# Patient Record
Sex: MALE | Race: WHITE | NOT HISPANIC OR LATINO | Employment: UNEMPLOYED | URBAN - METROPOLITAN AREA
[De-identification: names, ages, dates, MRNs, and addresses within clinical notes are randomized per-mention and may not be internally consistent; named-entity substitution may affect disease eponyms.]

---

## 2023-09-28 ENCOUNTER — LAB (OUTPATIENT)
Dept: LAB | Facility: LAB | Age: 53
End: 2023-09-28
Payer: MEDICAID

## 2023-09-28 LAB
ALANINE AMINOTRANSFERASE (SGPT) (U/L) IN SER/PLAS: 11 U/L (ref 10–52)
ALBUMIN (G/DL) IN SER/PLAS: 2.7 G/DL (ref 3.4–5)
ALCOHOL (MG/DL) IN URINE: <10 MG/DL
ALKALINE PHOSPHATASE (U/L) IN SER/PLAS: 108 U/L (ref 33–120)
ALPHA 1 ANTITRYPSIN (MG/DL) IN SER/PLAS: 146 MG/DL (ref 84–218)
ALPHA-1 FETOPROTEIN (NG/ML) IN SER/PLAS: <4 NG/ML (ref 0–9)
AMPHETAMINE (PRESENCE) IN URINE BY SCREEN METHOD: NORMAL
ANION GAP IN SER/PLAS: 11 MMOL/L (ref 10–20)
ASPARTATE AMINOTRANSFERASE (SGOT) (U/L) IN SER/PLAS: 33 U/L (ref 9–39)
BARBITURATES PRESENCE IN URINE BY SCREEN METHOD: NORMAL
BASOPHILS (10*3/UL) IN BLOOD BY AUTOMATED COUNT: 0.03 X10E9/L (ref 0–0.1)
BASOPHILS/100 LEUKOCYTES IN BLOOD BY AUTOMATED COUNT: 0.8 % (ref 0–2)
BENZODIAZEPINE (PRESENCE) IN URINE BY SCREEN METHOD: NORMAL
BILIRUBIN DIRECT (MG/DL) IN SER/PLAS: 0.3 MG/DL (ref 0–0.3)
BILIRUBIN TOTAL (MG/DL) IN SER/PLAS: 1.1 MG/DL (ref 0–1.2)
CALCIDIOL (25 OH VITAMIN D3) (NG/ML) IN SER/PLAS: 20 NG/ML
CALCIUM (MG/DL) IN SER/PLAS: 8.2 MG/DL (ref 8.6–10.6)
CANNABINOIDS IN URINE BY SCREEN METHOD: NORMAL
CARBON DIOXIDE, TOTAL (MMOL/L) IN SER/PLAS: 23 MMOL/L (ref 21–32)
CARCINOEMBRYONIC AG (NG/ML) IN SER/PLAS: 6.8 UG/L
CERULOPLASMIN (MG/DL) IN SER/PLAS: 29 MG/DL (ref 20–60)
CHLORIDE (MMOL/L) IN SER/PLAS: 113 MMOL/L (ref 98–107)
COCAINE (PRESENCE) IN URINE BY SCREEN METHOD: NORMAL
CREATININE (MG/DL) IN SER/PLAS: 1.82 MG/DL (ref 0.5–1.3)
CYTOMEGALOVIRUS IGG ANTIBODY: NONREACTIVE
DRUG SCREEN COMMENT URINE: NORMAL
EOSINOPHILS (10*3/UL) IN BLOOD BY AUTOMATED COUNT: 0.14 X10E9/L (ref 0–0.7)
EOSINOPHILS/100 LEUKOCYTES IN BLOOD BY AUTOMATED COUNT: 4 % (ref 0–6)
ERYTHROCYTE DISTRIBUTION WIDTH (RATIO) BY AUTOMATED COUNT: 17 % (ref 11.5–14.5)
ERYTHROCYTE MEAN CORPUSCULAR HEMOGLOBIN CONCENTRATION (G/DL) BY AUTOMATED: 32 G/DL (ref 32–36)
ERYTHROCYTE MEAN CORPUSCULAR VOLUME (FL) BY AUTOMATED COUNT: 92 FL (ref 80–100)
ERYTHROCYTES (10*6/UL) IN BLOOD BY AUTOMATED COUNT: 3.23 X10E12/L (ref 4.5–5.9)
ESTIMATED AVERAGE GLUCOSE FOR HBA1C: 105 MG/DL
FENTANYL URINE: NORMAL
FERRITIN (UG/LL) IN SER/PLAS: 26 UG/L (ref 20–300)
GFR MALE: 43 ML/MIN/1.73M2
GLUCOSE (MG/DL) IN SER/PLAS: 103 MG/DL (ref 74–99)
HEMATOCRIT (%) IN BLOOD BY AUTOMATED COUNT: 29.7 % (ref 41–52)
HEMOGLOBIN (G/DL) IN BLOOD: 9.5 G/DL (ref 13.5–17.5)
HEMOGLOBIN A1C/HEMOGLOBIN TOTAL IN BLOOD: 5.3 %
HEPATITIS A TOTAL AB INTERPRETATION: REACTIVE
HEPATITIS B VIRUS CORE AB (PRESENCE) IN SER/PLAS BY IMM: NONREACTIVE
HEPATITIS B VIRUS CORE IGM AB PRESENCE IN SER/PLAS BY IMMUNOASSY: NONREACTIVE
HEPATITIS B VIRUS SURFACE AB (MIU/ML) IN SERUM: 30.8 MIU/ML
HEPATITIS B VIRUS SURFACE AG PRESENCE IN SERUM: NONREACTIVE
HEPATITIS C VIRUS AB PRESENCE IN SERUM: NONREACTIVE
HERPES SIMPLEX VIRUS 1 IGG: 5.3 INDEX
HERPES SIMPLEX VIRUS 2 IGG: <0.2 INDEX
HIV 1/ 2 AG/AB SCREEN: NONREACTIVE
IMMATURE GRANULOCYTES/100 LEUKOCYTES IN BLOOD BY AUTOMATED COUNT: 0.3 % (ref 0–0.9)
INR IN PPP BY COAGULATION ASSAY: 1.2 (ref 0.9–1.1)
IRON (UG/DL) IN SER/PLAS: 65 UG/DL (ref 35–150)
IRON BINDING CAPACITY (UG/DL) IN SER/PLAS: 301 UG/DL (ref 240–445)
IRON SATURATION (%) IN SER/PLAS: 22 % (ref 25–45)
LEUKOCYTES (10*3/UL) IN BLOOD BY AUTOMATED COUNT: 3.5 X10E9/L (ref 4.4–11.3)
LYMPHOCYTES (10*3/UL) IN BLOOD BY AUTOMATED COUNT: 0.68 X10E9/L (ref 1.2–4.8)
LYMPHOCYTES/100 LEUKOCYTES IN BLOOD BY AUTOMATED COUNT: 19.2 % (ref 13–44)
MONOCYTES (10*3/UL) IN BLOOD BY AUTOMATED COUNT: 0.34 X10E9/L (ref 0.1–1)
MONOCYTES/100 LEUKOCYTES IN BLOOD BY AUTOMATED COUNT: 9.6 % (ref 2–10)
NEUTROPHILS (10*3/UL) IN BLOOD BY AUTOMATED COUNT: 2.34 X10E9/L (ref 1.2–7.7)
NEUTROPHILS/100 LEUKOCYTES IN BLOOD BY AUTOMATED COUNT: 66.1 % (ref 40–80)
NRBC (PER 100 WBCS) BY AUTOMATED COUNT: 0 /100 WBC (ref 0–0)
OPIATES (PRESENCE) IN URINE BY SCREEN METHOD: NORMAL
OXYCODONE (PRESENCE) IN URINE BY SCREEN METHOD: NORMAL
PHENCYCLIDINE (PRESENCE) IN URINE BY SCREEN METHOD: NORMAL
PLATELETS (10*3/UL) IN BLOOD AUTOMATED COUNT: 84 X10E9/L (ref 150–450)
POTASSIUM (MMOL/L) IN SER/PLAS: 4.2 MMOL/L (ref 3.5–5.3)
PROTEIN TOTAL: 6.9 G/DL (ref 6.4–8.2)
PROTHROMBIN TIME (PT) IN PPP BY COAGULATION ASSAY: 13.1 SEC (ref 9.8–12.8)
RBC MORPHOLOGY IN BLOOD: NORMAL
SODIUM (MMOL/L) IN SER/PLAS: 143 MMOL/L (ref 136–145)
SYPHILIS TOTAL AB: NONREACTIVE
THYROTROPIN (MIU/L) IN SER/PLAS BY DETECTION LIMIT <= 0.05 MIU/L: 6.12 MIU/L (ref 0.44–3.98)
UREA NITROGEN (MG/DL) IN SER/PLAS: 21 MG/DL (ref 6–23)
VARICELLA ZOSTER IGG: POSITIVE

## 2023-09-29 LAB
ABO GROUP (TYPE) IN BLOOD: NORMAL
ANA PATTERN: ABNORMAL
ANA TITER: ABNORMAL
ANTI-NUCLEAR ANTIBODY (ANA): POSITIVE
CANCER AG 19-9 (U/ML) IN SER/PLAS: 22.21 U/ML
MITOCHONDRIAL ANTIBODY: NEGATIVE
RH FACTOR: NORMAL

## 2023-09-30 LAB
EBV INTERPRETATION: ABNORMAL
EPSTEIN-BARR VCA IGG: POSITIVE
EPSTEIN-BARR VCA IGM: <10 U/ML (ref 0–43.9)
EPSTEIN-BARR VIRUS EARLY ANTIGEN ANTIBODY, IGG: NEGATIVE
EPSTIEN-BARR NUCLEAR ANTIGEN AB: POSITIVE
ETHYL GLUCURONIDE SCREEN: NEGATIVE NG/ML
HEPATITIS BE ANTIGEN: NEGATIVE

## 2023-10-01 LAB
NIL(NEG) CONTROL SPOT COUNT: NORMAL
PANEL A SPOT COUNT: 2
PANEL B SPOT COUNT: 2
POS CONTROL SPOT COUNT: NORMAL
T-SPOT. TB INTERPRETATION: NEGATIVE

## 2023-10-02 ENCOUNTER — SOCIAL WORK (OUTPATIENT)
Dept: TRANSPLANT | Facility: HOSPITAL | Age: 53
End: 2023-10-02
Payer: MEDICAID

## 2023-10-02 ENCOUNTER — HOSPITAL ENCOUNTER (OUTPATIENT)
Dept: RADIOLOGY | Facility: EXTERNAL LOCATION | Age: 53
Discharge: HOME | End: 2023-10-02

## 2023-10-02 ENCOUNTER — APPOINTMENT (OUTPATIENT)
Dept: TRANSPLANT | Facility: HOSPITAL | Age: 53
End: 2023-10-02

## 2023-10-02 ENCOUNTER — OFFICE VISIT (OUTPATIENT)
Dept: TRANSPLANT | Facility: HOSPITAL | Age: 53
End: 2023-10-02
Payer: MEDICAID

## 2023-10-02 ENCOUNTER — DOCUMENTATION (OUTPATIENT)
Dept: TRANSPLANT | Facility: HOSPITAL | Age: 53
End: 2023-10-02

## 2023-10-02 VITALS
WEIGHT: 250.8 LBS | DIASTOLIC BLOOD PRESSURE: 100 MMHG | SYSTOLIC BLOOD PRESSURE: 204 MMHG | OXYGEN SATURATION: 99 % | HEART RATE: 57 BPM | TEMPERATURE: 97.9 F | BODY MASS INDEX: 37.15 KG/M2 | HEIGHT: 69 IN

## 2023-10-02 DIAGNOSIS — I85.10 ESOPHAGEAL VARICES IN ALCOHOLIC CIRRHOSIS (MULTI): ICD-10-CM

## 2023-10-02 DIAGNOSIS — Z94.9 TRANSPLANT: ICD-10-CM

## 2023-10-02 DIAGNOSIS — B96.81 H PYLORI ULCER: ICD-10-CM

## 2023-10-02 DIAGNOSIS — K70.30 ESOPHAGEAL VARICES IN ALCOHOLIC CIRRHOSIS (MULTI): ICD-10-CM

## 2023-10-02 DIAGNOSIS — Z01.818 PRE-TRANSPLANT EVALUATION FOR LIVER TRANSPLANT: ICD-10-CM

## 2023-10-02 DIAGNOSIS — K76.82 HEPATIC ENCEPHALOPATHY (MULTI): ICD-10-CM

## 2023-10-02 DIAGNOSIS — K27.9 H PYLORI ULCER: ICD-10-CM

## 2023-10-02 DIAGNOSIS — R18.8 CIRRHOSIS OF LIVER WITH ASCITES, UNSPECIFIED HEPATIC CIRRHOSIS TYPE (MULTI): Primary | ICD-10-CM

## 2023-10-02 DIAGNOSIS — Z01.818 ENCOUNTER FOR PRE-TRANSPLANT EVALUATION FOR LIVER TRANSPLANT: Primary | ICD-10-CM

## 2023-10-02 DIAGNOSIS — K74.60 CIRRHOSIS OF LIVER WITH ASCITES, UNSPECIFIED HEPATIC CIRRHOSIS TYPE (MULTI): Primary | ICD-10-CM

## 2023-10-02 LAB
ANTI-SMOOTH MUSCLE ANTIBODY: ABNORMAL
CYTOMEGALOVIRUS IGM ANTIBODY: <8 AU/ML
EER PETH: NORMAL
HEPATITIS BE ANTIBODY: NEGATIVE
LIVER/KIDNEY MICROSOME TYPE 1 ANTIBODIES: NORMAL
PETH 16:0/18:1 (POPETH): <10 NG/ML
PETH 16:0/18:2 (PLPETH): <10 NG/ML
PROSTATE SPECIFIC AG (NG/ML) IN SER/PLAS: 0.3 NG/ML (ref 0–4)
PROSTATE SPECIFIC AG FREE (NG/ML) IN SER/PLAS: <0.1 NG/ML
PROSTATE SPECIFIC AG FREE/PROSTATE SPECIFIC AG TOTAL IN SER/PLAS: <33 %

## 2023-10-02 PROCEDURE — 99214 OFFICE O/P EST MOD 30 MIN: CPT | Performed by: TRANSPLANT SURGERY

## 2023-10-02 PROCEDURE — 99215 OFFICE O/P EST HI 40 MIN: CPT | Performed by: INTERNAL MEDICINE

## 2023-10-02 PROCEDURE — 99204 OFFICE O/P NEW MOD 45 MIN: CPT | Performed by: TRANSPLANT SURGERY

## 2023-10-02 PROCEDURE — 99205 OFFICE O/P NEW HI 60 MIN: CPT | Performed by: INTERNAL MEDICINE

## 2023-10-02 RX ORDER — PROPRANOLOL HYDROCHLORIDE 20 MG/1
20 TABLET ORAL 2 TIMES DAILY
Status: ON HOLD | COMMUNITY
Start: 2023-08-03 | End: 2024-04-18 | Stop reason: ALTCHOICE

## 2023-10-02 RX ORDER — PANTOPRAZOLE SODIUM 40 MG/1
40 TABLET, DELAYED RELEASE ORAL 2 TIMES DAILY
COMMUNITY
Start: 2023-08-03

## 2023-10-02 RX ORDER — SODIUM BICARBONATE 650 MG/1
TABLET ORAL
Status: ON HOLD | COMMUNITY
Start: 2023-06-03 | End: 2024-04-18 | Stop reason: DRUGHIGH

## 2023-10-02 RX ORDER — SUCRALFATE 1 G/1
1 TABLET ORAL 4 TIMES DAILY
COMMUNITY
Start: 2023-08-03 | End: 2024-04-22 | Stop reason: HOSPADM

## 2023-10-02 RX ORDER — FUROSEMIDE 20 MG/1
20 TABLET ORAL 2 TIMES DAILY
COMMUNITY
Start: 2023-08-03 | End: 2024-02-06 | Stop reason: SINTOL

## 2023-10-02 ASSESSMENT — ENCOUNTER SYMPTOMS
ABDOMINAL PAIN: 0
ACTIVITY CHANGE: 0
CONFUSION: 1
UNEXPECTED WEIGHT CHANGE: 1
MUSCULOSKELETAL NEGATIVE: 1
ALLERGIC/IMMUNOLOGIC NEGATIVE: 1
BLOOD IN STOOL: 1
NAUSEA: 0
DIARRHEA: 0
RECTAL PAIN: 0
VOMITING: 0
HEMATOLOGIC/LYMPHATIC NEGATIVE: 1
EYES NEGATIVE: 1
NEUROLOGICAL NEGATIVE: 1
CARDIOVASCULAR NEGATIVE: 1
RESPIRATORY NEGATIVE: 1
CONSTIPATION: 0
ABDOMINAL DISTENTION: 1
ANAL BLEEDING: 0
ENDOCRINE NEGATIVE: 1

## 2023-10-02 ASSESSMENT — PAIN SCALES - GENERAL: PAINLEVEL: 0-NO PAIN

## 2023-10-02 NOTE — PROGRESS NOTES
Subjective   Goran Ibrahim is a 53 y.o. male who presents for Transplant Hepatology evaluation for liver transplant.     He had a number of hospitalizations at [facility] and [facility] from May to July 2023.     He explains that he developed symptoms which he attributed to a viral gastroenteritis in April 2023.  He started taking Pepto-Bismol.  At that time he noticed that his stools were dark and black.  He assumed that his dark stools were related to Pepto-Bismol use.  On May 18 is when he first took himself to the hospital at [facility].  He actually drove to the hospital and was confused.  He was pulled over by a  and had difficulty with his speech.  Ultimately, during that index hospitalization he was diagnosed with hepatic encephalopathy secondary to severe GI bleeding.  His GI bleeding was a major issue with recurrent GI bleeding events over days and weeks.  He was diagnosed with a duodenal ulcer which tested positive for H. pylori.  He also was diagnosed with esophageal varices.  During his first hospitalization he had also developed ascites and underwent an index paracentesis procedure.     He explains that at one point he was in the ICU and had required a number of blood transfusions.  He believes he had 3 endoscopy procedures in total and 4 colonoscopy procedures; in addition to multiple scans trying to determine where the GI bleeding was originating from.    Otherwise the details of his history is chronic sparse.  The patient is poor historian and has difficulty providing a clear timeline of events.    Over the last 3 months he has been out of the hospital.  He has been seeing gastroenterologist Dr. Dana Hills.  He has also been seeing a primary care physician and the nephrologist.    His past medical history is otherwise notable for hypertension for which she was on BP meds (these have since been stopped).    He was previously prediabetic diet controlled.  He says  before getting sick his weight was up to 325 pounds and he was struggling with obesity.    Past surgical history is otherwise notable for right arm fracture.    Family history: No known liver disease in the family.  His mother's probably from lung cancer.  His father is also  from metastatic lung cancer.    Social history he does smoke cigarettes daily since the age of 13.  He estimates 5 to 10 cigarettes/day for the last 40 years.  His last alcohol use was on the floors.  He describes weekend drinking 1-2 servings of hard liquor which would be at the bar.  He denies daily or excessive alcohol use.  He said in his 20s or 30s his drinking was a little heavier he would have binge type drinking at the bar on  and .  He denies any recreational drug use.  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  He said he is very careful about not drinking and driving.  He has never had DUI according to my questioning.    Review of Systems   Constitutional:  Positive for unexpected weight change. Negative for activity change.   HENT: Negative.  Negative for drooling, sneezing and tinnitus.    Eyes: Negative.    Respiratory: Negative.     Cardiovascular: Negative.    Gastrointestinal:  Positive for abdominal distention and blood in stool. Negative for abdominal pain, anal bleeding, constipation, diarrhea, nausea, rectal pain and vomiting.   Endocrine: Negative.    Genitourinary: Negative.    Musculoskeletal: Negative.    Skin: Negative.    Allergic/Immunologic: Negative.    Neurological: Negative.    Hematological: Negative.    Psychiatric/Behavioral:  Positive for confusion.         Objective   Physical Exam  Constitutional:       Appearance: Normal appearance. He is obese.   HENT:      Head: Normocephalic and atraumatic.      Mouth/Throat:      Mouth: Mucous membranes are moist.      Pharynx: Oropharynx is clear.   Eyes:      General: No scleral icterus.      "Extraocular Movements: Extraocular movements intact.      Pupils: Pupils are equal, round, and reactive to light.   Cardiovascular:      Rate and Rhythm: Normal rate and regular rhythm.      Heart sounds: No murmur heard.  Pulmonary:      Effort: Pulmonary effort is normal.      Breath sounds: Normal breath sounds.   Abdominal:      General: Abdomen is flat. Bowel sounds are normal.      Palpations: Abdomen is soft.   Musculoskeletal:         General: No swelling. Normal range of motion.      Right lower leg: No edema.      Left lower leg: No edema.   Skin:     Coloration: Skin is not jaundiced.   Neurological:      General: No focal deficit present.      Mental Status: He is alert and oriented to person, place, and time. Mental status is at baseline.   Psychiatric:         Mood and Affect: Mood normal.         Thought Content: Thought content normal.         Judgment: Judgment normal.          Lab Review    Blood Type: No results found for: \"ABORH\"  Lab Results   Component Value Date    CREATININE 1.82 (H) 09/28/2023    K 4.2 09/28/2023    GLUCOSE 103 (H) 09/28/2023    HCT 29.7 (L) 09/28/2023    WBC 3.5 (L) 09/28/2023    PLT 84 (L) 09/28/2023    CALCIUM 8.2 (L) 09/28/2023       Cardiographics  ECG:     Assessment/Plan    Diagnoses:   1. Encounter for pre-transplant evaluation for liver transplant    2. Esophageal varices in alcoholic cirrhosis (CMS/HCC)    3. H pylori ulcer      Goran Ibrahim is a satisfactory candidate for liver transplantation.     -Poor health literacy, very basic understanding of his medical condition and the liver transplant process.  -Cigarette smoking.    He has had issues with both variceal and non variceal related GI bleeding.    He had an index episode of hepatic encephalopathy which was probably in the context of gastrointestinal blood loss.    He also developed ascites which required paracentesis 1 time in the hospital.  He now has chronic kidney disease and renal impairment.    His " MELD score and MELD sodium score based on her recent blood work is 15, mostly driven by renal impairment.    Other important issues include H. pylori related duodenal ulcer,  He is a daily smoker with family history of lung cancer,    Plan:  I plan on obtaining some records to confirm and better understand some of his liver disease complications we will need to make some recommendations regarding management of his esophageal varices and H. pylori infection.  We will also work-up his renal impairment and the need for simultaneous liver kidney transplant.  I provided counseling regarding cigarette smoking cessation -5 minutes.    We will begin liver transplant testing.    SABI Francois MD   Transplant Hepatology    Counselling:  Advantages and disadvantages of transplantation were reviewed. Discussed the operative procedure and potential complications, particularly in regard to the need for re-operation. I addressed issues of post-operative recovery and follow-up. Lastly, I discussed the need for immunosuppressive therapy and the risk associated with this treatment.  He appears to have marginal understanding of his medical condition and the transplant process.

## 2023-10-02 NOTE — PROGRESS NOTES
ENCOUNTER    Visit Type Initial Visit  [facility]    Barriers to Communication / Understanding:   [] Language [] Vision [] Hearing [] Other      []  Present     Accompanied By: Ramirez Orosco    Organ For Transplant: Liver    Device For Implant: { device for implant (Optional):09575}    Ethnicity:  White    ADLs Fully Independent      Instrumental ADLs Fully Independent      Level of Activity Active      DME     Knowledge of Health Good      Why Do You Have End Stage Organ Disease ***    Knowledge of Transplant / VAD:  Yes Patient Is Able To Make An Informed Decision    Patient Understands the Risks of Transplant / VAD  Yes Rejection  Yes Infection Yes Complications  Yes Death  ***    Patient Understands Recovery and Follow-Up from Transplant / VAD  Yes Length Of State Yes Appointments  Yes Labs  Yes Rehabilitation  ***    Patient Has Identified Goals of Transplant / VAD Yes      Any Potential Donors? None     Overall Compliance  {GOOD/FAIR/POOR:45147}       Compliance With Medications {GOOD/FAIR/POOR:76087}    Managed By { managed by:87516}    Understanding Of Medication {GOOD/FAIR/POOR:56005}  Compliance With Appointments {GOOD/FAIR/POOR:56802}    How Does Patient Handle Health Problems      Organ { organ for transplant:59943}    Heart    Cardiac Rehab: { active complete compliant (Optional):27667}     IOP: { active complete compliant (Optional):30796}    Fluid Restriction:   {YES,NO:68802} [] Compliant     Anticoagulation Service:   {YES,NO:61736} [] Compliant   [] INR    Medical And Clinic Appointment Compliant { yes no (Optional):43858}      Lung    Pulmonary Rehab: { active complete compliant (Optional):32043}     IOP: { active complete compliant (Optional):03360}     O2 Therapy Compliance { yes no (Optional):92763}    Medical And Clinic Appointment Compliant { yes no (Optional):45442}      Dialysis:  [] What Dialysis Center [] Began      [] In Center  [] Home Hemo [] Peritoneal       Attendance:  Treatment Attendance { good fair poor (Optional):95302} Treatment Time     [] Shortened Treatments [] Rescheduled Treatments [] Missed Treatments       Fluids:    {YES,NO:34558} Does Patient Still Urinate  [] Fluid Restriction [] IDWG [] EDW { yes no (Optional):93494} Achieved Dry Weight        Diet:  { yes no (Optional):56150} Patient is compliant with renal restrictions    { yes no (Optional):74353} Patient is compliant with low sodium diet      Labs:    [] Patient Reported [] Collateral       []  Potassium [] Albumin [] Phosphorus      # of Binders:  [] # of Binders per meal [] Meals per day      []  # of Binders per Snack [] Snacks per day [] Phosphorus     Liver:  Is Lactulose prescribed { yes no (Optional):29431} Dose:   Timesper day:  Is patient compliant { yes no (Optional):72422}      Pancreas:  [] Checks blood sugar      times/day [] A1c   Hypoglycemic Episodes  Unawareness{ yes no (Optional):03440}  Outside Interventions      SOCIAL HISTORY  {YES,NO:16862}   { (Optional):90479}    Education: { education:49084}    Literate {YES,NO:90837} { education details:26685}  Computer literate {YES,NO:47644}  Internet access {YES,NO:51524}       Sources of Income:  Patient's Current Employment    [] Full-Time [] Part-Time [] Unemployed [] Retired     []  None [] Not working by choice [] Not working disabled     [] Short Term Leave   [] Other   Employment History ***    Will patient have paid status from employment during recovery { yes no (Optional):31465}  { employment during recovery:97445}    Spouse / SO Current Employment { spouse employment:09694}    Will spouse / SO have paid status from employment during recovery {YES,NO:41393}  { employment during recovery:18179}    Other Sources of Income { other sources of income (Optional):68295}      Does patient have financial concerns {YES,NO:79670}     Is patient able to meet  current monthly expenses {YES,NO:79234}    Resources:  { resources (Optional):60879}    Patient was provided information on transplant fundraising {YES,NO:82024}      Insurance:  Primary Insurance { insurance:47174}    Secondary Insurance { insurance:69762}    Prescription Coverage { prescription coverage:35760}    Medicare coverage due to { Medicare coverage due to:28522}    Medicare Part A {YES,NO:57556} Effective date    Medicare Part B {YES,NO:44832} Effective date    Medicare Part C {YES,NO:25882} Deductable  Out of Pocket Max    Medicare Part D {YES,NO:13981} Extra Help?    Medicaid {YES,NO:93930} Waiver {YES,NO:36900} Redetermination Date    HMO { HMO (Optional):39469}      FAMILY SYSTEM    Single {YES,NO:56547}   {YES,NO:98983} How long   Describe Relationship   {YES,NO:10055} How long   Describe Relationship   {YES,NO:01246} When   {YES,NO:10326} When  In a Relationship {YES,NO:59949}  How Long  Describe Relationship    Spouse / SO Name   Age   Health   Other Caregiver Responsibilities  Spouse / Significant others reaction to donation    Children:  {YES,NO:86925} # Biological  {SW yes no (Optional):72188} # Adopted   {YES,NO:70053} # Step Children        Child #1 Name  Age   Health    Lives { child lives location (Optional):[address]}  How Much Contact { child contact (Optional):20721}      Child #2 Name  Age   Health    Lives { child lives location (Optional):[address]}  How Much Contact { child contact (Optional):40950}    Parents:  Raised By { raised by:98708}    Did the patient have contact with the other parent {YES,NO:58865}    Mother  {YES,NO:39892} Age   Cause of Death   Father  {YES,NO:02767} Age   Cause of Death    Living Parent #1  Living Parent #2  Living Parent #3  Living Parent #4    Additional Information    Siblings:  [] # Biological [] # Half Siblings [] # Step Siblings         Sibling #1  Sibling #2  Sibling #3   Sibling  #4   Sibling #5   Sibling #6   Sibling #7     Support & Recovery Plan:  {YES,NO:50047} Adequate    Primary Support:  Name *** Phone ***  Age ***  Relationship to Patient ***  If employed, can they take time off work {YES,NO:80499} ***  If so, is it paid time off {YES,NO:20272} ***  If not, will this impact your finances {YES,NO:40369} ***  Did they attend education classes {YES,NO:51848} ***  Do they have other caregiver responsibilities (child or eldercare) {YES,NO:54671} ***  Do they have their own conditions which may prevent them from providing care for you {YES,NO:42290}  (Medical, psychological, physical limitations)  ***  Are they available on short notice {YES,NO:60492} ***  Are they reliable {YES,NO:80743} ***  Are they responsible {YES,NO:05823} ***  Are they able to understand and process new information {YES,NO:13007} ***  Do they have reliable transportation or will you allow them to use your vehicle {YES,NO:52371} ***  Are they currently involved in your care {YES,NO:04821} ***  Comments    Secondary Support  Alternate Support  Alternate Support    Housing:  {YES,NO:34834} Adequate { housin}  Type of Home { type of home:97042}  Distance to [facility] ***  Pets ***  Does Patient Feel Safe in Home {YES,NO:20183}   ***    Transportation:  {YES,NO:75239} Adequate  # Licensed Drivers in the Home ***  Does Patient Drive {YES,NO:92573} If not, why  # Reliable Vehicles  Does Patient use Public Transportation {YES,NO:10754}  Does Patient use Medical Transportation {YES,NO:46339}  Comments    MENTAL HEALTH  The patient reports their mood as ***     Reported Mental Health Diagnosis  { reported mental health diagnosis (Optional):30199}  Family History of Mental Health Concerns ***  What are patient psychosocial stressors ***    Cognition:  { cognition:49701}       Current Medications:  {YES,NO:19763}  Mental Health Meds   Rx'd by {SW prescribed by (Optional):73643}  Sleep Meds   Rx'd by {GEE prescribed by  (Optional):45404}  Pain Meds   Rx'd by {GEE prescribed by (Optional):19863}    OTC Meds ***  Past Medications ***    Counseling {GEE counseling (Optional):98715}      IOP  Has patient ever been hospitalized for mental health reasons {YES,NO:85719}   Was the hospitalization voluntary {GEE yes no (Optional):42511} Duration   Where    When  Describe situation    Discharge Plan for Follow Up  Was Discharge Plan Completed {YES,NO:31469}  Referral to Transplant Psych {YES,NO:84994}  Mental Health Follow Up Required {YES,NO:79317}     Suicide Assessment:  History of Suicide Ideation {YES,NO:29537}  [] Timeframe [] Frequency   Frequency {frequency (Optional):92976}  Plan Created  Intent to Follow Through  Outcome      History of Suicide Attempt {YES,NO:25771} { history of suicide attempt (Optional):49311}    History of Suicidal Ideation in the past 3 months {YES,NO:60258} Intensity   Duration   {frequency (Optional):49217}  Description of Plan      Plans thought of  Intent to Follow Through  Highest Level of Intent to Follow Through    Current Plan for Safety    Plan for Follow-Up    Patient's Reported Trauma History    What are patient's coping behaviors ***    Yarsanism / Spirituality ***    Attitude toward interviewer {GEE attitude toward interviewer:84743}    Eye Contact { eye contact:60061}    Appearance {GEE appearance:58394}    Affect { affect:19832}    Thought Process { thought process:08595}    Substance Use /Abuse History:  Current Tobacco User {YES,NO:84274}    Patient uses { patient uses tobacco (Optional):42547}  Tobacco Frequency   For How Long  Is Patient Required to Quit { required to quit (Optional):43626}  Former Tobacco User {YES,NO:24158}  Describe past tobacco use and date quit    Current Alcohol User {YES,NO:54535}  Type of Alcohol Used   Amount  Frequency   Pattern of Alcohol Use    Is Patient Required to Quit {GEE required to quit (Optional):52791}  Continued to use the substance despite being  told the substance is affecting their health    History of problems at work, school or home due to substance use      Former Alcohol User {YES,NO:51260}  Describe past alcohol use and date quit      Has patient ever gone to CD treatment {YES,NO:73947}  If yes, When, Where and What type of Program  Attends AA meetings {SW attends AA meetings (Optional):59940}  {SW yes no (Optional):48113} Sponsor  Do support people drink alcohol {YES,NO:76162}  If yes, describe support people's use  Is alcohol kept in the home {YES,NO:99253}  Does Patient need to sign a CD contract {YES,NO:27937}    Current Illegal / Unprescribed Drug User {YES,NO:82821}  Type of Illegal Drug Used   Frequency  Pattern of Drug Use    Is Patient Required to Quit {SW required to quit (Optional):46814}    Illegal / Unprescribed Drug #2  Type of Illegal Drug Used   Frequency  Pattern of Drug Use      Continue to use the substance despite being told the substance is affecting their health    History of problems at work, school or home due to substance use      Former Illegal / Unprescribed Drug User {YES,NO:33168}  Describe past illegal drug use and date quit    Has patient ever gone to CD treatment {YES,NO:11666}  If yes, When, Where and What type of Program   Attends AA/NA  meetings {SW attends AA meetings (Optional):96236}   Is patient on a Methadone / Suboxone regiment {YES,NO:53829}  Do support people use illegal drugs {YES,NO:19915}  If yes, describe support people's use  Are illegal drugs kept in the home {YES,NO:75079}  Does Patient need to sign a CD contract {YES,NO:84087}    Illegal / Unprescribed Drug #2  Type of Illegal Drug Used   Frequency  Pattern of Drug Use    Prescription Drug Abuse:  {YES,NO:93591} Has patient experienced feelings of addiction  {YES,NO:50655} Has patient experienced symptoms of withdrawal  {YES,NO:46411} Has patient experienced any side effects? e.g.  hallucinations or delusions    Does Patient Meet the Criteria for  Alcohol Use Disorder {YES,NO:31458} Diagnosis  Does Patient Meet [place]OTC guidelines { yes no unsure:48233}  Does Patient Meet the Criteria for Illegal Drug Use Disorder {YES,NO:24773} Diagnosis  Does Patient Meet [place]OTC guidelines { yes no unsure:77798}    [facility]OTC Substance Relapse Risk Factors { OSOTC substance relapse risk (Optional):50017}  DSM-5 Severity Factors: { DSM 5 severity (Optional):91862}    Plan    LEGAL ISSUES  {YES,NO:96186} Arrests  {YES,NO:60521} Currently probation or parole { yes no (Optional):17563}   snf {YES,NO:38288}  When   How long   Where       Citizenship:  {YES,NO:48158} [place] Citizen  {YES,NO:48793} Green Card {YES,NO:75317} Visa    Advance Directives: {YES,NO:19491}  HCPOA { HCPOA (Optional):63607}  { advance directives continued:75998}    Guardian:    IMPRESSION  ***    PLAN  ***

## 2023-10-03 ASSESSMENT — ENCOUNTER SYMPTOMS
ADENOPATHY: 0
CONFUSION: 0
CONSTIPATION: 0
AGITATION: 0
COLOR CHANGE: 0
ABDOMINAL DISTENTION: 0
DIZZINESS: 0
FEVER: 0
APPETITE CHANGE: 0
HALLUCINATIONS: 0
DIARRHEA: 0
WEAKNESS: 0
DYSURIA: 0
ABDOMINAL PAIN: 0
FREQUENCY: 0
ARTHRALGIAS: 0
LIGHT-HEADEDNESS: 0
EYES NEGATIVE: 1
SHORTNESS OF BREATH: 0
CHILLS: 0
COUGH: 0
HEMATURIA: 0

## 2023-10-03 NOTE — PROGRESS NOTES
Subjective   Goran Ibrahim is a 53 y.o. male who presents for for evaluation of liver transplant evaluation visit. He is from the [facility].  He was diagnosed with decompensated cirrhosis earlier this year which required long and several hospitalization due to HE and GI bleeding. His known etiology of cirrhosis is unclear. He had some drinking history but not heavy. He does have risk factor for WILCOX such HTN, obesity and Pre-diabetes. Prior to his decompensation, he was not aware of is liver disease  His decompensation was manifested by ascites, HE, EV. He was also found to have gastric/duodenal ulcers and H. Pylori.   CKD and possible component of HRS.  Fortunately, he had been in stable condition for the past 3 months without hospitalization. His ascites controlled with diuretics. No further GI bleeding    Of note, his records were limited and he is a poor historian    PMHx: CKD, pre-DM, HTN  PSHx: right wrist      Review of Systems   Constitutional:  Negative for appetite change, chills and fever.   HENT:  Positive for dental problem. Negative for congestion.    Eyes: Negative.    Respiratory:  Negative for cough and shortness of breath.    Cardiovascular:  Positive for leg swelling. Negative for chest pain.   Gastrointestinal:  Negative for abdominal distention, abdominal pain, constipation and diarrhea.   Endocrine: Negative for cold intolerance and heat intolerance.   Genitourinary:  Negative for dysuria, frequency, hematuria and urgency.   Musculoskeletal:  Negative for arthralgias.   Skin:  Negative for color change.   Allergic/Immunologic: Negative for environmental allergies.   Neurological:  Negative for dizziness, weakness and light-headedness.   Hematological:  Negative for adenopathy.   Psychiatric/Behavioral:  Negative for agitation, confusion and hallucinations.         Objective   Physical Exam  Constitutional:       Appearance: Normal appearance. He is not toxic-appearing.      Comments: No  obvious Hepatic encephalopathy    HENT:      Head: Normocephalic and atraumatic.      Nose: Nose normal.   Eyes:      Pupils: Pupils are equal, round, and reactive to light.   Cardiovascular:      Rate and Rhythm: Normal rate.   Pulmonary:      Effort: Pulmonary effort is normal. No respiratory distress.   Abdominal:      General: There is no distension.      Palpations: Abdomen is soft. There is no mass.      Comments: No significant ascites appreciated   Musculoskeletal:         General: No swelling. Normal range of motion.      Cervical back: Normal range of motion.   Skin:     General: Skin is warm and dry.   Neurological:      General: No focal deficit present.      Mental Status: He is alert and oriented to person, place, and time.   Psychiatric:         Mood and Affect: Mood normal.         Behavior: Behavior normal.            Lab Review      Lab Results   Component Value Date    CREATININE 1.82 (H) 09/28/2023    K 4.2 09/28/2023    GLUCOSE 103 (H) 09/28/2023    HCT 29.7 (L) 09/28/2023    WBC 3.5 (L) 09/28/2023    PLT 84 (L) 09/28/2023    CALCIUM 8.2 (L) 09/28/2023     Lab Results   Component Value Date    WBC 3.5 (L) 09/28/2023    HGB 9.5 (L) 09/28/2023    HCT 29.7 (L) 09/28/2023    MCV 92 09/28/2023    PLT 84 (L) 09/28/2023      Lab Results   Component Value Date    ALT 11 09/28/2023    AST 33 09/28/2023    ALKPHOS 108 09/28/2023    BILITOT 1.1 09/28/2023    MELD 3.0: 15 at 9/28/2023 11:52 AM  MELD-Na: 14 at 9/28/2023 11:52 AM  Calculated from:  Serum Creatinine: 1.82 mg/dL at 9/28/2023 11:52 AM  Serum Sodium: 143 mmol/L (Using max of 137 mmol/L) at 9/28/2023 11:52 AM  Total Bilirubin: 1.1 mg/dL at 9/28/2023 11:52 AM  Serum Albumin: 2.7 g/dL at 9/28/2023 11:52 AM  INR(ratio): 1.2 at 9/28/2023 11:52 AM  Age at listing (hypothetical): 53 years  Sex: Male at 9/28/2023 11:52 AM     Cardiographics      Assessment/Plan    Diagnoses: decompensated cirrhosis, newly diagnosed, MELD 15  I think he is a suitable  candidate for transplant from the surgical standpoint. He does not have in depth understanding of the process yet. Will start the evaluation process and would definitely benefit from continued hepatology management.

## 2023-10-09 ENCOUNTER — DOCUMENTATION (OUTPATIENT)
Dept: TRANSPLANT | Facility: HOSPITAL | Age: 53
End: 2023-10-09
Payer: MEDICAID

## 2023-10-19 DIAGNOSIS — Z01.818 ENCOUNTER FOR PRE-TRANSPLANT EVALUATION FOR LIVER TRANSPLANT: ICD-10-CM

## 2023-10-19 DIAGNOSIS — K70.31 ALCOHOLIC CIRRHOSIS OF LIVER WITH ASCITES (MULTI): Primary | ICD-10-CM

## 2023-10-25 ENCOUNTER — NUTRITION (OUTPATIENT)
Dept: TRANSPLANT | Facility: HOSPITAL | Age: 53
End: 2023-10-25
Payer: MEDICAID

## 2023-10-25 ENCOUNTER — TELEPHONE (OUTPATIENT)
Dept: TRANSPLANT | Facility: HOSPITAL | Age: 53
End: 2023-10-25

## 2023-10-25 NOTE — PROGRESS NOTES
Reason for Nutrition Visit:  Pt is a 53 y.o. male referred for initial nutrition assessment. Pt is being evaluated  for liver transplant.  Initial MELD - 15       Past Medical Hx:  There is no problem list on file for this patient.       Food and Nutrition Hx:  Pt is feeling good this week.  Pt's weight goal is to be at 200-220# to be healthy. He believes most of the fluid is off, he was 280# in May. He is trying to lose weight. He is watching his sugar intake, recent A1c is WNL, states he is diabetic had an A1c over 10% and lowered it with diet. He does not use salt, uses salt free seasoning.  Still working as a , morning and afternoon, although working as an aide now, not a . Does use lunch meat anymore, attempts to get lower sodium cheese.  Trying to eat more protein at dinner because he was told to eat more protein.  Uses airfryer, wants a bigger one, also likes to grill.     24 Diet Recall:  Meal 1: avocado toast - 4:30-5:30 am before work  Meal 2: [employer] - trying to cut it down, taking PB and J sandwiches, pickle, pretzels - sometimes - 10 am - 1 pm  Meal 3:  4:30 - 6 pm - salads or fish, shrimp, noodles, broccoli or coleslaw  Snacks:  popcorn after dinner   Beverages: water -  oz, and ice chips, sometimes loves to drink milk    WEIGHT HISTORY  CW: 250.8#  BMI: 37 (Class 2 Obesity)  Currently at home: 240#  Weight change:    Significant Weight Change: No    Lab Results   Component Value Date    HGBA1C 5.3 09/28/2023    HGBA1C 5.2 06/28/2023    BUN 21 09/28/2023    K 4.2 09/28/2023          Food Preparation: Patient  Cooking Skills/Barriers: None reported  Grocery Shopping: Patient        Allergies: None  Intolerance: None  Appetite: Fluctuates  Intake: >75%  GI Symptoms : None  BM 2-3 x/day. Does have lactulose but not using, no confusion at this time  Swallowing Difficulty: No problems with swallowing  Dentition : poor condition, unable to chew vegetables, going to the dentist to get  them fixed    Eating Out Type: Brunch and Lunch  1 times per week much less than previously, was eating out 4-5x/week  Convenience Foods: Denies     Types of Activities: Walking  Duration: 30-60 minutes  once a week, after dinner    Sleep duration/quality : 7+ hours and continuous sleep  Sleep disorders: none    Supplements: Denies       Nutrition Focused Physical Exam:    Performed/Deferred: Deferred due to be being virtual visit    Muscle Wasting:  Temporal: Mild per telehealth      Malnutrition Present: No    Estimated Energy Needs:    Weight Maintanence: 25 kcal/kg/day and 1.2-1.4 g/pro/kg/day        Estimated Needs: 2700 kcal for weight maintenance, 136-145 protein, and per MD fluid    Nutrition Diagnosis:    Diagnosis Statement 1:  Diagnosis Status: New  Diagnosis : Food and nutrition related knowledge deficit related to lack of or limited prior nutrition-related education as evidenced by  diet recall, need for a low sodium diet, increased protein      Nutrition Interventions:    1) We reviewed the importance and compliance with a 2 gm sodium diet. Recommend decreasing high sodium foods such as soups, gravies, regular deli meats, condiments, prepackaged foods, crackers, chips.  One teaspoon of salt contain more than 2000 mg of sodium.  When reviewing a food label, choose foods than have less than 20% of the daily value of sodium     2) We discussed the importance of adequate protein consumption    Adding extra protein:  Add ¼ cup nonfat dry milk powder or protein powder to make a high-protein milk to drink or to use in recipes that call for milk.  Vanilla or peppermint extract or unsweetened cocoa powder could help to boost the flavor.  Add hard-cooked eggs, leftover meat, grated cheese, canned beans or tofu to noodles, rice, salads, sandwiches, soups,  casseroles, pasta, tuna and other mixed dishes.  Add powdered milk or protein powder to hot cereals, meatloaf, casseroles, scrambled eggs, sauces, cream soups,  and shakes.  Add beans and lentils to salads, soups, casseroles, and vegetable dishes.  Eat cottage cheese or yogurt, especially Greek yogurt, with fruit as a snack or dessert.  Eat peanut or other nut butters on crackers, bread, toast, waffles, apples, bananas or celery sticks. Add it to milkshakes,  smoothies, or desserts.  Consider a ready-made protein shake. Your RDN will make recommendations.     Nutrition Goals:  Nutrition Goals : diet compliance with diet 2 gm sodium restriction, high protein diet    Nutrition Recommendations:  1) Consider MVI daily - d/t limited fruit and vegetables and stated hair falling out

## 2023-10-27 ENCOUNTER — OFFICE VISIT (OUTPATIENT)
Dept: TRANSPLANT | Facility: HOSPITAL | Age: 53
End: 2023-10-27
Payer: MEDICAID

## 2023-10-27 DIAGNOSIS — F17.200 TOBACCO USE DISORDER, MODERATE, DEPENDENCE: Primary | ICD-10-CM

## 2023-10-27 PROCEDURE — 90791 PSYCH DIAGNOSTIC EVALUATION: CPT | Performed by: PSYCHOLOGIST

## 2023-10-27 PROCEDURE — 90791 PSYCH DIAGNOSTIC EVALUATION: CPT | Mod: AH,GT | Performed by: PSYCHOLOGIST

## 2023-10-27 NOTE — PROGRESS NOTES
Pre-Transplant Psychological Evaluation    Reason for Referral  Goran Ibrahim is a 53 y.o. male with a past medical history of ESLD.  He is referred for a routine, pre-transplant psychological evaluation and is seen virtually today.      Knowledge of Surgery  Goran Ibrahim reports he has considered the possibility of undergoing Liver transplant since 2023 and appears to have a limited to fair understanding of the risks and benefits of transplant surgery. He is able to name death, heart attack, and stroke as risks of surgery.  He knows he will have to take medications daily for the rest of his life or duration of the transplant, though is unable to provide additional information about this.  He indicates the benefits of undergoing Liver transplant surgery would include getting back to his normal life, being less fatigued, and living longer.      Medical History  ESLD  Hepatic Encephalopathy   Liver cirrhosis   Esophageal varices  HTN  H. Pylori  Renal impairment    Surgical History  Past Surgical History:   Procedure Laterality Date    IR ANGIOGRAM CELIAC  5/19/2023    IR ANGIOGRAM CELIAC 5/19/2023    US GUIDED ABDOMINAL PARACENTESIS  6/22/2023    US GUIDED ABDOMINAL PARACENTESIS 6/22/2023    US GUIDED ABDOMINAL PARACENTESIS  5/19/2023    US GUIDED ABDOMINAL PARACENTESIS 5/19/2023       Current Medications  Current Outpatient Medications   Medication Instructions    furosemide (LASIX) 20 mg, oral, 2 times daily    pantoprazole (PROTONIX) 40 mg, oral, 2 times daily    propranolol (INDERAL) 20 mg, oral, 2 times daily    sodium bicarbonate 650 mg tablet TAKE 1 TABLET (650 MG) BY MOUTH DAILY. DO NOT START BEFORE JUNE 4, 2023.    sucralfate (CARAFATE) 1 g, oral, 4 times daily, Take before meals        Medical Adherence   Goran Ibrahim denies problems with medical adherence.     Adherence with medication regimen:   Able to name medications: Yes, all but sucralfate    Able to name why he is taking these medications: Yes  "  Where medications are kept: on top of the mini-fridge in the dining  room  How medications are organized: takes meds out of the bottle  Difficulties taking medications:  prescribed lactulose but was going to the bathroom too frequently. States he discussed with doctor regarding not taking it as long as he is having 2-3 bowel movements per day. States doctor is ok with this.   Last time “forgot” a medication: July, 2023 when he had just gotten out of the hospital and was really \"tired and out of it.\"   Last time intentionally “skipped”a medication: denied  Last time self-adjusted medication without medical teams' recommendation: denied  Adherence with dietary/fluid restrictions: N/A, was just given a low salt recommendation by dietician this week so will be working on this  Adherence with binders: Yes   Adherence with appointment attendance: pt denies, though states doesn't like doctors and had not been to a doctor for years until appointments in May   History of leaving the hospital AMA: no    Psychosocial History  Marital Status:  many years ago, not currently in a relationship  Children: Yes - 2 girls ages 30 and 32  Living Situation: lives in [place], [place]. He lives in a Private Home  with a friend who is helping him out with driving and support.    Educational History: high school diploma/GED   History of learning disabilities: No   History of special education: No   Occupational History:  , has CDL  Disability: none  Financial Concerns: No  Legal:  none     History:  none    Interests:  concerts, sing and run karaoke, walk around [place]hag[place], go to fairs, amusement bhagat, TV , ride motorcycle    Support System for Surgery  Goran Ibrahim reports friend Ana Luias who lives with him will serve as his primary support(s) following surgery. She is able to take time off work and drive him to and from appointments. Pt does have 2 daughters, but one lives in [place] and the one who lives locally is just " "reconnecting with pt. Pt's brother, Lenny, will stay for 1-2 weeks after surgery and then come back to help intermittently. He states he has discussed this plan formally with his proposed primary support(s). Pt has other siblings who live out of state who might also be able to help.     Psychiatry History  Previous diagnoses: no  Previous psychiatric treatment and medication trials: no  Previous psychotherapy: no  Previous treatment modalities employed include None.   Previous self-injurious, non suicidal behavior: no   Previous psychiatric hospitalizations: no  Previous suicide attempts: no  History of abuse/trauma: no  History of violence: no  Depression:  denied sx .   Anxiety: some worry and avoidance regarding medical care  Poppy: negative   Psychosis: negative   Sleep problems: some waking related to Lasix   Pain: denied  Family psychiatric history: Family history is significant for mom, dad, and brother \"drank a lot\". Brother, who will reportedly help pt intermittently after transplant, is working on cutting back his drinking. Discussed that pt's brother will need refrain from drinking around pt if he comes to help after transplant.     Substance Use History   Alcohol. Amount (shots/glasses/beers per day/week, etc.) : max use ages [age] drinking in response to stress, last few years would drink 1-2 mixed drinks. Would mostly drink whiskey and wine coolers. . Frequency: weekends (is a  and denies drinking during the week) . Last used: May 1, 2023. Hx of withdrawal sx: none. Hx of seizures: none. Hx of delirium tremens: none.     Caffeine use:  occasional pop; not daily    Tobacco use: yes - 1 pack every 3-4 days for the past 40 years. Pt working on weaning himself down. Not using a patch or gum. Longest quit period was 6 months by using a zero nicotine vape pen.     Marijuana: denied    Other illicit drugs: denied    Impact on daily Life: denies    History of treatment: History of 12-Step " Participation ongoing. Participating in about 6-7 AA meetings per week and just received 6 month sober chip. Pt looking forward to AA meeting online via [facility].     Current Psychiatric Symptoms   Anxiety: Negative  Depression: negative  Psychosis: negative  Poppy: negative  Sleep problems: Mild    Pain: none  Safety issues:  no SI/HI, has pistols and hunting rifles, stored in a fire-rated gun safe, ammunition stored with guns     Stressors: health  Coping strategies: talking with someone (pt very close with brother, who lives in [place])     Psychiatric Risk Assessment  Violence Risk Assessment: acces to weapons, male, and presence of firearms in home  Acute Risk of Harm to Others is Considered: low   Suicide Risk Assessment: access to weapons, , chronic medical illness, male, and unmarried  Protective Factors against Suicide: adherence to  treatment, employment, fear of suicide, hopefulness/future orientation, and positive family relationships  Acute Risk of Harm to Self is Considered: low    Mental Status Exam  General Appearance:  casually dressed, poor dentition noted  Attitude/Behavior: cooperative  Motor: no psychomotor agitation or retardation, no tremor or other abnormal movements  Speech: normal rate, volume, prosody  Gait/Station:  not observed  Mood:  appropriate to situation    Affect: congruent with mood and topic of conversation  Thought Process: linear, goal directed  Thought Associations: no loosening of associations  Thought Content: normal  Perception: no perceptual abnormalities noted  Sensorium: alert and oriented to person, place, time and situation  Insight: limited  Judgment: fair  Cognition:  in tact to conversation, but with limited recall and potentially low health literacy; pt with difficulty recalling various parts of his medical history     Assessment  Goran Ibrahim is a 53 y.o. male with a past medical history of ESLD.  He is referred for a routine,  pre-transplant psychological evaluation.      On evaluation, he appears motivated for recovery, rehabilitation, and lifestyle changes to support an active life post-transplant. Notably, he has already begun attending AA and indicates he just received his 6 month chip. Additionally, there do not appear to be any psychiatric concerns.  His understanding of his health conditions and transplant, however, appears limited.  It is unclear whether this is a reflection of low health literacy (pt reports he has avoided healthcare for much of his adult life) vs. baseline cognitive deficits.  As such, it will be important to obtain a cognitive screen and to ensure that he has a solid support system to assist with medication management and adherence to his medical regimen.     Impression  Tobacco use disorder, moderate  Cognitive deficits     Recommendations  1) Psychiatric:  no psychiatric concerns at this time. Should he begin to experience clinically significant mood or anxiety symptoms at any point during transplant evaluation, he is free to follow-up with me in clinic.    2) Substance use: pt continues to smoke and will need to cut down/quit smoking as per TI recs. Pt should continue AA group and will need to provide appropriate documentation to Transplant SW.      3) Support: He lives with a friend, Ana Luisa, and reported adequate support for transportation, in home care as needed, medication assistance and emotional support.  He reports his brother, Lenny (lives out of town), will also help periodically. Recommend confirmation of support plan with SW as pt will likely need at least 2 solid supports given cognitive/health literacy concerns.     4) Cognitive: some concern regarding pt's ability to relay parts of his history. Also concern for low health literacy given his report of avoiding medical care for much of his adult life. Would refer pt to OT or other appropriate provider for cognitive screening (ie [facility], [facility],  [place] [facility]).     5) Other: would recommend pt acquire and use a pill box. He may need help with medication management from a support person.  Would also recommend pt bring a support person with him to all appointments.       Plan  There are no psychiatric or substance misuse/dependency contraindications to transplant at this time.  No follow-up needed unless recommended by members of transplant team or requested by patient.      Jammie Floyd, PhD  Psychologist     Start Time: 1300  End Time: 0317

## 2023-11-02 ENCOUNTER — TELEPHONE (OUTPATIENT)
Dept: TRANSPLANT | Facility: HOSPITAL | Age: 53
End: 2023-11-02
Payer: MEDICAID

## 2023-11-02 NOTE — TELEPHONE ENCOUNTER
VM left for patient notifying him that he will need to be referred to an oral surgeon for teeth extraction and explained that due to liver disease and low platelet count he would be at risk for uncontrolled bleeding. Advised that his dentist refer him to an oral surgeon for procedure.

## 2023-11-03 ENCOUNTER — TELEPHONE (OUTPATIENT)
Dept: TRANSPLANT | Facility: HOSPITAL | Age: 53
End: 2023-11-03
Payer: MEDICAID

## 2023-11-03 NOTE — TELEPHONE ENCOUNTER
Patient called to follow up on dental questions. Reviewed options of [facility] and local oral surgeon in [place]. Provided contact information for both. Advised patient to notify coordinator if [facility] schedules him too far out.

## 2023-11-09 ENCOUNTER — TELEPHONE (OUTPATIENT)
Dept: TRANSPLANT | Facility: HOSPITAL | Age: 53
End: 2023-11-09
Payer: MEDICAID

## 2023-11-09 ASSESSMENT — PATIENT HEALTH QUESTIONNAIRE - PHQ9
2. FEELING DOWN, DEPRESSED OR HOPELESS: NOT AT ALL
7. TROUBLE CONCENTRATING ON THINGS, SUCH AS READING THE NEWSPAPER OR WATCHING TELEVISION: NOT AT ALL
1. LITTLE INTEREST OR PLEASURE IN DOING THINGS: NOT AT ALL
10. IF YOU CHECKED OFF ANY PROBLEMS, HOW DIFFICULT HAVE THESE PROBLEMS MADE IT FOR YOU TO DO YOUR WORK, TAKE CARE OF THINGS AT HOME, OR GET ALONG WITH OTHER PEOPLE: NOT DIFFICULT AT ALL
9. THOUGHTS THAT YOU WOULD BE BETTER OFF DEAD, OR OF HURTING YOURSELF: NOT AT ALL
4. FEELING TIRED OR HAVING LITTLE ENERGY: MORE THAN HALF THE DAYS
8. MOVING OR SPEAKING SO SLOWLY THAT OTHER PEOPLE COULD HAVE NOTICED. OR THE OPPOSITE, BEING SO FIGETY OR RESTLESS THAT YOU HAVE BEEN MOVING AROUND A LOT MORE THAN USUAL: NOT AT ALL
3. TROUBLE FALLING OR STAYING ASLEEP OR SLEEPING TOO MUCH: MORE THAN HALF THE DAYS
SUM OF ALL RESPONSES TO PHQ QUESTIONS 1-9: 4
5. POOR APPETITE OR OVEREATING: NOT AT ALL
6. FEELING BAD ABOUT YOURSELF - OR THAT YOU ARE A FAILURE OR HAVE LET YOURSELF OR YOUR FAMILY DOWN: NOT AT ALL
SUM OF ALL RESPONSES TO PHQ9 QUESTIONS 1 & 2: 0

## 2023-11-09 ASSESSMENT — ANXIETY QUESTIONNAIRES
1. FEELING NERVOUS, ANXIOUS, OR ON EDGE: MORE THAN HALF THE DAYS
GAD7 TOTAL SCORE: 4
IF YOU CHECKED OFF ANY PROBLEMS ON THIS QUESTIONNAIRE, HOW DIFFICULT HAVE THESE PROBLEMS MADE IT FOR YOU TO DO YOUR WORK, TAKE CARE OF THINGS AT HOME, OR GET ALONG WITH OTHER PEOPLE: NOT DIFFICULT AT ALL
3. WORRYING TOO MUCH ABOUT DIFFERENT THINGS: NOT AT ALL
7. FEELING AFRAID AS IF SOMETHING AWFUL MIGHT HAPPEN: NOT AT ALL
6. BECOMING EASILY ANNOYED OR IRRITABLE: NOT AT ALL
5. BEING SO RESTLESS THAT IT IS HARD TO SIT STILL: NOT AT ALL
4. TROUBLE RELAXING: NOT AT ALL
2. NOT BEING ABLE TO STOP OR CONTROL WORRYING: MORE THAN HALF THE DAYS

## 2023-11-09 NOTE — TELEPHONE ENCOUNTER
This version of the note has been redacted during the course of a chart correction case.  If you need access to the original text of this note, please contact the Health Information Management department.

## 2023-11-09 NOTE — TELEPHONE ENCOUNTER
Returned call to patient, he said he has an appointment with oral surgery at [facility] tomorrow, he thinks he needs a letter to have his teeth pulled from us.  Discussed that they will likely evaluate him and then request clearance from us.  He is not sure if they will pull the teeth tomorrow. He will have them call here if something is needed tomorrow.

## 2023-11-09 NOTE — PROGRESS NOTES
"  ENCOUNTER    Visit Type Initial Visit  [facility]    Barriers to Communication / Understanding:   [] Language [] Vision [] Hearing [] Other      []  Present     Accompanied By: Ana Luisa, Friend    Organ For Transplant: Liver    Ethnicity:  White    ADLs Fully Independent      Instrumental ADLs Fully Independent      Level of Activity Active      DME     Knowledge of Health Good  Liver Cirrhosis     Why Do You Have End Stage Organ Disease - \"They're saying alcohol. I'm not buying that.\"    Knowledge of Transplant / VAD:  Yes Patient Is Able To Make An Informed Decision    Patient Understands the Risks of Transplant / VAD  Yes Rejection  Yes Infection Yes Complications  Yes Death      Patient Understands Recovery and Follow-Up from Transplant / VAD  Yes Length Of Stay Yes Appointments  Yes Labs  Yes Rehabilitation      Patient Has Identified Goals of Transplant / VAD Yes  \"I want to live longer.\"      Any Potential Donors?     Overall Compliance  Good       Compliance With Medications Good    Managed By Patient    Understanding Of Medications Good  Compliance With Appointments Good    How Does Patient Handle Health Problems - Pt stated he will call the doctor or go to the hospital.      Organ Liver    IOP:       Medical And Clinic Appointment Compliant     Dialysis:  [] What Dialysis Center  [] Began       [] In Center [] Home Hemo [] Peritoneal       Attendance:  Treatment Attendance  Treatment Time     [] Shortened Treatments [] Rescheduled Treatments [] Missed Treatments       Fluids:    Yes Does Patient Still Urinate  [] Fluid Restriction [] IDWG [] EDW  Achieved Dry Weight        Diet:   Patient is compliant with renal restrictions     Patient is compliant with low sodium diet      Labs:    [] Patient Reported [] Collateral       []  Potassium [] Albumin [] Phosphorus      # of Binders:  [] # of Binders per meal [] Meals per day      []  # of Binders per Snack [] Snacks per " day [] Phosphorus     Pancreas:  [] Checks blood sugar      times/day [] A1c   Hypoglycemic Episodes  Unawareness  Outside Interventions    Liver:  Is Lactulose prescribed Yes Dose:   Timesper day:  Is patient compliant Yes      SOCIAL HISTORY  No       Education: High School Diploma    Literate Yes   Computer literate Yes  Internet access Yes       Sources of Income:    Patient's Current Employment    [] Full-Time [x] Part-Time [] Unemployed [] Retired     []  None [] Not working by choice [] Not working disabled     [] Short Term Leave   [] Other   Employment History - 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    Will patient have paid status from employment during recovery No      Spouse / SO Current Employment     Will spouse / SO have paid status from employment during recovery No    Other Sources of Income:  None       Does patient have financial concerns No     Is patient able to meet current monthly expenses Yes    Resources:      Patient was provided information on transplant fundraising No      Insurance:  Primary Insurance Medicaid     Secondary Insurance     Prescription Coverage     Medicare coverage due to     Medicare Part A No Effective date    Medicare Part B No Effective date    Medicare Part C No Deductable  Out of Pocket Max    Medicare Part D No Extra Help?    Medicaid No Waiver No Redetermination Date    HMO       FAMILY SYSTEM    Single     How long   Describe Relationship    How long   Describe Relationship   Yes When    When  In a Relationship   How Long  Describe Relationship    Spouse / SO Name   Age   Health   Other Caregiver Responsibilities  Spouse / Significant others reaction to donation    Children:  Yes # Biological 2  # Adopted    # Step Children        Child #1 Name Hina  Age 32  Health Good     Lives   How Much Contact       Child #2 Name Mary  Age 30  Health  Good     Lives   How Much Contact     Child #3  Name Age  Health  Lives   How Much Contact     Parents:  Raised By Both Biological Parents    Did the patient have contact with the other parent     Mother  Yes Age 70  Cause of Death Cancer  Father  Yes Age 62  Cause of Death  Cancer    Living Parent #1  Living Parent #2    Additional Information    Siblings:  [x] # Biological 1 brother, 2 sisters [] # Half Siblings [] # Step Siblings     Sibling #1  Sibling #2    Support & Recovery Plan:  Yes Adequate    Primary Support:  Name Ana Luisa Phone [phone]  Age 35  Relationship to Patient Friend  If employed, can they take time off work Yes   If so, is it paid time off No   If not, will this impact your finances No   Did they attend education classes Yes   Do they have other caregiver responsibilities (child or eldercare) No   Do they have their own conditions which may prevent them from providing care for you No  (Medical, psychological, physical limitations)    Are they available on short notice Yes   Are they reliable Yes   Are they responsible Yes   Are they able to understand and process new information Yes   Do they have reliable transportation or will you allow them to use your vehicle Yes   Are they currently involved in your care Yes   Comments    Secondary Support:  Name Lenny Phone [phone] Age 58  Relationship to Patient Brother  If employed, can they take time off work Yes   If so, is it paid time off Yes   If not, will this impact your finances No  Did they attend education classes No   Do they have other caregiver responsibilities (child or eldercare) No   Do they have their own conditions which may prevent them from providing care for you No  (Medical, psychological, physical limitations)    Are they available on short notice No, lives out of state.  Are they reliable  Yes  Are they responsible Yes   Are they able to understand and process new information Yes   Do they have reliable transportation or will you allow them to use  your vehicle Yes   Are they currently involved in your care Yes   Comments    Alternate Support - Gerda Villalobos  Phone: ([phone]) Age: 58  Relationship:  Friend   She drives, has reliable transportation, and available on short notice.  She does not have any medical, psychological, or physical limitations.    Housing:  Yes Adequate Owns home  Type of Home Two Story  Distance to [facility] One hour  Pets None  Does Patient Feel Safe in Home Yes      Transportation:  Yes Adequate  # Licensed Drivers in the Home 2  Does Patient Drive Yes If not, why  # Reliable Vehicles  2  Does Patient use Public Transportation No  Does Patient use Medical Transportation No  Comments    MENTAL HEALTH      Cognition:  No impairment observed / reported    The patient reports their mood as good.     Reported Mental Health Diagnosis:  None     Family History of Mental Health Concerns No  What are patient psychosocial stressors - Pt shared his health problems are stressful.       Current Medications:  None  Mental Health Meds   Rx'd by   Sleep Meds   Rx'd by   Pain Meds   Rx'd by     OTC Meds None  Past Medications None    Counseling       Has patient ever been hospitalized for mental health reasons No   Was the hospitalization voluntary  Duration   Where    When  Describe situation    Discharge Plan for Follow Up  Was Discharge Plan Completed   Referral to Transplant Psych Yes  Mental Health Follow Up Required No     Suicide Assessment:  History of Suicide Ideation No  [] Timeframe [] Frequency   Frequency   Plan Created  Intent to Follow Through  Outcome      History of Suicide Attempt No     History of Suicidal Ideation in the past 3 months No Intensity   Duration     Description of Plan      Plans thought of  Intent to Follow Through  Highest Level of Intent to Follow Through    Current Plan for Safety    Plan for Follow-Up    Patient's Reported Trauma History:  Denied     What are patient's coping behaviors:       Bahai / Spirituality  - Pt shared he is Jewish.      Attitude toward interviewer Cooperative    Eye Contact Patient maintained good eye contact throughout appointment    Appearance The patient was neatly groomed, appropriately dressed and adequately nourished    Affect Appropriate    Thought Process Appropriate    Substance Use /Abuse History:    Current Tobacco User Yes  Patient uses Cigarettes  Tobacco Frequency  5 to 10 daily For How Long  40 years  Is Patient Required to Quit Yes    Former Tobacco User No  Describe past tobacco use and date quit    Current Alcohol User No  Type of Alcohol Used   Amount  Frequency   Pattern of Alcohol Use    Is Patient Required to Quit   Continued to use the substance despite being told the substance is affecting their health    History of problems at work, school or home due to substance use      Former Alcohol User Yes  Describe past alcohol use and date quit  Pt shared he would have anywhere from 2 to 6 drinks on the weekends only.  He quit drinking in May 2023.    Has patient ever gone to CD treatment No  If yes, When, Where and What type of Program  Attends AA meetings    Sponsor  Do support people drink alcohol Yes  If yes, describe support people's use  Is alcohol kept in the home No  Does Patient need to sign a CD contract No    Current Illegal / Unprescribed Drug User No  Type of Illegal Drug Used   Frequency  Pattern of Drug Use    Is Patient Required to Quit     Illegal / Unprescribed Drug #2  Type of Illegal Drug Used   Frequency  Pattern of Drug Use      Continue to use the substance despite being told the substance is affecting their health    History of problems at work, school or home due to substance use      Former Illegal / Unprescribed Drug User Yes  Describe past illegal drug use and date quit  Pt shared he previously used Marijuana once in a while.    Has patient ever gone to CD treatment No  If yes, When, Where and What type of Program   Attends AA/NA  meetings    Is patient  on a Methadone / Suboxone regiment No  Do support people use illegal drugs No  If yes, describe support people's use  Are illegal drugs kept in the home No  Does Patient need to sign a CD contract No    Illegal / Unprescribed Drug #2  Type of Illegal Drug Used   Frequency  Pattern of Drug Use    Prescription Drug Abuse:  No Has patient experienced feelings of addiction  No Has patient experienced symptoms of withdrawal  No Has patient experienced any side effects? e.g.  hallucinations or delusions    Does Patient Meet the Criteria for Alcohol Use Disorder No Diagnosis  Does Patient Meet OSOTC guidelines   Does Patient Meet the Criteria for Illegal Drug Use Disorder No Diagnosis  Does Patient Meet OSOTC guidelines     OSOTC Substance Relapse Risk Factors   DSM-5 Severity Factors:     IOP     LEGAL ISSUES  No Arrests  No Currently probation or parole No   FCI No  When   How long   Where       Citizenship:  Yes [place] Citizen  No Green Card No Visa    Advance Directives: Yes  HCPOA Requested Copy  Pt's brother, Lenny Ibrahim is HCPOA.    Guardian:    TY FLYNN met with pt and pt's friend, Ana Luisa for an initial psychosocial assessment.  Pt and his friend were pleasant and engaged.  Pt reported he is independent with his ADL's/IADL's and active at home.  Pt reported the cause of his end stage liver disease is, “They're saying alcohol. I'm not buying that.”  His goal for transplant is to live longer.  Pt reported good compliance with his medications and medical appointments.  He manages his medications independently and takes them out of the pill bottles.  Pt is  with two adult daughters.  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  Pt has Medicaid for insurance.  Pt denied having any financial concerns or difficulty obtaining prescription medications.  Pt named his friend, Ana Luisa ([phone]) as his primary support, his brother, Lenny ([phone]) as his secondary support, and his  friend, Gerda Villalobos ([phone]) as his tertiary support.  Lenny lives out of state.  Supports drive, have reliable transportation, and are available on short notice.  Pt denied having any mental health treatment history.  He described his current mood as good.  Pt denied previous psychiatric hospitalizations, suicide attempts, or active suicidal ideation.  PHQ-9 was administered and pt scored a “4” which indicates none to minimal depression.  JUANA-7 was administered and pt scored a “4” which indicates minimal anxiety.  Pt reported he currently smokes 5 to 10 cigarettes daily and has since he was 13 years old.  Pt denied current alcohol use, with last use in May 2023.  He reported his past alcohol use varied and could have anywhere from 2 to 6 drinks.  He shared he drank on the weekends only.  Pt denied any current illicit substance use.  He shared he used Marijuana in the past once in a while.  Pt denied having any legal problems.  Pt stated his brother, Lenny Ibrahim is his HCPOA.  SW requested copy for chart.  Pt and support signed Transplant Care Agreement.  Pt's SIPAT score is “24” which indicates pt is a minimally acceptable candidate.  Consider listing.  Identified risk factors must be satisfactorily addressed before representing for consideration.  Risks include active nicotine use.  GEE recommends pt engage in weekly AA meetings and have random alcohol screens to ensure sobriety.    PLAN  SW to confirm secondary and tertiary supports via phone.  SW will follow up with pt in 3 to 6 months.  This appointment can be virtual.

## 2023-11-14 ENCOUNTER — APPOINTMENT (OUTPATIENT)
Dept: CARDIOLOGY | Facility: CLINIC | Age: 53
End: 2023-11-14
Payer: MEDICAID

## 2023-11-21 ENCOUNTER — HOSPITAL ENCOUNTER (OUTPATIENT)
Dept: CARDIOLOGY | Facility: HOSPITAL | Age: 53
Discharge: HOME | End: 2023-11-21
Payer: MEDICAID

## 2023-11-21 ENCOUNTER — APPOINTMENT (OUTPATIENT)
Dept: RESPIRATORY THERAPY | Facility: HOSPITAL | Age: 53
End: 2023-11-21
Payer: MEDICAID

## 2023-11-21 ENCOUNTER — TELEPHONE (OUTPATIENT)
Dept: TRANSPLANT | Facility: HOSPITAL | Age: 53
End: 2023-11-21
Payer: MEDICAID

## 2023-11-21 ENCOUNTER — HOSPITAL ENCOUNTER (OUTPATIENT)
Dept: RESPIRATORY THERAPY | Facility: HOSPITAL | Age: 53
End: 2023-11-21
Payer: MEDICAID

## 2023-11-21 DIAGNOSIS — Z01.818 ENCOUNTER FOR PRE-TRANSPLANT EVALUATION FOR LIVER TRANSPLANT: ICD-10-CM

## 2023-11-21 DIAGNOSIS — K70.31 ALCOHOLIC CIRRHOSIS OF LIVER WITH ASCITES (MULTI): ICD-10-CM

## 2023-11-21 DIAGNOSIS — K74.60 CIRRHOSIS OF LIVER WITH ASCITES, UNSPECIFIED HEPATIC CIRRHOSIS TYPE (MULTI): ICD-10-CM

## 2023-11-21 DIAGNOSIS — R18.8 CIRRHOSIS OF LIVER WITH ASCITES, UNSPECIFIED HEPATIC CIRRHOSIS TYPE (MULTI): ICD-10-CM

## 2023-11-21 PROCEDURE — 93017 CV STRESS TEST TRACING ONLY: CPT

## 2023-11-21 PROCEDURE — 93350 STRESS TTE ONLY: CPT | Performed by: INTERNAL MEDICINE

## 2023-11-21 PROCEDURE — 93016 CV STRESS TEST SUPVJ ONLY: CPT | Performed by: INTERNAL MEDICINE

## 2023-11-21 PROCEDURE — 93018 CV STRESS TEST I&R ONLY: CPT | Performed by: INTERNAL MEDICINE

## 2023-11-21 NOTE — TELEPHONE ENCOUNTER
PT called saying he missed his Pulmonary testing, and it looks like his Dr. Connelly appt. For today was cancelled as well. He will go to his stress echo this afternoon, but he will need to have the others rescheduled. He needs to be called before we schedule because he has to plan to get off work. [phone]

## 2023-11-22 ENCOUNTER — TELEPHONE (OUTPATIENT)
Dept: TRANSPLANT | Facility: HOSPITAL | Age: 53
End: 2023-11-22
Payer: MEDICAID

## 2023-11-24 NOTE — TELEPHONE ENCOUNTER
Late entry: Spoke with patient to follow up on dental records left for coordinator and missed PFT appointment. Patient states he forgot to bring his appointment letter so he didn't know the location of where to go for PFTs and initially came to [place] [id number] for appointment. By the time he made it to [facility] for PFTs (per patient approximately 15 minutes late) he was told he would need to reschedule. Patient's 11/21/23 cardiology appointment was cancelled and rescheduled to 12/19 -- the patient nor the liver txp office was notified. Since patient that lives an hour away and is currently working coordinator reached out to cardiology team to see if Goran can be rescheduled to 12/5 (same day as PFTs). Awaiting response.     Patient was unable to find local oral surgeon, therefore, he will be referred to [facility].

## 2023-11-27 ENCOUNTER — TELEPHONE (OUTPATIENT)
Dept: TRANSPLANT | Facility: HOSPITAL | Age: 53
End: 2023-11-27
Payer: MEDICAID

## 2023-11-27 NOTE — TELEPHONE ENCOUNTER
Colette from [facility] called - needs records faxed over to [phone], and her phone number is [phone]. She needs to know if this is a clearance for Transplant, plus other info, and she scheduled him for tomorrow at 2 pm if Liliana thinks he can come then. Please call her back

## 2023-11-28 ENCOUNTER — APPOINTMENT (OUTPATIENT)
Dept: TRANSPLANT | Facility: HOSPITAL | Age: 53
End: 2023-11-28
Payer: MEDICAID

## 2023-11-28 NOTE — TELEPHONE ENCOUNTER
VM left for Colette at [facility] and faxed over dental referral and records.

## 2023-11-30 ENCOUNTER — APPOINTMENT (OUTPATIENT)
Dept: CARDIOLOGY | Facility: CLINIC | Age: 53
End: 2023-11-30
Payer: MEDICAID

## 2023-11-30 ENCOUNTER — TELEPHONE (OUTPATIENT)
Dept: TRANSPLANT | Facility: HOSPITAL | Age: 53
End: 2023-11-30
Payer: MEDICAID

## 2023-11-30 NOTE — TELEPHONE ENCOUNTER
Patient called to let us know his Cardiology appointment is now 12/08/23 and he will then see Dr. Francois 12/19/23.  Appointments mailed to patient.   SUBJECTIVE:  Feeling well no cp or sob  Family at beside     OBJECTIVE:  I/O's  No intake or output data in the 24 hours ending 05/16/23 1318    Last Recorded Vitals  Blood pressure (!) 156/88, pulse 76, temperature 98.4 °F (36.9 °C), temperature source Oral, resp. rate 16, height 5' 2\" (1.575 m), weight 90.7 kg (199 lb 15.3 oz), SpO2 93 %.  Body mass index is 36.57 kg/m².    Physical Exam  HENT:      Head: Normocephalic and atraumatic.   Eyes:      Pupils: Pupils are equal, round, and reactive to light.   Cardiovascular:      Rate and Rhythm: Normal rate and regular rhythm.      Heart sounds: Normal heart sounds.   Pulmonary:      Effort: Pulmonary effort is normal.      Breath sounds: Normal breath sounds.   Abdominal:      General: Bowel sounds are normal.      Palpations: Abdomen is soft.   Musculoskeletal:         General: Normal range of motion.      Cervical back: Normal range of motion and neck supple.   Skin:     General: Skin is warm and dry.   Neurological:      Mental Status: She is alert and oriented to person, place, and time.      Deep Tendon Reflexes: Reflexes are normal and symmetric.            Labs   Recent Results (from the past 24 hour(s))   TROPONIN I, HIGH SENSITIVITY    Collection Time: 05/15/23  3:09 PM   Result Value Ref Range    Troponin I, High Sensitivity 128 (HH) <52 ng/L   Lipid Panel Without Reflex    Collection Time: 05/15/23  4:26 PM   Result Value Ref Range    Cholesterol 158 <=199 mg/dL    Triglycerides 66 <=149 mg/dL    HDL 57 >=50 mg/dL    LDL 88 <=129 mg/dL    Non-HDL Cholesterol 101 mg/dL    Cholesterol/ HDL Ratio 2.8 <=4.4   Thyroid Stimulating Hormone    Collection Time: 05/15/23  4:26 PM   Result Value Ref Range    TSH 1.840 0.350 - 5.000 mcUnits/mL   TROPONIN I, HIGH SENSITIVITY    Collection Time: 05/15/23  4:26 PM   Result Value Ref Range    Troponin I, High Sensitivity 131 (HH) <52 ng/L   Magnesium    Collection Time: 05/15/23  4:26 PM   Result Value Ref Range     Magnesium 1.9 1.7 - 2.4 mg/dL   Lipid Panel With Reflex    Collection Time: 05/16/23  6:24 AM   Result Value Ref Range    Cholesterol 161 <=199 mg/dL    Triglycerides 111 <=149 mg/dL    HDL 55 >=50 mg/dL    LDL 84 <=129 mg/dL    Non-HDL Cholesterol 106 mg/dL    Cholesterol/ HDL Ratio 2.9 <=4.4   Thyroid Stimulating Hormone Reflex    Collection Time: 05/16/23  6:24 AM   Result Value Ref Range    TSH 2.220 0.350 - 5.000 mcUnits/mL   Magnesium    Collection Time: 05/16/23  6:24 AM   Result Value Ref Range    Magnesium 2.0 1.7 - 2.4 mg/dL   Comprehensive Metabolic Panel    Collection Time: 05/16/23  6:24 AM   Result Value Ref Range    Fasting Status      Sodium 140 135 - 145 mmol/L    Potassium 3.9 3.4 - 5.1 mmol/L    Chloride 111 (H) 97 - 110 mmol/L    Carbon Dioxide 22 21 - 32 mmol/L    Anion Gap 11 7 - 19 mmol/L    Glucose 133 (H) 70 - 99 mg/dL    BUN 12 6 - 20 mg/dL    Creatinine 0.64 0.51 - 0.95 mg/dL    Glomerular Filtration Rate >90 >=60    BUN/Cr 19 7 - 25    Calcium 8.8 8.4 - 10.2 mg/dL    Bilirubin, Total 0.5 0.2 - 1.0 mg/dL    GOT/AST 15 <=37 Units/L    GPT/ALT 19 <64 Units/L    Alkaline Phosphatase 107 45 - 117 Units/L    Albumin 3.4 (L) 3.6 - 5.1 g/dL    Protein, Total 6.9 6.4 - 8.2 g/dL    Globulin 3.5 2.0 - 4.0 g/dL    A/G Ratio 1.0 1.0 - 2.4   TROPONIN I, HIGH SENSITIVITY    Collection Time: 05/16/23  6:24 AM   Result Value Ref Range    Troponin I, High Sensitivity 51 <52 ng/L   CBC with Automated Differential (performable only)    Collection Time: 05/16/23  6:24 AM   Result Value Ref Range    WBC 7.1 4.2 - 11.0 K/mcL    RBC 4.75 4.00 - 5.20 mil/mcL    HGB 13.5 12.0 - 15.5 g/dL    HCT 41.5 36.0 - 46.5 %    MCV 87.4 78.0 - 100.0 fl    MCH 28.4 26.0 - 34.0 pg    MCHC 32.5 32.0 - 36.5 g/dL    RDW-CV 13.2 11.0 - 15.0 %    RDW-SD 41.8 39.0 - 50.0 fL     140 - 450 K/mcL    NRBC 0 <=0 /100 WBC    Neutrophil, Percent 62 %    Lymphocytes, Percent 27 %    Mono, Percent 7 %    Eosinophils, Percent 3 %     Basophils, Percent 1 %    Immature Granulocytes 0 %    Absolute Neutrophils 4.4 1.8 - 7.7 K/mcL    Absolute Lymphocytes 1.9 1.0 - 4.0 K/mcL    Absolute Monocytes 0.5 0.3 - 0.9 K/mcL    Absolute Eosinophils  0.2 0.0 - 0.5 K/mcL    Absolute Basophils 0.1 0.0 - 0.3 K/mcL    Absolute Immature Granulocytes 0.0 0.0 - 0.2 K/mcL   Stress test only    Collection Time: 05/16/23  9:12 AM   Result Value Ref Range    Predicted HR Max 157 BPM        Imaging  No images are attached to the encounter.         ASSESSMENT/PLAN:  syncopal episode  Cardio on consult  ECHO pending  Tilt table test neg  Stress test noted with mild reversible defect, d/w Dr. Ang, plan for cath tomorrow  Telemetry as ordered  Metoprolol and cozaar  Pravastatin as ordered     Acute hyperglycemia in the absence of diabetes mellitus type 2 and a history of gestational diabetes  A1c 6.6     Acute UTI- ruled out  Stop abx  Culture pending  AFebrile  No leukocytosis     HTN  meds as above     HLD  Pravachol as ordered  Lipid panel within normal limits     Anxiety  Xanax as ordered     Obesity  BMI 36.57  Diet and lifestyle modifications encouraged     Smoking Cessation  Ready to quit: Not Answered  Counseling given: Not Answered        DVT: Lovenox        DISPO: home tomorrow if cath neg    Code Status    Code Status: Full Resuscitation    Primary Care Physician  Tommy Adair MD Kimberly D Rosenthal, DO

## 2023-12-04 ENCOUNTER — TELEPHONE (OUTPATIENT)
Dept: TRANSPLANT | Facility: HOSPITAL | Age: 53
End: 2023-12-04
Payer: MEDICAID

## 2023-12-04 DIAGNOSIS — Z01.818 ENCOUNTER FOR PRE-TRANSPLANT EVALUATION FOR LIVER TRANSPLANT: Primary | ICD-10-CM

## 2023-12-04 DIAGNOSIS — K70.31 ALCOHOLIC CIRRHOSIS OF LIVER WITH ASCITES (MULTI): ICD-10-CM

## 2023-12-04 DIAGNOSIS — K72.10 END STAGE LIVER DISEASE (MULTI): ICD-10-CM

## 2023-12-04 NOTE — TELEPHONE ENCOUNTER
Dr. Edgar from [facility] called, PT will have full extraction, she needs Coag studies faxed to her at [phone]. Her number is [phone]. She said she faxed over a form already.

## 2023-12-04 NOTE — TELEPHONE ENCOUNTER
Message sent via [employer] advising patient to go to local  lab for updated MELD and screens which will also provide coagulation studies needed by [place] oral surgeon prior to full dental extraction. Lab orders in system.

## 2023-12-05 ENCOUNTER — HOSPITAL ENCOUNTER (OUTPATIENT)
Dept: RESPIRATORY THERAPY | Facility: HOSPITAL | Age: 53
Discharge: HOME | End: 2023-12-05
Payer: MEDICAID

## 2023-12-05 ENCOUNTER — LAB (OUTPATIENT)
Dept: LAB | Facility: LAB | Age: 53
End: 2023-12-05
Payer: MEDICAID

## 2023-12-05 DIAGNOSIS — Z01.818 ENCOUNTER FOR PRE-TRANSPLANT EVALUATION FOR LIVER TRANSPLANT: ICD-10-CM

## 2023-12-05 DIAGNOSIS — R18.8 CIRRHOSIS OF LIVER WITH ASCITES, UNSPECIFIED HEPATIC CIRRHOSIS TYPE (MULTI): ICD-10-CM

## 2023-12-05 DIAGNOSIS — K72.10 END STAGE LIVER DISEASE (MULTI): ICD-10-CM

## 2023-12-05 DIAGNOSIS — K70.31 ALCOHOLIC CIRRHOSIS OF LIVER WITH ASCITES (MULTI): ICD-10-CM

## 2023-12-05 DIAGNOSIS — K74.60 CIRRHOSIS OF LIVER WITH ASCITES, UNSPECIFIED HEPATIC CIRRHOSIS TYPE (MULTI): ICD-10-CM

## 2023-12-05 DIAGNOSIS — I10 HYPERTENSION, UNSPECIFIED TYPE: ICD-10-CM

## 2023-12-05 LAB
ALBUMIN SERPL BCP-MCNC: 3 G/DL (ref 3.4–5)
ALP SERPL-CCNC: 121 U/L (ref 33–120)
ALT SERPL W P-5'-P-CCNC: 12 U/L (ref 10–52)
ANION GAP SERPL CALC-SCNC: 13 MMOL/L (ref 10–20)
AST SERPL W P-5'-P-CCNC: 32 U/L (ref 9–39)
BASE EXCESS BLDA CALC-SCNC: -2.6 MMOL/L (ref -2–3)
BASOPHILS # BLD AUTO: 0.05 X10*3/UL (ref 0–0.1)
BASOPHILS NFR BLD AUTO: 1.3 %
BILIRUB SERPL-MCNC: 0.9 MG/DL (ref 0–1.2)
BODY TEMPERATURE: 37 DEGREES CELSIUS
BUN SERPL-MCNC: 29 MG/DL (ref 6–23)
CALCIUM SERPL-MCNC: 8.2 MG/DL (ref 8.6–10.6)
CHLORIDE SERPL-SCNC: 111 MMOL/L (ref 98–107)
CO2 SERPL-SCNC: 23 MMOL/L (ref 21–32)
COHGB MFR BLDA: 1.3 %
CREAT SERPL-MCNC: 2.28 MG/DL (ref 0.5–1.3)
DO-HGB MFR BLDA: 2.4 % (ref 0–5)
EOSINOPHIL # BLD AUTO: 0.16 X10*3/UL (ref 0–0.7)
EOSINOPHIL NFR BLD AUTO: 4.1 %
ERYTHROCYTE [DISTWIDTH] IN BLOOD BY AUTOMATED COUNT: 17.2 % (ref 11.5–14.5)
GFR SERPL CREATININE-BSD FRML MDRD: 33 ML/MIN/1.73M*2
GLUCOSE SERPL-MCNC: 96 MG/DL (ref 74–99)
HCO3 BLDA-SCNC: 21.6 MMOL/L (ref 22–26)
HCT VFR BLD AUTO: 31 % (ref 41–52)
HGB BLD-MCNC: 10.1 G/DL (ref 13.5–17.5)
HGB BLDA-MCNC: 9.5 G/DL (ref 13.5–17.5)
IMM GRANULOCYTES # BLD AUTO: 0.01 X10*3/UL (ref 0–0.7)
IMM GRANULOCYTES NFR BLD AUTO: 0.3 % (ref 0–0.9)
INR PPP: 1.1 (ref 0.9–1.1)
LYMPHOCYTES # BLD AUTO: 0.88 X10*3/UL (ref 1.2–4.8)
LYMPHOCYTES NFR BLD AUTO: 22.7 %
MCH RBC QN AUTO: 30.6 PG (ref 26–34)
MCHC RBC AUTO-ENTMCNC: 32.6 G/DL (ref 32–36)
MCV RBC AUTO: 94 FL (ref 80–100)
METHGB MFR BLDA: 0.5 % (ref 0–1.5)
MGC ASCENT PFT - FEV1 - PRE: 2.53
MGC ASCENT PFT - FEV1 - PREDICTED: 3.42
MGC ASCENT PFT - FVC - PRE: 3.77
MGC ASCENT PFT - FVC - PREDICTED: 4.32
MONOCYTES # BLD AUTO: 0.45 X10*3/UL (ref 0.1–1)
MONOCYTES NFR BLD AUTO: 11.6 %
NEUTROPHILS # BLD AUTO: 2.32 X10*3/UL (ref 1.2–7.7)
NEUTROPHILS NFR BLD AUTO: 60 %
NRBC BLD-RTO: 0 /100 WBCS (ref 0–0)
OXYHGB MFR BLDA: 95.8 % (ref 94–98)
PCO2 BLDA: 34 MM HG (ref 38–42)
PH BLDA: 7.41 PH (ref 7.38–7.42)
PLATELET # BLD AUTO: 77 X10*3/UL (ref 150–450)
PO2 BLDA: 92 MM HG (ref 85–95)
POTASSIUM SERPL-SCNC: 4 MMOL/L (ref 3.5–5.3)
PROT SERPL-MCNC: 7.1 G/DL (ref 6.4–8.2)
PROTHROMBIN TIME: 12.4 SECONDS (ref 9.8–12.8)
RBC # BLD AUTO: 3.3 X10*6/UL (ref 4.5–5.9)
SAO2 % BLDA: 98 % (ref 94–100)
SODIUM SERPL-SCNC: 143 MMOL/L (ref 136–145)
WBC # BLD AUTO: 3.9 X10*3/UL (ref 4.4–11.3)

## 2023-12-05 PROCEDURE — 94010 BREATHING CAPACITY TEST: CPT

## 2023-12-05 PROCEDURE — 82375 ASSAY CARBOXYHB QUANT: CPT

## 2023-12-05 PROCEDURE — 36600 WITHDRAWAL OF ARTERIAL BLOOD: CPT

## 2023-12-05 PROCEDURE — 36415 COLL VENOUS BLD VENIPUNCTURE: CPT

## 2023-12-05 PROCEDURE — 85610 PROTHROMBIN TIME: CPT

## 2023-12-05 PROCEDURE — 80321 ALCOHOLS BIOMARKERS 1OR 2: CPT

## 2023-12-05 PROCEDURE — 80061 LIPID PANEL: CPT

## 2023-12-05 PROCEDURE — 3023F SPIROM DOC REV: CPT | Performed by: INTERNAL MEDICINE

## 2023-12-05 PROCEDURE — 80053 COMPREHEN METABOLIC PANEL: CPT

## 2023-12-05 PROCEDURE — 85025 COMPLETE CBC W/AUTO DIFF WBC: CPT

## 2023-12-06 ENCOUNTER — DOCUMENTATION (OUTPATIENT)
Dept: TRANSPLANT | Facility: HOSPITAL | Age: 53
End: 2023-12-06
Payer: MEDICAID

## 2023-12-08 ENCOUNTER — CONSULT (OUTPATIENT)
Dept: CARDIOLOGY | Facility: CLINIC | Age: 53
End: 2023-12-08
Payer: MEDICAID

## 2023-12-08 VITALS
HEIGHT: 70 IN | SYSTOLIC BLOOD PRESSURE: 164 MMHG | BODY MASS INDEX: 36.08 KG/M2 | WEIGHT: 252 LBS | OXYGEN SATURATION: 95 % | DIASTOLIC BLOOD PRESSURE: 87 MMHG | HEART RATE: 54 BPM

## 2023-12-08 DIAGNOSIS — I10 HYPERTENSION, UNSPECIFIED TYPE: ICD-10-CM

## 2023-12-08 DIAGNOSIS — Z01.818 ENCOUNTER FOR PRE-TRANSPLANT EVALUATION FOR LIVER TRANSPLANT: Primary | ICD-10-CM

## 2023-12-08 DIAGNOSIS — Z01.810 PREOP CARDIOVASCULAR EXAM: ICD-10-CM

## 2023-12-08 LAB
CHOLEST SERPL-MCNC: 137 MG/DL (ref 0–199)
CHOLESTEROL/HDL RATIO: 4.9
HDLC SERPL-MCNC: 28.2 MG/DL
LABORATORY REPORT: NORMAL
LDLC SERPL CALC-MCNC: 80 MG/DL
NON HDL CHOLESTEROL: 109 MG/DL (ref 0–149)
PETH INTERPRETATION: NORMAL
PLPETH BLD-MCNC: <10 NG/ML
POPETH BLD-MCNC: <10 NG/ML
TRIGL SERPL-MCNC: 142 MG/DL (ref 0–149)
VLDL: 28 MG/DL (ref 0–40)

## 2023-12-08 PROCEDURE — 99214 OFFICE O/P EST MOD 30 MIN: CPT | Performed by: INTERNAL MEDICINE

## 2023-12-08 PROCEDURE — 93010 ELECTROCARDIOGRAM REPORT: CPT | Performed by: INTERNAL MEDICINE

## 2023-12-08 PROCEDURE — 99204 OFFICE O/P NEW MOD 45 MIN: CPT | Performed by: INTERNAL MEDICINE

## 2023-12-08 RX ORDER — AMLODIPINE BESYLATE 5 MG/1
5 TABLET ORAL DAILY
Qty: 90 TABLET | Refills: 3 | Status: SHIPPED | OUTPATIENT
Start: 2023-12-08 | End: 2024-05-24 | Stop reason: WASHOUT

## 2023-12-08 ASSESSMENT — ENCOUNTER SYMPTOMS
FALLS: 0
WHEEZING: 0
HEMATURIA: 0
MEMORY LOSS: 0
MYALGIAS: 0
VOMITING: 0
ABDOMINAL PAIN: 0
DYSURIA: 0
BLOATING: 0
ALTERED MENTAL STATUS: 0
COUGH: 0
HEADACHES: 0
CHILLS: 0
CONSTIPATION: 0
HEMOPTYSIS: 0
NAUSEA: 0
DEPRESSION: 0
DIARRHEA: 0
FEVER: 0
OCCASIONAL FEELINGS OF UNSTEADINESS: 0
LOSS OF SENSATION IN FEET: 0

## 2023-12-08 ASSESSMENT — COLUMBIA-SUICIDE SEVERITY RATING SCALE - C-SSRS
2. HAVE YOU ACTUALLY HAD ANY THOUGHTS OF KILLING YOURSELF?: NO
1. IN THE PAST MONTH, HAVE YOU WISHED YOU WERE DEAD OR WISHED YOU COULD GO TO SLEEP AND NOT WAKE UP?: NO
6. HAVE YOU EVER DONE ANYTHING, STARTED TO DO ANYTHING, OR PREPARED TO DO ANYTHING TO END YOUR LIFE?: NO

## 2023-12-08 ASSESSMENT — PAIN SCALES - GENERAL: PAINLEVEL: 0-NO PAIN

## 2023-12-08 NOTE — PROGRESS NOTES
Chief complaint:  Preop Liver    HPI  52 yo WM w/ h/o cirrhosis/liver Fx, varices, HTN, CKD, TOB now here for cardiology consult. No chest pain. No dyspnea at rest. No WEEKS. No orthopnea/PND. No palps. No LH/dizzy/syncope. No edema. No claudication. No cough.   ECG 12/23: SB (58), PVC, nonsp T-wave changes  DSE 11/23: no ischemia, EF 60% -> 65-70%, submax (67%)  CT ab 6/23: mild CAC, nl heart size, no peric eff, nl Ao    Review of Systems   Constitutional: Negative for chills, fever and malaise/fatigue.   HENT:  Negative for hearing loss.    Eyes:  Negative for visual disturbance.   Respiratory:  Negative for cough, hemoptysis and wheezing.    Skin:  Negative for rash.   Musculoskeletal:  Negative for falls and myalgias.   Gastrointestinal:  Negative for bloating, abdominal pain, constipation, diarrhea, dysphagia, nausea and vomiting.   Genitourinary:  Negative for dysuria and hematuria.   Neurological:  Negative for headaches.   Psychiatric/Behavioral:  Negative for altered mental status, depression and memory loss.         Social History     Tobacco Use    Smoking status: Not on file    Smokeless tobacco: Not on file   Substance Use Topics    Alcohol use: Not on file        No family history on file.     No Known Allergies     Current Outpatient Medications   Medication Instructions    furosemide (LASIX) 20 mg, oral, 2 times daily    pantoprazole (PROTONIX) 40 mg, oral, 2 times daily    propranolol (INDERAL) 20 mg, oral, 2 times daily    sodium bicarbonate 650 mg tablet TAKE 1 TABLET (650 MG) BY MOUTH DAILY. DO NOT START BEFORE JUNE 4, 2023.    sucralfate (CARAFATE) 1 g, oral, 4 times daily, Take before meals        Vitals:    12/08/23 1519   BP: 164/87   Pulse: 54   SpO2:         Physical Exam  Constitutional:       Appearance: Normal appearance.   HENT:      Head: Normocephalic and atraumatic.      Nose: Nose normal.   Neck:      Vascular: No carotid bruit.   Cardiovascular:      Rate and Rhythm: Normal rate and  regular rhythm.      Heart sounds: No murmur heard.  Pulmonary:      Effort: Pulmonary effort is normal.      Breath sounds: Normal breath sounds.   Abdominal:      Palpations: Abdomen is soft.      Tenderness: There is no abdominal tenderness.   Musculoskeletal:      Right lower leg: Edema present.      Left lower leg: Edema present.      Comments: Tr-1+ pitting LE edema   Skin:     General: Skin is warm and dry.   Neurological:      General: No focal deficit present.      Mental Status: He is alert.   Psychiatric:         Mood and Affect: Mood normal.         Judgment: Judgment normal.          Lab Results   Component Value Date    CR 2.28      HGB 10.1      K 4.0      MG      BNP      PLT 77      LDL      TSH       Assessment/Plan   52 yo WM w/ h/o cirrhosis/liver Fx, varices, HTN, CKD, TOB. No cardiac symptoms. DSE 11/23 normal but submax. Low cardiac risk for liver surgery -> proceed as indicated.   Due to reported mild CAC on CT ab, will get formal CT cardiac score (to determine ASA/statin need).  BP needs better control.  -continue Propranolol 20 bid  -add Amlodipine 5 every day  -continue Lasix 20 bid  -encourage TOB cessation  -FU PRN    Enio Pepe MD

## 2023-12-13 LAB
ATRIAL RATE: 58 BPM
P AXIS: 43 DEGREES
P OFFSET: 186 MS
P ONSET: 127 MS
PR INTERVAL: 186 MS
Q ONSET: 220 MS
QRS COUNT: 10 BEATS
QRS DURATION: 104 MS
QT INTERVAL: 432 MS
QTC CALCULATION(BAZETT): 424 MS
QTC FREDERICIA: 426 MS
R AXIS: 16 DEGREES
T AXIS: 55 DEGREES
T OFFSET: 436 MS
VENTRICULAR RATE: 58 BPM

## 2023-12-14 DIAGNOSIS — Z01.818 ENCOUNTER FOR PRE-TRANSPLANT EVALUATION FOR LIVER TRANSPLANT: ICD-10-CM

## 2023-12-14 DIAGNOSIS — Z01.818 ENCOUNTER FOR PRE-TRANSPLANT EVALUATION FOR KIDNEY TRANSPLANT: ICD-10-CM

## 2023-12-19 ENCOUNTER — APPOINTMENT (OUTPATIENT)
Dept: CARDIOLOGY | Facility: HOSPITAL | Age: 53
End: 2023-12-19
Payer: MEDICAID

## 2023-12-19 ENCOUNTER — LAB (OUTPATIENT)
Dept: LAB | Facility: LAB | Age: 53
End: 2023-12-19
Payer: MEDICAID

## 2023-12-19 ENCOUNTER — HOSPITAL ENCOUNTER (OUTPATIENT)
Dept: RADIOLOGY | Facility: HOSPITAL | Age: 53
Discharge: HOME | End: 2023-12-19
Payer: MEDICAID

## 2023-12-19 ENCOUNTER — OFFICE VISIT (OUTPATIENT)
Dept: TRANSPLANT | Facility: HOSPITAL | Age: 53
End: 2023-12-19
Payer: MEDICAID

## 2023-12-19 VITALS
OXYGEN SATURATION: 94 % | DIASTOLIC BLOOD PRESSURE: 74 MMHG | SYSTOLIC BLOOD PRESSURE: 144 MMHG | WEIGHT: 247.4 LBS | BODY MASS INDEX: 35.5 KG/M2 | TEMPERATURE: 97 F | HEART RATE: 55 BPM

## 2023-12-19 DIAGNOSIS — Z01.818 ENCOUNTER FOR PRE-TRANSPLANT EVALUATION FOR LIVER TRANSPLANT: ICD-10-CM

## 2023-12-19 DIAGNOSIS — K70.30 ESOPHAGEAL VARICES IN ALCOHOLIC CIRRHOSIS (MULTI): ICD-10-CM

## 2023-12-19 DIAGNOSIS — Z01.818 ENCOUNTER FOR PRE-TRANSPLANT EVALUATION FOR KIDNEY TRANSPLANT: ICD-10-CM

## 2023-12-19 DIAGNOSIS — Z01.818 ENCOUNTER FOR PRE-TRANSPLANT EVALUATION FOR LIVER TRANSPLANT: Primary | ICD-10-CM

## 2023-12-19 DIAGNOSIS — N18.30 STAGE 3 CHRONIC KIDNEY DISEASE, UNSPECIFIED WHETHER STAGE 3A OR 3B CKD (MULTI): ICD-10-CM

## 2023-12-19 DIAGNOSIS — K70.31 ALCOHOLIC CIRRHOSIS OF LIVER WITH ASCITES (MULTI): ICD-10-CM

## 2023-12-19 DIAGNOSIS — I10 HYPERTENSION, UNSPECIFIED TYPE: ICD-10-CM

## 2023-12-19 DIAGNOSIS — Z71.6 ENCOUNTER FOR SMOKING CESSATION COUNSELING: ICD-10-CM

## 2023-12-19 DIAGNOSIS — I85.10 ESOPHAGEAL VARICES IN ALCOHOLIC CIRRHOSIS (MULTI): ICD-10-CM

## 2023-12-19 DIAGNOSIS — N18.9 ACUTE KIDNEY INJURY SUPERIMPOSED ON CKD (CMS-HCC): ICD-10-CM

## 2023-12-19 DIAGNOSIS — N17.9 ACUTE KIDNEY INJURY SUPERIMPOSED ON CKD (CMS-HCC): ICD-10-CM

## 2023-12-19 LAB
AMPHETAMINES UR QL SCN: NORMAL
ANION GAP SERPL CALC-SCNC: 14 MMOL/L (ref 10–20)
BARBITURATES UR QL SCN: NORMAL
BENZODIAZ UR QL SCN: NORMAL
BUN SERPL-MCNC: 20 MG/DL (ref 6–23)
BZE UR QL SCN: NORMAL
CALCIUM SERPL-MCNC: 8.6 MG/DL (ref 8.6–10.6)
CANNABINOIDS UR QL SCN: NORMAL
CHLORIDE SERPL-SCNC: 109 MMOL/L (ref 98–107)
CO2 SERPL-SCNC: 24 MMOL/L (ref 21–32)
CREAT SERPL-MCNC: 2.16 MG/DL (ref 0.5–1.3)
FENTANYL+NORFENTANYL UR QL SCN: NORMAL
GFR SERPL CREATININE-BSD FRML MDRD: 35 ML/MIN/1.73M*2
GLUCOSE SERPL-MCNC: 106 MG/DL (ref 74–99)
OPIATES UR QL SCN: NORMAL
OXYCODONE+OXYMORPHONE UR QL SCN: NORMAL
PCP UR QL SCN: NORMAL
POTASSIUM SERPL-SCNC: 4.5 MMOL/L (ref 3.5–5.3)
SODIUM SERPL-SCNC: 142 MMOL/L (ref 136–145)

## 2023-12-19 PROCEDURE — 99406 BEHAV CHNG SMOKING 3-10 MIN: CPT | Performed by: INTERNAL MEDICINE

## 2023-12-19 PROCEDURE — 93975 VASCULAR STUDY: CPT

## 2023-12-19 PROCEDURE — 83880 ASSAY OF NATRIURETIC PEPTIDE: CPT

## 2023-12-19 PROCEDURE — 36415 COLL VENOUS BLD VENIPUNCTURE: CPT

## 2023-12-19 PROCEDURE — 76770 US EXAM ABDO BACK WALL COMP: CPT | Mod: 59

## 2023-12-19 PROCEDURE — 80307 DRUG TEST PRSMV CHEM ANLYZR: CPT

## 2023-12-19 PROCEDURE — 76770 US EXAM ABDO BACK WALL COMP: CPT | Mod: DISTINCT PROCEDURAL SERVICE | Performed by: STUDENT IN AN ORGANIZED HEALTH CARE EDUCATION/TRAINING PROGRAM

## 2023-12-19 PROCEDURE — 80048 BASIC METABOLIC PNL TOTAL CA: CPT

## 2023-12-19 PROCEDURE — 3077F SYST BP >= 140 MM HG: CPT | Performed by: INTERNAL MEDICINE

## 2023-12-19 PROCEDURE — 99215 OFFICE O/P EST HI 40 MIN: CPT | Performed by: INTERNAL MEDICINE

## 2023-12-19 PROCEDURE — 3078F DIAST BP <80 MM HG: CPT | Performed by: INTERNAL MEDICINE

## 2023-12-19 ASSESSMENT — ENCOUNTER SYMPTOMS
SINUS PRESSURE: 1
ABDOMINAL PAIN: 0
ABDOMINAL DISTENTION: 0
CONSTITUTIONAL NEGATIVE: 1
GASTROINTESTINAL NEGATIVE: 1
BLOOD IN STOOL: 0
RESPIRATORY NEGATIVE: 1
NAUSEA: 0
CARDIOVASCULAR NEGATIVE: 1

## 2023-12-19 ASSESSMENT — PAIN SCALES - GENERAL: PAINLEVEL: 0-NO PAIN

## 2023-12-19 NOTE — PROGRESS NOTES
Transplant Hepatology Clinic Note    History Of Present Illness  Goran Ibrahim is a 53 y.o. male presenting with decompensated alcoholic cirrhosis. Last drink in 5/2023. Alcohol biomarkers have been negative last 12/2023     He had a number of hospitalizations at [facility] and [facility] from May to July 2023.      He explains that he developed symptoms which he attributed to a viral gastroenteritis in April 2023.  He started taking Pepto-Bismol.  At that time he noticed that his stools were dark and black.  He assumed that his dark stools were related to Pepto-Bismol use.  On May 18 is when he first took himself to the hospital at [facility].  He actually drove to the hospital and was confused.  He was pulled over by a  and had difficulty with his speech.  Ultimately, during that index hospitalization he was diagnosed with hepatic encephalopathy secondary to severe GI bleeding.  His GI bleeding was a major issue with recurrent GI bleeding events over days and weeks.  He was diagnosed with a duodenal ulcer which tested positive for H. pylori.  He also was diagnosed with esophageal varices.  During his first hospitalization he had also developed ascites and underwent an index paracentesis procedure.      He explains that at one point he was in the ICU and had required a number of blood transfusions.  He believes he had 3 endoscopy procedures in total and 4 colonoscopy procedures; in addition to multiple scans trying to determine where the GI bleeding was originating from.     Otherwise the details of his history is chronic sparse.  The patient is poor historian and has difficulty providing a clear timeline of events.     Over the last 3 months he has been out of the hospital.  He has been seeing gastroenterologist Dr. Dana Geronimo.  He has also been seeing a primary care physician and the nephrologist.     His past medical history is otherwise notable for hypertension for which she  was on BP meds (these have since been stopped).     He was previously prediabetic diet controlled.  He says before getting sick his weight was up to 325 pounds and he was struggling with obesity.     Past surgical history is otherwise notable for right arm fracture.     Family history: No known liver disease in the family.  His mother's probably from lung cancer.  His father is also  from metastatic lung cancer.     Social history he does smoke cigarettes daily since the age of 13.  He estimates 5 to 10 cigarettes/day for the last 40 years.  His last alcohol use was on the floors.  He describes weekend drinking 1-2 servings of hard liquor which would be at the bar.  He denies daily or excessive alcohol use.  He said in his 20s or 30s his drinking was a little heavier he would have binge type drinking at the bar on  and .  He denies any recreational drug use.  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  He said he is very careful about not drinking and driving.  He has never had DUI according to my questioning.     Subjective  Last seen in clinic 10/2023.    In the interim, he has seen Dr. Floyd with transplant psychology who has raised concerns for cognitive deficits vs healthcare literacy. She has recommended cognitive testing, at least 2 solid support persons, and that he bring at least 1 support person with him to all appointments. He also saw cardiology and had a normal dobutamine stress test but was submaximal HR. He was deemed low cardiac risk for liver surgery, though Dr. Pepe has asked for CT cardiac scoring to decide need for asa/plavix    Roommate and friend Ana Luisa works and is not able to attend all appointments with him. She just started a new job as . He has several other friends that can step up to help but may not be able to come to all appointments. He is trying to see if there are people that can alternate  in coming to appointments with him, but he feels like he is doing fine with out having people come with. He works as a     No black stools, hematochezia. No N/V abdominal pain. No leg swelling. Denies alcohol use.    Lasix has been on hold given AKUA on 12/5/2023 with Cr 2.28 (from prior 1.8). Has been held since about 12/12    He has seen [facility] and has had tooth extraction, now edentulous    He is getting PFTs done on 12/5    He [place] [facility] ( Dr. Dana [place]) who told him he does not need lactulose as long as he is having 2-3 BMs per day. He has not taken the lactulose since he was discharged from the hospital earlier this year. Has not had any issues with confusion    Has not had a cigarette in 1.5 days. Wants to quit, is his new years resolution to quit.    Review of Systems  Review of Systems   Constitutional: Negative.    HENT:  Positive for sinus pressure.    Respiratory: Negative.     Cardiovascular: Negative.  Negative for leg swelling.   Gastrointestinal: Negative.  Negative for abdominal distention, abdominal pain, blood in stool and nausea.        Medications:    Current Outpatient Medications:     amLODIPine (Norvasc) 5 mg tablet, Take 1 tablet (5 mg) by mouth once daily. (Patient taking differently: Take 2 tablets (10 mg) by mouth once daily.), Disp: 90 tablet, Rfl: 3    furosemide (Lasix) 20 mg tablet, Take 1 tablet (20 mg) by mouth 2 times a day. 12/19/23 On hold due to elevated CR, Disp: , Rfl:     pantoprazole (ProtoNix) 40 mg EC tablet, Take 1 tablet (40 mg) by mouth 2 times a day., Disp: , Rfl:     propranolol (Inderal) 20 mg tablet, Take 1 tablet (20 mg) by mouth 2 times a day., Disp: , Rfl:     sodium bicarbonate 650 mg tablet, TAKE 1 TABLET (650 MG) BY MOUTH DAILY. DO NOT START BEFORE JUNE 4, 2023., Disp: , Rfl:     sucralfate (Carafate) 1 gram tablet, Take 1 tablet (1 g) by mouth 4 times a day. Take before meals, Disp: , Rfl:     Physical Exam  General:  well-nourished, no acute distress  HEENT: PERRLA, EOM intact, no scleral icterus, moist MM, edentulous  Respiratory: CTA bilaterally, normal work of breathing  Cardiovascular: RRR, no murmurs/rubs/gallops  Abdomen: Soft, nontender, nondistended  Extremities: trace edema, no asterixis  Neuro: alert and oriented, CNII-XII grossly intact, moves all 4 extremities with no focal deficits       Last Recorded Vitals  Blood pressure 144/74, pulse 55, temperature 36.1 °C (97 °F), temperature source Temporal, weight 112 kg (247 lb 6.4 oz), SpO2 94 %.    Relevant Results    Current Outpatient Medications:     amLODIPine (Norvasc) 5 mg tablet, Take 1 tablet (5 mg) by mouth once daily. (Patient taking differently: Take 2 tablets (10 mg) by mouth once daily.), Disp: 90 tablet, Rfl: 3    furosemide (Lasix) 20 mg tablet, Take 1 tablet (20 mg) by mouth 2 times a day. 12/19/23 On hold due to elevated CR, Disp: , Rfl:     pantoprazole (ProtoNix) 40 mg EC tablet, Take 1 tablet (40 mg) by mouth 2 times a day., Disp: , Rfl:     propranolol (Inderal) 20 mg tablet, Take 1 tablet (20 mg) by mouth 2 times a day., Disp: , Rfl:     sodium bicarbonate 650 mg tablet, TAKE 1 TABLET (650 MG) BY MOUTH DAILY. DO NOT START BEFORE JUNE 4, 2023., Disp: , Rfl:     sucralfate (Carafate) 1 gram tablet, Take 1 tablet (1 g) by mouth 4 times a day. Take before meals, Disp: , Rfl:      Lab Results   Component Value Date    WBC 3.9 (L) 12/05/2023    HGB 10.1 (L) 12/05/2023    HCT 31.0 (L) 12/05/2023    MCV 94 12/05/2023    PLT 77 (L) 12/05/2023     Lab Results   Component Value Date    GLUCOSE 96 12/05/2023    CALCIUM 8.2 (L) 12/05/2023     12/05/2023    K 4.0 12/05/2023    CO2 23 12/05/2023     (H) 12/05/2023    BUN 29 (H) 12/05/2023    CREATININE 2.28 (H) 12/05/2023     Lab Results   Component Value Date    ALT 12 12/05/2023    AST 32 12/05/2023    ALKPHOS 121 (H) 12/05/2023    BILITOT 0.9 12/05/2023         Imaging:    GI Procedures:    7/2023  EGD  Impression:               - Normal upper third of esophagus.                             - Grade I esophageal varices.                             - Esophageal mucosal changes suggestive of                             short-segment Matute's esophagus, classified as                             Matute's stage C0-M2 per [place] criteria.                             Biopsy is contraindicated.                             - Small hiatal hernia.                             - Non-bleeding duodenal ulcer with no stigmata                             of bleeding. Appears to be healing well, without                             any signs of recent blood loss.                             - Mucosal changes in the duodenum. Biopsied.                             - No blood was present throughout the entire                             exam.   Recommendation:           - Patient has a contact number available for                             emergencies. The signs and symptoms of potential                             delayed complications were discussed with the                             patient. Return to normal activities tomorrow.                             Written discharge instructions were provided to                             the patient.                             - Resume previous diet.                             - Continue present medications.                             - Await pathology results.                             - Use Protonix (pantoprazole) 40 mg PO BID for 2                             months, then 1 daily indefinitely..       6/2023 EGD  Findings:       The esophagus was normal.        The stomach was normal.        One non-bleeding cratered duodenal ulcer with a visible vessel was        found in the duodenal bulb. The lesion was 30 mm by 30 mm in largest        dimension. Area was successfully injected with 4 mL of a 0.1 mg/mL        solution of epinephrine for hemostasis. Coagulation for  hemostasis        using argon plasma at 0.8 liters/minute and 40 tilley was successful.        The exam was otherwise without abnormality.   Impression:               - Normal esophagus.                             - Normal stomach.                             - Non-bleeding duodenal ulcer with a visible                             vessel. Injected. Treated with argon plasma                             coagulation (APC).                             - The examination was otherwise normal.                             - No specimens collected.        ASSESSMENT/PLAN:    Goran Ibrahim is a satisfactory candidate for liver transplantation.     -Poor health literacy, very basic understanding of his medical condition and the liver transplant process.  -Cigarette smoking.     He has had issues with both variceal and non variceal related GI bleeding.    He had an index episode of hepatic encephalopathy which was probably in the context of gastrointestinal blood loss.    He also developed ascites which required paracentesis 1 time in the hospital.  He now has chronic kidney disease and renal impairment.   Repeat MELD labs today     Other important issues include H. pylori related duodenal ulcer,  He is a daily smoker with family history of lung cancer,    Decompensated alcoholic Cirrhosis  - Ascites: non currently; 1 paracentesis while admitted early 2023  - Volume: Lasix 20mg currently held for AKUA  - EV: 7/2023 EGD showed grade 1 EV. Repeat 2024. Not on NSBB  - HE: HE insetting of GIB. Not currently on lactulose and doing well. Monitor and restart if needed  - Immunizations: HAV and HBV immune  - HCC screening: q6m, due now. Will obtain cross sectional imaging  - Transplant: undergoing pre-transplant testing. PFTs done 12/5, pending read. Needs CT calcium scoring--> if high will likely need LHC; has been attending transplant house AA meetings and online AA meetings. Last drink 5/2023 just got 6 month sobriety chip. 12/2023  PETH negative.    HTN  Currently on Amlodipine 10  Will consider switch to NSBB coreg at next visit    AKUA on CKD  -lasix has been held x1 week  -Repeat BMP today    Matute's Esophagus  -Characterized as C0M2 on 7/3/2023 EGD but this was not biopsied due to presence of Grade I EV  -Repeat EGD in 2024    Hpylori Gastritis  -Treated in early 2023  -Needs test of cure, consider gastric biopsies at next EGD    Duodenal ulcer  -Reportedly healing well on 7/2023 EGD  -Decrease Pantoprazole 40mg to once daily    Colon cancer screening  -obtain colonoscopy reports from OSH    Tobacco abuse  -patient actively trying to quit. No cigarette in last 1.5 days    Patient seen and discussed with Dr. Alessandro Carlton MD  PGY4 Gastroenterology Fellow  [facility]

## 2023-12-20 LAB — BNP SERPL-MCNC: 155 PG/ML (ref 0–99)

## 2023-12-21 NOTE — PROCEDURES
Pulmonary attending  Pulmonary function testing   Date of study: 5 December 2023    Impression: FEV1 is mildly reduced.  FVC is normal.  FEV1/FVC is at the lower limit of normal.  This suggests mild spirometric airflow obstruction.  Bronchodilator was not administered.  Therefore, unable to comment on potential reversibility of this mild airflow obstruction.  Normal total lung capacity excludes restrictive ventilatory limitation.  Increased RV and RV/TLC is consistent with some degree of gas trapping.  Mild diffusion impairment.    Jamaal Coello MD  12/20/2023  9:12 PM

## 2023-12-21 NOTE — ADDENDUM NOTE
Encounter addended by: Jamaal Coello MD on: 12/20/2023 9:13 PM   Actions taken: Clinical Note Signed, Charge Capture section accepted

## 2023-12-27 ENCOUNTER — TELEPHONE (OUTPATIENT)
Dept: TRANSPLANT | Facility: HOSPITAL | Age: 53
End: 2023-12-27
Payer: MEDICAID

## 2023-12-27 NOTE — TELEPHONE ENCOUNTER
PT called to say he got a call that the MRI for tomorrow is not covered, and that he would have to reschedule. He gave us the number, belonged to someone named Monica. Called her and left a VM that the PT is covered by [employer] insurance, and that he will be coming tomorrow for the MRI as well as the DUS. Then spoke to Radiology and explained that he is covered by Transplant insurance, and that Monica wouldn't call us in transplant, but called PT's doctor (not sure who she did call) and told him. Asked radiology to make sure that they know this is covered by transplant insurance. She said okay.

## 2023-12-28 ENCOUNTER — ANCILLARY PROCEDURE (OUTPATIENT)
Dept: RADIOLOGY | Facility: CLINIC | Age: 53
End: 2023-12-28
Payer: MEDICAID

## 2023-12-28 DIAGNOSIS — N18.30 STAGE 3 CHRONIC KIDNEY DISEASE, UNSPECIFIED WHETHER STAGE 3A OR 3B CKD (MULTI): ICD-10-CM

## 2023-12-28 DIAGNOSIS — Z01.818 ENCOUNTER FOR PRE-TRANSPLANT EVALUATION FOR LIVER TRANSPLANT: ICD-10-CM

## 2023-12-28 PROCEDURE — 76705 ECHO EXAM OF ABDOMEN: CPT | Mod: DISTINCT PROCEDURAL SERVICE | Performed by: RADIOLOGY

## 2023-12-28 PROCEDURE — 93975 VASCULAR STUDY: CPT | Mod: DISTINCT PROCEDURAL SERVICE | Performed by: RADIOLOGY

## 2023-12-28 PROCEDURE — A9575 INJ GADOTERATE MEGLUMI 0.1ML: HCPCS | Mod: SE | Performed by: INTERNAL MEDICINE

## 2023-12-28 PROCEDURE — 93975 VASCULAR STUDY: CPT | Mod: 59

## 2023-12-28 PROCEDURE — 2550000001 HC RX 255 CONTRASTS: Mod: SE | Performed by: INTERNAL MEDICINE

## 2023-12-28 PROCEDURE — 74183 MRI ABD W/O CNTR FLWD CNTR: CPT

## 2023-12-28 PROCEDURE — 74183 MRI ABD W/O CNTR FLWD CNTR: CPT | Performed by: RADIOLOGY

## 2023-12-28 RX ORDER — GADOTERATE MEGLUMINE 376.9 MG/ML
20 INJECTION INTRAVENOUS
Status: COMPLETED | OUTPATIENT
Start: 2023-12-28 | End: 2023-12-28

## 2023-12-28 RX ADMIN — GADOTERATE MEGLUMINE 20 ML: 376.9 INJECTION INTRAVENOUS at 16:41

## 2024-01-02 ENCOUNTER — APPOINTMENT (OUTPATIENT)
Dept: TRANSPLANT | Facility: HOSPITAL | Age: 54
End: 2024-01-02
Payer: MEDICAID

## 2024-01-02 ENCOUNTER — EPISODE CHANGES (OUTPATIENT)
Dept: TRANSPLANT | Facility: HOSPITAL | Age: 54
End: 2024-01-02

## 2024-01-08 ENCOUNTER — OFFICE VISIT (OUTPATIENT)
Dept: TRANSPLANT | Facility: HOSPITAL | Age: 54
End: 2024-01-08
Payer: MEDICAID

## 2024-01-08 ENCOUNTER — LAB (OUTPATIENT)
Dept: LAB | Facility: LAB | Age: 54
End: 2024-01-08
Payer: MEDICAID

## 2024-01-08 VITALS
HEART RATE: 60 BPM | OXYGEN SATURATION: 98 % | DIASTOLIC BLOOD PRESSURE: 85 MMHG | TEMPERATURE: 97.9 F | SYSTOLIC BLOOD PRESSURE: 160 MMHG | WEIGHT: 244.1 LBS | BODY MASS INDEX: 35.02 KG/M2

## 2024-01-08 DIAGNOSIS — Z01.818 ENCOUNTER FOR PRE-TRANSPLANT EVALUATION FOR LIVER TRANSPLANT: ICD-10-CM

## 2024-01-08 DIAGNOSIS — Z01.818 PRE-TRANSPLANT EVALUATION FOR KIDNEY TRANSPLANT: ICD-10-CM

## 2024-01-08 DIAGNOSIS — I15.1 HYPERTENSION SECONDARY TO OTHER RENAL DISORDERS: ICD-10-CM

## 2024-01-08 DIAGNOSIS — N18.32 STAGE 3B CHRONIC KIDNEY DISEASE (MULTI): ICD-10-CM

## 2024-01-08 DIAGNOSIS — K70.30 ALCOHOLIC CIRRHOSIS OF LIVER WITHOUT ASCITES (MULTI): ICD-10-CM

## 2024-01-08 DIAGNOSIS — E21.3 HYPERPARATHYROIDISM (MULTI): Primary | ICD-10-CM

## 2024-01-08 LAB
ALBUMIN SERPL BCP-MCNC: 3.4 G/DL (ref 3.4–5)
ANION GAP SERPL CALC-SCNC: 12 MMOL/L (ref 10–20)
APPEARANCE UR: ABNORMAL
BACTERIA #/AREA URNS AUTO: ABNORMAL /HPF
BILIRUB UR STRIP.AUTO-MCNC: NEGATIVE MG/DL
BUN SERPL-MCNC: 28 MG/DL (ref 6–23)
CALCIUM SERPL-MCNC: 9 MG/DL (ref 8.6–10.6)
CHLORIDE SERPL-SCNC: 109 MMOL/L (ref 98–107)
CO2 SERPL-SCNC: 25 MMOL/L (ref 21–32)
COLOR UR: YELLOW
CREAT SERPL-MCNC: 2.1 MG/DL (ref 0.5–1.3)
CREAT UR-MCNC: 99.7 MG/DL (ref 20–370)
EGFRCR SERPLBLD CKD-EPI 2021: 36 ML/MIN/1.73M*2
ERYTHROCYTE [DISTWIDTH] IN BLOOD BY AUTOMATED COUNT: 15.9 % (ref 11.5–14.5)
GLUCOSE SERPL-MCNC: 132 MG/DL (ref 74–99)
GLUCOSE UR STRIP.AUTO-MCNC: NEGATIVE MG/DL
HCT VFR BLD AUTO: 34.9 % (ref 41–52)
HGB BLD-MCNC: 11.7 G/DL (ref 13.5–17.5)
KETONES UR STRIP.AUTO-MCNC: NEGATIVE MG/DL
LEUKOCYTE ESTERASE UR QL STRIP.AUTO: ABNORMAL
MCH RBC QN AUTO: 31.2 PG (ref 26–34)
MCHC RBC AUTO-ENTMCNC: 33.5 G/DL (ref 32–36)
MCV RBC AUTO: 93 FL (ref 80–100)
MUCOUS THREADS #/AREA URNS AUTO: ABNORMAL /LPF
NITRITE UR QL STRIP.AUTO: NEGATIVE
NRBC BLD-RTO: 0 /100 WBCS (ref 0–0)
PH UR STRIP.AUTO: 5 [PH]
PHOSPHATE SERPL-MCNC: 3.5 MG/DL (ref 2.5–4.9)
PLATELET # BLD AUTO: 86 X10*3/UL (ref 150–450)
POTASSIUM SERPL-SCNC: 4.2 MMOL/L (ref 3.5–5.3)
PROT UR STRIP.AUTO-MCNC: ABNORMAL MG/DL
PROT UR-ACNC: 191 MG/DL (ref 5–25)
PROT/CREAT UR: 1.92 MG/MG CREAT (ref 0–0.17)
PTH-INTACT SERPL-MCNC: 161.2 PG/ML (ref 18.5–88)
RBC # BLD AUTO: 3.75 X10*6/UL (ref 4.5–5.9)
RBC # UR STRIP.AUTO: NEGATIVE /UL
RBC #/AREA URNS AUTO: ABNORMAL /HPF
SODIUM SERPL-SCNC: 142 MMOL/L (ref 136–145)
SP GR UR STRIP.AUTO: 1.01
SQUAMOUS #/AREA URNS AUTO: ABNORMAL /HPF
URATE SERPL-MCNC: 10.7 MG/DL (ref 4–7.5)
UROBILINOGEN UR STRIP.AUTO-MCNC: 2 MG/DL
WBC # BLD AUTO: 4.8 X10*3/UL (ref 4.4–11.3)
WBC #/AREA URNS AUTO: >50 /HPF

## 2024-01-08 PROCEDURE — 81001 URINALYSIS AUTO W/SCOPE: CPT

## 2024-01-08 PROCEDURE — 36415 COLL VENOUS BLD VENIPUNCTURE: CPT

## 2024-01-08 PROCEDURE — 85027 COMPLETE CBC AUTOMATED: CPT

## 2024-01-08 PROCEDURE — 82570 ASSAY OF URINE CREATININE: CPT

## 2024-01-08 PROCEDURE — 80069 RENAL FUNCTION PANEL: CPT

## 2024-01-08 PROCEDURE — 84550 ASSAY OF BLOOD/URIC ACID: CPT

## 2024-01-08 PROCEDURE — 82610 CYSTATIN C: CPT

## 2024-01-08 PROCEDURE — 3079F DIAST BP 80-89 MM HG: CPT | Performed by: INTERNAL MEDICINE

## 2024-01-08 PROCEDURE — 83970 ASSAY OF PARATHORMONE: CPT

## 2024-01-08 PROCEDURE — 99215 OFFICE O/P EST HI 40 MIN: CPT | Performed by: INTERNAL MEDICINE

## 2024-01-08 PROCEDURE — 3077F SYST BP >= 140 MM HG: CPT | Performed by: INTERNAL MEDICINE

## 2024-01-08 PROCEDURE — 99205 OFFICE O/P NEW HI 60 MIN: CPT | Performed by: INTERNAL MEDICINE

## 2024-01-08 PROCEDURE — 84156 ASSAY OF PROTEIN URINE: CPT

## 2024-01-08 ASSESSMENT — PAIN SCALES - GENERAL: PAINLEVEL: 0-NO PAIN

## 2024-01-08 NOTE — PROGRESS NOTES
TRANSPLANT NEPHROLOGY CONSULT :   KIDNEY TRANSPLANT RECIPIENT EVALUATION        SERVICE DATE: 01/08/2024     REASON FOR CONSULT/CHIEF COMPLAINT:  CKD, potential simultaneous liver-kidney transpalnt    HPI:    Mr. Ibrahim is a 53 y.o. male with past medical history significant for alcoholic cirrhosis, h/o variceal bleed, rectal varices, ischemic colitis, UGIB 2/2 duodenal ulcer s/p GDA embolization, hepatic encephalopathy, suspected CKD, T2DM.     Follows with hepatology regularly. H/o frequent admissions for GI symptoms, last 7/2023.    Being evaluated for liver txp. Deemed reasonable candidate pending eval, smoking cessation.     Here to be evaluated as potential kidney txp candidate.     Quit alcohol completely in 5/2023. H/o heavy alcohol for in his 20-40s, has cut back significantly over the past few yrs.      Continues to smoke cigarettes. Approx 40 pk-yr history of nicotine use. However, states he has cut down significantly of late. Down to 3-4 cigs/day. No definite quit date or plan in place.     Last admission was in 6/2023. Has been in stable health condition since then.     Review of available albs shows serum Cr ~1.4 at baseline prior to admission in 6/2023. No labs available prior to H/o AKUA during that admission (Cr as high as 2.3), partially resolved with Cr down 1.5-1.6. since then serum Cr has been variable. Cr up to 2.26 12/5/23. most recent labs 1/8/24 with Cr 2.1, eGFR by CKD-EPI based on serum Cr 36 ml/min. Cystatin C based eGFR drawn, result pending.     UA 1/8/24 with 3+ pro, pyuria, 3+ bacteruria. Pt denies any LUTS today. Voiding well.  Urine pro/Cr 1.92 g/g. No prior quantification on file.     A1C 5.3%. no h/o DM.    Was seen by renal during last admission for AKUA. No outpt follow up scheduled with renal.     Renal US 12/19/23 with relatively small kidney for pt's body habitus, increased cortical echogenicity suggesting CKD.     BP elevated today. Prior office Bps above goal as  well. Does not check home BPs. Tries best to watch salt intake.     Denies any specific complaints today. No c/p CP, dyspnea, orthopnea, PND, abd pain, bloating, n/v/d, f/c/ns, dysuria, gross hematuria, other LUTS, flank pain, back pain, new skin rash or lesions, syncope or presyncope.       BLOOD TYPE:  O    Functional status :   Active. KPS 90.      Last GFR /Creatinine:   Lab Results   Component Value Date    CREATININE 2.10 (H) 01/08/2024     Creatinine clearance cannot be calculated (Patient's most recent lab result is older than the maximum 7 days allowed.)    Hx of PRBC Transfusion  Yes    Recent Hospitalization/ED visit:  Last admitted 6/2023 for GI bleed      PAST MEDICAL HISTORY: as noted above  No past medical history on file.     PAST SURGICAL HISTORY:  Past Surgical History:   Procedure Laterality Date    IR ANGIOGRAM CELIAC  5/19/2023    IR ANGIOGRAM CELIAC 5/19/2023    US GUIDED ABDOMINAL PARACENTESIS  6/22/2023    US GUIDED ABDOMINAL PARACENTESIS 6/22/2023    US GUIDED ABDOMINAL PARACENTESIS  5/19/2023    US GUIDED ABDOMINAL PARACENTESIS 5/19/2023        SOCIAL HISTORY:  Social History     Socioeconomic History    Marital status: Unknown     Spouse name: Not on file    Number of children: Not on file    Years of education: Not on file    Highest education level: Not on file   Occupational History    Not on file   Tobacco Use    Smoking status: Not on file    Smokeless tobacco: Not on file   Substance and Sexual Activity    Alcohol use: Not on file    Drug use: Not on file    Sexual activity: Not on file   Other Topics Concern    Not on file   Social History Narrative    Not on file     Social Determinants of Health     Financial Resource Strain: Not on file   Food Insecurity: Not on file   Transportation Needs: Not on file   Physical Activity: Not on file   Stress: Not on file   Social Connections: Not on file   Intimate Partner Violence: Not on file   Housing Stability: Not on file       FAMILY  HISTORY:  No family history on file.    MEDICATION LIST:  Current Outpatient Medications   Medication Instructions    amLODIPine (NORVASC) 5 mg, oral, Daily    furosemide (LASIX) 20 mg, oral, 2 times daily, 12/19/23 On hold due to elevated CR    pantoprazole (PROTONIX) 40 mg, oral, 2 times daily    propranolol (INDERAL) 20 mg, oral, 2 times daily    sodium bicarbonate 650 mg tablet TAKE 1 TABLET (650 MG) BY MOUTH DAILY. DO NOT START BEFORE JUNE 4, 2023.    sucralfate (CARAFATE) 1 g, oral, 4 times daily, Take before meals       ALLERGY  No Known Allergies    PHYSICAL EXAM:    Visit Vitals  /85   Pulse 60   Temp 36.6 °C (97.9 °F) (Temporal)   Wt 111 kg (244 lb 1.6 oz)   SpO2 98%   BMI 35.02 kg/m²   BSA 2.34 m²        General Appearance - NAD, Good speech, oriented and alert  HEENT - Supple. Not pale. No jaundice. No cervical lymphadenopathy.  CVS - RRR. Normal S1/S2. No murmur, click , rub or gallop  Lungs- clear to auscultation bilaterally  Abdomen - soft , not tender, no guarding, no rigidity. No masses and ascites.   Musculoskeletal /Extremities - no edema. Full ROM. No joint tenderness.   Neuro/Psych - appropriate mood and affect. Motor power V/V all extremities. CN I -XII were grossly intact.  Skin - No visible rash      LABS:    Lab Results   Component Value Date    CREATININE 2.10 (H) 01/08/2024    BUN 28 (H) 01/08/2024     01/08/2024    K 4.2 01/08/2024     (H) 01/08/2024    CO2 25 01/08/2024     Lab Results   Component Value Date    WBC 4.8 01/08/2024    HGB 11.7 (L) 01/08/2024    HCT 34.9 (L) 01/08/2024    MCV 93 01/08/2024    PLT 86 (L) 01/08/2024     Lab Results   Component Value Date    CALCIUM 9.0 01/08/2024    PHOS 3.5 01/08/2024    .2 (H) 01/08/2024    VITD25 20 (A) 09/28/2023     UA 1/8/24 >50 wbc, 1-2 rbc, 3+ bacteria, 3+ pro  Up/Uc 1.92 g/g      MR abd: 12/2023  KIDNEYS:  The bilateral kidneys demonstrate numerous well defined lesions with  high signal intensity on T2w  imaging and no contrast enhancement  consistent with renal cysts. The largest in the right kidney measures  0.7 cm and in the left measures 0.8 cm. There is no evidence of  hydroureteronephrosis.    Renal US: 12/14/23  RIGHT KIDNEY:  The right kidney measures 9.7 cm in length. The renal cortical  echogenicity is increased. Cortical thickness is within normal  limits. No hydronephrosis is present; no evidence of nephrolithiasis.      LEFT KIDNEY:  The left kidney measures 9.5 cm in length. The renal cortical  echogenicity is increased. Cortical thickness is within normal  limits. No hydronephrosis is present; no evidence of nephrolithiasis.        ASSESSMENT AND PLAN:      52 y/o M with decompensated cirrhosis sec to alcohol use disorder being evaluated for liver transplant. Also with CKD likelyt sec to HTN, possible HRS.   Renal US with relatively small sized kidneys, increased echogenicity of renal cortices, do suggest underlying CKD.   Creatinine eGFR based on labs 1/8/24 noted to be 36 ml/min. Pt does not meet criteria for SLK quite yet.   eGFR based on cystatin C pending.     Given subnephrotic proteinuria, will check renal specific serologies to evaluate potential other etiologies as well.     UA with pyuria, bacteruria. However, pt w/o any LUTS. Will hold off on any abx.     Advised pt on the need for regular follow up with renal for CKD management.    Counseled pt on smoking cessation.     HTN: Bps elevated. Advised to monitor home Bps, bring log to next appt for med titration.   No hypervolemia on exam.     Recent labs with Cr 2.1-2.2. metabolic parameters stable.    Anemia: Hb 11.7. no indication for SHAUNA. Cont to monitor. Check iron panel, ferritin. Replete if indicated.    CKD-MBD: Ca, phos acceptable. PTH elevated, vit D deficient. Start po cholecalciferol.        In addition, the following were also discussed:    - Risks and benefits of transplantation, both short-term and long-term    - Risk of primary  graft non-function, DGF, SGF, rejection, primary disease recurrence, return to dialysis    - Risks of immunosuppression including infections, CA, CV risk    - Need for compliance with medications and medical care in general      The patient expressed understanding of the above and wishes to proceed.     - I have spent over 60 minutes with the patient, reviewing medical record, lab result , CXR result and other specialty's notes. More than 50% of the time was spent in counseling, explaining about the transplantation and answering the questions. I also reviewed the medical record, blood test results, imaging and previous studies which were obtained from the nephrologists. I also order the tests needed to complete the evaluation and I will review the results of those tests.    Addendum:   eGFR by cystatin C noted to be 13 ml/min. Would rpt once serum Creatinine stabilizes.

## 2024-01-09 RX ORDER — ACETAMINOPHEN 500 MG
2000 TABLET ORAL DAILY
Qty: 30 CAPSULE | Refills: 2 | Status: SHIPPED | OUTPATIENT
Start: 2024-01-09 | End: 2024-04-08

## 2024-01-11 LAB
CYSTATIN C SERPL-MCNC: 4 MG/L (ref 0.5–1.2)
GFR/BSA.PRED SERPLBLD CYS-BASED-ARV: 13 ML/MIN/BSA

## 2024-01-16 ENCOUNTER — ANCILLARY PROCEDURE (OUTPATIENT)
Dept: RADIOLOGY | Facility: CLINIC | Age: 54
End: 2024-01-16
Payer: MEDICAID

## 2024-01-16 ENCOUNTER — TELEPHONE (OUTPATIENT)
Dept: TRANSPLANT | Facility: HOSPITAL | Age: 54
End: 2024-01-16
Payer: MEDICAID

## 2024-01-16 DIAGNOSIS — I10 HYPERTENSION, UNSPECIFIED TYPE: ICD-10-CM

## 2024-01-16 DIAGNOSIS — Z01.810 PREOP CARDIOVASCULAR EXAM: ICD-10-CM

## 2024-01-16 PROCEDURE — 75571 CT HRT W/O DYE W/CA TEST: CPT

## 2024-01-16 NOTE — TELEPHONE ENCOUNTER
SW attempted to reach Pt via telephone call to discuss Pt's support attending Pt's appointment with him on 02/06/2024. GEE LVM to Pt requesting a return phone call. GEE provided SW contact information.     Plan: GEE will attempt to reach Pt again at a later date.

## 2024-01-18 ENCOUNTER — TELEPHONE (OUTPATIENT)
Dept: TRANSPLANT | Facility: HOSPITAL | Age: 54
End: 2024-01-18
Payer: MEDICAID

## 2024-01-18 NOTE — TELEPHONE ENCOUNTER
"SW spoke with Pt via telephone call to discuss his supports. SW verified Pt's identity by confirming Pt's name, , address, and phone number listed on file. Pt was pleasant and engaged. Pt reported his friend, Ana Luisa as his primary support and shared she lives with Pt and drives and has a working vehicle. Pt listed his brother, Lenny as secondary support and shared he lives in [place]. Pt shared Lenny is planning to fly into town for Pt's transplant and stay with Pt for a few weeks to care for Pt. Pt listed his friend, Gerda Villalobos as an alternate support as well. Pt shared Gerda Villalobos works from home and lives in [place]. SW encouraged Pt to bring both Ana Luisa and Gerda Villalobos to Pt's upcoming appointment with SW. Pt shared he will ask them both to come. SW to call and confirm Pt's secondary support, Lenny. Pt shared he has a sister in [place] who might fly into town, a sister from [place] who might fly into town, a daughter in [place] who \"might come up,\" and his ex-wife as well for support.     Pt shared that he has been attending virtual AA meetings through transplant house every Wednesday and reported engaging in [facility] AA meetings as well. Pt shared that transplant house SW \"might have\" sent his coordinator proof of attendance, but shared he wasn't completely sure. SW encouraged Pt to ask for proof of attendance during his few AA meetings and to bring proof to Pt's upcoming appointment. Pt was agreeable. Pt denied any further questions/concerns at this time.     Plan: GEE to call and confirm Pt's secondary support. SW to follow-up with Pt at Pt's upcoming appointment on 24.         "

## 2024-01-24 ENCOUNTER — TELEPHONE (OUTPATIENT)
Dept: TRANSPLANT | Facility: HOSPITAL | Age: 54
End: 2024-01-24
Payer: MEDICAID

## 2024-01-24 NOTE — TELEPHONE ENCOUNTER
"SW reached Pt's secondary support, Lenny via telephone call to confirm support. SW confirmed Pt's brother's identity. Pt's brother shared that he lives in [place], but would \"absolutely\" be coming to stay with Pt post-transplant. Pt's brother shared he would be staying at Pt's home while in [place]. Pt's brother shared he drives and has a working vehicle, and he would be driving to [place] to stay with Pt. Pt's brother reported that he is not currently working, but if he were to return to work he would be able to take paid time off. Pt's brother denied having any caregiver responsibilities as well. Pt's brother shared that he is willing and able to help Pt throughout the transplant process. Pt's brother denied any further questions/concerns at this time.     GEE attempted to reach Pt via telephone call to follow-up. GEE LVM requesting a return call. GEE provided SW contact information.     Plan: GEE will await return phone call from Pt.   "

## 2024-01-29 ENCOUNTER — DOCUMENTATION (OUTPATIENT)
Dept: TRANSPLANT | Facility: HOSPITAL | Age: 54
End: 2024-01-29
Payer: MEDICAID

## 2024-01-29 NOTE — PROGRESS NOTES
Pharmacist Pre-Transplant Candidate Screening Note     Current Outpatient Medications on File Prior to Visit   Medication Sig Dispense Refill    amLODIPine (Norvasc) 5 mg tablet Take 1 tablet (5 mg) by mouth once daily. (Patient taking differently: Take 2 tablets (10 mg) by mouth once daily.) 90 tablet 3    cholecalciferol, vitamin D3, (Vitamin D-3) 50 mcg (2,000 unit) capsule Take 1 capsule (50 mcg) by mouth once daily. 30 capsule 2    furosemide (Lasix) 20 mg tablet Take 1 tablet (20 mg) by mouth 2 times a day. 12/19/23 On hold due to elevated CR      pantoprazole (ProtoNix) 40 mg EC tablet Take 1 tablet (40 mg) by mouth 2 times a day.      propranolol (Inderal) 20 mg tablet Take 1 tablet (20 mg) by mouth 2 times a day.      sodium bicarbonate 650 mg tablet TAKE 1 TABLET (650 MG) BY MOUTH DAILY. DO NOT START BEFORE JUNE 4, 2023.      sucralfate (Carafate) 1 gram tablet Take 1 tablet (1 g) by mouth 4 times a day. Take before meals       No current facility-administered medications on file prior to visit.       The patient's reported medications have been reviewed. Based on the above medication list, there are no pharmacologic contraindications to liver or kidney transplantation.    Bernice Guidry, PharmD, BCTXP  Clinical Pharmacy Specialist - Solid Organ Transplant

## 2024-01-30 DIAGNOSIS — K70.31 ALCOHOLIC CIRRHOSIS OF LIVER WITH ASCITES (MULTI): ICD-10-CM

## 2024-01-30 DIAGNOSIS — Z01.818 PRE-TRANSPLANT EVALUATION FOR KIDNEY TRANSPLANT: ICD-10-CM

## 2024-01-30 DIAGNOSIS — Z01.818 ENCOUNTER FOR PRE-TRANSPLANT EVALUATION FOR LIVER TRANSPLANT: Primary | ICD-10-CM

## 2024-02-02 ENCOUNTER — LAB (OUTPATIENT)
Dept: LAB | Facility: LAB | Age: 54
End: 2024-02-02
Payer: MEDICAID

## 2024-02-02 DIAGNOSIS — Z01.818 ENCOUNTER FOR PRE-TRANSPLANT EVALUATION FOR LIVER TRANSPLANT: ICD-10-CM

## 2024-02-02 DIAGNOSIS — N18.32 STAGE 3B CHRONIC KIDNEY DISEASE (MULTI): ICD-10-CM

## 2024-02-02 DIAGNOSIS — K70.31 ALCOHOLIC CIRRHOSIS OF LIVER WITH ASCITES (MULTI): ICD-10-CM

## 2024-02-02 DIAGNOSIS — Z01.818 PRE-TRANSPLANT EVALUATION FOR KIDNEY TRANSPLANT: ICD-10-CM

## 2024-02-02 PROCEDURE — 80053 COMPREHEN METABOLIC PANEL: CPT

## 2024-02-02 PROCEDURE — 86320 SERUM IMMUNOELECTROPHORESIS: CPT | Performed by: INTERNAL MEDICINE

## 2024-02-02 PROCEDURE — 80321 ALCOHOLS BIOMARKERS 1OR 2: CPT

## 2024-02-02 PROCEDURE — 85025 COMPLETE CBC W/AUTO DIFF WBC: CPT

## 2024-02-02 PROCEDURE — 84155 ASSAY OF PROTEIN SERUM: CPT

## 2024-02-02 PROCEDURE — 83516 IMMUNOASSAY NONANTIBODY: CPT

## 2024-02-02 PROCEDURE — 86255 FLUORESCENT ANTIBODY SCREEN: CPT

## 2024-02-02 PROCEDURE — 86334 IMMUNOFIX E-PHORESIS SERUM: CPT

## 2024-02-02 PROCEDURE — 84165 PROTEIN E-PHORESIS SERUM: CPT | Performed by: INTERNAL MEDICINE

## 2024-02-02 PROCEDURE — 86160 COMPLEMENT ANTIGEN: CPT

## 2024-02-02 PROCEDURE — 36415 COLL VENOUS BLD VENIPUNCTURE: CPT

## 2024-02-02 PROCEDURE — 86036 ANCA SCREEN EACH ANTIBODY: CPT

## 2024-02-02 PROCEDURE — 80307 DRUG TEST PRSMV CHEM ANLYZR: CPT

## 2024-02-02 PROCEDURE — 84165 PROTEIN E-PHORESIS SERUM: CPT

## 2024-02-02 PROCEDURE — 85610 PROTHROMBIN TIME: CPT

## 2024-02-03 LAB
ALBUMIN SERPL BCP-MCNC: 3.4 G/DL (ref 3.4–5)
ALP SERPL-CCNC: 147 U/L (ref 33–120)
ALT SERPL W P-5'-P-CCNC: 16 U/L (ref 10–52)
AMPHETAMINES UR QL SCN: NORMAL
ANION GAP SERPL CALC-SCNC: 17 MMOL/L (ref 10–20)
AST SERPL W P-5'-P-CCNC: 46 U/L (ref 9–39)
BARBITURATES UR QL SCN: NORMAL
BASOPHILS # BLD AUTO: 0.06 X10*3/UL (ref 0–0.1)
BASOPHILS NFR BLD AUTO: 1.3 %
BENZODIAZ UR QL SCN: NORMAL
BILIRUB SERPL-MCNC: 1.1 MG/DL (ref 0–1.2)
BUN SERPL-MCNC: 34 MG/DL (ref 6–23)
BZE UR QL SCN: NORMAL
C3 SERPL-MCNC: 95 MG/DL (ref 87–200)
C4 SERPL-MCNC: 12 MG/DL (ref 10–50)
CALCIUM SERPL-MCNC: 9 MG/DL (ref 8.6–10.6)
CANNABINOIDS UR QL SCN: NORMAL
CHLORIDE SERPL-SCNC: 110 MMOL/L (ref 98–107)
CO2 SERPL-SCNC: 20 MMOL/L (ref 21–32)
CREAT SERPL-MCNC: 2.53 MG/DL (ref 0.5–1.3)
EGFRCR SERPLBLD CKD-EPI 2021: 29 ML/MIN/1.73M*2
EOSINOPHIL # BLD AUTO: 0.15 X10*3/UL (ref 0–0.7)
EOSINOPHIL NFR BLD AUTO: 3.1 %
ERYTHROCYTE [DISTWIDTH] IN BLOOD BY AUTOMATED COUNT: 15.2 % (ref 11.5–14.5)
FENTANYL+NORFENTANYL UR QL SCN: NORMAL
GLUCOSE SERPL-MCNC: 138 MG/DL (ref 74–99)
HCT VFR BLD AUTO: 34.6 % (ref 41–52)
HGB BLD-MCNC: 11.7 G/DL (ref 13.5–17.5)
IMM GRANULOCYTES # BLD AUTO: 0.01 X10*3/UL (ref 0–0.7)
IMM GRANULOCYTES NFR BLD AUTO: 0.2 % (ref 0–0.9)
INR PPP: 1.1 (ref 0.9–1.1)
LYMPHOCYTES # BLD AUTO: 0.76 X10*3/UL (ref 1.2–4.8)
LYMPHOCYTES NFR BLD AUTO: 15.9 %
MCH RBC QN AUTO: 31.5 PG (ref 26–34)
MCHC RBC AUTO-ENTMCNC: 33.8 G/DL (ref 32–36)
MCV RBC AUTO: 93 FL (ref 80–100)
MONOCYTES # BLD AUTO: 0.43 X10*3/UL (ref 0.1–1)
MONOCYTES NFR BLD AUTO: 9 %
NEUTROPHILS # BLD AUTO: 3.38 X10*3/UL (ref 1.2–7.7)
NEUTROPHILS NFR BLD AUTO: 70.5 %
NRBC BLD-RTO: 0 /100 WBCS (ref 0–0)
OPIATES UR QL SCN: NORMAL
OXYCODONE+OXYMORPHONE UR QL SCN: NORMAL
PCP UR QL SCN: NORMAL
PLATELET # BLD AUTO: 90 X10*3/UL (ref 150–450)
POTASSIUM SERPL-SCNC: 4.6 MMOL/L (ref 3.5–5.3)
PROT SERPL-MCNC: 7.8 G/DL (ref 6.4–8.2)
PROT SERPL-MCNC: 7.8 G/DL (ref 6.4–8.2)
PROTHROMBIN TIME: 12 SECONDS (ref 9.8–12.8)
RBC # BLD AUTO: 3.71 X10*6/UL (ref 4.5–5.9)
SODIUM SERPL-SCNC: 142 MMOL/L (ref 136–145)
WBC # BLD AUTO: 4.8 X10*3/UL (ref 4.4–11.3)

## 2024-02-05 ENCOUNTER — COMMITTEE REVIEW (OUTPATIENT)
Dept: TRANSPLANT | Facility: HOSPITAL | Age: 54
End: 2024-02-05
Payer: MEDICAID

## 2024-02-05 ENCOUNTER — DOCUMENTATION (OUTPATIENT)
Dept: TRANSPLANT | Facility: HOSPITAL | Age: 54
End: 2024-02-05
Payer: MEDICAID

## 2024-02-05 DIAGNOSIS — I15.1 HYPERTENSION SECONDARY TO OTHER RENAL DISORDERS: ICD-10-CM

## 2024-02-05 DIAGNOSIS — Z01.818 PRE-TRANSPLANT EVALUATION FOR KIDNEY TRANSPLANT: ICD-10-CM

## 2024-02-05 DIAGNOSIS — Z87.19 HISTORY OF ESOPHAGEAL VARICES WITH BLEEDING: ICD-10-CM

## 2024-02-05 DIAGNOSIS — Z01.818 ENCOUNTER FOR PRE-TRANSPLANT EVALUATION FOR LIVER TRANSPLANT: Primary | ICD-10-CM

## 2024-02-05 DIAGNOSIS — R94.30 ABNORMAL CARDIAC FUNCTION TEST: ICD-10-CM

## 2024-02-05 LAB — PLA2R IGG SERPL QL IF: NORMAL

## 2024-02-05 NOTE — COMMITTEE REVIEW
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 After review of the candidate's diagnosis and the evaluations of the multidisciplinary team members, it was the consensus of the Selection Committee that the candidate does not meet Liver Selection Criteria at this time and requires follow up prior to re-presentation for listing.     Committee Recommendations:    Due to non diagnostic stress testing and slightly elevated RSVP the LTSC would like to repeat cardiac stress testing. Dr. Francois will follow up with cardiology for recommendation.     Confirmation of additional supports residing in [place].     Additional Recommendations for Listing:    Smoking cessation confirmation with urine screen - ongoing.    Patient will continue weekly AA meetings through Transplant House - SW/Coordiantor will confirm participation.     Random alcohol screens - ongoing.

## 2024-02-06 ENCOUNTER — SOCIAL WORK (OUTPATIENT)
Dept: TRANSPLANT | Facility: HOSPITAL | Age: 54
End: 2024-02-06
Payer: MEDICAID

## 2024-02-06 ENCOUNTER — OFFICE VISIT (OUTPATIENT)
Dept: TRANSPLANT | Facility: HOSPITAL | Age: 54
End: 2024-02-06
Payer: MEDICAID

## 2024-02-06 ENCOUNTER — LAB (OUTPATIENT)
Dept: LAB | Facility: LAB | Age: 54
End: 2024-02-06
Payer: MEDICAID

## 2024-02-06 ENCOUNTER — APPOINTMENT (OUTPATIENT)
Dept: TRANSPLANT | Facility: HOSPITAL | Age: 54
End: 2024-02-06
Payer: MEDICAID

## 2024-02-06 ENCOUNTER — LAB REQUISITION (OUTPATIENT)
Dept: LAB | Facility: CLINIC | Age: 54
End: 2024-02-06
Payer: MEDICAID

## 2024-02-06 VITALS
HEART RATE: 68 BPM | DIASTOLIC BLOOD PRESSURE: 81 MMHG | SYSTOLIC BLOOD PRESSURE: 160 MMHG | BODY MASS INDEX: 36.27 KG/M2 | OXYGEN SATURATION: 97 % | WEIGHT: 252.8 LBS | TEMPERATURE: 97.6 F

## 2024-02-06 DIAGNOSIS — I15.1 HYPERTENSION SECONDARY TO OTHER RENAL DISORDERS: ICD-10-CM

## 2024-02-06 DIAGNOSIS — Z01.818 ENCOUNTER FOR PRE-TRANSPLANT EVALUATION FOR KIDNEY TRANSPLANT: ICD-10-CM

## 2024-02-06 DIAGNOSIS — N18.4 CKD (CHRONIC KIDNEY DISEASE), STAGE IV (MULTI): ICD-10-CM

## 2024-02-06 DIAGNOSIS — N18.6 END STAGE RENAL DISEASE (MULTI): ICD-10-CM

## 2024-02-06 DIAGNOSIS — Z01.818 ENCOUNTER FOR PRE-TRANSPLANT EVALUATION FOR LIVER TRANSPLANT: Primary | ICD-10-CM

## 2024-02-06 DIAGNOSIS — B96.81 H PYLORI ULCER: ICD-10-CM

## 2024-02-06 DIAGNOSIS — Z71.6 ENCOUNTER FOR SMOKING CESSATION COUNSELING: ICD-10-CM

## 2024-02-06 DIAGNOSIS — Z01.818 ENCOUNTER FOR PRE-TRANSPLANT EVALUATION FOR LIVER TRANSPLANT: ICD-10-CM

## 2024-02-06 DIAGNOSIS — K27.9 H PYLORI ULCER: ICD-10-CM

## 2024-02-06 LAB
ALBUMIN: 3.5 G/DL (ref 3.4–5)
ALPHA 1 GLOBULIN: 0.3 G/DL (ref 0.2–0.6)
ALPHA 2 GLOBULIN: 0.5 G/DL (ref 0.4–1.1)
ANCA AB PATTERN SER IF-IMP: NORMAL
ANCA IGG TITR SER IF: NORMAL {TITER}
BETA GLOBULIN: 1.5 G/DL (ref 0.5–1.2)
GAMMA GLOBULIN: 2 G/DL (ref 0.5–1.4)
HLA CLS I TYP PNL BLD/T DONR HIGH RES: NORMAL
HLA RESULTS: NORMAL
HLA-A+B+C AB NFR SER: NORMAL %
HLA-DP+DQ+DR AB NFR SER: NORMAL %
HLA-DP2 QL: NORMAL
HLA-DQB1 HIGH RES: NORMAL
HLA-DRB1 HIGH RES: NORMAL
IMMUNOFIXATION COMMENT: ABNORMAL
LABORATORY REPORT: NORMAL
MYELOPEROXIDASE AB SER-ACNC: 1 AU/ML (ref 0–19)
PATH REVIEW - SERUM IMMUNOFIXATION: ABNORMAL
PATH REVIEW-SERUM PROTEIN ELECTROPHORESIS: ABNORMAL
PETH INTERPRETATION: NORMAL
PLPETH BLD-MCNC: <10 NG/ML
POPETH BLD-MCNC: <10 NG/ML
PROTEIN ELECTROPHORESIS COMMENT: ABNORMAL
PROTEINASE3 AB SER-ACNC: 3 AU/ML (ref 0–19)

## 2024-02-06 PROCEDURE — 3077F SYST BP >= 140 MM HG: CPT | Performed by: INTERNAL MEDICINE

## 2024-02-06 PROCEDURE — 81382 HLA II TYPING 1 LOC HR: CPT | Mod: 59,OUT | Performed by: SURGERY

## 2024-02-06 PROCEDURE — 99215 OFFICE O/P EST HI 40 MIN: CPT | Performed by: INTERNAL MEDICINE

## 2024-02-06 PROCEDURE — 86832 HLA CLASS I HIGH DEFIN QUAL: CPT | Mod: OUT | Performed by: SURGERY

## 2024-02-06 PROCEDURE — 3079F DIAST BP 80-89 MM HG: CPT | Performed by: INTERNAL MEDICINE

## 2024-02-06 RX ORDER — CARVEDILOL 6.25 MG/1
6.25 TABLET ORAL DAILY
Qty: 30 TABLET | Refills: 11 | Status: SHIPPED | OUTPATIENT
Start: 2024-02-06 | End: 2024-04-22 | Stop reason: HOSPADM

## 2024-02-06 ASSESSMENT — PATIENT HEALTH QUESTIONNAIRE - PHQ9
SUM OF ALL RESPONSES TO PHQ9 QUESTIONS 1 & 2: 2
6. FEELING BAD ABOUT YOURSELF - OR THAT YOU ARE A FAILURE OR HAVE LET YOURSELF OR YOUR FAMILY DOWN: NOT AT ALL
SUM OF ALL RESPONSES TO PHQ QUESTIONS 1-9: 5
9. THOUGHTS THAT YOU WOULD BE BETTER OFF DEAD, OR OF HURTING YOURSELF: NOT AT ALL
4. FEELING TIRED OR HAVING LITTLE ENERGY: NOT AT ALL
10. IF YOU CHECKED OFF ANY PROBLEMS, HOW DIFFICULT HAVE THESE PROBLEMS MADE IT FOR YOU TO DO YOUR WORK, TAKE CARE OF THINGS AT HOME, OR GET ALONG WITH OTHER PEOPLE: NOT DIFFICULT AT ALL
2. FEELING DOWN, DEPRESSED OR HOPELESS: NOT AT ALL
7. TROUBLE CONCENTRATING ON THINGS, SUCH AS READING THE NEWSPAPER OR WATCHING TELEVISION: NOT AT ALL
8. MOVING OR SPEAKING SO SLOWLY THAT OTHER PEOPLE COULD HAVE NOTICED. OR THE OPPOSITE, BEING SO FIGETY OR RESTLESS THAT YOU HAVE BEEN MOVING AROUND A LOT MORE THAN USUAL: NOT AT ALL
1. LITTLE INTEREST OR PLEASURE IN DOING THINGS: MORE THAN HALF THE DAYS
5. POOR APPETITE OR OVEREATING: NOT AT ALL
3. TROUBLE FALLING OR STAYING ASLEEP OR SLEEPING TOO MUCH: NEARLY EVERY DAY

## 2024-02-06 ASSESSMENT — ANXIETY QUESTIONNAIRES
3. WORRYING TOO MUCH ABOUT DIFFERENT THINGS: NOT AT ALL
5. BEING SO RESTLESS THAT IT IS HARD TO SIT STILL: SEVERAL DAYS
IF YOU CHECKED OFF ANY PROBLEMS ON THIS QUESTIONNAIRE, HOW DIFFICULT HAVE THESE PROBLEMS MADE IT FOR YOU TO DO YOUR WORK, TAKE CARE OF THINGS AT HOME, OR GET ALONG WITH OTHER PEOPLE: NOT DIFFICULT AT ALL
4. TROUBLE RELAXING: NOT AT ALL
7. FEELING AFRAID AS IF SOMETHING AWFUL MIGHT HAPPEN: NOT AT ALL
1. FEELING NERVOUS, ANXIOUS, OR ON EDGE: NOT AT ALL
2. NOT BEING ABLE TO STOP OR CONTROL WORRYING: NOT AT ALL
6. BECOMING EASILY ANNOYED OR IRRITABLE: NOT AT ALL
GAD7 TOTAL SCORE: 1

## 2024-02-06 ASSESSMENT — PAIN SCALES - GENERAL: PAINLEVEL: 0-NO PAIN

## 2024-02-06 NOTE — PATIENT INSTRUCTIONS
Thank you for choosing  as your transplant team!    Loren will call you to schedule a repeat cardiac stress test after Dr. Francois discusses with cardiologist.     We will also get you scheduled for EGD w anesthesia and follow up with Dr. Francois in May.     Medication Changes:    Friday 2/9 - STOP taking Propanolol    Monday 2/12 - Start taking Carvedilol 6.25mg once daily    Monday 2/19 - STOP Amlodipine    Please keep a log of your daily blood pressures.     Notify your transplant coordinator if your blood pressure readings increase or if you are having symptoms like shortness of breath, chest pain and feeling dizzy/lightheaded.     If you have any questions or concerns please contact your coordinator via [employer] message, or by calling the liver transplant office at [phone].

## 2024-02-06 NOTE — PROGRESS NOTES
"GEE met with Pt and Pt's friend, Ana Luisa for follow-up. Pt was pleasant and engaged. Pt denied any recent insurance changes. Pt shared he is currently working part-time as a . Pt denied any recent hospitalizations/trips to the ED. Pt reported good compliance with his medications and medical appointments. Pt listed his friend, Ana Luisa as primary support and his brother, Lenny as secondary support. Pt listed his friend, Gerda Villalobos as an alternate support. Pt shared he did not ask Gerda Villalobos to attend this appointment because \"her mom is not doing well\" and she \"can not call off every day from work.\" SW informed Pt that Gerda Villalobos will need to come to his next appointment for SW and the team to meet her. Pt was agreeable and stated, \"I will try.\" GEE informed Pt if he is unable to have eGrda Villalobos attend, he will need to list a new alternate support. Pt expressed understanding. Pt reported his recent mood as \"good.\" Pt denied any recent MH diagnosis/concerns. Pt denied any current MH treatment. Pt scored a 5 on the PHQ-9, indicating mild clinical depression. Pt scored a 1 on the JUANA-7, indicating minimal clinical anxiety. Pt shared he has not smoked cigarettes in 2 weeks. Pt shared he is currently using a \"zero nicotine\" vape. SW offered referral to smoking cessation and Pt declined. SW praised Pt for quitting and re-emphasized the importance of remaining abstinent from tobacco. Pt expressed understanding. Pt denied any current alcohol use. Pt shared he last drank May 1st. 2023. Pt shared he received a 6 month coin from AA and appeared excited about this. Pt stated, \"I'm never touching alcohol again.\" Pt shared he has still been attending weekly AA transplant house meetings and [facility] meetings in [place] 2-3 times weekly. Pt did not bring proof of attendance to follow-up visit. Pt shared he believed transplant house Beck[facility] sent proof to the team. SW to email transplant house " coordinator for proof of attendance. Pt denied any recent illicit drug use. Pt is currently moderate psychosocial risk. SW to follow-up with Pt in 6 months to monitor Pt's sobriety and AA attendance.

## 2024-02-06 NOTE — PROGRESS NOTES
Transplant Hepatology Clinic Note    Follow up appointment for decompensated cirrhosis, pre liver transplant evaluation.    Accompanied by friend, Chilo.  Patient seen today in the office by Dr. Cassius Francois/RN Liliana Choi.      History Of Present Illness  Goran Ibrahim is a 53 y.o. 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 Last drink in 5/2023. Alcohol biomarkers have been negative last 12/2023, 2/2024. His case was discussed at our LT Selection Committee Meeting, yesterday.     He had a number of hospitalizations at [facility] and [facility] from May to July 2023.      He explains that he developed symptoms which he attributed to a viral gastroenteritis in April 2023.  He started taking Pepto-Bismol.  At that time he noticed that his stools were dark and black.  He assumed that his dark stools were related to Pepto-Bismol use.  On May 18, 2023 is when he first took himself to the hospital at [facility].  He actually drove to the hospital and was confused.  He was pulled over by a  and had difficulty with his speech.  Ultimately, during that index hospitalization he was diagnosed with hepatic encephalopathy secondary to severe GI bleeding.  His GI bleeding was a major issue with recurrent GI bleeding events over days and weeks.  He was diagnosed with a duodenal ulcer which tested positive for H. pylori.  He also was diagnosed with esophageal varices.  During his first hospitalization he had also developed ascites and underwent an index paracentesis procedure.      He explains that at one point he was in the ICU and had required a number of blood transfusions.  He believes he had 3 endoscopy procedures in total and 4 colonoscopy procedures; in addition to multiple scans trying to determine where the GI bleeding was originating from.     Otherwise the details of his medical history is sparse.  The patient is poor historian and has difficulty providing a clear  timeline of events.     Over the last several months he has been out of the hospital.  He has been seeing gastroenterologist Dr. Dana Geronimo.  He has also been seeing a primary care physician and the nephrologist. He has now completed much of his testing and evaluation for both Liver and Kidney Transplant here at .      His past medical history is otherwise notable for hypertension for which she was on BP meds (these have since been stopped).  He was previously prediabetic, diet controlled.  He says before getting sick his weight was up to 325 pounds and he was struggling with obesity.     Past surgical history is otherwise notable for right arm fracture.     Family history: No known liver disease in the family.  His mother's probably from lung cancer.  His father is also  from metastatic lung cancer.     Social history: he reports smoking cigarettes daily, since the age of 13.  He estimates 5 to 10 cigarettes/day for the last 40 years.  He has committed to smoking cessation. His last alcohol use was May of 2023.  He describes weekend drinking 1-2 servings of hard liquor, which would be at the bar.  He denies daily alcohol use. Drinking was mostly on the weekends.  He said in his 20s or 30s his drinking was a little heavier he would have binge type drinking at the bar on  and .  He denies any recreational drug use.  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  He said he is very careful about not drinking and driving.  He has never had a DUI according to my questioning.     Subjective  Seen in transplant clinic 10/2023, 2023.    He has been evaluated by Dr. Floyd (transplant psychology), who raised concerns for cognitive deficits vs poor healthcare literacy. She has recommended cognitive testing, strong social support plan, with the recommendation to bring 1 support person to accompany him to all appointments. He is engaged in AA meetings.  His SIPAT was 24, per our SW assessment.     He was evaluated by Cardiologist, Dr. Pepe.  His dobutamine stress test did not meet protocol - submaximal HR. It was negative for inducible ischemia. He then completed a CT Ca2+ cardiac scoring, intermediate risk category:  LM 92.7  LAD 62.94  LCx 7.25  RCA 0.51      Total 163.4    His cardiac testing and evaluation was discussed at our Transplant Selection Committee. Concerns were raised for an incomplete evaluation based on his clinical risk factors, intermediate Ca2+ score and non-diagnostic stress test.      He has completed evaluation/testing with Transplant Nephrology. He meets dual listing by Cystatin C estimation of GFR.     Prior social history:  Roommate and friend Ana Luisa works and is not able to attend all appointments with him. She just started a new job as . He has several other friends that can step up to help but may not be able to come to all appointments. He is trying to see if there are people that can alternate in coming to appointments with him, but he feels like he is doing fine with out having people come with. He works as a      ROS:  No black stools, hematochezia. No N/V abdominal pain. No leg swelling. Denies alcohol use.     Lasix has been on hold given AKUA on 12/5/2023 with Cr 2.28 (from prior 1.8). Has been held since about 12/12     He has seen [facility] and has had tooth extraction, now edentulous       Review of Systems  Review of Systems   Constitutional: Negative.    HENT:  Positive for sinus pressure.    Respiratory: Negative.     Cardiovascular: Negative.  Negative for leg swelling.   Gastrointestinal: Negative.  Negative for abdominal distention, abdominal pain, blood in stool and nausea.         Medications:     Current Outpatient Medications:     amLODIPine (Norvasc) 5 mg tablet, Take 1 tablet (5 mg) by mouth once daily. (Patient taking differently: Take 2 tablets (10 mg) by mouth once daily.),  Disp: 90 tablet, Rfl: 3    furosemide (Lasix) 20 mg tablet, Take 1 tablet (20 mg) by mouth 2 times a day. 12/19/23 On hold due to elevated CR, Disp: , Rfl:     pantoprazole (ProtoNix) 40 mg EC tablet, Take 1 tablet (40 mg) by mouth 2 times a day., Disp: , Rfl:     propranolol (Inderal) 20 mg tablet, Take 1 tablet (20 mg) by mouth 2 times a day., Disp: , Rfl:     sodium bicarbonate 650 mg tablet, TAKE 1 TABLET (650 MG) BY MOUTH DAILY. DO NOT START BEFORE JUNE 4, 2023., Disp: , Rfl:     sucralfate (Carafate) 1 gram tablet, Take 1 tablet (1 g) by mouth 4 times a day. Take before meals, Disp: , Rfl:      Physical Exam  General: well-nourished, no acute distress  HEENT: PERRLA, EOM intact, no scleral icterus, moist MM, edentulous  Respiratory: CTA bilaterally, normal work of breathing  Cardiovascular: RRR, no murmurs/rubs/gallops  Abdomen: Soft, nontender, nondistended  Extremities: + edema, no asterixis  Neuro: alert and oriented, CNII-XII grossly intact, moves all 4 extremities with no focal deficits        Last Recorded Vitals  Blood pressure 144/74, pulse 55, temperature 36.1 °C (97 °F), temperature source Temporal, weight 112 kg (247 lb 6.4 oz), SpO2 94 %.     Relevant Results     Current Outpatient Medications:     amLODIPine (Norvasc) 5 mg tablet, Take 1 tablet (5 mg) by mouth once daily. (Patient taking differently: Take 2 tablets (10 mg) by mouth once daily.), Disp: 90 tablet, Rfl: 3    furosemide (Lasix) 20 mg tablet, Take 1 tablet (20 mg) by mouth 2 times a day. 12/19/23 On hold due to elevated CR, Disp: , Rfl:     pantoprazole (ProtoNix) 40 mg EC tablet, Take 1 tablet (40 mg) by mouth 2 times a day., Disp: , Rfl:     propranolol (Inderal) 20 mg tablet, Take 1 tablet (20 mg) by mouth 2 times a day., Disp: , Rfl:     sodium bicarbonate 650 mg tablet, TAKE 1 TABLET (650 MG) BY MOUTH DAILY. DO NOT START BEFORE JUNE 4, 2023., Disp: , Rfl:     sucralfate (Carafate) 1 gram tablet, Take 1 tablet (1 g) by mouth 4  times a day. Take before meals, Disp: , Rfl:             Lab Results   Component Value Date     WBC 3.9 (L) 12/05/2023     HGB 10.1 (L) 12/05/2023     HCT 31.0 (L) 12/05/2023     MCV 94 12/05/2023     PLT 77 (L) 12/05/2023            Lab Results   Component Value Date     GLUCOSE 96 12/05/2023     CALCIUM 8.2 (L) 12/05/2023      12/05/2023     K 4.0 12/05/2023     CO2 23 12/05/2023      (H) 12/05/2023     BUN 29 (H) 12/05/2023     CREATININE 2.28 (H) 12/05/2023            Lab Results   Component Value Date     ALT 12 12/05/2023     AST 32 12/05/2023     ALKPHOS 121 (H) 12/05/2023     BILITOT 0.9 12/05/2023            Imaging:     GI Procedures:     7/2023 EGD  Impression:               - Normal upper third of esophagus.                             - Grade I esophageal varices.                             - Esophageal mucosal changes suggestive of                             short-segment Matute's esophagus, classified as                             Matute's stage C0-M2 per Carlton criteria.                             Biopsy is contraindicated.                             - Small hiatal hernia.                             - Non-bleeding duodenal ulcer with no stigmata                             of bleeding. Appears to be healing well, without                             any signs of recent blood loss.                             - Mucosal changes in the duodenum. Biopsied.                             - No blood was present throughout the entire                             exam.   Recommendation:           - Patient has a contact number available for                             emergencies. The signs and symptoms of potential                             delayed complications were discussed with the                             patient. Return to normal activities tomorrow.                             Written discharge instructions were provided to                             the patient.                              - Resume previous diet.                             - Continue present medications.                             - Await pathology results.                             - Use Protonix (pantoprazole) 40 mg PO BID for 2                             months, then 1 daily indefinitely..         6/2023 EGD  Findings:       The esophagus was normal.        The stomach was normal.        One non-bleeding cratered duodenal ulcer with a visible vessel was        found in the duodenal bulb. The lesion was 30 mm by 30 mm in largest        dimension. Area was successfully injected with 4 mL of a 0.1 mg/mL        solution of epinephrine for hemostasis. Coagulation for hemostasis        using argon plasma at 0.8 liters/minute and 40 tilley was successful.        The exam was otherwise without abnormality.   Impression:               - Normal esophagus.                             - Normal stomach.                             - Non-bleeding duodenal ulcer with a visible                             vessel. Injected. Treated with argon plasma                             coagulation (APC).                             - The examination was otherwise normal.                             - No specimens collected.      MELD 3.0: 18 at 2/2/2024 10:14 AM  MELD-Na: 17 at 2/2/2024 10:14 AM  Calculated from:  Serum Creatinine: 2.53 mg/dL at 2/2/2024 10:14 AM  Serum Sodium: 142 mmol/L (Using max of 137 mmol/L) at 2/2/2024 10:14 AM  Total Bilirubin: 1.1 mg/dL at 2/2/2024 10:14 AM  Serum Albumin: 3.5 g/dL at 2/2/2024 10:14 AM  INR(ratio): 1.1 at 2/2/2024 10:14 AM  Age at listing (hypothetical): 53 years  Sex: Male at 2/2/2024 10:14 AM         ASSESSMENT/PLAN:     Goran Ibrahim is a satisfactory candidate for liver/kidney transplantation.     -Poor health literacy, very basic understanding of his medical condition and the liver transplant process. Education will need to be reinforced with patient and support.  -Cigarette smoking. Early  cessation at this time.  -Further Cardiovascular assessment.     He has had issues with both variceal and non variceal related GI bleeding.    He had an index episode of hepatic encephalopathy which was probably in the context of gastrointestinal blood loss.    He also developed ascites which required paracentesis 1 time in the hospital. He now has advanced chronic kidney disease - likely hypertensive related, given risk factors, imaging and lack of ascites (unlikely HRS).     Other important  GI issues include H. pylori related duodenal ulcer - will repeat EGD with gastric biopsies; short segment Matute's will be reassessed on EGD this year.      Decompensated alcoholic Cirrhosis  - Ascites: non currently; 1 paracentesis while admitted early 2023  - Volume: Lasix 20mg avelino.  Has leg swelling. Will stop amlodipine and transition from Propranolol to Coreg 6.25 mg BID. Will also begin a BP log.     - EV: 7/2023 EGD showed grade 1 EV. Repeat May  2024.     - HE: HE insetting of GIB. Not currently on lactulose and doing well. Monitor and restart if needed    - Immunizations: HAV and HBV immune    - HCC screening: q6m, with AFP.    - Transplant: will clarify/discuss CV risk assessment with Dr. Pepe, based on LT Selection Meeting.     HTN  Plan outlined above.      Advanced CKD  -risk factor management, plan for SLKT.    Matute's Esophagus  -Characterized as C0M2 on 7/3/2023 EGD but this was not biopsied due to presence of Grade I EV  -Repeat EGD in 2024     Hpylori Gastritis  -Treated in early 2023  -Needs test of cure, gastric biopsies at next EGD     Duodenal ulcer  -Reportedly healing well on 7/2023 EGD  -Pantoprazole 40mg to once daily     Colon cancer screening  -obtain colonoscopy reports from OSH     Tobacco abuse  -recent cessation.    Cassius Francois MD

## 2024-02-12 ENCOUNTER — TELEPHONE (OUTPATIENT)
Dept: TRANSPLANT | Facility: HOSPITAL | Age: 54
End: 2024-02-12
Payer: MEDICAID

## 2024-02-12 DIAGNOSIS — K76.82 HE (HEPATIC ENCEPHALOPATHY) (MULTI): ICD-10-CM

## 2024-02-12 NOTE — TELEPHONE ENCOUNTER
Patient is calling stating he is sleeping hard, he is not waking up for his alarm clock Please call

## 2024-02-13 RX ORDER — LACTULOSE 10 G/15ML
20 SOLUTION ORAL 2 TIMES DAILY
Qty: 5400 ML | Refills: 3 | Status: SHIPPED | OUTPATIENT
Start: 2024-02-13 | End: 2025-02-12

## 2024-02-15 ENCOUNTER — DOCUMENTATION (OUTPATIENT)
Dept: TRANSPLANT | Facility: HOSPITAL | Age: 54
End: 2024-02-15
Payer: MEDICAID

## 2024-02-15 LAB
HLA RESULTS: NORMAL
HLA-A+B+C AB NFR SER: NORMAL %
HLA-DP+DQ+DR AB NFR SER: NORMAL %

## 2024-02-15 NOTE — PROGRESS NOTES
"SW attempted to reach Pt's alternate support, Gerda Villalobos via telephone call to confirm commitment. SW was unable to speak with Pt's alternate support and left VM with SW contact information.    This SW received confirmation from Becky Marinelli, Transplant House SW that Pt has been attending weekly AA meetings. Becky stated, \"Yes, I keep attendance and Goran has been participating in our Wednesday AA meeting since the first time on 11/15/23.  He has joined us weekly since then except for last week he must not have been able to. We meet every Wednesday AM from 10-11:15. Goran has connected with other transplant patients, hearing their support for him and sharing his support for them.\"     Plan: SW to follow original plan from 02/06/2024. SW will await return phone call from Gerda Villalobos.    "

## 2024-02-19 LAB
HLA CLS I TYP PNL BLD/T DONR HIGH RES: NORMAL
HLA RESULTS: NORMAL
HLA-DP2 QL: NORMAL
HLA-DQB1 HIGH RES: NORMAL
HLA-DRB1 HIGH RES: NORMAL

## 2024-03-04 ENCOUNTER — DOCUMENTATION (OUTPATIENT)
Dept: TRANSPLANT | Facility: HOSPITAL | Age: 54
End: 2024-03-04
Payer: MEDICAID

## 2024-03-04 ENCOUNTER — TELEPHONE (OUTPATIENT)
Dept: TRANSPLANT | Facility: HOSPITAL | Age: 54
End: 2024-03-04
Payer: MEDICAID

## 2024-03-04 NOTE — TELEPHONE ENCOUNTER
Called patient asking if EGD was scheduled.  Sent a message to [facility] to call patient.  Patient would like to know if he should be taking his BP medication the day of his MR Cardiac Stress Test?

## 2024-03-06 ENCOUNTER — DOCUMENTATION (OUTPATIENT)
Dept: TRANSPLANT | Facility: HOSPITAL | Age: 54
End: 2024-03-06
Payer: MEDICAID

## 2024-03-06 DIAGNOSIS — Z01.818 ENCOUNTER FOR PRE-TRANSPLANT EVALUATION FOR LIVER TRANSPLANT: Primary | ICD-10-CM

## 2024-03-06 NOTE — PROGRESS NOTES
"SW spoke with Pt's alternate support, Gerda Villalobos via telephone call. SW confirmed Gerda Villalobos's identity. Gerda Villalobos reported that initially Pt asked her to be primary support and she was unable to due to financially caring for her parents in [place] and being unable to take unpaid time off from work. Gerda Villalobos shared that she is willing to be listed as an alternate support and she will help out where she can. Gerda Villalobos reported that she has vacation time and can take hours \"here and there.\" She shared that when Pt was hospitalized last year, she was the main person caring for him in the hospital. She reported that she lives an hour away from Pt, but she will be able to visit and provide him support in the hospital and help provide Pt with rides to his post-op appointments when needed. Gerda Villalobos reported that she drives and has a working vehicle and has no limitations that would prevent her from caring for Pt. Gerda Villalobos denied any further questions/concerns at this time.    Plan: SW to follow-up with Pt in 5 months to monitor Pt's sobriety and AA attendance.   "

## 2024-03-15 ENCOUNTER — TELEPHONE (OUTPATIENT)
Dept: TRANSPLANT | Facility: HOSPITAL | Age: 54
End: 2024-03-15
Payer: MEDICAID

## 2024-03-15 NOTE — TELEPHONE ENCOUNTER
[facility] if calling.  The patient's insurance does not see a reason for upcoming MR Stress Test, they will need additional information to support the test. [phone] Anny to be reached before 2PM  They will be calling the patient for rescheduling regardless due to protocol.

## 2024-03-19 ENCOUNTER — HOSPITAL ENCOUNTER (OUTPATIENT)
Dept: RADIOLOGY | Facility: HOSPITAL | Age: 54
End: 2024-03-19
Payer: MEDICAID

## 2024-03-20 ENCOUNTER — DOCUMENTATION (OUTPATIENT)
Dept: TRANSPLANT | Facility: HOSPITAL | Age: 54
End: 2024-03-20
Payer: MEDICAID

## 2024-03-20 NOTE — PROGRESS NOTES
Rec'd note from [facility] staff to call Anny at [phone] in precert re pts stress test.  Called and left a v/m with my name and number but the v/m did not identify who they were so patient information was not left.

## 2024-03-22 DIAGNOSIS — Z01.818 ENCOUNTER FOR PRE-TRANSPLANT EVALUATION FOR LIVER TRANSPLANT: ICD-10-CM

## 2024-03-22 DIAGNOSIS — K70.30 ALCOHOLIC CIRRHOSIS OF LIVER WITHOUT ASCITES (MULTI): ICD-10-CM

## 2024-03-22 DIAGNOSIS — K70.31 ALCOHOLIC CIRRHOSIS OF LIVER WITH ASCITES (MULTI): ICD-10-CM

## 2024-04-18 ENCOUNTER — HOSPITAL ENCOUNTER (INPATIENT)
Facility: HOSPITAL | Age: 54
LOS: 4 days | Discharge: HOME | End: 2024-04-22
Attending: INTERNAL MEDICINE | Admitting: INTERNAL MEDICINE
Payer: MEDICAID

## 2024-04-18 ENCOUNTER — APPOINTMENT (OUTPATIENT)
Dept: RADIOLOGY | Facility: HOSPITAL | Age: 54
End: 2024-04-18
Payer: MEDICAID

## 2024-04-18 DIAGNOSIS — Z01.818 ENCOUNTER FOR PRE-TRANSPLANT EVALUATION FOR LIVER TRANSPLANT: ICD-10-CM

## 2024-04-18 DIAGNOSIS — K74.60 LIVER CIRRHOSIS (MULTI): Primary | ICD-10-CM

## 2024-04-18 LAB
ALBUMIN SERPL BCP-MCNC: 2.7 G/DL (ref 3.4–5)
ALBUMIN SERPL BCP-MCNC: 2.9 G/DL (ref 3.4–5)
ALP SERPL-CCNC: 116 U/L (ref 33–120)
ALP SERPL-CCNC: 126 U/L (ref 33–120)
ALT SERPL W P-5'-P-CCNC: 20 U/L (ref 10–52)
ALT SERPL W P-5'-P-CCNC: 23 U/L (ref 10–52)
AMMONIA PLAS-SCNC: 126 UMOL/L (ref 16–53)
AMMONIA PLAS-SCNC: 134 UMOL/L (ref 16–53)
AMPHETAMINES UR QL SCN: NORMAL
ANION GAP SERPL CALC-SCNC: 12 MMOL/L (ref 10–20)
ANION GAP SERPL CALC-SCNC: 13 MMOL/L (ref 10–20)
APPEARANCE UR: CLEAR
APTT PPP: 34 SECONDS (ref 27–38)
AST SERPL W P-5'-P-CCNC: 43 U/L (ref 9–39)
AST SERPL W P-5'-P-CCNC: 53 U/L (ref 9–39)
BACTERIA #/AREA URNS AUTO: ABNORMAL /HPF
BARBITURATES UR QL SCN: NORMAL
BASOPHILS # BLD AUTO: 0.05 X10*3/UL (ref 0–0.1)
BASOPHILS NFR BLD AUTO: 1.3 %
BENZODIAZ UR QL SCN: NORMAL
BILIRUB SERPL-MCNC: 0.8 MG/DL (ref 0–1.2)
BILIRUB SERPL-MCNC: 1 MG/DL (ref 0–1.2)
BILIRUB UR STRIP.AUTO-MCNC: NEGATIVE MG/DL
BUN SERPL-MCNC: 40 MG/DL (ref 6–23)
BUN SERPL-MCNC: 41 MG/DL (ref 6–23)
BZE UR QL SCN: NORMAL
CALCIUM SERPL-MCNC: 8.1 MG/DL (ref 8.6–10.6)
CALCIUM SERPL-MCNC: 8.6 MG/DL (ref 8.6–10.6)
CANNABINOIDS UR QL SCN: NORMAL
CHLORIDE SERPL-SCNC: 108 MMOL/L (ref 98–107)
CHLORIDE SERPL-SCNC: 112 MMOL/L (ref 98–107)
CHLORIDE UR-SCNC: 77 MMOL/L
CHLORIDE/CREATININE (MMOL/G) IN URINE: 115 MMOL/G CREAT (ref 23–275)
CO2 SERPL-SCNC: 20 MMOL/L (ref 21–32)
CO2 SERPL-SCNC: 21 MMOL/L (ref 21–32)
COLOR UR: ABNORMAL
CREAT SERPL-MCNC: 2.58 MG/DL (ref 0.5–1.3)
CREAT SERPL-MCNC: 2.66 MG/DL (ref 0.5–1.3)
CREAT UR-MCNC: 67.2 MG/DL (ref 20–370)
CREAT UR-MCNC: 67.2 MG/DL (ref 20–370)
EGFRCR SERPLBLD CKD-EPI 2021: 27 ML/MIN/1.73M*2
EGFRCR SERPLBLD CKD-EPI 2021: 29 ML/MIN/1.73M*2
EOSINOPHIL # BLD AUTO: 0.2 X10*3/UL (ref 0–0.7)
EOSINOPHIL NFR BLD AUTO: 5.1 %
ERYTHROCYTE [DISTWIDTH] IN BLOOD BY AUTOMATED COUNT: 15.6 % (ref 11.5–14.5)
ERYTHROCYTE [DISTWIDTH] IN BLOOD BY AUTOMATED COUNT: 15.6 % (ref 11.5–14.5)
FENTANYL+NORFENTANYL UR QL SCN: NORMAL
GLUCOSE SERPL-MCNC: 132 MG/DL (ref 74–99)
GLUCOSE SERPL-MCNC: 91 MG/DL (ref 74–99)
GLUCOSE UR STRIP.AUTO-MCNC: NORMAL MG/DL
HCT VFR BLD AUTO: 29.9 % (ref 41–52)
HCT VFR BLD AUTO: 32.3 % (ref 41–52)
HGB BLD-MCNC: 10.4 G/DL (ref 13.5–17.5)
HGB BLD-MCNC: 9.8 G/DL (ref 13.5–17.5)
IMM GRANULOCYTES # BLD AUTO: 0.01 X10*3/UL (ref 0–0.7)
IMM GRANULOCYTES NFR BLD AUTO: 0.3 % (ref 0–0.9)
INR PPP: 1.1 (ref 0.9–1.1)
KETONES UR STRIP.AUTO-MCNC: NEGATIVE MG/DL
LACTATE SERPL-SCNC: 1.3 MMOL/L (ref 0.4–2)
LEUKOCYTE ESTERASE UR QL STRIP.AUTO: ABNORMAL
LYMPHOCYTES # BLD AUTO: 1.08 X10*3/UL (ref 1.2–4.8)
LYMPHOCYTES NFR BLD AUTO: 27.7 %
MCH RBC QN AUTO: 29.7 PG (ref 26–34)
MCH RBC QN AUTO: 30.2 PG (ref 26–34)
MCHC RBC AUTO-ENTMCNC: 32.2 G/DL (ref 32–36)
MCHC RBC AUTO-ENTMCNC: 32.8 G/DL (ref 32–36)
MCV RBC AUTO: 92 FL (ref 80–100)
MCV RBC AUTO: 92 FL (ref 80–100)
METHADONE UR QL SCN: NORMAL
MONOCYTES # BLD AUTO: 0.38 X10*3/UL (ref 0.1–1)
MONOCYTES NFR BLD AUTO: 9.7 %
NEUTROPHILS # BLD AUTO: 2.18 X10*3/UL (ref 1.2–7.7)
NEUTROPHILS NFR BLD AUTO: 55.9 %
NITRITE UR QL STRIP.AUTO: NEGATIVE
NRBC BLD-RTO: 0 /100 WBCS (ref 0–0)
NRBC BLD-RTO: 0 /100 WBCS (ref 0–0)
OPIATES UR QL SCN: NORMAL
OXYCODONE+OXYMORPHONE UR QL SCN: NORMAL
PCP UR QL SCN: NORMAL
PH UR STRIP.AUTO: 6 [PH]
PLATELET # BLD AUTO: 77 X10*3/UL (ref 150–450)
PLATELET # BLD AUTO: 85 X10*3/UL (ref 150–450)
POTASSIUM SERPL-SCNC: 3.6 MMOL/L (ref 3.5–5.3)
POTASSIUM SERPL-SCNC: 3.6 MMOL/L (ref 3.5–5.3)
POTASSIUM UR-SCNC: 22 MMOL/L
POTASSIUM/CREAT UR-RTO: 33 MMOL/G CREAT
PROT SERPL-MCNC: 6.8 G/DL (ref 6.4–8.2)
PROT SERPL-MCNC: 7.1 G/DL (ref 6.4–8.2)
PROT UR STRIP.AUTO-MCNC: ABNORMAL MG/DL
PROTHROMBIN TIME: 12.1 SECONDS (ref 9.8–12.8)
RBC # BLD AUTO: 3.24 X10*6/UL (ref 4.5–5.9)
RBC # BLD AUTO: 3.5 X10*6/UL (ref 4.5–5.9)
RBC # UR STRIP.AUTO: ABNORMAL /UL
RBC #/AREA URNS AUTO: ABNORMAL /HPF
RBC MORPH BLD: NORMAL
SODIUM SERPL-SCNC: 137 MMOL/L (ref 136–145)
SODIUM SERPL-SCNC: 141 MMOL/L (ref 136–145)
SODIUM UR-SCNC: 86 MMOL/L
SODIUM/CREAT UR-RTO: 128 MMOL/G CREAT
SP GR UR STRIP.AUTO: 1.01
UREA/CREAT UR-SRTO: 6.4 G/G CREAT
UROBILINOGEN UR STRIP.AUTO-MCNC: NORMAL MG/DL
UUN UR-MCNC: 433 MG/DL
WBC # BLD AUTO: 2.9 X10*3/UL (ref 4.4–11.3)
WBC # BLD AUTO: 3.9 X10*3/UL (ref 4.4–11.3)
WBC #/AREA URNS AUTO: ABNORMAL /HPF

## 2024-04-18 PROCEDURE — 2500000004 HC RX 250 GENERAL PHARMACY W/ HCPCS (ALT 636 FOR OP/ED)

## 2024-04-18 PROCEDURE — 80307 DRUG TEST PRSMV CHEM ANLYZR: CPT

## 2024-04-18 PROCEDURE — 84075 ASSAY ALKALINE PHOSPHATASE: CPT

## 2024-04-18 PROCEDURE — 2500000001 HC RX 250 WO HCPCS SELF ADMINISTERED DRUGS (ALT 637 FOR MEDICARE OP)

## 2024-04-18 PROCEDURE — 99223 1ST HOSP IP/OBS HIGH 75: CPT

## 2024-04-18 PROCEDURE — 93975 VASCULAR STUDY: CPT | Mod: DISTINCT PROCEDURAL SERVICE | Performed by: RADIOLOGY

## 2024-04-18 PROCEDURE — 1100000001 HC PRIVATE ROOM DAILY

## 2024-04-18 PROCEDURE — 82140 ASSAY OF AMMONIA: CPT

## 2024-04-18 PROCEDURE — 93975 VASCULAR STUDY: CPT | Mod: 59

## 2024-04-18 PROCEDURE — 36415 COLL VENOUS BLD VENIPUNCTURE: CPT

## 2024-04-18 PROCEDURE — 85027 COMPLETE CBC AUTOMATED: CPT

## 2024-04-18 PROCEDURE — 81001 URINALYSIS AUTO W/SCOPE: CPT

## 2024-04-18 PROCEDURE — 85025 COMPLETE CBC W/AUTO DIFF WBC: CPT

## 2024-04-18 PROCEDURE — 85610 PROTHROMBIN TIME: CPT

## 2024-04-18 PROCEDURE — 87086 URINE CULTURE/COLONY COUNT: CPT

## 2024-04-18 PROCEDURE — 83605 ASSAY OF LACTIC ACID: CPT

## 2024-04-18 PROCEDURE — 2500000006 HC RX 250 W HCPCS SELF ADMINISTERED DRUGS (ALT 637 FOR ALL PAYERS)

## 2024-04-18 PROCEDURE — 80321 ALCOHOLS BIOMARKERS 1OR 2: CPT

## 2024-04-18 PROCEDURE — 82436 ASSAY OF URINE CHLORIDE: CPT

## 2024-04-18 PROCEDURE — 82570 ASSAY OF URINE CREATININE: CPT

## 2024-04-18 RX ORDER — SUCRALFATE 1 G/1
1 TABLET ORAL 4 TIMES DAILY
Status: DISCONTINUED | OUTPATIENT
Start: 2024-04-18 | End: 2024-04-22 | Stop reason: HOSPADM

## 2024-04-18 RX ORDER — POTASSIUM CHLORIDE 20 MEQ/1
40 TABLET, EXTENDED RELEASE ORAL ONCE
Status: COMPLETED | OUTPATIENT
Start: 2024-04-18 | End: 2024-04-18

## 2024-04-18 RX ORDER — CHOLECALCIFEROL (VITAMIN D3) 50 MCG
50 TABLET ORAL DAILY
COMMUNITY
End: 2024-05-14 | Stop reason: SDUPTHER

## 2024-04-18 RX ORDER — SODIUM BICARBONATE 650 MG/1
650 TABLET ORAL 2 TIMES DAILY
COMMUNITY

## 2024-04-18 RX ORDER — SODIUM BICARBONATE 650 MG/1
650 TABLET ORAL DAILY
Status: DISCONTINUED | OUTPATIENT
Start: 2024-04-18 | End: 2024-04-22 | Stop reason: HOSPADM

## 2024-04-18 RX ORDER — CARVEDILOL 12.5 MG/1
6.25 TABLET ORAL DAILY
Status: DISCONTINUED | OUTPATIENT
Start: 2024-04-18 | End: 2024-04-19

## 2024-04-18 RX ORDER — ALBUMIN HUMAN 250 G/1000ML
25 SOLUTION INTRAVENOUS 3 TIMES DAILY
Status: DISCONTINUED | OUTPATIENT
Start: 2024-04-19 | End: 2024-04-19

## 2024-04-18 RX ORDER — PANTOPRAZOLE SODIUM 40 MG/1
40 TABLET, DELAYED RELEASE ORAL 2 TIMES DAILY
Status: DISCONTINUED | OUTPATIENT
Start: 2024-04-18 | End: 2024-04-18

## 2024-04-18 RX ORDER — CEFTRIAXONE 1 G/50ML
1 INJECTION, SOLUTION INTRAVENOUS EVERY 24 HOURS
Status: DISCONTINUED | OUTPATIENT
Start: 2024-04-18 | End: 2024-04-22 | Stop reason: HOSPADM

## 2024-04-18 RX ORDER — LACTULOSE 10 G/15ML
20 SOLUTION ORAL 3 TIMES DAILY
Status: DISCONTINUED | OUTPATIENT
Start: 2024-04-18 | End: 2024-04-22 | Stop reason: HOSPADM

## 2024-04-18 RX ORDER — FUROSEMIDE 20 MG/1
20 TABLET ORAL 2 TIMES DAILY
Status: DISCONTINUED | OUTPATIENT
Start: 2024-04-18 | End: 2024-04-18

## 2024-04-18 RX ORDER — PANTOPRAZOLE SODIUM 40 MG/1
40 TABLET, DELAYED RELEASE ORAL
Status: DISCONTINUED | OUTPATIENT
Start: 2024-04-18 | End: 2024-04-22 | Stop reason: HOSPADM

## 2024-04-18 RX ORDER — LACTULOSE 10 G/15ML
20 SOLUTION ORAL 2 TIMES DAILY
Status: DISCONTINUED | OUTPATIENT
Start: 2024-04-18 | End: 2024-04-18

## 2024-04-18 RX ORDER — FUROSEMIDE 20 MG/1
20 TABLET ORAL 2 TIMES DAILY
COMMUNITY
End: 2024-04-22 | Stop reason: HOSPADM

## 2024-04-18 RX ORDER — LACTULOSE 10 G/15ML
10 SOLUTION ORAL 2 TIMES DAILY
Status: DISCONTINUED | OUTPATIENT
Start: 2024-04-18 | End: 2024-04-18

## 2024-04-18 RX ORDER — HEPARIN SODIUM 5000 [USP'U]/ML
5000 INJECTION, SOLUTION INTRAVENOUS; SUBCUTANEOUS EVERY 8 HOURS SCHEDULED
Status: DISCONTINUED | OUTPATIENT
Start: 2024-04-18 | End: 2024-04-22 | Stop reason: HOSPADM

## 2024-04-18 RX ORDER — POLYETHYLENE GLYCOL 3350 17 G/17G
17 POWDER, FOR SOLUTION ORAL DAILY
Status: DISCONTINUED | OUTPATIENT
Start: 2024-04-18 | End: 2024-04-18

## 2024-04-18 RX ADMIN — POTASSIUM CHLORIDE 40 MEQ: 1500 TABLET, EXTENDED RELEASE ORAL at 11:22

## 2024-04-18 RX ADMIN — RIFAXIMIN 550 MG: 550 TABLET ORAL at 11:22

## 2024-04-18 RX ADMIN — SODIUM BICARBONATE 650 MG: 650 TABLET ORAL at 11:22

## 2024-04-18 RX ADMIN — SUCRALFATE 1 G: 1 TABLET ORAL at 20:25

## 2024-04-18 RX ADMIN — PANTOPRAZOLE SODIUM 40 MG: 40 TABLET, DELAYED RELEASE ORAL at 03:30

## 2024-04-18 RX ADMIN — LACTULOSE 20 G: 20 SOLUTION ORAL at 16:01

## 2024-04-18 RX ADMIN — LACTULOSE 20 G: 20 SOLUTION ORAL at 20:25

## 2024-04-18 RX ADMIN — LACTULOSE 10 G: 20 SOLUTION ORAL at 03:30

## 2024-04-18 RX ADMIN — CEFTRIAXONE SODIUM 1 G: 1 INJECTION, SOLUTION INTRAVENOUS at 20:31

## 2024-04-18 RX ADMIN — PANTOPRAZOLE SODIUM 40 MG: 40 TABLET, DELAYED RELEASE ORAL at 11:22

## 2024-04-18 RX ADMIN — RIFAXIMIN 550 MG: 550 TABLET ORAL at 20:25

## 2024-04-18 RX ADMIN — CARVEDILOL 6.25 MG: 12.5 TABLET, FILM COATED ORAL at 11:22

## 2024-04-18 RX ADMIN — LACTULOSE 20 G: 20 SOLUTION ORAL at 11:25

## 2024-04-18 RX ADMIN — SUCRALFATE 1 G: 1 TABLET ORAL at 18:14

## 2024-04-18 SDOH — SOCIAL STABILITY: SOCIAL INSECURITY: ABUSE: ADULT

## 2024-04-18 SDOH — SOCIAL STABILITY: SOCIAL INSECURITY: DO YOU FEEL ANYONE HAS EXPLOITED OR TAKEN ADVANTAGE OF YOU FINANCIALLY OR OF YOUR PERSONAL PROPERTY?: NO

## 2024-04-18 SDOH — SOCIAL STABILITY: SOCIAL INSECURITY: ARE YOU OR HAVE YOU BEEN THREATENED OR ABUSED PHYSICALLY, EMOTIONALLY, OR SEXUALLY BY ANYONE?: NO

## 2024-04-18 SDOH — SOCIAL STABILITY: SOCIAL INSECURITY: DO YOU FEEL UNSAFE GOING BACK TO THE PLACE WHERE YOU ARE LIVING?: NO

## 2024-04-18 SDOH — SOCIAL STABILITY: SOCIAL INSECURITY: HAVE YOU HAD ANY THOUGHTS OF HARMING ANYONE ELSE?: NO

## 2024-04-18 SDOH — SOCIAL STABILITY: SOCIAL INSECURITY: DOES ANYONE TRY TO KEEP YOU FROM HAVING/CONTACTING OTHER FRIENDS OR DOING THINGS OUTSIDE YOUR HOME?: NO

## 2024-04-18 SDOH — SOCIAL STABILITY: SOCIAL INSECURITY: HAS ANYONE EVER THREATENED TO HURT YOUR FAMILY OR YOUR PETS?: NO

## 2024-04-18 SDOH — SOCIAL STABILITY: SOCIAL INSECURITY: HAVE YOU HAD THOUGHTS OF HARMING ANYONE ELSE?: NO

## 2024-04-18 SDOH — SOCIAL STABILITY: SOCIAL INSECURITY: ARE THERE ANY APPARENT SIGNS OF INJURIES/BEHAVIORS THAT COULD BE RELATED TO ABUSE/NEGLECT?: NO

## 2024-04-18 SDOH — SOCIAL STABILITY: SOCIAL INSECURITY: WERE YOU ABLE TO COMPLETE ALL THE BEHAVIORAL HEALTH SCREENINGS?: YES

## 2024-04-18 ASSESSMENT — COGNITIVE AND FUNCTIONAL STATUS - GENERAL
DAILY ACTIVITIY SCORE: 24
DAILY ACTIVITIY SCORE: 24
PATIENT BASELINE BEDBOUND: NO
MOBILITY SCORE: 24
MOBILITY SCORE: 24

## 2024-04-18 ASSESSMENT — PATIENT HEALTH QUESTIONNAIRE - PHQ9
2. FEELING DOWN, DEPRESSED OR HOPELESS: NOT AT ALL
1. LITTLE INTEREST OR PLEASURE IN DOING THINGS: NOT AT ALL
SUM OF ALL RESPONSES TO PHQ9 QUESTIONS 1 & 2: 0

## 2024-04-18 ASSESSMENT — ACTIVITIES OF DAILY LIVING (ADL)
WALKS IN HOME: INDEPENDENT
BATHING: INDEPENDENT
DRESSING YOURSELF: INDEPENDENT
ADEQUATE_TO_COMPLETE_ADL: YES
HEARING - LEFT EAR: FUNCTIONAL
HEARING - RIGHT EAR: FUNCTIONAL
LACK_OF_TRANSPORTATION: PATIENT DECLINED
TOILETING: INDEPENDENT
FEEDING YOURSELF: INDEPENDENT
PATIENT'S MEMORY ADEQUATE TO SAFELY COMPLETE DAILY ACTIVITIES?: YES
JUDGMENT_ADEQUATE_SAFELY_COMPLETE_DAILY_ACTIVITIES: YES
ASSISTIVE_DEVICE: NONE;DENTURES LOWER;DENTURES UPPER
GROOMING: INDEPENDENT

## 2024-04-18 ASSESSMENT — PAIN SCALES - GENERAL
PAINLEVEL_OUTOF10: 0 - NO PAIN

## 2024-04-18 ASSESSMENT — PAIN - FUNCTIONAL ASSESSMENT
PAIN_FUNCTIONAL_ASSESSMENT: 0-10

## 2024-04-18 ASSESSMENT — LIFESTYLE VARIABLES
SKIP TO QUESTIONS 9-10: 1
HOW OFTEN DO YOU HAVE 6 OR MORE DRINKS ON ONE OCCASION: NEVER
PRESCIPTION_ABUSE_PAST_12_MONTHS: NO
AUDIT-C TOTAL SCORE: 0
HOW OFTEN DO YOU HAVE A DRINK CONTAINING ALCOHOL: NEVER
SUBSTANCE_ABUSE_PAST_12_MONTHS: NO
AUDIT-C TOTAL SCORE: 0
HOW MANY STANDARD DRINKS CONTAINING ALCOHOL DO YOU HAVE ON A TYPICAL DAY: PATIENT DOES NOT DRINK

## 2024-04-18 NOTE — CARE PLAN
The patient's goals for the shift include get some rest    The clinical goals for the shift include patient will remain hemodynamically stable throughput shift    Problem: Pain  Goal: My pain/discomfort is manageable  Outcome: Progressing     Problem: Safety  Goal: Patient will be injury free during hospitalization  Outcome: Progressing     Problem: Safety  Goal: I will remain free of falls  Outcome: Progressing     Problem: Daily Care  Goal: Daily care needs are met  Outcome: Progressing

## 2024-04-18 NOTE — PROGRESS NOTES
Pharmacy Admission Order Reconciliation Review    Goran Ibrahim is a 53 y.o. male admitted for Liver cirrhosis (Multi). Pharmacy reviewed the patient's unreconciled admission medications.    Prior to admission medications that were reviewed and acted on by the pharmacist include:  Vitamin D3  Sodium bicarbonate    These medications have been reconciled.     Any other unreconcilied medications have been addressed and will be ordered or held by the patient's medical team. Medications addressed by the pharmacist may be added or changed by the patient's medical team at any time.    Leesa Joyce, PharmD  Transitions of Care Pharmacist  [facility]  Please reach out via Secure Chat for questions, or if no response call [phone]

## 2024-04-18 NOTE — H&P
History Of Present Illness  Goran Ibrahim is a 53 y.o. male presenting as transfer from [facility] for Liver and Kidney Transplant here at  (seen by Dr Francois)  Today, he was found by police to be disoriented and was shifted to ED at [place].   Patient was frustrated to provide detailed history. Was SNELL x3. Denied any upper or lower GI bleeding. No change in stool or urine color. No recent illness or infection or fever. No recent non compliance to medications (Lactulose, BB, PPI).    ED Course:  On Physical examination: VS WNL  144\74, 55, 36/1, 95% on RA  SNELL x3 , NAD  Abdomen with mild ascites. No jaundice.   LL with +1 swelling    Labs: Elevated liver enzymes, Cr 2.66  Glu 132, Na 137, K 3.6, Lactate 1.3, Hgb 19.4, WBC 3.9  Ammonia 134    Dr Francois contacted and plan was to shift the patient to  for the transplant process.  At time patient seen he denied any complaints. No nausea or vomiting reported. No bloody stool or change in stool or urine colour. No abdominal distention.     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 Last drink in 5/2023. Alcohol biomarkers have been negative last 12/2023, 2/2024. His case was discussed at LT Selection Committee Meeting. He has now completed much of his testing and evaluation for both Liver and Kidney Transplant here at .   He had a number of hospitalizations at [facility] and [facility] from May to July 2023.    He explains that he developed symptoms which he attributed to a viral gastroenteritis in April 2023.  He started taking Pepto-Bismol.  At that time he noticed that his stools were dark and black.  He assumed that his dark stools were related to Pepto-Bismol use.  On May 18, 2023 is when he first took himself to the hospital at [facility].  He actually drove to the hospital and was confused.  He was pulled over by a  and had difficulty with his speech.  Ultimately, during that index hospitalization he was  diagnosed with hepatic encephalopathy secondary to severe GI bleeding.  His GI bleeding was a major issue with recurrent GI bleeding events over days and weeks.  He was diagnosed with a duodenal ulcer which tested positive for H. pylori.  He also was diagnosed with esophageal varices.  During his first hospitalization he had also developed ascites and underwent an index paracentesis procedure.    He explains that at one point he was in the ICU and had required a number of blood transfusions.  He believes he had 3 endoscopy procedures in total and 4 colonoscopy procedures; in addition to multiple scans trying to determine where the GI bleeding was originating from.     Otherwise the details of his medical history is sparse.  The patient is poor historian and has difficulty providing a clear timeline of events.     Over the last several months he has been out of the hospital.  He has been seeing gastroenterologist Dr. Dana Geronimo.  He has also been seeing a primary care physician and the nephrologist.      Transplant work up:  Seen in transplant clinic 10/2023, 12/2023.     He has been evaluated by Dr. Floyd (transplant psychology), who raised concerns for cognitive deficits vs poor healthcare literacy. She has recommended cognitive testing, strong social support plan, with the recommendation to bring 1 support person to accompany him to all appointments. He is engaged in AA meetings. His SIPAT was 24, per our SW assessment.      He was evaluated by Cardiologist, Dr. Pepe.  His dobutamine stress test did not meet protocol - submaximal HR. It was negative for inducible ischemia. He then completed a CT Ca2+ cardiac scoring, intermediate risk category:  LM 92.7  LAD 62.94  LCx 7.25  RCA 0.51      Total 163.4     His cardiac testing and evaluation was discussed at our Transplant Selection Committee. Concerns were raised for an incomplete evaluation based on his clinical risk factors, intermediate Ca2+ score and  non-diagnostic stress test.      He has completed evaluation/testing with Transplant Nephrology. He meets dual listing by Cystatin C estimation of GFR.       GI Procedures:     7/2023 EGD  Impression:               - Normal upper third of esophagus.                             - Grade I esophageal varices.                             - Esophageal mucosal changes suggestive of                             short-segment Matute's esophagus, classified as                             Matute's stage C0-M2 per Medford criteria.                             Biopsy is contraindicated.                             - Small hiatal hernia.                             - Non-bleeding duodenal ulcer with no stigmata                             of bleeding. Appears to be healing well, without                             any signs of recent blood loss.                             - Mucosal changes in the duodenum. Biopsied.                             - No blood was present throughout the entire                             exam.   Recommendation:           - Patient has a contact number available for                             emergencies. The signs and symptoms of potential                             delayed complications were discussed with the                             patient. Return to normal activities tomorrow.                             Written discharge instructions were provided to                             the patient.                             - Resume previous diet.                             - Continue present medications.                             - Await pathology results.                             - Use Protonix (pantoprazole) 40 mg PO BID for 2                             months, then 1 daily indefinitely..         6/2023 EGD  Findings:       The esophagus was normal.        The stomach was normal.        One non-bleeding cratered duodenal ulcer with a visible vessel was        found in the duodenal  bulb. The lesion was 30 mm by 30 mm in largest        dimension. Area was successfully injected with 4 mL of a 0.1 mg/mL        solution of epinephrine for hemostasis. Coagulation for hemostasis        using argon plasma at 0.8 liters/minute and 40 tilley was successful.        The exam was otherwise without abnormality.   Impression:               - Normal esophagus.                             - Normal stomach.                             - Non-bleeding duodenal ulcer with a visible                             vessel. Injected. Treated with argon plasma                             coagulation (APC).                             - The examination was otherwise normal.                             - No specimens collected.      MELD 3.0: 18 at 2/2/2024 10:14 AM  MELD-Na: 17 at 2/2/2024 10:14 AM  Calculated from:  Serum Creatinine: 2.53 mg/dL at 2/2/2024 10:14 AM  Serum Sodium: 142 mmol/L (Using max of 137 mmol/L) at 2/2/2024 10:14 AM  Total Bilirubin: 1.1 mg/dL at 2/2/2024 10:14 AM  Serum Albumin: 3.5 g/dL at 2/2/2024 10:14 AM  INR(ratio): 1.1 at 2/2/2024 10:14 AM  Age at listing (hypothetical): 53 years  Sex: Male at 2/2/2024 10:14 AM    Past Medical History  HTN  Pre-diabetes--resolved  Obesity    Surgical History  Past Surgical History:   Procedure Laterality Date    IR ANGIOGRAM CELIAC  5/19/2023    IR ANGIOGRAM CELIAC 5/19/2023    US GUIDED ABDOMINAL PARACENTESIS  6/22/2023    US GUIDED ABDOMINAL PARACENTESIS 6/22/2023    US GUIDED ABDOMINAL PARACENTESIS  5/19/2023    US GUIDED ABDOMINAL PARACENTESIS 5/19/2023        Social History  He reports that he has been smoking cigarettes. He has never used smokeless tobacco. He reports that he does not currently use alcohol. He reports that he does not use drugs.  he reports smoking cigarettes daily, since the age of 13.  He estimates 5 to 10 cigarettes/day for the last 40 years.  He has committed to smoking cessation. His last alcohol use was May of 2023.  He describes  weekend drinking 1-2 servings of hard liquor, which would be at the bar.  He denies daily alcohol use. Drinking was mostly on the weekends.  He said in his 20s or 30s his drinking was a little heavier he would have binge type drinking at the bar on  and .  He denies any recreational drug use.  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  He said he is very careful about not drinking and driving.  He has never had a DUI     Family History  No known liver disease in the family.  His mother's probably from lung cancer.  His father is also  from      Allergies  Patient has no known allergies.    Current Outpatient Medications:   - Pantoprazole    furosemide (Lasix) 20 mg tablet, Take 1 tablet (20 mg) by mouth 2 times a day. 23 On hold due to elevated CR, Disp: , Rfl:     pantoprazole (ProtoNix) 40 mg EC tablet, Take 1 tablet (40 mg) by mouth 2 times a day., Disp: , Rfl:     Carvedilol  6.25    sodium bicarbonate 650 mg tablet, TAKE 1 TABLET (650 MG) BY MOUTH DAILY. DO NOT START BEFORE 2023., Disp: , Rfl:     sucralfate (Carafate) 1 gram tablet, Take 1 tablet (1 g) by mouth 4 times a day. Take before meals, Disp: , Rfl:      Review of Systems   Constitutional: Negative.    HENT:  Negative for jaundice  Respiratory: Negative.     Cardiovascular: Negative.  Negative for leg swelling.   Gastrointestinal: Negative.  Negative for abdominal distention, abdominal pain, blood in stool and nausea.     Physical Exam   General: well-nourished, no acute distress, SNELL x3   HEENT: PERRLA, EOM intact, no scleral icterus, moist MM, edentulous  Respiratory: CTA bilaterally, normal work of breathing  Cardiovascular: RRR, no murmurs/rubs/gallops  Abdomen: Soft, nontender, nondistended  Extremities: + edema, no asterixis  Neuro: alert and oriented, CNII-XII grossly intact, moves all 4 extremities with no focal deficits    Last Recorded Vitals  Blood pressure  "174/83, pulse 54, temperature 36.6 °C (97.9 °F), temperature source Oral, resp. rate 20, height 1.778 m (5' 10\"), weight 112 kg (247 lb 4.8 oz), SpO2 99%.    Relevant Results  Scheduled medications  carvedilol, 6.25 mg, oral, Daily  [Held by provider] furosemide, 20 mg, oral, BID  heparin (porcine), 5,000 Units, subcutaneous, q8h EDINSON  lactulose, 10 g, oral, BID  pantoprazole, 40 mg, oral, BID  polyethylene glycol, 17 g, oral, Daily  sucralfate, 1 g, oral, 4x daily      Continuous medications     PRN medications     No results found for this or any previous visit (from the past 24 hour(s)).     CT head wo IV contrast    Result Date: 2024  Patient Name: GORAN IBRAHIM : 1970 [id number] Accession: [id number] Exam Date/Time: 2024 15:30 Procedure: CT HEAD WO IV CONTRAST Ordering Provider: ROMERO DOUGLAS Reason For Exam: driving erratically Indication: Trauma pneumatic 20 Comparison date: No comparison. FINDINGS: Dose reduction was employed with automated exposure control.3 mm unenhanced imaging of the brain performed. Images viewed in multiple orthogonal planes. Acute findings: No convincing acute ischemia or infarct, mass effect or midline shift, or hemorrhage. Bony structures:Unremarkable as seen. Orbits within normal limits, limited lack of contrast. Review of the paranasal sinuses shows mucosal thickening especially sphenoid sinus, right maxillary sinus..     No acute brain process identified. Chronic appearing sinusitis. 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 Transferred from OSH where he was found to have brief altered mental status, moslty hepatic encephalopathy with labs showing high ammonia. " Now admitted for transplant process under Dr Rizo care with Dr Francois onboard/at time seen was SNELL x3 with no complaints.       #Decompensated alcoholic Cirrhosis  #Hepatic Encephalopathy  #Kidney Liver transplant  ::He has had issues with both variceal and non variceal related GI bleeding.    He had an index episode of hepatic encephalopathy which was probably in the context of gastrointestinal blood loss.    ::He also developed ascites which required paracentesis 1 time in the hospital.   ::He also has advanced chronic kidney disease - likely hypertensive related, given risk factors, imaging and lack of ascites (unlikely HRS).   Other important  GI issues include H. pylori related duodenal ulcer - plan to repeat EGD with gastric biopsies; short segment Matute's will be reassessed on EGD this year, May.  :: No active bleeding  :: Transient altered mental status  :: No infection or non compliance or recent change of medications known to cause the decompensation.  - Ascites: non currently; 1 paracentesis while admitted early 2023  - Volume: Lasix 20mg avelino.  (On hold due to AKUA)   Will monitor and resume if needed     - Esophageal Varices: 7/2023 EGD showed grade 1 EV. Repeat May  2024.    Resume home carvedilol   Will keep NPO    - Hepatic Encephalopathy: resolved- . Continue Lactulose. Goal of 3 bowel motions daily   - Immunizations: HAV and HBV immune   - HCC screening: q6m, with AFP.   - Continue Transplant w\up, Poor health literacy, very basic understanding of his medical condition and the liver transplant process. Education will need to be reinforced with patient and support.    HTN  Currently off amlodipine due to LL edema. Will monitor BP       Advanced CKD  AKUA on CKD  - CMP stat, and daily RFP  - Hold home lasix  - Plan for SLKT.     Matute's Esophagus  -Characterized as C0M2 on 7/3/2023 EGD but this was not biopsied due to presence of Grade I EV  -Repeat EGD in May 2024     Hpylori Gastritis  -Treated  in early 2023  -Needs test of cure, gastric biopsies at next EGD     Duodenal ulcer  -Reportedly healing well on 7/2023 EGD  -Pantoprazole 40mg  once daily     Colon cancer screening  -obtain colonoscopy reports from OSH     Tobacco abuse  -recent cessation.     F Strict (ascites)  E Correct as needed  N NPO  A PIV  DVT prophylaxis Heparin  GI prophylaxis Pantoprazole    Code: Full Code  STACYK Ana Luisa Kwok  Friend [phone]    Kassidy Guzman MD

## 2024-04-18 NOTE — PROGRESS NOTES
Pharmacy Medication History Review    Goran Ibrahim is a 53 y.o. male admitted for Liver cirrhosis (Multi). Pharmacy reviewed the patient's [id number] medications and allergies for accuracy.    The list below reflects the updated PTA list.   Comments regarding how patient may be taking medications differently can be found in the Admit Orders Activity  Prior to Admission Medications   Prescriptions Last Dose Informant Patient Reported?   amLODIPine (Norvasc) 5 mg tablet More than a month Self No   Sig: Take 1 tablet (5 mg) by mouth once daily.   Patient not taking: Reported on 4/18/2024   carvedilol (Coreg) 6.25 mg tablet 4/17/2024 Self No   Sig: Take 1 tablet (6.25 mg) by mouth once daily.   cholecalciferol (Vitamin D-3) 50 MCG (2000 UT) tablet Past Week Self Yes   Sig: Take 1 tablet (50 mcg) by mouth once daily.   furosemide (Lasix) 20 mg tablet 4/17/2024 Self Yes   Sig: Take 1 tablet (20 mg) by mouth 2 times a day.   lactulose 20 gram/30 mL oral solution 4/17/2024 Self No   Sig: Take 30 mL (20 g) by mouth 2 times a day. Take 30mL up to 3 times daily - Titrate to having 3-4 bowel movements daily   Patient taking differently: Take 30 mL (20 g) by mouth 2 times a day. Take 30mL up to 3 times daily - Titrate to having 3-4 bowel movements daily    *Patient takes 15mL 1-2 times daily*   pantoprazole (ProtoNix) 40 mg EC tablet 4/17/2024 Self Yes   Sig: Take 1 tablet (40 mg) by mouth 2 times a day.   sodium bicarbonate 650 mg tablet 4/17/2024 Self Yes   Sig: Take 1 tablet (650 mg) by mouth 2 times a day.   sucralfate (Carafate) 1 gram tablet 4/17/2024 Self Yes   Sig: Take 1 tablet (1 g) by mouth 4 times a day. Take before meals      Facility-Administered Medications: None        The list below reflects the updated allergy list. Please review each documented allergy for additional clarification and justification.  Allergies  Reviewed by Felice Pichardo RPh on 4/18/2024   No Known Allergies         Patient declines  "M2B at discharge.   Local Pharmacy: [facility] [place] [id number] [place] [address] [[id number]]     Sources:   Pt interview - has pictures of Rx bottles on phone. Pt combines multiple prescriptions for same drug into one bottle.   Dispense hx  Call with pharmacist at [facility] [phone]  12/14/23 [facility] IM office visit   OARRS - no hx    Additional Comments:  Antihypertensives - amlodipine stopped (leg edema), transition from propranolol to carvedilol. Furosemide held iso elevated scr.    Lactulose - pt reports trying to take at least once daily. Pt reports if he takes more than one time a day, he cannot hold his bowels. Limited by lack of access to a bathroom at work ().       Felice Pichardo PharmD  Transitions of Care Pharmacist  04/18/24     Secure Chat preferred   If no response call [phone] or Vocera \"Med Rec\"   "

## 2024-04-18 NOTE — PROGRESS NOTES
Transitional Care Coordination Progress Note:  Patient discussed during interdisciplinary rounds.  Team members present: TYSON ROSA  Plan per Medical/Surgical team: Pt transferred from [facility] for liver and kidney transplant eval. Plan to order labs, lactulose, and Albumin for AKUA.  Payer: [facility] Medicaid  Status: Inpatient  Discharge disposition: Home  Potential Barriers: none  ADOD: 4/20 versus 4/21  Attempted to meet with pt this afternoon but he was in his room for assessment. Plan to meet with pt another time. Care coordinator will continue to follow for discharge planning needs.     Alex Knight RN  Transitional Care Coordinator/TCC  [phone]

## 2024-04-19 ENCOUNTER — APPOINTMENT (OUTPATIENT)
Dept: RADIOLOGY | Facility: HOSPITAL | Age: 54
End: 2024-04-19
Payer: MEDICAID

## 2024-04-19 LAB
ALBUMIN SERPL BCP-MCNC: 2.8 G/DL (ref 3.4–5)
ALP SERPL-CCNC: 109 U/L (ref 33–120)
ALT SERPL W P-5'-P-CCNC: 20 U/L (ref 10–52)
ANION GAP SERPL CALC-SCNC: 12 MMOL/L (ref 10–20)
APTT PPP: 34 SECONDS (ref 27–38)
AST SERPL W P-5'-P-CCNC: 43 U/L (ref 9–39)
BACTERIA UR CULT: NORMAL
BASOPHILS # BLD AUTO: 0.04 X10*3/UL (ref 0–0.1)
BASOPHILS NFR BLD AUTO: 1.2 %
BILIRUB DIRECT SERPL-MCNC: 0.3 MG/DL (ref 0–0.3)
BILIRUB SERPL-MCNC: 1 MG/DL (ref 0–1.2)
BUN SERPL-MCNC: 37 MG/DL (ref 6–23)
CALCIUM SERPL-MCNC: 8.4 MG/DL (ref 8.6–10.6)
CHLORIDE SERPL-SCNC: 112 MMOL/L (ref 98–107)
CO2 SERPL-SCNC: 20 MMOL/L (ref 21–32)
CREAT SERPL-MCNC: 2.32 MG/DL (ref 0.5–1.3)
EGFRCR SERPLBLD CKD-EPI 2021: 32 ML/MIN/1.73M*2
EOSINOPHIL # BLD AUTO: 0.09 X10*3/UL (ref 0–0.7)
EOSINOPHIL NFR BLD AUTO: 2.7 %
ERYTHROCYTE [DISTWIDTH] IN BLOOD BY AUTOMATED COUNT: 15.4 % (ref 11.5–14.5)
GLUCOSE SERPL-MCNC: 85 MG/DL (ref 74–99)
HCT VFR BLD AUTO: 30.3 % (ref 41–52)
HGB BLD-MCNC: 9.7 G/DL (ref 13.5–17.5)
HOLD SPECIMEN: NORMAL
IMM GRANULOCYTES # BLD AUTO: 0.01 X10*3/UL (ref 0–0.7)
IMM GRANULOCYTES NFR BLD AUTO: 0.3 % (ref 0–0.9)
INR PPP: 1.1 (ref 0.9–1.1)
LYMPHOCYTES # BLD AUTO: 0.46 X10*3/UL (ref 1.2–4.8)
LYMPHOCYTES NFR BLD AUTO: 13.8 %
MAGNESIUM SERPL-MCNC: 1.96 MG/DL (ref 1.6–2.4)
MCH RBC QN AUTO: 29.8 PG (ref 26–34)
MCHC RBC AUTO-ENTMCNC: 32 G/DL (ref 32–36)
MCV RBC AUTO: 93 FL (ref 80–100)
MONOCYTES # BLD AUTO: 0.27 X10*3/UL (ref 0.1–1)
MONOCYTES NFR BLD AUTO: 8.1 %
NEUTROPHILS # BLD AUTO: 2.47 X10*3/UL (ref 1.2–7.7)
NEUTROPHILS NFR BLD AUTO: 73.9 %
NRBC BLD-RTO: 0 /100 WBCS (ref 0–0)
PHOSPHATE SERPL-MCNC: 4.3 MG/DL (ref 2.5–4.9)
PLATELET # BLD AUTO: 63 X10*3/UL (ref 150–450)
POTASSIUM SERPL-SCNC: 4.1 MMOL/L (ref 3.5–5.3)
PROT SERPL-MCNC: 6.8 G/DL (ref 6.4–8.2)
PROTHROMBIN TIME: 12.7 SECONDS (ref 9.8–12.8)
RBC # BLD AUTO: 3.25 X10*6/UL (ref 4.5–5.9)
SODIUM SERPL-SCNC: 140 MMOL/L (ref 136–145)
WBC # BLD AUTO: 3.3 X10*3/UL (ref 4.4–11.3)

## 2024-04-19 PROCEDURE — 84100 ASSAY OF PHOSPHORUS: CPT

## 2024-04-19 PROCEDURE — 36415 COLL VENOUS BLD VENIPUNCTURE: CPT

## 2024-04-19 PROCEDURE — 71045 X-RAY EXAM CHEST 1 VIEW: CPT

## 2024-04-19 PROCEDURE — 87040 BLOOD CULTURE FOR BACTERIA: CPT

## 2024-04-19 PROCEDURE — 2500000004 HC RX 250 GENERAL PHARMACY W/ HCPCS (ALT 636 FOR OP/ED)

## 2024-04-19 PROCEDURE — 85025 COMPLETE CBC W/AUTO DIFF WBC: CPT

## 2024-04-19 PROCEDURE — 97165 OT EVAL LOW COMPLEX 30 MIN: CPT | Mod: GO

## 2024-04-19 PROCEDURE — 99233 SBSQ HOSP IP/OBS HIGH 50: CPT | Performed by: INTERNAL MEDICINE

## 2024-04-19 PROCEDURE — 97161 PT EVAL LOW COMPLEX 20 MIN: CPT | Mod: GP

## 2024-04-19 PROCEDURE — 97535 SELF CARE MNGMENT TRAINING: CPT | Mod: GO

## 2024-04-19 PROCEDURE — 2500000001 HC RX 250 WO HCPCS SELF ADMINISTERED DRUGS (ALT 637 FOR MEDICARE OP)

## 2024-04-19 PROCEDURE — 71045 X-RAY EXAM CHEST 1 VIEW: CPT | Performed by: RADIOLOGY

## 2024-04-19 PROCEDURE — 83735 ASSAY OF MAGNESIUM: CPT

## 2024-04-19 PROCEDURE — 1100000001 HC PRIVATE ROOM DAILY

## 2024-04-19 PROCEDURE — 82248 BILIRUBIN DIRECT: CPT

## 2024-04-19 PROCEDURE — P9047 ALBUMIN (HUMAN), 25%, 50ML: HCPCS | Mod: JZ

## 2024-04-19 PROCEDURE — 2500000006 HC RX 250 W HCPCS SELF ADMINISTERED DRUGS (ALT 637 FOR ALL PAYERS)

## 2024-04-19 PROCEDURE — 2500000004 HC RX 250 GENERAL PHARMACY W/ HCPCS (ALT 636 FOR OP/ED): Mod: JZ

## 2024-04-19 PROCEDURE — 85730 THROMBOPLASTIN TIME PARTIAL: CPT

## 2024-04-19 RX ORDER — CARVEDILOL 3.12 MG/1
3.12 TABLET ORAL DAILY
Status: DISCONTINUED | OUTPATIENT
Start: 2024-04-19 | End: 2024-04-22 | Stop reason: HOSPADM

## 2024-04-19 RX ORDER — ALBUMIN HUMAN 250 G/1000ML
25 SOLUTION INTRAVENOUS 3 TIMES DAILY
Status: COMPLETED | OUTPATIENT
Start: 2024-04-19 | End: 2024-04-20

## 2024-04-19 RX ORDER — MAGNESIUM SULFATE HEPTAHYDRATE 40 MG/ML
2 INJECTION, SOLUTION INTRAVENOUS ONCE
Status: COMPLETED | OUTPATIENT
Start: 2024-04-19 | End: 2024-04-19

## 2024-04-19 RX ORDER — AMLODIPINE BESYLATE 5 MG/1
5 TABLET ORAL DAILY
Status: DISCONTINUED | OUTPATIENT
Start: 2024-04-19 | End: 2024-04-22 | Stop reason: HOSPADM

## 2024-04-19 RX ADMIN — RIFAXIMIN 550 MG: 550 TABLET ORAL at 20:15

## 2024-04-19 RX ADMIN — SUCRALFATE 1 G: 1 TABLET ORAL at 08:44

## 2024-04-19 RX ADMIN — RIFAXIMIN 550 MG: 550 TABLET ORAL at 08:44

## 2024-04-19 RX ADMIN — CARVEDILOL 3.12 MG: 3.12 TABLET, FILM COATED ORAL at 13:41

## 2024-04-19 RX ADMIN — LACTULOSE 20 G: 20 SOLUTION ORAL at 08:44

## 2024-04-19 RX ADMIN — ALBUMIN HUMAN 25 G: 0.25 SOLUTION INTRAVENOUS at 20:15

## 2024-04-19 RX ADMIN — MAGNESIUM SULFATE HEPTAHYDRATE 2 G: 2 INJECTION, SOLUTION INTRAVENOUS at 09:45

## 2024-04-19 RX ADMIN — PANTOPRAZOLE SODIUM 40 MG: 40 TABLET, DELAYED RELEASE ORAL at 08:44

## 2024-04-19 RX ADMIN — CEFTRIAXONE SODIUM 1 G: 1 INJECTION, SOLUTION INTRAVENOUS at 18:00

## 2024-04-19 RX ADMIN — SUCRALFATE 1 G: 1 TABLET ORAL at 20:15

## 2024-04-19 RX ADMIN — ALBUMIN HUMAN 25 G: 0.25 SOLUTION INTRAVENOUS at 08:43

## 2024-04-19 RX ADMIN — AMLODIPINE BESYLATE 5 MG: 5 TABLET ORAL at 17:11

## 2024-04-19 RX ADMIN — SUCRALFATE 1 G: 1 TABLET ORAL at 17:11

## 2024-04-19 RX ADMIN — ALBUMIN HUMAN 25 G: 0.25 SOLUTION INTRAVENOUS at 17:10

## 2024-04-19 RX ADMIN — LACTULOSE 20 G: 20 SOLUTION ORAL at 17:11

## 2024-04-19 RX ADMIN — HEPARIN SODIUM 5000 UNITS: 5000 INJECTION INTRAVENOUS; SUBCUTANEOUS at 17:11

## 2024-04-19 RX ADMIN — SODIUM BICARBONATE 650 MG: 650 TABLET ORAL at 08:44

## 2024-04-19 RX ADMIN — LACTULOSE 20 G: 20 SOLUTION ORAL at 20:15

## 2024-04-19 ASSESSMENT — COGNITIVE AND FUNCTIONAL STATUS - GENERAL
CLIMB 3 TO 5 STEPS WITH RAILING: A LITTLE
DAILY ACTIVITIY SCORE: 24
CLIMB 3 TO 5 STEPS WITH RAILING: A LITTLE
DAILY ACTIVITIY SCORE: 24
MOBILITY SCORE: 23
DAILY ACTIVITIY SCORE: 24
MOBILITY SCORE: 23
MOBILITY SCORE: 24

## 2024-04-19 ASSESSMENT — ACTIVITIES OF DAILY LIVING (ADL)
ADL_ASSISTANCE: INDEPENDENT
BATHING_ASSISTANCE: MODIFIED INDEPENDENT (DEVICE)
HOME_MANAGEMENT_TIME_ENTRY: 12
ADL_ASSISTANCE: INDEPENDENT

## 2024-04-19 ASSESSMENT — PAIN SCALES - GENERAL
PAINLEVEL_OUTOF10: 0 - NO PAIN

## 2024-04-19 ASSESSMENT — PAIN - FUNCTIONAL ASSESSMENT
PAIN_FUNCTIONAL_ASSESSMENT: 0-10
PAIN_FUNCTIONAL_ASSESSMENT: 0-10

## 2024-04-19 NOTE — PROGRESS NOTES
Goran Ibrahim is a 53 y.o. male on day 1 of admission presenting with Liver cirrhosis (Multi).    Subjective   No acute events overnight. Patient reports he feels well overall, denies any acute complaints. No fevers, chills, nausea/vomiting, chest pain, dysuria. He does report that he continues to have some cognitive fog that has been persistent over the last 6 weeks.       Objective     Physical Exam  General: awake, alert, conversant, appears stated age  HEENT: pupils equal and round, no scleral icterus  Skin: no suspect lesions or rashes noted on visible skin  Chest: ctab, normal respiratory effort, not on supplemental oxygen  Cardiac: regular rate, normal s1, s2, no M/R/G  Abdomen: soft, NT, no involuntary guarding, mild distension of abdomen.  : no flank pain or indwelling urinary catheter  EXT: no peripheral edema, no asymmetry noted  MSK: no focal joint swelling noted  Neuro: AOx2-3 (able to answer name and location, unable to identify current month), moving all limbs spontaneously, follows commands  Psych: coherent thought process, appropriate mood and affect    Last Recorded Vitals  Vitals:    04/19/24 0500   BP: 162/70   Pulse: 56   Resp:    Temp:    SpO2: 96%     Intake/Output last 3 Shifts:  No intake or output data in the 24 hours ending 04/19/24 0949    Relevant Results  Results from last 7 days   Lab Units 04/19/24  0730 04/18/24  0741 04/18/24  0223   WBC AUTO x10*3/uL 3.3* 2.9* 3.9*   HEMOGLOBIN g/dL 9.7* 9.8* 10.4*   HEMATOCRIT % 30.3* 29.9* 32.3*   PLATELETS AUTO x10*3/uL 63* 77* 85*     Results from last 7 days   Lab Units 04/19/24  0730 04/18/24  0741 04/18/24  0223   SODIUM mmol/L 140 141 137   POTASSIUM mmol/L 4.1 3.6 3.6   CHLORIDE mmol/L 112* 112* 108*   CO2 mmol/L 20* 21 20*   BUN mg/dL 37* 40* 41*   CREATININE mg/dL 2.32* 2.58* 2.66*   CALCIUM mg/dL 8.4* 8.6 8.1*   PROTEIN TOTAL g/dL 6.8 6.8 7.1   BILIRUBIN TOTAL mg/dL 1.0 0.8 1.0   ALK PHOS U/L 109 116 126*   ALT U/L 20 20 23   AST U/L  43* 43* 53*   GLUCOSE mg/dL 85 91 132*     Imaging/Procedures:  RUQ US w/Doppler 4/18:  IMPRESSION:  1. Hepatic cirrhosis with no focal liver lesions identified.  Unremarkable Doppler evaluation of the hepatic vasculature.  2. Splenomegaly.    7/2023 EGD  Impression:               - Normal upper third of esophagus.                             - Grade I esophageal varices.                             - Esophageal mucosal changes suggestive of                             short-segment Matute's esophagus, classified as                             Matute's stage C0-M2 per [place] criteria.                             Biopsy is contraindicated.                             - Small hiatal hernia.                             - Non-bleeding duodenal ulcer with no stigmata                             of bleeding. Appears to be healing well, without                             any signs of recent blood loss.                             - Mucosal changes in the duodenum. Biopsied.                             - No blood was present throughout the entire                             exam.   6/2023 EGD  Impression:               - Normal esophagus.                             - Normal stomach.                             - Non-bleeding duodenal ulcer with a visible                             vessel. Injected. Treated with argon plasma                             coagulation (APC).                             - The examination was otherwise normal.                             - No specimens collected.     Assessment/Plan   Principal Problem:    Liver cirrhosis (Multi)  53-year-old male with history of decompensated cirrhosis secondary to MetALD with esophageal varices, history of duodenal ulcer, CKD, currently undergoing liver and kidney transplant evaluation, admitted as a transfer from [facility] for altered mental status with concern for hepatic encephalopathy and AKUA on CKD, treating for potential UTI    Updates 4/19  - Lowered  coreg to 3.125 daily from 6.25 due to bradycardia (45-50 overnight, asymptomatic)  - First dose of albumin today for AKUA on CKD  - Continue ceftriaxone 1g daily for UTI    #Decompensated cirrhosis  #Hepatic encephalopathy  ::MELD 3.0: 16 at 4/19/2024  7:30 AM  MELD-Na: 15 at 4/19/2024  7:30 AM  Calculated from:  Serum Creatinine: 2.32 mg/dL at 4/19/2024  7:30 AM  Serum Sodium: 140 mmol/L (Using max of 137 mmol/L) at 4/19/2024  7:30 AM  Total Bilirubin: 1.0 mg/dL at 4/19/2024  7:30 AM  Serum Albumin: 2.8 g/dL at 4/19/2024  7:30 AM  INR(ratio): 1.1 at 4/19/2024  7:30 AM  Age at listing (hypothetical): 53 years  Sex: Male at 4/19/2024  7:30 AM  :: Patient reported to be acutely encephalopathic previously when at [facility], events leading up to his admission at [facility] not entirely clear to the patient  :: Prior hospitalizations at [facility] and [facility] 05/2023-07/2023 where he was treated for hepatic encephalopathy and significant UGI bleeding due to duodenal ulcer and also esophageal varices  :: Undergoing transplant evaluation currently, under the care of Dr. Francois  :: Current concerns regarding transplant evaluation include transplant psychology (cognitive deficits vs poor healthcare literacy), incomplete cardiac testing/evaluation  :: - RUQ US w doppler 4/18: demonstrating hepatic cirrhosis with no focal liver lesions, unremarkable Doppler evaluation, splenomegaly. No free/loculated fluid seen in abdomen  :: UDS negative, P Eth level pending  :: U/A positive for leuk esterase/bacteria  Plan:  - Will investigate further as to cause of hepatic encephalopathy including infectious workup with U/A, urine drug screen, PEth, correct electrolyte abnormalities  - Will order further required testing for transplant evaluation while inpatient if possible  - Continue lactulose, titrated to 3-4 BM per day. Patient reports good compliance with this prior to admission  - Start rifaximin  - Continue protonix 40mg daily    #Possible  UTI  :: U/A on 4/18 with 25 leuk esterase, 1+ bacteria, 6-10 WBC  :: Patient denies any symptoms of UTI/dysuria however currently no other sources of infection that could contribute to hepatic encephalopathy  Plan:  - Will treat empirically for UTI with ceftriaxone 1g (4/18-)  - Follow up urine culture results    #AKUA on CKD  :: b/l Cr in 01/2024 previously reported as 2.1-2.2 however was 2.53 in 02/2024  :: Cr on admission at 2.66  Plan:  - Will start albumin 25g  at 50cc/hr    - Will continue to monitor RFP   - Hold home Lasix due to AKUA    #HTN  Plan:  - Decrease Coreg to 3.125mg daily from 6.25 for bradycardia    F: not indicated  E: Replete PRN  N: Regular diet, sodium restricted  A: pIV    DVT ppx: subcutaneous heparin  GI ppx: PPI    Code status: Full (confirmed on admission)  Next of kin: Ana Luisa Kwok (friend) [phone]  Brother (Hugo) [phone] would like updates    Yoshi Guerra MD  PGY-1 Internal Medicine

## 2024-04-19 NOTE — CARE PLAN
Problem: Pain  Goal: My pain/discomfort is manageable  Outcome: Progressing     Problem: Safety  Goal: Patient will be injury free during hospitalization  Outcome: Progressing  Goal: I will remain free of falls  Outcome: Progressing     Problem: Daily Care  Goal: Daily care needs are met  Outcome: Progressing     Problem: Psychosocial Needs  Goal: Demonstrates ability to cope with hospitalization/illness  Outcome: Progressing  Goal: Collaborate with me, my family, and caregiver to identify my specific goals  Outcome: Progressing     Problem: Discharge Barriers  Goal: My discharge needs are met  Outcome: Progressing   The patient's goals for the shift include get some rest    The clinical goals for the shift include Patient will remain HDS this shift and free from injury.

## 2024-04-19 NOTE — PROGRESS NOTES
Attempted to meet with pt this afternoon but he was sound asleep in bed. Plan to meet with pt another time. Care coordinator will continue to follow for discharge planning needs.    Alex Knight RN  Transitional Care Coordinator/TCC  [phone]

## 2024-04-19 NOTE — PROGRESS NOTES
Physical Therapy    Physical Therapy Evaluation    Patient Name: Goran Ibrahim  MRN: [id number]  Today's Date: 4/19/2024   Room: [address] [place] [address]-A  Time Calculation  Start Time: 1215  Stop Time: 1228  Time Calculation (min): 13 min    Assessment/Plan   PT Assessment  Assessment Comment: Pt declines need for PT at this time, educated on if hospitalization is extended and mobility decreases that PT can be reconsulted.  Educated pt on OP PT if needed in future.  End of Session Patient Position: Bed, 3 rail up, Alarm off, not on at start of session  IP OR SWING BED PT PLAN  Inpatient or Swing Bed: Inpatient  PT Plan  PT Plan: PT Eval only  PT Eval Only Reason: No acute PT needs identified  PT Frequency: PT eval only  PT Discharge Recommendations: No further acute PT, No PT needed after discharge  PT Recommended Transfer Status: Independent  PT - OK to Discharge: Yes (Eval complete)      Subjective   General Visit Information:  Reason for Referral: transfer from [facility] liver/kidney transplant workup, decompensated liver disease  Past Medical History Relevant to Rehab: HTN, prediabetes, CKD, hepatic encephalopathy  General Comment: Pt resting in bed upon PT entry, reports he did not feel up to mobilizing at this time as he felt cold.  Did witness pt up in room earlier this date, mobilizing without assist, has been (I) in room.  Discussed mobility also with OT.  Per pt, feels he is getting his strength and balance back, does not feel the need for PT follow.  Nursing [facility] is 24.   Home Living:  Home Living  Type of Home: House  Lives With: Adult children (dtr)  Home Adaptive Equipment: None  Home Layout: Two level, Bed/bath upstairs, Stairs to alternate level with rails, Work area in basement  Alternate Level Stairs-Number of Steps: 13  Home Access: Stairs to enter with rails  Entrance Stairs-Number of Steps: 3  Prior Level of Function:  Prior Function Per Pt/Caregiver Report  ADL Assistance: Independent  Ambulatory Assistance:  ((I)  community amb without AD)  Precautions:  Precautions  Hearing/Visual Limitations: WFL     Objective      Pain:  Pain Assessment  Pain Assessment: 0-10  Pain Score: 0 - No pain  Cognition:  Cognition  Orientation Level: Oriented X4    Extremity/Trunk Assessments:  Strength:           RLE   RLE : Within Functional Limits  LLE   LLE : Within Functional Limits    General Assessments:  Strength  Strength Comments: UE WNL- Grossly 5/5         Sensation  Sensation Comment: denies        Dynamic Standing Balance  Dynamic Standing-Balance Support: No upper extremity supported  Dynamic Standing-Comments: (I)    Functional Assessments:  Bed Mobility  Bed Mobility: Yes  Bed Mobility 1  Bed Mobility 1: Sitting to supine  Level of Assistance 1: Independent  Transfers  Transfer: Yes  Transfer 1  Transfer From 1: Stand to  Transfer to 1: Bed  Transfer Level of Assistance 1: Independent  Ambulation/Gait Training  Ambulation/Gait Training Performed: Yes  Ambulation/Gait Training 1  Surface 1: Level tile  Device 1: No device  Assistance 1: Independent  Quality of Gait 1:  (mild forward flexed posture)  Comments/Distance (ft) 1: pt amb back towards bed from bathroom ~10 ft earlier this date.  Declined OOB at this time, but reports no issues with ambulation and balance.        Outcome Measures:  [facility] Basic Mobility  Turning from your back to your side while in a flat bed without using bedrails: None  Moving from lying on your back to sitting on the side of a flat bed without using bedrails: None  Moving to and from bed to chair (including a wheelchair): None  Standing up from a chair using your arms (e.g. wheelchair or bedside chair): None  To walk in hospital room: None  Climbing 3-5 steps with railing: A little  Basic Mobility - Total Score: 23                               Education Documentation  Mobility Training, taught by Una Badillo PT at 4/19/2024  3:04 PM.  Learner: Patient  Readiness: Acceptance  Method:  Explanation  Response: Verbalizes Understanding  Comment: mobility safety, increasing activity while inpatient safely    04/19/24 at 3:05 PM   Una Badillo, PT

## 2024-04-19 NOTE — PROGRESS NOTES
Goran Ibrahim is a 53 y.o. male on day 0 of admission presenting with Liver cirrhosis (Multi).    Subjective   No acute events overnight. Patient was conversational, answering questions appropriately, and in no acute distress. Patient was A&Ox 2-3, able to answer his name and location however was unclear as to the current month. He confirms he had some confusion that lead up to his admission to [facility] but since his transfer, he reports that his confusion has improved. He denies any nausea/vomiting, chest pain, shortness of breath, fevers/chills, dysuria.       Objective     Physical Exam  General: awake, alert, conversant, appears stated age  HEENT: pupils equal and round, no scleral icterus  Skin: no suspect lesions or rashes noted on visible skin  Chest: ctab, normal respiratory effort, not on supplemental oxygen  Cardiac: regular rate, normal s1, s2, no M/R/G  Abdomen: soft, NT, no involuntary guarding, mild distension of abdomen.  : no flank pain or indwelling urinary catheter  EXT: no peripheral edema, no asymmetry noted  MSK: no focal joint swelling noted  Neuro: AOx2-3 (able to answer name and location, unable to identify current month), moving all limbs spontaneously, follows commands  Psych: coherent thought process, appropriate mood and affect    Last Recorded Vitals  Vitals:    04/18/24 2034   BP: 168/76   Pulse: (!) 45   Resp: 17   Temp: 36.4 °C (97.5 °F)   SpO2: 100%     Intake/Output last 3 Shifts:  No intake or output data in the 24 hours ending 04/18/24 2104    Relevant Results  Results from last 7 days   Lab Units 04/18/24  0741 04/18/24  0223   WBC AUTO x10*3/uL 2.9* 3.9*   HEMOGLOBIN g/dL 9.8* 10.4*   HEMATOCRIT % 29.9* 32.3*   PLATELETS AUTO x10*3/uL 77* 85*     Results from last 7 days   Lab Units 04/18/24  0741 04/18/24  0223   SODIUM mmol/L 141 137   POTASSIUM mmol/L 3.6 3.6   CHLORIDE mmol/L 112* 108*   CO2 mmol/L 21 20*   BUN mg/dL 40* 41*   CREATININE mg/dL 2.58* 2.66*   CALCIUM mg/dL 8.6  8.1*   PROTEIN TOTAL g/dL 6.8 7.1   BILIRUBIN TOTAL mg/dL 0.8 1.0   ALK PHOS U/L 116 126*   ALT U/L 20 23   AST U/L 43* 53*   GLUCOSE mg/dL 91 132*     Imaging/Procedures:  RUQ US w/Doppler 4/18:  IMPRESSION:  1. Hepatic cirrhosis with no focal liver lesions identified.  Unremarkable Doppler evaluation of the hepatic vasculature.  2. Splenomegaly.    7/2023 EGD  Impression:               - Normal upper third of esophagus.                             - Grade I esophageal varices.                             - Esophageal mucosal changes suggestive of                             short-segment Matute's esophagus, classified as                             Matute's stage C0-M2 per [place] criteria.                             Biopsy is contraindicated.                             - Small hiatal hernia.                             - Non-bleeding duodenal ulcer with no stigmata                             of bleeding. Appears to be healing well, without                             any signs of recent blood loss.                             - Mucosal changes in the duodenum. Biopsied.                             - No blood was present throughout the entire                             exam.   6/2023 EGD  Impression:               - Normal esophagus.                             - Normal stomach.                             - Non-bleeding duodenal ulcer with a visible                             vessel. Injected. Treated with argon plasma                             coagulation (APC).                             - The examination was otherwise normal.                             - No specimens collected.     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    #Decompensated cirrhosis  #Hepatic encephalopathy  ::MELD 3.0: 18 at 4/18/2024  5:18 PM  MELD-Na: 17 at 4/18/2024  5:18 PM  Calculated from:  Serum Creatinine: 2.58 mg/dL at 4/18/2024  7:41 AM  Serum Sodium: 141 mmol/L (Using max of 137 mmol/L) at 4/18/2024  7:41 AM  Total Bilirubin: 0.8 mg/dL (Using min of 1 mg/dL) at 4/18/2024  7:41 AM  Serum Albumin: 2.7 g/dL at 4/18/2024  7:41 AM  INR(ratio): 1.1 at 4/18/2024  5:18 PM  Age at listing (hypothetical): 53 years  Sex: Male at 4/18/2024  5:18 PM  :: Patient reported to be acutely encephalopathic previously when at [facility], events leading up to his admission at [facility] not entirely clear to the patient  :: Prior hospitalizations at [facility] and [facility] 05/2023-07/2023 where he was treated for hepatic encephalopathy and significant UGI bleeding due to duodenal ulcer and also esophageal varices  :: Undergoing transplant evaluation currently, under the care of Dr. Francois  :: Current concerns regarding transplant evaluation include transplant psychology (cognitive deficits vs poor healthcare literacy), incomplete cardiac testing/evaluation  Plan:  - Will investigate further as to cause of hepatic encephalopathy including infectious workup with U/A, urine drug screen, PEth, correct electrolyte abnormalities  - Will order further required testing for transplant evaluation while inpatient if possible  - RUQ US w doppler performed: demonstrating hepatic cirrhosis with no focal liver lesions, unremarkable Doppler evaluation, splenomegaly. No free/loculated fluid seen in abdomen  - Continue lactulose, titrated to 3-4 BM per day. Patient reports good compliance with this prior to admission  - Start rifaximin  - Continue protonix 40mg daily    #Possible UTI  :: U/A on 4/18 with 25 leuk esterase, 1+ bacteria, 6-10 WBC  :: Patient denies any symptoms of UTI/dysuria however currently no other sources of infection that could  contribute to hepatic encephalopathy  Plan:  - Will treat empirically for UTI with ceftriaxone 1g (4/18-)    #AKUA on CKD  :: b/l Cr in 01/2024 previously reported as 2.1-2.2 however was 2.53 in 02/2024  :: Cr on admission at 2.66  Plan:  - Will start albumin 25g  at 50cc/hr    - Will continue to monitor RFP   - Hold home Lasix due to AKUA    #HTN  - Continue Coreg 6.25mg daily    F: not indicated  E: Replete PRN  N: Regular diet, sodium restricted  A: pIV    DVT ppx: subcutaneous heparin  GI ppx: PPI    Code status: Full (confirmed on admission)  Next of kin: Ana Luisa Kwok (friend) [phone]    Yoshi Guerra MD  PGY-1 Internal Medicine

## 2024-04-19 NOTE — PROGRESS NOTES
Occupational Therapy    Evaluation and Treatment    Patient Name: Goran Ibrahim  MRN: [id number]  Today's Date: 4/19/2024  Room: [facility]   Total time:[id number]    Assessment  IP OT Assessment  OT Assessment: Overall, pt able to perform ADLs independently and functional mobility without AD. Pt does present with decreased endurance  and IADL function and would likely benefit from low intensity skilled OT services to return to baseline.  Prognosis: Good  Barriers to Discharge: Other (Comment) (Comorbidities)  Evaluation/Treatment Tolerance: Patient tolerated treatment well  Medical Staff Made Aware: Yes  End of Session Communication: Bedside nurse  End of Session Patient Position: Bed, 3 rail up, Alarm off, not on at start of session (Seated EOB upon departure)  Plan:  Treatment Interventions: Endurance training, Cognitive reorientation, Compensatory technique education  OT Frequency: 2 times per week  OT Discharge Recommendations: Low intensity level of continued care  Equipment Recommended upon Discharge: Bedside commode  OT Recommended Transfer Status: Stand by assist, Assist of 1  OT - OK to Discharge: Yes    Subjective   Current Problem:  1. Liver cirrhosis (Multi)          General:  Reason for Referral: altered mental status with concern for hepatic encephalopathy and AKUA on CKD, treating for potential UTI  Past Medical History Relevant to Rehab: decompensated cirrhosis secondary to MetALD with esophageal varices, history of duodenal ulcer, CKD  Prior to Session Communication: Bedside nurse  Patient Position Received: Bed, 3 rail up, Alarm off, not on at start of session (Sitting EOB eating)  Family/Caregiver Present: No  General Comment: Pt sitting EOB eating upon arrival. Pt educated on OT evaluation- pleasant and agreeable.   Precautions:  Medical Precautions: Fall precautions    Pain:  Pain Assessment  Pain Assessment: 0-10  Pain Score: 0 - No pain    Objective   Cognition:  Overall Cognitive Status: Within  Functional Limits  Orientation Level: Oriented X4    Home Living:  Type of Home: House  Lives With: Adult children (Dtr)  Home Adaptive Equipment: None (Reports he will be getting bedside commode)  Home Layout: Two level, Bed/bath upstairs, Stairs to alternate level with rails, Work area in basement  Alternate Level Stairs-Rails: Both  Alternate Level Stairs-Number of Steps: 13  Home Access: Stairs to enter with rails  Entrance Stairs-Rails: Right  Entrance Stairs-Number of Steps: 3  Bathroom Shower/Tub: Tub/shower unit  Bathroom Toilet: Standard  Bathroom Equipment: None  Bathroom Accessibility: No issues reported   Prior Function:  Level of [place]: Independent with ADLs and functional transfers, Independent with homemaking with ambulation  ADL Assistance: Independent  Homemaking Assistance: Independent  Ambulatory Assistance: Independent  Hand Dominance: Right  IADL History:  Homemaking Responsibilities: Yes  Homemaking Comments: Tasks shared between pt and his dtr  Current License: Yes (Recently no longer driving)  Mode of Transportation: Family  Occupation: Full time employment  Type of Occupation:   Leisure and Hobbies: Car shows, gun shows  ADL:  Eating Assistance: Independent  Grooming Assistance: Independent (Anticipated)  Bathing Assistance: Modified independent (Device) (Anticipated)  UE Dressing Assistance: Independent  LE Dressing Assistance: Stand by  LE Dressing Deficit: Supervision/safety  Toileting Assistance with Device: Independent (Anticipated)  Activity Tolerance:  Endurance: Tolerates 10 - 20 min exercise with multiple rests  Balance:  Dynamic Sitting Balance  Dynamic Sitting-Balance Support: Feet supported  Dynamic Sitting-Balance: Lateral lean, Forward lean, Reaching for objects (During LB dressing- shoes)  Dynamic Sitting-Comments: Distant supervision  Dynamic Standing Balance  Dynamic Standing-Balance Support: No upper extremity supported  Dynamic Standing-Balance:  Lateral lean, Forward lean, Reaching for objects, Reaching across midline, Turning (During LB and UB dressing)  Dynamic Standing-Comments: Close supervision  Static Sitting Balance  Static Sitting-Balance Support: Feet supported  Static Sitting-Level of Assistance: Distant supervision  Static Standing Balance  Static Standing-Balance Support: No upper extremity supported  Static Standing-Level of Assistance: Close supervision  Bed Mobility/Transfers: Bed Mobility/Transfers: Bed Mobility  Bed Mobility: No  Functional Mobility  Functional Mobility Performed: Yes  Functional Mobility 1  Comments 1: Pt ambulated MIN household distance in room with no AD and with close supervision   and Transfers  Transfer: Yes  Transfer 1  Transfer From 1: Bed to, Stand to  Transfer to 1: Stand, Bed  Technique 1: Sit to stand, Stand to sit  Transfer Level of Assistance 1: Independent  Trials/Comments 1: x3  IADL's:   Homemaking Responsibilities: Yes  Homemaking Comments: Tasks shared between pt and his dtr  Current License: Yes (Recently no longer driving)  Mode of Transportation: Family  Occupation: Full time employment  Type of Occupation:   Leisure and Hobbies: Car shows, gun shows  Vision: Vision - Basic Assessment  Current Vision: Wears glasses for distance only   and    Sensation:  Light Touch: No apparent deficits  Strength:  Strength Comments: UE WNL- Grossly 5/5    Coordination:  Movements are Fluid and Coordinated: Yes   Hand Function:  Hand Function  Gross Grasp: Functional  Coordination: Functional  Extremities:   RUE   RUE : Within Functional Limits, LUE   LUE: Within Functional Limits,  , and      Outcome Measures: [facility] Daily Activity  Putting on and taking off regular lower body clothing: None  Bathing (including washing, rinsing, drying): None  Putting on and taking off regular upper body clothing: None  Toileting, which includes using toilet, bedpan or urinal: None  Taking care of personal grooming  such as brushing teeth: None  Eating Meals: None  Daily Activity - Total Score: 24    OT Adult Other Outcome Measures  4AT: -  SLUMS Total Score: 26    Education Documentation  Body Mechanics, taught by Harsha Lujan OT at 4/19/2024  2:35 PM.  Learner: Patient  Readiness: Acceptance  Method: Explanation, Demonstration  Response: Verbalizes Understanding, Demonstrated Understanding    Precautions, taught by Harsha Lujan OT at 4/19/2024  2:35 PM.  Learner: Patient  Readiness: Acceptance  Method: Explanation, Demonstration  Response: Verbalizes Understanding, Demonstrated Understanding    ADL Training, taught by Harsha Lujan OT at 4/19/2024  2:35 PM.  Learner: Patient  Readiness: Acceptance  Method: Explanation, Demonstration  Response: Verbalizes Understanding, Demonstrated Understanding    Education Comments  No comments found.        Goals:   Encounter Problems       Encounter Problems (Active)       BALANCE       Pt will maintain Good dynamic standing balance during morning ADL routine with modified independent level of assistance in order to demonstrate decreased risk of falling and improved postural control.       Start:  04/19/24    Expected End:  05/10/24            Pt will maintain Good dynamic standing balance during IADL task performance (ex. cleaning, cooking, laundry) and while using LRD       Start:  04/19/24    Expected End:  05/10/24                   MOBILITY       Patient will perform Functional mobility max Household distances/Community Distances with modified independent level of assistance and least restrictive device in order to improve safety and functional mobility.       Start:  04/19/24    Expected End:  05/10/24                   Treatment Completed on Evaluation    Activities of Daily Living:     Feeding  Feeding Comments: Pt able to feed self independently while seated EOB and both feet supported.    Grooming  Grooming Comments: Washed face IND while seated EOB- good  sitting balance     UE Dressing  UE Dressing Level of Assistance: Distant supervision  UE Dressing Where Assessed: Other (Comment) (Standing)  UE Dressing Comments: Pt able to dress UB while standing and with supervision level of assist with VCs for guidance and education on compensatory strategies.    LE Dressing  LE Dressing: Yes  Pants Level of Assistance: Distant supervision  Shoe Level of Assistance: Modified independent  LE Dressing Where Assessed: Edge of bed, Other (Comment) (Standing)  LE Dressing Comments: Pt completed LB dressing while standing- able to doff pants and don new pants while maintaining fair to good dynamic standing balance throughout, intermittently  leaned against wall to support self while dressing LB- reports this is how he performs LB dressing at home. Required supervision for safety while standing. Donned shoes while seated EOB- maintained good dynamic sitting balance       04/19/24 at 2:36 PM   SUZANNA MONTIEL OT   Rehab Office: [phone]

## 2024-04-20 LAB
ALBUMIN SERPL BCP-MCNC: 3.5 G/DL (ref 3.4–5)
ALP SERPL-CCNC: 97 U/L (ref 33–120)
ALT SERPL W P-5'-P-CCNC: 20 U/L (ref 10–52)
ANION GAP SERPL CALC-SCNC: 15 MMOL/L (ref 10–20)
AST SERPL W P-5'-P-CCNC: 50 U/L (ref 9–39)
BASOPHILS # BLD AUTO: ABNORMAL 10*3/UL
BASOPHILS NFR BLD AUTO: ABNORMAL %
BILIRUB DIRECT SERPL-MCNC: 0.3 MG/DL (ref 0–0.3)
BILIRUB SERPL-MCNC: 1.3 MG/DL (ref 0–1.2)
BUN SERPL-MCNC: 33 MG/DL (ref 6–23)
CALCIUM SERPL-MCNC: 9 MG/DL (ref 8.6–10.6)
CHLORIDE SERPL-SCNC: 104 MMOL/L (ref 98–107)
CO2 SERPL-SCNC: 19 MMOL/L (ref 21–32)
CREAT SERPL-MCNC: 2.8 MG/DL (ref 0.5–1.3)
EGFRCR SERPLBLD CKD-EPI 2021: 26 ML/MIN/1.73M*2
EOSINOPHIL # BLD AUTO: ABNORMAL 10*3/UL
EOSINOPHIL NFR BLD AUTO: ABNORMAL %
ERYTHROCYTE [DISTWIDTH] IN BLOOD BY AUTOMATED COUNT: 14.9 % (ref 11.5–14.5)
ETHYL GLUCURONIDE UR QL SCN: NEGATIVE NG/ML
GLUCOSE SERPL-MCNC: 140 MG/DL (ref 74–99)
HCT VFR BLD AUTO: 29.5 % (ref 41–52)
HGB BLD-MCNC: 9.8 G/DL (ref 13.5–17.5)
IMM GRANULOCYTES # BLD AUTO: 0.01 X10*3/UL (ref 0–0.7)
IMM GRANULOCYTES NFR BLD AUTO: 0.5 % (ref 0–0.9)
INR PPP: 1.3 (ref 0.9–1.1)
LABORATORY REPORT: NORMAL
LYMPHOCYTES # BLD AUTO: ABNORMAL 10*3/UL
LYMPHOCYTES NFR BLD AUTO: ABNORMAL %
MAGNESIUM SERPL-MCNC: 2.16 MG/DL (ref 1.6–2.4)
MCH RBC QN AUTO: 30.4 PG (ref 26–34)
MCHC RBC AUTO-ENTMCNC: 33.2 G/DL (ref 32–36)
MCV RBC AUTO: 92 FL (ref 80–100)
MONOCYTES # BLD AUTO: ABNORMAL 10*3/UL
MONOCYTES NFR BLD AUTO: ABNORMAL %
NEUTROPHILS # BLD AUTO: ABNORMAL 10*3/UL
NEUTROPHILS NFR BLD AUTO: ABNORMAL %
NRBC BLD-RTO: 0 /100 WBCS (ref 0–0)
PETH INTERPRETATION: NORMAL
PHOSPHATE SERPL-MCNC: 3.5 MG/DL (ref 2.5–4.9)
PLATELET # BLD AUTO: 47 X10*3/UL (ref 150–450)
PLPETH BLD-MCNC: <10 NG/ML
POPETH BLD-MCNC: <10 NG/ML
POTASSIUM SERPL-SCNC: 4.3 MMOL/L (ref 3.5–5.3)
PROT SERPL-MCNC: 8 G/DL (ref 6.4–8.2)
PROTHROMBIN TIME: 14.8 SECONDS (ref 9.8–12.8)
RBC # BLD AUTO: 3.22 X10*6/UL (ref 4.5–5.9)
SODIUM SERPL-SCNC: 134 MMOL/L (ref 136–145)
WBC # BLD AUTO: 1.9 X10*3/UL (ref 4.4–11.3)

## 2024-04-20 PROCEDURE — 87081 CULTURE SCREEN ONLY: CPT

## 2024-04-20 PROCEDURE — 99233 SBSQ HOSP IP/OBS HIGH 50: CPT | Performed by: INTERNAL MEDICINE

## 2024-04-20 PROCEDURE — 85610 PROTHROMBIN TIME: CPT

## 2024-04-20 PROCEDURE — 2500000001 HC RX 250 WO HCPCS SELF ADMINISTERED DRUGS (ALT 637 FOR MEDICARE OP)

## 2024-04-20 PROCEDURE — 84100 ASSAY OF PHOSPHORUS: CPT

## 2024-04-20 PROCEDURE — 82248 BILIRUBIN DIRECT: CPT

## 2024-04-20 PROCEDURE — 83735 ASSAY OF MAGNESIUM: CPT

## 2024-04-20 PROCEDURE — 80048 BASIC METABOLIC PNL TOTAL CA: CPT

## 2024-04-20 PROCEDURE — P9047 ALBUMIN (HUMAN), 25%, 50ML: HCPCS | Mod: JZ

## 2024-04-20 PROCEDURE — 1100000001 HC PRIVATE ROOM DAILY

## 2024-04-20 PROCEDURE — 2500000004 HC RX 250 GENERAL PHARMACY W/ HCPCS (ALT 636 FOR OP/ED)

## 2024-04-20 PROCEDURE — 85060 BLOOD SMEAR INTERPRETATION: CPT | Performed by: PATHOLOGY

## 2024-04-20 PROCEDURE — 36415 COLL VENOUS BLD VENIPUNCTURE: CPT

## 2024-04-20 PROCEDURE — 2500000006 HC RX 250 W HCPCS SELF ADMINISTERED DRUGS (ALT 637 FOR ALL PAYERS)

## 2024-04-20 RX ADMIN — SUCRALFATE 1 G: 1 TABLET ORAL at 06:04

## 2024-04-20 RX ADMIN — ALBUMIN HUMAN 25 G: 0.25 SOLUTION INTRAVENOUS at 20:05

## 2024-04-20 RX ADMIN — AMLODIPINE BESYLATE 5 MG: 5 TABLET ORAL at 08:26

## 2024-04-20 RX ADMIN — SODIUM BICARBONATE 650 MG: 650 TABLET ORAL at 08:26

## 2024-04-20 RX ADMIN — RIFAXIMIN 550 MG: 550 TABLET ORAL at 08:26

## 2024-04-20 RX ADMIN — SUCRALFATE 1 G: 1 TABLET ORAL at 17:51

## 2024-04-20 RX ADMIN — LACTULOSE 20 G: 20 SOLUTION ORAL at 08:26

## 2024-04-20 RX ADMIN — ALBUMIN HUMAN 25 G: 0.25 SOLUTION INTRAVENOUS at 08:26

## 2024-04-20 RX ADMIN — CEFTRIAXONE SODIUM 1 G: 1 INJECTION, SOLUTION INTRAVENOUS at 17:51

## 2024-04-20 RX ADMIN — HEPARIN SODIUM 5000 UNITS: 5000 INJECTION INTRAVENOUS; SUBCUTANEOUS at 21:53

## 2024-04-20 RX ADMIN — SUCRALFATE 1 G: 1 TABLET ORAL at 20:05

## 2024-04-20 RX ADMIN — HEPARIN SODIUM 5000 UNITS: 5000 INJECTION INTRAVENOUS; SUBCUTANEOUS at 14:12

## 2024-04-20 RX ADMIN — HEPARIN SODIUM 5000 UNITS: 5000 INJECTION INTRAVENOUS; SUBCUTANEOUS at 06:04

## 2024-04-20 RX ADMIN — SUCRALFATE 1 G: 1 TABLET ORAL at 14:12

## 2024-04-20 RX ADMIN — ALBUMIN HUMAN 25 G: 0.25 SOLUTION INTRAVENOUS at 14:12

## 2024-04-20 RX ADMIN — PANTOPRAZOLE SODIUM 40 MG: 40 TABLET, DELAYED RELEASE ORAL at 06:04

## 2024-04-20 RX ADMIN — CARVEDILOL 3.12 MG: 3.12 TABLET, FILM COATED ORAL at 08:26

## 2024-04-20 RX ADMIN — RIFAXIMIN 550 MG: 550 TABLET ORAL at 20:05

## 2024-04-20 ASSESSMENT — COGNITIVE AND FUNCTIONAL STATUS - GENERAL
CLIMB 3 TO 5 STEPS WITH RAILING: A LITTLE
DAILY ACTIVITIY SCORE: 24
MOBILITY SCORE: 23

## 2024-04-20 ASSESSMENT — PAIN SCALES - GENERAL
PAINLEVEL_OUTOF10: 0 - NO PAIN

## 2024-04-20 ASSESSMENT — PAIN - FUNCTIONAL ASSESSMENT
PAIN_FUNCTIONAL_ASSESSMENT: 0-10
PAIN_FUNCTIONAL_ASSESSMENT: 0-10

## 2024-04-20 NOTE — PROGRESS NOTES
"Goran Ibrahim is a 53 y.o. male on day 2 of admission presenting with Liver cirrhosis (Multi).    Subjective   Overnight, pt spiked a temp Tmax 38.4. Did not require tylenol. Bcx, CXR, MRSA nares ordered.    This AM, pt endorses feeling well. No HA, chest pain, cough, SOB, abdominal pain. He is having multiple bowel movements. He was surprised that he had fevers, saying he didn't feel sick. Says he isn't sure if he feels back to his mental baseline.       Objective     General: awake, alert, conversant, appears stated age  HEENT: pupils equal and round, no scleral icterus  Skin: no suspect lesions or rashes noted on visible skin  Chest: ctab, normal respiratory effort, not on supplemental oxygen  Cardiac: regular rate, normal s1, s2, no M/R/G  Abdomen: soft, NT, no involuntary guarding, mild distension of abdomen.  : no flank pain or indwelling urinary catheter  EXT: no peripheral edema, no asymmetry noted  MSK: no focal joint swelling noted  Neuro: AOx2-3 (able to answer name and location, unable to identify current month), moving all limbs spontaneously, follows commands  Psych: coherent thought process, appropriate mood and affect    Last Recorded Vitals  Blood pressure 158/78, pulse 62, temperature 37.2 °C (99 °F), temperature source Temporal, resp. rate 18, height 1.778 m (5' 10\"), weight 112 kg (247 lb 4.8 oz), SpO2 98%.  Intake/Output last 3 Shifts:  I/O last 3 completed shifts:  In: 320 (2.9 mL/kg) [I.V.:20 (0.2 mL/kg); IV Piggyback:300]  Out: - (0 mL/kg)   Weight: 112.2 kg     Relevant Results    Scheduled medications  albumin human, 25 g, intravenous, TID  amLODIPine, 5 mg, oral, Daily  carvedilol, 3.125 mg, oral, Daily  cefTRIAXone, 1 g, intravenous, q24h  heparin (porcine), 5,000 Units, subcutaneous, q8h EDINSON  lactulose, 20 g, oral, TID  pantoprazole, 40 mg, oral, Daily before breakfast  rifAXIMin, 550 mg, oral, q12h EDINOSN  sodium bicarbonate, 650 mg, oral, Daily  sucralfate, 1 g, oral, 4x " daily      Continuous medications     PRN medications    Results for orders placed or performed during the hospital encounter of 04/18/24 (from the past 24 hour(s))   Blood Culture    Specimen: Peripheral Venipuncture; Blood culture   Result Value Ref Range    Blood Culture Loaded on Instrument - Culture in progress    Blood Culture    Specimen: Peripheral Venipuncture; Blood culture   Result Value Ref Range    Blood Culture Loaded on Instrument - Culture in progress                Assessment/Plan   Principal Problem:    Liver cirrhosis (Multi)  53-year-old male with history of decompensated cirrhosis secondary to MetALD with esophageal varices, history of duodenal ulcer, CKD, currently undergoing liver and kidney transplant evaluation, admitted as a transfer from [facility] for altered mental status with concern for hepatic encephalopathy and AKUA on CKD, treating for potential UTI.     4/20 updates:  -Cr bump 2.3 -> 2.8; suspect this is pre-renal vs. Intrinsic (based on FeUrea > 35)  -c/w 2nd day IV albumin   -still holding lasix     #Decompensated cirrhosis  #Hepatic encephalopathy  ::MELD 3.0: 16 at 4/19/2024  7:30 AM  MELD-Na: 15 at 4/19/2024  7:30 AM  Calculated from:  Serum Creatinine: 2.32 mg/dL at 4/19/2024  7:30 AM  Serum Sodium: 140 mmol/L (Using max of 137 mmol/L) at 4/19/2024  7:30 AM  Total Bilirubin: 1.0 mg/dL at 4/19/2024  7:30 AM  Serum Albumin: 2.8 g/dL at 4/19/2024  7:30 AM  INR(ratio): 1.1 at 4/19/2024  7:30 AM  Age at listing (hypothetical): 53 years  Sex: Male at 4/19/2024  7:30 AM  :: Patient reported to be acutely encephalopathic previously when at [facility], events leading up to his admission at [facility] not entirely clear to the patient  :: Prior hospitalizations at [facility] and [facility] 05/2023-07/2023 where he was treated for hepatic encephalopathy and significant UGI bleeding due to duodenal ulcer and also esophageal varices  :: Undergoing transplant evaluation currently, under the care of   S[facility]  :: Current concerns regarding transplant evaluation include transplant psychology (cognitive deficits vs poor healthcare literacy), incomplete cardiac testing/evaluation  :: - RUQ US w doppler 4/18: demonstrating hepatic cirrhosis with no focal liver lesions, unremarkable Doppler evaluation, splenomegaly. No free/loculated fluid seen in abdomen  :: UDS negative, P Eth level pending  :: U/A positive for leuk esterase/bacteria  Plan:  - Will investigate further as to cause of hepatic encephalopathy including infectious workup with U/A, urine drug screen, PEth, correct electrolyte abnormalities  - Will order further required testing for transplant evaluation while inpatient if possible  - Continue lactulose, titrated to 3-4 BM per day. Patient reports good compliance with this prior to admission  - Start rifaximin  - Continue protonix 40mg daily     #Possible UTI  :: U/A on 4/18 with 25 leuk esterase, 1+ bacteria, 6-10 WBC  :: Patient denies any symptoms of UTI/dysuria however currently no other sources of infection that could contribute to hepatic encephalopathy  Plan:  - Will treat empirically for UTI with ceftriaxone 1g (4/18-)  - Follow up urine culture results     #AKUA on CKD  :: b/l Cr in 01/2024 previously reported as 2.1-2.2 however was 2.53 in 02/2024  :: Cr on admission at 2.66  Plan:  - Will start albumin 25g  at 50cc/hr    - Will continue to monitor RFP   - Hold home Lasix due to AKUA     #HTN  Plan:  - Decrease Coreg to 3.125mg daily from 6.25 for bradycardia     F: not indicated  E: Replete PRN  N: Regular diet, sodium restricted  A: pIV     DVT ppx: subcutaneous heparin  GI ppx: PPI     Code status: Full (confirmed on admission)  Next of kin: Ana Luisa Kwok (friend) [phone]  Brother (Hugo) [phone] would like updates             Riya Richards MD

## 2024-04-20 NOTE — CARE PLAN
The patient's goals for the shift include get some rest.    Problem: Pain  Goal: My pain/discomfort is manageable  Outcome: Progressing     Problem: Safety  Goal: Patient will be injury free during hospitalization  Outcome: Progressing  Goal: I will remain free of falls  Outcome: Progressing     Problem: Daily Care  Goal: Daily care needs are met  Outcome: Progressing     Problem: Psychosocial Needs  Goal: Demonstrates ability to cope with hospitalization/illness  Outcome: Progressing  Goal: Collaborate with me, my family, and caregiver to identify my specific goals  Outcome: Progressing     Problem: Discharge Barriers  Goal: My discharge needs are met  Outcome: Progressing       The clinical goals for the shift include Pt will remain safe and free from injury and falls throughout this shift.

## 2024-04-21 LAB
ALBUMIN SERPL BCP-MCNC: 3.8 G/DL (ref 3.4–5)
ALP SERPL-CCNC: 93 U/L (ref 33–120)
ALT SERPL W P-5'-P-CCNC: 19 U/L (ref 10–52)
ANION GAP SERPL CALC-SCNC: 13 MMOL/L (ref 10–20)
APPEARANCE UR: CLEAR
APTT PPP: 34 SECONDS (ref 27–38)
AST SERPL W P-5'-P-CCNC: 52 U/L (ref 9–39)
BASOPHILS # BLD AUTO: 0.02 X10*3/UL (ref 0–0.1)
BASOPHILS NFR BLD AUTO: 0.9 %
BILIRUB SERPL-MCNC: 0.9 MG/DL (ref 0–1.2)
BILIRUB UR STRIP.AUTO-MCNC: NEGATIVE MG/DL
BUN SERPL-MCNC: 38 MG/DL (ref 6–23)
CALCIUM SERPL-MCNC: 8.9 MG/DL (ref 8.6–10.6)
CHLORIDE SERPL-SCNC: 107 MMOL/L (ref 98–107)
CHLORIDE UR-SCNC: 15 MMOL/L
CHLORIDE/CREATININE (MMOL/G) IN URINE: 21 MMOL/G CREAT (ref 23–275)
CO2 SERPL-SCNC: 18 MMOL/L (ref 21–32)
COLOR UR: ABNORMAL
CREAT SERPL-MCNC: 2.7 MG/DL (ref 0.5–1.3)
CREAT UR-MCNC: 72.1 MG/DL (ref 20–370)
EGFRCR SERPLBLD CKD-EPI 2021: 27 ML/MIN/1.73M*2
EOSINOPHIL # BLD AUTO: 0.11 X10*3/UL (ref 0–0.7)
EOSINOPHIL NFR BLD AUTO: 4.7 %
ERYTHROCYTE [DISTWIDTH] IN BLOOD BY AUTOMATED COUNT: 14.9 % (ref 11.5–14.5)
GLUCOSE SERPL-MCNC: 113 MG/DL (ref 74–99)
GLUCOSE UR STRIP.AUTO-MCNC: NORMAL MG/DL
HCT VFR BLD AUTO: 30.2 % (ref 41–52)
HGB BLD-MCNC: 9.7 G/DL (ref 13.5–17.5)
IMM GRANULOCYTES # BLD AUTO: 0 X10*3/UL (ref 0–0.7)
IMM GRANULOCYTES NFR BLD AUTO: 0 % (ref 0–0.9)
INR PPP: 1.2 (ref 0.9–1.1)
KETONES UR STRIP.AUTO-MCNC: NEGATIVE MG/DL
LEUKOCYTE ESTERASE UR QL STRIP.AUTO: NEGATIVE
LYMPHOCYTES # BLD AUTO: 0.62 X10*3/UL (ref 1.2–4.8)
LYMPHOCYTES NFR BLD AUTO: 26.7 %
MAGNESIUM SERPL-MCNC: 2.14 MG/DL (ref 1.6–2.4)
MCH RBC QN AUTO: 29.8 PG (ref 26–34)
MCHC RBC AUTO-ENTMCNC: 32.1 G/DL (ref 32–36)
MCV RBC AUTO: 93 FL (ref 80–100)
MONOCYTES # BLD AUTO: 0.37 X10*3/UL (ref 0.1–1)
MONOCYTES NFR BLD AUTO: 15.9 %
NEUTROPHILS # BLD AUTO: 1.2 X10*3/UL (ref 1.2–7.7)
NEUTROPHILS NFR BLD AUTO: 51.8 %
NITRITE UR QL STRIP.AUTO: NEGATIVE
NRBC BLD-RTO: 0 /100 WBCS (ref 0–0)
OSMOLALITY UR: 224 MOSM/KG (ref 200–1200)
PH UR STRIP.AUTO: 6 [PH]
PHOSPHATE SERPL-MCNC: 3.5 MG/DL (ref 2.5–4.9)
PLATELET # BLD AUTO: 56 X10*3/UL (ref 150–450)
POTASSIUM SERPL-SCNC: 4 MMOL/L (ref 3.5–5.3)
POTASSIUM UR-SCNC: 9 MMOL/L
POTASSIUM/CREAT UR-RTO: 12 MMOL/G CREAT
PROT SERPL-MCNC: 7.9 G/DL (ref 6.4–8.2)
PROT UR STRIP.AUTO-MCNC: ABNORMAL MG/DL
PROTHROMBIN TIME: 13.6 SECONDS (ref 9.8–12.8)
RBC # BLD AUTO: 3.26 X10*6/UL (ref 4.5–5.9)
RBC # UR STRIP.AUTO: ABNORMAL /UL
RBC #/AREA URNS AUTO: NORMAL /HPF
SODIUM SERPL-SCNC: 134 MMOL/L (ref 136–145)
SODIUM UR-SCNC: 22 MMOL/L
SODIUM/CREAT UR-RTO: 31 MMOL/G CREAT
SP GR UR STRIP.AUTO: 1.01
SQUAMOUS #/AREA URNS AUTO: NORMAL /HPF
UROBILINOGEN UR STRIP.AUTO-MCNC: NORMAL MG/DL
WBC # BLD AUTO: 2.3 X10*3/UL (ref 4.4–11.3)
WBC #/AREA URNS AUTO: NORMAL /HPF

## 2024-04-21 PROCEDURE — 2500000004 HC RX 250 GENERAL PHARMACY W/ HCPCS (ALT 636 FOR OP/ED): Mod: JZ

## 2024-04-21 PROCEDURE — 36415 COLL VENOUS BLD VENIPUNCTURE: CPT

## 2024-04-21 PROCEDURE — 84100 ASSAY OF PHOSPHORUS: CPT

## 2024-04-21 PROCEDURE — 1100000001 HC PRIVATE ROOM DAILY

## 2024-04-21 PROCEDURE — 83735 ASSAY OF MAGNESIUM: CPT

## 2024-04-21 PROCEDURE — 82436 ASSAY OF URINE CHLORIDE: CPT

## 2024-04-21 PROCEDURE — 2500000001 HC RX 250 WO HCPCS SELF ADMINISTERED DRUGS (ALT 637 FOR MEDICARE OP)

## 2024-04-21 PROCEDURE — 2500000004 HC RX 250 GENERAL PHARMACY W/ HCPCS (ALT 636 FOR OP/ED)

## 2024-04-21 PROCEDURE — 83935 ASSAY OF URINE OSMOLALITY: CPT

## 2024-04-21 PROCEDURE — 85610 PROTHROMBIN TIME: CPT

## 2024-04-21 PROCEDURE — 99233 SBSQ HOSP IP/OBS HIGH 50: CPT | Performed by: INTERNAL MEDICINE

## 2024-04-21 PROCEDURE — 85025 COMPLETE CBC W/AUTO DIFF WBC: CPT

## 2024-04-21 PROCEDURE — 81001 URINALYSIS AUTO W/SCOPE: CPT

## 2024-04-21 PROCEDURE — 80053 COMPREHEN METABOLIC PANEL: CPT

## 2024-04-21 PROCEDURE — P9047 ALBUMIN (HUMAN), 25%, 50ML: HCPCS | Mod: JZ

## 2024-04-21 PROCEDURE — 2500000006 HC RX 250 W HCPCS SELF ADMINISTERED DRUGS (ALT 637 FOR ALL PAYERS)

## 2024-04-21 RX ORDER — REGADENOSON 0.08 MG/ML
0.4 INJECTION, SOLUTION INTRAVENOUS
Status: CANCELLED | OUTPATIENT
Start: 2024-04-21

## 2024-04-21 RX ORDER — ALBUMIN HUMAN 250 G/1000ML
25 SOLUTION INTRAVENOUS 3 TIMES DAILY
Status: COMPLETED | OUTPATIENT
Start: 2024-04-21 | End: 2024-04-21

## 2024-04-21 RX ORDER — AMINOPHYLLINE 25 MG/ML
125 INJECTION, SOLUTION INTRAVENOUS AS NEEDED
Status: CANCELLED | OUTPATIENT
Start: 2024-04-21

## 2024-04-21 RX ADMIN — SUCRALFATE 1 G: 1 TABLET ORAL at 20:04

## 2024-04-21 RX ADMIN — ALBUMIN HUMAN 25 G: 0.25 SOLUTION INTRAVENOUS at 20:02

## 2024-04-21 RX ADMIN — RIFAXIMIN 550 MG: 550 TABLET ORAL at 20:02

## 2024-04-21 RX ADMIN — SUCRALFATE 1 G: 1 TABLET ORAL at 06:05

## 2024-04-21 RX ADMIN — PANTOPRAZOLE SODIUM 40 MG: 40 TABLET, DELAYED RELEASE ORAL at 06:05

## 2024-04-21 RX ADMIN — HEPARIN SODIUM 5000 UNITS: 5000 INJECTION INTRAVENOUS; SUBCUTANEOUS at 06:05

## 2024-04-21 RX ADMIN — SODIUM BICARBONATE 650 MG: 650 TABLET ORAL at 09:30

## 2024-04-21 RX ADMIN — CEFTRIAXONE SODIUM 1 G: 1 INJECTION, SOLUTION INTRAVENOUS at 17:47

## 2024-04-21 RX ADMIN — LACTULOSE 20 G: 20 SOLUTION ORAL at 14:28

## 2024-04-21 RX ADMIN — HEPARIN SODIUM 5000 UNITS: 5000 INJECTION INTRAVENOUS; SUBCUTANEOUS at 14:28

## 2024-04-21 RX ADMIN — AMLODIPINE BESYLATE 5 MG: 5 TABLET ORAL at 09:30

## 2024-04-21 RX ADMIN — RIFAXIMIN 550 MG: 550 TABLET ORAL at 09:30

## 2024-04-21 RX ADMIN — ALBUMIN HUMAN 25 G: 0.25 SOLUTION INTRAVENOUS at 14:28

## 2024-04-21 RX ADMIN — LACTULOSE 20 G: 20 SOLUTION ORAL at 09:30

## 2024-04-21 RX ADMIN — ALBUMIN HUMAN 25 G: 0.25 SOLUTION INTRAVENOUS at 09:30

## 2024-04-21 RX ADMIN — HEPARIN SODIUM 5000 UNITS: 5000 INJECTION INTRAVENOUS; SUBCUTANEOUS at 20:02

## 2024-04-21 ASSESSMENT — COGNITIVE AND FUNCTIONAL STATUS - GENERAL
MOBILITY SCORE: 23
MOBILITY SCORE: 23
CLIMB 3 TO 5 STEPS WITH RAILING: A LITTLE
DAILY ACTIVITIY SCORE: 24
CLIMB 3 TO 5 STEPS WITH RAILING: A LITTLE
DAILY ACTIVITIY SCORE: 24

## 2024-04-21 ASSESSMENT — PAIN - FUNCTIONAL ASSESSMENT: PAIN_FUNCTIONAL_ASSESSMENT: 0-10

## 2024-04-21 ASSESSMENT — PAIN SCALES - GENERAL
PAINLEVEL_OUTOF10: 0 - NO PAIN
PAINLEVEL_OUTOF10: 0 - NO PAIN

## 2024-04-21 NOTE — PROGRESS NOTES
4/21/24 [facility] Transitional Care Coordinator Notes:    Assessment Note: Spoke with patient to discuss discharge needs. Patient is up and independent. Denies any home care needs. Will notify SW due to patient being interested in information on disability. Patient may need transportation assistance at discharge.    Home Care: denies  DME: denies  PCP: Dr. Quiñones  Pharmacy:   Falls: denies  Dialysis: denies                       Assessment/Plan   Principal Problem:    Liver cirrhosis (Multi)    Discharge Plans: discharge to home           Holly Posey RN

## 2024-04-21 NOTE — PROGRESS NOTES
Goran Ibrahim is a 53 y.o. male on day 3 of admission presenting with Liver cirrhosis (Multi).    Subjective   No acute events overnight. No fevers reported. Patient reports he feels well, denies any acute complaints. Denies any abdominal pain.       Objective     General: awake, alert, conversant, appears stated age  HEENT: pupils equal and round, no scleral icterus  Skin: no suspect lesions or rashes noted on visible skin  Chest: ctab, normal respiratory effort, not on supplemental oxygen  Cardiac: regular rate, normal s1, s2, no M/R/G  Abdomen: soft, NT, no involuntary guarding, mild distension of abdomen.  : no flank pain or indwelling urinary catheter  EXT: no peripheral edema, no asymmetry noted  MSK: no focal joint swelling noted  Neuro: AOx2-3 (able to answer name and location, unable to identify current month), moving all limbs spontaneously, follows commands  Psych: coherent thought process, appropriate mood and affect    Last Recorded Vitals  Vitals:    04/21/24 0933   BP: 146/81   Pulse: 55   Resp:    Temp:    SpO2: 97%       Intake/Output Summary (Last 24 hours) at 4/21/2024 1329  Last data filed at 4/20/2024 2205  Gross per 24 hour   Intake 440 ml   Output 1 ml   Net 439 ml     Relevant Results    Scheduled medications  albumin human, 25 g, intravenous, TID  amLODIPine, 5 mg, oral, Daily  carvedilol, 3.125 mg, oral, Daily  cefTRIAXone, 1 g, intravenous, q24h  heparin (porcine), 5,000 Units, subcutaneous, q8h EDINSON  lactulose, 20 g, oral, TID  pantoprazole, 40 mg, oral, Daily before breakfast  rifAXIMin, 550 mg, oral, q12h EDINSON  sodium bicarbonate, 650 mg, oral, Daily  sucralfate, 1 g, oral, 4x daily      Labs  Results from last 7 days   Lab Units 04/21/24  0918 04/20/24  0936 04/19/24  0730   WBC AUTO x10*3/uL 2.3* 1.9* 3.3*   HEMOGLOBIN g/dL 9.7* 9.8* 9.7*   HEMATOCRIT % 30.2* 29.5* 30.3*   PLATELETS AUTO x10*3/uL 56* 47* 63*     Results from last 7 days   Lab Units 04/21/24  0918 04/20/24  0936  04/19/24  0730   SODIUM mmol/L 134* 134* 140   POTASSIUM mmol/L 4.0 4.3 4.1   CHLORIDE mmol/L 107 104 112*   CO2 mmol/L 18* 19* 20*   BUN mg/dL 38* 33* 37*   CREATININE mg/dL 2.70* 2.80* 2.32*   CALCIUM mg/dL 8.9 9.0 8.4*   PROTEIN TOTAL g/dL 7.9 8.0 6.8   BILIRUBIN TOTAL mg/dL 0.9 1.3* 1.0   ALK PHOS U/L 93 97 109   ALT U/L 19 20 20   AST U/L 52* 50* 43*   GLUCOSE mg/dL 113* 140* 85           Assessment/Plan   Principal Problem:    Liver cirrhosis (Multi)  53-year-old male with history of decompensated cirrhosis secondary to MetALD with esophageal varices, history of duodenal ulcer, CKD, currently undergoing liver and kidney transplant evaluation, admitted as a transfer from [facility] for altered mental status with concern for hepatic encephalopathy and AKUA on CKD, treating for potential UTI.     4/21 Updates:  - Creatinine slightly better from yesterday, however still elevated (2.7<2.8<2.3)  - Will give additional albumin today  - Remained afebrile overnight. Fever possibly secondary to ceftriaxone  - Continue ceftriaxone, will consider converting to PO when planning for discharge    #Decompensated cirrhosis  #Hepatic encephalopathy  ::MELD 3.0: 21 at 4/21/2024  9:18 AM  MELD-Na: 20 at 4/21/2024  9:18 AM  Calculated from:  Serum Creatinine: 2.70 mg/dL at 4/21/2024  9:18 AM  Serum Sodium: 134 mmol/L at 4/21/2024  9:18 AM  Total Bilirubin: 0.9 mg/dL (Using min of 1 mg/dL) at 4/21/2024  9:18 AM  Serum Albumin: 3.8 g/dL (Using max of 3.5 g/dL) at 4/21/2024  9:18 AM  INR(ratio): 1.2 at 4/21/2024  9:18 AM  Age at listing (hypothetical): 53 years  Sex: Male at 4/21/2024  9:18 AM  :: Patient reported to be acutely encephalopathic previously when at [facility], events leading up to his admission at [facility] not entirely clear to the patient  :: Prior hospitalizations at [facility] 05/2023-07/2023 where he was treated for hepatic encephalopathy and significant UGI bleeding due to duodenal ulcer and also esophageal  varices  :: Undergoing transplant evaluation currently, under the care of Dr. Francois  :: Current concerns regarding transplant evaluation include transplant psychology (cognitive deficits vs poor healthcare literacy), incomplete cardiac testing/evaluation  :: - RUQ US w doppler 4/18: demonstrating hepatic cirrhosis with no focal liver lesions, unremarkable Doppler evaluation, splenomegaly. No free/loculated fluid seen in abdomen  :: UDS negative, P Eth level pending  :: U/A positive for leuk esterase/bacteria  Plan:  - Will investigate further as to cause of hepatic encephalopathy including infectious workup with U/A, urine drug screen, PEth, correct electrolyte abnormalities  - Will order further required testing for transplant evaluation while inpatient if possible - NM stress test ordered  - Continue lactulose, titrated to 3-4 BM per day. Patient reports good compliance with this prior to admission  - Start rifaximin  - Continue protonix 40mg daily     #Possible UTI  :: U/A on 4/18 with 25 leuk esterase, 1+ bacteria, 6-10 WBC  :: Patient denies any symptoms of UTI/dysuria however currently no other sources of infection that could contribute to hepatic encephalopathy  Plan:  - Will treat empirically for UTI with ceftriaxone 1g (4/18-)  - Follow up urine culture results     #AKUA on CKD  :: b/l Cr in 01/2024 previously reported as 2.1-2.2 however was 2.53 in 02/2024  :: Cr on admission at 2.66  Plan:  - Received albumin 4/19 and 4/20, will give additional albumin today 4/21  - Will continue to monitor RFP   - Hold home Lasix due to AKUA     #HTN  Plan:  - Decreased Coreg to 3.125mg daily from 6.25 for bradycardia     F: not indicated  E: Replete PRN  N: Regular diet, sodium restricted  A: pIV     DVT ppx: subcutaneous heparin  GI ppx: PPI     Code status: Full (confirmed on admission)  Next of kin: Ana Luisa Kwok (friend) [phone]  Brother (Hugo) [phone] would like updates     Yoshi Guerra MD  PGY-1  Internal Medicine

## 2024-04-21 NOTE — CARE PLAN
The patient's goals for the shift include get some rest    The clinical goals for the shift include Pt will have have 3-4 BMs. per day 4/21/24 0700      Problem: Pain  Goal: My pain/discomfort is manageable  Outcome: Progressing     Problem: Safety  Goal: Patient will be injury free during hospitalization  Outcome: Progressing  Goal: I will remain free of falls  Outcome: Progressing     Problem: Daily Care  Goal: Daily care needs are met  Outcome: Progressing     Problem: Psychosocial Needs  Goal: Demonstrates ability to cope with hospitalization/illness  Outcome: Progressing  Goal: Collaborate with me, my family, and caregiver to identify my specific goals  Outcome: Progressing     Problem: Discharge Barriers  Goal: My discharge needs are met  Outcome: Progressing

## 2024-04-22 ENCOUNTER — PHARMACY VISIT (OUTPATIENT)
Dept: PHARMACY | Facility: CLINIC | Age: 54
End: 2024-04-22
Payer: MEDICAID

## 2024-04-22 VITALS
BODY MASS INDEX: 35.4 KG/M2 | SYSTOLIC BLOOD PRESSURE: 116 MMHG | HEART RATE: 59 BPM | OXYGEN SATURATION: 97 % | TEMPERATURE: 98.4 F | DIASTOLIC BLOOD PRESSURE: 58 MMHG | HEIGHT: 70 IN | WEIGHT: 247.3 LBS | RESPIRATION RATE: 20 BRPM

## 2024-04-22 LAB
ALBUMIN SERPL BCP-MCNC: 3.7 G/DL (ref 3.4–5)
ALP SERPL-CCNC: 87 U/L (ref 33–120)
ALT SERPL W P-5'-P-CCNC: 20 U/L (ref 10–52)
ANION GAP SERPL CALC-SCNC: 13 MMOL/L (ref 10–20)
APTT PPP: 35 SECONDS (ref 27–38)
AST SERPL W P-5'-P-CCNC: 52 U/L (ref 9–39)
BASOPHILS # BLD AUTO: 0.02 X10*3/UL (ref 0–0.1)
BASOPHILS NFR BLD AUTO: 1.2 %
BILIRUB DIRECT SERPL-MCNC: 0.2 MG/DL (ref 0–0.3)
BILIRUB SERPL-MCNC: 0.6 MG/DL (ref 0–1.2)
BUN SERPL-MCNC: 40 MG/DL (ref 6–23)
CALCIUM SERPL-MCNC: 8.9 MG/DL (ref 8.6–10.6)
CHLORIDE SERPL-SCNC: 109 MMOL/L (ref 98–107)
CO2 SERPL-SCNC: 20 MMOL/L (ref 21–32)
CREAT SERPL-MCNC: 2.35 MG/DL (ref 0.5–1.3)
EGFRCR SERPLBLD CKD-EPI 2021: 32 ML/MIN/1.73M*2
EOSINOPHIL # BLD AUTO: 0.11 X10*3/UL (ref 0–0.7)
EOSINOPHIL NFR BLD AUTO: 6.7 %
ERYTHROCYTE [DISTWIDTH] IN BLOOD BY AUTOMATED COUNT: 14.7 % (ref 11.5–14.5)
GLUCOSE SERPL-MCNC: 95 MG/DL (ref 74–99)
HCT VFR BLD AUTO: 26.1 % (ref 41–52)
HGB BLD-MCNC: 8.6 G/DL (ref 13.5–17.5)
HOLD SPECIMEN: NORMAL
IMM GRANULOCYTES # BLD AUTO: 0 X10*3/UL (ref 0–0.7)
IMM GRANULOCYTES NFR BLD AUTO: 0 % (ref 0–0.9)
INR PPP: 1.1 (ref 0.9–1.1)
LYMPHOCYTES # BLD AUTO: 0.48 X10*3/UL (ref 1.2–4.8)
LYMPHOCYTES NFR BLD AUTO: 29.1 %
MAGNESIUM SERPL-MCNC: 2.19 MG/DL (ref 1.6–2.4)
MCH RBC QN AUTO: 30.3 PG (ref 26–34)
MCHC RBC AUTO-ENTMCNC: 33 G/DL (ref 32–36)
MCV RBC AUTO: 92 FL (ref 80–100)
MONOCYTES # BLD AUTO: 0.27 X10*3/UL (ref 0.1–1)
MONOCYTES NFR BLD AUTO: 16.4 %
NEUTROPHILS # BLD AUTO: 0.77 X10*3/UL (ref 1.2–7.7)
NEUTROPHILS NFR BLD AUTO: 46.6 %
NRBC BLD-RTO: 0 /100 WBCS (ref 0–0)
PATH REVIEW-CBC DIFFERENTIAL: NORMAL
PHOSPHATE SERPL-MCNC: 4.2 MG/DL (ref 2.5–4.9)
PLATELET # BLD AUTO: 53 X10*3/UL (ref 150–450)
POTASSIUM SERPL-SCNC: 4.1 MMOL/L (ref 3.5–5.3)
PROT SERPL-MCNC: 7.4 G/DL (ref 6.4–8.2)
PROTHROMBIN TIME: 12.9 SECONDS (ref 9.8–12.8)
RBC # BLD AUTO: 2.84 X10*6/UL (ref 4.5–5.9)
SODIUM SERPL-SCNC: 138 MMOL/L (ref 136–145)
STAPHYLOCOCCUS SPEC CULT: NORMAL
WBC # BLD AUTO: 1.7 X10*3/UL (ref 4.4–11.3)

## 2024-04-22 PROCEDURE — 82248 BILIRUBIN DIRECT: CPT

## 2024-04-22 PROCEDURE — 80053 COMPREHEN METABOLIC PANEL: CPT

## 2024-04-22 PROCEDURE — 2500000001 HC RX 250 WO HCPCS SELF ADMINISTERED DRUGS (ALT 637 FOR MEDICARE OP)

## 2024-04-22 PROCEDURE — 83735 ASSAY OF MAGNESIUM: CPT

## 2024-04-22 PROCEDURE — 36415 COLL VENOUS BLD VENIPUNCTURE: CPT

## 2024-04-22 PROCEDURE — 85610 PROTHROMBIN TIME: CPT

## 2024-04-22 PROCEDURE — 84100 ASSAY OF PHOSPHORUS: CPT

## 2024-04-22 PROCEDURE — RXMED WILLOW AMBULATORY MEDICATION CHARGE

## 2024-04-22 PROCEDURE — 2500000006 HC RX 250 W HCPCS SELF ADMINISTERED DRUGS (ALT 637 FOR ALL PAYERS)

## 2024-04-22 PROCEDURE — 2500000004 HC RX 250 GENERAL PHARMACY W/ HCPCS (ALT 636 FOR OP/ED)

## 2024-04-22 PROCEDURE — 85025 COMPLETE CBC W/AUTO DIFF WBC: CPT

## 2024-04-22 PROCEDURE — 99238 HOSP IP/OBS DSCHRG MGMT 30/<: CPT

## 2024-04-22 RX ORDER — CARVEDILOL 3.12 MG/1
3.12 TABLET ORAL DAILY
Qty: 30 TABLET | Refills: 0 | Status: SHIPPED | OUTPATIENT
Start: 2024-04-22 | End: 2024-05-14 | Stop reason: SDUPTHER

## 2024-04-22 RX ADMIN — CARVEDILOL 3.12 MG: 3.12 TABLET, FILM COATED ORAL at 08:59

## 2024-04-22 RX ADMIN — HEPARIN SODIUM 5000 UNITS: 5000 INJECTION INTRAVENOUS; SUBCUTANEOUS at 05:23

## 2024-04-22 RX ADMIN — RIFAXIMIN 550 MG: 550 TABLET ORAL at 08:58

## 2024-04-22 RX ADMIN — LACTULOSE 20 G: 20 SOLUTION ORAL at 08:58

## 2024-04-22 RX ADMIN — PANTOPRAZOLE SODIUM 40 MG: 40 TABLET, DELAYED RELEASE ORAL at 08:58

## 2024-04-22 RX ADMIN — SODIUM BICARBONATE 650 MG: 650 TABLET ORAL at 08:58

## 2024-04-22 RX ADMIN — SUCRALFATE 1 G: 1 TABLET ORAL at 08:59

## 2024-04-22 RX ADMIN — AMLODIPINE BESYLATE 5 MG: 5 TABLET ORAL at 08:58

## 2024-04-22 ASSESSMENT — PAIN - FUNCTIONAL ASSESSMENT: PAIN_FUNCTIONAL_ASSESSMENT: 0-10

## 2024-04-22 ASSESSMENT — PAIN SCALES - GENERAL: PAINLEVEL_OUTOF10: 0 - NO PAIN

## 2024-04-22 NOTE — DISCHARGE INSTRUCTIONS
Dear  [place]and[place],    You were admitted to [facility] for confusion related to your liver disease, injury to your kidney, and also treatment of a urinary tract infection. While here, we started a new medication for your confusion, rifaximin, in addition to your lactulose. Please continue taking lactulose. We planned to prescribe you rifaximin to take when at home as well, however, it is still pending insurance approval at the current time - please look out for a phone call if it is approved, within 72 hours. Due to your kidney injury, we have held your Lasix medication - please continue to hold off on taking these medications until you are instructed to do so by a provider. For your urinary tract infection, you completed a course of antibiotics.    After you are discharged, you are planned for follow up with your transplant hepatologist, Dr. Francois, within 2-3 weeks. You are also planned for repeat lab work on Wednesday, 4/24 to monitor your liver and kidney function. Our transplant coordinator will call you to further coordinate your next follow up appointments and testing needed for your transplant evaluation.    Sincerely,  Your  Hepatology Team    Follow-up:  - Repeat labs on 4/24 (comprehensive metabolic panel, PT/INR)  - Follow up with Dr. Francois within 2-3 weeks  - Transplant coordinator to reach out to you for further appointments and testing

## 2024-04-22 NOTE — CONSULTS
"Nutrition Initial Assessment:   Nutrition Assessment    Reason for Assessment: Dietitian discretion    Patient is a 53 y.o. 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       Nutrition History:  Energy Intake: Good > 75 %  Food and Nutrient History: Pt reports a good appetite and to be eating 2-3 meals a day. pt tolerating diet well, no nausea or vomiting. PTA Pt had good Po intake and no weight changes. Pt reports to be trying to lose weight by paying attention to portion sizes. Pt has good knowledge of protein sources and limits salt use at home. No other nutrition related questions or concerns reported at this time  Food Allergies/Intolerances:  None  GI Symptoms: None  Oral Problems: None       Anthropometrics:  Height: 177.8 cm (5' 10\")   Weight: 112 kg (247 lb 4.8 oz)   BMI (Calculated): 35.48  IBW/kg (Dietitian Calculated): 75.45 kg  Percent of IBW: 148 %       Weight History:   Wt Readings from Last 20 Encounters:   04/18/24 112 kg (247 lb 4.8 oz)   02/06/24 115 kg (252 lb 12.8 oz)   01/08/24 111 kg (244 lb 1.6 oz)   12/28/23 112 kg (246 lb 14.6 oz)   12/19/23 112 kg (247 lb 6.4 oz)   12/08/23 114 kg (252 lb)   10/02/23 114 kg (250 lb 12.8 oz)      Weight Change %:  Weight History / % Weight Change: No significant weight changes noted per EMR documented weights  Significant Weight Loss: No    Nutrition Focused Physical Exam Findings:    Subcutaneous Fat Loss:   Orbital Fat Pads: Mild-Moderate (slight dark circles and slight hollowing)  Buccal Fat Pads: Mild-Moderate (flat cheeks, minimal bounce)  Triceps: Well nourished (ample fat tissue)  Muscle Wasting:  Temporalis: Mild-Moderate (slight " depression)  Pectoralis (Clavicular Region): Well nourished (clavicle not visible)  Deltoid/Trapezius: Well nourished (rounded appearance at arm, shoulder, neck)  Interosseous: Well nourished (muscle bulges)  Trapezius/Infraspinatus/Supraspinatus (Scapular Region): Well nourished (bones not prominent, muscle taut)  Edema:  Edema: none  Physical Findings:  Skin: Negative    Nutrition Significant Labs:  BMP Trend:   Results from last 7 days   Lab Units 04/22/24 0625 04/21/24 0918 04/20/24  0936 04/19/24  0730   GLUCOSE mg/dL 95 113* 140* 85   CALCIUM mg/dL 8.9 8.9 9.0 8.4*   SODIUM mmol/L 138 134* 134* 140   POTASSIUM mmol/L 4.1 4.0 4.3 4.1   CO2 mmol/L 20* 18* 19* 20*   CHLORIDE mmol/L 109* 107 104 112*   BUN mg/dL 40* 38* 33* 37*   CREATININE mg/dL 2.35* 2.70* 2.80* 2.32*    , A1C:  Lab Results   Component Value Date    HGBA1C 5.3 09/28/2023   , BG POCT trend:    , Renal Lab Trend:   Results from last 7 days   Lab Units 04/22/24 0625 04/21/24 0918 04/20/24 0936 04/19/24  0730   POTASSIUM mmol/L 4.1 4.0 4.3 4.1   PHOSPHORUS mg/dL 4.2 3.5 3.5 4.3   SODIUM mmol/L 138 134* 134* 140   MAGNESIUM mg/dL 2.19 2.14 2.16 1.96   EGFR mL/min/1.73m*2 32* 27* 26* 32*   BUN mg/dL 40* 38* 33* 37*   CREATININE mg/dL 2.35* 2.70* 2.80* 2.32*        Nutrition Specific Medications:  Reviewed     I/O:   Last BM Date: 04/21/24; Stool Appearance: Loose (04/20/24 2100)    Dietary Orders (From admission, onward)       Start     Ordered    04/23/24 0001  NPO Diet; Effective midnight  Diet effective midnight         04/22/24 0756    04/20/24 1234  Adult diet 2-3 grams sodium  Diet effective now        Question:  Diet type  Answer:  2-3 grams sodium    04/20/24 5853                     Estimated Needs:   Total Energy Estimated Needs (kCal): 2100 kCal (6201-5758)  Method for Estimating Needs: MSj x 1.1 AF x 1.2 SF  Total Protein Estimated Needs (g): 112 g  Method for Estimating Needs: 1.5gm/kg of IBW  Total Fluid Estimated Needs (mL): 2100  mL  Method for Estimating Needs: 1mL/kcal or per team        Nutrition Diagnosis   Malnutrition Diagnosis  Patient has Malnutrition Diagnosis: No    Nutrition Diagnosis  Patient has Nutrition Diagnosis: Yes  Diagnosis Status (1): New  Nutrition Diagnosis 1: Increased nutrient needs  Related to (1): increased protein and calorie needs  As Evidenced by (1): Liver cirrhosis       Nutrition Interventions/Recommendations         Nutrition Prescription:  Individualized Nutrition Prescription Provided for : 2596-4038 kcal, 102gm PRO        Nutrition Interventions:   Interventions: Meals and snacks  Goal: Continue adult diet 2-3 gm sodium as tolerated and per team  No other nutrition interventions indicated at this time, RDN will remain available reconsult as needed       Nutrition Education: N/A         Nutrition Monitoring and Evaluation   Food/Nutrient Related History Monitoring  Monitoring and Evaluation Plan: Energy intake, Amount of food  Energy Intake: Estimated energy intake  Criteria: >75% of energy needs met via PO  Amount of Food: Estimated amout of food  Criteria: >50% of meals and supplements    Body Composition/Growth/Weight History  Monitoring and Evaluation Plan: Weight  Criteria: no sig weight changes    Biochemical Data, Medical Tests and Procedures  Monitoring and Evaluation Plan: Glucose/endocrine profile  Glucose/Endocrine Profile: Glucose, casual, Glucose, fasting  Criteria: <180            Time Spent/Follow-up Reminder:   Time Spent (min): 45 minutes  Last Date of Nutrition Visit: 04/22/24  Nutrition Follow-Up Needed?: None

## 2024-04-22 NOTE — DISCHARGE SUMMARY
Discharge Diagnosis  Liver cirrhosis (Multi)    Issues Requiring Follow-Up  [ ] Repeat labs on 4/24 (comprehensive metabolic panel, PT/INR)  [ ] Follow up with Dr. 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     Patient was reported to be acutely encephalopathic prior to his admission however, while here, he was minimally confused (A&O x2-3). Rifaximin started this admission in addition to his lactulose, with gradual improvement in mentation.     In regards to AKUA, patient initially presented with Cr of 2.66 (b/l ~ 2.1-2.2). His home lasix was held and he was given several doses of albumin, with gradual improvement in creatinine.    An infectious workup was performed, which returned for U/A suggestive of potential UTI, however, cultures with no growth. He was treated with 5 day course of ceftriaxone.    He was deemed stable for discharge on 4/22 with close follow up with hepatology and repeat labs within several days.    Pertinent Physical Exam At Time of Discharge  Physical Exam  General: awake, alert, conversant, appears stated age  HEENT: pupils equal and round, no scleral icterus  Skin: no suspect lesions or rashes noted on visible skin  Chest: ctab, normal respiratory effort, not on supplemental oxygen  Cardiac: regular rate, normal s1, s2, no M/R/G  Abdomen: soft, ND, NT, no involuntary  guarding. Mild distension of abdomen.  : no flank pain or indwelling urinary catheter  EXT: no peripheral edema, no asymmetry noted  MSK: no focal joint swelling noted  Neuro: AOx4, moving all limbs spontaneously, follows commands  Psych: coherent thought process, appropriate mood and affect    Home Medications     Medication List      START taking these medications     amLODIPine 5 mg tablet; Commonly known as: Norvasc; Take 1 tablet (5 mg)   by mouth once daily.   rifAXIMin 550 mg tablet; Commonly known as: Xifaxan; Take 1 tablet (550   mg) by mouth every 12 hours.     CHANGE how you take these medications     carvedilol 3.125 mg tablet; Commonly known as: Coreg; Take 1 tablet   (3.125 mg) by mouth once daily.; What changed: medication strength, how   much to take   lactulose 20 gram/30 mL oral solution; Take 30 mL (20 g) by mouth 2   times a day. Take 30mL up to 3 times daily - Titrate to having 3-4 bowel   movements daily; What changed: additional instructions     CONTINUE taking these medications     cholecalciferol 50 MCG (2000 UT) tablet; Commonly known as: Vitamin D-3   pantoprazole 40 mg EC tablet; Commonly known as: ProtoNix   sodium bicarbonate 650 mg tablet     STOP taking these medications     furosemide 20 mg tablet; Commonly known as: Lasix   sucralfate 1 gram tablet; Commonly known as: Carafate       Outpatient Follow-Up  Future Appointments   Date Time Provider Department Center   6/7/2024  9:00 AM Cassius Francois MD [id number]   6/25/2024 11:40 AM Cassius Francois MD [id number]       Yoshi Guerra MD  PGY-1 Internal Medicine

## 2024-04-22 NOTE — NURSING NOTE
Discharge Note  Pt discharged home with no home care. Meds to Beds delivered. Discharge instructions discussed with pt, no questions per pt. Peripheral IV removed. Belongings gathered per pt. Pt being picked up by neighbor.  --Thuy Chávez RN

## 2024-04-22 NOTE — PROGRESS NOTES
Social Work Note  Patient is requesting information on how to apply for SSD. He would like for the information to be sent to his email address at [email or web address]. Sw will attempt to see him today to give him a printed copy also.   EDUAR Lyons

## 2024-04-22 NOTE — PROGRESS NOTES
Transitional Care Coordination Progress Note:  Patient discussed during interdisciplinary rounds.  Team members present: TYSON ROSA  Plan per Medical/Surgical team: Plan for discharge home today, no home going needs anticipated at time of discharge, team ordered [id number].  Payer: [employer]  Status: Inpatient  Discharge disposition: Home  Potential Barriers: none  ADOD: 4/22  RN TCC met with pt to discuss needing transport home per team request. Pt stated his neighbor will be picking him up at time of discharge, he will call for a ride once he receives his discharge paperwork. Medical team and nursing updated of above. Care coordinator will continue to follow for discharge planning needs.     Alex Knight RN  Transitional Care Coordinator/TCC  [id number]

## 2024-04-22 NOTE — HOSPITAL COURSE
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     Patient was reported to be acutely encephalopathic prior to his admission however, while here, he was minimally confused (A&O x2-3). Rifaximin started this admission in addition to his lactulose, with gradual improvement in mentation.     In regards to AKUA, patient initially presented with Cr of 2.66 (b/l ~ 2.1-2.2). His home lasix was held and he was given several doses of albumin, with gradual improvement in creatinine.    An infectious workup was performed, which returned for U/A suggestive of potential UTI, however, cultures with no growth. He was treated with 5 day course of ceftriaxone.    He was deemed stable for discharge on 4/22 with close follow up with hepatology and repeat labs within several days.

## 2024-04-23 LAB
BACTERIA BLD CULT: NORMAL
BACTERIA BLD CULT: NORMAL

## 2024-04-24 ENCOUNTER — PHARMACY VISIT (OUTPATIENT)
Dept: PHARMACY | Facility: CLINIC | Age: 54
End: 2024-04-24

## 2024-04-26 ENCOUNTER — HOSPITAL ENCOUNTER (OUTPATIENT)
Dept: RADIOLOGY | Facility: HOSPITAL | Age: 54
Discharge: HOME | End: 2024-04-26
Payer: MEDICAID

## 2024-04-26 ENCOUNTER — LAB (OUTPATIENT)
Dept: LAB | Facility: LAB | Age: 54
End: 2024-04-26
Payer: MEDICAID

## 2024-04-26 ENCOUNTER — HOSPITAL ENCOUNTER (OUTPATIENT)
Dept: CARDIOLOGY | Facility: HOSPITAL | Age: 54
Discharge: HOME | End: 2024-04-26
Payer: MEDICAID

## 2024-04-26 DIAGNOSIS — K74.60 LIVER CIRRHOSIS (MULTI): ICD-10-CM

## 2024-04-26 DIAGNOSIS — Z01.818 ENCOUNTER FOR PRE-TRANSPLANT EVALUATION FOR LIVER TRANSPLANT: ICD-10-CM

## 2024-04-26 DIAGNOSIS — K70.31 ALCOHOLIC CIRRHOSIS OF LIVER WITH ASCITES (MULTI): ICD-10-CM

## 2024-04-26 LAB
ALBUMIN SERPL BCP-MCNC: 3.7 G/DL (ref 3.4–5)
ALP SERPL-CCNC: 111 U/L (ref 33–120)
ALT SERPL W P-5'-P-CCNC: 22 U/L (ref 10–52)
ANION GAP SERPL CALC-SCNC: 13 MMOL/L (ref 10–20)
AST SERPL W P-5'-P-CCNC: 48 U/L (ref 9–39)
BILIRUB SERPL-MCNC: 1.2 MG/DL (ref 0–1.2)
BUN SERPL-MCNC: 29 MG/DL (ref 6–23)
CALCIUM SERPL-MCNC: 8.7 MG/DL (ref 8.6–10.6)
CHLORIDE SERPL-SCNC: 111 MMOL/L (ref 98–107)
CO2 SERPL-SCNC: 21 MMOL/L (ref 21–32)
CREAT SERPL-MCNC: 2.16 MG/DL (ref 0.5–1.3)
EGFRCR SERPLBLD CKD-EPI 2021: 35 ML/MIN/1.73M*2
GLUCOSE SERPL-MCNC: 95 MG/DL (ref 74–99)
INR PPP: 1.2 (ref 0.9–1.1)
POTASSIUM SERPL-SCNC: 4.6 MMOL/L (ref 3.5–5.3)
PROT SERPL-MCNC: 7.5 G/DL (ref 6.4–8.2)
PROTHROMBIN TIME: 13.5 SECONDS (ref 9.8–12.8)
SODIUM SERPL-SCNC: 140 MMOL/L (ref 136–145)

## 2024-04-26 PROCEDURE — A9502 TC99M TETROFOSMIN: HCPCS | Mod: SE | Performed by: INTERNAL MEDICINE

## 2024-04-26 PROCEDURE — 80053 COMPREHEN METABOLIC PANEL: CPT

## 2024-04-26 PROCEDURE — 3430000001 HC RX 343 DIAGNOSTIC RADIOPHARMACEUTICALS: Mod: SE | Performed by: INTERNAL MEDICINE

## 2024-04-26 PROCEDURE — 78452 HT MUSCLE IMAGE SPECT MULT: CPT

## 2024-04-26 PROCEDURE — 78452 HT MUSCLE IMAGE SPECT MULT: CPT | Performed by: STUDENT IN AN ORGANIZED HEALTH CARE EDUCATION/TRAINING PROGRAM

## 2024-04-26 PROCEDURE — 36415 COLL VENOUS BLD VENIPUNCTURE: CPT

## 2024-04-26 PROCEDURE — 93017 CV STRESS TEST TRACING ONLY: CPT

## 2024-04-26 PROCEDURE — 85610 PROTHROMBIN TIME: CPT

## 2024-04-26 RX ADMIN — TETROFOSMIN 10.4 MILLICURIE: 0.23 INJECTION, POWDER, LYOPHILIZED, FOR SOLUTION INTRAVENOUS at 09:11

## 2024-04-26 RX ADMIN — TETROFOSMIN 30.8 MILLICURIE: 0.23 INJECTION, POWDER, LYOPHILIZED, FOR SOLUTION INTRAVENOUS at 10:21

## 2024-05-14 ENCOUNTER — COMMITTEE REVIEW (OUTPATIENT)
Dept: TRANSPLANT | Facility: HOSPITAL | Age: 54
End: 2024-05-14
Payer: MEDICAID

## 2024-05-14 DIAGNOSIS — N18.4 CKD (CHRONIC KIDNEY DISEASE), STAGE IV (MULTI): ICD-10-CM

## 2024-05-14 DIAGNOSIS — K70.30 ESOPHAGEAL VARICES IN ALCOHOLIC CIRRHOSIS (MULTI): ICD-10-CM

## 2024-05-14 DIAGNOSIS — Z01.818 ENCOUNTER FOR PRE-TRANSPLANT EVALUATION FOR LIVER TRANSPLANT: Primary | ICD-10-CM

## 2024-05-14 DIAGNOSIS — Z01.818 ENCOUNTER FOR PRE-TRANSPLANT EVALUATION FOR KIDNEY TRANSPLANT: ICD-10-CM

## 2024-05-14 DIAGNOSIS — K70.31 ALCOHOLIC CIRRHOSIS OF LIVER WITH ASCITES (MULTI): ICD-10-CM

## 2024-05-14 DIAGNOSIS — I85.10 ESOPHAGEAL VARICES IN ALCOHOLIC CIRRHOSIS (MULTI): ICD-10-CM

## 2024-05-14 RX ORDER — CHOLECALCIFEROL (VITAMIN D3) 50 MCG
50 TABLET ORAL DAILY
Qty: 30 TABLET | Refills: 11 | Status: SHIPPED | OUTPATIENT
Start: 2024-05-14 | End: 2025-05-14

## 2024-05-14 RX ORDER — CARVEDILOL 3.12 MG/1
3.12 TABLET ORAL DAILY
Qty: 30 TABLET | Refills: 11 | Status: SHIPPED | OUTPATIENT
Start: 2024-05-14 | End: 2024-05-24

## 2024-05-14 NOTE — COMMITTEE REVIEW
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    Committee Recommendation(s):  Present at Kidney/Panc Selection Committee on 5/16/24 to confirm SLK approval.   Listing consents appointment with surgeon.   Proceed with listing.

## 2024-05-14 NOTE — LETTER
May 14, 2024    Goran Ibrahim  [address]  [place] [address]      Dear Mr. Ibrahim:    Our multi-disciplinary transplant team completed a review of your medical records on 5/13/2024.  I am pleased to inform you that you will be placed on the [facility] (UN[facility]) waiting list for a Liver transplant.    Our transplant program consists of surgeons and medical doctors who provide coverage 365 days a year, 24 hours a day.     If you have any questions or concerns regarding your insurance coverage or billing issues, a  is available to speak with you.     It is important to keep us updated of any major changes in your medical condition, contact information and health insurance coverage.     Please don't hesitate to contact us at Dept: [phone] with any questions or concerns. We look forward to working with you through this process.      Sincerely,      Liliana Choi RN          The UNOS Toll-free Patient Services Line:  Your Resource for Organ Transplant Information    If you have a question regarding your own medical care, you always should call your transplant hospital first. However, for general organ transplant-related information, you should call the [facility] (UNOS) toll-free patient services line at [phone].  Anyone, including potential transplant candidates, candidates, recipients, family members, friends, living donors, and donor family members, can call this number to:    Talk about organ donation, living donation, the transplant process, the donation process, and transplant policies.  Get a free patient information kit with helpful booklets, waiting list and transplant information, and a list of all transplant hospitals.  Ask questions about the Organ Procurement and Transplantation Network (OPTN) web site (http://optn.transplant.hrsa.gov/), the UNOS Web site (http://unos.org/), or the UNOS web site for living donors and transplant  recipients. (http://www.transplantliving.org/).  Learn how [facility] and the OPTN can help you.  Talk about any concerns that you may have with a transplant hospital.    [facility] is a not-for-profit organization that provides the administrative services for the national OPTN under federal contract to the Health Resources and Services Administration (HRSA), an agency under the U.S. Department of Health and Human Services (HHS).    [facility] and the OPTN are responsible for:    Providing educational material for patients, the public, and professionals.  Raising awareness of the need for donated organs and tissue.  Writing organ transplant policy with help from transplant professionals, transplant patients, transplant candidates, donor families, living donors, and the public.  Coordinating organ procurement, matching, and placement.  Collecting information about every organ transplant and donation that occurs in the [place].    Remember, you should contact your transplant hospital directly if you have questions or concerns about your own medical care including medical records, work-up progress, and test results.    [facility] is not your transplant hospital, and staff at [facility] will not be able to transfer you to your transplant hospital, so keep your transplant hospital’s phone number handy.    However, while you research your transplant needs and learn as much as you can about transplantation and donation, we welcome your call to our toll-free patient services line at [phone].      [facility] PIL Final Rev 1-

## 2024-05-14 NOTE — TELEPHONE ENCOUNTER
Goran Ibrahim was notified of liver transplant selection committee to list patient for OLT.     The following information was reviewed with patient:    - Approval from Kidney/[place] Selection Committee on 5/16/24  - Listing consents appointment with surgeon  - Insurance authorization  - Updated MELD labs  - Notification of listing    Patient verbalized understanding of plan.     Patient reports doing well post hospital discharge, no further HE events. Patient will go to lab today/tomorrow for updated MELD and Cystatin C levels, as well as HLA panel update.     Patient has questions regarding disability - will have SW or FC contact patient.     Patient requested refills on Vitamin D and Coreg. Rx sent to Dr. Francois to sign.     Txp  will contact patient to schedule appointment for listing consents.

## 2024-05-15 ENCOUNTER — LAB (OUTPATIENT)
Dept: LAB | Facility: LAB | Age: 54
End: 2024-05-15
Payer: MEDICAID

## 2024-05-15 DIAGNOSIS — Z01.818 ENCOUNTER FOR PRE-TRANSPLANT EVALUATION FOR LIVER TRANSPLANT: ICD-10-CM

## 2024-05-15 DIAGNOSIS — Z01.818 ENCOUNTER FOR PRE-TRANSPLANT EVALUATION FOR KIDNEY TRANSPLANT: ICD-10-CM

## 2024-05-15 DIAGNOSIS — K70.31 ALCOHOLIC CIRRHOSIS OF LIVER WITH ASCITES (MULTI): ICD-10-CM

## 2024-05-15 DIAGNOSIS — N18.4 CKD (CHRONIC KIDNEY DISEASE), STAGE IV (MULTI): ICD-10-CM

## 2024-05-15 PROCEDURE — 85025 COMPLETE CBC W/AUTO DIFF WBC: CPT

## 2024-05-15 PROCEDURE — 82610 CYSTATIN C: CPT

## 2024-05-15 PROCEDURE — 86850 RBC ANTIBODY SCREEN: CPT

## 2024-05-15 PROCEDURE — 80321 ALCOHOLS BIOMARKERS 1OR 2: CPT

## 2024-05-15 PROCEDURE — 36415 COLL VENOUS BLD VENIPUNCTURE: CPT

## 2024-05-15 PROCEDURE — 80053 COMPREHEN METABOLIC PANEL: CPT

## 2024-05-15 PROCEDURE — 86901 BLOOD TYPING SEROLOGIC RH(D): CPT

## 2024-05-15 PROCEDURE — 86900 BLOOD TYPING SEROLOGIC ABO: CPT

## 2024-05-16 ENCOUNTER — COMMITTEE REVIEW (OUTPATIENT)
Dept: TRANSPLANT | Facility: HOSPITAL | Age: 54
End: 2024-05-16
Payer: MEDICAID

## 2024-05-16 ENCOUNTER — TELEPHONE (OUTPATIENT)
Dept: TRANSPLANT | Facility: HOSPITAL | Age: 54
End: 2024-05-16

## 2024-05-16 ENCOUNTER — DOCUMENTATION (OUTPATIENT)
Dept: TRANSPLANT | Facility: HOSPITAL | Age: 54
End: 2024-05-16
Payer: MEDICAID

## 2024-05-16 LAB
ABO GROUP (TYPE) IN BLOOD: NORMAL
ALBUMIN SERPL BCP-MCNC: 3.5 G/DL (ref 3.4–5)
ALP SERPL-CCNC: 107 U/L (ref 33–120)
ALT SERPL W P-5'-P-CCNC: 13 U/L (ref 10–52)
ANION GAP SERPL CALC-SCNC: 15 MMOL/L (ref 10–20)
ANTIBODY SCREEN: NORMAL
AST SERPL W P-5'-P-CCNC: 32 U/L (ref 9–39)
BASOPHILS # BLD AUTO: 0.03 X10*3/UL (ref 0–0.1)
BASOPHILS NFR BLD AUTO: 1 %
BILIRUB SERPL-MCNC: 1 MG/DL (ref 0–1.2)
BUN SERPL-MCNC: 33 MG/DL (ref 6–23)
CALCIUM SERPL-MCNC: 8.3 MG/DL (ref 8.6–10.6)
CHLORIDE SERPL-SCNC: 109 MMOL/L (ref 98–107)
CO2 SERPL-SCNC: 20 MMOL/L (ref 21–32)
CREAT SERPL-MCNC: 2.31 MG/DL (ref 0.5–1.3)
EGFRCR SERPLBLD CKD-EPI 2021: 33 ML/MIN/1.73M*2
EOSINOPHIL # BLD AUTO: 0.14 X10*3/UL (ref 0–0.7)
EOSINOPHIL NFR BLD AUTO: 4.5 %
ERYTHROCYTE [DISTWIDTH] IN BLOOD BY AUTOMATED COUNT: 15 % (ref 11.5–14.5)
GLUCOSE SERPL-MCNC: 143 MG/DL (ref 74–99)
HCT VFR BLD AUTO: 32.6 % (ref 41–52)
HGB BLD-MCNC: 10.5 G/DL (ref 13.5–17.5)
IMM GRANULOCYTES # BLD AUTO: 0.01 X10*3/UL (ref 0–0.7)
IMM GRANULOCYTES NFR BLD AUTO: 0.3 % (ref 0–0.9)
LYMPHOCYTES # BLD AUTO: 0.57 X10*3/UL (ref 1.2–4.8)
LYMPHOCYTES NFR BLD AUTO: 18.5 %
MCH RBC QN AUTO: 30.3 PG (ref 26–34)
MCHC RBC AUTO-ENTMCNC: 32.2 G/DL (ref 32–36)
MCV RBC AUTO: 94 FL (ref 80–100)
MONOCYTES # BLD AUTO: 0.31 X10*3/UL (ref 0.1–1)
MONOCYTES NFR BLD AUTO: 10.1 %
NEUTROPHILS # BLD AUTO: 2.02 X10*3/UL (ref 1.2–7.7)
NEUTROPHILS NFR BLD AUTO: 65.6 %
NRBC BLD-RTO: 0 /100 WBCS (ref 0–0)
PLATELET # BLD AUTO: 70 X10*3/UL (ref 150–450)
POTASSIUM SERPL-SCNC: 4.4 MMOL/L (ref 3.5–5.3)
PROT SERPL-MCNC: 7.3 G/DL (ref 6.4–8.2)
RBC # BLD AUTO: 3.47 X10*6/UL (ref 4.5–5.9)
RH FACTOR (ANTIGEN D): NORMAL
SODIUM SERPL-SCNC: 140 MMOL/L (ref 136–145)
WBC # BLD AUTO: 3.1 X10*3/UL (ref 4.4–11.3)

## 2024-05-16 NOTE — LETTER
May 16, 2024    Goran Ibrahim  [address]  [place] [address]      Dear Mr. Ibrahim:    Our multi-disciplinary transplant team completed a review of your medical records on 5/16/2024.  I regret to inform you that the decision was not to proceed with placing you on the [facility] (UNOS) waiting list for a Kidney transplant at this time. Issues were identified that would jeopardize a successful transplant.    Our transplant program consists of surgeons and medical doctors who provide coverage 365 days a year, 24 hours a day.     If you have any questions or concerns regarding your insurance coverage or billing issues, a  is available to speak with you.     It is important to keep us updated of any major changes in your medical condition, contact information and health insurance coverage.     Please don't hesitate to contact us at Dept: [phone] with any questions or concerns. We look forward to working with you through this process.      Sincerely,      Liliana Chio RN          The UNOS Toll-free Patient Services Line:  Your Resource for Organ Transplant Information    If you have a question regarding your own medical care, you always should call your transplant hospital first. However, for general organ transplant-related information, you should call the [facility] (UNOS) toll-free patient services line at [phone].  Anyone, including potential transplant candidates, candidates, recipients, family members, friends, living donors, and donor family members, can call this number to:    Talk about organ donation, living donation, the transplant process, the donation process, and transplant policies.  Get a free patient information kit with helpful booklets, waiting list and transplant information, and a list of all transplant hospitals.  Ask questions about the Organ Procurement and Transplantation Network (OPTN) web site  (http://optn.transplant.hrsa.gov/), the UNOS Web site (http://unos.org/), or the UNOS web site for living donors and transplant recipients. (http://www.transplantliving.org/).  Learn how UNOS and the OPTN can help you.  Talk about any concerns that you may have with a transplant hospital.    [employer] is a not-for-profit organization that provides the administrative services for the national OPTN under federal contract to the Health Resources and Services Administration ([facility] an agency under the U.S. Department of Health and Human Services (HHS).    UNOS and the OPTN are responsible for:    Providing educational material for patients, the public, and professionals.  Raising awareness of the need for donated organs and tissue.  Writing organ transplant policy with help from transplant professionals, transplant patients, transplant candidates, donor families, living donors, and the public.  Coordinating organ procurement, matching, and placement.  Collecting information about every organ transplant and donation that occurs in the [place].    Remember, you should contact your transplant hospital directly if you have questions or concerns about your own medical care including medical records, work-up progress, and test results.    [facility] is not your transplant hospital, and staff at [facility] will not be able to transfer you to your transplant hospital, so keep your transplant hospital’s phone number handy.    However, while you research your transplant needs and learn as much as you can about transplantation and donation, we welcome your call to our toll-free patient services line at [phone].      [facility] PIL Final Rev 1-

## 2024-05-16 NOTE — PROGRESS NOTES
SW received message from Pt's coordinator requesting SW speak with Pt regarding application for disability. SW spoke with Pt via telephone call. Pt requested SW give Pt a call back in 30 minutes, as Pt was unable to speak at that time. SW attempted to reach Pt via telephone call 30 minutes later and was unable to speak with Pt. SW was unable to LVM for Pt.     Plan: SW will attempt to reach Pt again at a later date to discuss application process for disability.

## 2024-05-16 NOTE — COMMITTEE REVIEW
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 After review of the candidate's diagnosis and the evaluations of the multidisciplinary team members, it was the consensus of the Selection Committee that the candidate meets Kidney Selection Criteria pending result of Cystatin C.

## 2024-05-17 LAB
CYSTATIN C SERPL-MCNC: 4.5 MG/L (ref 0.5–1.2)
GFR/BSA.PRED SERPLBLD CYS-BASED-ARV: 11 ML/MIN/BSA

## 2024-05-18 LAB
LABORATORY REPORT: NORMAL
PETH INTERPRETATION: NORMAL
PLPETH BLD-MCNC: <10 NG/ML
POPETH BLD-MCNC: <10 NG/ML

## 2024-05-21 ENCOUNTER — DOCUMENTATION (OUTPATIENT)
Dept: TRANSPLANT | Facility: HOSPITAL | Age: 54
End: 2024-05-21
Payer: MEDICAID

## 2024-05-21 NOTE — PROGRESS NOTES
SW attempted to reach Pt via telephone call to discuss applying for disability. GEE LVM requesting a return call. SW provided SW contact information.     Plan: SW will await return phone call.

## 2024-05-24 ENCOUNTER — EDUCATION (OUTPATIENT)
Dept: TRANSPLANT | Facility: HOSPITAL | Age: 54
End: 2024-05-24
Payer: MEDICAID

## 2024-05-24 ENCOUNTER — OFFICE VISIT (OUTPATIENT)
Dept: TRANSPLANT | Facility: HOSPITAL | Age: 54
End: 2024-05-24
Payer: MEDICAID

## 2024-05-24 VITALS
WEIGHT: 263.6 LBS | BODY MASS INDEX: 37.82 KG/M2 | DIASTOLIC BLOOD PRESSURE: 89 MMHG | SYSTOLIC BLOOD PRESSURE: 173 MMHG | HEART RATE: 70 BPM | OXYGEN SATURATION: 98 % | TEMPERATURE: 97.8 F

## 2024-05-24 DIAGNOSIS — Z01.818 ENCOUNTER FOR PRE-TRANSPLANT EVALUATION FOR KIDNEY TRANSPLANT: ICD-10-CM

## 2024-05-24 DIAGNOSIS — K74.60 LIVER CIRRHOSIS (MULTI): ICD-10-CM

## 2024-05-24 DIAGNOSIS — I85.10 ESOPHAGEAL VARICES IN ALCOHOLIC CIRRHOSIS (MULTI): ICD-10-CM

## 2024-05-24 DIAGNOSIS — K70.30 ESOPHAGEAL VARICES IN ALCOHOLIC CIRRHOSIS (MULTI): ICD-10-CM

## 2024-05-24 DIAGNOSIS — Z01.818 ENCOUNTER FOR PRE-TRANSPLANT EVALUATION FOR LIVER TRANSPLANT: ICD-10-CM

## 2024-05-24 DIAGNOSIS — I10 HYPERTENSION, UNSPECIFIED TYPE: ICD-10-CM

## 2024-05-24 DIAGNOSIS — I15.1 HYPERTENSION SECONDARY TO OTHER RENAL DISORDERS: ICD-10-CM

## 2024-05-24 DIAGNOSIS — K76.82 HEPATIC ENCEPHALOPATHY (MULTI): ICD-10-CM

## 2024-05-24 DIAGNOSIS — K70.31 ALCOHOLIC CIRRHOSIS OF LIVER WITH ASCITES (MULTI): Primary | ICD-10-CM

## 2024-05-24 DIAGNOSIS — N18.4 CKD (CHRONIC KIDNEY DISEASE), STAGE IV (MULTI): ICD-10-CM

## 2024-05-24 DIAGNOSIS — R60.9 EDEMA, UNSPECIFIED TYPE: ICD-10-CM

## 2024-05-24 PROCEDURE — 3079F DIAST BP 80-89 MM HG: CPT | Performed by: STUDENT IN AN ORGANIZED HEALTH CARE EDUCATION/TRAINING PROGRAM

## 2024-05-24 PROCEDURE — 99215 OFFICE O/P EST HI 40 MIN: CPT

## 2024-05-24 PROCEDURE — 3077F SYST BP >= 140 MM HG: CPT | Performed by: STUDENT IN AN ORGANIZED HEALTH CARE EDUCATION/TRAINING PROGRAM

## 2024-05-24 ASSESSMENT — PAIN SCALES - GENERAL: PAINLEVEL: 0-NO PAIN

## 2024-05-24 NOTE — PROGRESS NOTES
Chief Complaint: Patient presents for liver kidney transplant listing consent     History of Present Illness:  Goran Ibrahim  is a 53 y.o. male presents with cryptogenic cirrhosis.  He initially was diagnosed when he presented earlier this year with hepatic encephalopathy and GI bleeding.    His known etiology of cirrhosis is unclear. He had some drinking history but not heavy. He does have risk factor for WILCXO such HTN, obesity and Pre-diabetes. Prior to his decompensation, he was not aware of is liver disease    His decompensation was manifested by ascites, HE, EV. He was also found to have gastric/duodenal ulcers and H. Pylori.     CKD and possible component of HRS.    Presenting for listing consent of both liver and kidney today.    Liver complications:  Ascites: Yes  Encephalopathy: Yes  Varices: Yes, GIB  Jaundice: no  DVT/PE: no  HCC/Malignancy: no  Portal Vein Thrombosis: no    MELD 3.0: 17 at 4/26/2024  1:41 PM  MELD-Na: 17 at 4/26/2024  1:41 PM  Calculated from:  Serum Creatinine: 2.16 mg/dL at 4/26/2024  1:41 PM  Serum Sodium: 140 mmol/L (Using max of 137 mmol/L) at 4/26/2024  1:41 PM  Total Bilirubin: 1.2 mg/dL at 4/26/2024  1:41 PM  Serum Albumin: 3.7 g/dL (Using max of 3.5 g/dL) at 4/26/2024  1:41 PM  INR(ratio): 1.2 at 4/26/2024 11:57 AM  Age at listing (hypothetical): 53 years  Sex: Male at 4/26/2024  1:41 PM      Past Medical History:  CKD  Hypertension  Cirrhosis    Past Surgical History:  Wrist surgery    Physical Exam:  Gen: A+OX3; NAD  HEENT: PERRL, sclera anicteric, MMM  Cardiac: RRR  Chest: Normal inspiratory effort  Abdomen: S/NT/ND.  Ext: No LE edema  Vascular: 2+ palpable femoral pulses  Psychiatric: Normal mood, affect    Assessment/Plan:    Consented Mr. Ibrahim for liver and kidney transplantation listing.  Went into discussion about both procedures with the possibility of a staged surgery for the kidney.    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 Will ask for our radiologist to review original CTA at tumor board.        Transplant Education:    I had a discussion with Goran Ibrahim regarding one year graft and patient survival statistics following liver transplantation for  donor liver allografts. This data included [facility] data compared to National data readily available for review on https://www.SRTR.org.     The differences between patient and graft survival was discussed for  donor, living donor, donation after cardiac death, and split livers as it applied to their circumstance.     Further discussion included:  -The Transplant selection committee process.  -The need for lifelong immunosuppressive therapy and the side effects of these medications including the risk of infections, cancer, and lymphoma.  -The wait list time dependent on MELD score and potential MELD upgrade status.  - Utilization of donors with identified risk factors for transmission of infection  - Potential transmission of infectious disease from any donor, due to the inability to test for every single pathogen.  -The possibility of transmission of tumors and infections via the transplanted organ.  -Surgical complications. This included need for reoperation due to bleeding, repair of leaks, control of infection, and also possible allograft removal.   -The medical complications including but not limited to cardiac, pulmonary, infectious, neurologic, and other complications.  -The possibility of death at any point in the transplant process.  -We also discussed allograft non-function, potential removal, and immediate need for re-transplantation.  -We discussed multiple listing at different locations.  -We discussed potential for living donation (currently not available at [facility]).    Time Attestation:  I spent 60 minutes with the patient, over 50 minutes in  counseling and education as outlined above.

## 2024-05-24 NOTE — PROGRESS NOTES
Reviewed donor organ consents for both liver and kidney transplant with Dr. Martell.     LISTING EDUCATION   Patient educated regarding the following prior to placement on the transplant waiting list:   The patient's medical condition, prognosis, and treatment plan.   The expectations and patient responsibilities while on the waiting list, including:   Keeping the transplant center informed of any changes in contact information or insurance coverage   Notifying the transplant center of any changes in medical status   Required testing and/or re-evaluation appointments while awaiting transplant  An overview of the surgical procedure, including potential risks and alternatives.   Information regarding what to expect during the inpatient admission and recovery period.   A discussion regarding organ offers and types of potential donors, including potential risks that may be associated with specific types of donors that could affect the success of the transplant or the health of the patient.   The right to refuse transplantation.   Patient was given the opportunity to have questions answered. Patient was provided a copy of the signed informed consent for transplant listing.   Education provided by:   Transplant Coordinator: LANIE SANTANA RN   Transplant Physician: MARIAM MARTELL MD   Signed listing informed consent received? LIVER & KIDNEY  Patient agrees to be listed for the following: DCD, HCV+, HepBcAb+, KDPI>85%  Patient will be placed on the [facility] waiting list.

## 2024-05-24 NOTE — PATIENT INSTRUCTIONS
Xifaxan and potentially other medications will be sent to your local pharmacy.     Your coordinator will call you to go for listing labs once we receive listing authorization from your insurance.

## 2024-05-28 ENCOUNTER — DOCUMENTATION (OUTPATIENT)
Dept: TRANSPLANT | Facility: HOSPITAL | Age: 54
End: 2024-05-28
Payer: MEDICAID

## 2024-05-28 NOTE — PROGRESS NOTES
Rec'd note asking to call patient about applying for disability.  Called patient & left a v/m at [phone] he could call his local SS office or apply on line.  Left my direct phone number for him to call back.

## 2024-05-29 RX ORDER — FUROSEMIDE 20 MG/1
20 TABLET ORAL
Qty: 60 TABLET | Refills: 11 | Status: SHIPPED | OUTPATIENT
Start: 2024-05-29 | End: 2025-05-29

## 2024-05-29 RX ORDER — CARVEDILOL 6.25 MG/1
6.25 TABLET ORAL
Qty: 60 TABLET | Refills: 11 | Status: SHIPPED | OUTPATIENT
Start: 2024-05-29 | End: 2025-05-29

## 2024-06-07 ENCOUNTER — HOSPITAL ENCOUNTER (OUTPATIENT)
Dept: GASTROENTEROLOGY | Facility: HOSPITAL | Age: 54
Setting detail: OUTPATIENT SURGERY
Discharge: HOME | End: 2024-06-07
Payer: MEDICAID

## 2024-06-07 VITALS
BODY MASS INDEX: 37.03 KG/M2 | WEIGHT: 250 LBS | OXYGEN SATURATION: 96 % | RESPIRATION RATE: 16 BRPM | TEMPERATURE: 99 F | SYSTOLIC BLOOD PRESSURE: 114 MMHG | HEART RATE: 55 BPM | HEIGHT: 69 IN | DIASTOLIC BLOOD PRESSURE: 67 MMHG

## 2024-06-07 DIAGNOSIS — D50.0 ANEMIA DUE TO GI BLOOD LOSS: Primary | ICD-10-CM

## 2024-06-07 DIAGNOSIS — I85.00 ESOPHAGEAL VARICES WITHOUT BLEEDING, UNSPECIFIED ESOPHAGEAL VARICES TYPE (MULTI): ICD-10-CM

## 2024-06-07 DIAGNOSIS — Z01.818 ENCOUNTER FOR PRE-TRANSPLANT EVALUATION FOR LIVER TRANSPLANT: ICD-10-CM

## 2024-06-07 DIAGNOSIS — Z86.19 HISTORY OF HELICOBACTER PYLORI INFECTION: ICD-10-CM

## 2024-06-07 PROCEDURE — 7100000009 HC PHASE TWO TIME - INITIAL BASE CHARGE

## 2024-06-07 PROCEDURE — 99152 MOD SED SAME PHYS/QHP 5/>YRS: CPT | Performed by: INTERNAL MEDICINE

## 2024-06-07 PROCEDURE — 3700000012 HC SEDATION LEVEL 5+ TIME - INITIAL 15 MINUTES 5/> YEARS

## 2024-06-07 PROCEDURE — 99153 MOD SED SAME PHYS/QHP EA: CPT | Performed by: INTERNAL MEDICINE

## 2024-06-07 PROCEDURE — 2500000004 HC RX 250 GENERAL PHARMACY W/ HCPCS (ALT 636 FOR OP/ED): Mod: SE | Performed by: INTERNAL MEDICINE

## 2024-06-07 PROCEDURE — 7100000010 HC PHASE TWO TIME - EACH INCREMENTAL 1 MINUTE

## 2024-06-07 PROCEDURE — 43239 EGD BIOPSY SINGLE/MULTIPLE: CPT | Performed by: INTERNAL MEDICINE

## 2024-06-07 PROCEDURE — 2720000007 HC OR 272 NO HCPCS

## 2024-06-07 PROCEDURE — 3700000013 HC SEDATION LEVEL 5+ TIME - EACH ADDITIONAL 15 MINUTES

## 2024-06-07 RX ORDER — FENTANYL CITRATE 50 UG/ML
INJECTION, SOLUTION INTRAMUSCULAR; INTRAVENOUS AS NEEDED
Status: COMPLETED | OUTPATIENT
Start: 2024-06-07 | End: 2024-06-07

## 2024-06-07 RX ORDER — MIDAZOLAM HYDROCHLORIDE 1 MG/ML
INJECTION, SOLUTION INTRAMUSCULAR; INTRAVENOUS AS NEEDED
Status: COMPLETED | OUTPATIENT
Start: 2024-06-07 | End: 2024-06-07

## 2024-06-07 RX ADMIN — MIDAZOLAM HYDROCHLORIDE 1 MG: 1 INJECTION, SOLUTION INTRAMUSCULAR; INTRAVENOUS at 09:18

## 2024-06-07 RX ADMIN — MIDAZOLAM HYDROCHLORIDE 1 MG: 1 INJECTION, SOLUTION INTRAMUSCULAR; INTRAVENOUS at 09:22

## 2024-06-07 RX ADMIN — MIDAZOLAM HYDROCHLORIDE 2 MG: 1 INJECTION, SOLUTION INTRAMUSCULAR; INTRAVENOUS at 09:00

## 2024-06-07 RX ADMIN — MIDAZOLAM HYDROCHLORIDE 1 MG: 1 INJECTION, SOLUTION INTRAMUSCULAR; INTRAVENOUS at 09:03

## 2024-06-07 RX ADMIN — FENTANYL CITRATE 25 MCG: 50 INJECTION, SOLUTION INTRAMUSCULAR; INTRAVENOUS at 09:20

## 2024-06-07 RX ADMIN — FENTANYL CITRATE 25 MCG: 50 INJECTION, SOLUTION INTRAMUSCULAR; INTRAVENOUS at 09:03

## 2024-06-07 RX ADMIN — FENTANYL CITRATE 50 MCG: 50 INJECTION, SOLUTION INTRAMUSCULAR; INTRAVENOUS at 09:00

## 2024-06-07 ASSESSMENT — ENCOUNTER SYMPTOMS
FEVER: 0
NAUSEA: 0
COLOR CHANGE: 0
UNEXPECTED WEIGHT CHANGE: 0
CHILLS: 0
BLOOD IN STOOL: 0
DIARRHEA: 0
ANAL BLEEDING: 0
ABDOMINAL DISTENTION: 0
CONSTIPATION: 0
TROUBLE SWALLOWING: 0
RECTAL PAIN: 0
ABDOMINAL PAIN: 0
SHORTNESS OF BREATH: 0
VOMITING: 0

## 2024-06-07 ASSESSMENT — COLUMBIA-SUICIDE SEVERITY RATING SCALE - C-SSRS
2. HAVE YOU ACTUALLY HAD ANY THOUGHTS OF KILLING YOURSELF?: NO
6. HAVE YOU EVER DONE ANYTHING, STARTED TO DO ANYTHING, OR PREPARED TO DO ANYTHING TO END YOUR LIFE?: NO
1. IN THE PAST MONTH, HAVE YOU WISHED YOU WERE DEAD OR WISHED YOU COULD GO TO SLEEP AND NOT WAKE UP?: NO

## 2024-06-07 ASSESSMENT — PAIN - FUNCTIONAL ASSESSMENT
PAIN_FUNCTIONAL_ASSESSMENT: 0-10

## 2024-06-07 ASSESSMENT — PAIN SCALES - GENERAL
PAINLEVEL_OUTOF10: 0 - NO PAIN

## 2024-06-07 NOTE — H&P
History Of Present Illness  Goran Ibrahim is a 53 y.o. male with liver cirrhosis complicated by varices presenting for surveillance EGD.     Past Medical History  Past Medical History:   Diagnosis Date    Cirrhosis (Multi)     CKD (chronic kidney disease)     Hypertension      Surgical History  Past Surgical History:   Procedure Laterality Date    IR ANGIOGRAM CELIAC  5/19/2023    IR ANGIOGRAM CELIAC 5/19/2023    US GUIDED ABDOMINAL PARACENTESIS  6/22/2023    US GUIDED ABDOMINAL PARACENTESIS 6/22/2023    US GUIDED ABDOMINAL PARACENTESIS  5/19/2023    US GUIDED ABDOMINAL PARACENTESIS 5/19/2023     Social History  He reports that he has been smoking cigarettes. He has never used smokeless tobacco. He reports that he does not currently use alcohol. He reports that he does not use drugs.    Family History  No family history on file.     Allergies  No Known Allergies  Review of Systems   Constitutional:  Negative for chills, fever and unexpected weight change.   HENT:  Negative for trouble swallowing.    Respiratory:  Negative for shortness of breath.    Cardiovascular:  Negative for chest pain.   Gastrointestinal:  Negative for abdominal distention, abdominal pain, anal bleeding, blood in stool, constipation, diarrhea, nausea, rectal pain and vomiting.   Skin:  Negative for color change.     Pre-sedation Evaluation:  ASA Classification - ASA 3 - Patient with moderate systemic disease with functional limitations  Mallampati Score - III (soft and hard palate and base of uvula visible)    Physical Exam  Constitutional:       General: He is awake.      Appearance: Normal appearance.   HENT:      Head: Normocephalic and atraumatic.      Nose: Nose normal.      Mouth/Throat:      Mouth: Mucous membranes are moist.   Eyes:      General: No scleral icterus.     Extraocular Movements: Extraocular movements intact.   Cardiovascular:      Rate and Rhythm: Normal rate.   Pulmonary:      Effort: Pulmonary effort is normal.  "  Neurological:      Mental Status: He is alert and oriented to person, place, and time. Mental status is at baseline.   Psychiatric:         Attention and Perception: Attention and perception normal.         Mood and Affect: Mood normal.         Behavior: Behavior normal.          Last Recorded Vitals  Blood pressure 153/82, pulse 68, temperature 37.2 °C (99 °F), temperature source Temporal, resp. rate 18, height 1.753 m (5' 9\"), weight 113 kg (250 lb), SpO2 98%.     Assessment/Plan   Plan for EGD with possible banding with moderate sedation.     PTA/Current Medications:  (Not in a hospital admission)    Current Outpatient Medications   Medication Sig Dispense Refill    carvedilol (Coreg) 6.25 mg tablet Take 1 tablet (6.25 mg) by mouth 2 times daily (morning and late afternoon). 60 tablet 11    cholecalciferol (Vitamin D-3) 50 MCG (2000 UT) tablet Take 1 tablet (50 mcg) by mouth once daily. 30 tablet 11    furosemide (Lasix) 20 mg tablet Take 1 tablet (20 mg) by mouth 2 times daily (morning and late afternoon). 60 tablet 11    lactulose 20 gram/30 mL oral solution Take 30 mL (20 g) by mouth 2 times a day. Take 30mL up to 3 times daily - Titrate to having 3-4 bowel movements daily 5400 mL 3    pantoprazole (ProtoNix) 40 mg EC tablet Take 1 tablet (40 mg) by mouth 2 times a day.      rifAXIMin (Xifaxan) 550 mg tablet Take 1 tablet (550 mg) by mouth every 12 hours. 60 tablet 11    sodium bicarbonate 650 mg tablet Take 1 tablet (650 mg) by mouth 2 times a day.       No current facility-administered medications for this encounter.     Joanne Baptiste MD  "

## 2024-06-10 NOTE — ADDENDUM NOTE
Encounter addended by: Luisa Wharton RN on: 6/10/2024 8:38 AM   Actions taken: Contacts section saved, Flowsheet accepted

## 2024-06-11 ENCOUNTER — DOCUMENTATION (OUTPATIENT)
Dept: TRANSPLANT | Facility: HOSPITAL | Age: 54
End: 2024-06-11
Payer: MEDICAID

## 2024-06-11 NOTE — Clinical Note
Per [employer] active.  Faxed listing request for L/K listing to [phone].  Spoke to Gladis of [facility] and she stated it needed to be approved.

## 2024-06-11 NOTE — PROGRESS NOTES
Per [employer] active.  Faxed listing request for L/K listing to [phone].  Spoke to Gladis of [facility] and she stated it needed to be approved.

## 2024-06-12 ENCOUNTER — TELEPHONE (OUTPATIENT)
Dept: TRANSPLANT | Facility: HOSPITAL | Age: 54
End: 2024-06-12
Payer: MEDICAID

## 2024-06-12 ENCOUNTER — DOCUMENTATION (OUTPATIENT)
Dept: TRANSPLANT | Facility: HOSPITAL | Age: 54
End: 2024-06-12
Payer: MEDICAID

## 2024-06-12 NOTE — TELEPHONE ENCOUNTER
VM left for patient to notify him that insurance authorization to list for SLK has been received and requested patient go to  lab tomorrow morning so that he can be listed. Advised patient to call liver txp office with any questions.    Patient knows to contact coordinator with any questions or concerns via [employer] message, or by calling the liver transplant office at [phone]. Patient knows to contact the on call liver transplant coordinator for medical emergencies outside of office hours and on weekends.

## 2024-06-12 NOTE — PROGRESS NOTES
Rec'd 06/11/24 [facility] fax for L/K txp listing ref# [id number] valid 06/11/24-12/06/24.  Precert needed at time of txp.

## 2024-06-13 ENCOUNTER — LAB (OUTPATIENT)
Dept: LAB | Facility: LAB | Age: 54
End: 2024-06-13
Payer: MEDICAID

## 2024-06-13 DIAGNOSIS — K74.60 LIVER CIRRHOSIS (MULTI): ICD-10-CM

## 2024-06-13 DIAGNOSIS — N18.4 CKD (CHRONIC KIDNEY DISEASE), STAGE IV (MULTI): ICD-10-CM

## 2024-06-13 DIAGNOSIS — I85.10 ESOPHAGEAL VARICES IN ALCOHOLIC CIRRHOSIS (MULTI): ICD-10-CM

## 2024-06-13 DIAGNOSIS — K76.82 HEPATIC ENCEPHALOPATHY (MULTI): ICD-10-CM

## 2024-06-13 DIAGNOSIS — Z01.818 ENCOUNTER FOR PRE-TRANSPLANT EVALUATION FOR KIDNEY TRANSPLANT: ICD-10-CM

## 2024-06-13 DIAGNOSIS — K70.30 ESOPHAGEAL VARICES IN ALCOHOLIC CIRRHOSIS (MULTI): ICD-10-CM

## 2024-06-13 DIAGNOSIS — K70.31 ALCOHOLIC CIRRHOSIS OF LIVER WITH ASCITES (MULTI): ICD-10-CM

## 2024-06-13 DIAGNOSIS — Z01.818 ENCOUNTER FOR PRE-TRANSPLANT EVALUATION FOR LIVER TRANSPLANT: ICD-10-CM

## 2024-06-13 DIAGNOSIS — I15.1 HYPERTENSION SECONDARY TO OTHER RENAL DISORDERS: ICD-10-CM

## 2024-06-13 PROCEDURE — 80053 COMPREHEN METABOLIC PANEL: CPT

## 2024-06-13 PROCEDURE — 36415 COLL VENOUS BLD VENIPUNCTURE: CPT

## 2024-06-13 PROCEDURE — 86833 HLA CLASS II HIGH DEFIN QUAL: CPT

## 2024-06-13 PROCEDURE — 85025 COMPLETE CBC W/AUTO DIFF WBC: CPT

## 2024-06-13 PROCEDURE — 86850 RBC ANTIBODY SCREEN: CPT

## 2024-06-13 PROCEDURE — 86901 BLOOD TYPING SEROLOGIC RH(D): CPT

## 2024-06-13 PROCEDURE — 86900 BLOOD TYPING SEROLOGIC ABO: CPT

## 2024-06-13 PROCEDURE — 86832 HLA CLASS I HIGH DEFIN QUAL: CPT

## 2024-06-13 PROCEDURE — 85610 PROTHROMBIN TIME: CPT

## 2024-06-13 PROCEDURE — 82610 CYSTATIN C: CPT

## 2024-06-14 ENCOUNTER — TELEPHONE (OUTPATIENT)
Dept: TRANSPLANT | Facility: HOSPITAL | Age: 54
End: 2024-06-14
Payer: MEDICAID

## 2024-06-14 LAB
ABO GROUP (TYPE) IN BLOOD: NORMAL
ALBUMIN SERPL BCP-MCNC: 3 G/DL (ref 3.4–5)
ALP SERPL-CCNC: 142 U/L (ref 33–120)
ALT SERPL W P-5'-P-CCNC: 17 U/L (ref 10–52)
ANION GAP SERPL CALC-SCNC: 15 MMOL/L (ref 10–20)
ANTIBODY SCREEN: NORMAL
AST SERPL W P-5'-P-CCNC: 42 U/L (ref 9–39)
BASOPHILS # BLD AUTO: 0.07 X10*3/UL (ref 0–0.1)
BASOPHILS NFR BLD AUTO: 1.7 %
BILIRUB SERPL-MCNC: 1 MG/DL (ref 0–1.2)
BUN SERPL-MCNC: 41 MG/DL (ref 6–23)
CALCIUM SERPL-MCNC: 8.6 MG/DL (ref 8.6–10.6)
CHLORIDE SERPL-SCNC: 108 MMOL/L (ref 98–107)
CO2 SERPL-SCNC: 19 MMOL/L (ref 21–32)
CREAT SERPL-MCNC: 2.77 MG/DL (ref 0.5–1.3)
EGFRCR SERPLBLD CKD-EPI 2021: 26 ML/MIN/1.73M*2
EOSINOPHIL # BLD AUTO: 0.13 X10*3/UL (ref 0–0.7)
EOSINOPHIL NFR BLD AUTO: 3.2 %
ERYTHROCYTE [DISTWIDTH] IN BLOOD BY AUTOMATED COUNT: 15.1 % (ref 11.5–14.5)
GLUCOSE SERPL-MCNC: 181 MG/DL (ref 74–99)
HCT VFR BLD AUTO: 30.4 % (ref 41–52)
HGB BLD-MCNC: 10 G/DL (ref 13.5–17.5)
HLA RESULTS: NORMAL
HLA-A+B+C AB NFR SER: NORMAL %
HLA-DP+DQ+DR AB NFR SER: NORMAL %
IMM GRANULOCYTES # BLD AUTO: 0 X10*3/UL (ref 0–0.7)
IMM GRANULOCYTES NFR BLD AUTO: 0 % (ref 0–0.9)
INR PPP: 1.1 (ref 0.9–1.1)
LYMPHOCYTES # BLD AUTO: 0.85 X10*3/UL (ref 1.2–4.8)
LYMPHOCYTES NFR BLD AUTO: 21 %
MCH RBC QN AUTO: 29.9 PG (ref 26–34)
MCHC RBC AUTO-ENTMCNC: 32.9 G/DL (ref 32–36)
MCV RBC AUTO: 91 FL (ref 80–100)
MONOCYTES # BLD AUTO: 0.4 X10*3/UL (ref 0.1–1)
MONOCYTES NFR BLD AUTO: 9.9 %
NEUTROPHILS # BLD AUTO: 2.6 X10*3/UL (ref 1.2–7.7)
NEUTROPHILS NFR BLD AUTO: 64.2 %
NRBC BLD-RTO: 0 /100 WBCS (ref 0–0)
PLATELET # BLD AUTO: 80 X10*3/UL (ref 150–450)
POTASSIUM SERPL-SCNC: 3.9 MMOL/L (ref 3.5–5.3)
PROT SERPL-MCNC: 6.8 G/DL (ref 6.4–8.2)
PROTHROMBIN TIME: 12.8 SECONDS (ref 9.8–12.8)
RBC # BLD AUTO: 3.34 X10*6/UL (ref 4.5–5.9)
RH FACTOR (ANTIGEN D): NORMAL
SODIUM SERPL-SCNC: 138 MMOL/L (ref 136–145)
WBC # BLD AUTO: 4.1 X10*3/UL (ref 4.4–11.3)

## 2024-06-14 NOTE — TELEPHONE ENCOUNTER
Patient notified of active listing for Liver & Kidney with MELD 18. Patient is scheduled for fuv with Dr. Francois later this month. Patient updated support contact information and added his sister Jud who lives next door to him.     Coordinator reviewed the following information with patient:  - When MELD labs will be due to update score on [facility] waitlist  - Donor offer call may come at anytime of day, from blocked or out of state caller with 1 hour to respond   - Medical emergencies and requesting transfer to [facility] from outside hospital ED  - Upcoming appointments

## 2024-06-14 NOTE — LETTER
06/14/24    Goran Ibrahim  [address] [place] [address]     RE:  Transplant Listing Status    Dear Mr. Ibrahim:    [facility] Liver Selection Committee is pleased to inform you that you have been listed for a liver transplant effective 6/14/2024  with a MELD of 18.      While you are waiting for a donor offer it is important to remember the following:      CHANGES IN ADDRESS, PHONE, INSURANCE  Notify the transplant office immediately of any changes to:  Your address, phone number, or insurance   Your support person(s) contact information  **Failure to do so may result in you missing an opportunity for a transplant and/or may result in your insurance company refusing payment.    MELD LABS   You will need to have bloodwork drawn periodically to update your MELD score. The frequency of lab work is based on your most recent MELD score which can fluctuate. Your coordinator will notify you when to have labs drawn.     MEDICAL EMERGENCIES & PATIENT HOSPITALIZATIONS  In the case of a medical emergency, please:  Seek treatment at your closest Emergency Department.   Notify the emergency department's medical team that you are listed for a liver transplant and are followed by the liver transplant team at [facility].  Remember that you or a family member should request to be transferred to [facility] if the outside hospital believes you require admission.  Call the transplant office if you have been admitted to an outside hospital for any reason.     TRAVEL  If you plan to travel while you are active on the wait list, please call the transplant office and provide information on how we may contact you, as well as the dates you will be travelling.    If you are considering travel outside of the [place], please discuss this with your coordinator or physician prior to making arrangements.       TRANSPLANT ADMISSION & POSSIBILITY OF A DRY RUN  Remember to  always answer your phone!!   Keep your phone by your bed and the ringer at full volume overnight.   It is also important to clear your voicemail inbox so that you may be able to receive messages.    You will receive a call from a transplant coordinator when a donor has been matched for you. You will be notified of what type of donor organ is offered (Brain Dead, DCD, HepBcAb+, HCV+) and if donor meets risk criteria.   You will have ONE (1) HOUR to respond and accept the offer.   The transplant coordinator will provide instructions regarding your admission to the hospital including the time and location to arrive.   **Please remember that you may be contacted with a donor offer at any time, day or night. This call may appear as a blocked, out-of-state, or private number, therefore, it is extremely important to answer all phone calls.    It is possible that your transplant operation may be canceled after your arrival to the hospital -- this is called a [place] or False Alarm. There are many reasons this may happen, but here are a few examples:   DCD donor does not pass within 30 minute time frame  Active infection  Hyponatremia - Serum sodium </= 125  Organ arrives and is not in good condition  **As frustrating as this may be please understand that your transplant team makes these decisions in your best interest.     For general organ transplant-related information, you may call the Organ Procurement and Transplantation Network (OPTN) toll-free patient services phone line at 1-955.948.3498.  The OPTN is a great resource for transplant candidates, recipients, family members, and friends. Additional information on the OPTN is enclosed for your review.    Sincerely,    Liver Transplant Team  [facility]    CC:  Transplant Hepatologist -  Cassius Francois            Enclosures: OPTN Patient Information Letter         Surgeon and Physician Coverage Plan

## 2024-06-15 LAB
CYSTATIN C SERPL-MCNC: 4.9 MG/L (ref 0.5–1.2)
GFR/BSA.PRED SERPLBLD CYS-BASED-ARV: 10 ML/MIN/BSA

## 2024-06-20 LAB
LABORATORY COMMENT REPORT: NORMAL
PATH REPORT.FINAL DX SPEC: NORMAL
PATH REPORT.GROSS SPEC: NORMAL
PATH REPORT.TOTAL CANCER: NORMAL

## 2024-06-25 ENCOUNTER — OFFICE VISIT (OUTPATIENT)
Dept: TRANSPLANT | Facility: HOSPITAL | Age: 54
End: 2024-06-25
Payer: MEDICAID

## 2024-06-25 VITALS
SYSTOLIC BLOOD PRESSURE: 178 MMHG | OXYGEN SATURATION: 99 % | BODY MASS INDEX: 39.41 KG/M2 | TEMPERATURE: 97.7 F | DIASTOLIC BLOOD PRESSURE: 85 MMHG | HEART RATE: 56 BPM | WEIGHT: 266.9 LBS

## 2024-06-25 DIAGNOSIS — R60.9 EDEMA, UNSPECIFIED TYPE: ICD-10-CM

## 2024-06-25 DIAGNOSIS — I10 HYPERTENSION, UNSPECIFIED TYPE: ICD-10-CM

## 2024-06-25 DIAGNOSIS — Z01.818 ENCOUNTER FOR PRE-TRANSPLANT EVALUATION FOR LIVER TRANSPLANT: ICD-10-CM

## 2024-06-25 DIAGNOSIS — Z71.6 ENCOUNTER FOR SMOKING CESSATION COUNSELING: ICD-10-CM

## 2024-06-25 DIAGNOSIS — I85.10 ESOPHAGEAL VARICES IN ALCOHOLIC CIRRHOSIS (MULTI): ICD-10-CM

## 2024-06-25 DIAGNOSIS — Z76.82 PRE-KIDNEY TRANSPLANT, LISTED: ICD-10-CM

## 2024-06-25 DIAGNOSIS — K70.30 ESOPHAGEAL VARICES IN ALCOHOLIC CIRRHOSIS (MULTI): ICD-10-CM

## 2024-06-25 DIAGNOSIS — Z76.82 PRE-LIVER TRANSPLANT, LISTED: Primary | ICD-10-CM

## 2024-06-25 DIAGNOSIS — N18.4 CKD (CHRONIC KIDNEY DISEASE), STAGE IV (MULTI): ICD-10-CM

## 2024-06-25 PROCEDURE — 99406 BEHAV CHNG SMOKING 3-10 MIN: CPT | Performed by: INTERNAL MEDICINE

## 2024-06-25 PROCEDURE — 3079F DIAST BP 80-89 MM HG: CPT | Performed by: INTERNAL MEDICINE

## 2024-06-25 PROCEDURE — 99214 OFFICE O/P EST MOD 30 MIN: CPT | Performed by: INTERNAL MEDICINE

## 2024-06-25 PROCEDURE — 3077F SYST BP >= 140 MM HG: CPT | Performed by: INTERNAL MEDICINE

## 2024-06-25 RX ORDER — PANTOPRAZOLE SODIUM 40 MG/1
40 TABLET, DELAYED RELEASE ORAL 2 TIMES DAILY
Qty: 60 TABLET | Refills: 11 | Status: SHIPPED | OUTPATIENT
Start: 2024-06-25 | End: 2025-06-25

## 2024-06-25 RX ORDER — CARVEDILOL 6.25 MG/1
6.25 TABLET ORAL DAILY
Qty: 30 TABLET | Refills: 11 | Status: SHIPPED | OUTPATIENT
Start: 2024-06-25 | End: 2025-06-25

## 2024-06-25 RX ORDER — CHOLECALCIFEROL (VITAMIN D3) 50 MCG
50 TABLET ORAL DAILY
Qty: 30 TABLET | Refills: 11 | Status: SHIPPED | OUTPATIENT
Start: 2024-06-25 | End: 2025-06-25

## 2024-06-25 RX ORDER — FUROSEMIDE 40 MG/1
40 TABLET ORAL
Qty: 60 TABLET | Refills: 11 | Status: SHIPPED | OUTPATIENT
Start: 2024-06-25 | End: 2025-06-25

## 2024-06-25 ASSESSMENT — PAIN SCALES - GENERAL: PAINLEVEL: 0-NO PAIN

## 2024-06-25 NOTE — PROGRESS NOTES
Transplant Hepatology Clinic Note    Follow up appointment for decompensated cirrhosis, pre liver transplant evaluation.    Accompanied by friend and neighbor.  Patient seen today in the office by Dr. Cassius Francois/RN Liliana Choi.      History Of Present Illness  Goran Ibrahim is a 53 y.o. 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 He was recently approved and listed for K transplant. He is active for transplant with a MELD 3.0 of 18.    Last drink in 5/2023. Alcohol biomarkers have been negative.      He had a number of hospitalizations at [facility] and [facility] from May to July 2023. At that time he developed symptoms which he attributed to a viral gastroenteritis in April 2023.  He started taking Pepto-Bismol.  At that time he noticed that his stools were dark and black.  He assumed that his dark stools were related to Pepto-Bismol use.  On May 18, 2023 is when he first took himself to the hospital at [facility].  He actually drove to the hospital and was confused.  He was pulled over by a  and had difficulty with his speech.  Ultimately, during that index hospitalization he was diagnosed with hepatic encephalopathy secondary to severe GI bleeding.  His GI bleeding was a major issue with recurrent GI bleeding events over days and weeks. He was diagnosed with a duodenal ulcer which tested positive for H. pylori.  He also was diagnosed with esophageal varices.  During his first hospitalization he had also developed ascites and underwent an index paracentesis procedure. At one point he was in the ICU and had required a number of blood transfusions.  He believes he had 3 endoscopy procedures in total and 4 colonoscopy procedures; in addition to multiple scans trying to determine where the GI bleeding was originating from.     Otherwise the details of his medical history is sparse.  The patient is poor historian and has difficulty providing a clear timeline of  events.     Following these hospitalizations,  he had been out of the hospital.  He has been seeing gastroenterologist Dr. Dana Geronimo.  He has also been seeing a primary care physician and the nephrologist and was ultimately referred to our Liver Transplant Program.      His past medical history is otherwise notable for hypertension for which she was on BP meds (these have since been stopped).  He was previously prediabetic, diet controlled.  He says before getting sick his weight was up to 325 pounds and he was struggling with obesity.     Past surgical history is otherwise notable for right arm fracture.     Family history: No known liver disease in the family.  His mother's probably from lung cancer.  His father is also  from metastatic lung cancer.     Social history: he reports smoking cigarettes daily, since the age of 13.  He estimates 5 to 10 cigarettes/day for the last 40 years.  He has committed to smoking cessation. His last alcohol use was May of 2023.  He describes weekend drinking 1-2 servings of hard liquor, which would be at the bar.  He denies daily alcohol use. Drinking was mostly on the weekends.  He said in his 20s or 30s his drinking was a little heavier he would have binge type drinking at the bar on  and .  He denies any recreational drug use.  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  He said he is very careful about not drinking and driving.  He has never had a DUI according to my questioning.     Subjective  Seen in transplant clinic multiple times since last year.    He has been evaluated by Dr. Floyd (transplant psychology), who raised concerns for cognitive deficits vs poor healthcare literacy. She has recommended cognitive testing, strong social support plan, with the recommendation to bring 1 support person to accompany him to all appointments. He has been following these recommendations. He is engaged in AA  meetings. His SIPAT was 24, per our SW assessment.     He was evaluated by Cardiologist, Dr. Pepe.  His dobutamine stress test did not meet protocol - submaximal HR. It was negative for inducible ischemia. He then completed a CT Ca2+ cardiac scoring, intermediate risk category:  LM 92.7  LAD 62.94  LCx 7.25  RCA 0.51      Total 163.4    His cardiac testing and evaluation was discussed at our Transplant Selection Committee. Concerns were raised for an incomplete evaluation based on his clinical risk factors, intermediate Ca2+ score and non-diagnostic stress test. He then completed a nuclear medicine stress test.      Had a hospitalization at [facility] for altered mental status and HE, AKUA on CKD, UTI (April 2024).   Had a recent EGD with me earlier this month - with banding of large EV and gastric biopsies (which confirmed H Pylori eradication).    Today, he reports legs swelling and water retention.   Producing urine. On diuretic therapy with Furosemide.     BP is elevated. Carvedilol is 3.125 mg daily (per my review of his pill bottles).    Reviewed all medications and pill bottles.    Prior social history:  Roommate and friend Ana Luisa works and is not able to attend all appointments with him. She just started a new job as . He has several other friends that can step up to help but may not be able to come to all appointments. He is trying to see if there are people that can alternate in coming to appointments with him, but he feels like he is doing fine with out having people come with. He works as a      ROS:  No black stools, hematochezia. No N/V abdominal pain. No leg swelling. Denies alcohol use.     Lasix has been on hold given AKUA on 12/5/2023 with Cr 2.28 (from prior 1.8). Has been held since about 12/12     He has seen [facility] and has had tooth extraction, now edentulous       Review of Systems  Review of Systems   Constitutional: Negative.    HENT:  Positive for sinus  pressure.    Respiratory: Negative.     Cardiovascular: Negative.  Negative for leg swelling.   Gastrointestinal: Negative.  Negative for abdominal distention, abdominal pain, blood in stool and nausea.         Medications:     Current Outpatient Medications:     amLODIPine (Norvasc) 5 mg tablet, Take 1 tablet (5 mg) by mouth once daily. (Patient taking differently: Take 2 tablets (10 mg) by mouth once daily.), Disp: 90 tablet, Rfl: 3    furosemide (Lasix) 20 mg tablet, Take 1 tablet (20 mg) by mouth 2 times a day. 12/19/23 On hold due to elevated CR, Disp: , Rfl:     pantoprazole (ProtoNix) 40 mg EC tablet, Take 1 tablet (40 mg) by mouth 2 times a day., Disp: , Rfl:     propranolol (Inderal) 20 mg tablet, Take 1 tablet (20 mg) by mouth 2 times a day., Disp: , Rfl:     sodium bicarbonate 650 mg tablet, TAKE 1 TABLET (650 MG) BY MOUTH DAILY. DO NOT START BEFORE JUNE 4, 2023., Disp: , Rfl:     sucralfate (Carafate) 1 gram tablet, Take 1 tablet (1 g) by mouth 4 times a day. Take before meals, Disp: , Rfl:      Physical Exam  Vitals:    06/25/24 1140   BP: 178/85   Pulse: 56   Temp: 36.5 °C (97.7 °F)   SpO2: 99%       General: well-nourished, no acute distress  HEENT: PERRLA, EOM intact, no scleral icterus, moist MM, edentulous  Respiratory: CTA bilaterally, normal work of breathing  Cardiovascular: RRR, no murmurs/rubs/gallops  Abdomen: Soft, nontender, nondistended  Extremities: + edema, no asterixis  Neuro: alert and oriented, CNII-XII grossly intact, moves all 4 extremities with no focal deficits     Relevant Results     Current Outpatient Medications:     amLODIPine (Norvasc) 5 mg tablet, Take 1 tablet (5 mg) by mouth once daily. (Patient taking differently: Take 2 tablets (10 mg) by mouth once daily.), Disp: 90 tablet, Rfl: 3    furosemide (Lasix) 20 mg tablet, Take 1 tablet (20 mg) by mouth 2 times a day. 12/19/23 On hold due to elevated CR, Disp: , Rfl:     pantoprazole (ProtoNix) 40 mg EC tablet, Take 1 tablet  (40 mg) by mouth 2 times a day., Disp: , Rfl:     propranolol (Inderal) 20 mg tablet, Take 1 tablet (20 mg) by mouth 2 times a day., Disp: , Rfl:     sodium bicarbonate 650 mg tablet, TAKE 1 TABLET (650 MG) BY MOUTH DAILY. DO NOT START BEFORE JUNE 4, 2023., Disp: , Rfl:     sucralfate (Carafate) 1 gram tablet, Take 1 tablet (1 g) by mouth 4 times a day. Take before meals, Disp: , Rfl:             Lab Results   Component Value Date     WBC 3.9 (L) 12/05/2023     HGB 10.1 (L) 12/05/2023     HCT 31.0 (L) 12/05/2023     MCV 94 12/05/2023     PLT 77 (L) 12/05/2023            Lab Results   Component Value Date     GLUCOSE 96 12/05/2023     CALCIUM 8.2 (L) 12/05/2023      12/05/2023     K 4.0 12/05/2023     CO2 23 12/05/2023      (H) 12/05/2023     BUN 29 (H) 12/05/2023     CREATININE 2.28 (H) 12/05/2023            Lab Results   Component Value Date     ALT 12 12/05/2023     AST 32 12/05/2023     ALKPHOS 121 (H) 12/05/2023     BILITOT 0.9 12/05/2023            Imaging:     GI Procedures:     7/2023 EGD  Impression:               - Normal upper third of esophagus.                             - Grade I esophageal varices.                             - Esophageal mucosal changes suggestive of                             short-segment Matute's esophagus, classified as                             Matute's stage C0-M2 per [place] criteria.                             Biopsy is contraindicated.                             - Small hiatal hernia.                             - Non-bleeding duodenal ulcer with no stigmata                             of bleeding. Appears to be healing well, without                             any signs of recent blood loss.                             - Mucosal changes in the duodenum. Biopsied.                             - No blood was present throughout the entire                             exam.   Recommendation:           - Patient has a contact number available for                              emergencies. The signs and symptoms of potential                             delayed complications were discussed with the                             patient. Return to normal activities tomorrow.                             Written discharge instructions were provided to                             the patient.                             - Resume previous diet.                             - Continue present medications.                             - Await pathology results.                             - Use Protonix (pantoprazole) 40 mg PO BID for 2                             months, then 1 daily indefinitely..         6/2023 EGD  Findings:       The esophagus was normal.        The stomach was normal.        One non-bleeding cratered duodenal ulcer with a visible vessel was        found in the duodenal bulb. The lesion was 30 mm by 30 mm in largest        dimension. Area was successfully injected with 4 mL of a 0.1 mg/mL        solution of epinephrine for hemostasis. Coagulation for hemostasis        using argon plasma at 0.8 liters/minute and 40 tilley was successful.        The exam was otherwise without abnormality.   Impression:               - Normal esophagus.                             - Normal stomach.                             - Non-bleeding duodenal ulcer with a visible                             vessel. Injected. Treated with argon plasma                             coagulation (APC).                             - The examination was otherwise normal.                             - No specimens collected.     EGD 6/7/24    Findings  Irregular Z-line 38 cm from the incisors  Three medium grade II varices (no red tiera sign) in the esophagus; no bleeding was identified; placed 3 bands successfully, resulting in partial eradication  Cobblestone, edematous, erythematous and friable mucosa in the body of the stomach and antrum; performed cold forceps biopsy to rule out H. pylori;  Patchy  erythematous and friable mucosa in the duodenal bulb;  The duodenum appeared normal.     MELD 3.0: 18 at 6/13/2024 12:48 PM  MELD-Na: 17 at 6/13/2024 12:48 PM  Calculated from:  Serum Creatinine: 2.77 mg/dL at 6/13/2024 12:48 PM  Serum Sodium: 138 mmol/L (Using max of 137 mmol/L) at 6/13/2024 12:48 PM  Total Bilirubin: 1.0 mg/dL at 6/13/2024 12:48 PM  Serum Albumin: 3.0 g/dL at 6/13/2024 12:48 PM  INR(ratio): 1.1 at 6/13/2024 12:48 PM  Age at listing (hypothetical): 53 years  Sex: Male at 6/13/2024 12:48 PM      ASSESSMENT/PLAN:     Goran Ibrahim is a satisfactory candidate for liver/kidney transplantation.     -Poor health literacy, very basic understanding of his medical condition and the liver transplant process. Education will need to be reinforced with patient and support.  -Cigarette smoking: We again discussed a cigarette cessation plan today. I was very clear with our program's expectations. 5 min of counseling provided.       He has had issues with both variceal and non variceal related GI bleeding.    He had an index episode of hepatic encephalopathy which was probably in the context of gastrointestinal blood loss.    He also developed ascites which required paracentesis 1 time in the hospital. He now has advanced chronic kidney disease - likely hypertensive related, given risk factors, imaging and lack of ascites (unlikely HRS).     Other important  GI issues include H. pylori related duodenal ulcer - will repeat EGD with gastric biopsies; completed.  Will repeat in 6 months.     Decompensated alcoholic Cirrhosis  - Ascites: non currently; 1 paracentesis while admitted early 2023  - Volume: Increase lasix to 40 mg BID    Has leg swelling. Will stop amlodipine and transition from Propranolol to Coreg 6.25 mg BID. Will also begin a BP log.   Need to follow up his BP/HR.    - EV: 7/2023 EGD showed grade 1 EV. Repeat May  2024.   - HE: HE insetting of GIB. Not currently on lactulose and doing well. Monitor  and restart if needed  - Immunizations: HAV and HBV immune  - HCC screening: q6m, with AFP.  - Transplant: active, MELD 18.     HTN  Plan outlined above.      Advanced CKD  -risk factor management, plan for SLKT.    Matute's Esophagus  -Characterized as C0M2 on 7/3/2023 EGD but this was not biopsied due to presence of Grade I EV  -Repeat EGD in 2024     Hpylori Gastritis  -Treated in early 2023  -Needs test of cure, gastric biopsies at next EGD  -H Pylori has been eradicated.     Duodenal ulcer  -Reportedly healing well on 7/2023 EGD  -Pantoprazole 40mg to once daily     Colon cancer screening  -obtain colonoscopy reports from OSH   - 6/22/23 [facility]: Colonoscopy with Adams Score of 2/2/2: one 5 mm inflammatory type polyp in the sigmoid colon.     Tobacco abuse  -see above..    Cassius Francois MD

## 2024-06-25 NOTE — PATIENT INSTRUCTIONS
Plan:    We are increasing your Furosemide to 40 mg twice daily.     Please keep log of daily blood pressures.     We have sent refills to your local pharmacy.    One of the transplant secretaries will call to get you scheduled for an updated echocardiogram and follow up appointment with Dr. Francois in 3 months.

## 2024-07-05 PROCEDURE — 80505 PATH CLIN CONSLTJ HIGH 41-60: CPT | Performed by: PATHOLOGY

## 2024-07-09 ENCOUNTER — TELEPHONE (OUTPATIENT)
Dept: TRANSPLANT | Facility: HOSPITAL | Age: 54
End: 2024-07-09
Payer: MEDICAID

## 2024-07-09 DIAGNOSIS — N18.4 CKD (CHRONIC KIDNEY DISEASE), STAGE IV (MULTI): ICD-10-CM

## 2024-07-09 RX ORDER — SODIUM BICARBONATE 650 MG/1
650 TABLET ORAL 2 TIMES DAILY
Qty: 60 TABLET | Refills: 11 | Status: SHIPPED | OUTPATIENT
Start: 2024-07-09

## 2024-07-09 NOTE — TELEPHONE ENCOUNTER
PT called saying he got all his meds but the only thing the pharmacy didn't have was the Sodium BiChloride 650 mg. Needs to go to [facility] in [place]

## 2024-07-18 ENCOUNTER — TELEPHONE (OUTPATIENT)
Dept: TRANSPLANT | Facility: HOSPITAL | Age: 54
End: 2024-07-18
Payer: MEDICAID

## 2024-07-18 NOTE — TELEPHONE ENCOUNTER
Attempted to reach patient about needing to update HLA sample.  Left VM requesting updated HLA sample.  Provided Pre Kidney office [phone] for questions.  Coordinator notified.

## 2024-07-19 DIAGNOSIS — I85.10 ESOPHAGEAL VARICES IN ALCOHOLIC CIRRHOSIS (MULTI): ICD-10-CM

## 2024-07-19 DIAGNOSIS — K70.30 ESOPHAGEAL VARICES IN ALCOHOLIC CIRRHOSIS (MULTI): ICD-10-CM

## 2024-07-19 DIAGNOSIS — I10 HYPERTENSION, UNSPECIFIED TYPE: ICD-10-CM

## 2024-07-19 RX ORDER — CARVEDILOL 6.25 MG/1
6.25 TABLET ORAL 2 TIMES DAILY
Qty: 60 TABLET | Refills: 11 | Status: SHIPPED | OUTPATIENT
Start: 2024-07-19 | End: 2025-07-19

## 2024-07-25 ENCOUNTER — LAB REQUISITION (OUTPATIENT)
Dept: LAB | Facility: CLINIC | Age: 54
End: 2024-07-25
Payer: MEDICAID

## 2024-07-25 DIAGNOSIS — Z76.82 PRE-LIVER TRANSPLANT, LISTED: ICD-10-CM

## 2024-07-25 DIAGNOSIS — N18.4 CKD (CHRONIC KIDNEY DISEASE), STAGE IV (MULTI): ICD-10-CM

## 2024-07-25 DIAGNOSIS — K70.30 ALCOHOLIC CIRRHOSIS OF LIVER WITHOUT ASCITES (MULTI): ICD-10-CM

## 2024-07-25 DIAGNOSIS — N18.6 END STAGE RENAL DISEASE (MULTI): ICD-10-CM

## 2024-07-25 DIAGNOSIS — Z76.82 PRE-KIDNEY TRANSPLANT, LISTED: ICD-10-CM

## 2024-07-29 ENCOUNTER — LAB (OUTPATIENT)
Dept: LAB | Facility: LAB | Age: 54
End: 2024-07-29
Payer: MEDICAID

## 2024-07-29 ENCOUNTER — HOSPITAL ENCOUNTER (OUTPATIENT)
Dept: CARDIOLOGY | Facility: CLINIC | Age: 54
Discharge: HOME | End: 2024-07-29
Payer: MEDICAID

## 2024-07-29 DIAGNOSIS — Z76.82 PRE-KIDNEY TRANSPLANT, LISTED: ICD-10-CM

## 2024-07-29 DIAGNOSIS — Z01.818 ENCOUNTER FOR PRE-TRANSPLANT EVALUATION FOR LIVER TRANSPLANT: ICD-10-CM

## 2024-07-29 DIAGNOSIS — N18.4 CKD (CHRONIC KIDNEY DISEASE), STAGE IV (MULTI): ICD-10-CM

## 2024-07-29 DIAGNOSIS — Z76.82 PRE-LIVER TRANSPLANT, LISTED: ICD-10-CM

## 2024-07-29 DIAGNOSIS — Z01.810 ENCOUNTER FOR PREPROCEDURAL CARDIOVASCULAR EXAMINATION: ICD-10-CM

## 2024-07-29 DIAGNOSIS — K70.30 ALCOHOLIC CIRRHOSIS OF LIVER WITHOUT ASCITES (MULTI): ICD-10-CM

## 2024-07-29 LAB
ALBUMIN SERPL BCP-MCNC: 3.3 G/DL (ref 3.4–5)
ALP SERPL-CCNC: 117 U/L (ref 33–120)
ALT SERPL W P-5'-P-CCNC: 17 U/L (ref 10–52)
ANION GAP SERPL CALC-SCNC: 13 MMOL/L (ref 10–20)
AORTIC VALVE PEAK VELOCITY: 1.57 M/S
AST SERPL W P-5'-P-CCNC: 43 U/L (ref 9–39)
AV PEAK GRADIENT: 9.9 MMHG
AVA (PEAK VEL): 3.8 CM2
BASOPHILS # BLD AUTO: 0.05 X10*3/UL (ref 0–0.1)
BASOPHILS NFR BLD AUTO: 1.7 %
BILIRUB SERPL-MCNC: 1.2 MG/DL (ref 0–1.2)
BUN SERPL-MCNC: 45 MG/DL (ref 6–23)
CALCIUM SERPL-MCNC: 8.9 MG/DL (ref 8.6–10.6)
CHLORIDE SERPL-SCNC: 107 MMOL/L (ref 98–107)
CO2 SERPL-SCNC: 24 MMOL/L (ref 21–32)
CREAT SERPL-MCNC: 2.97 MG/DL (ref 0.5–1.3)
EGFRCR SERPLBLD CKD-EPI 2021: 24 ML/MIN/1.73M*2
EJECTION FRACTION APICAL 4 CHAMBER: 64.2
EJECTION FRACTION: 62 %
EOSINOPHIL # BLD AUTO: 0.11 X10*3/UL (ref 0–0.7)
EOSINOPHIL NFR BLD AUTO: 3.6 %
ERYTHROCYTE [DISTWIDTH] IN BLOOD BY AUTOMATED COUNT: 15.6 % (ref 11.5–14.5)
GLUCOSE SERPL-MCNC: 166 MG/DL (ref 74–99)
HCT VFR BLD AUTO: 30.9 % (ref 41–52)
HGB BLD-MCNC: 9.8 G/DL (ref 13.5–17.5)
IMM GRANULOCYTES # BLD AUTO: 0.01 X10*3/UL (ref 0–0.7)
IMM GRANULOCYTES NFR BLD AUTO: 0.3 % (ref 0–0.9)
INR PPP: 1 (ref 0.9–1.1)
LEFT ATRIUM VOLUME AREA LENGTH INDEX BSA: 41.9 ML/M2
LEFT VENTRICLE INTERNAL DIMENSION DIASTOLE: 5.46 CM (ref 3.5–6)
LEFT VENTRICULAR OUTFLOW TRACT DIAMETER: 2.46 CM
LYMPHOCYTES # BLD AUTO: 0.77 X10*3/UL (ref 1.2–4.8)
LYMPHOCYTES NFR BLD AUTO: 25.4 %
MCH RBC QN AUTO: 29.3 PG (ref 26–34)
MCHC RBC AUTO-ENTMCNC: 31.7 G/DL (ref 32–36)
MCV RBC AUTO: 92 FL (ref 80–100)
MITRAL VALVE E/A RATIO: 1.13
MITRAL VALVE E/E' RATIO: 27.85
MONOCYTES # BLD AUTO: 0.36 X10*3/UL (ref 0.1–1)
MONOCYTES NFR BLD AUTO: 11.9 %
NEUTROPHILS # BLD AUTO: 1.73 X10*3/UL (ref 1.2–7.7)
NEUTROPHILS NFR BLD AUTO: 57.1 %
NRBC BLD-RTO: 0 /100 WBCS (ref 0–0)
PLATELET # BLD AUTO: 59 X10*3/UL (ref 150–450)
POTASSIUM SERPL-SCNC: 3.5 MMOL/L (ref 3.5–5.3)
PROT SERPL-MCNC: 7.1 G/DL (ref 6.4–8.2)
PROTHROMBIN TIME: 11.8 SECONDS (ref 9.8–12.8)
RBC # BLD AUTO: 3.35 X10*6/UL (ref 4.5–5.9)
RIGHT VENTRICLE FREE WALL PEAK S': 13 CM/S
RIGHT VENTRICLE PEAK SYSTOLIC PRESSURE: 28 MMHG
SODIUM SERPL-SCNC: 140 MMOL/L (ref 136–145)
TRICUSPID ANNULAR PLANE SYSTOLIC EXCURSION: 3 CM
WBC # BLD AUTO: 3 X10*3/UL (ref 4.4–11.3)

## 2024-07-29 PROCEDURE — 85025 COMPLETE CBC W/AUTO DIFF WBC: CPT

## 2024-07-29 PROCEDURE — 80321 ALCOHOLS BIOMARKERS 1OR 2: CPT

## 2024-07-29 PROCEDURE — 93306 TTE W/DOPPLER COMPLETE: CPT

## 2024-07-29 PROCEDURE — 36415 COLL VENOUS BLD VENIPUNCTURE: CPT

## 2024-07-29 PROCEDURE — 80053 COMPREHEN METABOLIC PANEL: CPT

## 2024-07-29 PROCEDURE — 93306 TTE W/DOPPLER COMPLETE: CPT | Performed by: INTERNAL MEDICINE

## 2024-07-29 PROCEDURE — 85610 PROTHROMBIN TIME: CPT

## 2024-08-03 PROCEDURE — 80505 PATH CLIN CONSLTJ HIGH 41-60: CPT | Performed by: PATHOLOGY

## 2024-08-05 ENCOUNTER — TELEPHONE (OUTPATIENT)
Dept: TRANSPLANT | Facility: HOSPITAL | Age: 54
End: 2024-08-05

## 2024-08-05 ENCOUNTER — TELEPHONE (OUTPATIENT)
Dept: TRANSPLANT | Facility: HOSPITAL | Age: 54
End: 2024-08-05
Payer: MEDICAID

## 2024-08-05 NOTE — TELEPHONE ENCOUNTER
UNOS ID: [id number]  MATCH ID: [id number]  ORGAN: LIVER/KIDNEY  KDPI (if applicable): 28%  KNOWN RISK STATUS: YES  LOCAL VS IMPORT: IMPORT  HCV (if applicable): NEGATIVE  HepBcAb (if applicable): NOT DONE    Confirm the following:  Any COVID symptoms (nausea, vomiting, diarrhea, fever, chills, cough, sore throat, headache): YES-DESCRIBE/NO: No  Any contact with anyone positive for or suspected to have COVID: YES-DESCRIBE/NO: No  Any recent hospitalizations: YES-DESCRIBE/NO: No  Any recent illnesses: YES-DESCRIBE/NO: No  Any recent injuries (cracked teeth, broken bones, wounds that won’t heal): YES-DESCRIBE/NO: No  Any antibiotics: YES-DESCRIBE/NO: No  Any blood thinners: YES-DESCRIBE/NO: No  Any blood transfusions: YES-DESCRIBE/NO: No  When was last dialysis/type: YES/NA/NO: No    The last time they ate or drank anythin pm  Ask the surgeon whether or not the patient should be made NPO at this time. 9 am 8/6  It is not necessary for the patient to bring anything with them other than a current medication list and insurance information  Determine ETA to hospital: 45 minutes  Patient aware of the possibility of a dry-run.  YES

## 2024-08-10 ENCOUNTER — HOSPITAL ENCOUNTER (INPATIENT)
Facility: HOSPITAL | Age: 54
End: 2024-08-10
Attending: STUDENT IN AN ORGANIZED HEALTH CARE EDUCATION/TRAINING PROGRAM | Admitting: STUDENT IN AN ORGANIZED HEALTH CARE EDUCATION/TRAINING PROGRAM
Payer: MEDICAID

## 2024-08-19 ENCOUNTER — LAB REQUISITION (OUTPATIENT)
Dept: LAB | Facility: CLINIC | Age: 54
End: 2024-08-19
Payer: MEDICAID

## 2024-08-19 DIAGNOSIS — N18.6 END STAGE RENAL DISEASE (MULTI): ICD-10-CM

## 2024-08-23 ENCOUNTER — TELEPHONE (OUTPATIENT)
Dept: TRANSPLANT | Facility: HOSPITAL | Age: 54
End: 2024-08-23
Payer: MEDICAID

## 2024-08-23 DIAGNOSIS — Z76.82 PRE-LIVER TRANSPLANT, LISTED: ICD-10-CM

## 2024-08-23 DIAGNOSIS — Z01.818 ENCOUNTER FOR PRE-TRANSPLANT EVALUATION FOR KIDNEY TRANSPLANT: ICD-10-CM

## 2024-08-23 DIAGNOSIS — N18.4 CKD (CHRONIC KIDNEY DISEASE), STAGE IV (MULTI): ICD-10-CM

## 2024-08-23 DIAGNOSIS — Z01.818 ENCOUNTER FOR PRE-TRANSPLANT EVALUATION FOR LIVER TRANSPLANT: ICD-10-CM

## 2024-08-23 DIAGNOSIS — Z76.82 PRE-KIDNEY TRANSPLANT, LISTED: ICD-10-CM

## 2024-08-28 ENCOUNTER — LAB (OUTPATIENT)
Dept: LAB | Facility: LAB | Age: 54
End: 2024-08-28
Payer: MEDICAID

## 2024-08-28 DIAGNOSIS — N18.4 CKD (CHRONIC KIDNEY DISEASE), STAGE IV (MULTI): ICD-10-CM

## 2024-08-28 DIAGNOSIS — Z01.818 ENCOUNTER FOR PRE-TRANSPLANT EVALUATION FOR LIVER TRANSPLANT: ICD-10-CM

## 2024-08-28 DIAGNOSIS — Z76.82 PRE-LIVER TRANSPLANT, LISTED: ICD-10-CM

## 2024-08-28 DIAGNOSIS — Z01.818 ENCOUNTER FOR PRE-TRANSPLANT EVALUATION FOR KIDNEY TRANSPLANT: ICD-10-CM

## 2024-08-28 DIAGNOSIS — Z76.82 PRE-KIDNEY TRANSPLANT, LISTED: ICD-10-CM

## 2024-08-28 LAB
BASOPHILS # BLD AUTO: 0.04 X10*3/UL (ref 0–0.1)
BASOPHILS NFR BLD AUTO: 1.3 %
EOSINOPHIL # BLD AUTO: 0.11 X10*3/UL (ref 0–0.7)
EOSINOPHIL NFR BLD AUTO: 3.5 %
ERYTHROCYTE [DISTWIDTH] IN BLOOD BY AUTOMATED COUNT: 15.9 % (ref 11.5–14.5)
HCT VFR BLD AUTO: 31.2 % (ref 41–52)
HGB BLD-MCNC: 10.1 G/DL (ref 13.5–17.5)
IMM GRANULOCYTES # BLD AUTO: 0 X10*3/UL (ref 0–0.7)
IMM GRANULOCYTES NFR BLD AUTO: 0 % (ref 0–0.9)
INR PPP: 1.1 (ref 0.9–1.1)
LYMPHOCYTES # BLD AUTO: 0.63 X10*3/UL (ref 1.2–4.8)
LYMPHOCYTES NFR BLD AUTO: 20.2 %
MCH RBC QN AUTO: 29.1 PG (ref 26–34)
MCHC RBC AUTO-ENTMCNC: 32.4 G/DL (ref 32–36)
MCV RBC AUTO: 90 FL (ref 80–100)
MONOCYTES # BLD AUTO: 0.23 X10*3/UL (ref 0.1–1)
MONOCYTES NFR BLD AUTO: 7.4 %
NEUTROPHILS # BLD AUTO: 2.11 X10*3/UL (ref 1.2–7.7)
NEUTROPHILS NFR BLD AUTO: 67.6 %
NRBC BLD-RTO: 0 /100 WBCS (ref 0–0)
PLATELET # BLD AUTO: 65 X10*3/UL (ref 150–450)
PROTHROMBIN TIME: 12.1 SECONDS (ref 9.8–12.8)
RBC # BLD AUTO: 3.47 X10*6/UL (ref 4.5–5.9)
WBC # BLD AUTO: 3.1 X10*3/UL (ref 4.4–11.3)

## 2024-08-28 PROCEDURE — 80053 COMPREHEN METABOLIC PANEL: CPT

## 2024-08-28 PROCEDURE — 86832 HLA CLASS I HIGH DEFIN QUAL: CPT

## 2024-08-28 PROCEDURE — 85025 COMPLETE CBC W/AUTO DIFF WBC: CPT

## 2024-08-28 PROCEDURE — 86833 HLA CLASS II HIGH DEFIN QUAL: CPT

## 2024-08-28 PROCEDURE — 85610 PROTHROMBIN TIME: CPT

## 2024-08-28 PROCEDURE — 36415 COLL VENOUS BLD VENIPUNCTURE: CPT

## 2024-08-28 PROCEDURE — 80321 ALCOHOLS BIOMARKERS 1OR 2: CPT

## 2024-08-29 LAB
ALBUMIN SERPL BCP-MCNC: 3.6 G/DL (ref 3.4–5)
ALP SERPL-CCNC: 114 U/L (ref 33–120)
ALT SERPL W P-5'-P-CCNC: 18 U/L (ref 10–52)
ANION GAP SERPL CALC-SCNC: 18 MMOL/L (ref 10–20)
AST SERPL W P-5'-P-CCNC: 42 U/L (ref 9–39)
BILIRUB SERPL-MCNC: 1 MG/DL (ref 0–1.2)
BUN SERPL-MCNC: 54 MG/DL (ref 6–23)
CALCIUM SERPL-MCNC: 8.9 MG/DL (ref 8.6–10.6)
CHLORIDE SERPL-SCNC: 108 MMOL/L (ref 98–107)
CO2 SERPL-SCNC: 19 MMOL/L (ref 21–32)
CREAT SERPL-MCNC: 3.53 MG/DL (ref 0.5–1.3)
EGFRCR SERPLBLD CKD-EPI 2021: 19 ML/MIN/1.73M*2
GLUCOSE SERPL-MCNC: 136 MG/DL (ref 74–99)
HLA RESULTS: NORMAL
HLA-A+B+C AB NFR SER: NORMAL %
HLA-DP+DQ+DR AB NFR SER: NORMAL %
POTASSIUM SERPL-SCNC: 4.2 MMOL/L (ref 3.5–5.3)
PROT SERPL-MCNC: 7.4 G/DL (ref 6.4–8.2)
SODIUM SERPL-SCNC: 141 MMOL/L (ref 136–145)

## 2024-08-30 DIAGNOSIS — Z76.82 PRE-LIVER TRANSPLANT, LISTED: ICD-10-CM

## 2024-08-30 DIAGNOSIS — Z01.818 ENCOUNTER FOR PRE-TRANSPLANT EVALUATION FOR KIDNEY TRANSPLANT: ICD-10-CM

## 2024-08-30 DIAGNOSIS — K70.31 ALCOHOLIC CIRRHOSIS OF LIVER WITH ASCITES (MULTI): ICD-10-CM

## 2024-08-30 DIAGNOSIS — Z01.818 ENCOUNTER FOR PRE-TRANSPLANT EVALUATION FOR LIVER TRANSPLANT: ICD-10-CM

## 2024-08-30 DIAGNOSIS — N18.4 CKD (CHRONIC KIDNEY DISEASE), STAGE IV (MULTI): ICD-10-CM

## 2024-08-30 DIAGNOSIS — Z76.82 PRE-KIDNEY TRANSPLANT, LISTED: ICD-10-CM

## 2024-09-17 ENCOUNTER — OFFICE VISIT (OUTPATIENT)
Dept: TRANSPLANT | Facility: HOSPITAL | Age: 54
End: 2024-09-17
Payer: MEDICAID

## 2024-09-17 ENCOUNTER — SOCIAL WORK (OUTPATIENT)
Dept: TRANSPLANT | Facility: HOSPITAL | Age: 54
End: 2024-09-17
Payer: MEDICAID

## 2024-09-17 VITALS
DIASTOLIC BLOOD PRESSURE: 85 MMHG | BODY MASS INDEX: 39.02 KG/M2 | HEART RATE: 63 BPM | SYSTOLIC BLOOD PRESSURE: 176 MMHG | TEMPERATURE: 97.3 F | OXYGEN SATURATION: 97 % | WEIGHT: 264.2 LBS

## 2024-09-17 DIAGNOSIS — Z76.82 PRE-LIVER TRANSPLANT, LISTED: Primary | ICD-10-CM

## 2024-09-17 DIAGNOSIS — Z71.6 ENCOUNTER FOR SMOKING CESSATION COUNSELING: ICD-10-CM

## 2024-09-17 DIAGNOSIS — I85.10 ESOPHAGEAL VARICES IN ALCOHOLIC CIRRHOSIS (MULTI): ICD-10-CM

## 2024-09-17 DIAGNOSIS — K70.31 ALCOHOLIC CIRRHOSIS OF LIVER WITH ASCITES (MULTI): ICD-10-CM

## 2024-09-17 DIAGNOSIS — K70.30 ESOPHAGEAL VARICES IN ALCOHOLIC CIRRHOSIS (MULTI): ICD-10-CM

## 2024-09-17 PROCEDURE — 3077F SYST BP >= 140 MM HG: CPT | Performed by: INTERNAL MEDICINE

## 2024-09-17 PROCEDURE — 3079F DIAST BP 80-89 MM HG: CPT | Performed by: INTERNAL MEDICINE

## 2024-09-17 PROCEDURE — 99215 OFFICE O/P EST HI 40 MIN: CPT | Performed by: INTERNAL MEDICINE

## 2024-09-17 PROCEDURE — 99406 BEHAV CHNG SMOKING 3-10 MIN: CPT | Performed by: INTERNAL MEDICINE

## 2024-09-17 RX ORDER — AMLODIPINE BESYLATE 10 MG/1
10 TABLET ORAL DAILY
COMMUNITY
End: 2024-09-17 | Stop reason: ENTERED-IN-ERROR

## 2024-09-17 ASSESSMENT — PATIENT HEALTH QUESTIONNAIRE - PHQ9
2. FEELING DOWN, DEPRESSED OR HOPELESS: NOT AT ALL
10. IF YOU CHECKED OFF ANY PROBLEMS, HOW DIFFICULT HAVE THESE PROBLEMS MADE IT FOR YOU TO DO YOUR WORK, TAKE CARE OF THINGS AT HOME, OR GET ALONG WITH OTHER PEOPLE: SOMEWHAT DIFFICULT
3. TROUBLE FALLING OR STAYING ASLEEP OR SLEEPING TOO MUCH: NOT AT ALL
9. THOUGHTS THAT YOU WOULD BE BETTER OFF DEAD, OR OF HURTING YOURSELF: NOT AT ALL
1. LITTLE INTEREST OR PLEASURE IN DOING THINGS: NOT AT ALL
8. MOVING OR SPEAKING SO SLOWLY THAT OTHER PEOPLE COULD HAVE NOTICED. OR THE OPPOSITE, BEING SO FIGETY OR RESTLESS THAT YOU HAVE BEEN MOVING AROUND A LOT MORE THAN USUAL: SEVERAL DAYS
5. POOR APPETITE OR OVEREATING: NOT AT ALL
6. FEELING BAD ABOUT YOURSELF - OR THAT YOU ARE A FAILURE OR HAVE LET YOURSELF OR YOUR FAMILY DOWN: NOT AT ALL
SUM OF ALL RESPONSES TO PHQ QUESTIONS 1-9: 3
SUM OF ALL RESPONSES TO PHQ9 QUESTIONS 1 & 2: 0
4. FEELING TIRED OR HAVING LITTLE ENERGY: SEVERAL DAYS
7. TROUBLE CONCENTRATING ON THINGS, SUCH AS READING THE NEWSPAPER OR WATCHING TELEVISION: SEVERAL DAYS

## 2024-09-17 ASSESSMENT — ANXIETY QUESTIONNAIRES
3. WORRYING TOO MUCH ABOUT DIFFERENT THINGS: NOT AT ALL
6. BECOMING EASILY ANNOYED OR IRRITABLE: NOT AT ALL
1. FEELING NERVOUS, ANXIOUS, OR ON EDGE: NOT AT ALL
5. BEING SO RESTLESS THAT IT IS HARD TO SIT STILL: NEARLY EVERY DAY
2. NOT BEING ABLE TO STOP OR CONTROL WORRYING: NOT AT ALL
7. FEELING AFRAID AS IF SOMETHING AWFUL MIGHT HAPPEN: NOT AT ALL
4. TROUBLE RELAXING: NOT AT ALL
GAD7 TOTAL SCORE: 3
IF YOU CHECKED OFF ANY PROBLEMS ON THIS QUESTIONNAIRE, HOW DIFFICULT HAVE THESE PROBLEMS MADE IT FOR YOU TO DO YOUR WORK, TAKE CARE OF THINGS AT HOME, OR GET ALONG WITH OTHER PEOPLE: VERY DIFFICULT

## 2024-09-17 ASSESSMENT — PAIN SCALES - GENERAL: PAINLEVEL: 0-NO PAIN

## 2024-09-17 NOTE — PROGRESS NOTES
Transplant Hepatology Clinic Note    Follow up appointment for decompensated cirrhosis, pre liver transplant evaluation.    Accompanied by girlfriend.   Patient seen today in the office by Dr. Cassius Francois/RN Liliana Choi and DEAN Mireles.      History Of Present Illness  Goran Ibrahim is a 54 y.o. 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 He was  approved and listed for [facility] transplant. He is active for transplant with a MELD 3.0 of 18 as of 6/14/2024.    Interval history:  Had a hospitalization in August -with hepatic encephalopathy and a urinary tract infection.  He was treated with ceftriaxone for Enterococcus faecium UTI.  On my review with the hospital notes, there was discussion of not treating the UTI as that he was felt to be asymptomatic.  However, there is a record of antibiotic administration with ceftriaxone.  He was previously instructed to take lactulose 3 times a day.  During this August hospitalization he was discharged with instructions to stop and discontinue lactulose.  He has not been taking lactulose since then.     Yesterday, he did not return to home after work at his usual time of 5:00 PM.  He was found at 8:00 PM in his truck and he was confused.  EMS was called and he was taken to [facility], and was evaluated in the emergency room.  He was there overnight and discharged this morning at 7 AM.  He was given a lactulose enema, and shortly after his confusion had cleared up.  He was also straight cath with a concern for a recurrent UTI.  He was given a dose of antibiotics    Last drink in 5/2023. Alcohol biomarkers have been negative.   505 days since his last drink!  3 weeks without a cigarette.      Initial presentation:  He had a number of hospitalizations at [facility] and [facility] from May to July 2023. At that time he developed symptoms which he attributed to a viral gastroenteritis in April 2023.  He started taking Pepto-Bismol.  At that time he noticed that his  stools were dark and black.  He assumed that his dark stools were related to Pepto-Bismol use.  On May 18, 2023 is when he first took himself to the hospital at [facility].  He actually drove to the hospital and was confused.  He was pulled over by a  and had difficulty with his speech.  Ultimately, during that index hospitalization he was diagnosed with hepatic encephalopathy secondary to severe GI bleeding.  His GI bleeding was a major issue with recurrent GI bleeding events over days and weeks. He was diagnosed with a duodenal ulcer which tested positive for H. pylori.  He also was diagnosed with esophageal varices.  During his first hospitalization he had also developed ascites and underwent an index paracentesis procedure. At one point he was in the ICU and had required a number of blood transfusions.  He reported he had 3 endoscopy procedures in total and 4 colonoscopy procedures; in addition to multiple scans trying to determine where the GI bleeding was originating from.     Otherwise the details of his medical history is sparse.  The patient is poor historian and has difficulty providing a clear timeline of events.     Following these hospitalizations, he had been seeing gastroenterologist Dr. Dana Geronimo.  He has also been seeing a primary care physician and the nephrologist and was ultimately referred to our Liver Transplant Program.      His past medical history is otherwise notable for hypertension for which she was on BP meds (these have since been stopped). He was previously prediabetic, diet controlled.  He says before getting sick his weight was up to 325 pounds and he was struggling with obesity.     Past surgical history is otherwise notable for right arm fracture.     Family history: No known liver disease in the family.  His mother's probably from lung cancer.  His father is also  from metastatic lung cancer.     Social history: he reports smoking cigarettes daily,  since the age of 13.  He estimates 5 to 10 cigarettes/day for the last 40 years.  He has committed to smoking cessation. His last alcohol use was May of 2023.  He describes weekend drinking 1-2 servings of hard liquor, which would be at the bar.  He denies daily alcohol use. Drinking was mostly on the weekends.  He said in his 20s or 30s his drinking was a little heavier he would have binge type drinking at the bar on Fridays and Saturdays.  He denies any recreational drug use.  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  He said he is very careful about not drinking and driving.  He has never had a DUI according to my questioning.     Subjective  Seen in transplant clinic multiple times since last year.    He has been evaluated by Dr. Floyd (transplant psychology), who raised concerns for cognitive deficits vs poor healthcare literacy. She has recommended cognitive testing, strong social support plan, with the recommendation to bring 1 support person to accompany him to all appointments. He has been following these recommendations. He is engaged in AA meetings. His SIPAT was 24, per our SW assessment.     He was evaluated by Cardiologist, Dr. Pepe.  His dobutamine stress test did not meet protocol - submaximal HR. It was negative for inducible ischemia. He then completed a CT Ca2+ cardiac scoring, intermediate risk category:  LM 92.7  LAD 62.94  LCx 7.25  RCA 0.51      Total 163.4    His cardiac testing and evaluation was discussed at our Transplant Selection Committee. Concerns were raised for an incomplete evaluation based on his clinical risk factors, intermediate Ca2+ score and non-diagnostic stress test. He then completed a nuclear medicine stress test.      Had a [place] [facility] for altered mental status and HE, AKUA on CKD, UTI (April 2024).   Had a recent EGD with me in June 2024 - with banding of large EV and gastric biopsies (which confirmed H Pylori  eradication).    Reviewed all medications and pill bottles.      ROS:  No black stools, hematochezia. No N/V abdominal pain. No leg swelling. Denies alcohol use.     Lasix has been on hold given AKUA on 12/5/2023 with Cr 2.28 (from prior 1.8). Has been held since about 12/12    He has seen [facility] and has had tooth extraction, now edentulous       Review of Systems  Review of Systems   Constitutional: Negative.    HENT:  Positive for sinus pressure.    Respiratory: Negative.     Cardiovascular: Negative.  Negative for leg swelling.   Gastrointestinal: Negative.  Negative for abdominal distention, abdominal pain, blood in stool and nausea.         Medications:     Current Outpatient Medications:     amLODIPine (Norvasc) 5 mg tablet, Take 1 tablet (5 mg) by mouth once daily. (Patient taking differently: Take 2 tablets (10 mg) by mouth once daily.), Disp: 90 tablet, Rfl: 3    furosemide (Lasix) 20 mg tablet, Take 1 tablet (20 mg) by mouth 2 times a day. 12/19/23 On hold due to elevated CR, Disp: , Rfl:     pantoprazole (ProtoNix) 40 mg EC tablet, Take 1 tablet (40 mg) by mouth 2 times a day., Disp: , Rfl:     propranolol (Inderal) 20 mg tablet, Take 1 tablet (20 mg) by mouth 2 times a day., Disp: , Rfl:     sodium bicarbonate 650 mg tablet, TAKE 1 TABLET (650 MG) BY MOUTH DAILY. DO NOT START BEFORE JUNE 4, 2023., Disp: , Rfl:     sucralfate (Carafate) 1 gram tablet, Take 1 tablet (1 g) by mouth 4 times a day. Take before meals, Disp: , Rfl:      Physical Exam  Vitals:    09/17/24 1050   BP: 176/85   Pulse: 63   Temp: 36.3 °C (97.3 °F)   SpO2: 97%       General: well-nourished, no acute distress  HEENT: PERRLA, EOM intact, no scleral icterus, moist MM, edentulous  Respiratory: CTA bilaterally, normal work of breathing  Cardiovascular: RRR, no murmurs/rubs/gallops  Abdomen: Soft, nontender, nondistended  Extremities: + edema, no asterixis  Neuro: alert and oriented, CNII-XII grossly intact, moves all 4 extremities  with no focal deficits     Relevant Results              Lab Results   Component Value Date     WBC 3.9 (L) 12/05/2023     HGB 10.1 (L) 12/05/2023     HCT 31.0 (L) 12/05/2023     MCV 94 12/05/2023     PLT 77 (L) 12/05/2023            Lab Results   Component Value Date     GLUCOSE 96 12/05/2023     CALCIUM 8.2 (L) 12/05/2023      12/05/2023     K 4.0 12/05/2023     CO2 23 12/05/2023      (H) 12/05/2023     BUN 29 (H) 12/05/2023     CREATININE 2.28 (H) 12/05/2023            Lab Results   Component Value Date     ALT 12 12/05/2023     AST 32 12/05/2023     ALKPHOS 121 (H) 12/05/2023     BILITOT 0.9 12/05/2023            Imaging:     GI Procedures:     7/2023 EGD  Impression:               - Normal upper third of esophagus.                             - Grade I esophageal varices.                             - Esophageal mucosal changes suggestive of                             short-segment Matute's esophagus, classified as                             Matute's stage C0-M2 per [place] criteria.                             Biopsy is contraindicated.                             - Small hiatal hernia.                             - Non-bleeding duodenal ulcer with no stigmata                             of bleeding. Appears to be healing well, without                             any signs of recent blood loss.                             - Mucosal changes in the duodenum. Biopsied.                             - No blood was present throughout the entire                             exam.   Recommendation:           - Patient has a contact number available for                             emergencies. The signs and symptoms of potential                             delayed complications were discussed with the                             patient. Return to normal activities tomorrow.                             Written discharge instructions were provided to                             the patient.                              - Resume previous diet.                             - Continue present medications.                             - Await pathology results.                             - Use Protonix (pantoprazole) 40 mg PO BID for 2                             months, then 1 daily indefinitely..         6/2023 EGD  Findings:       The esophagus was normal.        The stomach was normal.        One non-bleeding cratered duodenal ulcer with a visible vessel was        found in the duodenal bulb. The lesion was 30 mm by 30 mm in largest        dimension. Area was successfully injected with 4 mL of a 0.1 mg/mL        solution of epinephrine for hemostasis. Coagulation for hemostasis        using argon plasma at 0.8 liters/minute and 40 tilley was successful.        The exam was otherwise without abnormality.   Impression:               - Normal esophagus.                             - Normal stomach.                             - Non-bleeding duodenal ulcer with a visible                             vessel. Injected. Treated with argon plasma                             coagulation (APC).                             - The examination was otherwise normal.                             - No specimens collected.     EGD 6/7/24    Findings  Irregular Z-line 38 cm from the incisors  Three medium grade II varices (no red tiera sign) in the esophagus; no bleeding was identified; placed 3 bands successfully, resulting in partial eradication  Cobblestone, edematous, erythematous and friable mucosa in the body of the stomach and antrum; performed cold forceps biopsy to rule out H. pylori;  Patchy erythematous and friable mucosa in the duodenal bulb;  The duodenum appeared normal.     MELD 3.0: 19 at 8/28/2024  9:42 AM  MELD-Na: 19 at 8/28/2024  9:42 AM  Calculated from:  Serum Creatinine: 3.53 mg/dL (Using max of 3 mg/dL) at 8/28/2024  9:42 AM  Serum Sodium: 141 mmol/L (Using max of 137 mmol/L) at 8/28/2024  9:42 AM  Total Bilirubin: 1.0  "mg/dL at 2024  9:42 AM  Serum Albumin: 3.6 g/dL (Using max of 3.5 g/dL) at 2024  9:42 AM  INR(ratio): 1.1 at 2024  9:42 AM  Age at listin years  Sex: Male at 2024  9:42 AM      ASSESSMENT/PLAN:     Goran Ibrahim is a satisfactory candidate for liver/kidney transplantation.     -Poor health literacy, very basic understanding of his medical condition and the liver transplant process. Education will need to be reinforced with patient and support.  -Cigarette smoking: We again discussed a cigarette cessation plan today. I was very clear with our program's expectations. He is now 3 weeks without any cigarettes.  5 min of counseling provided.       Decompensated alcoholic cirrhosis  He has had issues with both variceal and non variceal related GI bleeding.    He had an index episode of hepatic encephalopathy which was probably in the context of gastrointestinal blood loss.      He also developed ascites which required paracentesis 1 time in the hospital. He now has advanced chronic kidney disease - likely hypertensive related, given risk factors, imaging and lack of ascites (unlikely HRS).    Other important  GI issues include H. pylori related duodenal ulcer -  repeat EGD with gastric biopsies; completed.     Decompensated alcoholic Cirrhosis  - Ascites: non currently; 1 paracentesis while admitted early   - Volume: continue lasix to 40 mg BID    Has leg swelling.   Stopped/discontinued amlodipine.   He has transitioned from propranolol to Coreg 6.25 mg BID.   BP is elevated today, will encourage ongoing BP monitoring and log.  Need to follow up his BP/HR.    - EV: 2024 EGD with band ligation of varices, repeat in 6m and continue BB therapy.  - HE: NOW WITH TWO EPISODES in the past 2 months. Will need to be taking lactulose regularly. Will continue Rifaximin.     Apparently, according to my last Transplant Hepatology Clinic note, he was off lactulose, \"Monitor and restart if needed.\"    - " UTI - possibly with another/current infection.  Received a dose of ceftriaxone; prescribing a 7-day course of ciprofloxacin;  will follow-up urine cultures.    - Immunizations: HAV and HBV immune  - HCC screening: q6m, with AFP.  - Transplant: active, will recertify with today's emergency room lab work.     HTN  Plan outlined above.      Advanced CKD  -risk factor management, plan for SLKT.    Matute's Esophagus  -Characterized as C0M2 on 7/3/2023 EGD but this was not biopsied due to presence of Grade I EV  -Repeat EGD in 2024     Hpylori Gastritis  -Treated in early 2023  -Needs test of cure, gastric biopsies at next EGD  -H Pylori has been eradicated.     Duodenal ulcer  -Reportedly healing well on 7/2023 EGD  -Pantoprazole 40mg to once daily     Colon cancer screening  -obtain colonoscopy reports from OSH   - 6/22/23 [facility]: Colonoscopy with [place] Score of 2/2/2: one 5 mm inflammatory type polyp in the sigmoid colon.     Tobacco abuse  -see above.    Cassius Francois MD

## 2024-09-17 NOTE — PROGRESS NOTES
"GEE met with Pt and Pt's significant other, Fani in exam room for psychosocial update. Pt was pleasant and engaged. Pt has [employer] for insurance. Pt shared he applied for disability in July and has not heard back and does not know who to call. SW encouraged Pt to reach out to his local social security office either in-person or via telephone call. Pt was thankful. Pt reported he stayed overnight in the hospital last night due to an ammonia build-up. Pt reported he was admitted 5 weeks ago for the same reason. SW inquired if Pt has been taking his lactulose as prescribed and Pt reported he was told by his doctor not to take it. Pt shared he planned on talking with his doctor today regarding his lactulose. SW was agreeable. Pt reported good compliance with his medications and medical appointments. Pt changed his primary support from his friend, Ana Luisa to his significant other, Fani ([phone]). Fani reported she is not currently working, has no caregiver responsibilities or limitations, lives local to Pt, and she drives and has a working vehicle. Pt reported he still lives with Ana Luisa but requested she be removed from his support list. Pt secondary support is his brother, Lenny. Pt's alternate support remains to be Gerda Villalobos. GEE discussed the TCA and SW, Pt, and Fani all signed this. Pt described their mood as good, actually.\" Pt shared his symptoms of depression and anxiety \"have lightened up tremendously.\" Pt denied any current MH treatment. Pt denied any recent SI/SA. Pt shared he enjoys spending time with Fani, going to meetings, riding the rail buggy, and going on hikes. Pt scored a 3 on both the PHQ-9 and JUANA-7, indicating minimal clinical depression and anxiety. Pt denied any current substance use. Pt reported he \"bummed a couple cigarettes\" off of someone 3 weeks ago. Pt declined smoking cessation referral at this time. Pt shared he last drank 505 days ago. Pt shared " "he is attending NA meetings on Monday and Friday nights, transplant house on Wednesday mornings, and his morning unity AA meeting \"almost everyday.\" Pt denied any recent cravings or urges to drink. SW discussed the inpatient transplant stay and the need for support to be a part of education session. SW also discussed the frequency of post op appointments - once a week surgeon/hepatologist visits and twice a week labs. Pt expressed understanding. Pt is moderate psychosocial risk due to Pt's change in primary support and past challenges with supports. SW will follow up with Pt in 6-12 months.     "

## 2024-09-17 NOTE — PATIENT INSTRUCTIONS
PLAN:  -Schedule an initial visit with primary care provider have them review BP medications.  -Please take a log of your BP and heart rate at home at the same time daily and document the results.  -Take lactulose 3 times a day as prescribed this medication needs to be taken daily in order to prevent build up of toxins in the brain.   -We will schedule you for an EGD.  -We will schedule for a visit with Dr. Francois and a Nephrologist in 6-8 weeks.    DO NOT STOP TAKING ANY MEDICATION RELATED TO YOUR LIVER DISEASE WITHOUT FIRST CONSULTING YOUR LIVER TEAM.

## 2024-09-20 ENCOUNTER — TELEPHONE (OUTPATIENT)
Dept: TRANSPLANT | Facility: HOSPITAL | Age: 54
End: 2024-09-20
Payer: MEDICAID

## 2024-09-20 DIAGNOSIS — N39.0 URINARY TRACT INFECTION WITHOUT HEMATURIA, SITE UNSPECIFIED: Primary | ICD-10-CM

## 2024-09-20 LAB — URINE CULTURE EXTERNAL: ABNORMAL

## 2024-09-20 RX ORDER — NITROFURANTOIN 25; 75 MG/1; MG/1
100 CAPSULE ORAL EVERY 12 HOURS SCHEDULED
Qty: 14 CAPSULE | Refills: 0 | Status: SHIPPED | OUTPATIENT
Start: 2024-09-20 | End: 2024-09-27

## 2024-10-09 ENCOUNTER — TELEPHONE (OUTPATIENT)
Dept: TRANSPLANT | Facility: HOSPITAL | Age: 54
End: 2024-10-09
Payer: MEDICAID

## 2024-10-09 DIAGNOSIS — K70.30 ALCOHOLIC CIRRHOSIS OF LIVER WITHOUT ASCITES (MULTI): ICD-10-CM

## 2024-10-09 DIAGNOSIS — N18.4 CKD (CHRONIC KIDNEY DISEASE), STAGE IV (MULTI): ICD-10-CM

## 2024-10-09 DIAGNOSIS — Z76.82 PRE-LIVER TRANSPLANT, LISTED: ICD-10-CM

## 2024-10-09 NOTE — TELEPHONE ENCOUNTER
Called and spoke to Goran, asked him to have labs done by Wednesday 10/16 for MELD re-certification. He will go, also reports he got a new pcp that he will see next month. Gerda Toledo RN

## 2024-10-11 ENCOUNTER — TELEPHONE (OUTPATIENT)
Dept: TRANSPLANT | Facility: HOSPITAL | Age: 54
End: 2024-10-11
Payer: MEDICAID

## 2024-10-15 ENCOUNTER — LAB (OUTPATIENT)
Dept: LAB | Facility: LAB | Age: 54
End: 2024-10-15
Payer: MEDICAID

## 2024-10-15 DIAGNOSIS — Z01.818 ENCOUNTER FOR PRE-TRANSPLANT EVALUATION FOR KIDNEY TRANSPLANT: ICD-10-CM

## 2024-10-15 DIAGNOSIS — Z76.82 PRE-LIVER TRANSPLANT, LISTED: ICD-10-CM

## 2024-10-15 DIAGNOSIS — Z76.82 PRE-KIDNEY TRANSPLANT, LISTED: ICD-10-CM

## 2024-10-15 DIAGNOSIS — N18.4 CKD (CHRONIC KIDNEY DISEASE), STAGE IV (MULTI): ICD-10-CM

## 2024-10-15 DIAGNOSIS — Z01.818 ENCOUNTER FOR PRE-TRANSPLANT EVALUATION FOR LIVER TRANSPLANT: ICD-10-CM

## 2024-10-15 DIAGNOSIS — K70.31 ALCOHOLIC CIRRHOSIS OF LIVER WITH ASCITES (MULTI): ICD-10-CM

## 2024-10-15 LAB
ALBUMIN SERPL BCP-MCNC: 3.4 G/DL (ref 3.4–5)
ALP SERPL-CCNC: 109 U/L (ref 33–120)
ALT SERPL W P-5'-P-CCNC: 17 U/L (ref 10–52)
ANION GAP SERPL CALC-SCNC: 12 MMOL/L (ref 10–20)
AST SERPL W P-5'-P-CCNC: 36 U/L (ref 9–39)
BASOPHILS # BLD AUTO: 0.04 X10*3/UL (ref 0–0.1)
BASOPHILS NFR BLD AUTO: 1.2 %
BILIRUB SERPL-MCNC: 1.2 MG/DL (ref 0–1.2)
BUN SERPL-MCNC: 47 MG/DL (ref 6–23)
BURR CELLS BLD QL SMEAR: NORMAL
CALCIUM SERPL-MCNC: 8.9 MG/DL (ref 8.6–10.3)
CHLORIDE SERPL-SCNC: 111 MMOL/L (ref 98–107)
CO2 SERPL-SCNC: 21 MMOL/L (ref 21–32)
CREAT SERPL-MCNC: 3.03 MG/DL (ref 0.5–1.3)
EGFRCR SERPLBLD CKD-EPI 2021: 23 ML/MIN/1.73M*2
EOSINOPHIL # BLD AUTO: 0.16 X10*3/UL (ref 0–0.7)
EOSINOPHIL NFR BLD AUTO: 4.7 %
ERYTHROCYTE [DISTWIDTH] IN BLOOD BY AUTOMATED COUNT: 15.2 % (ref 11.5–14.5)
GLUCOSE SERPL-MCNC: 123 MG/DL (ref 74–99)
HCT VFR BLD AUTO: 32.3 % (ref 41–52)
HGB BLD-MCNC: 10.4 G/DL (ref 13.5–17.5)
IMM GRANULOCYTES # BLD AUTO: 0 X10*3/UL (ref 0–0.7)
IMM GRANULOCYTES NFR BLD AUTO: 0 % (ref 0–0.9)
INR PPP: 1.1 (ref 0.9–1.1)
LYMPHOCYTES # BLD AUTO: 0.67 X10*3/UL (ref 1.2–4.8)
LYMPHOCYTES NFR BLD AUTO: 19.7 %
MCH RBC QN AUTO: 29.5 PG (ref 26–34)
MCHC RBC AUTO-ENTMCNC: 32.2 G/DL (ref 32–36)
MCV RBC AUTO: 92 FL (ref 80–100)
MONOCYTES # BLD AUTO: 0.34 X10*3/UL (ref 0.1–1)
MONOCYTES NFR BLD AUTO: 10 %
NEUTROPHILS # BLD AUTO: 2.19 X10*3/UL (ref 1.2–7.7)
NEUTROPHILS NFR BLD AUTO: 64.4 %
NRBC BLD-RTO: 0 /100 WBCS (ref 0–0)
OVALOCYTES BLD QL SMEAR: NORMAL
PLATELET # BLD AUTO: 68 X10*3/UL (ref 150–450)
POTASSIUM SERPL-SCNC: 4.2 MMOL/L (ref 3.5–5.3)
PROT SERPL-MCNC: 7.1 G/DL (ref 6.4–8.2)
PROTHROMBIN TIME: 12.5 SECONDS (ref 9.8–12.8)
RBC # BLD AUTO: 3.52 X10*6/UL (ref 4.5–5.9)
RBC MORPH BLD: NORMAL
SCHISTOCYTES BLD QL SMEAR: NORMAL
SODIUM SERPL-SCNC: 140 MMOL/L (ref 136–145)
WBC # BLD AUTO: 3.4 X10*3/UL (ref 4.4–11.3)

## 2024-10-15 PROCEDURE — 85025 COMPLETE CBC W/AUTO DIFF WBC: CPT

## 2024-10-15 PROCEDURE — 85610 PROTHROMBIN TIME: CPT

## 2024-10-15 PROCEDURE — 36415 COLL VENOUS BLD VENIPUNCTURE: CPT

## 2024-10-15 PROCEDURE — 80053 COMPREHEN METABOLIC PANEL: CPT

## 2024-10-15 PROCEDURE — 86808 CYTOTOXIC ANTIBODY SCREENING: CPT | Mod: OUT | Performed by: SURGERY

## 2024-10-23 ENCOUNTER — APPOINTMENT (OUTPATIENT)
Dept: PRIMARY CARE | Facility: CLINIC | Age: 54
End: 2024-10-23
Payer: MEDICAID

## 2024-10-23 VITALS
RESPIRATION RATE: 16 BRPM | OXYGEN SATURATION: 98 % | WEIGHT: 274 LBS | HEART RATE: 64 BPM | HEIGHT: 70 IN | SYSTOLIC BLOOD PRESSURE: 182 MMHG | BODY MASS INDEX: 39.22 KG/M2 | TEMPERATURE: 98.4 F | DIASTOLIC BLOOD PRESSURE: 84 MMHG

## 2024-10-23 DIAGNOSIS — N18.4 STAGE 4 CHRONIC KIDNEY DISEASE (MULTI): ICD-10-CM

## 2024-10-23 DIAGNOSIS — K70.30 ALCOHOLIC CIRRHOSIS, UNSPECIFIED WHETHER ASCITES PRESENT (MULTI): ICD-10-CM

## 2024-10-23 DIAGNOSIS — I10 PRIMARY HYPERTENSION: Primary | ICD-10-CM

## 2024-10-23 DIAGNOSIS — Z76.89 ENCOUNTER TO ESTABLISH CARE WITH NEW DOCTOR: ICD-10-CM

## 2024-10-23 PROCEDURE — 3079F DIAST BP 80-89 MM HG: CPT | Performed by: STUDENT IN AN ORGANIZED HEALTH CARE EDUCATION/TRAINING PROGRAM

## 2024-10-23 PROCEDURE — 3008F BODY MASS INDEX DOCD: CPT | Performed by: STUDENT IN AN ORGANIZED HEALTH CARE EDUCATION/TRAINING PROGRAM

## 2024-10-23 PROCEDURE — 3077F SYST BP >= 140 MM HG: CPT | Performed by: STUDENT IN AN ORGANIZED HEALTH CARE EDUCATION/TRAINING PROGRAM

## 2024-10-23 PROCEDURE — 99203 OFFICE O/P NEW LOW 30 MIN: CPT | Performed by: STUDENT IN AN ORGANIZED HEALTH CARE EDUCATION/TRAINING PROGRAM

## 2024-10-23 RX ORDER — AMLODIPINE BESYLATE 5 MG/1
5 TABLET ORAL DAILY
Qty: 30 TABLET | Refills: 3 | Status: SHIPPED | OUTPATIENT
Start: 2024-10-23 | End: 2025-02-20

## 2024-10-23 SDOH — ECONOMIC STABILITY: FOOD INSECURITY: WITHIN THE PAST 12 MONTHS, YOU WORRIED THAT YOUR FOOD WOULD RUN OUT BEFORE YOU GOT MONEY TO BUY MORE.: NEVER TRUE

## 2024-10-23 SDOH — ECONOMIC STABILITY: FOOD INSECURITY: WITHIN THE PAST 12 MONTHS, THE FOOD YOU BOUGHT JUST DIDN'T LAST AND YOU DIDN'T HAVE MONEY TO GET MORE.: NEVER TRUE

## 2024-10-23 ASSESSMENT — ENCOUNTER SYMPTOMS
FEVER: 0
HEADACHES: 0
CONSTIPATION: 0
DIZZINESS: 0
COUGH: 0
DIARRHEA: 0
PALPITATIONS: 0
FATIGUE: 0
ABDOMINAL PAIN: 0
CHILLS: 0
WHEEZING: 0
CONFUSION: 0
UNEXPECTED WEIGHT CHANGE: 0
NAUSEA: 0
COLOR CHANGE: 0
VOMITING: 0
MUSCULOSKELETAL NEGATIVE: 1
SHORTNESS OF BREATH: 0

## 2024-10-23 ASSESSMENT — LIFESTYLE VARIABLES
HOW OFTEN DO YOU HAVE SIX OR MORE DRINKS ON ONE OCCASION: NEVER
SKIP TO QUESTIONS 9-10: 1
HOW MANY STANDARD DRINKS CONTAINING ALCOHOL DO YOU HAVE ON A TYPICAL DAY: PATIENT DOES NOT DRINK
AUDIT-C TOTAL SCORE: 0
HOW OFTEN DO YOU HAVE A DRINK CONTAINING ALCOHOL: NEVER

## 2024-10-23 ASSESSMENT — PATIENT HEALTH QUESTIONNAIRE - PHQ9
1. LITTLE INTEREST OR PLEASURE IN DOING THINGS: NOT AT ALL
SUM OF ALL RESPONSES TO PHQ9 QUESTIONS 1 & 2: 0
2. FEELING DOWN, DEPRESSED OR HOPELESS: NOT AT ALL

## 2024-10-23 ASSESSMENT — PAIN SCALES - GENERAL: PAINLEVEL_OUTOF10: 0-NO PAIN

## 2024-10-23 NOTE — PROGRESS NOTES
"Subjective   Patient ID: Goran Ibrahim is a 54 y.o. male who presents for New Patient Visit (Pt is having a liver and kidney transplant.  Needs PCP to help with HTN) and Hypertension.    HPI   He is a new pt here to estb care and HTN mgmt. Pt has h/o decompensated alcoholic liver cirrhosis and CKD stage 4. He is following with hepatologist/Dr. Francois & renal transplant team. Reports he is on the transplant list, following closely with transplant team at [facility].   Reports he is taking lactulose, rifaximin, lasix & protonix; also following with GI team. He is having 3-4 BM daily, managing with lactulose. Reports his last alcohol was about 1.5-2 yrs ago; stop smoking too few ako ago, doing alright there.   His IO /91 and repeat was 182/84; taking coreg 6.25 mg bid and diuretics per emr.     Review of Systems   Constitutional:  Negative for chills, fatigue, fever and unexpected weight change.   HENT: Negative.     Respiratory:  Negative for cough, shortness of breath and wheezing.    Cardiovascular:  Negative for chest pain, palpitations and leg swelling.   Gastrointestinal:  Negative for abdominal pain, constipation, diarrhea, nausea and vomiting.   Musculoskeletal: Negative.    Skin:  Negative for color change and rash.   Neurological:  Negative for dizziness and headaches.   Psychiatric/Behavioral:  Negative for behavioral problems and confusion.        Objective   BP (!) 182/84 (BP Location: Left arm, Patient Position: Sitting, BP Cuff Size: Large adult)   Pulse 64   Temp 36.9 °C (98.4 °F) (Temporal)   Resp 16   Ht 1.778 m (5' 10\")   Wt 124 kg (274 lb)   SpO2 98%   BMI 39.31 kg/m²     Physical Exam  Vitals and nursing note reviewed.   Constitutional:       Appearance: Normal appearance. He is obese.   Cardiovascular:      Rate and Rhythm: Normal rate and regular rhythm.      Pulses: Normal pulses.      Heart sounds: Normal heart sounds.   Pulmonary:      Effort: Pulmonary effort is normal.      Breath " sounds: Normal breath sounds.   Abdominal:      General: Abdomen is flat. Bowel sounds are normal. There is distension.      Palpations: Abdomen is soft.      Tenderness: There is no abdominal tenderness. There is no right CVA tenderness, left CVA tenderness, guarding or rebound.   Musculoskeletal:         General: Normal range of motion.   Neurological:      General: No focal deficit present.      Mental Status: He is alert and oriented to person, place, and time.   Psychiatric:         Mood and Affect: Mood normal.         Behavior: Behavior normal.       Assessment/Plan   He is a new pt here to estb care and HTN mgmt. Pt has h/o decompensated alcoholic liver cirrhosis and CKD stage 4, following with liver-kidney transplant team actively at [facility]. Appears he is stable, having regular BM with lactulose, no confusion noted. Adv to cont following with transplant team as usual.   HTN remains uncontrolled, will add amlodipine 5 mg daily; cont coreg 6.25 mg bid & diuretic as usual. Will bring in 2 wks for BP check with nurse. Follow DASH diet, work on optimizing wt. Bld work per transplant team.     Problem List Items Addressed This Visit             ICD-10-CM    Hypertension - Primary I10    Relevant Medications    amLODIPine (Norvasc) 5 mg tablet    Liver cirrhosis (Multi) K74.60     Other Visit Diagnoses         Codes    Stage 4 chronic kidney disease (Multi)     N18.4    Encounter to establish care with new doctor     Z76.89          Rtc 2-3 mo for HTN FU    Seun Rodrigues MD    [facility]

## 2024-10-24 ENCOUNTER — TELEPHONE (OUTPATIENT)
Dept: TRANSPLANT | Facility: HOSPITAL | Age: 54
End: 2024-10-24
Payer: MEDICAID

## 2024-10-24 NOTE — TELEPHONE ENCOUNTER
Patient needs new script for Carvedilol showing patient takes med twice a day to [facility] in [place]

## 2024-10-30 ENCOUNTER — LAB REQUISITION (OUTPATIENT)
Dept: LAB | Facility: CLINIC | Age: 54
End: 2024-10-30
Payer: MEDICAID

## 2024-10-30 DIAGNOSIS — N18.6 END STAGE RENAL DISEASE (MULTI): ICD-10-CM

## 2024-10-30 LAB — FREEZE CROSSMATCH: NORMAL

## 2024-10-31 DIAGNOSIS — K70.30 ESOPHAGEAL VARICES IN ALCOHOLIC CIRRHOSIS (MULTI): ICD-10-CM

## 2024-10-31 DIAGNOSIS — I85.10 ESOPHAGEAL VARICES IN ALCOHOLIC CIRRHOSIS (MULTI): ICD-10-CM

## 2024-10-31 DIAGNOSIS — I10 HYPERTENSION, UNSPECIFIED TYPE: ICD-10-CM

## 2024-10-31 RX ORDER — CARVEDILOL 6.25 MG/1
6.25 TABLET ORAL 2 TIMES DAILY
Qty: 60 TABLET | Refills: 11 | Status: SHIPPED | OUTPATIENT
Start: 2024-10-31 | End: 2025-10-31

## 2024-11-01 ENCOUNTER — HOSPITAL ENCOUNTER (OUTPATIENT)
Dept: GASTROENTEROLOGY | Facility: HOSPITAL | Age: 54
Discharge: HOME | End: 2024-11-01
Payer: MEDICAID

## 2024-11-01 VITALS
OXYGEN SATURATION: 98 % | SYSTOLIC BLOOD PRESSURE: 103 MMHG | WEIGHT: 265 LBS | RESPIRATION RATE: 14 BRPM | HEART RATE: 54 BPM | BODY MASS INDEX: 37.94 KG/M2 | DIASTOLIC BLOOD PRESSURE: 67 MMHG | TEMPERATURE: 98.6 F | HEIGHT: 70 IN

## 2024-11-01 DIAGNOSIS — I85.10 ESOPHAGEAL VARICES IN ALCOHOLIC CIRRHOSIS (MULTI): Primary | ICD-10-CM

## 2024-11-01 DIAGNOSIS — Z76.82 PRE-LIVER TRANSPLANT, LISTED: ICD-10-CM

## 2024-11-01 DIAGNOSIS — K70.30 ESOPHAGEAL VARICES IN ALCOHOLIC CIRRHOSIS (MULTI): Primary | ICD-10-CM

## 2024-11-01 DIAGNOSIS — K70.31 ALCOHOLIC CIRRHOSIS OF LIVER WITH ASCITES (MULTI): ICD-10-CM

## 2024-11-01 PROCEDURE — 43235 EGD DIAGNOSTIC BRUSH WASH: CPT | Performed by: INTERNAL MEDICINE

## 2024-11-01 PROCEDURE — 7100000010 HC PHASE TWO TIME - EACH INCREMENTAL 1 MINUTE

## 2024-11-01 PROCEDURE — 2500000004 HC RX 250 GENERAL PHARMACY W/ HCPCS (ALT 636 FOR OP/ED): Mod: SE | Performed by: INTERNAL MEDICINE

## 2024-11-01 PROCEDURE — 3700000012 HC SEDATION LEVEL 5+ TIME - INITIAL 15 MINUTES 5/> YEARS

## 2024-11-01 PROCEDURE — 99152 MOD SED SAME PHYS/QHP 5/>YRS: CPT | Performed by: INTERNAL MEDICINE

## 2024-11-01 PROCEDURE — 2500000005 HC RX 250 GENERAL PHARMACY W/O HCPCS: Mod: SE | Performed by: INTERNAL MEDICINE

## 2024-11-01 PROCEDURE — 7100000009 HC PHASE TWO TIME - INITIAL BASE CHARGE

## 2024-11-01 RX ORDER — MIDAZOLAM HYDROCHLORIDE 1 MG/ML
INJECTION INTRAMUSCULAR; INTRAVENOUS AS NEEDED
Status: COMPLETED | OUTPATIENT
Start: 2024-11-01 | End: 2024-11-01

## 2024-11-01 RX ORDER — FENTANYL CITRATE 50 UG/ML
INJECTION, SOLUTION INTRAMUSCULAR; INTRAVENOUS AS NEEDED
Status: COMPLETED | OUTPATIENT
Start: 2024-11-01 | End: 2024-11-01

## 2024-11-01 RX ADMIN — FENTANYL CITRATE 50 MCG: 50 INJECTION, SOLUTION INTRAMUSCULAR; INTRAVENOUS at 15:46

## 2024-11-01 RX ADMIN — BENZOCAINE, BUTAMBEN, AND TETRACAINE HYDROCHLORIDE 1 SPRAY: .028; .004; .004 AEROSOL, SPRAY TOPICAL at 15:49

## 2024-11-01 RX ADMIN — FENTANYL CITRATE 50 MCG: 50 INJECTION, SOLUTION INTRAMUSCULAR; INTRAVENOUS at 15:39

## 2024-11-01 RX ADMIN — MIDAZOLAM HYDROCHLORIDE 2 MG: 1 INJECTION, SOLUTION INTRAMUSCULAR; INTRAVENOUS at 15:47

## 2024-11-01 RX ADMIN — BENZOCAINE, BUTAMBEN, AND TETRACAINE HYDROCHLORIDE 1 SPRAY: .028; .004; .004 AEROSOL, SPRAY TOPICAL at 15:48

## 2024-11-01 RX ADMIN — MIDAZOLAM HYDROCHLORIDE 2 MG: 1 INJECTION, SOLUTION INTRAMUSCULAR; INTRAVENOUS at 15:39

## 2024-11-01 RX ADMIN — FENTANYL CITRATE 25 MCG: 50 INJECTION, SOLUTION INTRAMUSCULAR; INTRAVENOUS at 15:50

## 2024-11-01 RX ADMIN — MIDAZOLAM HYDROCHLORIDE 1 MG: 1 INJECTION, SOLUTION INTRAMUSCULAR; INTRAVENOUS at 15:50

## 2024-11-01 ASSESSMENT — PAIN SCALES - GENERAL
PAINLEVEL_OUTOF10: 0 - NO PAIN
PAINLEVEL_OUTOF10: 1
PAINLEVEL_OUTOF10: 0 - NO PAIN

## 2024-11-01 ASSESSMENT — COLUMBIA-SUICIDE SEVERITY RATING SCALE - C-SSRS
1. IN THE PAST MONTH, HAVE YOU WISHED YOU WERE DEAD OR WISHED YOU COULD GO TO SLEEP AND NOT WAKE UP?: NO
6. HAVE YOU EVER DONE ANYTHING, STARTED TO DO ANYTHING, OR PREPARED TO DO ANYTHING TO END YOUR LIFE?: NO
2. HAVE YOU ACTUALLY HAD ANY THOUGHTS OF KILLING YOURSELF?: NO

## 2024-11-01 ASSESSMENT — PAIN - FUNCTIONAL ASSESSMENT
PAIN_FUNCTIONAL_ASSESSMENT: 0-10

## 2024-11-01 NOTE — H&P
History Of Present Illness  Goran Ibrahim is a 54 y.o. male presenting for an EGD.    Last EGD in June with banding of varices.     Past Medical History  Past Medical History:   Diagnosis Date    Cirrhosis (Multi)     CKD (chronic kidney disease)     Hypertension      Surgical History  Past Surgical History:   Procedure Laterality Date    IR ANGIOGRAM CELIAC  5/19/2023    IR ANGIOGRAM CELIAC 5/19/2023    US GUIDED ABDOMINAL PARACENTESIS  6/22/2023    US GUIDED ABDOMINAL PARACENTESIS 6/22/2023    US GUIDED ABDOMINAL PARACENTESIS  5/19/2023    US GUIDED ABDOMINAL PARACENTESIS 5/19/2023     Social History  He reports that he has quit smoking. His smoking use included cigarettes. He has never used smokeless tobacco. He reports that he does not currently use alcohol. He reports that he does not use drugs.    Family History  No family history on file.     Allergies  No Known Allergies  Review of Systems  Pre-sedation Evaluation:  ASA Classification - ASA 3 - Patient with moderate systemic disease with functional limitations  Mallampati Score - II (hard and soft palate, upper portion of tonsils and uvula visible)    Physical Exam  Constitutional:       General: He is awake.      Appearance: Normal appearance.   HENT:      Head: Normocephalic and atraumatic.      Nose: Nose normal.      Mouth/Throat:      Mouth: Mucous membranes are moist.   Eyes:      Pupils: Pupils are equal, round, and reactive to light.   Neck:      Thyroid: No thyroid mass.      Trachea: Phonation normal.   Cardiovascular:      Rate and Rhythm: Normal rate and regular rhythm.      Heart sounds: Normal heart sounds. No murmur heard.     No gallop.   Pulmonary:      Effort: Pulmonary effort is normal. No respiratory distress.      Breath sounds: Normal air entry. No decreased breath sounds, wheezing, rhonchi or rales.   Abdominal:      General: Bowel sounds are normal. There is no distension.      Palpations: Abdomen is soft.      Tenderness: There is no  "abdominal tenderness.   Musculoskeletal:      Cervical back: Neck supple.      Right lower leg: No edema.      Left lower leg: No edema.   Skin:     General: Skin is warm.      Capillary Refill: Capillary refill takes less than 2 seconds.   Neurological:      General: No focal deficit present.      Mental Status: He is alert and oriented to person, place, and time. Mental status is at baseline.      Cranial Nerves: Cranial nerves 2-12 are intact.      Motor: Motor function is intact.   Psychiatric:         Attention and Perception: Attention and perception normal.         Mood and Affect: Mood normal.         Speech: Speech normal.         Behavior: Behavior normal.          Last Recorded Vitals  Blood pressure 146/72, pulse 54, temperature 37 °C (98.6 °F), temperature source Temporal, resp. rate 17, height 1.778 m (5' 10\"), weight 120 kg (265 lb), SpO2 98%.     Assessment/Plan   EGD with possible banding of varices.  Mod sedation.  R/b/a discussed.     PTA/Current Medications:  (Not in a hospital admission)    Current Outpatient Medications   Medication Sig Dispense Refill    amLODIPine (Norvasc) 5 mg tablet Take 1 tablet (5 mg) by mouth once daily. 30 tablet 3    carvedilol (Coreg) 6.25 mg tablet Take 1 tablet (6.25 mg) by mouth 2 times a day. 60 tablet 11    furosemide (Lasix) 40 mg tablet Take 1 tablet (40 mg) by mouth 2 times daily (morning and late afternoon). 60 tablet 11    cholecalciferol (Vitamin D-3) 50 MCG (2000 UT) tablet Take 1 tablet (50 mcg) by mouth once daily. 30 tablet 11    lactulose 20 gram/30 mL oral solution Take 30 mL (20 g) by mouth 2 times a day. Take 30mL up to 3 times daily - Titrate to having 3-4 bowel movements daily 5400 mL 3    pantoprazole (ProtoNix) 40 mg EC tablet Take 1 tablet (40 mg) by mouth 2 times a day. 60 tablet 11    rifAXIMin (Xifaxan) 550 mg tablet Take 1 tablet (550 mg) by mouth every 12 hours. 60 tablet 11    sodium bicarbonate 650 mg tablet Take 1 tablet (650 mg) by " mouth 2 times a day. 60 tablet 11     No current facility-administered medications for this encounter.     Cassius Francois MD

## 2024-11-04 ENCOUNTER — HOSPITAL ENCOUNTER (OUTPATIENT)
Dept: RADIOLOGY | Facility: CLINIC | Age: 54
Discharge: HOME | End: 2024-11-04
Payer: MEDICAID

## 2024-11-04 DIAGNOSIS — Z76.82 PRE-LIVER TRANSPLANT, LISTED: ICD-10-CM

## 2024-11-04 DIAGNOSIS — K70.30 ALCOHOLIC CIRRHOSIS OF LIVER WITHOUT ASCITES (MULTI): ICD-10-CM

## 2024-11-04 DIAGNOSIS — N18.4 CKD (CHRONIC KIDNEY DISEASE), STAGE IV (MULTI): ICD-10-CM

## 2024-11-04 PROCEDURE — 93975 VASCULAR STUDY: CPT | Mod: 59

## 2024-11-04 NOTE — ADDENDUM NOTE
Encounter addended by: Franca Lindsey RN on: 11/4/2024 10:40 AM   Actions taken: Contacts section saved, Flowsheet accepted

## 2024-11-05 ENCOUNTER — TELEPHONE (OUTPATIENT)
Dept: TRANSPLANT | Facility: HOSPITAL | Age: 54
End: 2024-11-05
Payer: MEDICAID

## 2024-11-05 PROCEDURE — 80505 PATH CLIN CONSLTJ HIGH 41-60: CPT | Performed by: PATHOLOGY

## 2024-11-05 NOTE — TELEPHONE ENCOUNTER
UNOS ID: [id number]  MATCH ID: [id number]  ORGAN: SLK  KDPI (if applicable): 49%  KNOWN RISK STATUS: Yes  LOCAL VS IMPORT: Import  HCV (if applicable): n/a  HepBcAb (if applicable): n/a    Confirmed patient is accepting of DCD (pt has progressed to BD but lists run as DCD), high risk social and transmedics.    Confirm the following:  Any COVID symptoms (nausea, vomiting, diarrhea, fever, chills, cough, sore throat, headache): YES-DESCRIBE/NO: No  Any contact with anyone positive for or suspected to have COVID: YES-DESCRIBE/NO: No  Any recent hospitalizations: YES-DESCRIBE/NO: No  Any recent illnesses: YES-DESCRIBE/NO: No  Any recent injuries (cracked teeth, broken bones, wounds that won’t heal): YES-DESCRIBE/NO: No  Any antibiotics: YES-DESCRIBE/NO: No  Any blood thinners: YES-DESCRIBE/NO: No  Any blood transfusions: YES-DESCRIBE/NO: No  When was last dialysis/type: YES/NA/NO: No    The last time they ate or drank anythin/5 1100- pt instructed to have a meal now  It is not necessary for the patient to bring anything with them other than a current medication list and insurance information  Determine ETA to hospital: 1 hour  Patient aware of the possibility of a dry-run.      Immediately after I contacted this recipient the OPO contacted me to make me aware that family has rescinded consent.   Mr. Ibrahim was updated as such and assured that this does not change is status/place on the list.

## 2024-11-06 NOTE — ADDENDUM NOTE
Encounter addended by: Cassius Francois MD on: 11/6/2024 3:08 PM   Actions taken: SmartForm saved, Image edited

## 2024-11-08 ENCOUNTER — TELEPHONE (OUTPATIENT)
Dept: TRANSPLANT | Facility: HOSPITAL | Age: 54
End: 2024-11-08

## 2024-11-08 ENCOUNTER — APPOINTMENT (OUTPATIENT)
Dept: PRIMARY CARE | Facility: CLINIC | Age: 54
End: 2024-11-08
Payer: MEDICAID

## 2024-11-08 VITALS
BODY MASS INDEX: 39.47 KG/M2 | SYSTOLIC BLOOD PRESSURE: 123 MMHG | WEIGHT: 275.7 LBS | DIASTOLIC BLOOD PRESSURE: 72 MMHG | OXYGEN SATURATION: 98 % | HEART RATE: 63 BPM | HEIGHT: 70 IN | TEMPERATURE: 97.3 F | RESPIRATION RATE: 20 BRPM

## 2024-11-08 DIAGNOSIS — I10 HYPERTENSION, UNSPECIFIED TYPE: ICD-10-CM

## 2024-11-08 PROCEDURE — 99211 OFF/OP EST MAY X REQ PHY/QHP: CPT | Performed by: STUDENT IN AN ORGANIZED HEALTH CARE EDUCATION/TRAINING PROGRAM

## 2024-11-08 ASSESSMENT — PAIN SCALES - GENERAL: PAINLEVEL_OUTOF10: 0-NO PAIN

## 2024-11-08 NOTE — PROGRESS NOTES
Pt was taken back to room 1 and vitals were taken.  Vitals were:    Blood pressure -   Right arm - 129/76  Left arm - 123/72  Temp - 97.3  Pulse - 63  Resp - 20   O2 - 98  Weight - 275.7lbs  Height - 5'10    Vital signs were given to Dr Rodrigues .  Pt had no questions and check out with .

## 2024-11-11 NOTE — PROGRESS NOTES
Talked with pt and gave Dr Rodrigues's message.  Pt understood and had no questions at this time.

## 2024-11-15 ENCOUNTER — LAB (OUTPATIENT)
Dept: LAB | Facility: LAB | Age: 54
End: 2024-11-15
Payer: MEDICAID

## 2024-11-15 DIAGNOSIS — N18.4 CKD (CHRONIC KIDNEY DISEASE), STAGE IV (MULTI): ICD-10-CM

## 2024-11-15 DIAGNOSIS — K70.31 ALCOHOLIC CIRRHOSIS OF LIVER WITH ASCITES (MULTI): ICD-10-CM

## 2024-11-15 DIAGNOSIS — Z76.82 PRE-LIVER TRANSPLANT, LISTED: ICD-10-CM

## 2024-11-15 DIAGNOSIS — Z76.82 PRE-KIDNEY TRANSPLANT, LISTED: ICD-10-CM

## 2024-11-15 LAB
ALBUMIN SERPL BCP-MCNC: 3.4 G/DL (ref 3.4–5)
ALP SERPL-CCNC: 98 U/L (ref 33–120)
ALT SERPL W P-5'-P-CCNC: 16 U/L (ref 10–52)
ANION GAP SERPL CALC-SCNC: 14 MMOL/L (ref 10–20)
AST SERPL W P-5'-P-CCNC: 37 U/L (ref 9–39)
BASOPHILS # BLD AUTO: 0.06 X10*3/UL (ref 0–0.1)
BASOPHILS NFR BLD AUTO: 1.6 %
BILIRUB SERPL-MCNC: 1.4 MG/DL (ref 0–1.2)
BUN SERPL-MCNC: 48 MG/DL (ref 6–23)
CALCIUM SERPL-MCNC: 8.8 MG/DL (ref 8.6–10.3)
CHLORIDE SERPL-SCNC: 109 MMOL/L (ref 98–107)
CO2 SERPL-SCNC: 20 MMOL/L (ref 21–32)
CREAT SERPL-MCNC: 3.5 MG/DL (ref 0.5–1.3)
EGFRCR SERPLBLD CKD-EPI 2021: 20 ML/MIN/1.73M*2
EOSINOPHIL # BLD AUTO: 0.21 X10*3/UL (ref 0–0.7)
EOSINOPHIL NFR BLD AUTO: 5.6 %
ERYTHROCYTE [DISTWIDTH] IN BLOOD BY AUTOMATED COUNT: 14.5 % (ref 11.5–14.5)
GLUCOSE SERPL-MCNC: 166 MG/DL (ref 74–99)
HCT VFR BLD AUTO: 31.7 % (ref 41–52)
HGB BLD-MCNC: 10.4 G/DL (ref 13.5–17.5)
IMM GRANULOCYTES # BLD AUTO: 0.01 X10*3/UL (ref 0–0.7)
IMM GRANULOCYTES NFR BLD AUTO: 0.3 % (ref 0–0.9)
INR PPP: 1.2 (ref 0.9–1.1)
LYMPHOCYTES # BLD AUTO: 0.84 X10*3/UL (ref 1.2–4.8)
LYMPHOCYTES NFR BLD AUTO: 22.5 %
MCH RBC QN AUTO: 30 PG (ref 26–34)
MCHC RBC AUTO-ENTMCNC: 32.8 G/DL (ref 32–36)
MCV RBC AUTO: 91 FL (ref 80–100)
MONOCYTES # BLD AUTO: 0.35 X10*3/UL (ref 0.1–1)
MONOCYTES NFR BLD AUTO: 9.4 %
NEUTROPHILS # BLD AUTO: 2.26 X10*3/UL (ref 1.2–7.7)
NEUTROPHILS NFR BLD AUTO: 60.6 %
NRBC BLD-RTO: 0 /100 WBCS (ref 0–0)
PLATELET # BLD AUTO: 67 X10*3/UL (ref 150–450)
POTASSIUM SERPL-SCNC: 4.1 MMOL/L (ref 3.5–5.3)
PROT SERPL-MCNC: 7.1 G/DL (ref 6.4–8.2)
PROTHROMBIN TIME: 13.1 SECONDS (ref 9.8–12.8)
RBC # BLD AUTO: 3.47 X10*6/UL (ref 4.5–5.9)
SODIUM SERPL-SCNC: 139 MMOL/L (ref 136–145)
WBC # BLD AUTO: 3.7 X10*3/UL (ref 4.4–11.3)

## 2024-11-15 PROCEDURE — 80053 COMPREHEN METABOLIC PANEL: CPT

## 2024-11-15 PROCEDURE — 85025 COMPLETE CBC W/AUTO DIFF WBC: CPT

## 2024-11-15 PROCEDURE — 36415 COLL VENOUS BLD VENIPUNCTURE: CPT

## 2024-11-15 PROCEDURE — 85610 PROTHROMBIN TIME: CPT

## 2024-11-22 ENCOUNTER — LAB REQUISITION (OUTPATIENT)
Dept: LAB | Facility: CLINIC | Age: 54
End: 2024-11-22
Payer: MEDICAID

## 2024-11-22 DIAGNOSIS — N18.6 END STAGE RENAL DISEASE (MULTI): ICD-10-CM

## 2024-12-03 ENCOUNTER — OFFICE VISIT (OUTPATIENT)
Dept: TRANSPLANT | Facility: HOSPITAL | Age: 54
End: 2024-12-03
Payer: MEDICAID

## 2024-12-03 VITALS
WEIGHT: 274.1 LBS | DIASTOLIC BLOOD PRESSURE: 87 MMHG | OXYGEN SATURATION: 96 % | BODY MASS INDEX: 39.33 KG/M2 | TEMPERATURE: 97.7 F | HEART RATE: 66 BPM | SYSTOLIC BLOOD PRESSURE: 154 MMHG

## 2024-12-03 VITALS
DIASTOLIC BLOOD PRESSURE: 87 MMHG | OXYGEN SATURATION: 96 % | SYSTOLIC BLOOD PRESSURE: 154 MMHG | HEART RATE: 66 BPM | WEIGHT: 274.1 LBS | BODY MASS INDEX: 39.33 KG/M2 | TEMPERATURE: 97.7 F

## 2024-12-03 DIAGNOSIS — K76.82 HEPATIC ENCEPHALOPATHY: Primary | ICD-10-CM

## 2024-12-03 DIAGNOSIS — Z76.82 PRE-LIVER TRANSPLANT, LISTED: ICD-10-CM

## 2024-12-03 DIAGNOSIS — Z76.82 PRE-KIDNEY TRANSPLANT, LISTED: ICD-10-CM

## 2024-12-03 DIAGNOSIS — R60.9 EDEMA, UNSPECIFIED TYPE: ICD-10-CM

## 2024-12-03 DIAGNOSIS — Z71.6 ENCOUNTER FOR SMOKING CESSATION COUNSELING: ICD-10-CM

## 2024-12-03 DIAGNOSIS — I10 HYPERTENSION, UNSPECIFIED TYPE: ICD-10-CM

## 2024-12-03 DIAGNOSIS — N18.4 CKD (CHRONIC KIDNEY DISEASE), STAGE IV (MULTI): ICD-10-CM

## 2024-12-03 DIAGNOSIS — I85.10 ESOPHAGEAL VARICES IN ALCOHOLIC CIRRHOSIS (MULTI): ICD-10-CM

## 2024-12-03 DIAGNOSIS — K70.30 ESOPHAGEAL VARICES IN ALCOHOLIC CIRRHOSIS (MULTI): ICD-10-CM

## 2024-12-03 PROCEDURE — 3079F DIAST BP 80-89 MM HG: CPT | Performed by: INTERNAL MEDICINE

## 2024-12-03 PROCEDURE — 99406 BEHAV CHNG SMOKING 3-10 MIN: CPT | Performed by: INTERNAL MEDICINE

## 2024-12-03 PROCEDURE — 99214 OFFICE O/P EST MOD 30 MIN: CPT | Performed by: INTERNAL MEDICINE

## 2024-12-03 PROCEDURE — 3077F SYST BP >= 140 MM HG: CPT | Performed by: INTERNAL MEDICINE

## 2024-12-03 RX ORDER — PANTOPRAZOLE SODIUM 40 MG/1
40 TABLET, DELAYED RELEASE ORAL
Qty: 30 TABLET | Refills: 11 | Status: SHIPPED | OUTPATIENT
Start: 2024-12-03 | End: 2025-12-03

## 2024-12-03 ASSESSMENT — PAIN SCALES - GENERAL
PAINLEVEL_OUTOF10: 0-NO PAIN
PAINLEVEL_OUTOF10: 0-NO PAIN

## 2024-12-03 NOTE — PROGRESS NOTES
Transplant Hepatology Clinic Note    Follow up appointment for decompensated cirrhosis, pre liver transplant evaluation.      History Of Present Illness  Goran Ibrahim is a 54 y.o. 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 He was  approved and listed for [facility] transplant. He is active for transplant with a MELD 3.0 of 21 as of 11/16/2024.      Interval history:  Had a hospitalization in August -with hepatic encephalopathy and a urinary tract infection.  He was treated with ceftriaxone for Enterococcus faecium UTI.  On my review with the hospital notes, there was discussion of not treating the UTI as that he was felt to be asymptomatic.  However, there is a record of antibiotic administration with ceftriaxone.  He was previously instructed to take lactulose 3 times a day.  During this August hospitalization he was discharged with instructions to stop and discontinue lactulose.      At his office visit in September, he did not return to home after work at his usual time of 5:00 PM.  He was found at 8:00 PM in his truck and he was confused.  EMS was called and he was taken to [facility], and was evaluated in the emergency room.  He was there overnight and discharged this morning at 7 AM.  He was given a lactulose enema, and shortly after his confusion had cleared up.  He was also straight cath with a concern for a recurrent UTI.  He was given a dose of antibiotics, and then completed a course of antibiotics.    He has been doing well. No lower urinary tract symptoms. Denies confusion. Taking his lactulose. Having bowel movements.  I conducted serial 7s and an Animal Naming Test in the office, today.    Last drink in 5/2023. Alcohol biomarkers have been negative.   582 days since his last drink!  1 month without a cigarette.      Initial presentation:  He had a number of hospitalizations at [facility] from May to July 2023. At that time he developed symptoms which he attributed  to a viral gastroenteritis in April 2023.  He started taking Pepto-Bismol.  At that time he noticed that his stools were dark and black.  He assumed that his dark stools were related to Pepto-Bismol use.  On May 18, 2023 is when he first took himself to the hospital at [facility].  He actually drove to the hospital and was confused.  He was pulled over by a  and had difficulty with his speech.  Ultimately, during that index hospitalization he was diagnosed with hepatic encephalopathy secondary to severe GI bleeding.  His GI bleeding was a major issue with recurrent GI bleeding events over days and weeks. He was diagnosed with a duodenal ulcer which tested positive for H. pylori.  He also was diagnosed with esophageal varices.  During his first hospitalization he had also developed ascites and underwent an index paracentesis procedure. At one point he was in the ICU and had required a number of blood transfusions.  He reported he had 3 endoscopy procedures in total and 4 colonoscopy procedures; in addition to multiple scans trying to determine where the GI bleeding was originating from.     Otherwise the details of his medical history is sparse.  The patient is poor historian and has difficulty providing a clear timeline of events.     Following these hospitalizations, he had been seeing gastroenterologist Dr. Dana Geronimo.  He has also been seeing a primary care physician and the nephrologist and was ultimately referred to our Liver Transplant Program.      His past medical history is otherwise notable for hypertension for which she was on BP meds (these have since been stopped). He was previously prediabetic, diet controlled.  He says before getting sick his weight was up to 325 pounds and he was struggling with obesity.     Past surgical history is otherwise notable for right arm fracture.     Family history: No known liver disease in the family.  His mother's probably from lung cancer.  His  father is also  from metastatic lung cancer.     Social history: he reports smoking cigarettes daily, since the age of 13.  He estimates 5 to 10 cigarettes/day for the last 40 years.  He has committed to smoking cessation. His last alcohol use was May of 2023.  He describes weekend drinking 1-2 servings of hard liquor, which would be at the bar.  He denies daily alcohol use. Drinking was mostly on the weekends.  He said in his 20s or 30s his drinking was a little heavier he would have binge type drinking at the bar on  and .  He denies any recreational drug use.  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  He said he is very careful about not drinking and driving.  He has never had a DUI according to my questioning.     Subjective    He has been evaluated by Dr. Floyd (transplant psychology), who raised concerns for cognitive deficits vs poor healthcare literacy. She has recommended cognitive testing, strong social support plan, with the recommendation to bring 1 support person to accompany him to all appointments. He has been following these recommendations. He is engaged in AA meetings. Transplant House on Wednesday mornings; online AA meetings 9PM. His SIPAT was 24, per our SW assessment.     He was evaluated by Cardiologist, Dr. Pepe.  His dobutamine stress test did not meet protocol - submaximal HR. It was negative for inducible ischemia. He then completed a CT Ca2+ cardiac scoring, intermediate risk category:  LM 92.7  LAD 62.94  LCx 7.25  RCA 0.51      Total 163.4    His cardiac testing and evaluation was discussed at our Transplant Selection Committee. Concerns were raised for an incomplete evaluation based on his clinical risk factors, intermediate Ca2+ score and non-diagnostic stress test. He then completed a nuclear medicine stress test.      Had a hospitalization at [facility] for altered mental status and HE, AKUA on CKD, UTI (April  2024).   Had a recent EGD with me in June 2024 - with banding of large EV and gastric biopsies (which confirmed H Pylori eradication).  Repeat EGD 11/1/24.    Reviewed all medications and pill bottles.      ROS:  No black stools, hematochezia. No N/V abdominal pain. No leg swelling. Denies alcohol use.     He has seen [facility] and has had tooth extraction, now edentulous       Review of Systems  Review of Systems   Constitutional: Negative.    HENT:  Positive for sinus pressure.    Respiratory: Negative.     Cardiovascular: Negative.  Negative for leg swelling.   Gastrointestinal: Negative.  Negative for abdominal distention, abdominal pain, blood in stool and nausea.         Medications:     Current Outpatient Medications:     amLODIPine (Norvasc) 5 mg tablet, Take 1 tablet (5 mg) by mouth once daily. (Patient taking differently: Take 2 tablets (10 mg) by mouth once daily.), Disp: 90 tablet, Rfl: 3    furosemide (Lasix) 20 mg tablet, Take 1 tablet (20 mg) by mouth 2 times a day. 12/19/23 On hold due to elevated CR, Disp: , Rfl:     pantoprazole (ProtoNix) 40 mg EC tablet, Take 1 tablet (40 mg) by mouth 2 times a day., Disp: , Rfl:     propranolol (Inderal) 20 mg tablet, Take 1 tablet (20 mg) by mouth 2 times a day., Disp: , Rfl:     sodium bicarbonate 650 mg tablet, TAKE 1 TABLET (650 MG) BY MOUTH DAILY. DO NOT START BEFORE JUNE 4, 2023., Disp: , Rfl:     sucralfate (Carafate) 1 gram tablet, Take 1 tablet (1 g) by mouth 4 times a day. Take before meals, Disp: , Rfl:      Physical Exam  Vitals:    12/03/24 1004   BP: 154/87   Pulse: 66   Temp: 36.5 °C (97.7 °F)   SpO2: 96%       General: well-nourished, no acute distress  HEENT: PERRLA, EOM intact, no scleral icterus, moist MM, edentulous  Respiratory: CTA bilaterally, normal work of breathing  Cardiovascular: RRR, no murmurs/rubs/gallops  Abdomen: Soft, nontender, nondistended  Extremities: + edema, no asterixis  Neuro: alert and oriented, CNII-XII grossly  intact, moves all 4 extremities with no focal deficits     Serial 7's: 86    Animal Naming Test: 14 of 15 in 65 seconds. Last 2 were insects not animals.    Relevant Results       Imaging:     GI Procedures:     7/2023 EGD  Impression:               - Normal upper third of esophagus.                             - Grade I esophageal varices.                             - Esophageal mucosal changes suggestive of                             short-segment Matute's esophagus, classified as                             Matute's stage C0-M2 per [facility] criteria.                             Biopsy is contraindicated.                             - Small hiatal hernia.                             - Non-bleeding duodenal ulcer with no stigmata                             of bleeding. Appears to be healing well, without                             any signs of recent blood loss.                             - Mucosal changes in the duodenum. Biopsied.                             - No blood was present throughout the entire                             exam.   Recommendation:           - Patient has a contact number available for                             emergencies. The signs and symptoms of potential                             delayed complications were discussed with the                             patient. Return to normal activities tomorrow.                             Written discharge instructions were provided to                             the patient.                             - Resume previous diet.                             - Continue present medications.                             - Await pathology results.                             - Use Protonix (pantoprazole) 40 mg PO BID for 2                             months, then 1 daily indefinitely..         6/2023 EGD  Findings:       The esophagus was normal.        The stomach was normal.        One non-bleeding cratered duodenal ulcer with a visible vessel  was        found in the duodenal bulb. The lesion was 30 mm by 30 mm in largest        dimension. Area was successfully injected with 4 mL of a 0.1 mg/mL        solution of epinephrine for hemostasis. Coagulation for hemostasis        using argon plasma at 0.8 liters/minute and 40 tilley was successful.        The exam was otherwise without abnormality.   Impression:               - Normal esophagus.                             - Normal stomach.                             - Non-bleeding duodenal ulcer with a visible                             vessel. Injected. Treated with argon plasma                             coagulation (APC).                             - The examination was otherwise normal.                             - No specimens collected.     EGD 6/7/24    Findings  Irregular Z-line 38 cm from the incisors  Three medium grade II varices (no red tiera sign) in the esophagus; no bleeding was identified; placed 3 bands successfully, resulting in partial eradication  Cobblestone, edematous, erythematous and friable mucosa in the body of the stomach and antrum; performed cold forceps biopsy to rule out H. pylori;  Patchy erythematous and friable mucosa in the duodenal bulb;  The duodenum appeared normal.    EGD 11/1/24    Impression  Irregular Z-line  Matute's esophagus  Multiple small varices in the lower third of the esophagus  Mild and friable portal hypertensive gastropathy in the cardia, fundus of the stomach, body of the stomach and antrum  Abnormal mucosa with erosion in the 1st part of the duodenum  The 2nd part of the duodenum appeared normal.        MELD 3.0: 21 at 11/15/2024  2:25 PM  MELD-Na: 22 at 11/15/2024  2:25 PM  Calculated from:  Serum Creatinine: 3.5 mg/dL (Using max of 3 mg/dL) at 11/15/2024  2:25 PM  Serum Sodium: 139 mmol/L (Using max of 137 mmol/L) at 11/15/2024  2:25 PM  Total Bilirubin: 1.4 mg/dL at 11/15/2024  2:25 PM  Serum Albumin: 3.4 g/dL at 11/15/2024  2:25 PM  INR(ratio):  "1.2 at 11/15/2024  2:25 PM  Age at listin years  Sex: Male at 11/15/2024  2:25 PM      ASSESSMENT/PLAN:     Goran Ibrahim is a satisfactory candidate for liver/kidney transplantation.  He is active on the Transplant Wait List.    -Poor health literacy, very basic understanding of his medical condition and the liver transplant process. Education will need to be reinforced with patient and support.  -Cigarette smoking: We again discussed a cigarette cessation plan today. I was very clear with our program's expectations. He is now about 1 month  without any cigarettes.  5 min of counseling provided.       Decompensated alcoholic cirrhosis  He has had issues with both variceal and non variceal related GI bleeding.    He had an index episode of hepatic encephalopathy which was probably in the context of gastrointestinal blood loss.    He recently had a follow up endoscopy.    He also developed ascites which required paracentesis 1 time in the hospital. He now has advanced chronic kidney disease - likely hypertensive related, given risk factors, imaging and lack of ascites (unlikely HRS).    Other important  GI issues include H. pylori related duodenal ulcer -  repeat EGD with gastric biopsies; completed.     Decompensated alcoholic Cirrhosis  - Ascites: non currently; 1 paracentesis while admitted early   - Volume: continue lasix to 40 mg BID    Has leg swelling.   Stopped/discontinued amlodipine.   He has transitioned from propranolol to Coreg 6.25 mg BID.   BP is elevated today, will encourage ongoing BP monitoring and log.  Need to follow up his BP/HR.    - EV: 2024 EGD with band ligation of varices, repeat in 6m and continue BB therapy.  - HE: NOW WITH TWO EPISODES in the past 2 months. Will need to be taking lactulose regularly. Will continue Rifaximin.     Apparently, according to a prior Transplant Hepatology Clinic note, he was off lactulose, \"Monitor and restart if needed.\"    - UTI - possibly with " another/current infection.  Received a dose of ceftriaxone; prescribing a 7-day course of ciprofloxacin;  will follow-up urine cultures. No recurrent symptoms after treatment.    - Immunizations: HAV and HBV immune  - HCC screening: q6m, with AFP.  - Transplant: active.     HTN  Plan outlined above.      Advanced CKD  -risk factor management, plan for [facility].  Renal impairment slowly progressing.    Matute's Esophagus  -Characterized as C0M2 on 7/3/2023 EGD but this was not biopsied due to presence of Grade I EV  -Repeat EGD in 2024. PPI therapy.     Hpylori Gastritis  -Treated in early 2023  -Needs test of cure, gastric biopsies at next EGD  -H Pylori has been eradicated.     Duodenal ulcer  -Reportedly healing well on 7/2023 EGD  -Pantoprazole 40mg to once daily     Colon cancer screening  -obtain colonoscopy reports from OSH   - 6/22/23 [facility]: Colonoscopy with [place] Score of 2/2/2: one 5 mm inflammatory type polyp in the sigmoid colon.     Tobacco abuse  -see above.    Cassius Francois MD

## 2024-12-03 NOTE — PATIENT INSTRUCTIONS
We will call you when you need to have labs done.  Follow up in 3 months.   Follow up with pcp regularly

## 2024-12-05 RX ORDER — SODIUM BICARBONATE 650 MG/1
650 TABLET ORAL 3 TIMES DAILY
Qty: 90 TABLET | Refills: 11 | Status: SHIPPED | OUTPATIENT
Start: 2024-12-05

## 2024-12-10 ENCOUNTER — DOCUMENTATION (OUTPATIENT)
Dept: TRANSPLANT | Facility: HOSPITAL | Age: 54
End: 2024-12-10
Payer: MEDICAID

## 2024-12-10 DIAGNOSIS — K74.60 CIRRHOSIS OF LIVER WITH ASCITES, UNSPECIFIED HEPATIC CIRRHOSIS TYPE (MULTI): ICD-10-CM

## 2024-12-10 DIAGNOSIS — R18.8 CIRRHOSIS OF LIVER WITH ASCITES, UNSPECIFIED HEPATIC CIRRHOSIS TYPE (MULTI): ICD-10-CM

## 2024-12-10 DIAGNOSIS — Z01.818 ENCOUNTER FOR PRE-TRANSPLANT EVALUATION FOR LIVER TRANSPLANT: ICD-10-CM

## 2024-12-10 DIAGNOSIS — Z76.82 PRE-LIVER TRANSPLANT, LISTED: ICD-10-CM

## 2024-12-10 DIAGNOSIS — Z76.82 PRE-KIDNEY TRANSPLANT, LISTED: ICD-10-CM

## 2024-12-10 NOTE — Clinical Note
Patient has [facility] Medicaid & L/K listing approval was valid 06/11/24-12/06/24.  Patient will need an updated visit with nephrology and all labs, including Hep, HIV, Syph, T-spot Etc.

## 2024-12-10 NOTE — PROGRESS NOTES
Patient has [facility] Medicaid & L/K listing approval was valid 06/11/24-12/06/24.  Patient will need an updated visit with nephrology and all labs, including Hep, HIV, Syph, T-spot Etc.

## 2024-12-10 NOTE — Clinical Note
Emailed the nurses for updated labs and neph note.  Patient was scheduled for neph but cancelled because it was under a year.

## 2024-12-13 ENCOUNTER — TELEPHONE (OUTPATIENT)
Dept: TRANSPLANT | Facility: HOSPITAL | Age: 54
End: 2024-12-13

## 2024-12-13 ENCOUNTER — LAB (OUTPATIENT)
Dept: LAB | Facility: LAB | Age: 54
End: 2024-12-13
Payer: MEDICAID

## 2024-12-13 ENCOUNTER — IMMUNIZATION (OUTPATIENT)
Dept: TRANSPLANT | Facility: HOSPITAL | Age: 54
End: 2024-12-13
Payer: MEDICAID

## 2024-12-13 DIAGNOSIS — Z01.818 ENCOUNTER FOR PRE-TRANSPLANT EVALUATION FOR LIVER TRANSPLANT: ICD-10-CM

## 2024-12-13 DIAGNOSIS — R18.8 CIRRHOSIS OF LIVER WITH ASCITES, UNSPECIFIED HEPATIC CIRRHOSIS TYPE (MULTI): ICD-10-CM

## 2024-12-13 DIAGNOSIS — Z76.82 PRE-KIDNEY TRANSPLANT, LISTED: ICD-10-CM

## 2024-12-13 DIAGNOSIS — Z76.82 PRE-LIVER TRANSPLANT, LISTED: ICD-10-CM

## 2024-12-13 DIAGNOSIS — K74.60 CIRRHOSIS OF LIVER WITH ASCITES, UNSPECIFIED HEPATIC CIRRHOSIS TYPE (MULTI): ICD-10-CM

## 2024-12-13 LAB
AFP SERPL-MCNC: <4 NG/ML (ref 0–9)
ALBUMIN SERPL BCP-MCNC: 3.8 G/DL (ref 3.4–5)
ALP SERPL-CCNC: 133 U/L (ref 33–120)
ALT SERPL W P-5'-P-CCNC: 19 U/L (ref 10–52)
ANION GAP SERPL CALC-SCNC: 14 MMOL/L (ref 10–20)
AST SERPL W P-5'-P-CCNC: 45 U/L (ref 9–39)
BASOPHILS # BLD AUTO: 0.06 X10*3/UL (ref 0–0.1)
BASOPHILS NFR BLD AUTO: 1.3 %
BILIRUB SERPL-MCNC: 1.1 MG/DL (ref 0–1.2)
BUN SERPL-MCNC: 55 MG/DL (ref 6–23)
CALCIUM SERPL-MCNC: 9.1 MG/DL (ref 8.6–10.3)
CHLORIDE SERPL-SCNC: 108 MMOL/L (ref 98–107)
CMV IGG AVIDITY SERPL IA-RTO: NONREACTIVE %
CO2 SERPL-SCNC: 22 MMOL/L (ref 21–32)
CREAT SERPL-MCNC: 3.52 MG/DL (ref 0.5–1.3)
EGFRCR SERPLBLD CKD-EPI 2021: 20 ML/MIN/1.73M*2
EOSINOPHIL # BLD AUTO: 0.12 X10*3/UL (ref 0–0.7)
EOSINOPHIL NFR BLD AUTO: 2.6 %
ERYTHROCYTE [DISTWIDTH] IN BLOOD BY AUTOMATED COUNT: 14.6 % (ref 11.5–14.5)
GLUCOSE SERPL-MCNC: 131 MG/DL (ref 74–99)
HBV CORE IGM SER QL: NONREACTIVE
HBV SURFACE AB SER-ACNC: 23.6 MIU/ML
HBV SURFACE AG SERPL QL IA: NONREACTIVE
HCT VFR BLD AUTO: 33.3 % (ref 41–52)
HCV AB SER QL: NONREACTIVE
HERPES SIMPLEX VIRUS 1 IGG: 5.9 INDEX
HERPES SIMPLEX VIRUS 2 IGG: <0.2 INDEX
HGB BLD-MCNC: 10.7 G/DL (ref 13.5–17.5)
HIV 1+2 AB+HIV1 P24 AG SERPL QL IA: NONREACTIVE
IMM GRANULOCYTES # BLD AUTO: 0.02 X10*3/UL (ref 0–0.7)
IMM GRANULOCYTES NFR BLD AUTO: 0.4 % (ref 0–0.9)
INR PPP: 1.1 (ref 0.9–1.1)
LYMPHOCYTES # BLD AUTO: 0.79 X10*3/UL (ref 1.2–4.8)
LYMPHOCYTES NFR BLD AUTO: 17.3 %
MCH RBC QN AUTO: 29.3 PG (ref 26–34)
MCHC RBC AUTO-ENTMCNC: 32.1 G/DL (ref 32–36)
MCV RBC AUTO: 91 FL (ref 80–100)
MONOCYTES # BLD AUTO: 0.39 X10*3/UL (ref 0.1–1)
MONOCYTES NFR BLD AUTO: 8.5 %
NEUTROPHILS # BLD AUTO: 3.19 X10*3/UL (ref 1.2–7.7)
NEUTROPHILS NFR BLD AUTO: 69.9 %
NRBC BLD-RTO: 0 /100 WBCS (ref 0–0)
PLATELET # BLD AUTO: 71 X10*3/UL (ref 150–450)
POTASSIUM SERPL-SCNC: 4.5 MMOL/L (ref 3.5–5.3)
PROT SERPL-MCNC: 7.6 G/DL (ref 6.4–8.2)
PROTHROMBIN TIME: 12.4 SECONDS (ref 9.8–12.8)
RBC # BLD AUTO: 3.65 X10*6/UL (ref 4.5–5.9)
SODIUM SERPL-SCNC: 139 MMOL/L (ref 136–145)
WBC # BLD AUTO: 4.6 X10*3/UL (ref 4.4–11.3)

## 2024-12-13 PROCEDURE — 87340 HEPATITIS B SURFACE AG IA: CPT

## 2024-12-13 PROCEDURE — 85025 COMPLETE CBC W/AUTO DIFF WBC: CPT

## 2024-12-13 PROCEDURE — 86706 HEP B SURFACE ANTIBODY: CPT

## 2024-12-13 PROCEDURE — 86481 TB AG RESPONSE T-CELL SUSP: CPT

## 2024-12-13 PROCEDURE — 86696 HERPES SIMPLEX TYPE 2 TEST: CPT

## 2024-12-13 PROCEDURE — 86803 HEPATITIS C AB TEST: CPT

## 2024-12-13 PROCEDURE — 36415 COLL VENOUS BLD VENIPUNCTURE: CPT

## 2024-12-13 PROCEDURE — 86644 CMV ANTIBODY: CPT

## 2024-12-13 PROCEDURE — 86705 HEP B CORE ANTIBODY IGM: CPT

## 2024-12-13 PROCEDURE — 86695 HERPES SIMPLEX TYPE 1 TEST: CPT

## 2024-12-13 PROCEDURE — 86645 CMV ANTIBODY IGM: CPT

## 2024-12-13 PROCEDURE — 82105 ALPHA-FETOPROTEIN SERUM: CPT

## 2024-12-13 PROCEDURE — 85610 PROTHROMBIN TIME: CPT

## 2024-12-13 PROCEDURE — 87389 HIV-1 AG W/HIV-1&-2 AB AG IA: CPT

## 2024-12-13 PROCEDURE — 80053 COMPREHEN METABOLIC PANEL: CPT

## 2024-12-13 NOTE — PROGRESS NOTES
VMM left for patient to return call to clarify Hep B vaccination status and where records could be obtained.    Addendum [id number] - pt returned call and said vaccine may have been completed at [facility] or [facility].  Will request records from [facility].    Lab Results   Component Value Date    HEPBSAB 30.8 09/28/2023         There is no immunization history on file for this patient.

## 2024-12-16 ENCOUNTER — DOCUMENTATION (OUTPATIENT)
Dept: TRANSPLANT | Facility: HOSPITAL | Age: 54
End: 2024-12-16
Payer: MEDICAID

## 2024-12-16 LAB
CMV IGM SERPL-ACNC: <8 AU/ML
NIL(NEG) CONTROL SPOT COUNT: NORMAL
PANEL A SPOT COUNT: 0
PANEL B SPOT COUNT: 1
POS CONTROL SPOT COUNT: NORMAL
T-SPOT. TB INTERPRETATION: NEGATIVE

## 2024-12-16 NOTE — PROGRESS NOTES
Faxed clinical  to [facility] at [phone] for updated L/K listing approval.

## 2024-12-16 NOTE — Clinical Note
[facility] [phone] for updated L/K listing approval.

## 2024-12-18 ENCOUNTER — DOCUMENTATION (OUTPATIENT)
Dept: TRANSPLANT | Facility: HOSPITAL | Age: 54
End: 2024-12-18
Payer: MEDICAID

## 2024-12-18 NOTE — PROGRESS NOTES
GEE mailed Pt a gift card gifted to  transplant by O with a letter explaining the gift and purpose of providing it to Pt. GEE provided this SW and GEE Snell's contact information in the letter if Pt has any questions/concerns.      Plan: GEE to remain available.

## 2024-12-19 ENCOUNTER — TELEPHONE (OUTPATIENT)
Facility: HOSPITAL | Age: 54
End: 2024-12-19

## 2024-12-19 ENCOUNTER — IMMUNIZATION (OUTPATIENT)
Dept: TRANSPLANT | Facility: HOSPITAL | Age: 54
End: 2024-12-19
Payer: MEDICAID

## 2024-12-19 NOTE — PROGRESS NOTES
Pt reported being vaccinated at [facility] or [facility]; request sent 12/13 to locate records from [facility] and will update once received.     Lab Results   Component Value Date    HEPBSAB 23.6 (H) 12/13/2024       Immunization History   Administered Date(s) Administered Comments    None   Deferred Date(s) Deferred Comments    Hepatitis B vaccine, 19 yrs and under (RECOMBIVAX, ENGERIX) 12/19/2024 Pt reported being vaccinated at [facility] or [facility]; request sent 12/13 to locate records from [facility] and will update once received.

## 2024-12-19 NOTE — TELEPHONE ENCOUNTER
Attempted to reach patient about needing to update HLA sample.  Left VM requesting updated HLA sample.  Provided Pre Kidney office [phone] for questions.

## 2024-12-24 ENCOUNTER — DOCUMENTATION (OUTPATIENT)
Dept: TRANSPLANT | Facility: HOSPITAL | Age: 54
End: 2024-12-24
Payer: MEDICAID

## 2024-12-31 ENCOUNTER — DOCUMENTATION (OUTPATIENT)
Dept: TRANSPLANT | Facility: HOSPITAL | Age: 54
End: 2024-12-31
Payer: MEDICAID

## 2025-01-03 ENCOUNTER — DOCUMENTATION (OUTPATIENT)
Dept: TRANSPLANT | Facility: HOSPITAL | Age: 55
End: 2025-01-03
Payer: MEDICAID

## 2025-01-03 NOTE — PROGRESS NOTES
Per [facility] system Medicaid termed 12/31/2024. I spoke to the patient and he was not aware. Provided the Medicaid hotline number for patient to check on benefits. Provided [facility] contact number as well. Patient has no insurance coverage as of 01/01/2025.

## 2025-01-05 ENCOUNTER — DOCUMENTATION (OUTPATIENT)
Dept: TRANSPLANT | Facility: HOSPITAL | Age: 55
End: 2025-01-05
Payer: MEDICAID

## 2025-01-05 NOTE — PROGRESS NOTES
Pt was made in active in UNET d/t insurance issues.  Will update when issues rectified.      Successfully updated registration for Goran Ibrahim. No change was made to MELD lab values or recertification deadline.  This candidate is inactive for at least one organ, the following organs are still active: Kidney.    Successfully updated registration for Goran Ibrahim

## 2025-01-08 ENCOUNTER — TELEPHONE (OUTPATIENT)
Facility: HOSPITAL | Age: 55
End: 2025-01-08
Payer: MEDICAID

## 2025-01-08 NOTE — TELEPHONE ENCOUNTER
Called and spoke to Goran to follow up on insurance lapse.  He reports he is back on the insurance.  Will verify with  and told him we may need labs to reactivate once verified. Gerda Toledo RN

## 2025-01-09 ENCOUNTER — DOCUMENTATION (OUTPATIENT)
Dept: TRANSPLANT | Facility: HOSPITAL | Age: 55
End: 2025-01-09

## 2025-01-09 ENCOUNTER — TELEPHONE (OUTPATIENT)
Facility: HOSPITAL | Age: 55
End: 2025-01-09

## 2025-01-09 ENCOUNTER — APPOINTMENT (OUTPATIENT)
Dept: PRIMARY CARE | Facility: CLINIC | Age: 55
End: 2025-01-09
Payer: MEDICAID

## 2025-01-09 VITALS
TEMPERATURE: 97.3 F | BODY MASS INDEX: 38.22 KG/M2 | HEART RATE: 66 BPM | DIASTOLIC BLOOD PRESSURE: 82 MMHG | SYSTOLIC BLOOD PRESSURE: 136 MMHG | RESPIRATION RATE: 18 BRPM | HEIGHT: 70 IN | WEIGHT: 267 LBS | OXYGEN SATURATION: 98 %

## 2025-01-09 DIAGNOSIS — I10 PRIMARY HYPERTENSION: Primary | ICD-10-CM

## 2025-01-09 DIAGNOSIS — N18.4 STAGE 4 CHRONIC KIDNEY DISEASE (MULTI): ICD-10-CM

## 2025-01-09 DIAGNOSIS — E78.5 DYSLIPIDEMIA: ICD-10-CM

## 2025-01-09 DIAGNOSIS — K70.30 ALCOHOLIC CIRRHOSIS, UNSPECIFIED WHETHER ASCITES PRESENT (MULTI): ICD-10-CM

## 2025-01-09 PROCEDURE — 99213 OFFICE O/P EST LOW 20 MIN: CPT | Performed by: STUDENT IN AN ORGANIZED HEALTH CARE EDUCATION/TRAINING PROGRAM

## 2025-01-09 PROCEDURE — 3079F DIAST BP 80-89 MM HG: CPT | Performed by: STUDENT IN AN ORGANIZED HEALTH CARE EDUCATION/TRAINING PROGRAM

## 2025-01-09 PROCEDURE — 1036F TOBACCO NON-USER: CPT | Performed by: STUDENT IN AN ORGANIZED HEALTH CARE EDUCATION/TRAINING PROGRAM

## 2025-01-09 PROCEDURE — 3075F SYST BP GE 130 - 139MM HG: CPT | Performed by: STUDENT IN AN ORGANIZED HEALTH CARE EDUCATION/TRAINING PROGRAM

## 2025-01-09 PROCEDURE — 3008F BODY MASS INDEX DOCD: CPT | Performed by: STUDENT IN AN ORGANIZED HEALTH CARE EDUCATION/TRAINING PROGRAM

## 2025-01-09 RX ORDER — AMLODIPINE BESYLATE 5 MG/1
5 TABLET ORAL DAILY
Qty: 90 TABLET | Refills: 1 | Status: SHIPPED | OUTPATIENT
Start: 2025-01-09 | End: 2025-07-08

## 2025-01-09 SDOH — ECONOMIC STABILITY: FOOD INSECURITY: WITHIN THE PAST 12 MONTHS, THE FOOD YOU BOUGHT JUST DIDN'T LAST AND YOU DIDN'T HAVE MONEY TO GET MORE.: NEVER TRUE

## 2025-01-09 SDOH — ECONOMIC STABILITY: FOOD INSECURITY: WITHIN THE PAST 12 MONTHS, YOU WORRIED THAT YOUR FOOD WOULD RUN OUT BEFORE YOU GOT MONEY TO BUY MORE.: NEVER TRUE

## 2025-01-09 ASSESSMENT — LIFESTYLE VARIABLES
HOW MANY STANDARD DRINKS CONTAINING ALCOHOL DO YOU HAVE ON A TYPICAL DAY: PATIENT DOES NOT DRINK
SKIP TO QUESTIONS 9-10: 1
HOW OFTEN DO YOU HAVE SIX OR MORE DRINKS ON ONE OCCASION: NEVER
HOW OFTEN DO YOU HAVE A DRINK CONTAINING ALCOHOL: NEVER
AUDIT-C TOTAL SCORE: 0

## 2025-01-09 ASSESSMENT — ENCOUNTER SYMPTOMS
CONSTIPATION: 0
COUGH: 0
WHEEZING: 0
MUSCULOSKELETAL NEGATIVE: 1
UNEXPECTED WEIGHT CHANGE: 0
ABDOMINAL PAIN: 0
DIZZINESS: 0
NAUSEA: 0
SHORTNESS OF BREATH: 0
DIARRHEA: 0
VOMITING: 0
HEADACHES: 0
FEVER: 0
CONFUSION: 0
FATIGUE: 0
COLOR CHANGE: 0
CHILLS: 0
PALPITATIONS: 0

## 2025-01-09 ASSESSMENT — PAIN SCALES - GENERAL: PAINLEVEL_OUTOF10: 5

## 2025-01-09 ASSESSMENT — PATIENT HEALTH QUESTIONNAIRE - PHQ9
SUM OF ALL RESPONSES TO PHQ9 QUESTIONS 1 & 2: 2
1. LITTLE INTEREST OR PLEASURE IN DOING THINGS: SEVERAL DAYS
2. FEELING DOWN, DEPRESSED OR HOPELESS: SEVERAL DAYS
10. IF YOU CHECKED OFF ANY PROBLEMS, HOW DIFFICULT HAVE THESE PROBLEMS MADE IT FOR YOU TO DO YOUR WORK, TAKE CARE OF THINGS AT HOME, OR GET ALONG WITH OTHER PEOPLE: SOMEWHAT DIFFICULT

## 2025-01-09 NOTE — PROGRESS NOTES
"Subjective   Patient ID: Goran Ibrahim is a 54 y.o. male who presents for Follow-up (2 month follow-up. Asking for Rx for disability placard for mobility issues.) and Hypertension.    HPI   He is for follow up. Pt has h/o decompensated alcoholic liver cirrhosis and CKD stage 4. He is following with hepatologist/Dr. Francois & renal transplant team. Reports he is on the transplant list, following closely with transplant team at [facility].   His IO /82; taking amlodipine 5 mg (added at LOV) & coreg 6.25 mg bid and diuretics per emr; later 2 from GI.   Also he is req for handicap placard d/t difficulty walking from advance liver cirrhosis.     Review of Systems   Constitutional:  Negative for chills, fatigue, fever and unexpected weight change.   HENT: Negative.     Respiratory:  Negative for cough, shortness of breath and wheezing.    Cardiovascular:  Negative for chest pain, palpitations and leg swelling.   Gastrointestinal:  Negative for abdominal pain, constipation, diarrhea, nausea and vomiting.   Musculoskeletal: Negative.    Skin:  Negative for color change and rash.   Neurological:  Negative for dizziness and headaches.   Psychiatric/Behavioral:  Negative for behavioral problems and confusion.        Objective   /82 (BP Location: Left arm, Patient Position: Sitting, BP Cuff Size: Large adult)   Pulse 66   Temp 36.3 °C (97.3 °F)   Resp 18   Ht 1.778 m (5' 10\")   Wt 121 kg (267 lb)   SpO2 98%   BMI 38.31 kg/m²     Physical Exam  Vitals and nursing note reviewed.   Constitutional:       Appearance: Normal appearance. He is obese.   Cardiovascular:      Rate and Rhythm: Normal rate and regular rhythm.      Pulses: Normal pulses.      Heart sounds: Normal heart sounds.   Pulmonary:      Effort: Pulmonary effort is normal.      Breath sounds: Normal breath sounds.   Abdominal:      General: Abdomen is flat. Bowel sounds are normal. There is distension.      Palpations: Abdomen is soft.      Tenderness: " There is no abdominal tenderness. There is no right CVA tenderness, left CVA tenderness, guarding or rebound.   Musculoskeletal:         General: Normal range of motion.   Neurological:      General: No focal deficit present.      Mental Status: He is alert. Mental status is at baseline.   Psychiatric:         Mood and Affect: Mood normal.         Behavior: Behavior normal.         Assessment/Plan   He is for follow up. Pt has h/o decompensated alcoholic liver cirrhosis and CKD stage 4. He is following with hepatologist/Dr. Fracnois & renal transplant team. Reports he is on the transplant list, following closely with transplant team at [facility].   His BP improved & close to goal; cont same: amlodipine 5 mg, coreg 6.25 mg bid and diuretics per emr (later 2 from GI). Follow DASH diet and work on optimizing weight.   He is having difficulty walking long distances from advance liver cirrhosis, will provide handicap placard as requested. He is otherwise clinically & vitally stable.   Problem List Items Addressed This Visit             ICD-10-CM    Hypertension - Primary I10    Relevant Medications    amLODIPine (Norvasc) 5 mg tablet    Liver cirrhosis (Multi) K74.60    Relevant Orders    Disability Placard     Other Visit Diagnoses         Codes    Stage 4 chronic kidney disease (Multi)     N18.4    Relevant Orders    Disability Placard    Dyslipidemia     E78.5    Relevant Orders    Lipid Panel          Rtc 5-6 mo for HM/FU     Seun Rodrigues MD    [facility]

## 2025-01-09 NOTE — TELEPHONE ENCOUNTER
CALLED PT ABOUT HLA LABS, PT SAID THEY HAD NO CLUE THAT HLA LABS WERE DUE EVERY MONTH AND HE ONLY DID THEM WHEN HE GOT A PHONE CALL FROM HIS COORDINATOR TO GET THEM DONE. I INFORMED PT THAT HLA LABS HAVE TO BE DONE EVERY MONTH. PLEASE GIVE PT A CALL ABOUT NEARBY  LAB FACILITIES TO TURN IN BLOOD WORK, ALSO TO INFORM MORE ON HOW HLA LABS WORK

## 2025-01-10 ENCOUNTER — TELEPHONE (OUTPATIENT)
Facility: HOSPITAL | Age: 55
End: 2025-01-10
Payer: MEDICAID

## 2025-01-10 NOTE — TELEPHONE ENCOUNTER
Called Goran, let him know that insurance has been verified by  so we activate him on the waitlist.  He will have labs done.  Let him know that HLA is needed every 3 months for him and due this month. Gerda Toledo RN

## 2025-01-13 ENCOUNTER — TELEPHONE (OUTPATIENT)
Facility: HOSPITAL | Age: 55
End: 2025-01-13

## 2025-01-13 ENCOUNTER — LAB (OUTPATIENT)
Dept: LAB | Facility: LAB | Age: 55
End: 2025-01-13
Payer: MEDICAID

## 2025-01-13 DIAGNOSIS — Z01.818 ENCOUNTER FOR PRE-TRANSPLANT EVALUATION FOR KIDNEY TRANSPLANT: ICD-10-CM

## 2025-01-13 DIAGNOSIS — Z76.82 PRE-KIDNEY TRANSPLANT, LISTED: ICD-10-CM

## 2025-01-13 DIAGNOSIS — Z76.82 PRE-LIVER TRANSPLANT, LISTED: ICD-10-CM

## 2025-01-13 DIAGNOSIS — N18.4 CKD (CHRONIC KIDNEY DISEASE), STAGE IV (MULTI): ICD-10-CM

## 2025-01-13 DIAGNOSIS — Z01.818 ENCOUNTER FOR PRE-TRANSPLANT EVALUATION FOR LIVER TRANSPLANT: ICD-10-CM

## 2025-01-13 DIAGNOSIS — K70.31 ALCOHOLIC CIRRHOSIS OF LIVER WITH ASCITES (MULTI): ICD-10-CM

## 2025-01-13 DIAGNOSIS — E78.5 DYSLIPIDEMIA: ICD-10-CM

## 2025-01-13 LAB
ALBUMIN SERPL BCP-MCNC: 3.7 G/DL (ref 3.4–5)
ALP SERPL-CCNC: 100 U/L (ref 33–120)
ALT SERPL W P-5'-P-CCNC: 15 U/L (ref 10–52)
ANION GAP SERPL CALC-SCNC: 14 MMOL/L (ref 10–20)
AST SERPL W P-5'-P-CCNC: 33 U/L (ref 9–39)
BASOPHILS # BLD AUTO: 0.07 X10*3/UL (ref 0–0.1)
BASOPHILS NFR BLD AUTO: 1.3 %
BILIRUB SERPL-MCNC: 1.5 MG/DL (ref 0–1.2)
BUN SERPL-MCNC: 56 MG/DL (ref 6–23)
CALCIUM SERPL-MCNC: 9.1 MG/DL (ref 8.6–10.3)
CHLORIDE SERPL-SCNC: 110 MMOL/L (ref 98–107)
CHOLEST SERPL-MCNC: 150 MG/DL (ref 0–199)
CHOLESTEROL/HDL RATIO: 6.5
CO2 SERPL-SCNC: 21 MMOL/L (ref 21–32)
CREAT SERPL-MCNC: 3.79 MG/DL (ref 0.5–1.3)
EGFRCR SERPLBLD CKD-EPI 2021: 18 ML/MIN/1.73M*2
EOSINOPHIL # BLD AUTO: 0.18 X10*3/UL (ref 0–0.7)
EOSINOPHIL NFR BLD AUTO: 3.4 %
ERYTHROCYTE [DISTWIDTH] IN BLOOD BY AUTOMATED COUNT: 15 % (ref 11.5–14.5)
GLUCOSE SERPL-MCNC: 111 MG/DL (ref 74–99)
HCT VFR BLD AUTO: 34.4 % (ref 41–52)
HDLC SERPL-MCNC: 23.1 MG/DL
HGB BLD-MCNC: 11 G/DL (ref 13.5–17.5)
IMM GRANULOCYTES # BLD AUTO: 0.01 X10*3/UL (ref 0–0.7)
IMM GRANULOCYTES NFR BLD AUTO: 0.2 % (ref 0–0.9)
INR PPP: 1.2 (ref 0.9–1.1)
LDLC SERPL CALC-MCNC: 96 MG/DL
LYMPHOCYTES # BLD AUTO: 1.15 X10*3/UL (ref 1.2–4.8)
LYMPHOCYTES NFR BLD AUTO: 21.5 %
MCH RBC QN AUTO: 29.3 PG (ref 26–34)
MCHC RBC AUTO-ENTMCNC: 32 G/DL (ref 32–36)
MCV RBC AUTO: 92 FL (ref 80–100)
MONOCYTES # BLD AUTO: 0.42 X10*3/UL (ref 0.1–1)
MONOCYTES NFR BLD AUTO: 7.9 %
NEUTROPHILS # BLD AUTO: 3.52 X10*3/UL (ref 1.2–7.7)
NEUTROPHILS NFR BLD AUTO: 65.7 %
NON HDL CHOLESTEROL: 127 MG/DL (ref 0–149)
NRBC BLD-RTO: 0 /100 WBCS (ref 0–0)
PLATELET # BLD AUTO: 76 X10*3/UL (ref 150–450)
POTASSIUM SERPL-SCNC: 4.2 MMOL/L (ref 3.5–5.3)
PROT SERPL-MCNC: 7.2 G/DL (ref 6.4–8.2)
PROTHROMBIN TIME: 13 SECONDS (ref 9.8–12.8)
RBC # BLD AUTO: 3.75 X10*6/UL (ref 4.5–5.9)
SODIUM SERPL-SCNC: 141 MMOL/L (ref 136–145)
TRIGL SERPL-MCNC: 157 MG/DL (ref 0–149)
VLDL: 31 MG/DL (ref 0–40)
WBC # BLD AUTO: 5.4 X10*3/UL (ref 4.4–11.3)

## 2025-01-13 PROCEDURE — 85610 PROTHROMBIN TIME: CPT

## 2025-01-13 PROCEDURE — 85025 COMPLETE CBC W/AUTO DIFF WBC: CPT

## 2025-01-13 PROCEDURE — 80053 COMPREHEN METABOLIC PANEL: CPT

## 2025-01-13 PROCEDURE — 80061 LIPID PANEL: CPT

## 2025-01-14 NOTE — TELEPHONE ENCOUNTER
Left message for Goran that he is reactivated on the liver and kidney waitlist as of today. MELD 22. Gerda Toledo RN

## 2025-01-19 ENCOUNTER — TELEPHONE (OUTPATIENT)
Dept: TRANSPLANT | Facility: HOSPITAL | Age: 55
End: 2025-01-19
Payer: MEDICAID

## 2025-01-19 PROCEDURE — 81382 HLA II TYPING 1 LOC HR: CPT | Mod: 59,OUT | Performed by: INTERNAL MEDICINE

## 2025-01-19 NOTE — TELEPHONE ENCOUNTER
UNOS ID: [id number]  Match ID: [id number]  Organ: liver  KDPI: 73.0  Known risk status: None  Local vs Import: local  HCV: Ab: Anti-HCV: Negative; HEATH: HCV HAETH: Negative  HepBcAb: Anti-HBc: Negative    Confirm the following:  Any COVID symptoms (nausea, vomiting, diarrhea, fever, chills, cough, sore throat, headache): YES-DESCRIBE/NO: No  Any contact with anyone positive for or suspected to have COVID: YES-DESCRIBE/NO: No  Any recent hospitalizations: YES-DESCRIBE/NO: No  Any recent illnesses: YES-DESCRIBE/NO: No  Any recent injuries (cracked teeth, broken bones, wounds that won’t heal): YES-DESCRIBE/NO: No  Any antibiotics: YES-DESCRIBE/NO: No  Any blood thinners: YES-DESCRIBE/NO: No  Any blood transfusions: YES-DESCRIBE/NO: No  When was last dialysis/type: YES/NA/NO: No    The last time they ate or drank anything:   Ask the surgeon whether or not the patient should be made NPO at this time.  It is not necessary for the patient to bring anything with them other than a current medication list and insurance information  Determine ETA to hospital: patient lives 35 minutes away from hospital  Patient aware of the possibility of a dry-run.

## 2025-01-20 ENCOUNTER — HOSPITAL ENCOUNTER (OUTPATIENT)
Dept: OPERATING ROOM | Facility: HOSPITAL | Age: 55
Discharge: HOME | End: 2025-01-20

## 2025-01-20 ENCOUNTER — APPOINTMENT (OUTPATIENT)
Dept: RADIOLOGY | Facility: HOSPITAL | Age: 55
End: 2025-01-20
Payer: MEDICAID

## 2025-01-20 ENCOUNTER — TELEPHONE (OUTPATIENT)
Dept: TRANSPLANT | Facility: HOSPITAL | Age: 55
End: 2025-01-20
Payer: MEDICAID

## 2025-01-20 ENCOUNTER — HOSPITAL ENCOUNTER (INPATIENT)
Facility: HOSPITAL | Age: 55
End: 2025-01-20
Attending: STUDENT IN AN ORGANIZED HEALTH CARE EDUCATION/TRAINING PROGRAM | Admitting: STUDENT IN AN ORGANIZED HEALTH CARE EDUCATION/TRAINING PROGRAM
Payer: MEDICAID

## 2025-01-20 ENCOUNTER — DOCUMENTATION (OUTPATIENT)
Dept: TRANSPLANT | Facility: HOSPITAL | Age: 55
End: 2025-01-20

## 2025-01-20 ENCOUNTER — ANESTHESIA EVENT (OUTPATIENT)
Dept: OPERATING ROOM | Facility: HOSPITAL | Age: 55
End: 2025-01-20
Payer: MEDICAID

## 2025-01-20 ENCOUNTER — TELEPHONE (OUTPATIENT)
Dept: TRANSPLANT | Facility: HOSPITAL | Age: 55
End: 2025-01-20

## 2025-01-20 ENCOUNTER — HOSPITAL ENCOUNTER (OUTPATIENT)
Facility: HOSPITAL | Age: 55
Setting detail: SURGERY ADMIT
End: 2025-01-20
Attending: STUDENT IN AN ORGANIZED HEALTH CARE EDUCATION/TRAINING PROGRAM | Admitting: STUDENT IN AN ORGANIZED HEALTH CARE EDUCATION/TRAINING PROGRAM
Payer: MEDICAID

## 2025-01-20 ENCOUNTER — ANESTHESIA (OUTPATIENT)
Dept: OPERATING ROOM | Facility: HOSPITAL | Age: 55
End: 2025-01-20
Payer: MEDICAID

## 2025-01-20 ENCOUNTER — APPOINTMENT (OUTPATIENT)
Dept: CARDIOLOGY | Facility: HOSPITAL | Age: 55
End: 2025-01-20
Payer: MEDICAID

## 2025-01-20 DIAGNOSIS — N18.6 ESRD (END STAGE RENAL DISEASE) (MULTI): ICD-10-CM

## 2025-01-20 DIAGNOSIS — I10 HYPERTENSION, UNSPECIFIED TYPE: ICD-10-CM

## 2025-01-20 DIAGNOSIS — K59.03 CONSTIPATION DUE TO OPIOID THERAPY: ICD-10-CM

## 2025-01-20 DIAGNOSIS — K70.30 ALCOHOLIC CIRRHOSIS, UNSPECIFIED WHETHER ASCITES PRESENT (MULTI): ICD-10-CM

## 2025-01-20 DIAGNOSIS — Z76.82 PRE-KIDNEY TRANSPLANT, LISTED: ICD-10-CM

## 2025-01-20 DIAGNOSIS — T40.2X5A CONSTIPATION DUE TO OPIOID THERAPY: ICD-10-CM

## 2025-01-20 DIAGNOSIS — K74.60 HEPATIC CIRRHOSIS, UNSPECIFIED HEPATIC CIRRHOSIS TYPE, UNSPECIFIED WHETHER ASCITES PRESENT (MULTI): ICD-10-CM

## 2025-01-20 DIAGNOSIS — E11.9 TYPE 2 DIABETES MELLITUS WITHOUT COMPLICATION, UNSPECIFIED WHETHER LONG TERM INSULIN USE (MULTI): ICD-10-CM

## 2025-01-20 DIAGNOSIS — Z94.4 LIVER TRANSPLANT RECIPIENT (MULTI): ICD-10-CM

## 2025-01-20 DIAGNOSIS — G89.18 POST-OPERATIVE PAIN: ICD-10-CM

## 2025-01-20 DIAGNOSIS — K70.30 ESOPHAGEAL VARICES IN ALCOHOLIC CIRRHOSIS (MULTI): ICD-10-CM

## 2025-01-20 DIAGNOSIS — R73.9 STEROID-INDUCED HYPERGLYCEMIA: Primary | ICD-10-CM

## 2025-01-20 DIAGNOSIS — Z94.0 KIDNEY REPLACED BY TRANSPLANT (HHS-HCC): ICD-10-CM

## 2025-01-20 DIAGNOSIS — E09.9: ICD-10-CM

## 2025-01-20 DIAGNOSIS — I85.10 ESOPHAGEAL VARICES IN ALCOHOLIC CIRRHOSIS (MULTI): ICD-10-CM

## 2025-01-20 DIAGNOSIS — T38.0X5A STEROID-INDUCED HYPERGLYCEMIA: Primary | ICD-10-CM

## 2025-01-20 DIAGNOSIS — Z94.4 LIVER REPLACED BY TRANSPLANT (MULTI): ICD-10-CM

## 2025-01-20 DIAGNOSIS — Z76.82 PRE-LIVER TRANSPLANT, LISTED: ICD-10-CM

## 2025-01-20 DIAGNOSIS — I42.9 CARDIOMYOPATHY, UNSPECIFIED: ICD-10-CM

## 2025-01-20 LAB
ABO GROUP (TYPE) IN BLOOD: NORMAL
ALBUMIN SERPL BCP-MCNC: 2.9 G/DL (ref 3.4–5)
ALBUMIN SERPL BCP-MCNC: 2.9 G/DL (ref 3.4–5)
ALBUMIN SERPL BCP-MCNC: 3.3 G/DL (ref 3.4–5)
ALBUMIN SERPL BCP-MCNC: 3.5 G/DL (ref 3.4–5)
ALBUMIN SERPL BCP-MCNC: 3.5 G/DL (ref 3.4–5)
ALP SERPL-CCNC: 118 U/L (ref 33–120)
ALP SERPL-CCNC: 89 U/L (ref 33–120)
ALT SERPL W P-5'-P-CCNC: 1653 U/L (ref 10–52)
ALT SERPL W P-5'-P-CCNC: 20 U/L (ref 10–52)
ANION GAP BLDA CALCULATED.4IONS-SCNC: 10 MMO/L (ref 10–25)
ANION GAP BLDA CALCULATED.4IONS-SCNC: 11 MMO/L (ref 10–25)
ANION GAP BLDA CALCULATED.4IONS-SCNC: 12 MMO/L (ref 10–25)
ANION GAP BLDA CALCULATED.4IONS-SCNC: 12 MMO/L (ref 10–25)
ANION GAP BLDA CALCULATED.4IONS-SCNC: 13 MMO/L (ref 10–25)
ANION GAP BLDA CALCULATED.4IONS-SCNC: 13 MMO/L (ref 10–25)
ANION GAP BLDA CALCULATED.4IONS-SCNC: 7 MMO/L (ref 10–25)
ANION GAP BLDA CALCULATED.4IONS-SCNC: 9 MMO/L (ref 10–25)
ANION GAP SERPL CALC-SCNC: 11 MMOL/L (ref 10–20)
ANION GAP SERPL CALC-SCNC: 12 MMOL/L (ref 10–20)
ANION GAP SERPL CALC-SCNC: 13 MMOL/L (ref 10–20)
ANION GAP SERPL CALC-SCNC: 14 MMOL/L (ref 10–20)
ANTIBODY SCREEN: NORMAL
APTT PPP: 33 SECONDS (ref 27–38)
APTT PPP: 34 SECONDS (ref 27–38)
APTT PPP: 34 SECONDS (ref 27–38)
APTT PPP: 42 SECONDS (ref 27–38)
APTT PPP: 57 SECONDS (ref 27–38)
AST SERPL W P-5'-P-CCNC: 3250 U/L (ref 9–39)
AST SERPL W P-5'-P-CCNC: 41 U/L (ref 9–39)
ATRIAL RATE: 67 BPM
BASE EXCESS BLDA CALC-SCNC: -1.7 MMOL/L (ref -2–3)
BASE EXCESS BLDA CALC-SCNC: -2.6 MMOL/L (ref -2–3)
BASE EXCESS BLDA CALC-SCNC: -3.7 MMOL/L (ref -2–3)
BASE EXCESS BLDA CALC-SCNC: -4.2 MMOL/L (ref -2–3)
BASE EXCESS BLDA CALC-SCNC: -5.1 MMOL/L (ref -2–3)
BASE EXCESS BLDA CALC-SCNC: -5.5 MMOL/L (ref -2–3)
BASE EXCESS BLDA CALC-SCNC: -5.6 MMOL/L (ref -2–3)
BASE EXCESS BLDA CALC-SCNC: -6.2 MMOL/L (ref -2–3)
BASE EXCESS BLDA CALC-SCNC: -6.2 MMOL/L (ref -2–3)
BASE EXCESS BLDA CALC-SCNC: -6.7 MMOL/L (ref -2–3)
BASE EXCESS BLDA CALC-SCNC: -7.1 MMOL/L (ref -2–3)
BASE EXCESS BLDA CALC-SCNC: 0 MMOL/L (ref -2–3)
BASE EXCESS BLDA CALC-SCNC: 0.2 MMOL/L (ref -2–3)
BASE EXCESS BLDA CALC-SCNC: 0.4 MMOL/L (ref -2–3)
BASE EXCESS BLDA CALC-SCNC: 0.8 MMOL/L (ref -2–3)
BASE EXCESS BLDA CALC-SCNC: 0.9 MMOL/L (ref -2–3)
BASE EXCESS BLDA CALC-SCNC: 0.9 MMOL/L (ref -2–3)
BASE EXCESS BLDA CALC-SCNC: 2.6 MMOL/L (ref -2–3)
BASE EXCESS BLDA CALC-SCNC: 3 MMOL/L (ref -2–3)
BASOPHILS # BLD AUTO: 0.05 X10*3/UL (ref 0–0.1)
BASOPHILS NFR BLD AUTO: 1.3 %
BILIRUB DIRECT SERPL-MCNC: 0.3 MG/DL (ref 0–0.3)
BILIRUB DIRECT SERPL-MCNC: 0.9 MG/DL (ref 0–0.3)
BILIRUB SERPL-MCNC: 1.2 MG/DL (ref 0–1.2)
BILIRUB SERPL-MCNC: 2.2 MG/DL (ref 0–1.2)
BLOOD EXPIRATION DATE: NORMAL
BODY TEMPERATURE: 37 DEGREES CELSIUS
BUN SERPL-MCNC: 36 MG/DL (ref 6–23)
BUN SERPL-MCNC: 36 MG/DL (ref 6–23)
BUN SERPL-MCNC: 37 MG/DL (ref 6–23)
BUN SERPL-MCNC: 50 MG/DL (ref 6–23)
CA-I BLD-SCNC: 1.18 MMOL/L (ref 1.1–1.33)
CA-I BLDA-SCNC: 0.89 MMOL/L (ref 1.1–1.33)
CA-I BLDA-SCNC: 0.97 MMOL/L (ref 1.1–1.33)
CA-I BLDA-SCNC: 1.02 MMOL/L (ref 1.1–1.33)
CA-I BLDA-SCNC: 1.05 MMOL/L (ref 1.1–1.33)
CA-I BLDA-SCNC: 1.09 MMOL/L (ref 1.1–1.33)
CA-I BLDA-SCNC: 1.11 MMOL/L (ref 1.1–1.33)
CA-I BLDA-SCNC: 1.16 MMOL/L (ref 1.1–1.33)
CA-I BLDA-SCNC: 1.16 MMOL/L (ref 1.1–1.33)
CA-I BLDA-SCNC: 1.17 MMOL/L (ref 1.1–1.33)
CA-I BLDA-SCNC: 1.18 MMOL/L (ref 1.1–1.33)
CA-I BLDA-SCNC: 1.19 MMOL/L (ref 1.1–1.33)
CA-I BLDA-SCNC: 1.21 MMOL/L (ref 1.1–1.33)
CA-I BLDA-SCNC: 1.22 MMOL/L (ref 1.1–1.33)
CA-I BLDA-SCNC: 1.24 MMOL/L (ref 1.1–1.33)
CA-I BLDA-SCNC: 1.26 MMOL/L (ref 1.1–1.33)
CA-I BLDA-SCNC: 1.28 MMOL/L (ref 1.1–1.33)
CA-I BLDA-SCNC: 1.31 MMOL/L (ref 1.1–1.33)
CA-I BLDA-SCNC: 1.32 MMOL/L (ref 1.1–1.33)
CALCIUM SERPL-MCNC: 8.9 MG/DL (ref 8.6–10.6)
CALCIUM SERPL-MCNC: 8.9 MG/DL (ref 8.6–10.6)
CALCIUM SERPL-MCNC: 9.2 MG/DL (ref 8.6–10.6)
CALCIUM SERPL-MCNC: 9.8 MG/DL (ref 8.6–10.6)
CFT FORM KAOLIN IND BLD RES TEG: 2.4 MIN (ref 0.8–2.1)
CHLORIDE BLDA-SCNC: 107 MMOL/L (ref 98–107)
CHLORIDE BLDA-SCNC: 109 MMOL/L (ref 98–107)
CHLORIDE BLDA-SCNC: 110 MMOL/L (ref 98–107)
CHLORIDE BLDA-SCNC: 111 MMOL/L (ref 98–107)
CHLORIDE BLDA-SCNC: 111 MMOL/L (ref 98–107)
CHLORIDE BLDA-SCNC: 112 MMOL/L (ref 98–107)
CHLORIDE BLDA-SCNC: 113 MMOL/L (ref 98–107)
CHLORIDE BLDA-SCNC: 114 MMOL/L (ref 98–107)
CHLORIDE BLDA-SCNC: 115 MMOL/L (ref 98–107)
CHLORIDE SERPL-SCNC: 107 MMOL/L (ref 98–107)
CHLORIDE SERPL-SCNC: 107 MMOL/L (ref 98–107)
CHLORIDE SERPL-SCNC: 110 MMOL/L (ref 98–107)
CHLORIDE SERPL-SCNC: 110 MMOL/L (ref 98–107)
CLOT ANGLE.KAOLIN INDUCED BLD RES TEG: 59 DEG (ref 63–78)
CLOT INIT KAO IND P HEP NEUT BLD RES TEG: 13.4 MIN (ref 4.6–9.1)
CLOT INIT KAO IND P HEP NEUT BLD RES TEG: 9.6 MIN (ref 4.3–8.3)
CMV IGG AVIDITY SERPL IA-RTO: NONREACTIVE %
CO2 SERPL-SCNC: 19 MMOL/L (ref 21–32)
CO2 SERPL-SCNC: 26 MMOL/L (ref 21–32)
CO2 SERPL-SCNC: 30 MMOL/L (ref 21–32)
CO2 SERPL-SCNC: 31 MMOL/L (ref 21–32)
COHGB MFR BLDA: 1.1 %
COHGB MFR BLDA: 1.3 %
COHGB MFR BLDA: 1.3 %
COHGB MFR BLDA: 1.6 %
COHGB MFR BLDA: 1.6 %
COHGB MFR BLDA: 1.7 %
COHGB MFR BLDA: 2 %
COHGB MFR BLDA: 2.1 %
COHGB MFR BLDA: 2.4 %
COHGB MFR BLDA: 2.5 %
COHGB MFR BLDA: 2.6 %
COHGB MFR BLDA: 2.7 %
CREAT SERPL-MCNC: 2.12 MG/DL (ref 0.5–1.3)
CREAT SERPL-MCNC: 2.18 MG/DL (ref 0.5–1.3)
CREAT SERPL-MCNC: 2.36 MG/DL (ref 0.5–1.3)
CREAT SERPL-MCNC: 3.24 MG/DL (ref 0.5–1.3)
DISPENSE STATUS: NORMAL
DO-HGB MFR BLDA: 1.7 % (ref 0–5)
DO-HGB MFR BLDA: 1.7 % (ref 0–5)
DO-HGB MFR BLDA: 1.9 % (ref 0–5)
DO-HGB MFR BLDA: 1.9 % (ref 0–5)
DO-HGB MFR BLDA: 2 % (ref 0–5)
DO-HGB MFR BLDA: 2.3 % (ref 0–5)
DO-HGB MFR BLDA: 2.9 % (ref 0–5)
DO-HGB MFR BLDA: 3 % (ref 0–5)
EBV NA AB SER QL: POSITIVE
EGFRCR SERPLBLD CKD-EPI 2021: 22 ML/MIN/1.73M*2
EGFRCR SERPLBLD CKD-EPI 2021: 32 ML/MIN/1.73M*2
EGFRCR SERPLBLD CKD-EPI 2021: 35 ML/MIN/1.73M*2
EGFRCR SERPLBLD CKD-EPI 2021: 36 ML/MIN/1.73M*2
EJECTION FRACTION: 68 %
EOSINOPHIL # BLD AUTO: 0.15 X10*3/UL (ref 0–0.7)
EOSINOPHIL NFR BLD AUTO: 4 %
ERYTHROCYTE [DISTWIDTH] IN BLOOD BY AUTOMATED COUNT: 14.5 % (ref 11.5–14.5)
ERYTHROCYTE [DISTWIDTH] IN BLOOD BY AUTOMATED COUNT: 15.1 % (ref 11.5–14.5)
ERYTHROCYTE [DISTWIDTH] IN BLOOD BY AUTOMATED COUNT: 15.3 % (ref 11.5–14.5)
ERYTHROCYTE [DISTWIDTH] IN BLOOD BY AUTOMATED COUNT: 15.8 % (ref 11.5–14.5)
ERYTHROCYTE [DISTWIDTH] IN BLOOD BY AUTOMATED COUNT: 16 % (ref 11.5–14.5)
FIBRINOGEN BLD CALC-MCNC: 372 MG/DL (ref 278–581)
FIBRINOGEN PPP-MCNC: 115 MG/DL (ref 200–400)
FIBRINOGEN PPP-MCNC: 156 MG/DL (ref 200–400)
FIBRINOGEN PPP-MCNC: 211 MG/DL (ref 200–400)
GLUCOSE BLD MANUAL STRIP-MCNC: 173 MG/DL (ref 74–99)
GLUCOSE BLD MANUAL STRIP-MCNC: 186 MG/DL (ref 74–99)
GLUCOSE BLD MANUAL STRIP-MCNC: 199 MG/DL (ref 74–99)
GLUCOSE BLDA-MCNC: 135 MG/DL (ref 74–99)
GLUCOSE BLDA-MCNC: 174 MG/DL (ref 74–99)
GLUCOSE BLDA-MCNC: 175 MG/DL (ref 74–99)
GLUCOSE BLDA-MCNC: 180 MG/DL (ref 74–99)
GLUCOSE BLDA-MCNC: 180 MG/DL (ref 74–99)
GLUCOSE BLDA-MCNC: 182 MG/DL (ref 74–99)
GLUCOSE BLDA-MCNC: 186 MG/DL (ref 74–99)
GLUCOSE BLDA-MCNC: 188 MG/DL (ref 74–99)
GLUCOSE BLDA-MCNC: 188 MG/DL (ref 74–99)
GLUCOSE BLDA-MCNC: 189 MG/DL (ref 74–99)
GLUCOSE BLDA-MCNC: 190 MG/DL (ref 74–99)
GLUCOSE BLDA-MCNC: 219 MG/DL (ref 74–99)
GLUCOSE BLDA-MCNC: 235 MG/DL (ref 74–99)
GLUCOSE BLDA-MCNC: 252 MG/DL (ref 74–99)
GLUCOSE BLDA-MCNC: 263 MG/DL (ref 74–99)
GLUCOSE BLDA-MCNC: 275 MG/DL (ref 74–99)
GLUCOSE BLDA-MCNC: 330 MG/DL (ref 74–99)
GLUCOSE BLDA-MCNC: ABNORMAL MG/DL
GLUCOSE SERPL-MCNC: 179 MG/DL (ref 74–99)
GLUCOSE SERPL-MCNC: 183 MG/DL (ref 74–99)
GLUCOSE SERPL-MCNC: 212 MG/DL (ref 74–99)
GLUCOSE SERPL-MCNC: 220 MG/DL (ref 74–99)
HBV CORE AB SER QL: NONREACTIVE
HBV SURFACE AB SER-ACNC: 21 MIU/ML
HBV SURFACE AG SERPL QL IA: NONREACTIVE
HCO3 BLDA-SCNC: 19.3 MMOL/L (ref 22–26)
HCO3 BLDA-SCNC: 19.7 MMOL/L (ref 22–26)
HCO3 BLDA-SCNC: 20.2 MMOL/L (ref 22–26)
HCO3 BLDA-SCNC: 20.7 MMOL/L (ref 22–26)
HCO3 BLDA-SCNC: 21 MMOL/L (ref 22–26)
HCO3 BLDA-SCNC: 21.1 MMOL/L (ref 22–26)
HCO3 BLDA-SCNC: 21.2 MMOL/L (ref 22–26)
HCO3 BLDA-SCNC: 21.4 MMOL/L (ref 22–26)
HCO3 BLDA-SCNC: 22.5 MMOL/L (ref 22–26)
HCO3 BLDA-SCNC: 22.6 MMOL/L (ref 22–26)
HCO3 BLDA-SCNC: 24.3 MMOL/L (ref 22–26)
HCO3 BLDA-SCNC: 24.5 MMOL/L (ref 22–26)
HCO3 BLDA-SCNC: 24.6 MMOL/L (ref 22–26)
HCO3 BLDA-SCNC: 24.8 MMOL/L (ref 22–26)
HCO3 BLDA-SCNC: 25.2 MMOL/L (ref 22–26)
HCO3 BLDA-SCNC: 25.3 MMOL/L (ref 22–26)
HCO3 BLDA-SCNC: 26 MMOL/L (ref 22–26)
HCO3 BLDA-SCNC: 27.2 MMOL/L (ref 22–26)
HCO3 BLDA-SCNC: 28.5 MMOL/L (ref 22–26)
HCT VFR BLD AUTO: 14.6 % (ref 41–52)
HCT VFR BLD AUTO: 22.3 % (ref 41–52)
HCT VFR BLD AUTO: 23.5 % (ref 41–52)
HCT VFR BLD AUTO: 26 % (ref 41–52)
HCT VFR BLD AUTO: 29.9 % (ref 41–52)
HCT VFR BLD EST: 23 % (ref 41–52)
HCT VFR BLD EST: 24 % (ref 41–52)
HCT VFR BLD EST: 25 % (ref 41–52)
HCT VFR BLD EST: 26 % (ref 41–52)
HCT VFR BLD EST: 27 % (ref 41–52)
HCT VFR BLD EST: 28 % (ref 41–52)
HCT VFR BLD EST: 31 % (ref 41–52)
HCV AB SER QL: NONREACTIVE
HGB BLD-MCNC: 10.2 G/DL (ref 13.5–17.5)
HGB BLD-MCNC: 4.6 G/DL (ref 13.5–17.5)
HGB BLD-MCNC: 7.9 G/DL (ref 13.5–17.5)
HGB BLD-MCNC: 8.5 G/DL (ref 13.5–17.5)
HGB BLD-MCNC: 8.6 G/DL (ref 13.5–17.5)
HGB BLDA-MCNC: 10.2 G/DL (ref 13.5–17.5)
HGB BLDA-MCNC: 10.2 G/DL (ref 13.5–17.5)
HGB BLDA-MCNC: 7.7 G/DL (ref 13.5–17.5)
HGB BLDA-MCNC: 8.1 G/DL (ref 13.5–17.5)
HGB BLDA-MCNC: 8.2 G/DL (ref 13.5–17.5)
HGB BLDA-MCNC: 8.3 G/DL (ref 13.5–17.5)
HGB BLDA-MCNC: 8.3 G/DL (ref 13.5–17.5)
HGB BLDA-MCNC: 8.5 G/DL (ref 13.5–17.5)
HGB BLDA-MCNC: 8.5 G/DL (ref 13.5–17.5)
HGB BLDA-MCNC: 8.6 G/DL (ref 13.5–17.5)
HGB BLDA-MCNC: 8.9 G/DL (ref 13.5–17.5)
HGB BLDA-MCNC: 9.1 G/DL (ref 13.5–17.5)
HGB BLDA-MCNC: 9.1 G/DL (ref 13.5–17.5)
HGB BLDA-MCNC: 9.2 G/DL (ref 13.5–17.5)
HGB BLDA-MCNC: 9.2 G/DL (ref 13.5–17.5)
HGB BLDA-MCNC: 9.3 G/DL (ref 13.5–17.5)
HGB BLDA-MCNC: 9.4 G/DL (ref 13.5–17.5)
HGB BLDA-MCNC: 9.4 G/DL (ref 13.5–17.5)
HIV 1+2 AB+HIV1 P24 AG SERPL QL IA: NONREACTIVE
IMM GRANULOCYTES # BLD AUTO: 0.01 X10*3/UL (ref 0–0.7)
IMM GRANULOCYTES NFR BLD AUTO: 0.3 % (ref 0–0.9)
INHALED O2 CONCENTRATION: 25 %
INHALED O2 CONCENTRATION: 25 %
INHALED O2 CONCENTRATION: 27 %
INHALED O2 CONCENTRATION: 29 %
INHALED O2 CONCENTRATION: 31 %
INHALED O2 CONCENTRATION: 32 %
INHALED O2 CONCENTRATION: 35 %
INHALED O2 CONCENTRATION: 36 %
INHALED O2 CONCENTRATION: 37 %
INHALED O2 CONCENTRATION: 40 %
INHALED O2 CONCENTRATION: 50 %
INHALED O2 CONCENTRATION: 50 %
INHALED O2 CONCENTRATION: 53 %
INHALED O2 CONCENTRATION: 60 %
INHALED O2 CONCENTRATION: 63 %
INHALED O2 CONCENTRATION: 69 %
INR PPP: 1.1 (ref 0.9–1.1)
INR PPP: 1.5 (ref 0.9–1.1)
INR PPP: 1.5 (ref 0.9–1.1)
INR PPP: 2 (ref 0.9–1.1)
INR PPP: 2.4 (ref 0.9–1.1)
LACTATE BLDA-SCNC: 0.7 MMOL/L (ref 0.4–2)
LACTATE BLDA-SCNC: 1.1 MMOL/L (ref 0.4–2)
LACTATE BLDA-SCNC: 1.1 MMOL/L (ref 0.4–2)
LACTATE BLDA-SCNC: 1.2 MMOL/L (ref 0.4–2)
LACTATE BLDA-SCNC: 1.6 MMOL/L (ref 0.4–2)
LACTATE BLDA-SCNC: 1.7 MMOL/L (ref 0.4–2)
LACTATE BLDA-SCNC: 1.8 MMOL/L (ref 0.4–2)
LACTATE BLDA-SCNC: 1.8 MMOL/L (ref 0.4–2)
LACTATE BLDA-SCNC: 1.9 MMOL/L (ref 0.4–2)
LACTATE BLDA-SCNC: 2 MMOL/L (ref 0.4–2)
LACTATE BLDA-SCNC: 2.1 MMOL/L (ref 0.4–2)
LACTATE BLDA-SCNC: 2.2 MMOL/L (ref 0.4–2)
LACTATE BLDA-SCNC: 2.5 MMOL/L (ref 0.4–2)
LYMPHOCYTES # BLD AUTO: 0.73 X10*3/UL (ref 1.2–4.8)
LYMPHOCYTES NFR BLD AUTO: 19.3 %
MA KAOLIN BLD RES TEG: 54 MM (ref 52–69)
MA KAOLIN+TF BLD RES TEG: 52 MM (ref 52–70)
MA TF IND+IIB-IIIA INH BLD RES TEG: 20 MM (ref 15–32)
MAGNESIUM SERPL-MCNC: 1.62 MG/DL (ref 1.6–2.4)
MAGNESIUM SERPL-MCNC: 1.67 MG/DL (ref 1.6–2.4)
MAGNESIUM SERPL-MCNC: 1.96 MG/DL (ref 1.6–2.4)
MAGNESIUM SERPL-MCNC: 2.01 MG/DL (ref 1.6–2.4)
MCH RBC QN AUTO: 29.1 PG (ref 26–34)
MCH RBC QN AUTO: 29.3 PG (ref 26–34)
MCH RBC QN AUTO: 29.6 PG (ref 26–34)
MCH RBC QN AUTO: 29.8 PG (ref 26–34)
MCH RBC QN AUTO: 29.9 PG (ref 26–34)
MCHC RBC AUTO-ENTMCNC: 31.5 G/DL (ref 32–36)
MCHC RBC AUTO-ENTMCNC: 32.7 G/DL (ref 32–36)
MCHC RBC AUTO-ENTMCNC: 34.1 G/DL (ref 32–36)
MCHC RBC AUTO-ENTMCNC: 35.4 G/DL (ref 32–36)
MCHC RBC AUTO-ENTMCNC: 36.6 G/DL (ref 32–36)
MCV RBC AUTO: 82 FL (ref 80–100)
MCV RBC AUTO: 83 FL (ref 80–100)
MCV RBC AUTO: 87 FL (ref 80–100)
MCV RBC AUTO: 91 FL (ref 80–100)
MCV RBC AUTO: 92 FL (ref 80–100)
METHGB MFR BLDA: 0 % (ref 0–1.5)
METHGB MFR BLDA: 0.1 % (ref 0–1.5)
METHGB MFR BLDA: 0.2 % (ref 0–1.5)
METHGB MFR BLDA: 0.3 % (ref 0–1.5)
METHGB MFR BLDA: 0.4 % (ref 0–1.5)
MONOCYTES # BLD AUTO: 0.37 X10*3/UL (ref 0.1–1)
MONOCYTES NFR BLD AUTO: 9.8 %
NEUTROPHILS # BLD AUTO: 2.48 X10*3/UL (ref 1.2–7.7)
NEUTROPHILS NFR BLD AUTO: 65.3 %
NRBC BLD-RTO: 0 /100 WBCS (ref 0–0)
OXYHGB MFR BLDA: 94.1 % (ref 94–98)
OXYHGB MFR BLDA: 94.1 % (ref 94–98)
OXYHGB MFR BLDA: 95 % (ref 94–98)
OXYHGB MFR BLDA: 95 % (ref 94–98)
OXYHGB MFR BLDA: 95.3 % (ref 94–98)
OXYHGB MFR BLDA: 95.6 % (ref 94–98)
OXYHGB MFR BLDA: 95.8 % (ref 94–98)
OXYHGB MFR BLDA: 95.9 % (ref 94–98)
OXYHGB MFR BLDA: 96 % (ref 94–98)
OXYHGB MFR BLDA: 96 % (ref 94–98)
OXYHGB MFR BLDA: 96.2 % (ref 94–98)
OXYHGB MFR BLDA: 96.3 % (ref 94–98)
OXYHGB MFR BLDA: 96.4 % (ref 94–98)
OXYHGB MFR BLDA: 96.4 % (ref 94–98)
OXYHGB MFR BLDA: 96.6 % (ref 94–98)
OXYHGB MFR BLDA: 96.6 % (ref 94–98)
OXYHGB MFR BLDA: 96.8 % (ref 94–98)
OXYHGB MFR BLDA: 96.9 % (ref 94–98)
OXYHGB MFR BLDA: 96.9 % (ref 94–98)
P AXIS: 35 DEGREES
P OFFSET: 180 MS
P ONSET: 119 MS
PCO2 BLDA: 36 MM HG (ref 38–42)
PCO2 BLDA: 38 MM HG (ref 38–42)
PCO2 BLDA: 39 MM HG (ref 38–42)
PCO2 BLDA: 39 MM HG (ref 38–42)
PCO2 BLDA: 40 MM HG (ref 38–42)
PCO2 BLDA: 41 MM HG (ref 38–42)
PCO2 BLDA: 42 MM HG (ref 38–42)
PCO2 BLDA: 43 MM HG (ref 38–42)
PCO2 BLDA: 44 MM HG (ref 38–42)
PCO2 BLDA: 46 MM HG (ref 38–42)
PCO2 BLDA: 51 MM HG (ref 38–42)
PH BLDA: 7.23 PH (ref 7.38–7.42)
PH BLDA: 7.27 PH (ref 7.38–7.42)
PH BLDA: 7.28 PH (ref 7.38–7.42)
PH BLDA: 7.29 PH (ref 7.38–7.42)
PH BLDA: 7.29 PH (ref 7.38–7.42)
PH BLDA: 7.3 PH (ref 7.38–7.42)
PH BLDA: 7.33 PH (ref 7.38–7.42)
PH BLDA: 7.35 PH (ref 7.38–7.42)
PH BLDA: 7.37 PH (ref 7.38–7.42)
PH BLDA: 7.39 PH (ref 7.38–7.42)
PH BLDA: 7.4 PH (ref 7.38–7.42)
PH BLDA: 7.4 PH (ref 7.38–7.42)
PH BLDA: 7.42 PH (ref 7.38–7.42)
PH BLDA: 7.42 PH (ref 7.38–7.42)
PH BLDA: 7.43 PH (ref 7.38–7.42)
PH BLDA: 7.43 PH (ref 7.38–7.42)
PH BLDA: 7.44 PH (ref 7.38–7.42)
PHOSPHATE SERPL-MCNC: 4.1 MG/DL (ref 2.5–4.9)
PHOSPHATE SERPL-MCNC: 4.6 MG/DL (ref 2.5–4.9)
PHOSPHATE SERPL-MCNC: 5 MG/DL (ref 2.5–4.9)
PHOSPHATE SERPL-MCNC: 5.4 MG/DL (ref 2.5–4.9)
PLATELET # BLD AUTO: 45 X10*3/UL (ref 150–450)
PLATELET # BLD AUTO: 53 X10*3/UL (ref 150–450)
PLATELET # BLD AUTO: 57 X10*3/UL (ref 150–450)
PLATELET # BLD AUTO: 75 X10*3/UL (ref 150–450)
PLATELET # BLD AUTO: 84 X10*3/UL (ref 150–450)
PO2 BLDA: 100 MM HG (ref 85–95)
PO2 BLDA: 101 MM HG (ref 85–95)
PO2 BLDA: 102 MM HG (ref 85–95)
PO2 BLDA: 102 MM HG (ref 85–95)
PO2 BLDA: 109 MM HG (ref 85–95)
PO2 BLDA: 110 MM HG (ref 85–95)
PO2 BLDA: 118 MM HG (ref 85–95)
PO2 BLDA: 132 MM HG (ref 85–95)
PO2 BLDA: 142 MM HG (ref 85–95)
PO2 BLDA: 144 MM HG (ref 85–95)
PO2 BLDA: 144 MM HG (ref 85–95)
PO2 BLDA: 156 MM HG (ref 85–95)
PO2 BLDA: 182 MM HG (ref 85–95)
PO2 BLDA: 258 MM HG (ref 85–95)
PO2 BLDA: 294 MM HG (ref 85–95)
PO2 BLDA: 79 MM HG (ref 85–95)
PO2 BLDA: 85 MM HG (ref 85–95)
PO2 BLDA: 97 MM HG (ref 85–95)
PO2 BLDA: 98 MM HG (ref 85–95)
POTASSIUM BLDA-SCNC: 3.8 MMOL/L (ref 3.5–5.3)
POTASSIUM BLDA-SCNC: 3.8 MMOL/L (ref 3.5–5.3)
POTASSIUM BLDA-SCNC: 3.9 MMOL/L (ref 3.5–5.3)
POTASSIUM BLDA-SCNC: 4 MMOL/L (ref 3.5–5.3)
POTASSIUM BLDA-SCNC: 4.2 MMOL/L (ref 3.5–5.3)
POTASSIUM BLDA-SCNC: 4.3 MMOL/L (ref 3.5–5.3)
POTASSIUM BLDA-SCNC: 4.3 MMOL/L (ref 3.5–5.3)
POTASSIUM BLDA-SCNC: 4.4 MMOL/L (ref 3.5–5.3)
POTASSIUM BLDA-SCNC: 4.6 MMOL/L (ref 3.5–5.3)
POTASSIUM BLDA-SCNC: 4.7 MMOL/L (ref 3.5–5.3)
POTASSIUM BLDA-SCNC: 4.7 MMOL/L (ref 3.5–5.3)
POTASSIUM BLDA-SCNC: 4.8 MMOL/L (ref 3.5–5.3)
POTASSIUM BLDA-SCNC: 4.9 MMOL/L (ref 3.5–5.3)
POTASSIUM BLDA-SCNC: 4.9 MMOL/L (ref 3.5–5.3)
POTASSIUM BLDA-SCNC: 5 MMOL/L (ref 3.5–5.3)
POTASSIUM BLDA-SCNC: 5 MMOL/L (ref 3.5–5.3)
POTASSIUM SERPL-SCNC: 3.6 MMOL/L (ref 3.5–5.3)
POTASSIUM SERPL-SCNC: 3.9 MMOL/L (ref 3.5–5.3)
POTASSIUM SERPL-SCNC: 4 MMOL/L (ref 3.5–5.3)
POTASSIUM SERPL-SCNC: 4.2 MMOL/L (ref 3.5–5.3)
PR INTERVAL: 184 MS
PRODUCT BLOOD TYPE: 5100
PRODUCT BLOOD TYPE: 7300
PRODUCT BLOOD TYPE: 7300
PRODUCT CODE: NORMAL
PROT SERPL-MCNC: 4.9 G/DL (ref 6.4–8.2)
PROT SERPL-MCNC: 7.4 G/DL (ref 6.4–8.2)
PROTHROMBIN TIME: 12.6 SECONDS (ref 9.8–12.8)
PROTHROMBIN TIME: 16.5 SECONDS (ref 9.8–12.8)
PROTHROMBIN TIME: 17.3 SECONDS (ref 9.8–12.8)
PROTHROMBIN TIME: 22.9 SECONDS (ref 9.8–12.8)
PROTHROMBIN TIME: 27.5 SECONDS (ref 9.8–12.8)
Q ONSET: 211 MS
QRS COUNT: 11 BEATS
QRS DURATION: 108 MS
QT INTERVAL: 440 MS
QTC CALCULATION(BAZETT): 464 MS
QTC FREDERICIA: 456 MS
R AXIS: -37 DEGREES
RBC # BLD AUTO: 1.58 X10*6/UL (ref 4.5–5.9)
RBC # BLD AUTO: 2.7 X10*6/UL (ref 4.5–5.9)
RBC # BLD AUTO: 2.87 X10*6/UL (ref 4.5–5.9)
RBC # BLD AUTO: 2.88 X10*6/UL (ref 4.5–5.9)
RBC # BLD AUTO: 3.42 X10*6/UL (ref 4.5–5.9)
RH FACTOR (ANTIGEN D): NORMAL
SAO2 % BLDA: 97 % (ref 94–100)
SAO2 % BLDA: 98 % (ref 94–100)
SAO2 % BLDA: 99 % (ref 94–100)
SODIUM BLDA-SCNC: 138 MMOL/L (ref 136–145)
SODIUM BLDA-SCNC: 139 MMOL/L (ref 136–145)
SODIUM BLDA-SCNC: 140 MMOL/L (ref 136–145)
SODIUM BLDA-SCNC: 141 MMOL/L (ref 136–145)
SODIUM BLDA-SCNC: 142 MMOL/L (ref 136–145)
SODIUM SERPL-SCNC: 139 MMOL/L (ref 136–145)
SODIUM SERPL-SCNC: 144 MMOL/L (ref 136–145)
SODIUM SERPL-SCNC: 145 MMOL/L (ref 136–145)
SODIUM SERPL-SCNC: 146 MMOL/L (ref 136–145)
T AXIS: 32 DEGREES
T OFFSET: 431 MS
TEST COMMENT: ABNORMAL
UNIT ABO: NORMAL
UNIT NUMBER: NORMAL
UNIT RH: NORMAL
UNIT VOLUME: 184
UNIT VOLUME: 205
UNIT VOLUME: 206
UNIT VOLUME: 243
UNIT VOLUME: 255
UNIT VOLUME: 259
UNIT VOLUME: 263
UNIT VOLUME: 265
UNIT VOLUME: 272
UNIT VOLUME: 290
UNIT VOLUME: 291
UNIT VOLUME: 297
UNIT VOLUME: 301
UNIT VOLUME: 302
UNIT VOLUME: 304
UNIT VOLUME: 304
UNIT VOLUME: 315
UNIT VOLUME: 316
UNIT VOLUME: 324
UNIT VOLUME: 327
UNIT VOLUME: 332
UNIT VOLUME: 333
UNIT VOLUME: 343
UNIT VOLUME: 88
UNIT VOLUME: 89
VENTRICULAR RATE: 67 BPM
WBC # BLD AUTO: 1.2 X10*3/UL (ref 4.4–11.3)
WBC # BLD AUTO: 2.2 X10*3/UL (ref 4.4–11.3)
WBC # BLD AUTO: 2.3 X10*3/UL (ref 4.4–11.3)
WBC # BLD AUTO: 3 X10*3/UL (ref 4.4–11.3)
WBC # BLD AUTO: 3.8 X10*3/UL (ref 4.4–11.3)

## 2025-01-20 PROCEDURE — 2500000002 HC RX 250 W HCPCS SELF ADMINISTERED DRUGS (ALT 637 FOR MEDICARE OP, ALT 636 FOR OP/ED): Mod: SE | Performed by: ANESTHESIOLOGIST ASSISTANT

## 2025-01-20 PROCEDURE — 84075 ASSAY ALKALINE PHOSPHATASE: CPT | Performed by: STUDENT IN AN ORGANIZED HEALTH CARE EDUCATION/TRAINING PROGRAM

## 2025-01-20 PROCEDURE — 76937 US GUIDE VASCULAR ACCESS: CPT

## 2025-01-20 PROCEDURE — 82375 ASSAY CARBOXYHB QUANT: CPT

## 2025-01-20 PROCEDURE — 85384 FIBRINOGEN ACTIVITY: CPT

## 2025-01-20 PROCEDURE — 94002 VENT MGMT INPAT INIT DAY: CPT

## 2025-01-20 PROCEDURE — 88313 SPECIAL STAINS GROUP 2: CPT | Performed by: PATHOLOGY

## 2025-01-20 PROCEDURE — 2500000004 HC RX 250 GENERAL PHARMACY W/ HCPCS (ALT 636 FOR OP/ED): Mod: SE

## 2025-01-20 PROCEDURE — 86833 HLA CLASS II HIGH DEFIN QUAL: CPT | Mod: OUT | Performed by: INTERNAL MEDICINE

## 2025-01-20 PROCEDURE — 84132 ASSAY OF SERUM POTASSIUM: CPT | Performed by: ANESTHESIOLOGIST ASSISTANT

## 2025-01-20 PROCEDURE — A50360 PR TRANSPLANTATION OF KIDNEY: Performed by: ANESTHESIOLOGIST ASSISTANT

## 2025-01-20 PROCEDURE — 85027 COMPLETE CBC AUTOMATED: CPT

## 2025-01-20 PROCEDURE — 88309 TISSUE EXAM BY PATHOLOGIST: CPT | Mod: TC,SUR,WESLAB | Performed by: STUDENT IN AN ORGANIZED HEALTH CARE EDUCATION/TRAINING PROGRAM

## 2025-01-20 PROCEDURE — 88313 SPECIAL STAINS GROUP 2: CPT | Mod: TC,SUR,WESLAB | Performed by: STUDENT IN AN ORGANIZED HEALTH CARE EDUCATION/TRAINING PROGRAM

## 2025-01-20 PROCEDURE — 82947 ASSAY GLUCOSE BLOOD QUANT: CPT

## 2025-01-20 PROCEDURE — 47135 TRANSPLANTATION OF LIVER: CPT | Performed by: STUDENT IN AN ORGANIZED HEALTH CARE EDUCATION/TRAINING PROGRAM

## 2025-01-20 PROCEDURE — 82330 ASSAY OF CALCIUM: CPT

## 2025-01-20 PROCEDURE — 6360000002 HC RX 636 FACTOR: Mod: SE,TB

## 2025-01-20 PROCEDURE — 82805 BLOOD GASES W/O2 SATURATION: CPT | Performed by: INTERNAL MEDICINE

## 2025-01-20 PROCEDURE — 3700000002 HC GENERAL ANESTHESIA TIME - EACH INCREMENTAL 1 MINUTE: Performed by: STUDENT IN AN ORGANIZED HEALTH CARE EDUCATION/TRAINING PROGRAM

## 2025-01-20 PROCEDURE — 84132 ASSAY OF SERUM POTASSIUM: CPT

## 2025-01-20 PROCEDURE — 2020000001 HC ICU ROOM DAILY

## 2025-01-20 PROCEDURE — 85610 PROTHROMBIN TIME: CPT

## 2025-01-20 PROCEDURE — 82248 BILIRUBIN DIRECT: CPT

## 2025-01-20 PROCEDURE — 82810 BLOOD GASES O2 SAT ONLY: CPT | Performed by: INTERNAL MEDICINE

## 2025-01-20 PROCEDURE — 85027 COMPLETE CBC AUTOMATED: CPT | Performed by: ANESTHESIOLOGIST ASSISTANT

## 2025-01-20 PROCEDURE — 85027 COMPLETE CBC AUTOMATED: CPT | Performed by: STUDENT IN AN ORGANIZED HEALTH CARE EDUCATION/TRAINING PROGRAM

## 2025-01-20 PROCEDURE — 8120000002 HC CADAVER DONOR - KIDNEY: Performed by: STUDENT IN AN ORGANIZED HEALTH CARE EDUCATION/TRAINING PROGRAM

## 2025-01-20 PROCEDURE — 83735 ASSAY OF MAGNESIUM: CPT | Performed by: STUDENT IN AN ORGANIZED HEALTH CARE EDUCATION/TRAINING PROGRAM

## 2025-01-20 PROCEDURE — 2500000005 HC RX 250 GENERAL PHARMACY W/O HCPCS: Mod: SE

## 2025-01-20 PROCEDURE — 83050 HGB METHEMOGLOBIN QUAN: CPT

## 2025-01-20 PROCEDURE — 84100 ASSAY OF PHOSPHORUS: CPT

## 2025-01-20 PROCEDURE — 86664 EPSTEIN-BARR NUCLEAR ANTIGEN: CPT

## 2025-01-20 PROCEDURE — 85730 THROMBOPLASTIN TIME PARTIAL: CPT | Performed by: STUDENT IN AN ORGANIZED HEALTH CARE EDUCATION/TRAINING PROGRAM

## 2025-01-20 PROCEDURE — 50360 RNL ALTRNSPLJ W/O RCP NFRCT: CPT | Performed by: STUDENT IN AN ORGANIZED HEALTH CARE EDUCATION/TRAINING PROGRAM

## 2025-01-20 PROCEDURE — 83735 ASSAY OF MAGNESIUM: CPT | Performed by: ANESTHESIOLOGIST ASSISTANT

## 2025-01-20 PROCEDURE — P9047 ALBUMIN (HUMAN), 25%, 50ML: HCPCS | Mod: JZ,SE,TB

## 2025-01-20 PROCEDURE — 85730 THROMBOPLASTIN TIME PARTIAL: CPT | Performed by: ANESTHESIOLOGIST ASSISTANT

## 2025-01-20 PROCEDURE — 86706 HEP B SURFACE ANTIBODY: CPT

## 2025-01-20 PROCEDURE — 2780000003 HC OR 278 NO HCPCS: Performed by: STUDENT IN AN ORGANIZED HEALTH CARE EDUCATION/TRAINING PROGRAM

## 2025-01-20 PROCEDURE — 83735 ASSAY OF MAGNESIUM: CPT

## 2025-01-20 PROCEDURE — 86825 HLA X-MATH NON-CYTOTOXIC: CPT | Mod: OUT | Performed by: INTERNAL MEDICINE

## 2025-01-20 PROCEDURE — 2500000004 HC RX 250 GENERAL PHARMACY W/ HCPCS (ALT 636 FOR OP/ED): Mod: SE | Performed by: INTERNAL MEDICINE

## 2025-01-20 PROCEDURE — 84132 ASSAY OF SERUM POTASSIUM: CPT | Performed by: STUDENT IN AN ORGANIZED HEALTH CARE EDUCATION/TRAINING PROGRAM

## 2025-01-20 PROCEDURE — P9073 PLATELETS PHERESIS PATH REDU: HCPCS

## 2025-01-20 PROCEDURE — 85610 PROTHROMBIN TIME: CPT | Performed by: STUDENT IN AN ORGANIZED HEALTH CARE EDUCATION/TRAINING PROGRAM

## 2025-01-20 PROCEDURE — 2500000004 HC RX 250 GENERAL PHARMACY W/ HCPCS (ALT 636 FOR OP/ED): Mod: SE | Performed by: STUDENT IN AN ORGANIZED HEALTH CARE EDUCATION/TRAINING PROGRAM

## 2025-01-20 PROCEDURE — 80048 BASIC METABOLIC PNL TOTAL CA: CPT

## 2025-01-20 PROCEDURE — 0FY00Z0 TRANSPLANTATION OF LIVER, ALLOGENEIC, OPEN APPROACH: ICD-10-PCS | Performed by: STUDENT IN AN ORGANIZED HEALTH CARE EDUCATION/TRAINING PROGRAM

## 2025-01-20 PROCEDURE — 86923 COMPATIBILITY TEST ELECTRIC: CPT

## 2025-01-20 PROCEDURE — 2500000004 HC RX 250 GENERAL PHARMACY W/ HCPCS (ALT 636 FOR OP/ED): Mod: JZ,SE,TB | Performed by: STUDENT IN AN ORGANIZED HEALTH CARE EDUCATION/TRAINING PROGRAM

## 2025-01-20 PROCEDURE — 85730 THROMBOPLASTIN TIME PARTIAL: CPT

## 2025-01-20 PROCEDURE — 85347 COAGULATION TIME ACTIVATED: CPT

## 2025-01-20 PROCEDURE — 99222 1ST HOSP IP/OBS MODERATE 55: CPT | Performed by: STUDENT IN AN ORGANIZED HEALTH CARE EDUCATION/TRAINING PROGRAM

## 2025-01-20 PROCEDURE — 86704 HEP B CORE ANTIBODY TOTAL: CPT

## 2025-01-20 PROCEDURE — 2720000007 HC OR 272 NO HCPCS: Performed by: STUDENT IN AN ORGANIZED HEALTH CARE EDUCATION/TRAINING PROGRAM

## 2025-01-20 PROCEDURE — 36620 INSERTION CATHETER ARTERY: CPT | Performed by: ANESTHESIOLOGIST ASSISTANT

## 2025-01-20 PROCEDURE — 0TY00Z0 TRANSPLANTATION OF RIGHT KIDNEY, ALLOGENEIC, OPEN APPROACH: ICD-10-PCS | Performed by: STUDENT IN AN ORGANIZED HEALTH CARE EDUCATION/TRAINING PROGRAM

## 2025-01-20 PROCEDURE — 87340 HEPATITIS B SURFACE AG IA: CPT

## 2025-01-20 PROCEDURE — 80069 RENAL FUNCTION PANEL: CPT | Mod: CCI

## 2025-01-20 PROCEDURE — 93005 ELECTROCARDIOGRAM TRACING: CPT

## 2025-01-20 PROCEDURE — 85384 FIBRINOGEN ACTIVITY: CPT | Performed by: ANESTHESIOLOGIST ASSISTANT

## 2025-01-20 PROCEDURE — 71045 X-RAY EXAM CHEST 1 VIEW: CPT

## 2025-01-20 PROCEDURE — 2500000004 HC RX 250 GENERAL PHARMACY W/ HCPCS (ALT 636 FOR OP/ED): Mod: SE,TB | Performed by: PHARMACIST

## 2025-01-20 PROCEDURE — 3600000005 HC OR TIME - INITIAL BASE CHARGE - PROCEDURE LEVEL FIVE: Performed by: STUDENT IN AN ORGANIZED HEALTH CARE EDUCATION/TRAINING PROGRAM

## 2025-01-20 PROCEDURE — 86644 CMV ANTIBODY: CPT

## 2025-01-20 PROCEDURE — P9012 CRYOPRECIPITATE EACH UNIT: HCPCS

## 2025-01-20 PROCEDURE — 90937 HEMODIALYSIS REPEATED EVAL: CPT

## 2025-01-20 PROCEDURE — A50360 PR TRANSPLANTATION OF KIDNEY: Performed by: STUDENT IN AN ORGANIZED HEALTH CARE EDUCATION/TRAINING PROGRAM

## 2025-01-20 PROCEDURE — 87522 HEPATITIS C REVRS TRNSCRPJ: CPT

## 2025-01-20 PROCEDURE — 2500000004 HC RX 250 GENERAL PHARMACY W/ HCPCS (ALT 636 FOR OP/ED): Mod: SE | Performed by: ANESTHESIOLOGIST ASSISTANT

## 2025-01-20 PROCEDURE — 36556 INSERT NON-TUNNEL CV CATH: CPT

## 2025-01-20 PROCEDURE — P9037 PLATE PHERES LEUKOREDU IRRAD: HCPCS

## 2025-01-20 PROCEDURE — C2617 STENT, NON-COR, TEM W/O DEL: HCPCS | Performed by: STUDENT IN AN ORGANIZED HEALTH CARE EDUCATION/TRAINING PROGRAM

## 2025-01-20 PROCEDURE — 99291 CRITICAL CARE FIRST HOUR: CPT | Performed by: ANESTHESIOLOGY

## 2025-01-20 PROCEDURE — 85025 COMPLETE CBC W/AUTO DIFF WBC: CPT

## 2025-01-20 PROCEDURE — 86826 HLA X-MATCH NONCYTOTOXC ADDL: CPT | Mod: OUT | Performed by: INTERNAL MEDICINE

## 2025-01-20 PROCEDURE — 85610 PROTHROMBIN TIME: CPT | Performed by: ANESTHESIOLOGIST ASSISTANT

## 2025-01-20 PROCEDURE — 76705 ECHO EXAM OF ABDOMEN: CPT | Performed by: RADIOLOGY

## 2025-01-20 PROCEDURE — 6360000002 HC RX 636 FACTOR: Mod: JZ,SE,TB | Performed by: ANESTHESIOLOGIST ASSISTANT

## 2025-01-20 PROCEDURE — C9248 INJ, CLEVIDIPINE BUTYRATE: HCPCS | Mod: SE

## 2025-01-20 PROCEDURE — 93010 ELECTROCARDIOGRAM REPORT: CPT | Performed by: INTERNAL MEDICINE

## 2025-01-20 PROCEDURE — 47135 TRANSPLANTATION OF LIVER: CPT | Performed by: TRANSPLANT SURGERY

## 2025-01-20 PROCEDURE — 37799 UNLISTED PX VASCULAR SURGERY: CPT | Performed by: STUDENT IN AN ORGANIZED HEALTH CARE EDUCATION/TRAINING PROGRAM

## 2025-01-20 PROCEDURE — 2500000005 HC RX 250 GENERAL PHARMACY W/O HCPCS: Mod: SE | Performed by: STUDENT IN AN ORGANIZED HEALTH CARE EDUCATION/TRAINING PROGRAM

## 2025-01-20 PROCEDURE — 36415 COLL VENOUS BLD VENIPUNCTURE: CPT

## 2025-01-20 PROCEDURE — 36430 TRANSFUSION BLD/BLD COMPNT: CPT | Mod: GC

## 2025-01-20 PROCEDURE — A4312 CATH W/O BAG 2-WAY SILICONE: HCPCS | Performed by: STUDENT IN AN ORGANIZED HEALTH CARE EDUCATION/TRAINING PROGRAM

## 2025-01-20 PROCEDURE — 3700000001 HC GENERAL ANESTHESIA TIME - INITIAL BASE CHARGE: Performed by: STUDENT IN AN ORGANIZED HEALTH CARE EDUCATION/TRAINING PROGRAM

## 2025-01-20 PROCEDURE — P9016 RBC LEUKOCYTES REDUCED: HCPCS

## 2025-01-20 PROCEDURE — 88309 TISSUE EXAM BY PATHOLOGIST: CPT | Performed by: PATHOLOGY

## 2025-01-20 PROCEDURE — 50360 RNL ALTRNSPLJ W/O RCP NFRCT: CPT | Performed by: TRANSPLANT SURGERY

## 2025-01-20 PROCEDURE — 93975 VASCULAR STUDY: CPT

## 2025-01-20 PROCEDURE — 2500000004 HC RX 250 GENERAL PHARMACY W/ HCPCS (ALT 636 FOR OP/ED): Mod: SE | Performed by: HOSPITALIST

## 2025-01-20 PROCEDURE — 5A1D90Z PERFORMANCE OF URINARY FILTRATION, CONTINUOUS, GREATER THAN 18 HOURS PER DAY: ICD-10-PCS | Performed by: STUDENT IN AN ORGANIZED HEALTH CARE EDUCATION/TRAINING PROGRAM

## 2025-01-20 PROCEDURE — 87389 HIV-1 AG W/HIV-1&-2 AB AG IA: CPT

## 2025-01-20 PROCEDURE — 76776 US EXAM K TRANSPL W/DOPPLER: CPT

## 2025-01-20 PROCEDURE — P9045 ALBUMIN (HUMAN), 5%, 250 ML: HCPCS | Mod: JZ,SE,TB

## 2025-01-20 PROCEDURE — 37799 UNLISTED PX VASCULAR SURGERY: CPT | Performed by: ANESTHESIOLOGIST ASSISTANT

## 2025-01-20 PROCEDURE — 86901 BLOOD TYPING SEROLOGIC RH(D): CPT

## 2025-01-20 PROCEDURE — P9017 PLASMA 1 DONOR FRZ W/IN 8 HR: HCPCS

## 2025-01-20 PROCEDURE — 8120000003 HC CADAVER DONOR - LIVER: Performed by: STUDENT IN AN ORGANIZED HEALTH CARE EDUCATION/TRAINING PROGRAM

## 2025-01-20 PROCEDURE — 85384 FIBRINOGEN ACTIVITY: CPT | Performed by: STUDENT IN AN ORGANIZED HEALTH CARE EDUCATION/TRAINING PROGRAM

## 2025-01-20 PROCEDURE — 3600000010 HC OR TIME - EACH INCREMENTAL 1 MINUTE - PROCEDURE LEVEL FIVE: Performed by: STUDENT IN AN ORGANIZED HEALTH CARE EDUCATION/TRAINING PROGRAM

## 2025-01-20 PROCEDURE — 86803 HEPATITIS C AB TEST: CPT

## 2025-01-20 PROCEDURE — 93975 VASCULAR STUDY: CPT | Performed by: RADIOLOGY

## 2025-01-20 PROCEDURE — C9248 INJ, CLEVIDIPINE BUTYRATE: HCPCS | Mod: SE | Performed by: ANESTHESIOLOGIST ASSISTANT

## 2025-01-20 DEVICE — URETERAL STENT
Type: IMPLANTABLE DEVICE | Site: URETER | Status: FUNCTIONAL
Brand: POLARIS™ ULTRA

## 2025-01-20 RX ORDER — ALBUMIN HUMAN 50 G/1000ML
SOLUTION INTRAVENOUS AS NEEDED
Status: DISCONTINUED | OUTPATIENT
Start: 2025-01-20 | End: 2025-01-20

## 2025-01-20 RX ORDER — INSULIN LISPRO 100 [IU]/ML
0-5 INJECTION, SOLUTION INTRAVENOUS; SUBCUTANEOUS EVERY 4 HOURS
Status: DISCONTINUED | OUTPATIENT
Start: 2025-01-20 | End: 2025-01-21

## 2025-01-20 RX ORDER — PANTOPRAZOLE SODIUM 40 MG/1
40 TABLET, DELAYED RELEASE ORAL EVERY 24 HOURS
Status: DISCONTINUED | OUTPATIENT
Start: 2025-01-20 | End: 2025-01-21

## 2025-01-20 RX ORDER — FLUCONAZOLE 2 MG/ML
INJECTION, SOLUTION INTRAVENOUS AS NEEDED
Status: DISCONTINUED | OUTPATIENT
Start: 2025-01-20 | End: 2025-01-20

## 2025-01-20 RX ORDER — GLYCOPYRROLATE 0.2 MG/ML
INJECTION INTRAMUSCULAR; INTRAVENOUS AS NEEDED
Status: DISCONTINUED | OUTPATIENT
Start: 2025-01-20 | End: 2025-01-20

## 2025-01-20 RX ORDER — PANTOPRAZOLE SODIUM 40 MG/10ML
40 INJECTION, POWDER, LYOPHILIZED, FOR SOLUTION INTRAVENOUS
Status: DISCONTINUED | OUTPATIENT
Start: 2025-01-21 | End: 2025-01-21

## 2025-01-20 RX ORDER — HYDROMORPHONE HYDROCHLORIDE 1 MG/ML
INJECTION, SOLUTION INTRAMUSCULAR; INTRAVENOUS; SUBCUTANEOUS AS NEEDED
Status: DISCONTINUED | OUTPATIENT
Start: 2025-01-20 | End: 2025-01-20

## 2025-01-20 RX ORDER — FENTANYL CITRATE 50 UG/ML
INJECTION, SOLUTION INTRAMUSCULAR; INTRAVENOUS AS NEEDED
Status: DISCONTINUED | OUTPATIENT
Start: 2025-01-20 | End: 2025-01-20

## 2025-01-20 RX ORDER — DEXTROSE MONOHYDRATE AND SODIUM CHLORIDE 5; .45 G/100ML; G/100ML
40 INJECTION, SOLUTION INTRAVENOUS CONTINUOUS
Status: DISCONTINUED | OUTPATIENT
Start: 2025-01-20 | End: 2025-01-21

## 2025-01-20 RX ORDER — DEXTROSE 50 % IN WATER (D50W) INTRAVENOUS SYRINGE
25
Status: DISCONTINUED | OUTPATIENT
Start: 2025-01-20 | End: 2025-01-21

## 2025-01-20 RX ORDER — PROPOFOL 10 MG/ML
5-50 INJECTION, EMULSION INTRAVENOUS CONTINUOUS
Status: DISCONTINUED | OUTPATIENT
Start: 2025-01-20 | End: 2025-01-21

## 2025-01-20 RX ORDER — ALBUMIN HUMAN 250 G/1000ML
SOLUTION INTRAVENOUS CONTINUOUS PRN
Status: DISCONTINUED | OUTPATIENT
Start: 2025-01-20 | End: 2025-01-20

## 2025-01-20 RX ORDER — SODIUM BICARBONATE 42 MG/ML
INJECTION, SOLUTION INTRAVENOUS AS NEEDED
Status: DISCONTINUED | OUTPATIENT
Start: 2025-01-20 | End: 2025-01-20

## 2025-01-20 RX ORDER — FLUCONAZOLE 2 MG/ML
400 INJECTION, SOLUTION INTRAVENOUS ONCE
Status: COMPLETED | OUTPATIENT
Start: 2025-01-20 | End: 2025-01-20

## 2025-01-20 RX ORDER — HEPARIN SODIUM 1000 [USP'U]/ML
1000 INJECTION, SOLUTION INTRAVENOUS; SUBCUTANEOUS AS NEEDED
Status: DISCONTINUED | OUTPATIENT
Start: 2025-01-20 | End: 2025-01-21

## 2025-01-20 RX ORDER — HYDROMORPHONE HYDROCHLORIDE 0.2 MG/ML
0.2 INJECTION INTRAMUSCULAR; INTRAVENOUS; SUBCUTANEOUS
Status: DISCONTINUED | OUTPATIENT
Start: 2025-01-20 | End: 2025-01-21

## 2025-01-20 RX ORDER — GABAPENTIN 300 MG/1
300 CAPSULE ORAL ONCE
Status: CANCELLED | OUTPATIENT
Start: 2025-01-20 | End: 2025-01-20

## 2025-01-20 RX ORDER — SODIUM CHLORIDE 0.9 G/100ML
INJECTION, SOLUTION IRRIGATION AS NEEDED
Status: DISCONTINUED | OUTPATIENT
Start: 2025-01-20 | End: 2025-01-20 | Stop reason: HOSPADM

## 2025-01-20 RX ORDER — PROPOFOL 10 MG/ML
INJECTION, EMULSION INTRAVENOUS AS NEEDED
Status: DISCONTINUED | OUTPATIENT
Start: 2025-01-20 | End: 2025-01-20

## 2025-01-20 RX ORDER — ACETAMINOPHEN 325 MG/1
975 TABLET ORAL ONCE
Status: CANCELLED | OUTPATIENT
Start: 2025-01-20 | End: 2025-01-20

## 2025-01-20 RX ORDER — MIDAZOLAM HYDROCHLORIDE 1 MG/ML
INJECTION, SOLUTION INTRAMUSCULAR; INTRAVENOUS AS NEEDED
Status: DISCONTINUED | OUTPATIENT
Start: 2025-01-20 | End: 2025-01-20

## 2025-01-20 RX ORDER — AMLODIPINE BESYLATE 5 MG/1
5 TABLET ORAL DAILY
Status: DISCONTINUED | OUTPATIENT
Start: 2025-01-20 | End: 2025-01-21

## 2025-01-20 RX ORDER — CARVEDILOL 3.12 MG/1
6.25 TABLET ORAL 2 TIMES DAILY
Status: DISCONTINUED | OUTPATIENT
Start: 2025-01-20 | End: 2025-01-21

## 2025-01-20 RX ORDER — DEXTROSE 50 % IN WATER (D50W) INTRAVENOUS SYRINGE
12.5
Status: DISCONTINUED | OUTPATIENT
Start: 2025-01-20 | End: 2025-01-21

## 2025-01-20 RX ORDER — ROCURONIUM BROMIDE 10 MG/ML
INJECTION, SOLUTION INTRAVENOUS AS NEEDED
Status: DISCONTINUED | OUTPATIENT
Start: 2025-01-20 | End: 2025-01-20

## 2025-01-20 RX ORDER — LIDOCAINE HYDROCHLORIDE 20 MG/ML
INJECTION, SOLUTION INFILTRATION; PERINEURAL AS NEEDED
Status: DISCONTINUED | OUTPATIENT
Start: 2025-01-20 | End: 2025-01-20

## 2025-01-20 RX ORDER — DEXTROSE 50 % IN WATER (D50W) INTRAVENOUS SYRINGE
AS NEEDED
Status: DISCONTINUED | OUTPATIENT
Start: 2025-01-20 | End: 2025-01-20

## 2025-01-20 RX ORDER — FUROSEMIDE 10 MG/ML
INJECTION INTRAMUSCULAR; INTRAVENOUS AS NEEDED
Status: DISCONTINUED | OUTPATIENT
Start: 2025-01-20 | End: 2025-01-20

## 2025-01-20 RX ORDER — NOREPINEPHRINE BITARTRATE 0.03 MG/ML
INJECTION, SOLUTION INTRAVENOUS CONTINUOUS PRN
Status: DISCONTINUED | OUTPATIENT
Start: 2025-01-20 | End: 2025-01-20

## 2025-01-20 RX ORDER — FUROSEMIDE 40 MG/1
40 TABLET ORAL DAILY
Status: DISCONTINUED | OUTPATIENT
Start: 2025-01-20 | End: 2025-01-21

## 2025-01-20 RX ORDER — CALCIUM CHLORIDE INJECTION 100 MG/ML
INJECTION, SOLUTION INTRAVENOUS AS NEEDED
Status: DISCONTINUED | OUTPATIENT
Start: 2025-01-20 | End: 2025-01-20

## 2025-01-20 RX ORDER — POLYETHYLENE GLYCOL 3350 17 G/17G
17 POWDER, FOR SOLUTION ORAL DAILY
Status: DISCONTINUED | OUTPATIENT
Start: 2025-01-21 | End: 2025-01-21

## 2025-01-20 RX ORDER — SODIUM CHLORIDE, SODIUM GLUCONATE, SODIUM ACETATE, POTASSIUM CHLORIDE AND MAGNESIUM CHLORIDE 30; 37; 368; 526; 502 MG/100ML; MG/100ML; MG/100ML; MG/100ML; MG/100ML
INJECTION, SOLUTION INTRAVENOUS CONTINUOUS PRN
Status: DISCONTINUED | OUTPATIENT
Start: 2025-01-20 | End: 2025-01-20

## 2025-01-20 RX ADMIN — PROPOFOL 180 MG: 10 INJECTION, EMULSION INTRAVENOUS at 08:16

## 2025-01-20 RX ADMIN — ROCURONIUM BROMIDE 20 MG: 10 INJECTION INTRAVENOUS at 09:09

## 2025-01-20 RX ADMIN — FLUCONAZOLE 400 MG: 400 INJECTION, SOLUTION INTRAVENOUS at 07:11

## 2025-01-20 RX ADMIN — CALCIUM CHLORIDE, MAGNESIUM CHLORIDE, DEXTROSE MONOHYDRATE, LACTIC ACID, SODIUM CHLORIDE, SODIUM BICARBONATE AND POTASSIUM CHLORIDE 3000 ML/HR: 3.68; 3.05; 22; 5.4; 6.46; 3.09; .314 INJECTION INTRAVENOUS at 09:40

## 2025-01-20 RX ADMIN — EPINEPHRINE 8 MCG: 1 INJECTION INTRAMUSCULAR; INTRAVENOUS; SUBCUTANEOUS at 09:22

## 2025-01-20 RX ADMIN — FIBRINOGEN HUMAN 1070 MG: 1300 INJECTION, POWDER, LYOPHILIZED, FOR SOLUTION INTRAVENOUS at 13:51

## 2025-01-20 RX ADMIN — CALCIUM CHLORIDE, MAGNESIUM CHLORIDE, DEXTROSE MONOHYDRATE, LACTIC ACID, SODIUM CHLORIDE, SODIUM BICARBONATE AND POTASSIUM CHLORIDE 25 ML/KG/HR: 5.15; 2.03; 22; 5.4; 6.46; 3.09; .157 INJECTION INTRAVENOUS at 13:43

## 2025-01-20 RX ADMIN — ALBUMIN HUMAN 500 ML: 0.05 INJECTION, SOLUTION INTRAVENOUS at 15:55

## 2025-01-20 RX ADMIN — ROCURONIUM BROMIDE 20 MG: 10 INJECTION INTRAVENOUS at 17:26

## 2025-01-20 RX ADMIN — VASOPRESSIN 1 UNITS: 20 INJECTION INTRAVENOUS at 13:31

## 2025-01-20 RX ADMIN — SODIUM CHLORIDE 3 UNITS/HR: 9 INJECTION, SOLUTION INTRAVENOUS at 19:12

## 2025-01-20 RX ADMIN — DEXTROSE MONOHYDRATE 500 MG: 50 INJECTION, SOLUTION INTRAVENOUS at 13:11

## 2025-01-20 RX ADMIN — ALBUMIN HUMAN 500 ML: 0.05 INJECTION, SOLUTION INTRAVENOUS at 09:32

## 2025-01-20 RX ADMIN — DEXTROSE MONOHYDRATE 25 G: 25 INJECTION, SOLUTION INTRAVENOUS at 12:17

## 2025-01-20 RX ADMIN — FENTANYL CITRATE 50 MCG: 50 INJECTION, SOLUTION INTRAMUSCULAR; INTRAVENOUS at 08:16

## 2025-01-20 RX ADMIN — VASOPRESSIN 0.03 UNITS/MIN: 20 INJECTION, SOLUTION INTRAMUSCULAR; SUBCUTANEOUS at 09:13

## 2025-01-20 RX ADMIN — CALCIUM CHLORIDE, MAGNESIUM CHLORIDE, DEXTROSE MONOHYDRATE, LACTIC ACID, SODIUM CHLORIDE, SODIUM BICARBONATE AND POTASSIUM CHLORIDE 3000 ML/HR: 3.68; 3.05; 22; 5.4; 6.46; 3.09; .314 INJECTION INTRAVENOUS at 09:41

## 2025-01-20 RX ADMIN — CALCIUM CHLORIDE, MAGNESIUM CHLORIDE, DEXTROSE MONOHYDRATE, LACTIC ACID, SODIUM CHLORIDE, SODIUM BICARBONATE AND POTASSIUM CHLORIDE 3000 ML/HR: 3.68; 3.05; 22; 5.4; 6.46; 3.09; .314 INJECTION INTRAVENOUS at 12:57

## 2025-01-20 RX ADMIN — CALCIUM CHLORIDE 0.5 G: 100 INJECTION INTRAVENOUS; INTRAVENTRICULAR at 12:18

## 2025-01-20 RX ADMIN — VASOPRESSIN 1 UNITS: 20 INJECTION INTRAVENOUS at 09:59

## 2025-01-20 RX ADMIN — NOREPINEPHRINE BITARTRATE 16 MCG: 1 INJECTION, SOLUTION, CONCENTRATE INTRAVENOUS at 09:17

## 2025-01-20 RX ADMIN — SODIUM CHLORIDE: 9 INJECTION, SOLUTION INTRAVENOUS at 08:04

## 2025-01-20 RX ADMIN — CALCIUM CHLORIDE 1 G: 100 INJECTION INTRAVENOUS; INTRAVENTRICULAR at 11:34

## 2025-01-20 RX ADMIN — NOREPINEPHRINE BITARTRATE 16 MCG: 1 INJECTION, SOLUTION, CONCENTRATE INTRAVENOUS at 13:15

## 2025-01-20 RX ADMIN — SODIUM BICARBONATE 50 MEQ: 42 INJECTION, SOLUTION INTRAVENOUS at 13:02

## 2025-01-20 RX ADMIN — FENTANYL CITRATE 50 MCG: 50 INJECTION, SOLUTION INTRAMUSCULAR; INTRAVENOUS at 09:09

## 2025-01-20 RX ADMIN — ROCURONIUM BROMIDE 100 MG: 10 INJECTION INTRAVENOUS at 08:16

## 2025-01-20 RX ADMIN — PROTHROMBIN, COAGULATION FACTOR VII HUMAN, COAGULATION FACTOR IX HUMAN, COAGULATION FACTOR X HUMAN, PROTEIN C, PROTEIN S HUMAN, AND WATER 1000 UNITS: KIT at 17:35

## 2025-01-20 RX ADMIN — CLEVIPIDINE 2 MG/HR: 0.5 EMULSION INTRAVENOUS at 21:19

## 2025-01-20 RX ADMIN — ROCURONIUM BROMIDE 10 MG: 10 INJECTION INTRAVENOUS at 16:03

## 2025-01-20 RX ADMIN — MIDAZOLAM 1 MG: 1 INJECTION INTRAMUSCULAR; INTRAVENOUS at 08:15

## 2025-01-20 RX ADMIN — VASOPRESSIN 1 UNITS: 20 INJECTION INTRAVENOUS at 10:07

## 2025-01-20 RX ADMIN — FIBRINOGEN HUMAN 1074 MG: 1300 INJECTION, POWDER, LYOPHILIZED, FOR SOLUTION INTRAVENOUS at 16:47

## 2025-01-20 RX ADMIN — CALCIUM CHLORIDE 1 G: 100 INJECTION INTRAVENOUS; INTRAVENTRICULAR at 13:17

## 2025-01-20 RX ADMIN — CALCIUM CHLORIDE, MAGNESIUM CHLORIDE, DEXTROSE MONOHYDRATE, LACTIC ACID, SODIUM CHLORIDE, SODIUM BICARBONATE AND POTASSIUM CHLORIDE 3000 ML/HR: 3.68; 3.05; 22; 5.4; 6.46; 3.09; .314 INJECTION INTRAVENOUS at 09:42

## 2025-01-20 RX ADMIN — PROPOFOL 45 MCG/KG/MIN: 10 INJECTION, EMULSION INTRAVENOUS at 18:59

## 2025-01-20 RX ADMIN — FLUCONAZOLE 400 MG: 2 INJECTION, SOLUTION INTRAVENOUS at 04:52

## 2025-01-20 RX ADMIN — LIDOCAINE HYDROCHLORIDE 70 MG: 20 INJECTION, SOLUTION INFILTRATION; PERINEURAL at 08:16

## 2025-01-20 RX ADMIN — CALCIUM CHLORIDE, MAGNESIUM CHLORIDE, DEXTROSE MONOHYDRATE, LACTIC ACID, SODIUM CHLORIDE, SODIUM BICARBONATE AND POTASSIUM CHLORIDE 3000 ML/HR: 3.68; 3.05; 22; 5.4; 6.46; 3.09; .314 INJECTION INTRAVENOUS at 12:58

## 2025-01-20 RX ADMIN — PROPOFOL 15 MCG/KG/MIN: 10 INJECTION, EMULSION INTRAVENOUS at 23:28

## 2025-01-20 RX ADMIN — PIPERACILLIN SODIUM AND TAZOBACTAM SODIUM 3.38 G: 3; .375 INJECTION, SOLUTION INTRAVENOUS at 09:12

## 2025-01-20 RX ADMIN — SODIUM CHLORIDE: 9 INJECTION, SOLUTION INTRAVENOUS at 18:13

## 2025-01-20 RX ADMIN — NOREPINEPHRINE BITARTRATE 16 MCG: 1 INJECTION, SOLUTION, CONCENTRATE INTRAVENOUS at 13:07

## 2025-01-20 RX ADMIN — ROCURONIUM BROMIDE 30 MG: 10 INJECTION INTRAVENOUS at 09:36

## 2025-01-20 RX ADMIN — NOREPINEPHRINE BITARTRATE 16 MCG: 1 INJECTION, SOLUTION, CONCENTRATE INTRAVENOUS at 10:04

## 2025-01-20 RX ADMIN — SODIUM CHLORIDE, SODIUM GLUCONATE, SODIUM ACETATE, POTASSIUM CHLORIDE AND MAGNESIUM CHLORIDE: 526; 502; 368; 37; 30 INJECTION, SOLUTION INTRAVENOUS at 08:04

## 2025-01-20 RX ADMIN — NOREPINEPHRINE BITARTRATE 16 MCG: 1 INJECTION, SOLUTION, CONCENTRATE INTRAVENOUS at 13:20

## 2025-01-20 RX ADMIN — Medication 0.03 MCG/KG/MIN: at 10:04

## 2025-01-20 RX ADMIN — ALBUMIN HUMAN 250 ML: 0.05 INJECTION, SOLUTION INTRAVENOUS at 10:03

## 2025-01-20 RX ADMIN — NOREPINEPHRINE BITARTRATE 16 MCG: 1 INJECTION, SOLUTION, CONCENTRATE INTRAVENOUS at 13:05

## 2025-01-20 RX ADMIN — Medication 40 PERCENT: at 21:47

## 2025-01-20 RX ADMIN — CALCIUM CHLORIDE 0.5 G: 100 INJECTION INTRAVENOUS; INTRAVENTRICULAR at 12:28

## 2025-01-20 RX ADMIN — PIPERACILLIN SODIUM AND TAZOBACTAM SODIUM 3.38 G: 3; .375 INJECTION, SOLUTION INTRAVENOUS at 13:01

## 2025-01-20 RX ADMIN — DESMOPRESSIN ACETATE 36.6 MCG: 4 INJECTION, SOLUTION INTRAVENOUS; SUBCUTANEOUS at 11:37

## 2025-01-20 RX ADMIN — FUROSEMIDE 40 MG: 10 INJECTION, SOLUTION INTRAVENOUS at 13:36

## 2025-01-20 RX ADMIN — SODIUM BICARBONATE 50 MEQ: 42 INJECTION, SOLUTION INTRAVENOUS at 13:05

## 2025-01-20 RX ADMIN — CLEVIPIDINE 2 MG/HR: 0.5 EMULSION INTRAVENOUS at 20:01

## 2025-01-20 RX ADMIN — ROCURONIUM BROMIDE 30 MG: 10 INJECTION INTRAVENOUS at 08:55

## 2025-01-20 RX ADMIN — ALBUMIN HUMAN 500 ML: 0.05 INJECTION, SOLUTION INTRAVENOUS at 11:57

## 2025-01-20 RX ADMIN — PROPOFOL 30 MG: 10 INJECTION, EMULSION INTRAVENOUS at 18:58

## 2025-01-20 RX ADMIN — ROCURONIUM BROMIDE 30 MG: 10 INJECTION INTRAVENOUS at 11:38

## 2025-01-20 RX ADMIN — CALCIUM CHLORIDE, MAGNESIUM CHLORIDE, DEXTROSE MONOHYDRATE, LACTIC ACID, SODIUM CHLORIDE, SODIUM BICARBONATE AND POTASSIUM CHLORIDE 25 ML/KG/HR: 5.15; 2.03; 22; 5.4; 6.46; 3.09; .157 INJECTION INTRAVENOUS at 13:44

## 2025-01-20 RX ADMIN — GLYCOPYRROLATE 0.4 MG: 0.2 INJECTION INTRAMUSCULAR; INTRAVENOUS at 09:19

## 2025-01-20 RX ADMIN — PIPERACILLIN SODIUM AND TAZOBACTAM SODIUM 3.38 G: 3; .375 INJECTION, SOLUTION INTRAVENOUS at 03:37

## 2025-01-20 RX ADMIN — ROCURONIUM BROMIDE 20 MG: 10 INJECTION INTRAVENOUS at 09:55

## 2025-01-20 RX ADMIN — CALCIUM CHLORIDE 1 G: 100 INJECTION INTRAVENOUS; INTRAVENTRICULAR at 14:00

## 2025-01-20 RX ADMIN — NOREPINEPHRINE BITARTRATE 16 MCG: 1 INJECTION, SOLUTION, CONCENTRATE INTRAVENOUS at 14:29

## 2025-01-20 RX ADMIN — HYDROMORPHONE HYDROCHLORIDE 1 MG: 1 INJECTION, SOLUTION INTRAMUSCULAR; INTRAVENOUS; SUBCUTANEOUS at 19:26

## 2025-01-20 RX ADMIN — PROTHROMBIN, COAGULATION FACTOR VII HUMAN, COAGULATION FACTOR IX HUMAN, COAGULATION FACTOR X HUMAN, PROTEIN C, PROTEIN S HUMAN, AND WATER 1000 UNITS: KIT at 19:04

## 2025-01-20 RX ADMIN — CALCIUM CHLORIDE 1 G: 100 INJECTION INTRAVENOUS; INTRAVENTRICULAR at 10:49

## 2025-01-20 RX ADMIN — FUROSEMIDE 80 MG: 10 INJECTION, SOLUTION INTRAVENOUS at 16:12

## 2025-01-20 RX ADMIN — SODIUM BICARBONATE 50 MEQ: 42 INJECTION, SOLUTION INTRAVENOUS at 13:42

## 2025-01-20 RX ADMIN — SODIUM CHLORIDE 20 MG: 9 INJECTION, SOLUTION INTRAVENOUS at 22:59

## 2025-01-20 RX ADMIN — ROCURONIUM BROMIDE 20 MG: 10 INJECTION INTRAVENOUS at 14:02

## 2025-01-20 RX ADMIN — NOREPINEPHRINE BITARTRATE 16 MCG: 1 INJECTION, SOLUTION, CONCENTRATE INTRAVENOUS at 13:55

## 2025-01-20 RX ADMIN — NOREPINEPHRINE BITARTRATE 16 MCG: 1 INJECTION, SOLUTION, CONCENTRATE INTRAVENOUS at 09:30

## 2025-01-20 RX ADMIN — ROCURONIUM BROMIDE 20 MG: 10 INJECTION INTRAVENOUS at 11:07

## 2025-01-20 RX ADMIN — PIPERACILLIN SODIUM AND TAZOBACTAM SODIUM 3.38 G: 3; .375 INJECTION, SOLUTION INTRAVENOUS at 17:00

## 2025-01-20 RX ADMIN — SODIUM BICARBONATE 50 MEQ: 42 INJECTION, SOLUTION INTRAVENOUS at 12:46

## 2025-01-20 RX ADMIN — CALCIUM CHLORIDE, MAGNESIUM CHLORIDE, DEXTROSE MONOHYDRATE, LACTIC ACID, SODIUM CHLORIDE, SODIUM BICARBONATE AND POTASSIUM CHLORIDE 3000 ML/HR: 3.68; 3.05; 22; 5.4; 6.46; 3.09; .314 INJECTION INTRAVENOUS at 12:59

## 2025-01-20 RX ADMIN — PROPOFOL 45 MCG/KG/MIN: 10 INJECTION, EMULSION INTRAVENOUS at 21:19

## 2025-01-20 RX ADMIN — HYDROMORPHONE HYDROCHLORIDE 0.5 MG: 1 INJECTION, SOLUTION INTRAMUSCULAR; INTRAVENOUS; SUBCUTANEOUS at 17:38

## 2025-01-20 RX ADMIN — ALBUMIN HUMAN: 0.25 SOLUTION INTRAVENOUS at 09:59

## 2025-01-20 RX ADMIN — DEXTROSE AND SODIUM CHLORIDE 40 ML/HR: 5; 450 INJECTION, SOLUTION INTRAVENOUS at 03:37

## 2025-01-20 RX ADMIN — HYDROMORPHONE HYDROCHLORIDE 0.5 MG: 1 INJECTION, SOLUTION INTRAMUSCULAR; INTRAVENOUS; SUBCUTANEOUS at 17:42

## 2025-01-20 RX ADMIN — CALCIUM CHLORIDE, MAGNESIUM CHLORIDE, DEXTROSE MONOHYDRATE, LACTIC ACID, SODIUM CHLORIDE, SODIUM BICARBONATE AND POTASSIUM CHLORIDE 25 ML/KG/HR: 5.15; 2.03; 22; 5.4; 6.46; 3.09; .157 INJECTION INTRAVENOUS at 13:45

## 2025-01-20 RX ADMIN — ALBUMIN HUMAN: 0.25 SOLUTION INTRAVENOUS at 09:58

## 2025-01-20 RX ADMIN — NOREPINEPHRINE BITARTRATE 32 MCG: 1 INJECTION, SOLUTION, CONCENTRATE INTRAVENOUS at 13:33

## 2025-01-20 RX ADMIN — NOREPINEPHRINE BITARTRATE 16 MCG: 1 INJECTION, SOLUTION, CONCENTRATE INTRAVENOUS at 13:09

## 2025-01-20 SDOH — SOCIAL STABILITY: SOCIAL INSECURITY: HAVE YOU HAD ANY THOUGHTS OF HARMING ANYONE ELSE?: NO

## 2025-01-20 SDOH — HEALTH STABILITY: MENTAL HEALTH: HOW MANY DRINKS CONTAINING ALCOHOL DO YOU HAVE ON A TYPICAL DAY WHEN YOU ARE DRINKING?: PATIENT DOES NOT DRINK

## 2025-01-20 SDOH — ECONOMIC STABILITY: INCOME INSECURITY: IN THE PAST 12 MONTHS HAS THE ELECTRIC, GAS, OIL, OR WATER COMPANY THREATENED TO SHUT OFF SERVICES IN YOUR HOME?: NO

## 2025-01-20 SDOH — SOCIAL STABILITY: SOCIAL INSECURITY: DOES ANYONE TRY TO KEEP YOU FROM HAVING/CONTACTING OTHER FRIENDS OR DOING THINGS OUTSIDE YOUR HOME?: NO

## 2025-01-20 SDOH — ECONOMIC STABILITY: TRANSPORTATION INSECURITY: IN THE PAST 12 MONTHS, HAS LACK OF TRANSPORTATION KEPT YOU FROM MEDICAL APPOINTMENTS OR FROM GETTING MEDICATIONS?: NO

## 2025-01-20 SDOH — SOCIAL STABILITY: SOCIAL INSECURITY
WITHIN THE LAST YEAR, HAVE YOU BEEN RAPED OR FORCED TO HAVE ANY KIND OF SEXUAL ACTIVITY BY YOUR PARTNER OR EX-PARTNER?: NO

## 2025-01-20 SDOH — SOCIAL STABILITY: SOCIAL INSECURITY: HAVE YOU HAD THOUGHTS OF HARMING ANYONE ELSE?: NO

## 2025-01-20 SDOH — HEALTH STABILITY: MENTAL HEALTH: HOW OFTEN DO YOU HAVE SIX OR MORE DRINKS ON ONE OCCASION?: NEVER

## 2025-01-20 SDOH — SOCIAL STABILITY: SOCIAL INSECURITY
WITHIN THE LAST YEAR, HAVE YOU BEEN KICKED, HIT, SLAPPED, OR OTHERWISE PHYSICALLY HURT BY YOUR PARTNER OR EX-PARTNER?: NO

## 2025-01-20 SDOH — HEALTH STABILITY: MENTAL HEALTH: HOW OFTEN DO YOU HAVE A DRINK CONTAINING ALCOHOL?: NEVER

## 2025-01-20 SDOH — SOCIAL STABILITY: SOCIAL INSECURITY: WITHIN THE LAST YEAR, HAVE YOU BEEN HUMILIATED OR EMOTIONALLY ABUSED IN OTHER WAYS BY YOUR PARTNER OR EX-PARTNER?: NO

## 2025-01-20 SDOH — ECONOMIC STABILITY: HOUSING INSECURITY: AT ANY TIME IN THE PAST 12 MONTHS, WERE YOU HOMELESS OR LIVING IN A SHELTER (INCLUDING NOW)?: NO

## 2025-01-20 SDOH — ECONOMIC STABILITY: FOOD INSECURITY: WITHIN THE PAST 12 MONTHS, YOU WORRIED THAT YOUR FOOD WOULD RUN OUT BEFORE YOU GOT THE MONEY TO BUY MORE.: NEVER TRUE

## 2025-01-20 SDOH — SOCIAL STABILITY: SOCIAL INSECURITY: DO YOU FEEL ANYONE HAS EXPLOITED OR TAKEN ADVANTAGE OF YOU FINANCIALLY OR OF YOUR PERSONAL PROPERTY?: NO

## 2025-01-20 SDOH — ECONOMIC STABILITY: FOOD INSECURITY: WITHIN THE PAST 12 MONTHS, THE FOOD YOU BOUGHT JUST DIDN'T LAST AND YOU DIDN'T HAVE MONEY TO GET MORE.: NEVER TRUE

## 2025-01-20 SDOH — SOCIAL STABILITY: SOCIAL INSECURITY: ARE YOU OR HAVE YOU BEEN THREATENED OR ABUSED PHYSICALLY, EMOTIONALLY, OR SEXUALLY BY ANYONE?: NO

## 2025-01-20 SDOH — HEALTH STABILITY: MENTAL HEALTH: CURRENT SMOKER: 1

## 2025-01-20 SDOH — SOCIAL STABILITY: SOCIAL INSECURITY: ABUSE: ADULT

## 2025-01-20 SDOH — SOCIAL STABILITY: SOCIAL INSECURITY: WITHIN THE LAST YEAR, HAVE YOU BEEN AFRAID OF YOUR PARTNER OR EX-PARTNER?: NO

## 2025-01-20 SDOH — ECONOMIC STABILITY: HOUSING INSECURITY: IN THE LAST 12 MONTHS, WAS THERE A TIME WHEN YOU WERE NOT ABLE TO PAY THE MORTGAGE OR RENT ON TIME?: NO

## 2025-01-20 SDOH — SOCIAL STABILITY: SOCIAL INSECURITY: DO YOU FEEL UNSAFE GOING BACK TO THE PLACE WHERE YOU ARE LIVING?: NO

## 2025-01-20 SDOH — SOCIAL STABILITY: SOCIAL INSECURITY: ARE THERE ANY APPARENT SIGNS OF INJURIES/BEHAVIORS THAT COULD BE RELATED TO ABUSE/NEGLECT?: NO

## 2025-01-20 SDOH — SOCIAL STABILITY: SOCIAL INSECURITY: HAS ANYONE EVER THREATENED TO HURT YOUR FAMILY OR YOUR PETS?: NO

## 2025-01-20 SDOH — ECONOMIC STABILITY: FOOD INSECURITY: HOW HARD IS IT FOR YOU TO PAY FOR THE VERY BASICS LIKE FOOD, HOUSING, MEDICAL CARE, AND HEATING?: NOT HARD AT ALL

## 2025-01-20 SDOH — SOCIAL STABILITY: SOCIAL INSECURITY: WERE YOU ABLE TO COMPLETE ALL THE BEHAVIORAL HEALTH SCREENINGS?: YES

## 2025-01-20 SDOH — ECONOMIC STABILITY: HOUSING INSECURITY: IN THE PAST 12 MONTHS, HOW MANY TIMES HAVE YOU MOVED WHERE YOU WERE LIVING?: 0

## 2025-01-20 ASSESSMENT — COGNITIVE AND FUNCTIONAL STATUS - GENERAL
CLIMB 3 TO 5 STEPS WITH RAILING: A LITTLE
DRESSING REGULAR LOWER BODY CLOTHING: A LITTLE
WALKING IN HOSPITAL ROOM: A LITTLE
MOBILITY SCORE: 20
PATIENT BASELINE BEDBOUND: NO
MOVING TO AND FROM BED TO CHAIR: A LITTLE
DAILY ACTIVITIY SCORE: 23
STANDING UP FROM CHAIR USING ARMS: A LITTLE

## 2025-01-20 ASSESSMENT — LIFESTYLE VARIABLES
AUDIT-C TOTAL SCORE: 0
HOW MANY STANDARD DRINKS CONTAINING ALCOHOL DO YOU HAVE ON A TYPICAL DAY: PATIENT DOES NOT DRINK
AUDIT-C TOTAL SCORE: 0
SKIP TO QUESTIONS 9-10: 1
SKIP TO QUESTIONS 9-10: 1
HOW OFTEN DO YOU HAVE A DRINK CONTAINING ALCOHOL: NEVER
HOW OFTEN DO YOU HAVE 6 OR MORE DRINKS ON ONE OCCASION: NEVER
AUDIT-C TOTAL SCORE: 0

## 2025-01-20 ASSESSMENT — ACTIVITIES OF DAILY LIVING (ADL)
PATIENT'S MEMORY ADEQUATE TO SAFELY COMPLETE DAILY ACTIVITIES?: YES
LACK_OF_TRANSPORTATION: NO
WALKS IN HOME: INDEPENDENT
HEARING - LEFT EAR: FUNCTIONAL
BATHING: INDEPENDENT
LACK_OF_TRANSPORTATION: NO
DRESSING YOURSELF: INDEPENDENT
ASSISTIVE_DEVICE: DENTURES LOWER;DENTURES UPPER
HEARING - RIGHT EAR: FUNCTIONAL
FEEDING YOURSELF: INDEPENDENT
GROOMING: INDEPENDENT
ADEQUATE_TO_COMPLETE_ADL: YES
TOILETING: INDEPENDENT
JUDGMENT_ADEQUATE_SAFELY_COMPLETE_DAILY_ACTIVITIES: YES

## 2025-01-20 ASSESSMENT — PAIN - FUNCTIONAL ASSESSMENT
PAIN_FUNCTIONAL_ASSESSMENT: CPOT (CRITICAL CARE PAIN OBSERVATION TOOL)
PAIN_FUNCTIONAL_ASSESSMENT: 0-10
PAIN_FUNCTIONAL_ASSESSMENT: 0-10

## 2025-01-20 ASSESSMENT — COLUMBIA-SUICIDE SEVERITY RATING SCALE - C-SSRS
6. HAVE YOU EVER DONE ANYTHING, STARTED TO DO ANYTHING, OR PREPARED TO DO ANYTHING TO END YOUR LIFE?: NO
2. HAVE YOU ACTUALLY HAD ANY THOUGHTS OF KILLING YOURSELF?: NO
1. IN THE PAST MONTH, HAVE YOU WISHED YOU WERE DEAD OR WISHED YOU COULD GO TO SLEEP AND NOT WAKE UP?: NO

## 2025-01-20 ASSESSMENT — PAIN SCALES - GENERAL
PAINLEVEL_OUTOF10: 0 - NO PAIN
PAINLEVEL_OUTOF10: 0 - NO PAIN

## 2025-01-20 ASSESSMENT — PATIENT HEALTH QUESTIONNAIRE - PHQ9
1. LITTLE INTEREST OR PLEASURE IN DOING THINGS: NOT AT ALL
2. FEELING DOWN, DEPRESSED OR HOPELESS: NOT AT ALL
SUM OF ALL RESPONSES TO PHQ9 QUESTIONS 1 & 2: 0

## 2025-01-20 NOTE — H&P
Transplant Surgery History and Physical    Subjective   Chief Complaint/Reason for Admission: Liver and right kidney transplant     HPI:  Goran Ibrahim is a 54 y.o. 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  Patient feels well overall.   Denies recent juandice, ascites, hematemesis, melena, dark urine, acholic stools, or pruritus.   Denies any recent sick contacts, as well as fevers/chills, headache, dizziness, chest pain, cough, shortness of breath, nausea/vomiting, constipation/diarrhea, abdominal pain, weakness.    Kidney Info:  Etiology: DCD  CMV: Negative/Negative  EBV: Positive/Positive  HCV Ab/HEATH: Negative/Negative  HBcAb: Negative/Negative  ABsAg/HBV HEATH: Negative    Recent imaging includes:  - US liver on 11/2/24 with cirrhotic morphology of liver with sequela of portal htn with significant splenomegaly and flow reversal within the splenic vein  - ECHO on 6/25/24, which revealed an EF of 62%  - Stress Test on 4/26/24, which revealed no e/o inducible ischemia or prior ischemia     A 12-point ROS was performed and was unremarkable except as above.    PMH:  Past Medical History:   Diagnosis Date    Cirrhosis (Multi)     CKD (chronic kidney disease)     Hypertension      PSH:  Past Surgical History:   Procedure Laterality Date    IR ANGIOGRAM CELIAC  5/19/2023    IR ANGIOGRAM CELIAC 5/19/2023    US GUIDED ABDOMINAL PARACENTESIS  6/22/2023    US GUIDED ABDOMINAL PARACENTESIS 6/22/2023    US GUIDED ABDOMINAL PARACENTESIS  5/19/2023    US GUIDED ABDOMINAL PARACENTESIS 5/19/2023     Soc Hx:  Social History     Socioeconomic History    Marital status: Single     Spouse name: Not on file    Number of children: Not on file    Years of education: Not on file    Highest education level: Not on file   Occupational History    Not on file   Tobacco Use    Smoking status: Former     Types: Cigarettes    Smokeless tobacco: Never   Vaping Use    Vaping  status: Never Used   Substance and Sexual Activity    Alcohol use: Not Currently    Drug use: Never    Sexual activity: Yes     Partners: Female   Other Topics Concern    Not on file   Social History Narrative    Not on file     Social Drivers of Health     Financial Resource Strain: Patient Declined (4/18/2024)    Overall Financial Resource Strain (CARDIA)     Difficulty of Paying Living Expenses: Patient declined   Food Insecurity: No Food Insecurity (1/9/2025)    Hunger Vital Sign     Worried About Running Out of Food in the Last Year: Never true     Ran Out of Food in the Last Year: Never true   Transportation Needs: No Transportation Needs (8/10/2024)    Received from [facility]    [facility] - Transportation     Lack of Transportation (Medical): No     Lack of Transportation (Non-Medical): No   Physical Activity: Not on file   Stress: Not on file   Social Connections: Not on file   Intimate Partner Violence: Not At Risk (8/10/2024)    Received from [facility]    Humiliation, Afraid, Rape, and Kick questionnaire     Fear of Current or Ex-Partner: No     Emotionally Abused: No     Physically Abused: No     Sexually Abused: No   Housing Stability: Patient Declined (4/18/2024)    Housing Stability Vital Sign     Unable to Pay for Housing in the Last Year: Patient declined     Number of Places Lived in the Last Year: 1     Unstable Housing in the Last Year: Patient declined     Fam Hx:  No family history on file.   Allergies:  No Known Allergies  Current Medications:  No current facility-administered medications on file prior to encounter.     Current Outpatient Medications on File Prior to Encounter   Medication Sig Dispense Refill    amLODIPine (Norvasc) 5 mg tablet Take 1 tablet (5 mg) by mouth once daily. 90 tablet 1    carvedilol (Coreg) 6.25 mg tablet Take 1 tablet (6.25 mg) by mouth 2 times a day. 60 tablet 11    cholecalciferol (Vitamin D-3) 50 MCG (2000 UT) tablet Take 1 tablet (50 mcg) by mouth once  daily. 30 tablet 11    furosemide (Lasix) 40 mg tablet Take 1 tablet (40 mg) by mouth 2 times daily (morning and late afternoon). 60 tablet 11    lactulose 20 gram/30 mL oral solution Take 30 mL (20 g) by mouth 2 times a day. Take 30mL up to 3 times daily - Titrate to having 3-4 bowel movements daily 5400 mL 3    pantoprazole (ProtoNix) 40 mg EC tablet Take 1 tablet (40 mg) by mouth once daily in the morning. Take before meals. 30 tablet 11    rifAXIMin (Xifaxan) 550 mg tablet Take 1 tablet (550 mg) by mouth every 12 hours. 60 tablet 11    sodium bicarbonate 650 mg tablet Take 1 tablet (650 mg) by mouth 3 times a day. 90 tablet 11         Objective   Vitals:  There were no vitals taken for this visit.    Physical Exam:  GEN: No acute distress. Alert, awake and conversive.  HEENT: Sclera anicteric. Moist mucous membranes.  RESP: Breathing non-labored, equal chest rise. On RA.  CV: Regular rate, normotensive  GI: Abdomen soft, moderately distended, nontender.   : Voiding spontaneously.  MSK: No gross deformities. Moves all extremities spontaneously.  NEURO: Alert and oriented x3. No focal deficits.  PSYCH: Appropriate mood and affect.  SKIN:  Scattered bruising along RUEs    Labs:  No results found for this or any previous visit (from the past 24 hours).     Assessment   ASSESSMENT  Goran Ibrahim is a 54 y.o. male with hx cryptogenic cirrhosis who presented to [facility] as a direct admission for possible liver and right kidney transplant. Patient is in fair condition. Plan for OR Monday 1/20/2025 at 8 AM.     PLAN:  - To OR 8 am for SLK with Dr. Wright. Consent to be obtained prior.  - 10 units pRBCs, platelets, plasma placed on hold   - NPO now. NS @ 75cc/hr while NPO  - Ordered STAT basic labs, serologies, infectious workup including COVID swab and CXR.   - HLA transplant antibody panel  - Yoselyn-op antimicrobials: zosyn, fluconazole  - Induction immunosuppresants: solumedrol, simulect   - Reordered home meds: amlodipine  5 mg daily, Protonix 40 daily, Lasix 40 daily, Coreg 6.25 BID     Discussed with Dr. Wright, who agrees with the plan above.    Lester Uribe MD  PGY-1 General Surgery  Transplant Surgery [id number]

## 2025-01-20 NOTE — TELEPHONE ENCOUNTER
Patient has been called into the hospital for admission for a liver and right kidney transplant. ETA 45 minutes. Last ate .     UNOS ID: [id number]  Match ID: [id number]  Organ: liver  KDPI: 73.0  Known risk status: None  Local vs Import: local  HCV: Ab: Anti-HCV: Negative; HEATH: HCV HEATH: Negative  HepBcAb: Anti-HBc: Negative    Confirm the following:  Any COVID symptoms (nausea, vomiting, diarrhea, fever, chills, cough, sore throat, headache): YES-DESCRIBE/NO: No  Any contact with anyone positive for or suspected to have COVID: YES-DESCRIBE/NO: No  Any recent hospitalizations: YES-DESCRIBE/NO: No  Any recent illnesses: YES-DESCRIBE/NO: No  Any recent injuries (cracked teeth, broken bones, wounds that won’t heal): YES-DESCRIBE/NO: No  Any antibiotics: YES-DESCRIBE/NO: No  Any blood thinners: YES-DESCRIBE/NO: No  Any blood transfusions: YES-DESCRIBE/NO: No  When was last dialysis/type: YES/NA/NO: No    The last time they ate or drank anythin  Ask the surgeon whether or not the patient should be made NPO at this time.  It is not necessary for the patient to bring anything with them other than a current medication list and insurance information  Determine ETA to hospital: patient lives 35 minutes away from hospital  Patient aware of the possibility of a dry-run.

## 2025-01-20 NOTE — ANESTHESIA PROCEDURE NOTES
Airway  Date/Time: 1/20/2025 8:19 AM  Urgency: elective    Airway not difficult    Staffing  Performed: [facility]   Authorized by: Tay Johnson DO    Performed by: Everett Castillo MD  Patient location during procedure: OR    Indications and Patient Condition  Indications for airway management: anesthesia and airway protection  Spontaneous ventilation: present  Sedation level: deep  Preoxygenated: yes  Patient position: sniffing  MILS maintained throughout  Mask difficulty assessment: 0 - not attempted  Planned trial extubation    Final Airway Details  Final airway type: endotracheal airway      Successful airway: ETT  Cuffed: yes   Successful intubation technique: video laryngoscopy  Facilitating devices/methods: intubating stylet  Endotracheal tube insertion site: oral  Blade: Michael  Blade size: #4  ETT size (mm): 7.5  Cormack-Lehane Classification: grade I - full view of glottis  Placement verified by: chest auscultation, capnometry and palpation of cuff   Measured from: teeth  ETT to teeth (cm): 22  Number of attempts at approach: 1

## 2025-01-20 NOTE — ANESTHESIA PROCEDURE NOTES
Arterial Line:    Date/Time: 1/20/2025 8:30 AM    Staffing  Performed: [facility]   Authorized by: Tay Johnson DO    Performed by: Tay Johnson DO    An arterial line was placed. Procedure performed using surface landmarks.in the OR for the following indication(s): continuous blood pressure monitoring and blood sampling needed.    A 20 gauge (size), 12 cm (length), Arrow (type) catheter was placed into the Right radial artery, secured by Tegaderm,   Seldinger technique used.  Events:  patient tolerated procedure well with no complications and easy catheter advance over wire on left side first attempt with poor wave form. Second attempt on right side, successful on first attempt.

## 2025-01-20 NOTE — OP NOTE
Transplant Operative Note     Date: 2025  OR Location: [facility] OR    Name: Goran Ibrahim    : 1970    Age: 54 y.o.    MRN: [id number]     Sex: male    Diagnosis  Pre-op Diagnosis      * Hepatic cirrhosis, unspecified hepatic cirrhosis type, unspecified whether ascites present (Multi) [K74.60]     *ESRD Post-op Diagnosis     * Hepatic cirrhosis, unspecified hepatic cirrhosis type, unspecified whether ascites present (Multi) [K74.60]      *ESRD     Procedures  Backtable prep of donor liver, complex arterial reconstruction  Backtable prep of donor kidney, complex venous extension    Surgeons: Lois Kline MD, PhD    Assistant:  Lizandro Zavala MD    Procedure Details: The liver arrived to the operating form and the final ABO/organ verification was performed by myself and the circulating nurse. The liver was flushed and removed from [employer]. Then the liver backtable was completed. The vena cava was cleared of its perivascular tissues and the branches ligated. The shemar hepatis was dissected. The portal vein was dissected and its small branches ligated. After dissection of the artery, it was apparent that the donor graft had the following arterial anatomy: replaced RHA, this was reconstructed with SMA trunk to celiac trunk. The bile duct appeared normal. The liver was stored on ice until ready for implant.    Attention was turned to the kidney and the the final ABO/organ verification was performed by myself and the circulating nurse. The kidney was then prepared on the back table. The preservation method used was pulsatile preservation. The kidney was cleared of the perinephric fat and the vessels were dissected to the appropriate length. In addition, a venous extension was created by closing the ends of the vena cava with endovascular staple loads. The kidney was then stored on ice until ready for implant.      Lois Kline MD, PhD, MPH, FACS  Chief, MD Transplant and Hepatobiliary  Surgery

## 2025-01-20 NOTE — CARE PLAN
The patient's goals for the shift include Pt wants to go to surgury    The clinical goals for the shift include Pt will remain stable      Problem: Pain - Adult  Goal: Verbalizes/displays adequate comfort level or baseline comfort level  Outcome: Progressing     Problem: Safety - Adult  Goal: Free from fall injury  Outcome: Progressing     Problem: Discharge Planning  Goal: Discharge to home or other facility with appropriate resources  Outcome: Progressing     Problem: Chronic Conditions and Co-morbidities  Goal: Patient's chronic conditions and co-morbidity symptoms are monitored and maintained or improved  Outcome: Progressing     Problem: Fall/Injury  Goal: Not fall by end of shift  Outcome: Progressing  Goal: Be free from injury by end of the shift  Outcome: Progressing  Goal: Verbalize understanding of personal risk factors for fall in the hospital  Outcome: Progressing  Goal: Verbalize understanding of risk factor reduction measures to prevent injury from fall in the home  Outcome: Progressing  Goal: Use assistive devices by end of the shift  Outcome: Progressing  Goal: Pace activities to prevent fatigue by end of the shift  Outcome: Progressing

## 2025-01-20 NOTE — ANESTHESIA PROCEDURE NOTES
Peripheral IV  Date/Time: 1/20/2025 8:30 AM      Placement  Needle size: 16 G  Laterality: right  Location: hand  Local anesthetic: none  Site prep: alcohol  Technique: anatomical landmarks  Attempts: 1

## 2025-01-20 NOTE — ANESTHESIA PROCEDURE NOTES
Central Venous Line:    Date/Time: 1/20/2025 8:48 AM    A central venous line was placed in the OR for the following indication(s): central venous access and CVP monitoring.  Staffing  Performed: resident   Authorized by: Tay Johnson DO    Performed by: Everett Castillo MD    Sterility preparation included the following: provider hand hygiene performed prior to central venous catheter insertion, all 5 sterile barriers used (gloves, gown, cap, mask, large sterile drape) during central venous catheter insertion, antiseptic used during central venous catheter insertion and skin prep agent completely dried prior to procedure.  Medical reason for not performing maximal sterile barrier technique: no  The patient was placed in Trendelenburg position.    Right internal jugular vein was prepped.    The site was prepped with Chlorhexidine.  Size: 9 Fr (8 Fr)   Length: 11.5  Catheter type: introducer   Number of Lumens: triple lumen    This catheter was not an oximetric catheter.    During the procedure, the following specific steps were taken: target vein identified, needle advanced into vein and blood aspirated and guidewire advanced into vein.  Seldinger technique used.  Procedure performed using ultrasound guidance.  Sterile gel and probe cover used in ultrasound-guided central venous catheter insertion.    Intravenous verification was obtained by ultrasound, venous blood return and manometry.      Post insertion care included: all ports aspirated, all ports flushed easily, guidewire removed intact, Biopatch applied, line sutured in place and dressing applied.    During the procedure the patient experienced: patient tolerated procedure well with no complications.           images stored in chart

## 2025-01-20 NOTE — ANESTHESIA PROCEDURE NOTES
Peripheral IV  Date/Time: 1/20/2025 8:30 AM      Placement  Needle size: 20 G  Laterality: left  Location: forearm  Local anesthetic: none  Site prep: alcohol  Technique: anatomical landmarks  Attempts: 1

## 2025-01-20 NOTE — ANESTHESIA PREPROCEDURE EVALUATION
Patient: Goran Ibrahim    Procedure Information       Date/Time: 01/20/25 0600    Procedures:       TRANSPLANT, LIVER AND KIDNEY      TRANSPLANT, KIDNEY (Abdomen)      TRANSPLANT, LIVER    Location: [facility] OR 15 / [facility] OR    Surgeons: Priscila Wright MD            Summary (from patient interview and chart review):   54 yoM w/ h/o HTN, CKD4, decompensated alcoholic cirrhosis undergoing liver/kidney transplant with Dr. Wright 1/20/25. MELD 22. K 4. INR 1.1. H/H 10.2/29.9. PLT 57.    Relevant Problems   Anesthesia (within normal limits)      Cardiac   (+) Hypertension      Pulmonary (within normal limits)      Neuro (within normal limits)      GI   (+) Esophageal varices in alcoholic cirrhosis (Multi)      /Renal (within normal limits)      Liver   (+) Hepatic cirrhosis, unspecified hepatic cirrhosis type, unspecified whether ascites present (Multi)   (+) Liver cirrhosis (Multi)      Endocrine (within normal limits)      Hematology (within normal limits)      Musculoskeletal (within normal limits)      HEENT (within normal limits)      ID (within normal limits)      Skin (within normal limits)      GYN (within normal limits)        Medication Documentation Review Audit       Reviewed by Yohan Arreaga, RN (Registered Nurse) on 01/20/25 at 0348      Medication Order Taking? Sig Documenting Provider Last Dose Status   amLODIPine (Norvasc) 5 mg tablet [id number] Yes Take 1 tablet (5 mg) by mouth once daily. Seun Rodrigues MD 1/19/2025 Morning Active   carvedilol (Coreg) 6.25 mg tablet [id number] Yes Take 1 tablet (6.25 mg) by mouth 2 times a day. Cassius Francois MD 1/19/2025 Evening Active   cholecalciferol (Vitamin D-3) 50 MCG (2000 UT) tablet [id number] Yes Take 1 tablet (50 mcg) by mouth once daily. Cassius Francois MD 1/19/2025 Morning Active   furosemide (Lasix) 40 mg tablet [id number] Yes Take 1 tablet (40 mg) by mouth 2 times daily (morning and late afternoon). Cassius Francois MD 1/19/2025 Morning  "Active   lactulose 20 gram/30 mL oral solution [id number] Yes Take 30 mL (20 g) by mouth 2 times a day. Take 30mL up to 3 times daily - Titrate to having 3-4 bowel movements daily Cassius Francois MD Past Week Morning Active   pantoprazole (ProtoNix) 40 mg EC tablet [id number] Yes Take 1 tablet (40 mg) by mouth once daily in the morning. Take before meals. Cassius Francois MD 1/19/2025 Active   rifAXIMin (Xifaxan) 550 mg tablet [id number] Yes Take 1 tablet (550 mg) by mouth every 12 hours. Priscila Wright MD 1/19/2025 Evening Active   sodium bicarbonate 650 mg tablet [id number] Yes Take 1 tablet (650 mg) by mouth 3 times a day. Cassius Francois MD 1/19/2025 Active                    Visit Vitals  /67   Pulse 61   Temp 36 °C (96.8 °F) (Temporal)   Resp 18   Ht 1.778 m (5' 10\")   Wt 122 kg (269 lb 2.9 oz)   SpO2 97%   BMI 38.62 kg/m²   Smoking Status Former   BSA 2.45 m²            11/8/2024    10:20 AM 11/8/2024    10:23 AM 12/3/2024    10:04 AM 12/3/2024    10:05 AM 1/9/2025    10:32 AM 1/20/2025     2:31 AM 1/20/2025     7:02 AM   Vitals   Systolic 129 123 154 154 136 168 128   Diastolic 76 72 87 87 82 83 67   BP Location Right arm Left arm   Left arm Right arm    Heart Rate 63  66 66 66 70 61   Temp 36.3 °C (97.3 °F)  36.5 °C (97.7 °F) 36.5 °C (97.7 °F) 36.3 °C (97.3 °F) 37.1 °C (98.8 °F) 36 °C (96.8 °F)   Resp 20    18 14 18   Height 1.778 m (5' 10\")    1.778 m (5' 10\") 1.778 m (5' 10\")    Weight (lb) 275.7  274.1 274.1 267 269.18    BMI 39.56 kg/m2  39.33 kg/m2 39.33 kg/m2 38.31 kg/m2 38.62 kg/m2    BSA (m2) 2.48 m2  2.47 m2 2.47 m2 2.44 m2 2.45 m2    Visit Report   Report    Report Report    Report Report          Recent Labs:    Chemistry    Lab Results   Component Value Date/Time     01/20/2025 0312    K 4.0 01/20/2025 0312     (H) 01/20/2025 0312    CO2 19 (L) 01/20/2025 0312    BUN 50 (H) 01/20/2025 0312    CREATININE 3.24 (H) 01/20/2025 0312    Lab Results   Component Value Date/Time    CALCIUM 9.2 " 01/20/2025 0312    ALKPHOS 118 01/20/2025 0312    AST 41 (H) 01/20/2025 0312    ALT 20 01/20/2025 0312    BILITOT 1.2 01/20/2025 0312          Lab Results   Component Value Date/Time    WBC 3.8 (L) 01/20/2025 0312    HGB 10.2 (L) 01/20/2025 0312    HCT 29.9 (L) 01/20/2025 0312    PLT 57 (L) 01/20/2025 0312     Lab Results   Component Value Date/Time    PROTIME 12.6 01/20/2025 0312    INR 1.1 01/20/2025 0312       Electrocardiogram:  No results found for this or any previous visit (from the past 4464 hours).    Echocardiogram:  Results for orders placed during the hospital encounter of 07/29/24    Transthoracic Echo Complete    [facility], [address] [place] [address]  Tel [phone] and Fax [phone]    TRANSTHORACIC ECHOCARDIOGRAM REPORT      Patient Name:      MANDA Medeiros Physician:    [id number] Hamida Bridges MD  Study Date:        7/29/2024            Ordering Provider:    [id number] JUAQUIN BANKS  MRN/PID:           [id number]             Fellow:  Accession#:        [id number]         Nurse:                Milla Morris RN  Date of Birth/Age: 1970 / 54 years Sonographer:          Agatha Aguilera RDCS  Gender:            M                    Additional Staff:  Height:            175.26 cm            Admit Date:  Weight:            120.66 kg            Admission Status:     Outpatient  BSA / BMI:         2.33 m2 / 39.28      Encounter#:           [id number]  kg/m2  Blood Pressure:    178/65 mmHg          Department Location:  [facility]  Echo Lab    Study Type:    TRANSTHORACIC ECHO (TTE) COMPLETE  Diagnosis/ICD: Encounter for preprocedural cardiovascular examination-Z01.810  Indication:    Pre liver transplant eval  CPT Code:      Echo Complete w Full Doppler-24704    Patient History:  Pertinent History: HTN. Cirrhosis.    Study Detail: The following Echo studies were performed: 2D, M-Mode, Doppler and  color flow. Technically  challenging study due to body habitus.  Agitated saline used as a contrast agent for intraseptal flow  evaluation.      PHYSICIAN INTERPRETATION:  Left Ventricle: Left ventricular ejection fraction is normal, calculated by Gan's biplane at 62%. The left ventricular cavity size is mildly dilated. Spectral Doppler shows an abnormal pattern of left ventricular diastolic filling.  Left Atrium: The left atrium is mildly dilated. There is no evidence of a patent foramen ovale. A bubble study using agitated saline was performed.  Right Ventricle: The right ventricle is mildly enlarged. There is normal right ventricular global systolic function.  Right Atrium: The right atrium is mildly dilated.  Aortic Valve: The aortic valve is trileaflet. There is mild aortic valve regurgitation. The peak instantaneous gradient of the aortic valve is 9.9 mmHg.  Mitral Valve: The mitral valve is normal in structure. There is mild mitral annular calcification. There is trace mitral valve regurgitation.  Tricuspid Valve: The tricuspid valve is structurally normal. There is trace tricuspid regurgitation. The Doppler estimated RVSP is within normal limits at 28.0 mmHg.  Pulmonic Valve: The pulmonic valve is structurally normal. There is physiologic pulmonic valve regurgitation.  Pericardium: There is a trivial pericardial effusion.  Aorta: The aortic root is normal. There is no dilatation of the ascending aorta.  Systemic Veins: The inferior vena cava appears to be of normal size.  In comparison to the previous echocardiogram(s): There are no prior studies on this patient for comparison purposes.      CONCLUSIONS:  1. Left ventricular ejection fraction is normal, calculated by Gan's biplane at 62%.  2. Spectral Doppler shows an abnormal pattern of left ventricular diastolic filling.  3. Left ventricular cavity size is mildly dilated.  4. Mildly enlarged right ventricle.  5. There is normal right ventricular global systolic  function.  6. The left atrium is mildly dilated.  7. RVSP within normal limits.  8. Mild aortic valve regurgitation.  9. There is no evidence of a patent foramen ovale.    QUANTITATIVE DATA SUMMARY:  2D MEASUREMENTS:  Normal Ranges:  Ao Root d:     2.60 cm   (2.0-3.7cm)  LAs:           5.44 cm   (2.7-4.0cm)  RVIDd:         3.36 cm   (0.9-3.6cm)  IVSd:          1.06 cm   (0.6-1.1cm)  LVPWd:         0.94 cm   (0.6-1.1cm)  LVIDd:         5.46 cm   (3.9-5.9cm)  LVIDs:         3.51 cm  LV Mass Index: 90.4 g/m2  LV % FS        35.8 %    LA VOLUME:  Normal Ranges:  LA Vol A4C:       104.3 ml   (22+/-6mL/m2)  LA Vol A2C:       89.8 ml  LA Vol BP:        97.6 ml  LA Vol Index A4C: 44.7ml/m2  LA Vol Index A2C: 38.5 ml/m2  LA Vol Index BP:  41.9 ml/m2  LA Volume Index:  41.9 ml/m2  LA Vol A4C:       100.1 ml  LA Vol A2C:       86.5 ml    RA VOLUME BY A/L METHOD:  Normal Ranges:  RA Area A4C: 25.7 cm2    AORTA MEASUREMENTS:  Normal Ranges:  Asc Ao, d: 3.20 cm (2.1-3.4cm)    LV SYSTOLIC FUNCTION BY 2D PLANIMETRY (MOD):  Normal Ranges:  EF-A4C View:    64 % (>=55%)  EF-A2C View:    58 %  EF-Biplane:     62 %  LV EF Reported: 62 %    LV DIASTOLIC FUNCTION:  Normal Ranges:  MV Peak E:        1.11 m/s    (0.7-1.2 m/s)  MV Peak A:        0.99 m/s    (0.42-0.7 m/s)  E/A Ratio:        1.13        (1.0-2.2)  MV A Dur:         152.25 msec  a'                0.09 m/s  PulmV Sys Oli:    70.57 cm/s  PulmV Leal Oli:   75.71 cm/s  PulmV S/D Oli:    0.93  PulmV A Revs Oli: 27.78 cm/s  PulmV A Revs Dur: 221.45 msec    MITRAL VALVE:  Normal Ranges:  MV DT: 329 msec (150-240msec)    AORTIC VALVE:  Normal Ranges:  AoV Vmax:      1.57 m/s (<=1.7m/s)  AoV Peak P.9 mmHg (<20mmHg)  LVOT Max Oli:  1.25 m/s (<=1.1m/s)  LVOT VTI:      31.29 cm  LVOT Diameter: 2.46 cm  (1.8-2.4cm)  AoV Area,Vmax: 3.80 cm2 (2.5-4.5cm2)      RIGHT VENTRICLE:  RV Basal 4.80 cm  RV Mid   3.40 cm  RV Major 10.1 cm  TAPSE:   30.0 mm  RV s'    0.13 m/s    TRICUSPID  VALVE/RVSP:  Normal Ranges:  Peak TR Velocity: 2.50 m/s  RV Syst Pressure: 28.0 mmHg (< 30mmHg)    PULMONIC VALVE:  Normal Ranges:  PV Max Oli: 1.0 m/s  (0.6-0.9m/s)  PV Max P.0 mmHg    Pulmonary Veins:  PulmV A Revs Dur: 221.45 msec  PulmV A Revs Oli: 27.78 cm/s  PulmV Leal Oli:   75.71 cm/s  PulmV S/D Oli:    0.93  PulmV Sys Oli:    70.57 cm/s    AORTA:  Asc Ao Diam 3.19 cm      [id number] Hamida Bridges MD  Electronically signed on 2024 at 10:19:09 AM        ** Final (Updated) **      Above information reviewed including relevant HPI, PMHx, PSHx, anesthesia history, labs, and imaging. I discussed the anesthesia plan with the patient and/or family and reviewed the risks, benefits and alternatives. Agree to proceed.        Clinical information reviewed:    Allergies  Meds               NPO Detail:  NPO/Void Status  Date of Last Liquid: 25  Date of Last Solid: 25         Physical Exam    Airway  Mallampati: II  TM distance: >3 FB  Neck ROM: full     Cardiovascular   Rhythm: regular  Rate: normal     Dental   (+) upper dentures, lower dentures     Pulmonary   Breath sounds clear to auscultation     Abdominal   (+) obese  Abdomen: soft       Other findings: Fluid wave present          Anesthesia Plan    History of general anesthesia?: yes  History of complications of general anesthesia?: no    ASA 4     general   (Risks, benefits, and alternatives to intraoperative BELLO were explained to the patient. The patient indicated understanding and agreed to proceed.  )  The patient is a current smoker.  Patient was previously instructed to abstain from smoking on day of procedure.  Patient smoked on day of procedure.    intravenous induction   Postoperative administration of opioids is intended.  Trial extubation is planned.  Anesthetic plan and risks discussed with patient.  Use of blood products discussed with patient who consented to blood products.    Plan discussed with CAA, resident and  attending.

## 2025-01-21 ENCOUNTER — DOCUMENTATION (OUTPATIENT)
Dept: TRANSPLANT | Facility: HOSPITAL | Age: 55
End: 2025-01-21
Payer: MEDICAID

## 2025-01-21 ENCOUNTER — LAB REQUISITION (OUTPATIENT)
Dept: LAB | Facility: CLINIC | Age: 55
End: 2025-01-21
Payer: MEDICAID

## 2025-01-21 ENCOUNTER — APPOINTMENT (OUTPATIENT)
Dept: RADIOLOGY | Facility: HOSPITAL | Age: 55
End: 2025-01-21
Payer: MEDICAID

## 2025-01-21 DIAGNOSIS — N18.6 END STAGE RENAL DISEASE (MULTI): ICD-10-CM

## 2025-01-21 LAB
ALBUMIN SERPL BCP-MCNC: 3.3 G/DL (ref 3.4–5)
ALBUMIN SERPL BCP-MCNC: 3.4 G/DL (ref 3.4–5)
ALBUMIN SERPL BCP-MCNC: 3.4 G/DL (ref 3.4–5)
ALBUMIN SERPL BCP-MCNC: 3.5 G/DL (ref 3.4–5)
ALBUMIN SERPL BCP-MCNC: 3.5 G/DL (ref 3.4–5)
ALP SERPL-CCNC: 109 U/L (ref 33–120)
ALP SERPL-CCNC: 133 U/L (ref 33–120)
ALP SERPL-CCNC: 157 U/L (ref 33–120)
ALP SERPL-CCNC: 173 U/L (ref 33–120)
ALT SERPL W P-5'-P-CCNC: 1080 U/L (ref 10–52)
ALT SERPL W P-5'-P-CCNC: 1173 U/L (ref 10–52)
ALT SERPL W P-5'-P-CCNC: 897 U/L (ref 10–52)
ALT SERPL W P-5'-P-CCNC: 964 U/L (ref 10–52)
ANION GAP BLDA CALCULATED.4IONS-SCNC: 10 MMO/L (ref 10–25)
ANION GAP BLDA CALCULATED.4IONS-SCNC: 12 MMO/L (ref 10–25)
ANION GAP BLDA CALCULATED.4IONS-SCNC: 12 MMO/L (ref 10–25)
ANION GAP BLDA CALCULATED.4IONS-SCNC: 13 MMO/L (ref 10–25)
ANION GAP BLDA CALCULATED.4IONS-SCNC: 13 MMO/L (ref 10–25)
ANION GAP BLDA CALCULATED.4IONS-SCNC: 15 MMO/L (ref 10–25)
ANION GAP SERPL CALC-SCNC: 15 MMOL/L (ref 10–20)
ANION GAP SERPL CALC-SCNC: 15 MMOL/L (ref 10–20)
ANION GAP SERPL CALC-SCNC: 18 MMOL/L (ref 10–20)
ANION GAP SERPL CALC-SCNC: 19 MMOL/L (ref 10–20)
APTT PPP: 31 SECONDS (ref 27–38)
APTT PPP: 33 SECONDS (ref 27–38)
AST SERPL W P-5'-P-CCNC: 1795 U/L (ref 9–39)
AST SERPL W P-5'-P-CCNC: 2871 U/L (ref 9–39)
AST SERPL W P-5'-P-CCNC: 4929 U/L (ref 9–39)
AST SERPL W P-5'-P-CCNC: 5535 U/L (ref 9–39)
BASE EXCESS BLDA CALC-SCNC: -0.1 MMOL/L (ref -2–3)
BASE EXCESS BLDA CALC-SCNC: -0.2 MMOL/L (ref -2–3)
BASE EXCESS BLDA CALC-SCNC: -0.3 MMOL/L (ref -2–3)
BASE EXCESS BLDA CALC-SCNC: -1.5 MMOL/L (ref -2–3)
BASE EXCESS BLDA CALC-SCNC: 1.3 MMOL/L (ref -2–3)
BASE EXCESS BLDA CALC-SCNC: 2.6 MMOL/L (ref -2–3)
BASOPHILS # BLD AUTO: 0.01 X10*3/UL (ref 0–0.1)
BASOPHILS NFR BLD AUTO: 0.2 %
BILIRUB DIRECT SERPL-MCNC: 4 MG/DL (ref 0–0.3)
BILIRUB DIRECT SERPL-MCNC: 4.6 MG/DL (ref 0–0.3)
BILIRUB DIRECT SERPL-MCNC: 5.1 MG/DL (ref 0–0.3)
BILIRUB DIRECT SERPL-MCNC: 5.2 MG/DL (ref 0–0.3)
BILIRUB SERPL-MCNC: 5.8 MG/DL (ref 0–1.2)
BILIRUB SERPL-MCNC: 6.6 MG/DL (ref 0–1.2)
BILIRUB SERPL-MCNC: 6.9 MG/DL (ref 0–1.2)
BILIRUB SERPL-MCNC: 7.7 MG/DL (ref 0–1.2)
BLOOD EXPIRATION DATE: NORMAL
BODY TEMPERATURE: 37 DEGREES CELSIUS
BUN SERPL-MCNC: 38 MG/DL (ref 6–23)
BUN SERPL-MCNC: 38 MG/DL (ref 6–23)
BUN SERPL-MCNC: 41 MG/DL (ref 6–23)
BUN SERPL-MCNC: 51 MG/DL (ref 6–23)
BUN SERPL-MCNC: 51 MG/DL (ref 6–23)
BUN SERPL-MCNC: 58 MG/DL (ref 6–23)
CA-I BLD-SCNC: 1.08 MMOL/L (ref 1.1–1.33)
CA-I BLD-SCNC: 1.11 MMOL/L (ref 1.1–1.33)
CA-I BLD-SCNC: 1.16 MMOL/L (ref 1.1–1.33)
CA-I BLDA-SCNC: 1.09 MMOL/L (ref 1.1–1.33)
CA-I BLDA-SCNC: 1.12 MMOL/L (ref 1.1–1.33)
CA-I BLDA-SCNC: 1.12 MMOL/L (ref 1.1–1.33)
CA-I BLDA-SCNC: 1.13 MMOL/L (ref 1.1–1.33)
CA-I BLDA-SCNC: 1.13 MMOL/L (ref 1.1–1.33)
CA-I BLDA-SCNC: 1.15 MMOL/L (ref 1.1–1.33)
CALCIUM SERPL-MCNC: 8.4 MG/DL (ref 8.6–10.6)
CALCIUM SERPL-MCNC: 8.5 MG/DL (ref 8.6–10.6)
CALCIUM SERPL-MCNC: 8.7 MG/DL (ref 8.6–10.6)
CALCIUM SERPL-MCNC: 8.8 MG/DL (ref 8.6–10.6)
CFT FORM KAOLIN IND BLD RES TEG: 1.4 MIN (ref 0.8–2.1)
CFT FORM KAOLIN IND BLD RES TEG: 1.7 MIN (ref 0.8–2.1)
CFT FORM KAOLIN IND BLD RES TEG: 3.4 MIN (ref 0.8–2.1)
CHLORIDE BLDA-SCNC: 103 MMOL/L (ref 98–107)
CHLORIDE BLDA-SCNC: 105 MMOL/L (ref 98–107)
CHLORIDE BLDA-SCNC: 106 MMOL/L (ref 98–107)
CHLORIDE BLDA-SCNC: 107 MMOL/L (ref 98–107)
CHLORIDE SERPL-SCNC: 101 MMOL/L (ref 98–107)
CHLORIDE SERPL-SCNC: 104 MMOL/L (ref 98–107)
CHLORIDE SERPL-SCNC: 104 MMOL/L (ref 98–107)
CHLORIDE SERPL-SCNC: 105 MMOL/L (ref 98–107)
CHLORIDE SERPL-SCNC: 106 MMOL/L (ref 98–107)
CHLORIDE SERPL-SCNC: 106 MMOL/L (ref 98–107)
CLOT ANGLE.KAOLIN INDUCED BLD RES TEG: 50 DEG (ref 63–78)
CLOT ANGLE.KAOLIN INDUCED BLD RES TEG: 71.1 DEG (ref 63–78)
CLOT ANGLE.KAOLIN INDUCED BLD RES TEG: 72.4 DEG (ref 63–78)
CLOT INIT KAO IND P HEP NEUT BLD RES TEG: 14.2 MIN (ref 4.6–9.1)
CLOT INIT KAO IND P HEP NEUT BLD RES TEG: 7.9 MIN (ref 4.3–8.3)
CLOT INIT KAO IND P HEP NEUT BLD RES TEG: 8.2 MIN (ref 4.3–8.3)
CLOT INIT KAO IND P HEP NEUT BLD RES TEG: 8.2 MIN (ref 4.6–9.1)
CLOT INIT KAO IND P HEP NEUT BLD RES TEG: 8.9 MIN (ref 4.6–9.1)
CLOT INIT KAO IND P HEP NEUT BLD RES TEG: 9.3 MIN (ref 4.3–8.3)
CO2 SERPL-SCNC: 24 MMOL/L (ref 21–32)
CO2 SERPL-SCNC: 25 MMOL/L (ref 21–32)
CO2 SERPL-SCNC: 27 MMOL/L (ref 21–32)
CO2 SERPL-SCNC: 27 MMOL/L (ref 21–32)
CREAT SERPL-MCNC: 2.32 MG/DL (ref 0.5–1.3)
CREAT SERPL-MCNC: 2.32 MG/DL (ref 0.5–1.3)
CREAT SERPL-MCNC: 2.52 MG/DL (ref 0.5–1.3)
CREAT SERPL-MCNC: 2.75 MG/DL (ref 0.5–1.3)
CREAT SERPL-MCNC: 2.83 MG/DL (ref 0.5–1.3)
CREAT SERPL-MCNC: 2.83 MG/DL (ref 0.5–1.3)
DISPENSE STATUS: NORMAL
EGFRCR SERPLBLD CKD-EPI 2021: 25 ML/MIN/1.73M*2
EGFRCR SERPLBLD CKD-EPI 2021: 25 ML/MIN/1.73M*2
EGFRCR SERPLBLD CKD-EPI 2021: 26 ML/MIN/1.73M*2
EGFRCR SERPLBLD CKD-EPI 2021: 29 ML/MIN/1.73M*2
EGFRCR SERPLBLD CKD-EPI 2021: 32 ML/MIN/1.73M*2
EGFRCR SERPLBLD CKD-EPI 2021: 32 ML/MIN/1.73M*2
EOSINOPHIL # BLD AUTO: 0.01 X10*3/UL (ref 0–0.7)
EOSINOPHIL NFR BLD AUTO: 0.2 %
ERYTHROCYTE [DISTWIDTH] IN BLOOD BY AUTOMATED COUNT: 15.9 % (ref 11.5–14.5)
ERYTHROCYTE [DISTWIDTH] IN BLOOD BY AUTOMATED COUNT: 16.2 % (ref 11.5–14.5)
ERYTHROCYTE [DISTWIDTH] IN BLOOD BY AUTOMATED COUNT: 16.3 % (ref 11.5–14.5)
ERYTHROCYTE [DISTWIDTH] IN BLOOD BY AUTOMATED COUNT: 16.5 % (ref 11.5–14.5)
EST. AVERAGE GLUCOSE BLD GHB EST-MCNC: 143 MG/DL
FIBRINOGEN BLD CALC-MCNC: 334 MG/DL (ref 278–581)
FIBRINOGEN BLD CALC-MCNC: 369 MG/DL (ref 278–581)
FIBRINOGEN BLD CALC-MCNC: 376 MG/DL (ref 278–581)
FIBRINOGEN PPP-MCNC: 234 MG/DL (ref 200–400)
FLOW ALLOCROSSMATCH PEAK: NORMAL
FLOW AUTOCROSSMATCH: NORMAL
GGT SERPL-CCNC: 204 U/L (ref 5–64)
GGT SERPL-CCNC: 241 U/L (ref 5–64)
GGT SERPL-CCNC: 275 U/L (ref 5–64)
GLUCOSE BLD MANUAL STRIP-MCNC: 184 MG/DL (ref 74–99)
GLUCOSE BLD MANUAL STRIP-MCNC: 219 MG/DL (ref 74–99)
GLUCOSE BLD MANUAL STRIP-MCNC: 239 MG/DL (ref 74–99)
GLUCOSE BLD MANUAL STRIP-MCNC: 249 MG/DL (ref 74–99)
GLUCOSE BLD MANUAL STRIP-MCNC: 252 MG/DL (ref 74–99)
GLUCOSE BLD MANUAL STRIP-MCNC: 258 MG/DL (ref 74–99)
GLUCOSE BLD MANUAL STRIP-MCNC: 259 MG/DL (ref 74–99)
GLUCOSE BLD MANUAL STRIP-MCNC: 277 MG/DL (ref 74–99)
GLUCOSE BLD MANUAL STRIP-MCNC: 288 MG/DL (ref 74–99)
GLUCOSE BLD MANUAL STRIP-MCNC: 327 MG/DL (ref 74–99)
GLUCOSE BLD MANUAL STRIP-MCNC: 365 MG/DL (ref 74–99)
GLUCOSE BLDA-MCNC: 193 MG/DL (ref 74–99)
GLUCOSE BLDA-MCNC: 199 MG/DL (ref 74–99)
GLUCOSE BLDA-MCNC: 217 MG/DL (ref 74–99)
GLUCOSE BLDA-MCNC: 226 MG/DL (ref 74–99)
GLUCOSE BLDA-MCNC: 263 MG/DL (ref 74–99)
GLUCOSE BLDA-MCNC: 279 MG/DL (ref 74–99)
GLUCOSE SERPL-MCNC: 184 MG/DL (ref 74–99)
GLUCOSE SERPL-MCNC: 219 MG/DL (ref 74–99)
GLUCOSE SERPL-MCNC: 265 MG/DL (ref 74–99)
GLUCOSE SERPL-MCNC: 279 MG/DL (ref 74–99)
HBA1C MFR BLD: 6.6 %
HCO3 BLDA-SCNC: 22.8 MMOL/L (ref 22–26)
HCO3 BLDA-SCNC: 23.6 MMOL/L (ref 22–26)
HCO3 BLDA-SCNC: 23.9 MMOL/L (ref 22–26)
HCO3 BLDA-SCNC: 24 MMOL/L (ref 22–26)
HCO3 BLDA-SCNC: 25.9 MMOL/L (ref 22–26)
HCO3 BLDA-SCNC: 27.2 MMOL/L (ref 22–26)
HCT VFR BLD AUTO: 25.4 % (ref 41–52)
HCT VFR BLD AUTO: 26.4 % (ref 41–52)
HCT VFR BLD AUTO: 28 % (ref 41–52)
HCT VFR BLD AUTO: 28.2 % (ref 41–52)
HCT VFR BLD EST: 28 % (ref 41–52)
HCT VFR BLD EST: 29 % (ref 41–52)
HCT VFR BLD EST: 30 % (ref 41–52)
HCT VFR BLD EST: 32 % (ref 41–52)
HCT VFR BLD EST: 34 % (ref 41–52)
HCT VFR BLD EST: 38 % (ref 41–52)
HGB BLD-MCNC: 10 G/DL (ref 13.5–17.5)
HGB BLD-MCNC: 10.1 G/DL (ref 13.5–17.5)
HGB BLD-MCNC: 9 G/DL (ref 13.5–17.5)
HGB BLD-MCNC: 9.3 G/DL (ref 13.5–17.5)
HGB BLDA-MCNC: 10.1 G/DL (ref 13.5–17.5)
HGB BLDA-MCNC: 10.6 G/DL (ref 13.5–17.5)
HGB BLDA-MCNC: 11.3 G/DL (ref 13.5–17.5)
HGB BLDA-MCNC: 12.6 G/DL (ref 13.5–17.5)
HGB BLDA-MCNC: 9.4 G/DL (ref 13.5–17.5)
HGB BLDA-MCNC: 9.7 G/DL (ref 13.5–17.5)
HLA RESULTS: NORMAL
HLA RESULTS: NORMAL
HLA-A+B+C AB NFR SER: NORMAL %
HLA-DP+DQ+DR AB NFR SER: NORMAL %
IMM GRANULOCYTES # BLD AUTO: 0.02 X10*3/UL (ref 0–0.7)
IMM GRANULOCYTES NFR BLD AUTO: 0.3 % (ref 0–0.9)
INHALED O2 CONCENTRATION: 21 %
INHALED O2 CONCENTRATION: 32 %
INHALED O2 CONCENTRATION: 36 %
INHALED O2 CONCENTRATION: 40 %
INR PPP: 1.4 (ref 0.9–1.1)
INR PPP: 1.4 (ref 0.9–1.1)
INR PPP: 1.5 (ref 0.9–1.1)
INR PPP: 1.6 (ref 0.9–1.1)
LACTATE BLDA-SCNC: 1.3 MMOL/L (ref 0.4–2)
LACTATE BLDA-SCNC: 1.6 MMOL/L (ref 0.4–2)
LACTATE BLDA-SCNC: 1.7 MMOL/L (ref 0.4–2)
LYMPHOCYTES # BLD AUTO: 0.12 X10*3/UL (ref 1.2–4.8)
LYMPHOCYTES NFR BLD AUTO: 2 %
MA KAOLIN BLD RES TEG: 51 MM (ref 52–69)
MA KAOLIN BLD RES TEG: 56.5 MM (ref 52–69)
MA KAOLIN BLD RES TEG: 57.9 MM (ref 52–69)
MA KAOLIN+TF BLD RES TEG: 53 MM (ref 52–70)
MA KAOLIN+TF BLD RES TEG: 59.1 MM (ref 52–70)
MA KAOLIN+TF BLD RES TEG: 60.2 MM (ref 52–70)
MA TF IND+IIB-IIIA INH BLD RES TEG: 18 MM (ref 15–32)
MA TF IND+IIB-IIIA INH BLD RES TEG: 20.2 MM (ref 15–32)
MA TF IND+IIB-IIIA INH BLD RES TEG: 20.6 MM (ref 15–32)
MAGNESIUM SERPL-MCNC: 1.63 MG/DL (ref 1.6–2.4)
MAGNESIUM SERPL-MCNC: 2.13 MG/DL (ref 1.6–2.4)
MAGNESIUM SERPL-MCNC: 2.18 MG/DL (ref 1.6–2.4)
MAGNESIUM SERPL-MCNC: 2.2 MG/DL (ref 1.6–2.4)
MCH RBC QN AUTO: 28.9 PG (ref 26–34)
MCH RBC QN AUTO: 29.1 PG (ref 26–34)
MCH RBC QN AUTO: 29.2 PG (ref 26–34)
MCH RBC QN AUTO: 29.7 PG (ref 26–34)
MCHC RBC AUTO-ENTMCNC: 35.2 G/DL (ref 32–36)
MCHC RBC AUTO-ENTMCNC: 35.4 G/DL (ref 32–36)
MCHC RBC AUTO-ENTMCNC: 35.7 G/DL (ref 32–36)
MCHC RBC AUTO-ENTMCNC: 35.8 G/DL (ref 32–36)
MCV RBC AUTO: 81 FL (ref 80–100)
MCV RBC AUTO: 82 FL (ref 80–100)
MCV RBC AUTO: 83 FL (ref 80–100)
MCV RBC AUTO: 83 FL (ref 80–100)
MONOCYTES # BLD AUTO: 0.3 X10*3/UL (ref 0.1–1)
MONOCYTES NFR BLD AUTO: 4.9 %
NEUTROPHILS # BLD AUTO: 5.65 X10*3/UL (ref 1.2–7.7)
NEUTROPHILS NFR BLD AUTO: 92.4 %
NRBC BLD-RTO: 0 /100 WBCS (ref 0–0)
OXYHGB MFR BLDA: 88.8 % (ref 94–98)
OXYHGB MFR BLDA: 93.8 % (ref 94–98)
OXYHGB MFR BLDA: 94.2 % (ref 94–98)
OXYHGB MFR BLDA: 95.2 % (ref 94–98)
OXYHGB MFR BLDA: 95.8 % (ref 94–98)
OXYHGB MFR BLDA: 95.9 % (ref 94–98)
PCO2 BLDA: 34 MM HG (ref 38–42)
PCO2 BLDA: 36 MM HG (ref 38–42)
PCO2 BLDA: 36 MM HG (ref 38–42)
PCO2 BLDA: 37 MM HG (ref 38–42)
PCO2 BLDA: 40 MM HG (ref 38–42)
PCO2 BLDA: 41 MM HG (ref 38–42)
PH BLDA: 7.41 PH (ref 7.38–7.42)
PH BLDA: 7.42 PH (ref 7.38–7.42)
PH BLDA: 7.42 PH (ref 7.38–7.42)
PH BLDA: 7.43 PH (ref 7.38–7.42)
PH BLDA: 7.43 PH (ref 7.38–7.42)
PH BLDA: 7.45 PH (ref 7.38–7.42)
PHOSPHATE SERPL-MCNC: 6.1 MG/DL (ref 2.5–4.9)
PHOSPHATE SERPL-MCNC: 6.4 MG/DL (ref 2.5–4.9)
PHOSPHATE SERPL-MCNC: 7.1 MG/DL (ref 2.5–4.9)
PHOSPHATE SERPL-MCNC: 7.6 MG/DL (ref 2.5–4.9)
PLATELET # BLD AUTO: 89 X10*3/UL (ref 150–450)
PLATELET # BLD AUTO: 90 X10*3/UL (ref 150–450)
PLATELET # BLD AUTO: 94 X10*3/UL (ref 150–450)
PLATELET # BLD AUTO: 98 X10*3/UL (ref 150–450)
PO2 BLDA: 100 MM HG (ref 85–95)
PO2 BLDA: 143 MM HG (ref 85–95)
PO2 BLDA: 61 MM HG (ref 85–95)
PO2 BLDA: 78 MM HG (ref 85–95)
PO2 BLDA: 79 MM HG (ref 85–95)
PO2 BLDA: 83 MM HG (ref 85–95)
POTASSIUM BLDA-SCNC: 3.7 MMOL/L (ref 3.5–5.3)
POTASSIUM BLDA-SCNC: 3.8 MMOL/L (ref 3.5–5.3)
POTASSIUM BLDA-SCNC: 3.9 MMOL/L (ref 3.5–5.3)
POTASSIUM BLDA-SCNC: 4 MMOL/L (ref 3.5–5.3)
POTASSIUM BLDA-SCNC: 4.2 MMOL/L (ref 3.5–5.3)
POTASSIUM BLDA-SCNC: 4.3 MMOL/L (ref 3.5–5.3)
POTASSIUM SERPL-SCNC: 3.7 MMOL/L (ref 3.5–5.3)
POTASSIUM SERPL-SCNC: 4 MMOL/L (ref 3.5–5.3)
POTASSIUM SERPL-SCNC: 4 MMOL/L (ref 3.5–5.3)
POTASSIUM SERPL-SCNC: 4.2 MMOL/L (ref 3.5–5.3)
PRODUCT BLOOD TYPE: 5100
PRODUCT CODE: NORMAL
PROT SERPL-MCNC: 5.5 G/DL (ref 6.4–8.2)
PROT SERPL-MCNC: 5.7 G/DL (ref 6.4–8.2)
PROTHROMBIN TIME: 15.7 SECONDS (ref 9.8–12.8)
PROTHROMBIN TIME: 16.3 SECONDS (ref 9.8–12.8)
PROTHROMBIN TIME: 16.4 SECONDS (ref 9.8–12.8)
PROTHROMBIN TIME: 18.3 SECONDS (ref 9.8–12.8)
RBC # BLD AUTO: 3.09 X10*6/UL (ref 4.5–5.9)
RBC # BLD AUTO: 3.19 X10*6/UL (ref 4.5–5.9)
RBC # BLD AUTO: 3.37 X10*6/UL (ref 4.5–5.9)
RBC # BLD AUTO: 3.5 X10*6/UL (ref 4.5–5.9)
SAO2 % BLDA: 92 % (ref 94–100)
SAO2 % BLDA: 95 % (ref 94–100)
SAO2 % BLDA: 96 % (ref 94–100)
SAO2 % BLDA: 98 % (ref 94–100)
SODIUM BLDA-SCNC: 135 MMOL/L (ref 136–145)
SODIUM BLDA-SCNC: 139 MMOL/L (ref 136–145)
SODIUM BLDA-SCNC: 139 MMOL/L (ref 136–145)
SODIUM BLDA-SCNC: 140 MMOL/L (ref 136–145)
SODIUM BLDA-SCNC: 140 MMOL/L (ref 136–145)
SODIUM BLDA-SCNC: 141 MMOL/L (ref 136–145)
SODIUM SERPL-SCNC: 140 MMOL/L (ref 136–145)
SODIUM SERPL-SCNC: 143 MMOL/L (ref 136–145)
SODIUM SERPL-SCNC: 143 MMOL/L (ref 136–145)
SODIUM SERPL-SCNC: 144 MMOL/L (ref 136–145)
TEST COMMENT: ABNORMAL
UNIT ABO: NORMAL
UNIT NUMBER: NORMAL
UNIT RH: NORMAL
UNIT VOLUME: 278
UNIT VOLUME: 279
UNIT VOLUME: 280
UNIT VOLUME: 282
UNIT VOLUME: 350
WBC # BLD AUTO: 4.9 X10*3/UL (ref 4.4–11.3)
WBC # BLD AUTO: 6.1 X10*3/UL (ref 4.4–11.3)
WBC # BLD AUTO: 6.8 X10*3/UL (ref 4.4–11.3)
WBC # BLD AUTO: 7.2 X10*3/UL (ref 4.4–11.3)
XM INTEP: NORMAL

## 2025-01-21 PROCEDURE — 74018 RADEX ABDOMEN 1 VIEW: CPT

## 2025-01-21 PROCEDURE — 85027 COMPLETE CBC AUTOMATED: CPT

## 2025-01-21 PROCEDURE — 99291 CRITICAL CARE FIRST HOUR: CPT

## 2025-01-21 PROCEDURE — 2500000002 HC RX 250 W HCPCS SELF ADMINISTERED DRUGS (ALT 637 FOR MEDICARE OP, ALT 636 FOR OP/ED): Mod: SE

## 2025-01-21 PROCEDURE — 82248 BILIRUBIN DIRECT: CPT

## 2025-01-21 PROCEDURE — 2500000004 HC RX 250 GENERAL PHARMACY W/ HCPCS (ALT 636 FOR OP/ED): Mod: SE

## 2025-01-21 PROCEDURE — 97166 OT EVAL MOD COMPLEX 45 MIN: CPT | Mod: GO

## 2025-01-21 PROCEDURE — 82374 ASSAY BLOOD CARBON DIOXIDE: CPT | Performed by: HOSPITALIST

## 2025-01-21 PROCEDURE — 71045 X-RAY EXAM CHEST 1 VIEW: CPT | Performed by: RADIOLOGY

## 2025-01-21 PROCEDURE — 99254 IP/OBS CNSLTJ NEW/EST MOD 60: CPT | Performed by: INTERNAL MEDICINE

## 2025-01-21 PROCEDURE — 71045 X-RAY EXAM CHEST 1 VIEW: CPT

## 2025-01-21 PROCEDURE — 2020000001 HC ICU ROOM DAILY

## 2025-01-21 PROCEDURE — 80047 BASIC METABLC PNL IONIZED CA: CPT | Performed by: HOSPITALIST

## 2025-01-21 PROCEDURE — 82947 ASSAY GLUCOSE BLOOD QUANT: CPT

## 2025-01-21 PROCEDURE — 97530 THERAPEUTIC ACTIVITIES: CPT | Mod: GO

## 2025-01-21 PROCEDURE — 84520 ASSAY OF UREA NITROGEN: CPT | Performed by: HOSPITALIST

## 2025-01-21 PROCEDURE — 84132 ASSAY OF SERUM POTASSIUM: CPT

## 2025-01-21 PROCEDURE — 2500000005 HC RX 250 GENERAL PHARMACY W/O HCPCS: Mod: SE

## 2025-01-21 PROCEDURE — 97162 PT EVAL MOD COMPLEX 30 MIN: CPT | Mod: GP | Performed by: PHYSICAL THERAPIST

## 2025-01-21 PROCEDURE — 85384 FIBRINOGEN ACTIVITY: CPT

## 2025-01-21 PROCEDURE — 84100 ASSAY OF PHOSPHORUS: CPT | Performed by: HOSPITALIST

## 2025-01-21 PROCEDURE — 84132 ASSAY OF SERUM POTASSIUM: CPT | Performed by: HOSPITALIST

## 2025-01-21 PROCEDURE — 85610 PROTHROMBIN TIME: CPT

## 2025-01-21 PROCEDURE — 82040 ASSAY OF SERUM ALBUMIN: CPT

## 2025-01-21 PROCEDURE — 83735 ASSAY OF MAGNESIUM: CPT

## 2025-01-21 PROCEDURE — 82330 ASSAY OF CALCIUM: CPT

## 2025-01-21 PROCEDURE — 84075 ASSAY ALKALINE PHOSPHATASE: CPT | Performed by: INTERNAL MEDICINE

## 2025-01-21 PROCEDURE — 99291 CRITICAL CARE FIRST HOUR: CPT | Performed by: STUDENT IN AN ORGANIZED HEALTH CARE EDUCATION/TRAINING PROGRAM

## 2025-01-21 PROCEDURE — 84295 ASSAY OF SERUM SODIUM: CPT

## 2025-01-21 PROCEDURE — 99232 SBSQ HOSP IP/OBS MODERATE 35: CPT | Performed by: STUDENT IN AN ORGANIZED HEALTH CARE EDUCATION/TRAINING PROGRAM

## 2025-01-21 PROCEDURE — 37799 UNLISTED PX VASCULAR SURGERY: CPT | Performed by: HOSPITALIST

## 2025-01-21 PROCEDURE — 2500000004 HC RX 250 GENERAL PHARMACY W/ HCPCS (ALT 636 FOR OP/ED): Mod: JZ,SE,TB

## 2025-01-21 PROCEDURE — 84100 ASSAY OF PHOSPHORUS: CPT

## 2025-01-21 PROCEDURE — 99254 IP/OBS CNSLTJ NEW/EST MOD 60: CPT | Performed by: STUDENT IN AN ORGANIZED HEALTH CARE EDUCATION/TRAINING PROGRAM

## 2025-01-21 PROCEDURE — 85025 COMPLETE CBC W/AUTO DIFF WBC: CPT | Performed by: INTERNAL MEDICINE

## 2025-01-21 PROCEDURE — 82977 ASSAY OF GGT: CPT

## 2025-01-21 PROCEDURE — 80053 COMPREHEN METABOLIC PANEL: CPT | Performed by: HOSPITALIST

## 2025-01-21 PROCEDURE — 37799 UNLISTED PX VASCULAR SURGERY: CPT

## 2025-01-21 PROCEDURE — 2500000001 HC RX 250 WO HCPCS SELF ADMINISTERED DRUGS (ALT 637 FOR MEDICARE OP): Mod: SE

## 2025-01-21 PROCEDURE — 85576 BLOOD PLATELET AGGREGATION: CPT

## 2025-01-21 PROCEDURE — 83036 HEMOGLOBIN GLYCOSYLATED A1C: CPT

## 2025-01-21 PROCEDURE — 93975 VASCULAR STUDY: CPT

## 2025-01-21 PROCEDURE — 83735 ASSAY OF MAGNESIUM: CPT | Performed by: INTERNAL MEDICINE

## 2025-01-21 PROCEDURE — 94003 VENT MGMT INPAT SUBQ DAY: CPT

## 2025-01-21 PROCEDURE — 82330 ASSAY OF CALCIUM: CPT | Performed by: HOSPITALIST

## 2025-01-21 RX ORDER — SODIUM CHLORIDE, SODIUM GLUCONATE, SODIUM ACETATE, POTASSIUM CHLORIDE AND MAGNESIUM CHLORIDE 30; 37; 368; 526; 502 MG/100ML; MG/100ML; MG/100ML; MG/100ML; MG/100ML
0-1000 INJECTION, SOLUTION INTRAVENOUS CONTINUOUS
Status: DISCONTINUED | OUTPATIENT
Start: 2025-01-21 | End: 2025-01-21

## 2025-01-21 RX ORDER — FLUCONAZOLE 200 MG/1
400 TABLET ORAL DAILY
Status: DISCONTINUED | OUTPATIENT
Start: 2025-01-22 | End: 2025-01-28 | Stop reason: HOSPADM

## 2025-01-21 RX ORDER — POTASSIUM CHLORIDE 14.9 MG/ML
20 INJECTION INTRAVENOUS ONCE
Status: COMPLETED | OUTPATIENT
Start: 2025-01-21 | End: 2025-01-21

## 2025-01-21 RX ORDER — MAGNESIUM SULFATE HEPTAHYDRATE 40 MG/ML
2 INJECTION, SOLUTION INTRAVENOUS ONCE
Status: COMPLETED | OUTPATIENT
Start: 2025-01-21 | End: 2025-01-21

## 2025-01-21 RX ORDER — PANTOPRAZOLE SODIUM 40 MG/10ML
40 INJECTION, POWDER, LYOPHILIZED, FOR SOLUTION INTRAVENOUS EVERY 12 HOURS
Status: DISCONTINUED | OUTPATIENT
Start: 2025-01-21 | End: 2025-01-21

## 2025-01-21 RX ORDER — FLUCONAZOLE 2 MG/ML
400 INJECTION, SOLUTION INTRAVENOUS EVERY 24 HOURS
Status: DISCONTINUED | OUTPATIENT
Start: 2025-01-21 | End: 2025-01-21

## 2025-01-21 RX ORDER — CALCIUM GLUCONATE 20 MG/ML
1 INJECTION, SOLUTION INTRAVENOUS EVERY 6 HOURS PRN
Status: DISCONTINUED | OUTPATIENT
Start: 2025-01-21 | End: 2025-01-22

## 2025-01-21 RX ORDER — NEOSTIGMINE METHYLSULFATE 1 MG/ML
5 INJECTION INTRAVENOUS ONCE
Status: COMPLETED | OUTPATIENT
Start: 2025-01-21 | End: 2025-01-21

## 2025-01-21 RX ORDER — HYDROMORPHONE HYDROCHLORIDE 0.2 MG/ML
0.2 INJECTION INTRAMUSCULAR; INTRAVENOUS; SUBCUTANEOUS EVERY 4 HOURS PRN
Status: DISCONTINUED | OUTPATIENT
Start: 2025-01-21 | End: 2025-01-21

## 2025-01-21 RX ORDER — GLYCOPYRROLATE 0.2 MG/ML
1 INJECTION INTRAMUSCULAR; INTRAVENOUS ONCE
Status: COMPLETED | OUTPATIENT
Start: 2025-01-21 | End: 2025-01-21

## 2025-01-21 RX ORDER — MYCOPHENOLATE MOFETIL 250 MG/1
1000 CAPSULE ORAL
Status: DISCONTINUED | OUTPATIENT
Start: 2025-01-21 | End: 2025-01-28 | Stop reason: HOSPADM

## 2025-01-21 RX ORDER — LABETALOL HYDROCHLORIDE 5 MG/ML
10 INJECTION, SOLUTION INTRAVENOUS ONCE
Status: COMPLETED | OUTPATIENT
Start: 2025-01-21 | End: 2025-01-21

## 2025-01-21 RX ORDER — DEXTROSE 50 % IN WATER (D50W) INTRAVENOUS SYRINGE
12.5
Status: DISCONTINUED | OUTPATIENT
Start: 2025-01-21 | End: 2025-01-26

## 2025-01-21 RX ORDER — MAGNESIUM SULFATE HEPTAHYDRATE 40 MG/ML
4 INJECTION, SOLUTION INTRAVENOUS EVERY 6 HOURS PRN
Status: DISCONTINUED | OUTPATIENT
Start: 2025-01-21 | End: 2025-01-22

## 2025-01-21 RX ORDER — DEXTROSE 50 % IN WATER (D50W) INTRAVENOUS SYRINGE
25
Status: DISCONTINUED | OUTPATIENT
Start: 2025-01-21 | End: 2025-01-26

## 2025-01-21 RX ORDER — HYDRALAZINE HYDROCHLORIDE 20 MG/ML
20 INJECTION INTRAMUSCULAR; INTRAVENOUS ONCE
Status: COMPLETED | OUTPATIENT
Start: 2025-01-21 | End: 2025-01-21

## 2025-01-21 RX ORDER — LIDOCAINE 560 MG/1
2 PATCH PERCUTANEOUS; TOPICAL; TRANSDERMAL DAILY
Status: DISCONTINUED | OUTPATIENT
Start: 2025-01-21 | End: 2025-01-28 | Stop reason: HOSPADM

## 2025-01-21 RX ORDER — INSULIN LISPRO 100 [IU]/ML
0-20 INJECTION, SOLUTION INTRAVENOUS; SUBCUTANEOUS EVERY 4 HOURS
Status: DISCONTINUED | OUTPATIENT
Start: 2025-01-21 | End: 2025-01-22

## 2025-01-21 RX ORDER — CALCIUM GLUCONATE 20 MG/ML
2 INJECTION, SOLUTION INTRAVENOUS EVERY 6 HOURS PRN
Status: DISCONTINUED | OUTPATIENT
Start: 2025-01-21 | End: 2025-01-22

## 2025-01-21 RX ORDER — POTASSIUM CHLORIDE 1.5 G/1.58G
20 POWDER, FOR SOLUTION ORAL ONCE
Status: DISCONTINUED | OUTPATIENT
Start: 2025-01-21 | End: 2025-01-22

## 2025-01-21 RX ORDER — FLUCONAZOLE 2 MG/ML
400 INJECTION, SOLUTION INTRAVENOUS EVERY 24 HOURS
Status: CANCELLED | OUTPATIENT
Start: 2025-01-21

## 2025-01-21 RX ORDER — CARVEDILOL 3.12 MG/1
3.12 TABLET ORAL 2 TIMES DAILY
Status: DISCONTINUED | OUTPATIENT
Start: 2025-01-21 | End: 2025-01-22

## 2025-01-21 RX ORDER — MAGNESIUM SULFATE HEPTAHYDRATE 40 MG/ML
2 INJECTION, SOLUTION INTRAVENOUS EVERY 6 HOURS PRN
Status: DISCONTINUED | OUTPATIENT
Start: 2025-01-21 | End: 2025-01-22

## 2025-01-21 RX ORDER — INSULIN LISPRO 100 [IU]/ML
0-10 INJECTION, SOLUTION INTRAVENOUS; SUBCUTANEOUS EVERY 4 HOURS
Status: DISCONTINUED | OUTPATIENT
Start: 2025-01-21 | End: 2025-01-21

## 2025-01-21 RX ORDER — SODIUM CHLORIDE, SODIUM GLUCONATE, SODIUM ACETATE, POTASSIUM CHLORIDE AND MAGNESIUM CHLORIDE 30; 37; 368; 526; 502 MG/100ML; MG/100ML; MG/100ML; MG/100ML; MG/100ML
60 INJECTION, SOLUTION INTRAVENOUS CONTINUOUS
Status: DISCONTINUED | OUTPATIENT
Start: 2025-01-21 | End: 2025-01-23

## 2025-01-21 RX ORDER — INSULIN LISPRO 100 [IU]/ML
0-15 INJECTION, SOLUTION INTRAVENOUS; SUBCUTANEOUS EVERY 4 HOURS
Status: DISCONTINUED | OUTPATIENT
Start: 2025-01-21 | End: 2025-01-21

## 2025-01-21 RX ORDER — AMOXICILLIN 250 MG
2 CAPSULE ORAL 2 TIMES DAILY
Status: DISCONTINUED | OUTPATIENT
Start: 2025-01-21 | End: 2025-01-28 | Stop reason: HOSPADM

## 2025-01-21 RX ORDER — PANTOPRAZOLE SODIUM 40 MG/10ML
40 INJECTION, POWDER, LYOPHILIZED, FOR SOLUTION INTRAVENOUS
Status: DISCONTINUED | OUTPATIENT
Start: 2025-01-21 | End: 2025-01-21

## 2025-01-21 RX ORDER — PREDNISONE 20 MG/1
20 TABLET ORAL DAILY
Status: DISCONTINUED | OUTPATIENT
Start: 2025-01-25 | End: 2025-01-28 | Stop reason: HOSPADM

## 2025-01-21 RX ORDER — ASPIRIN 81 MG/1
81 TABLET ORAL DAILY
Status: DISCONTINUED | OUTPATIENT
Start: 2025-01-21 | End: 2025-01-28 | Stop reason: HOSPADM

## 2025-01-21 RX ORDER — URSODIOL 300 MG/1
300 CAPSULE ORAL EVERY 8 HOURS
Status: DISCONTINUED | OUTPATIENT
Start: 2025-01-21 | End: 2025-01-28 | Stop reason: HOSPADM

## 2025-01-21 RX ORDER — PREDNISONE 20 MG/1
20 TABLET ORAL DAILY
Status: DISCONTINUED | OUTPATIENT
Start: 2025-01-25 | End: 2025-01-21

## 2025-01-21 RX ORDER — PANTOPRAZOLE SODIUM 40 MG/1
40 TABLET, DELAYED RELEASE ORAL 2 TIMES DAILY
Status: DISCONTINUED | OUTPATIENT
Start: 2025-01-21 | End: 2025-01-28 | Stop reason: HOSPADM

## 2025-01-21 RX ORDER — AMLODIPINE BESYLATE 5 MG/1
5 TABLET ORAL DAILY
Status: DISCONTINUED | OUTPATIENT
Start: 2025-01-22 | End: 2025-01-26

## 2025-01-21 RX ADMIN — URSODIOL 300 MG: 300 CAPSULE ORAL at 21:45

## 2025-01-21 RX ADMIN — HYDRALAZINE HYDROCHLORIDE 20 MG: 20 INJECTION INTRAMUSCULAR; INTRAVENOUS at 13:14

## 2025-01-21 RX ADMIN — DEXTROSE MONOHYDRATE 250 MG: 50 INJECTION, SOLUTION INTRAVENOUS at 08:47

## 2025-01-21 RX ADMIN — ASPIRIN 81 MG: 81 TABLET, COATED ORAL at 13:21

## 2025-01-21 RX ADMIN — PANTOPRAZOLE SODIUM 40 MG: 40 INJECTION, POWDER, FOR SOLUTION INTRAVENOUS at 06:11

## 2025-01-21 RX ADMIN — INSULIN LISPRO 6 UNITS: 100 INJECTION, SOLUTION INTRAVENOUS; SUBCUTANEOUS at 08:26

## 2025-01-21 RX ADMIN — GLYCOPYRROLATE 1 MG: 0.2 INJECTION, SOLUTION INTRAMUSCULAR; INTRAVENOUS at 00:35

## 2025-01-21 RX ADMIN — INSULIN LISPRO 2 UNITS: 100 INJECTION, SOLUTION INTRAVENOUS; SUBCUTANEOUS at 00:47

## 2025-01-21 RX ADMIN — LIDOCAINE 4% 2 PATCH: 40 PATCH TOPICAL at 14:05

## 2025-01-21 RX ADMIN — SODIUM CHLORIDE, SODIUM GLUCONATE, SODIUM ACETATE, POTASSIUM CHLORIDE AND MAGNESIUM CHLORIDE 60 ML/HR: 526; 502; 368; 37; 30 INJECTION, SOLUTION INTRAVENOUS at 08:34

## 2025-01-21 RX ADMIN — SENNOSIDES AND DOCUSATE SODIUM 2 TABLET: 50; 8.6 TABLET ORAL at 20:11

## 2025-01-21 RX ADMIN — INSULIN LISPRO 4 UNITS: 100 INJECTION, SOLUTION INTRAVENOUS; SUBCUTANEOUS at 04:05

## 2025-01-21 RX ADMIN — MAGNESIUM SULFATE HEPTAHYDRATE 2 G: 40 INJECTION, SOLUTION INTRAVENOUS at 00:41

## 2025-01-21 RX ADMIN — PIPERACILLIN SODIUM AND TAZOBACTAM SODIUM 3.38 G: 3; .375 INJECTION, SOLUTION INTRAVENOUS at 06:11

## 2025-01-21 RX ADMIN — POTASSIUM CHLORIDE 20 MEQ: 14.9 INJECTION, SOLUTION INTRAVENOUS at 00:41

## 2025-01-21 RX ADMIN — PIPERACILLIN SODIUM AND TAZOBACTAM SODIUM 3.38 G: 3; .375 INJECTION, SOLUTION INTRAVENOUS at 12:07

## 2025-01-21 RX ADMIN — Medication 3 L/MIN: at 21:56

## 2025-01-21 RX ADMIN — CALCIUM GLUCONATE 1 G: 20 INJECTION, SOLUTION INTRAVENOUS at 13:34

## 2025-01-21 RX ADMIN — INSULIN LISPRO 6 UNITS: 100 INJECTION, SOLUTION INTRAVENOUS; SUBCUTANEOUS at 12:30

## 2025-01-21 RX ADMIN — HYDRALAZINE HYDROCHLORIDE 20 MG: 20 INJECTION INTRAMUSCULAR; INTRAVENOUS at 22:55

## 2025-01-21 RX ADMIN — FLUCONAZOLE 400 MG: 2 INJECTION, SOLUTION INTRAVENOUS at 08:02

## 2025-01-21 RX ADMIN — MYCOPHENOLATE MOFETIL 1000 MG: 250 CAPSULE ORAL at 18:17

## 2025-01-21 RX ADMIN — CARVEDILOL 3.12 MG: 3.12 TABLET, FILM COATED ORAL at 20:12

## 2025-01-21 RX ADMIN — URSODIOL 300 MG: 300 CAPSULE ORAL at 13:21

## 2025-01-21 RX ADMIN — PANTOPRAZOLE SODIUM 40 MG: 40 TABLET, DELAYED RELEASE ORAL at 20:11

## 2025-01-21 RX ADMIN — INSULIN HUMAN 4 UNITS/HR: 1 INJECTION, SOLUTION INTRAVENOUS at 16:36

## 2025-01-21 RX ADMIN — Medication 2 L/MIN: at 05:00

## 2025-01-21 RX ADMIN — Medication 36 PERCENT: at 08:00

## 2025-01-21 RX ADMIN — LABETALOL HYDROCHLORIDE 10 MG: 5 INJECTION, SOLUTION INTRAVENOUS at 10:00

## 2025-01-21 RX ADMIN — PIPERACILLIN SODIUM AND TAZOBACTAM SODIUM 3.38 G: 3; .375 INJECTION, SOLUTION INTRAVENOUS at 01:09

## 2025-01-21 RX ADMIN — PIPERACILLIN SODIUM AND TAZOBACTAM SODIUM 3.38 G: 3; .375 INJECTION, SOLUTION INTRAVENOUS at 18:17

## 2025-01-21 RX ADMIN — NEOSTIGMINE METHYLSULFATE 5 MG: 1 INJECTION INTRAVENOUS at 00:35

## 2025-01-21 RX ADMIN — MYCOPHENOLATE MOFETIL 1000 MG: 500 INJECTION, POWDER, LYOPHILIZED, FOR SOLUTION INTRAVENOUS at 10:15

## 2025-01-21 ASSESSMENT — COGNITIVE AND FUNCTIONAL STATUS - GENERAL
DRESSING REGULAR LOWER BODY CLOTHING: A LOT
STANDING UP FROM CHAIR USING ARMS: A LOT
HELP NEEDED FOR BATHING: A LITTLE
TURNING FROM BACK TO SIDE WHILE IN FLAT BAD: A LITTLE
DRESSING REGULAR UPPER BODY CLOTHING: A LITTLE
MOBILITY SCORE: 13
MOVING TO AND FROM BED TO CHAIR: A LOT
TOILETING: A LOT
WALKING IN HOSPITAL ROOM: A LOT
MOVING FROM LYING ON BACK TO SITTING ON SIDE OF FLAT BED WITH BEDRAILS: A LITTLE
CLIMB 3 TO 5 STEPS WITH RAILING: TOTAL
DAILY ACTIVITIY SCORE: 18

## 2025-01-21 ASSESSMENT — PAIN SCALES - GENERAL
PAINLEVEL_OUTOF10: 0 - NO PAIN
PAINLEVEL_OUTOF10: 3
PAINLEVEL_OUTOF10: 0 - NO PAIN
PAINLEVEL_OUTOF10: 0 - NO PAIN
PAINLEVEL_OUTOF10: 5 - MODERATE PAIN
PAINLEVEL_OUTOF10: 3
PAINLEVEL_OUTOF10: 0 - NO PAIN
PAINLEVEL_OUTOF10: 0 - NO PAIN
PAINLEVEL_OUTOF10: 3
PAINLEVEL_OUTOF10: 3

## 2025-01-21 ASSESSMENT — PAIN - FUNCTIONAL ASSESSMENT
PAIN_FUNCTIONAL_ASSESSMENT: 0-10
PAIN_FUNCTIONAL_ASSESSMENT: CPOT (CRITICAL CARE PAIN OBSERVATION TOOL)
PAIN_FUNCTIONAL_ASSESSMENT: 0-10

## 2025-01-21 ASSESSMENT — ACTIVITIES OF DAILY LIVING (ADL)
ADL_ASSISTANCE: INDEPENDENT
BATHING_ASSISTANCE: MINIMAL
ADL_ASSISTANCE: INDEPENDENT

## 2025-01-21 NOTE — PROGRESS NOTES
Occupational Therapy    Evaluation and Treatment    Patient Name: Goran Ibrahim  MRN: [id number]  Today's Date: 1/21/2025  Room: 20/20  Time Calculation  Start Time: 0950  Stop Time: 1035  Time Calculation (min): 45 min    Assessment  IP OT Assessment  OT Assessment: Pt is a 54 year old male who demonstrates decreased strength, balance, activity tolerance, and cognition, which impedes occupational performance.  Prognosis: Good  Barriers to Discharge Home: Cognition needs  Cognition Needs: 24hr supervision for safety awareness needed, Medication and/or medical management daily assist needed  Evaluation/Treatment Tolerance: Patient tolerated treatment well  Medical Staff Made Aware: Yes  End of Session Communication: Bedside nurse  End of Session Patient Position: Up in chair, Alarm on    Plan:  Inpatient Plan  Treatment Interventions: ADL retraining, Visual perceptual retraining, Functional transfer training, UE strengthening/ROM, Endurance training, Cognitive reorientation, Patient/family training, Equipment evaluation/education, Neuromuscular reeducation, Fine motor coordination activities, Compensatory technique education  OT Frequency: 3 times per week  OT Discharge Recommendations: Low intensity level of continued care, 24 hr supervision due to cognition  Equipment Recommended upon Discharge:  (TBD)  OT Recommended Transfer Status: Assist of 1  OT - OK to Discharge: Yes  OT Assessment  OT Assessment Results: Decreased ADL status, Decreased safe judgment during ADL, Decreased cognition, Decreased endurance, Decreased gross motor control, Decreased functional mobility, Decreased IADLs  Prognosis: Good  Evaluation/Treatment Tolerance: Patient tolerated treatment well  Medical Staff Made Aware: Yes    Subjective   Current Problem:  1. Hepatic cirrhosis, unspecified hepatic cirrhosis type, unspecified whether ascites present (Multi)  Case Request Operating Room: TRANSPLANT, LIVER AND KIDNEY    Case Request Operating  Room: TRANSPLANT, LIVER AND KIDNEY    Anesthesia Intraoperative Transesophageal Echocardiogram    Anesthesia Intraoperative Transesophageal Echocardiogram    Surgical Pathology Exam    Surgical Pathology Exam      2. Pre-kidney transplant, listed        3. Cardiomyopathy, unspecified  Anesthesia Intraoperative Transesophageal Echocardiogram      4. ESRD (end stage renal disease) (Multi)          General:  Reason for Referral: Liver and kidney transplant 1/20  Past Medical History Relevant to Rehab: PMH of HTN, HFpEF, CKD4, and cryptogenic liver cirrhosis (hx of alcohol use, but not heavy per chart review) c/b hepatic encephalopathy and esophageal varices s/p banding in June 2024  Prior to Session Communication: Bedside nurse  Patient Position Received: Bed, 3 rail up, Alarm off, not on at start of session  Family/Caregiver Present: No  General Comment: Pt supine in bed on arrival, agreeable to participate. On 4 L NC, x4 KAITLYNN drains. Hypertensive on arrival, but OK to see per RN and received IV medication for HTN at start of session.     Precautions:  Medical Precautions: Fall precautions  Post-Surgical Precautions: Abdominal surgery precautions  Precautions Comment: MAP 65-90    Vital Signs:   01/21/25 0950 01/21/25 1000 01/21/25 1035   Vital Signs   Vitals Session Pre OT  --  Post OT   Heart Rate 64 62 63   Resp 18 24 21   SpO2 97 % 94 % 94 %   /76 (!) 182/90 122/85   MAP (mmHg) 111 118 115   Vital Signs Comment  --   --  During: SBP mostly 170s but with decrease to 150s in standing.       Pain:  Pain Assessment  Pain Assessment: 0-10  0-10 (Numeric) Pain Score: 3  Pain Type: Acute pain  Pain Location: Abdomen (back)  Response to Interventions: Resting quietly    Lines/Tubes/Drains:  CVC 01/20/25 Triple lumen Right Internal jugular (Active)   Number of days: 1       Arterial Line 01/20/25 Radial (Active)   Number of days: 1       Urethral Catheter Coude 14 Fr. (Active)   Number of days: 1       Closed/Suction  "Drain 3 Right Hip Bulb 19 Fr. (Active)   Number of days: 0       Closed/Suction Drain 4 LUQ Bulb 19 Fr. (Active)   Number of days: 0       Closed/Suction Drain 2 Superior RUQ Bulb 19 Fr. (Active)   Number of days: 0       Closed/Suction Drain 1 Lateral RUQ Bulb 19 Fr. (Active)   Number of days: 0       NG/OG/Feeding Tube NG - [place] sump Right nostril (Active)   Number of days: 0     Objective   Cognition:  Overall Cognitive Status: Impaired  Orientation Level: Oriented X4  Following Commands: Follows one step commands with increased time  Cognition Comments: Delayed information processing 20-30 seconds. Reports this is baseline.  Attention: Exceptions to WFL  Selective Attention: Impaired  Sustained Attention: Impaired  Memory: Exceptions to WFL  Short-Term Memory: Impaired  Working Memory: Impaired  Memory Comments: Reports baseline deficits.  Processing Speed: Delayed  Processing Time (Minutes/Seconds): 20-30 seconds  Cognition Test Scores  Cognition Tests: Cognition Test Performed  SBT Test Score: Pt scored a 8/28 indicating a questionable cognitive impairment.        Home Living:  Type of Home: House  Lives With:  (reports he usually lives alone but will have \"people' staying with him when he discharges that can provide 24/7 support. Did not elaborate.)  Home Adaptive Equipment: None  Home Layout: Two level, Bed/bath upstairs, Stairs to alternate level with rails, Work area in basement  Alternate Level Stairs-Rails:  (1)  Alternate Level Stairs-Number of Steps: 13  Home Access: Stairs to enter with rails  Entrance Stairs-Rails:  (1)  Entrance Stairs-Number of Steps: 3  Bathroom Shower/Tub: Tub/shower unit  Bathroom Toilet: Standard  Bathroom Equipment: None     Prior Function:  Level of [place]: Independent with ADLs and functional transfers, Independent with homemaking with ambulation  ADL Assistance: Independent  Homemaking Assistance: Independent  Ambulatory Assistance: Independent  Vocational:  (reports " he is in process of applying for disability; 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.)  Leisure: Singing karaoke, ride his motorcycle  Hand Dominance: Right  Prior Function Comments: (+)drives, (-)falls     ADL:  Eating Assistance: Independent  Grooming Assistance: Modified independent (Device)  Grooming Deficit:  (seated)  Bathing Assistance: Minimal  UE Dressing Assistance: Minimal  LE Dressing Assistance: Moderate  LE Dressing Deficit:  (only able to perform figure four with 1 LE)  Toileting Assistance with Device: Moderate    Activity Tolerance:  Endurance: Tolerates 10 - 20 min exercise with multiple rests  Early Mobility/Exercise Safety Screen: Proceed with mobilization - No exclusion criteria met    Balance:  Dynamic Sitting Balance  Dynamic Sitting-Level of Assistance: Contact guard  Dynamic Standing Balance  Dynamic Standing-Level of Assistance: Minimum assistance  Static Sitting Balance  Static Sitting-Level of Assistance: Close supervision  Static Standing Balance  Static Standing-Level of Assistance: Minimum assistance    Bed Mobility/Transfers: Bed Mobility  Bed Mobility: Yes  Bed Mobility 1  Bed Mobility 1: Supine to sitting  Level of Assistance 1: Minimum assistance  Bed Mobility Comments 1: required extensive time to perform. HOB elevated. Educated on log roll technique.   and Transfers  Transfer: Yes  Transfer 1  Transfer From 1: Sit to  Transfer to 1: Stand  Transfer Level of Assistance 1: Minimum assistance  Trials/Comments 1: increased time to initiate task. Required cues for safety as pt attempted to use IV pole to stabilize for task.  Transfers 2  Transfer From 2: Bed to  Transfer to 2: Chair with arms  Transfer Level of Assistance 2: Minimum assistance, Hand held assistance  Trials/Comments 2: Increased time     Vision: Vision - Basic Assessment  Current Vision: Wears glasses for distance only    Sensation:  Light Touch: No apparent deficits    Strength:  Strength Comments: BUE >/=3/5,  resistance not applied 2/2 precautions     Coordination:  Movements are Fluid and Coordinated: Yes      Extremities:   RUE   RUE : Within Functional Limits, LUE   LUE: Within Functional Limits,        Treatment Completed on Evaluation    Activities of Daily Living:      LE Dressing  LE Dressing Comments: Pt introduced to sock aid and reacher for LBD as pt unable to perform figure four for task. Reports interest in use vs asking for assistance but with minimal active engagement in task.     Therapy/Activity:     Therapeutic Activity  Therapeutic Activity Performed: Yes  Therapeutic Activity 1: Pt required extensive time for all tasks and min cueing for attention to task as pt easily distracted by external stimuli. Increased time educating pt on abdominal precautions and compensatory ADLs and mobility.    Outcome Measures: [facility] Daily Activity  Putting on and taking off regular lower body clothing: A lot  Bathing (including washing, rinsing, drying): A little  Putting on and taking off regular upper body clothing: A little  Toileting, which includes using toilet, bedpan or urinal: A lot  Taking care of personal grooming such as brushing teeth: None  Eating Meals: None  Daily Activity - Total Score: 18        ICU Mobility Screen  Early Mobility/Exercise Safety Screen: Proceed with mobilization - No exclusion criteria met  ICU Mobility Scale: Transferring bed to chair,          Education Documentation  Body Mechanics, taught by Buffy Campos OT at 1/21/2025 12:44 PM.  Learner: Patient  Readiness: Acceptance  Method: Explanation, Demonstration  Response: Verbalizes Understanding, Demonstrated Understanding  Comment: abdominal precautions, OT goals/POC    Precautions, taught by Buffy Campos OT at 1/21/2025 12:44 PM.  Learner: Patient  Readiness: Acceptance  Method: Explanation, Demonstration  Response: Verbalizes Understanding, Demonstrated Understanding  Comment: abdominal precautions, OT  goals/POC    ADL Training, taught by Buffy Campos OT at 1/21/2025 12:44 PM.  Learner: Patient  Readiness: Acceptance  Method: Explanation, Demonstration  Response: Verbalizes Understanding, Demonstrated Understanding  Comment: abdominal precautions, OT goals/POC    Education Comments  No comments found.        Goals:   Encounter Problems       Encounter Problems (Active)       ADLs       Patient will perform UB and LB bathing with modified independent level of assistance.       Start:  01/21/25    Expected End:  02/04/25            Patient with complete upper body dressing with modified independent level of assistance donning and doffing all UE clothes       Start:  01/21/25    Expected End:  02/04/25            Patient with complete lower body dressing with modified independent level of assistance donning and doffing all LE clothes  with PRN adaptive equipment       Start:  01/21/25    Expected End:  02/04/25            Patient will complete toileting including hygiene clothing management/hygiene with modified independent level of assistance.       Start:  01/21/25    Expected End:  02/04/25               COGNITION/SAFETY       Pt will maintain abdominal precautions with 100% carryover during functional tasks, ADLs, and mobility without cueing.        Start:  01/21/25    Expected End:  02/04/25               MOBILITY       Patient will perform Functional mobility max Household distances/Community Distances with modified independent level of assistance and least restrictive device in order to improve safety and functional mobility.       Start:  01/21/25    Expected End:  02/04/25 01/21/25 at 12:45 PM   Buffy Campos OT   Rehab Office: [phone]

## 2025-01-21 NOTE — PROGRESS NOTES
Physical Therapy    Physical Therapy Evaluation    Patient Name: Goran Ibrahim  MRN: [id number]  Department: [facility]  Room: 20/20-A  Today's Date: 1/21/2025   Time Calculation  Start Time: 1422  Stop Time: 1501  Time Calculation (min): 39 min    Assessment/Plan   PT Assessment  PT Assessment Results: Decreased strength, Decreased range of motion, Decreased endurance, Decreased mobility, Impaired balance, Pain  Rehab Prognosis: Good  Barriers to Discharge Home: Physical needs  Physical Needs: Stair navigation into home limited by function/safety, Ambulating household distances limited by function/safety  Evaluation/Treatment Tolerance: Patient tolerated treatment well  Medical Staff Made Aware: Yes  Strengths: Ability to acquire knowledge, Attitude of self  Barriers to Participation: Housing layout  End of Session Communication: Bedside nurse  Assessment Comment: Pt tolerated treatment well. Pt presents with decreased activity tolerance, BLE strength, AROM, and mobility; as well as, ability to mobilize in bed, transfers, and gait quality.  End of Session Patient Position: Up in chair, Alarm off, not on at start of session  IP OR SWING BED PT PLAN  Inpatient or Swing Bed: Inpatient  PT Plan  Treatment/Interventions: Bed mobility, Transfer training, Gait training, Stair training, Balance training, Strengthening, Endurance training, Range of motion, Therapeutic exercise, Home exercise program, Therapeutic activity  PT Plan: Ongoing PT  PT Frequency: 5 times per week  PT Discharge Recommendations: Low intensity level of continued care  Equipment Recommended upon Discharge: Wheeled walker  PT Recommended Transfer Status: Assist x2, Assistive device  PT - OK to Discharge: Yes    Subjective   General Visit Information:  General  Reason for Referral: Liver and kidney transplant  Past Medical History Relevant to Rehab: PMH of HTN, HFpEF, CKD4, and cryptogenic liver cirrhosis (hx of alcohol use, but not heavy per chart review)  c/b hepatic encephalopathy and esophageal varices s/p banding in June 2024  Family/Caregiver Present: Yes  Caregiver Feedback: Brother came into room at the end of the session  Prior to Session Communication: Bedside nurse  Patient Position Received: Bed, 3 rail up, Alarm off, not on at start of session  General Comment: Pt supine in bed on arrival, agreeable to participate. On 4 L NC, x4 KAITLYNN drains, IV, tele, CVP.  Home Living:  Home Living  Type of Home: House  Lives With: Alone (Will have people staying at home with him to help with daily activities)  Home Adaptive Equipment: None  Home Layout: Two level, Bed/bath upstairs, Stairs to alternate level with rails, Work area in basement  Alternate Level Stairs-Number of Steps: 13  Home Access: Stairs to enter with rails  Entrance Stairs-Rails: Right (Rail on one side)  Entrance Stairs-Number of Steps: 3  Bathroom Shower/Tub: Tub/shower unit  Bathroom Toilet: Standard  Bathroom Equipment: None  Prior Level of Function:  Prior Function Per Pt/Caregiver Report  Level of [place]: Independent with ADLs and functional transfers, Independent with homemaking with ambulation  ADL Assistance: Independent  Homemaking Assistance: Independent  Ambulatory Assistance: Independent  Leisure: Singing karaoke, ride his motorcycle  Hand Dominance: Right  Prior Function Comments: States he drives intermittently  Precautions:  Precautions  Hearing/Visual Limitations: WFL  Medical Precautions: Fall precautions  Post-Surgical Precautions: Abdominal surgery precautions  Precautions Comment: MAP 65-90     01/21/25 1422 01/21/25 1501   Vital Signs   Vitals Session Pre PT Post PT   Heart Rate 72 81   Resp 21 20   SpO2 96 % 93 %   /81 151/83   MAP (mmHg) 105 95     Objective   Pain:  Pain Assessment  Pain Assessment: 0-10  0-10 (Numeric) Pain Score: 5 - Moderate pain  Pain Type: Acute pain  Pain Location: Abdomen  Cognition:  Cognition  Overall Cognitive Status: Impaired  Orientation  Level: Oriented X4  Selective Attention: Impaired  Sustained Attention: Impaired    General Assessments:  Activity Tolerance  Endurance: Tolerates 10 - 20 min exercise with multiple rests  Early Mobility/Exercise Safety Screen: Proceed with mobilization - No exclusion criteria met    Sensation  Light Touch: No apparent deficits    Coordination  Movements are Fluid and Coordinated: Yes    Postural Control  Postural Control: Within Functional Limits    Static Sitting Balance  Static Sitting-Balance Support: Bilateral upper extremity supported, Feet supported  Static Sitting-Level of Assistance: Close supervision  Static Sitting-Comment/Number of Minutes: ~5 min  Dynamic Sitting Balance  Dynamic Sitting-Balance Support: Feet supported, Bilateral upper extremity supported  Dynamic Sitting-Level of Assistance: Close supervision  Dynamic Sitting-Balance: Trunk control activities  Dynamic Sitting-Comments: ~2 min, PT assessed BLE AROM and strength    Static Standing Balance  Static Standing-Balance Support: Bilateral upper extremity supported, No upper extremity supported  Static Standing-Level of Assistance: Contact guard  Static Standing-Comment/Number of Minutes: ~2 min w/ BUE FWW, ~1 min w/o BUE support, CGA x2  Functional Assessments:       Bed Mobility  Bed Mobility: Yes  Bed Mobility 1  Bed Mobility 1: Supine to sitting  Level of Assistance 1: Minimum assistance  Bed Mobility Comments 1: Inc time to perform, HOB elevated, required assistance scooting forward on EOB    Transfers  Transfer: Yes  Transfer 1  Transfer From 1: Sit to, Stand to  Transfer to 1: Stand, Sit  Technique 1: Sit to stand, Stand to sit  Transfer Level of Assistance 1: Minimum assistance  Trials/Comments 1: inc time to initiate task. Edu pt on proper hand/foot placement to increase safety and movement efficiency with the t/f. Rosa x2    Ambulation/Gait Training  Ambulation/Gait Training Performed: Yes  Ambulation/Gait Training 1  Surface 1: Level  tile  Device 1: Rolling walker  Assistance 1: Contact guard  Comments/Distance (ft) 1: Pt amb 3' w/ FWW CGA, assistance managing lines and cords.    Stairs  Stairs: No  Extremity/Trunk Assessments:  RUE   RUE : Within Functional Limits  LUE   LUE: Within Functional Limits  RLE   RLE : Exceptions to WFL  AROM RLE (degrees)  RLE AROM Comment: Limited due to pain.  Strength RLE  RLE Overall Strength: Greater than or equal to 3/5 as evidenced by functional mobility, Due to pain  LLE   LLE : Exceptions to WFL  AROM LLE (degrees)  LLE AROM Comment: Limited due to pain.  Strength LLE  LLE Overall Strength: Greater than or equal to 3/5 as evidenced by functional mobility, Due to pain  Outcome Measures:  [facility] Basic Mobility  Turning from your back to your side while in a flat bed without using bedrails: A little  Moving from lying on your back to sitting on the side of a flat bed without using bedrails: A little  Moving to and from bed to chair (including a wheelchair): A lot  Standing up from a chair using your arms (e.g. wheelchair or bedside chair): A lot  To walk in hospital room: A lot  Climbing 3-5 steps with railing: Total  Basic Mobility - Total Score: 13    FSS-ICU  Ambulation: Walks <50 feet with any assistance x1 or walks any distance with assistance x2 people  Rolling: Minimal assistance (performs 75% or more of task)  Sitting: Supervision or set-up only  Transfer Sit-to-Stand: Minimal assistance (performs 75% or more of task)  Transfer Supine-to-Sit: Minimal assistance (performs 75% or more of task)  Total Score: 18    Early Mobility/Exercise Safety Screen: Proceed with mobilization - No exclusion criteria met  ICU Mobility Scale: Marching on spot (at bedside) [6]    Encounter Problems       Encounter Problems (Active)       Balance       STG - Maintains dynamic standing balance SUP without upper extremity support to decrease the risk of falls. (Progressing)       Start:  01/21/25    Expected End:  02/11/25                Mobility       STG - Patient will ambulate 50' w/ LRAD SUP to promote functional IND in the home. (Progressing)       Start:  01/21/25    Expected End:  02/11/25            STG - Patient will ascend and descend 3 stairs w/ 1 HR SUP to increase safety entering/exiting the home.  (Progressing)       Start:  01/21/25    Expected End:  02/11/25               Mobility       Pt will mobilize supine<>sitting EOB SUP to promote functional IND. (Progressing)       Start:  01/21/25    Expected End:  02/11/25               PT Transfers       STG - Patient will transfer sit to and from stand w/LRAD SUP to improve functional mobility.  (Progressing)       Start:  01/21/25    Expected End:  02/11/25               Pain - Adult              Education Documentation  Precautions, taught by FEDE Call at 1/21/2025  4:11 PM.  Learner: Patient  Readiness: Acceptance  Method: Explanation  Response: Verbalizes Understanding  Comment: Edu pt on log roll technique.    Body Mechanics, taught by FEDE Call at 1/21/2025  4:11 PM.  Learner: Patient  Readiness: Acceptance  Method: Explanation  Response: Verbalizes Understanding  Comment: Edu pt on log roll technique.    Mobility Training, taught by FEDE Call at 1/21/2025  4:11 PM.  Learner: Patient  Readiness: Acceptance  Method: Explanation  Response: Verbalizes Understanding  Comment: Edu pt on log roll technique.    Education Comments  No comments found.      VICTORIANO Call  Student physical therapist working under the direct supervision of licensed therapist

## 2025-01-21 NOTE — SIGNIFICANT EVENT
01/21/25 1130   Patient Interaction   Organ Liver   Type of Interaction Phone conference   Interdisciplinary Rounds   Attendance Surgeon;Physician;CHERYL;Coordinator;Pharmacist   Topics Discussed Medications;Blood test results;Activity;Imaging/test results     Transplant Surgery Multidisciplinary Team Note    Goran Ibrahim is a 54 y.o. male   POD#1 from a Kidney and Liver from a DCD donor. His post operative complications: None    24 Hour Events  1. No acute events, extubated overnight    Last Recorded Vitals  Visit Vitals  BP (!) 171/91   Pulse 56   Temp 36.6 °C (97.9 °F) (Temporal)   Resp 18      Intake/Output last 3 Shifts:    Intake/Output Summary (Last 24 hours) at 1/21/2025 1131  Last data filed at 1/21/2025 1100  Gross per 24 hour   Intake 45044.28 ml   Output 52628 ml   Net 5279.28 ml      Vitals:    01/21/25 0542   Weight: 126 kg (276 lb 14.4 oz)        Assessment/Plan   Principal Problem:    S/P SLK    - repeat liver DUS today  - starting ursodial 300 mg TID  - start ASA 81 mg today          Management after organ transplant  Active Problems:  Patient Active Problem List   Diagnosis    Preop cardiovascular exam    Hypertension    Liver cirrhosis (Multi)    Pre-kidney transplant, listed    Pre-liver transplant, listed    Esophageal varices in alcoholic cirrhosis (Multi)    Hepatic cirrhosis, unspecified hepatic cirrhosis type, unspecified whether ascites present (Multi)    ESRD (end stage renal disease) (Multi)        Immunosuppression reviewed and adjusted       Induction: Simulect       Tacrolimus goal 8-10 ng/mL. Current dose  0  mg BID, holding today         MMF 1000 mg IV BID       Solumedrol taper  DVT prophylaxis SCDS  PT/OT  Diet:  clears  Anticipated discharge TBD    Joanne Loja, APRN-CNP

## 2025-01-21 NOTE — PROGRESS NOTES
"Goran Ibrahim is a 54 y.o. male now POD # 1 s/p SLK transplant.    Subjective   Doing well. Extubated. No products since OR       Objective   Physical Exam  Gen: A+OX3; NAD  Abd: S/NT/ND. Incisions C/D/I.  Drains: Serosanguinous    Ext: trace LE edema    Last Recorded Vitals  Blood pressure 169/81, pulse 55, temperature 36.6 °C (97.9 °F), temperature source Temporal, resp. rate 16, height 1.778 m (5' 10\"), weight 126 kg (276 lb 14.4 oz), SpO2 97%.  Intake/Output last 3 Shifts:  I/O last 3 completed shifts:  In: 67950.9 (142.2 mL/kg) [I.V.:1183.1 (9.4 mL/kg); Blood:12288; IV Piggyback:6413.9]  Out: 77136 (81.8 mL/kg) [Urine:3055 (0.7 mL/kg/hr); Emesis/NG output:800; Drains:420; Blood:6000]  Weight: 125.6 kg      Lab Results   Component Value Date    CREATININE 2.52 (H) 01/21/2025    K 4.2 01/21/2025    GLUCOSE 219 (H) 01/21/2025    HGB 9.3 (L) 01/21/2025    WBC 6.1 01/21/2025    PLT 94 (L) 01/21/2025    CALCIUM 8.8 01/21/2025         Current Facility-Administered Medications:     [START ON 1/24/2025] basiliximab (Simulect) 20 mg in sodium chloride 0.9% 50 mL IV, 20 mg, intravenous, Once, Lizandro Zavala MD    dextrose 50 % injection 12.5 g, 12.5 g, intravenous, q15 min PRN, Lew Raymond MD    dextrose 50 % injection 25 g, 25 g, intravenous, q15 min PRN, Lew Raymond MD    electrolyte-A (Plasmalyte-A) solution, 60 mL/hr, intravenous, Continuous, Matt Quijano, APRN-CNP, Last Rate: 60 mL/hr at 01/21/25 0834, 60 mL/hr at 01/21/25 0834    electrolyte-A (Plasmalyte-A) solution, 0-1,000 mL/hr, intravenous, Continuous, Matt Quijano, APRN-CNP    fluconazole (Diflucan) 400 mg in sodium chloride (iso)  mL, 400 mg, intravenous, q24h, Lew Raymond MD, Stopped at 01/21/25 1016    glucagon (Glucagen) injection 1 mg, 1 mg, intramuscular, q15 min PRN, Lew Raymond MD    glucagon (Glucagen) injection 1 mg, 1 mg, intramuscular, q15 min PRN, Lew Raymond MD    HYDROmorphone (Dilaudid) injection 0.4 mg, 0.4 mg, " intravenous, q4h PRN, Lew Raymond MD    HYDROmorphone PF (Dilaudid) injection 0.2 mg, 0.2 mg, intravenous, q4h PRN, CHRIS Martinez    insulin lispro injection 0-15 Units, 0-15 Units, subcutaneous, q4h, CHRIS Martinez, 6 Units at 01/21/25 0826    [START ON 1/22/2025] methylPREDNISolone sod succinate (SOLU-Medrol) injection 125 mg, 125 mg, intravenous, Daily, CHRIS Cui    [START ON 1/24/2025] methylPREDNISolone sod succinate (SOLU-Medrol) injection 60 mg, 60 mg, intravenous, Once, CHRIS Cui    mycophenolate (Cellcept) 1,000 mg in dextrose 5% 167 mL (5.988 mg/mL) IV, 1,000 mg, intravenous, q12h EDINSON, CHRIS Martinez, Last Rate: 83.5 mL/hr at 01/21/25 1015, 1,000 mg at 01/21/25 1015    oxygen (O2) therapy, , inhalation, Continuous - Inhalation, Lew Raymond MD, 36 percent at 01/21/25 0800    pantoprazole (ProtoNix) injection 40 mg, 40 mg, intravenous, q12h, CHRIS Martinez    piperacillin-tazobactam (Zosyn) 3.375 g in dextrose (iso) IV 50 mL, 3.375 g, intravenous, q6h, Lew Raymond MD, Stopped at 01/21/25 0642    [START ON 1/25/2025] predniSONE (Deltasone) tablet 20 mg, 20 mg, oral, Daily, CHRIS Cui    PrismaSol BGK 2/3.5 CRRT solution, 25 mL/kg/hr, CRRT, Continuous, Lisa Murray MD, Last Rate: 3,050 mL/hr at 01/20/25 1345, 25 mL/kg/hr at 01/20/25 1345     Assessment/Plan    S/p SLK 1/20/25   DCD, caval piggyback, rCHA to dSMA/celiac stack HA, duct to duct (small to large with v plasty)  Transanimates starting to drop  Expect bili to be slow to clear  Interval liver US today  Cont KAITLYNN x4  Start ASA 81. Hold heparin  Maint IVF   NGT removal trial  Start katerin  5 day chaudhary    Immunosuppression   Simulect 1/20 - second dose 3-4 days  Tac tomorrow   MMF 1000 BID  Steroid taper     I spent 35 minutes in the professional and overall care of this patient.      Priscila Wright MD

## 2025-01-21 NOTE — SIGNIFICANT EVENT
S:    POD 0 from SLK. Intubated and sedated at time of evaluation. No concerns from nursing. Off pressors.     O:   Vital signs are stable, normotensive, afebrile, no new or worsening oxygen requirement, not tachycardic  Visit Vitals  /72   Pulse 67   Temp 37.1 °C (98.8 °F)   Resp 14        Constitutional: no acute distress  Skin: warm and dry overall   Neuro: A/O x4, no gross deficits   HEENT: Atraumatic, no scleral icterus  Cardiac: RRR, no pressors  Pulmonary: Intubated, mechanically ventilated. Fi)2 40%, PEEP 5  Abdomen: Moderately distended. Incisions covered in island dressing with minimal strikethrough. KAITLYNN drains x 4 with serosanguinous output.   GI: Bateman in place with robles colored urine in bag      A/P:  Overall, patient is doing well postoperatively with no acute concerns.  No concerns on post-op US.   Continue resuscitation per SICU.     Lester Uribe MD   PGY-1 Transplant Surgery

## 2025-01-21 NOTE — H&P
History Of Present Illness  Goran Ibrahim is a 54 y.o. male with PMHx of HTN, HFpEF, CKD4, and cryptogenic liver cirrhosis (hx of alcohol use, but not heavy per chart review) c/b HE and EV s/p banding in June 2024, now presenting to SICU from OR 1/20 s/p liver and kidney transplant with Dr. Wright.    OR Course:   EBL: 6L  UOP: 2L  Crystalloid: 4.5L  Colloid: 1.75L 5%, 50cc 25%  Cellsaver: 1939  Products: 10 pRBC, 10 FFP, 8plts, 2 cryo, 2 riastap, 2 Kcentra  Antibiotic time: zosyn 3.375g @ 1700  Intubation: Grade 1 view, S4 blade, 7.5mm ETT via VL  Lines/Access: RIJ trialysis and MAC, R radial arterial line, 16g RH, left RICC    MELD 3.0: 23 at 1/20/2025  7:33 PM  MELD-Na: 22 at 1/20/2025  7:33 PM  Calculated from:  Serum Creatinine: 2.36 mg/dL at 1/20/2025  2:53 PM  Serum Sodium: 144 mmol/L (Using max of 137 mmol/L) at 1/20/2025  2:53 PM  Total Bilirubin: 2.2 mg/dL at 1/20/2025  2:53 PM  Serum Albumin: 2.9 g/dL at 1/20/2025  2:53 PM  INR(ratio): 1.5 at 1/20/2025  7:33 PM  Age at listing (hypothetical): 54 years  Sex: Male at 1/20/2025  7:33 PM       Past Medical History  Past Medical History:   Diagnosis Date   • Cirrhosis (Multi)    • CKD (chronic kidney disease)    • Hypertension        Surgical History  Past Surgical History:   Procedure Laterality Date   • IR ANGIOGRAM CELIAC  5/19/2023    IR ANGIOGRAM CELIAC 5/19/2023   • US GUIDED ABDOMINAL PARACENTESIS  6/22/2023    US GUIDED ABDOMINAL PARACENTESIS 6/22/2023   • US GUIDED ABDOMINAL PARACENTESIS  5/19/2023    US GUIDED ABDOMINAL PARACENTESIS 5/19/2023        Social History  He reports that he has quit smoking. His smoking use included cigarettes. He has never used smokeless tobacco. He reports that he does not currently use alcohol. He reports that he does not use drugs.    Family History  No family history on file.     Allergies  Patient has no known allergies.    Medications Prior to Admission   Medication Sig Dispense Refill Last Dose/Taking   • amLODIPine  (Norvasc) 5 mg tablet Take 1 tablet (5 mg) by mouth once daily. 90 tablet 1 1/19/2025 Morning   • carvedilol (Coreg) 6.25 mg tablet Take 1 tablet (6.25 mg) by mouth 2 times a day. 60 tablet 11 1/19/2025 Evening   • cholecalciferol (Vitamin D-3) 50 MCG (2000 UT) tablet Take 1 tablet (50 mcg) by mouth once daily. 30 tablet 11 1/19/2025 Morning   • furosemide (Lasix) 40 mg tablet Take 1 tablet (40 mg) by mouth 2 times daily (morning and late afternoon). 60 tablet 11 1/19/2025 Morning   • lactulose 20 gram/30 mL oral solution Take 30 mL (20 g) by mouth 2 times a day. Take 30mL up to 3 times daily - Titrate to having 3-4 bowel movements daily 5400 mL 3 Past Week Morning   • pantoprazole (ProtoNix) 40 mg EC tablet Take 1 tablet (40 mg) by mouth once daily in the morning. Take before meals. 30 tablet 11 1/19/2025   • rifAXIMin (Xifaxan) 550 mg tablet Take 1 tablet (550 mg) by mouth every 12 hours. 60 tablet 11 1/19/2025 Evening   • sodium bicarbonate 650 mg tablet Take 1 tablet (650 mg) by mouth 3 times a day. 90 tablet 11 1/19/2025        Review of Systems   Reason unable to perform ROS: intubated and sedated.        Physical Exam  Vitals reviewed.   Constitutional:       General: He is not in acute distress.     Appearance: He is ill-appearing.   HENT:      Head: Normocephalic and atraumatic.      Nose: Nose normal.      Mouth/Throat:      Mouth: Mucous membranes are dry.   Neck:      Comments: RIJ lines with clean dressing overlying sites  Cardiovascular:      Rate and Rhythm: Normal rate and regular rhythm.      Pulses: Normal pulses.      Heart sounds: Normal heart sounds.   Pulmonary:      Effort: Pulmonary effort is normal.      Breath sounds: Normal breath sounds.      Comments: intubated  Abdominal:      Palpations: Abdomen is soft.   Musculoskeletal:      Cervical back: Neck supple.      Right lower leg: No edema.      Left lower leg: No edema.   Neurological:      Comments: sedated        Last Recorded  "Vitals  Blood pressure 128/67, pulse 61, temperature 36 °C (96.8 °F), temperature source Temporal, resp. rate 18, height 1.778 m (5' 10\"), weight 122 kg (269 lb 2.9 oz), SpO2 97%.    Relevant Results    Scheduled medications  [Transfer Hold] amLODIPine, 5 mg, oral, Daily  [Transfer Hold] carvedilol, 6.25 mg, oral, BID  [Transfer Hold] furosemide, 40 mg, oral, Daily  [Transfer Hold] pantoprazole, 40 mg, oral, q24h      Continuous medications  dextrose 5%-0.45 % sodium chloride, 40 mL/hr, Last Rate: 40 mL/hr (01/20/25 0521)  PrismaSol 4/2.5, 3,000 mL/hr, Last Rate: Stopped (01/20/25 1345)  PrismaSol BGK 2/3.5, 25 mL/kg/hr, Last Rate: 25 mL/kg/hr (01/20/25 1345)      PRN medications  PRN medications: [Transfer Hold] heparin, [Transfer Hold] heparin    Results for orders placed or performed during the hospital encounter of 01/20/25 (from the past 24 hours)   Electrocardiogram, 12-lead   Result Value Ref Range    Ventricular Rate 67 BPM    Atrial Rate 67 BPM    FL Interval 184 ms    QRS Duration 108 ms    QT Interval 440 ms    QTC Calculation(Bazett) 464 ms    P Axis 35 degrees    R Axis -37 degrees    T Axis 32 degrees    QRS Count 11 beats    Q Onset 211 ms    P Onset 119 ms    P Offset 180 ms    T Offset 431 ms    QTC Fredericia 456 ms   Prepare RBC: 10 Units   Result Value Ref Range    PRODUCT CODE W3218Y89     Unit Number [id number]     Unit ABO O     Unit RH POS     XM INTEP COMP     Dispense Status TR     Blood Expiration Date 2/10/2025 11:59:00 PM EST     PRODUCT BLOOD TYPE 5100     UNIT VOLUME 350     PRODUCT CODE T0390H52     Unit Number [id number]     Unit ABO O     Unit RH POS     XM INTEP COMP     Dispense Status TR     Blood Expiration Date 2/11/2025 11:59:00 PM EST     PRODUCT BLOOD TYPE 5100     UNIT VOLUME 350     PRODUCT CODE Y2326W77     Unit Number [id number]     Unit ABO O     Unit RH POS     XM INTEP COMP     Dispense Status TR     Blood Expiration Date 2/11/2025 11:59:00 PM EST     " PRODUCT BLOOD TYPE 5100     UNIT VOLUME 350     PRODUCT CODE U7221H67     Unit Number [id number]     Unit ABO O     Unit RH POS     XM INTEP COMP     Dispense Status TR     Blood Expiration Date 2/11/2025 11:59:00 PM EST     PRODUCT BLOOD TYPE 5100     UNIT VOLUME 350     PRODUCT CODE X5050G39     Unit Number [id number]     Unit ABO O     Unit RH POS     XM INTEP COMP     Dispense Status TR     Blood Expiration Date 2/11/2025 11:59:00 PM EST     PRODUCT BLOOD TYPE 5100     UNIT VOLUME 350     PRODUCT CODE P7682Y06     Unit Number [id number]     Unit ABO O     Unit RH POS     XM INTEP COMP     Dispense Status IS     Blood Expiration Date 1/30/2025 11:59:00 PM EST     PRODUCT BLOOD TYPE 5100     UNIT VOLUME 282     PRODUCT CODE P0356V22     Unit Number [id number]     Unit ABO O     Unit RH POS     XM INTEP COMP     Dispense Status TR     Blood Expiration Date 1/29/2025 11:59:00 PM EST     PRODUCT BLOOD TYPE 5100     UNIT VOLUME 280     PRODUCT CODE W2540T01     Unit Number [id number]     Unit ABO O     Unit RH POS     XM INTEP COMP     Dispense Status TR     Blood Expiration Date 1/30/2025 11:59:00 PM EST     PRODUCT BLOOD TYPE 5100     UNIT VOLUME 279     PRODUCT CODE D7846M58     Unit Number [id number]     Unit ABO O     Unit RH POS     XM INTEP COMP     Dispense Status TR     Blood Expiration Date 1/30/2025 11:59:00 PM EST     PRODUCT BLOOD TYPE 5100     UNIT VOLUME 278     PRODUCT CODE X0345P30     Unit Number [id number]     Unit ABO O     Unit RH POS     XM INTEP COMP     Dispense Status TR     Blood Expiration Date 2/4/2025 11:59:00 PM EST     PRODUCT BLOOD TYPE 5100     UNIT VOLUME 350    Prepare Platelets: 2 Units   Result Value Ref Range    PRODUCT CODE O9246I20     Unit Number [id number]     Unit ABO O     Unit RH POS     Dispense Status TR     Blood Expiration Date 1/21/2025 11:59:00 PM EST     PRODUCT BLOOD TYPE 5100     UNIT VOLUME 184     PRODUCT CODE M3745U06      Unit Number [id number]     Unit ABO O     Unit RH POS     Dispense Status TR     Blood Expiration Date 1/20/2025 11:59:00 PM EST     PRODUCT BLOOD TYPE 5100     UNIT VOLUME 265    Prepare Plasma: 10 Units   Result Value Ref Range    PRODUCT CODE T8131K12     Unit Number [id number]     Unit ABO O     Unit RH POS     Dispense Status TR     Blood Expiration Date 1/24/2025 11:20:00 PM EST     PRODUCT BLOOD TYPE 5100     UNIT VOLUME 332     PRODUCT CODE D5235J44     Unit Number [id number]     Unit ABO O     Unit RH POS     Dispense Status TR     Blood Expiration Date 1/24/2025 11:20:00 PM EST     PRODUCT BLOOD TYPE 5100     UNIT VOLUME 291     PRODUCT CODE N9216M61     Unit Number [id number]     Unit ABO O     Unit RH POS     Dispense Status TR     Blood Expiration Date 1/24/2025 11:20:00 PM EST     PRODUCT BLOOD TYPE 5100     UNIT VOLUME 297     PRODUCT CODE M5123Y07     Unit Number [id number]     Unit ABO O     Unit RH POS     Dispense Status TR     Blood Expiration Date 1/24/2025 11:20:00 PM EST     PRODUCT BLOOD TYPE 5100     UNIT VOLUME 333     PRODUCT CODE M3624V90     Unit Number [id number]     Unit ABO O     Unit RH POS     Dispense Status TR     Blood Expiration Date 1/24/2025 11:20:00 PM EST     PRODUCT BLOOD TYPE 5100     UNIT VOLUME 327     PRODUCT CODE K4957T87     Unit Number [id number]     Unit ABO O     Unit RH POS     Dispense Status TR     Blood Expiration Date 1/25/2025  4:12:00 AM EST     PRODUCT BLOOD TYPE 5100     UNIT VOLUME 302     PRODUCT CODE C9413U38     Unit Number [id number]     Unit ABO O     Unit RH POS     Dispense Status TR     Blood Expiration Date 1/25/2025  4:12:00 AM EST     PRODUCT BLOOD TYPE 5100     UNIT VOLUME 315     PRODUCT CODE Y2193T85     Unit Number [id number]     Unit ABO O     Unit RH POS     Dispense Status TR     Blood Expiration Date 1/25/2025  4:12:00 AM EST     PRODUCT BLOOD TYPE 5100     UNIT VOLUME 316     PRODUCT CODE  M2002P64     Unit Number [id number]     Unit ABO O     Unit RH POS     Dispense Status TR     Blood Expiration Date 1/25/2025  4:12:00 AM EST     PRODUCT BLOOD TYPE 5100     UNIT VOLUME 324     PRODUCT CODE G8308B73     Unit Number [id number]     Unit ABO O     Unit RH POS     Dispense Status TR     Blood Expiration Date 1/25/2025  4:12:00 AM EST     PRODUCT BLOOD TYPE 5100     UNIT VOLUME 343    Coagulation Screen   Result Value Ref Range    Protime 12.6 9.8 - 12.8 seconds    INR 1.1 0.9 - 1.1    aPTT 34 27 - 38 seconds   CBC and Auto Differential   Result Value Ref Range    WBC 3.8 (L) 4.4 - 11.3 x10*3/uL    nRBC 0.0 0.0 - 0.0 /100 WBCs    RBC 3.42 (L) 4.50 - 5.90 x10*6/uL    Hemoglobin 10.2 (L) 13.5 - 17.5 g/dL    Hematocrit 29.9 (L) 41.0 - 52.0 %    MCV 87 80 - 100 fL    MCH 29.8 26.0 - 34.0 pg    MCHC 34.1 32.0 - 36.0 g/dL    RDW 14.5 11.5 - 14.5 %    Platelets 57 (L) 150 - 450 x10*3/uL    Neutrophils % 65.3 40.0 - 80.0 %    Immature Granulocytes %, Automated 0.3 0.0 - 0.9 %    Lymphocytes % 19.3 13.0 - 44.0 %    Monocytes % 9.8 2.0 - 10.0 %    Eosinophils % 4.0 0.0 - 6.0 %    Basophils % 1.3 0.0 - 2.0 %    Neutrophils Absolute 2.48 1.20 - 7.70 x10*3/uL    Immature Granulocytes Absolute, Automated 0.01 0.00 - 0.70 x10*3/uL    Lymphocytes Absolute 0.73 (L) 1.20 - 4.80 x10*3/uL    Monocytes Absolute 0.37 0.10 - 1.00 x10*3/uL    Eosinophils Absolute 0.15 0.00 - 0.70 x10*3/uL    Basophils Absolute 0.05 0.00 - 0.10 x10*3/uL   Cytomegalovirus IgG   Result Value Ref Range    Cytomegalovirus IgG Nonreactive Nonreactive   Cynthia-Barr Virus Nuclear Antigen Antibody, IgG   Result Value Ref Range    EBV Nuclear Antigen Antibody, IgG Positive (A) Negative   Type And Screen   Result Value Ref Range    ABO TYPE O     Rh TYPE POS     ANTIBODY SCREEN NEG    Hepatic Function Panel   Result Value Ref Range    Albumin 3.5 3.4 - 5.0 g/dL    Bilirubin, Total 1.2 0.0 - 1.2 mg/dL    Bilirubin, Direct 0.3 0.0 - 0.3 mg/dL     Alkaline Phosphatase 118 33 - 120 U/L    ALT 20 10 - 52 U/L    AST 41 (H) 9 - 39 U/L    Total Protein 7.4 6.4 - 8.2 g/dL   Hepatitis C Antibody   Result Value Ref Range    Hepatitis C AB Nonreactive Nonreactive   Hepatitis B Core Antibody, Total   Result Value Ref Range    Hepatitis B Core AB- Total Nonreactive Nonreactive   Hepatitis B Surface Antibody   Result Value Ref Range    Hepatitis B Surface AB 21.0 (H) <10.0 mIU/mL   Hepatitis B Surface Antigen   Result Value Ref Range    Hepatitis B Surface AG Nonreactive Nonreactive   HIV 1/2 Antigen/Antibody Screen with Reflex to Confirmation   Result Value Ref Range    HIV 1/2 Antigen/Antibody Screen with Reflex to Confirmation Nonreactive Nonreactive   Magnesium   Result Value Ref Range    Magnesium 1.96 1.60 - 2.40 mg/dL   Phosphorus   Result Value Ref Range    Phosphorus 4.1 2.5 - 4.9 mg/dL   Basic Metabolic Panel   Result Value Ref Range    Glucose 220 (H) 74 - 99 mg/dL    Sodium 139 136 - 145 mmol/L    Potassium 4.0 3.5 - 5.3 mmol/L    Chloride 110 (H) 98 - 107 mmol/L    Bicarbonate 19 (L) 21 - 32 mmol/L    Anion Gap 14 10 - 20 mmol/L    Urea Nitrogen 50 (H) 6 - 23 mg/dL    Creatinine 3.24 (H) 0.50 - 1.30 mg/dL    eGFR 22 (L) >60 mL/min/1.73m*2    Calcium 9.2 8.6 - 10.6 mg/dL   Anesthesia Intraoperative Transesophageal Echocardiogram   Result Value Ref Range    LV EF 68 %   Prepare Platelets: 2 Units   Result Value Ref Range    PRODUCT CODE M3401K91     Unit Number [id number]     Unit ABO O     Unit RH POS     Dispense Status TR     Blood Expiration Date 1/22/2025 11:59:00 PM EST     PRODUCT BLOOD TYPE 5100     UNIT VOLUME 301     PRODUCT CODE S3004W55     Unit Number [id number]     Unit ABO O     Unit RH POS     Dispense Status TR     Blood Expiration Date 1/22/2025 11:59:00 PM EST     PRODUCT BLOOD TYPE 5100     UNIT VOLUME 205    Prepare Platelets: 2 Units   Result Value Ref Range    PRODUCT CODE K3080M55     Unit Number [id number]     Unit ABO B      Unit RH POS     Dispense Status TR     Blood Expiration Date 1/21/2025 11:59:00 PM EST     PRODUCT BLOOD TYPE 7300     UNIT VOLUME 272     PRODUCT CODE C6604T58     Unit Number [id number]     Unit ABO B     Unit RH POS     Dispense Status TR     Blood Expiration Date 1/21/2025 11:59:00 PM EST     PRODUCT BLOOD TYPE 7300     UNIT VOLUME 263    Prepare Cryoprecipitated AHF (Pooled Units): 1 Pools   Result Value Ref Range    PRODUCT CODE T1463V39     Unit Number [id number]     Unit ABO O     Unit RH POS     Dispense Status TR     Blood Expiration Date 1/20/2025  7:20:00 PM EST     PRODUCT BLOOD TYPE 5100     UNIT VOLUME 88    Prepare RBC: 5 Units   Result Value Ref Range    PRODUCT CODE P5294C19     Unit Number [id number]     Unit ABO O     Unit RH POS     XM INTEP COMP     Dispense Status XM     Blood Expiration Date 2/15/2025 11:59:00 PM EST     PRODUCT BLOOD TYPE 5100     UNIT VOLUME 350     PRODUCT CODE G1668V42     Unit Number [id number]     Unit ABO O     Unit RH POS     XM INTEP COMP     Dispense Status XM     Blood Expiration Date 2/15/2025 11:59:00 PM EST     PRODUCT BLOOD TYPE 5100     UNIT VOLUME 350     PRODUCT CODE E6248X44     Unit Number [id number]     Unit ABO O     Unit RH POS     XM INTEP COMP     Dispense Status XM     Blood Expiration Date 2/17/2025 11:59:00 PM EST     PRODUCT BLOOD TYPE 5100     UNIT VOLUME 350     PRODUCT CODE S7675J41     Unit Number [id number]     Unit ABO O     Unit RH POS     XM INTEP COMP     Dispense Status XM     Blood Expiration Date 2/15/2025 11:59:00 PM EST     PRODUCT BLOOD TYPE 5100     UNIT VOLUME 292     PRODUCT CODE Q3510K16     Unit Number [id number]     Unit ABO O     Unit RH POS     XM INTEP COMP     Dispense Status XM     Blood Expiration Date 2/15/2025 11:59:00 PM EST     PRODUCT BLOOD TYPE 5100     UNIT VOLUME 284    Prepare Plasma: 5 Units   Result Value Ref Range    PRODUCT CODE P6316J60     Unit Number [id number]      Unit ABO O     Unit RH POS     Dispense Status RE     Blood Expiration Date 1/25/2025  7:59:00 AM EST     PRODUCT BLOOD TYPE 5100     UNIT VOLUME 304     PRODUCT CODE A8008I65     Unit Number [id number]     Unit ABO O     Unit RH POS     Dispense Status RE     Blood Expiration Date 1/25/2025  7:59:00 AM EST     PRODUCT BLOOD TYPE 5100     UNIT VOLUME 290     PRODUCT CODE T0871C30     Unit Number [id number]     Unit ABO O     Unit RH POS     Dispense Status RE     Blood Expiration Date 1/25/2025  7:59:00 AM EST     PRODUCT BLOOD TYPE 5100     UNIT VOLUME 243     PRODUCT CODE P4525U94     Unit Number [id number]     Unit ABO O     Unit RH POS     Dispense Status RE     Blood Expiration Date 1/25/2025  7:59:00 AM EST     PRODUCT BLOOD TYPE 5100     UNIT VOLUME 259     PRODUCT CODE F5264Y66     Unit Number [id number]     Unit ABO O     Unit RH POS     Dispense Status RE     Blood Expiration Date 1/25/2025  7:59:00 AM EST     PRODUCT BLOOD TYPE 5100     UNIT VOLUME 255    Renal Function Panel   Result Value Ref Range    Glucose 212 (H) 74 - 99 mg/dL    Sodium 144 136 - 145 mmol/L    Potassium 4.2 3.5 - 5.3 mmol/L    Chloride 110 (H) 98 - 107 mmol/L    Bicarbonate 26 21 - 32 mmol/L    Anion Gap 12 10 - 20 mmol/L    Urea Nitrogen 37 (H) 6 - 23 mg/dL    Creatinine 2.36 (H) 0.50 - 1.30 mg/dL    eGFR 32 (L) >60 mL/min/1.73m*2    Calcium 9.8 8.6 - 10.6 mg/dL    Phosphorus 4.6 2.5 - 4.9 mg/dL    Albumin 2.9 (L) 3.4 - 5.0 g/dL   Magnesium   Result Value Ref Range    Magnesium 2.01 1.60 - 2.40 mg/dL   Hepatic function panel   Result Value Ref Range    Albumin 2.9 (L) 3.4 - 5.0 g/dL    Bilirubin, Total 2.2 (H) 0.0 - 1.2 mg/dL    Bilirubin, Direct 0.9 (H) 0.0 - 0.3 mg/dL    Alkaline Phosphatase 89 33 - 120 U/L    ALT 1,653 (H) 10 - 52 U/L    AST 3,250 (H) 9 - 39 U/L    Total Protein 4.9 (L) 6.4 - 8.2 g/dL   Protime-INR   Result Value Ref Range    Protime 22.9 (H) 9.8 - 12.8 seconds    INR 2.0 (H) 0.9 - 1.1    Fibrinogen   Result Value Ref Range    Fibrinogen 211 200 - 400 mg/dL   APTT   Result Value Ref Range    aPTT 57 (H) 27 - 38 seconds   CBC   Result Value Ref Range    WBC 2.3 (L) 4.4 - 11.3 x10*3/uL    nRBC 0.0 0.0 - 0.0 /100 WBCs    RBC 2.87 (L) 4.50 - 5.90 x10*6/uL    Hemoglobin 8.5 (L) 13.5 - 17.5 g/dL    Hematocrit 26.0 (L) 41.0 - 52.0 %    MCV 91 80 - 100 fL    MCH 29.6 26.0 - 34.0 pg    MCHC 32.7 32.0 - 36.0 g/dL    RDW 15.3 (H) 11.5 - 14.5 %    Platelets 53 (L) 150 - 450 x10*3/uL   Prepare Platelets: 2 Units   Result Value Ref Range    PRODUCT CODE M1371P73     Unit Number [id number]     Unit ABO O     Unit RH POS     Dispense Status TR     Blood Expiration Date 1/22/2025 11:59:00 PM EST     PRODUCT BLOOD TYPE 5100     UNIT VOLUME 304     PRODUCT CODE C4033S65     Unit Number [id number]     Unit ABO O     Unit RH POS     Dispense Status TR     Blood Expiration Date 1/22/2025 11:59:00 PM EST     PRODUCT BLOOD TYPE 5100     UNIT VOLUME 206    Fibrinogen   Result Value Ref Range    Fibrinogen 115 (L) 200 - 400 mg/dL   APTT   Result Value Ref Range    aPTT 42 (H) 27 - 38 seconds   Protime-INR   Result Value Ref Range    Protime 27.5 (H) 9.8 - 12.8 seconds    INR 2.4 (H) 0.9 - 1.1   CBC   Result Value Ref Range    WBC 1.2 (L) 4.4 - 11.3 x10*3/uL    nRBC 0.0 0.0 - 0.0 /100 WBCs    RBC 1.58 (L) 4.50 - 5.90 x10*6/uL    Hemoglobin 4.6 (LL) 13.5 - 17.5 g/dL    Hematocrit 14.6 (L) 41.0 - 52.0 %    MCV 92 80 - 100 fL    MCH 29.1 26.0 - 34.0 pg    MCHC 31.5 (L) 32.0 - 36.0 g/dL    RDW 16.0 (H) 11.5 - 14.5 %    Platelets 45 (L) 150 - 450 x10*3/uL   Prepare Cryoprecipitated AHF (Pooled Units): 1 Pools   Result Value Ref Range    PRODUCT CODE E1920E00     Unit Number [id number]     Unit ABO O     Unit RH POS     Dispense Status TR     Blood Expiration Date 1/21/2025 12:59:00 AM EST     PRODUCT BLOOD TYPE 5100     UNIT VOLUME 89    CBC   Result Value Ref Range    WBC 2.2 (L) 4.4 - 11.3 x10*3/uL    nRBC 0.0  0.0 - 0.0 /100 WBCs    RBC 2.70 (L) 4.50 - 5.90 x10*6/uL    Hemoglobin 7.9 (L) 13.5 - 17.5 g/dL    Hematocrit 22.3 (L) 41.0 - 52.0 %    MCV 83 80 - 100 fL    MCH 29.3 26.0 - 34.0 pg    MCHC 35.4 32.0 - 36.0 g/dL    RDW 15.1 (H) 11.5 - 14.5 %    Platelets 75 (L) 150 - 450 x10*3/uL   Fibrinogen   Result Value Ref Range    Fibrinogen 156 (L) 200 - 400 mg/dL   APTT   Result Value Ref Range    aPTT 33 27 - 38 seconds   Protime-INR   Result Value Ref Range    Protime 16.5 (H) 9.8 - 12.8 seconds    INR 1.5 (H) 0.9 - 1.1   Prepare Platelets: 2 Units   Result Value Ref Range    PRODUCT CODE W8279Y22     Unit Number [id number]     Unit ABO O     Unit RH POS     Dispense Status XM     Blood Expiration Date 1/22/2025 11:59:00 PM EST     PRODUCT BLOOD TYPE 5100     UNIT VOLUME 326     PRODUCT CODE D6653U35     Unit Number [id number]     Unit ABO O     Unit RH POS     Dispense Status XM     Blood Expiration Date 1/22/2025 11:59:00 PM EST     PRODUCT BLOOD TYPE 5100     UNIT VOLUME 250             Assessment/Plan   Assessment & Plan  Hepatic cirrhosis, unspecified hepatic cirrhosis type, unspecified whether ascites present (Multi)      Assessment:  Goran Ibrahim is a 54 y.o. male with PMHx of HTN, HFpEF, CKD4, and cryptogenic liver cirrhosis (hx of alcohol use, but not heavy per chart review) c/b HE and EV s/p banding in June 2024, now presenting to SICU from OR 1/20 s/p liver and kidney transplant with Dr. Wright.    Plan:  NEURO: History of hepatic encephalopathy. Acute post-op pain. Arrived to ICU intubated and sedated.   - ongoing neuro and pain assessments   - titrate propofol infusion to RASS goal: 0 to -2  - NMB reversal and SAT when appropriate  - PRN Dilaudid for pain control  - PT/OT consult  - Restraints indicated while patient remains intubated, restrain with soft wrist restraints until medical devices are discontinued.     CV: History of HTN, HFpEF. Baseline BP 130s-150s/80s. Baseline echo (7/29/24) with EF  62%, RV normal, mild AVR. Negative NM stress test 11/2023. Arrived to SICU on cleviprex at 2.  - Continuous EKG, abp, pap, cvp monitoring.  - Goal map 65-90  - Goal CVP 8-12, CI >2.5  - Additional volume resuscitation as clinically indicated  - Albumin 25% q6h x4 doses  - Home meds: amlodipine 5mg daily, carvedilol 6.25mg BID, furosemide 40mg BID    PULM: Former smoker (40 year history). PFTs performed 12/2023 that suggested mild spirometric airflow obstruction. Arrived to ICU intubated.  - Wean vent as tolerated, maintaining SpO2 >92%  - SBT when appropriate  - VAP bundle  - Q1h incentive spirometer post extubation  - f/u post-op CXR  - f/u post-op ABG, repeat q4h    GI: Hx of cryptogenic liver cirrhosis (hx of alcohol use, but not heavy per chart review) c/b HE and EV s/p banding in June 2024. Now s/p SLK transplant on 1/20.  - NPO, NG-> LIWS  - KUB to confirm enteral tube placement  - continue PPI (home med)  - LFTs/GGT post op and q4h  - obtain post-op liver US with dopplers  - serial abdominal exams  - monitor drain output  - IS/ppx per Transplant: methylpred taper, basiliximab    : History of CKD4. Baseline cr ~2.1-3.5. Now s/p Kidney Transplant on 1/20.  - Check renal function panel post op, then q4h.  - Maintenance IVF -> D5 1/2 NS at 40ml/hr per transplant  - 1:1 replacement of UOP hourly with NS    - Additional volume resuscitation as clinically indicated  - Maintain UOP >0.5ml/kg/hr   - Maintain chaudhary for strict I&Os  - Replete electrolytes judiciously  - Immunosuppression as above in GI    HEME: Acute blood loss anemia. Coagulapathy. Hypofibrinogenemia. Thrombocytopenia. OR EBL 6L. Intra-op patient received 10 PRBC, 10 FFP, 8 Plts, 2 cryo, and 1939 cellsaver. Riastap x2, Kcentra 2.  - Check CBC, coags, fibrinogen post op, then q4h  - SCDs for DVT ppx  - no subcutaneous heparin/ASA at this time  - ongoing monitoring for s/s bleeding  - maintain active T&S (1/20)    ENDO: No known history of DM or  thyroid disorder. A1c 5.3 (9/2023). Arrived to SICU on insulin infusion  - Dc insulin infusion, start Q4h BG and SSI Lispro per ICU protocol.    ID: Afebrile. Leukopenia.  - trend q4h WBC, q4h temps  - Post-op Zosyn x72 hrs  - continue prophylactic Fluconazole x30 days, ordered IV while NPO  - close monitoring for s/s infection. Low threshold to culture.    Lines:   - RIJ MAC w mini   - RIJ trialysis line  - right radial arterial line  - PIV x2  - 9F L RICC    Dispo: Admit to ICU. Patient seen and discussed with ICU attending Dr. Mcginnis.    Lew Raymond MD  SICU phone [phone]    Critical Care time: 60 minutes

## 2025-01-21 NOTE — CARE PLAN
The patient's goals for the shift include Pt wants to go to surgury    The clinical goals for the shift include Pt will remain stable    Problem: Pain - Adult  Goal: Verbalizes/displays adequate comfort level or baseline comfort level  1/21/2025 0640 by Mirta Franz RN  Outcome: Progressing  1/21/2025 0639 by Mirta Franz RN  Outcome: Progressing  1/21/2025 0639 by Mirta Franz RN  Outcome: Progressing     Problem: Safety - Adult  Goal: Free from fall injury  1/21/2025 0640 by Mirta Franz RN  Outcome: Progressing  1/21/2025 0639 by Mirat Franz RN  Outcome: Progressing  1/21/2025 0639 by Mirta Franz RN  Outcome: Progressing     Problem: Discharge Planning  Goal: Discharge to home or other facility with appropriate resources  1/21/2025 0640 by Mirta Franz RN  Outcome: Progressing  1/21/2025 0639 by Mirat Franz RN  Outcome: Progressing  1/21/2025 0639 by Mirta Franz RN  Outcome: Progressing     Problem: Chronic Conditions and Co-morbidities  Goal: Patient's chronic conditions and co-morbidity symptoms are monitored and maintained or improved  1/21/2025 0640 by Mirta Franz RN  Outcome: Progressing  1/21/2025 0639 by Mirta Franz RN  Outcome: Progressing  1/21/2025 0639 by Mirta Franz RN  Outcome: Progressing     Problem: Fall/Injury  Goal: Not fall by end of shift  1/21/2025 0640 by Mitra Franz RN  Outcome: Progressing  1/21/2025 0639 by Mirta Franz RN  Outcome: Progressing  1/21/2025 0639 by Mirta Franz RN  Outcome: Progressing  Goal: Be free from injury by end of the shift  1/21/2025 0640 by Mirta Franz RN  Outcome: Progressing  1/21/2025 0639 by Mirta Franz RN  Outcome: Progressing  1/21/2025 0639 by Mirta Franz RN  Outcome: Progressing  Goal: Verbalize understanding of personal risk factors for fall in the  [facility]  1/21/2025 0640 by Mirta Franz RN  Outcome: Progressing  1/21/2025 0639 by Mirta Franz RN  Outcome: Progressing  1/21/2025 0639 by Mirta Franz RN  Outcome: Progressing  Goal: Verbalize understanding of risk factor reduction measures to prevent injury from fall in the home  1/21/2025 0640 by Mirta Franz, RN  Outcome: Progressing  1/21/2025 0639 by Mirta Franz, RN  Outcome: Progressing  1/21/2025 0639 by Mirta Franz RN  Outcome: Progressing  Goal: Use assistive devices by end of the shift  1/21/2025 0640 by Mirta Franz RN  Outcome: Progressing  1/21/2025 0639 by Mirta Franz RN  Outcome: Progressing  1/21/2025 0639 by Mirta Franz RN  Outcome: Progressing  Goal: Pace activities to prevent fatigue by end of the shift  1/21/2025 0640 by Mirta Franz, DEAN  Outcome: Progressing  1/21/2025 0639 by Mirta Franz RN  Outcome: Progressing  1/21/2025 0639 by Mirta Franz RN  Outcome: Progressing     Problem: Knowledge Deficit  Goal: Patient/family/caregiver demonstrates understanding of disease process, treatment plan, medications, and discharge instructions  1/21/2025 0640 by Mirta Franz RN  Outcome: Progressing  1/21/2025 0639 by Mirta Franz RN  Outcome: Progressing  1/21/2025 0639 by Mirta Franz RN  Outcome: Progressing     Problem: Mechanical Ventilation  Goal: Patient Will Maintain Patent Airway  1/21/2025 0640 by Mirta Franz RN  Outcome: Progressing  1/21/2025 0639 by Mirta Franz RN  Outcome: Progressing  1/21/2025 0639 by Mirta Franz RN  Outcome: Progressing  Goal: Oral health is maintained or improved  1/21/2025 0640 by Mirta Franz, DEAN  Outcome: Progressing  1/21/2025 0639 by Mirta Franz RN  Outcome: Progressing  1/21/2025 0639 by Mirta Franz RN  Outcome: Progressing  Goal:  Tracheostomy will be managed safely  1/21/2025 0640 by Mirta Franz RN  Outcome: Progressing  1/21/2025 0639 by Mirta Franz RN  Outcome: Progressing  1/21/2025 0639 by Mirta Franz RN  Outcome: Progressing  Goal: ET tube will be managed safely  1/21/2025 0640 by Mirta Franz RN  Outcome: Progressing  1/21/2025 0639 by Mirta Franz RN  Outcome: Progressing  1/21/2025 0639 by Mirta Franz RN  Outcome: Progressing  Goal: Ability to express needs and understand communication  1/21/2025 0640 by Mirta Franz RN  Outcome: Progressing  1/21/2025 0639 by Mirta Franz RN  Outcome: Progressing  1/21/2025 0639 by Mirta Franz RN  Outcome: Progressing  Goal: Mobility/activity is maintained at optimum level for patient  1/21/2025 0640 by Mirta Franz RN  Outcome: Progressing  1/21/2025 0639 by Mirta Franz RN  Outcome: Progressing  1/21/2025 0639 by Mirta Franz RN  Outcome: Progressing     Problem: Skin  Goal: Decreased wound size/increased tissue granulation at next dressing change  1/21/2025 0640 by Mirta Franz RN  Outcome: Progressing  Flowsheets (Taken 1/21/2025 0640)  Decreased wound size/increased tissue granulation at next dressing change:   Promote sleep for wound healing   Protective dressings over bony prominences  1/21/2025 0639 by Mirta Franz RN  Outcome: Progressing  1/21/2025 0639 by Mirta Franz RN  Outcome: Progressing  Goal: Participates in plan/prevention/treatment measures  1/21/2025 0640 by Mirta Franz RN  Outcome: Progressing  Flowsheets (Taken 1/21/2025 0640)  Participates in plan/prevention/treatment measures:   Discuss with provider PT/OT consult   Elevate heels   Increase activity/out of bed for meals  1/21/2025 0639 by Mirta Franz RN  Outcome: Progressing  1/21/2025 0639 by Mirta Franz RN  Outcome:  Progressing  Goal: Prevent/manage excess moisture  1/21/2025 0640 by Mirta Franz RN  Outcome: Progressing  Flowsheets (Taken 1/21/2025 0640)  Prevent/manage excess moisture:   Cleanse incontinence/protect with barrier cream   Follow provider orders for dressing changes   Monitor for/manage infection if present   Moisturize dry skin  1/21/2025 0639 by Mirta Franz RN  Outcome: Progressing  1/21/2025 0639 by Mirta Franz RN  Outcome: Progressing  Goal: Prevent/minimize sheer/friction injuries  1/21/2025 0640 by Mirta Franz RN  Outcome: Progressing  Flowsheets (Taken 1/21/2025 0640)  Prevent/minimize sheer/friction injuries:   Complete micro-shifts as needed if patient unable. Adjust patient position to relieve pressure points, not a full turn   HOB 30 degrees or less   Increase activity/out of bed for meals   Turn/reposition every 2 hours/use positioning/transfer devices   Use pull sheet   Utilize specialty bed per algorithm  1/21/2025 0639 by Mirta Franz RN  Outcome: Progressing  1/21/2025 0639 by Mirta Franz RN  Outcome: Progressing  Goal: Promote/optimize nutrition  1/21/2025 0640 by Mirta Franz RN  Outcome: Progressing  Flowsheets (Taken 1/21/2025 0640)  Promote/optimize nutrition:   Discuss with provider if NPO > 2 days   Monitor/record intake including meals   Offer water/supplements/favorite foods   Assist with feeding   Consume > 50% meals/supplements  1/21/2025 0639 by Mirta Franz RN  Outcome: Progressing  1/21/2025 0639 by Mirta Franz RN  Outcome: Progressing  Goal: Promote skin healing  1/21/2025 0640 by Mirta Franz RN  Outcome: Progressing  Flowsheets (Taken 1/21/2025 0640)  Promote skin healing:   Assess skin/pad under line(s)/device(s)   Ensure correct size (line/device) and apply per  instructions   Rotate device position/do not position patient on device   Protective  dressings over bony prominences   Turn/reposition every 2 hours/use positioning/transfer devices  1/21/2025 0639 by Mirta Franz, DEAN  Outcome: Progressing  1/21/2025 0639 by Mirta Franz, DEAN  Outcome: Progressing

## 2025-01-21 NOTE — OP NOTE
Date: 2025  OR Location: [facility] OR    Name: Goran Ibrahim, : 1970, Age: 54 y.o., MRN: [id number], Sex: male    Diagnosis  Pre-op Diagnosis      * Hepatic cirrhosis, unspecified hepatic cirrhosis type, unspecified whether ascites present (Multi) [K74.60] Post-op Diagnosis     * Hepatic cirrhosis, unspecified hepatic cirrhosis type, unspecified whether ascites present (Multi) [K74.60]     Procedures  1. Orthotopic liver transplant, piggyback technique.   2. Native hepatectomy. Modifier 22 given anatomic complexity  3. Lysis of adhesions,    4.  Portal vein end to end reconstruction,    5.  Donor SMA/celiac Stac to recipient common hepatic artery reconstruction  6.  Duct to duct biliary reconstruction  7.   donor kidney transplant    Surgeons      * Priscila Wright - Primary    Resident/Fellow/Other Assistant:  Surgeons and Role:     * Liam Hudson MD - Assisting     * Lois Kline MD - Assisting     * Lizandro Zavala MD - Resident - Assisting    Procedure Summary  Anesthesia: General  ASA: IV  Anesthesia Staff:   Anesthesiologist: Tay Johnson DO  C-AA: KATT Mathew  Anesthesia Resident: Everett Castillo MD  NACHO: Keegan Dong     Products  RBC: 10  FFP: 10  Cryo: 1, 2 k centra  PLT: 8   Cellsaver: 1 L    Indication for Procedure  The patient was diagnosed with cryptogenic cirrhosis and CKD and was found to be a suitable candidate for liver and kidney transplantation here at the [facility]. The patient was allocated an appropriately matched liver through the OPTN/UNOS allocation system.Donor allograft risk profile included DCD graft.     After weighing the risks and benefits, the patient agreed to proceed with liver transplant.     DESCRIPTION OF PROCEDURE   The patient was taken to the operating room and placed in the supine position. The patient underwent general anesthesia without complication. Lines and monitoring devices were placed by Anesthesia, and a Bateman catheter  was inserted. The neck, chest, abdomen and pelvis were prepped and draped in a sterile surgical fashion. Antibiotics and immunosuppression were given per protocol.     A time-out was called to determine the correct patient's identification, blood type, allograft UNOS number, procedure, anesthesia, personnel, and equipment.     Bilateral subcostal incisions were made with  midline extension toward the xiphoid process. The peritoneal cavity was entered. The falciform ligament was ligated and divided.  There was no significant ascites fluid the abdomen was explored.  The liver was frankly cirrhotic.  Of note there was a severely hypertrophic and circumferential caudate. The remaining abdominal exploration was negative. The left triangular ligament was divided. The hepatogastric ligament was divided. The shemar hepatis was exposed. The neurolymphatic structures were ligated and divided. The hepatic artery branches were ligated distally and divided between silk sutures. The common bile duct was mobilized and ligated and divided between silk sutures. The portal vein was fully mobilized.  The portal vein was partially thrombosed.  The thrombosis was chronic.  There were adhesions of omentum to the inferior surface of the right lobe of the liver, and these adhesions were lysed.  The diaphragm was mobilized off the right lobe of the liver.     The anhepatic phase was started by ligating and dividing the portal vein at the hilum. All the straight veins between the posterior right lobe and the IVC were individually ligated and divided, as well as between the caudate and IVC.  The recipient native caudate was completely circumferential.  In order to remove the liver without removing the entire cava, the caudate had to be filleted open.  This was done with a combination of cautery and careful dissection.  Once the caudate was opened, the remaining short hepatics were then able to be taken.  This was done up to the confluence of  the right, middle, and left hepatic veins.  Venous clamp was then placed across the hepatic veins and the liver was removed.  A large common venous ostium was created to accommodate the required allograft venous outflow.     Dr. Yang performed the back bench of the liver.  Please see her operative note for dictation.     The allograft was brought to the operative field, and the allograft suprahepatic vena cava was anastomosed to the recipient common hepatic venous ostium using #3-0 Prolene suture in a running fashion.  The recipient and the allograft portal veins were anastomosed in an end-to-end fashion using #5-0 Prolene suture.  Although there was evidence of portal vein thrombosis, this was chronic and did not require a formal thrombectomy.  There was good flow through the portal.  The recirculation was initiated by releasing the portal vein clamp. Approximately 500 mL of circulating blood was wasted through the allograft IVC which was subsequently closed. The venous outflow clamp was then gradually opened. The patient was  hemodynamically stable during recirculation.  Total ischemia time was 14 hours 59 minutes.    We then turned our attention to performing the hepatic artery and anastomosis.  The donor hepatic artery anatomy consisted of a replaced right from the SMA which was stacked on the celiac trunk.  The SMA was the inflow site.  The recipient common hepatic artery had good arterial inflow and was anastomosed to the allograft SMA in an end-to-end fashion using #6-0 Prolene suture under loupe magnification.  Of note the GDA was left alone, however it had been previously embolized.  There was good arterial inflow through the allograft common hepatic artery following the arterial revascularization.    Hemostasis was ensured.  We then turned our attention to the kidney transplant.  Again see Dr. Yang note for back bench procedure.  This was a right kidney with single vessels.  The cava had been  extended.  We approximated the liver fascia with penetrating towel clips.  A separate incision was made in the right lower quadrant.  This was dissected down to the fascia until the retroperitoneum was encountered.  A preperitoneal pocket was made until the external iliac vessels on the right side were identified.  The Osorio retractor was repositioned to expose the right external iliac vessels.  These were encircled.  We began with the venous anastomosis.  A Satinsky was placed on the external iliac vein venotomy was made.  Using 5-0 Prolene sutures a end to side venous anastomosis was performed in running fashion.  We then turned our attention to the renal artery.  The right external iliac artery was clamped with a similar Satinsky a arteriotomy was made and this was extended using aortic cold punch.  A end to side arterial anastomosis was then performed using 6-0 Prolene sutures.  Once this was performed the clamps were removed and the kidney was reperfused. Total cold time was 17 hours 33 minutes. The kidney reperfused nicely.  Once hemostasis was achieved around the kidney turned our attention the bladder.  The bladder was filled with a Betadine solution.  A cystostomy was made and the ureter was anastomosed using 5-0 PDS in running fashion.  A stent was placed across the ureter anastomosis.  A antireflux suture was used to reapproximate detrusor over the anastomotic site.  From here hemostasis was ensured.  Floseal was placed around the kidney.  A 19 Tamazight Nikhil drain was placed through separate stab incision and oriented around the kidney.    We then turned our attention back to the liver.  The Osorio was repositioned.  The allograft gallbladder was not present. The allograft common bile duct was tailored to the appropriate length and prepared.  The duct was single.  The recipient common bile duct was dissected using low electrocautery and sharp dissection. The excess duct was removed. The  pericholedochal plexuses were controlled with 6-0 Prolene sutures.  The recipient bile duct was suitable for anastomosis.. There was a significant size discrepancy at the biliary anastomosis.  The donor duct was much smaller than the recipient duct.  To accommodate this a anterior slit was made on the donor duct for a V plasty.  The ducts were then sewn using running 5-0 PDS.  Hemostasis was examined and assured.    Three 19F Nikhil drains were brought through the abdominal wall and secured.  The by subcostal incision for the liver was closed using 2 layers of 0 PDS.  The kidney incision was also closed with 2 layers of 0 PDS.  Deep dermal was reapproximated using 3-0 Vicryl's interrupted for both incisions.  The skin was closed using staples the skin was closed with staples. All sponge, needle, and instrument counts were reported as correct at the end of the operation. The patient was taken to ICU in stable condition.    The operation was of sufficient complexity to require a first assistant during initial incision, operative exposure, allograft preparation, total hepatectomy, creation of multiple vascular and excretory anastomoses, hemostasis, as well as wound closure..  Dr. Hudson served as first assistant given no sufficient resident available.       Specimens Collected  ID Type Source Tests Collected by Time   1 : EXPLANTED LIVER AND GALL BLADDER Tissue LIVER TRANSPLANT EXPLANT SURGICAL PATHOLOGY EXAM Priscila Wright MD 10/21/2023 0537                     Attending Attestation: I was present and scrubbed for the entire procedure.    Priscila Wright  Phone Number: [phone]

## 2025-01-21 NOTE — ANESTHESIA POSTPROCEDURE EVALUATION
Patient: Goran Ibrahim    Procedure Summary       Date: 01/20/25 Room / Location: [facility] OR 15 / [facility] OR    Anesthesia Start: 0804 Anesthesia Stop: 2031    Procedures:       TRANSPLANT, KIDNEY (Abdomen)      TRANSPLANT, LIVER Diagnosis:       Hepatic cirrhosis, unspecified hepatic cirrhosis type, unspecified whether ascites present (Multi)      (Hepatic cirrhosis, unspecified hepatic cirrhosis type, unspecified whether ascites present (Multi) [K74.60])    Surgeons: Priscila Wright MD Responsible Provider: Tay Johnson DO    Anesthesia Type: general ASA Status: 3            Anesthesia Type: general    Vitals Value Taken Time   /84 01/20/25 2031   Temp 36.2 01/20/25 2031   Pulse 67 01/20/25 2030   Resp 16 01/20/25 2030   SpO2 100 % 01/20/25 2030   Vitals shown include unfiled device data.    Anesthesia Post Evaluation    Patient location during evaluation: ICU  Patient participation: complete - patient cannot participate  Level of consciousness: sedated  Pain management: adequate  Airway patency: patent  Cardiovascular status: acceptable  Respiratory status: acceptable  Hydration status: acceptable  Postoperative Nausea and Vomiting: none        No notable events documented.

## 2025-01-21 NOTE — PROGRESS NOTES
Goran Ibrahim is a 54 y.o. male on day 1 of admission presenting with Hepatic cirrhosis, unspecified hepatic cirrhosis type, unspecified whether ascites present (Multi).    Subjective   Patient extubated overnight. Post-op Liver US with decreased RIs of the hepatic arteries, otherwise unremarkable. Post-op Kidney US unremarkable. No transfusion requirements post-op overnight. On exam, patient is A&Ox3, denies pain at this time.    Objective     Physical Exam  Vitals reviewed.   Constitutional:       General: He is not in acute distress.     Appearance: He is obese.   HENT:      Head: Normocephalic and atraumatic.      Nose:      Comments: NG tube in place to LIWS.     Mouth/Throat:      Mouth: Mucous membranes are dry.   Eyes:      Extraocular Movements: Extraocular movements intact.      Pupils: Pupils are equal, round, and reactive to light.   Neck:      Comments: RIJ MAC with mini MAC and RIJ trialysis catheter in place, dressing c/d/i.  Cardiovascular:      Rate and Rhythm: Normal rate and regular rhythm.      Pulses: Normal pulses.      Heart sounds: Normal heart sounds.   Pulmonary:      Effort: Pulmonary effort is normal.      Breath sounds: Normal breath sounds.      Comments: On 4L NC.  Abdominal:      General: There is distension.      Palpations: Abdomen is soft.      Tenderness: There is abdominal tenderness (surgical).      Comments: Obese abdomen. Large mercedes incision, dressing with mild strikethrough. Right KAITLYNN drain x3 and Left KAITLYNN drain x1 with serosanguinous output.   Genitourinary:     Comments: Bateman in place with clear yellow UOP.   Musculoskeletal:      Right lower leg: Edema present.      Left lower leg: Edema present.   Skin:     General: Skin is warm and dry.   Neurological:      General: No focal deficit present.      Mental Status: He is alert and oriented to person, place, and time.   Psychiatric:         Mood and Affect: Mood normal.         Behavior: Behavior normal.       Last  "Recorded Vitals  Blood pressure (!) 160/100, pulse 68, temperature 36.6 °C (97.9 °F), temperature source Temporal, resp. rate 21, height 1.778 m (5' 10\"), weight 126 kg (276 lb 14.4 oz), SpO2 90%.  Intake/Output last 3 Shifts:  I/O last 3 completed shifts:  In: 21963.9 (142.2 mL/kg) [I.V.:1183.1 (9.4 mL/kg); Blood:61249; IV Piggyback:6413.9]  Out: 42759 (81.8 mL/kg) [Urine:3055 (0.7 mL/kg/hr); Emesis/NG output:800; Drains:420; Blood:6000]  Weight: 125.6 kg     Relevant Results  Scheduled medications  [START ON 1/24/2025] basiliximab, 20 mg, intravenous, Once  fluconazole, 400 mg, intravenous, q24h  insulin lispro, 0-15 Units, subcutaneous, q4h  [START ON 1/22/2025] methylPREDNISolone sodium succinate (PF), 125 mg, intravenous, Daily  [START ON 1/24/2025] methylPREDNISolone sodium succinate (PF), 60 mg, intravenous, Once  methylPREDNISolone sodium succinate (PF), 250 mg, intravenous, Once  mycophenolate, 1,000 mg, intravenous, q12h EDINSON  oxygen, , inhalation, Continuous - Inhalation  pantoprazole, 40 mg, intravenous, q12h  piperacillin-tazobactam, 3.375 g, intravenous, q6h  [START ON 1/25/2025] predniSONE, 20 mg, oral, Daily      Continuous medications  electrolyte-A, 60 mL/hr, Last Rate: 60 mL/hr (01/21/25 0834)  PrismaSol BGK 2/3.5, 25 mL/kg/hr, Last Rate: 25 mL/kg/hr (01/20/25 1345)      PRN medications  PRN medications: dextrose, dextrose, dextrose, dextrose, glucagon, glucagon, glucagon, glucagon, HYDROmorphone, HYDROmorphone       Results for orders placed or performed during the hospital encounter of 01/20/25 (from the past 24 hours)   Prepare Platelets: 2 Units   Result Value Ref Range    PRODUCT CODE D1212B96     Unit Number [id number]     Unit ABO O     Unit RH POS     Dispense Status TR     Blood Expiration Date 1/22/2025 11:59:00 PM EST     PRODUCT BLOOD TYPE 5100     UNIT VOLUME 301     PRODUCT CODE N3310D26     Unit Number [id number]     Unit ABO O     Unit RH POS     Dispense Status TR     " Blood Expiration Date 1/22/2025 11:59:00 PM EST     PRODUCT BLOOD TYPE 5100     UNIT VOLUME 205    Prepare Platelets: 2 Units   Result Value Ref Range    PRODUCT CODE R7408L03     Unit Number [id number]     Unit ABO B     Unit RH POS     Dispense Status TR     Blood Expiration Date 1/21/2025 11:59:00 PM EST     PRODUCT BLOOD TYPE 7300     UNIT VOLUME 272     PRODUCT CODE B6318V04     Unit Number [id number]     Unit ABO B     Unit RH POS     Dispense Status TR     Blood Expiration Date 1/21/2025 11:59:00 PM EST     PRODUCT BLOOD TYPE 7300     UNIT VOLUME 263    Prepare Cryoprecipitated AHF (Pooled Units): 1 Pools   Result Value Ref Range    PRODUCT CODE Z8017C01     Unit Number [id number]     Unit ABO O     Unit RH POS     Dispense Status TR     Blood Expiration Date 1/20/2025  7:20:00 PM EST     PRODUCT BLOOD TYPE 5100     UNIT VOLUME 88    Prepare RBC: 5 Units   Result Value Ref Range    PRODUCT CODE Y9932X95     Unit Number [id number]     Unit ABO O     Unit RH POS     XM INTEP COMP     Dispense Status XM     Blood Expiration Date 2/15/2025 11:59:00 PM EST     PRODUCT BLOOD TYPE 5100     UNIT VOLUME 350     PRODUCT CODE J3644U02     Unit Number [id number]     Unit ABO O     Unit RH POS     XM INTEP COMP     Dispense Status XM     Blood Expiration Date 2/15/2025 11:59:00 PM EST     PRODUCT BLOOD TYPE 5100     UNIT VOLUME 350     PRODUCT CODE O8897I75     Unit Number [id number]     Unit ABO O     Unit RH POS     XM INTEP COMP     Dispense Status XM     Blood Expiration Date 2/17/2025 11:59:00 PM EST     PRODUCT BLOOD TYPE 5100     UNIT VOLUME 350     PRODUCT CODE L3405M30     Unit Number [id number]     Unit ABO O     Unit RH POS     XM INTEP COMP     Dispense Status XM     Blood Expiration Date 2/15/2025 11:59:00 PM EST     PRODUCT BLOOD TYPE 5100     UNIT VOLUME 292     PRODUCT CODE S2005P04     Unit Number [id number]     Unit ABO O     Unit RH POS     XM INTEP COMP      Dispense Status XM     Blood Expiration Date 2/15/2025 11:59:00 PM EST     PRODUCT BLOOD TYPE 5100     UNIT VOLUME 284    Prepare Plasma: 5 Units   Result Value Ref Range    PRODUCT CODE B4494M95     Unit Number [id number]     Unit ABO O     Unit RH POS     Dispense Status RE     Blood Expiration Date 1/25/2025  7:59:00 AM EST     PRODUCT BLOOD TYPE 5100     UNIT VOLUME 304     PRODUCT CODE J9961Y23     Unit Number [id number]     Unit ABO O     Unit RH POS     Dispense Status RE     Blood Expiration Date 1/25/2025  7:59:00 AM EST     PRODUCT BLOOD TYPE 5100     UNIT VOLUME 290     PRODUCT CODE Y3655T37     Unit Number [id number]     Unit ABO O     Unit RH POS     Dispense Status RE     Blood Expiration Date 1/25/2025  7:59:00 AM EST     PRODUCT BLOOD TYPE 5100     UNIT VOLUME 243     PRODUCT CODE V8144R19     Unit Number [id number]     Unit ABO O     Unit RH POS     Dispense Status RE     Blood Expiration Date 1/25/2025  7:59:00 AM EST     PRODUCT BLOOD TYPE 5100     UNIT VOLUME 259     PRODUCT CODE K8897V23     Unit Number [id number]     Unit ABO O     Unit RH POS     Dispense Status RE     Blood Expiration Date 1/25/2025  7:59:00 AM EST     PRODUCT BLOOD TYPE 5100     UNIT VOLUME 255    Renal Function Panel   Result Value Ref Range    Glucose 212 (H) 74 - 99 mg/dL    Sodium 144 136 - 145 mmol/L    Potassium 4.2 3.5 - 5.3 mmol/L    Chloride 110 (H) 98 - 107 mmol/L    Bicarbonate 26 21 - 32 mmol/L    Anion Gap 12 10 - 20 mmol/L    Urea Nitrogen 37 (H) 6 - 23 mg/dL    Creatinine 2.36 (H) 0.50 - 1.30 mg/dL    eGFR 32 (L) >60 mL/min/1.73m*2    Calcium 9.8 8.6 - 10.6 mg/dL    Phosphorus 4.6 2.5 - 4.9 mg/dL    Albumin 2.9 (L) 3.4 - 5.0 g/dL   Magnesium   Result Value Ref Range    Magnesium 2.01 1.60 - 2.40 mg/dL   Hepatic function panel   Result Value Ref Range    Albumin 2.9 (L) 3.4 - 5.0 g/dL    Bilirubin, Total 2.2 (H) 0.0 - 1.2 mg/dL    Bilirubin, Direct 0.9 (H) 0.0 - 0.3 mg/dL    Alkaline  Phosphatase 89 33 - 120 U/L    ALT 1,653 (H) 10 - 52 U/L    AST 3,250 (H) 9 - 39 U/L    Total Protein 4.9 (L) 6.4 - 8.2 g/dL   Protime-INR   Result Value Ref Range    Protime 22.9 (H) 9.8 - 12.8 seconds    INR 2.0 (H) 0.9 - 1.1   Fibrinogen   Result Value Ref Range    Fibrinogen 211 200 - 400 mg/dL   APTT   Result Value Ref Range    aPTT 57 (H) 27 - 38 seconds   CBC   Result Value Ref Range    WBC 2.3 (L) 4.4 - 11.3 x10*3/uL    nRBC 0.0 0.0 - 0.0 /100 WBCs    RBC 2.87 (L) 4.50 - 5.90 x10*6/uL    Hemoglobin 8.5 (L) 13.5 - 17.5 g/dL    Hematocrit 26.0 (L) 41.0 - 52.0 %    MCV 91 80 - 100 fL    MCH 29.6 26.0 - 34.0 pg    MCHC 32.7 32.0 - 36.0 g/dL    RDW 15.3 (H) 11.5 - 14.5 %    Platelets 53 (L) 150 - 450 x10*3/uL   TEG Clot Global Profile Unsolicited   Result Value Ref Range    R (Reaction Time) K 14.2 (H) 4.6 - 9.1 min    K (Clot Kinetics) 3.4 (H) 0.8 - 2.1 min    ANGLE 50.0 (L) 63.0 - 78.0 deg    MA (Max Amplitude) K 51.0 (L) 52.0 - 69.0 mm    R (Reaction Time) KH 9.3 (H) 4.3 - 8.3 min    MA (Max Amplitude) RT 53.0 52.0 - 70.0 mm    MA ( Sergio Amplitude) FF 18.0 15.0 - 32.0 mm    FLEV 334 278 - 581 mg/dL    Test Comment post txp    Prepare Platelets: 2 Units   Result Value Ref Range    PRODUCT CODE O8600K12     Unit Number [id number]     Unit ABO O     Unit RH POS     Dispense Status TR     Blood Expiration Date 1/22/2025 11:59:00 PM EST     PRODUCT BLOOD TYPE 5100     UNIT VOLUME 304     PRODUCT CODE B6816E68     Unit Number [id number]     Unit ABO O     Unit RH POS     Dispense Status TR     Blood Expiration Date 1/22/2025 11:59:00 PM EST     PRODUCT BLOOD TYPE 5100     UNIT VOLUME 206    Fibrinogen   Result Value Ref Range    Fibrinogen 115 (L) 200 - 400 mg/dL   APTT   Result Value Ref Range    aPTT 42 (H) 27 - 38 seconds   Protime-INR   Result Value Ref Range    Protime 27.5 (H) 9.8 - 12.8 seconds    INR 2.4 (H) 0.9 - 1.1   CBC   Result Value Ref Range    WBC 1.2 (L) 4.4 - 11.3 x10*3/uL    nRBC 0.0 0.0  - 0.0 /100 WBCs    RBC 1.58 (L) 4.50 - 5.90 x10*6/uL    Hemoglobin 4.6 (LL) 13.5 - 17.5 g/dL    Hematocrit 14.6 (L) 41.0 - 52.0 %    MCV 92 80 - 100 fL    MCH 29.1 26.0 - 34.0 pg    MCHC 31.5 (L) 32.0 - 36.0 g/dL    RDW 16.0 (H) 11.5 - 14.5 %    Platelets 45 (L) 150 - 450 x10*3/uL   Prepare Cryoprecipitated AHF (Pooled Units): 1 Pools   Result Value Ref Range    PRODUCT CODE G9436I25     Unit Number [id number]     Unit ABO O     Unit RH POS     Dispense Status TR     Blood Expiration Date 1/21/2025 12:59:00 AM EST     PRODUCT BLOOD TYPE 5100     UNIT VOLUME 89    CBC   Result Value Ref Range    WBC 2.2 (L) 4.4 - 11.3 x10*3/uL    nRBC 0.0 0.0 - 0.0 /100 WBCs    RBC 2.70 (L) 4.50 - 5.90 x10*6/uL    Hemoglobin 7.9 (L) 13.5 - 17.5 g/dL    Hematocrit 22.3 (L) 41.0 - 52.0 %    MCV 83 80 - 100 fL    MCH 29.3 26.0 - 34.0 pg    MCHC 35.4 32.0 - 36.0 g/dL    RDW 15.1 (H) 11.5 - 14.5 %    Platelets 75 (L) 150 - 450 x10*3/uL   Renal Function Panel   Result Value Ref Range    Glucose 183 (H) 74 - 99 mg/dL    Sodium 146 (H) 136 - 145 mmol/L    Potassium 3.9 3.5 - 5.3 mmol/L    Chloride 107 98 - 107 mmol/L    Bicarbonate 30 21 - 32 mmol/L    Anion Gap 13 10 - 20 mmol/L    Urea Nitrogen 36 (H) 6 - 23 mg/dL    Creatinine 2.12 (H) 0.50 - 1.30 mg/dL    eGFR 36 (L) >60 mL/min/1.73m*2    Calcium 8.9 8.6 - 10.6 mg/dL    Phosphorus 5.0 (H) 2.5 - 4.9 mg/dL    Albumin 3.3 (L) 3.4 - 5.0 g/dL   Magnesium   Result Value Ref Range    Magnesium 1.62 1.60 - 2.40 mg/dL   Fibrinogen   Result Value Ref Range    Fibrinogen 156 (L) 200 - 400 mg/dL   APTT   Result Value Ref Range    aPTT 33 27 - 38 seconds   Protime-INR   Result Value Ref Range    Protime 16.5 (H) 9.8 - 12.8 seconds    INR 1.5 (H) 0.9 - 1.1   Prepare Platelets: 2 Units   Result Value Ref Range    PRODUCT CODE G0535Z91     Unit Number [id number]     Unit ABO O     Unit RH POS     Dispense Status XM     Blood Expiration Date 1/22/2025 11:59:00 PM EST     PRODUCT BLOOD TYPE 5100      UNIT VOLUME 326     PRODUCT CODE E7434W60     Unit Number [id number]     Unit ABO O     Unit RH POS     Dispense Status XM     Blood Expiration Date 1/22/2025 11:59:00 PM EST     PRODUCT BLOOD TYPE 5100     UNIT VOLUME 250    Blood Gas Arterial   Result Value Ref Range    POCT pH, Arterial 7.40 7.38 - 7.42 pH    POCT pCO2, Arterial 46 (H) 38 - 42 mm Hg    POCT pO2, Arterial 182 (H) 85 - 95 mm Hg    POCT SO2, Arterial 98 94 - 100 %    POCT Oxy Hemoglobin, Arterial 95.3 94.0 - 98.0 %    POCT Base Excess, Arterial 3.0 -2.0 - 3.0 mmol/L    POCT HCO3 Calculated, Arterial 28.5 (H) 22.0 - 26.0 mmol/L    Patient Temperature 37.0 degrees Celsius    FiO2 60 %   POCT GLUCOSE   Result Value Ref Range    POCT Glucose 186 (H) 74 - 99 mg/dL   Coagulation Screen   Result Value Ref Range    Protime 17.3 (H) 9.8 - 12.8 seconds    INR 1.5 (H) 0.9 - 1.1    aPTT 34 27 - 38 seconds   CBC   Result Value Ref Range    WBC 3.0 (L) 4.4 - 11.3 x10*3/uL    nRBC 0.0 0.0 - 0.0 /100 WBCs    RBC 2.88 (L) 4.50 - 5.90 x10*6/uL    Hemoglobin 8.6 (L) 13.5 - 17.5 g/dL    Hematocrit 23.5 (L) 41.0 - 52.0 %    MCV 82 80 - 100 fL    MCH 29.9 26.0 - 34.0 pg    MCHC 36.6 (H) 32.0 - 36.0 g/dL    RDW 15.8 (H) 11.5 - 14.5 %    Platelets 84 (L) 150 - 450 x10*3/uL   Calcium, Ionized   Result Value Ref Range    POCT Calcium, Ionized 1.18 1.1 - 1.33 mmol/L   Magnesium   Result Value Ref Range    Magnesium 1.67 1.60 - 2.40 mg/dL   Renal Function Panel   Result Value Ref Range    Glucose 179 (H) 74 - 99 mg/dL    Sodium 145 136 - 145 mmol/L    Potassium 3.6 3.5 - 5.3 mmol/L    Chloride 107 98 - 107 mmol/L    Bicarbonate 31 21 - 32 mmol/L    Anion Gap 11 10 - 20 mmol/L    Urea Nitrogen 36 (H) 6 - 23 mg/dL    Creatinine 2.18 (H) 0.50 - 1.30 mg/dL    eGFR 35 (L) >60 mL/min/1.73m*2    Calcium 8.9 8.6 - 10.6 mg/dL    Phosphorus 5.4 (H) 2.5 - 4.9 mg/dL    Albumin 3.5 3.4 - 5.0 g/dL   POCT GLUCOSE   Result Value Ref Range    POCT Glucose 199 (H) 74 - 99 mg/dL   TEG  Clot Global Profile   Result Value Ref Range    R (Reaction Time) K 8.9 4.6 - 9.1 min    K (Clot Kinetics) 1.7 0.8 - 2.1 min    ANGLE 71.1 63.0 - 78.0 deg    MA (Max Amplitude) K 56.5 52.0 - 69.0 mm    R (Reaction Time) KH 8.2 4.3 - 8.3 min    MA (Max Amplitude) RT 59.1 52.0 - 70.0 mm    MA ( Sergio Amplitude) FF 20.2 15.0 - 32.0 mm    FLEV 369 278 - 581 mg/dL   Blood Gas Arterial Full Panel   Result Value Ref Range    POCT pH, Arterial 7.43 (H) 7.38 - 7.42 pH    POCT pCO2, Arterial 41 38 - 42 mm Hg    POCT pO2, Arterial 132 (H) 85 - 95 mm Hg    POCT SO2, Arterial 99 94 - 100 %    POCT Oxy Hemoglobin, Arterial 95.8 94.0 - 98.0 %    POCT Hematocrit Calculated, Arterial 28.0 (L) 41.0 - 52.0 %    POCT Sodium, Arterial 139 136 - 145 mmol/L    POCT Potassium, Arterial 3.8 3.5 - 5.3 mmol/L    POCT Chloride, Arterial 107 98 - 107 mmol/L    POCT Ionized Calcium, Arterial 1.24 1.10 - 1.33 mmol/L    POCT Glucose, Arterial 186 (H) 74 - 99 mg/dL    POCT Lactate, Arterial 1.1 0.4 - 2.0 mmol/L    POCT Base Excess, Arterial 2.6 -2.0 - 3.0 mmol/L    POCT HCO3 Calculated, Arterial 27.2 (H) 22.0 - 26.0 mmol/L    POCT Hemoglobin, Arterial 9.3 (L) 13.5 - 17.5 g/dL    POCT Anion Gap, Arterial 9 (L) 10 - 25 mmo/L    Patient Temperature 37.0 degrees Celsius    FiO2 40 %   POCT GLUCOSE   Result Value Ref Range    POCT Glucose 173 (H) 74 - 99 mg/dL   Magnesium   Result Value Ref Range    Magnesium 1.63 1.60 - 2.40 mg/dL   Hepatic function panel   Result Value Ref Range    Albumin 3.3 (L) 3.4 - 5.0 g/dL    Bilirubin, Total 6.9 (H) 0.0 - 1.2 mg/dL    Bilirubin, Direct 5.1 (H) 0.0 - 0.3 mg/dL    Alkaline Phosphatase 109 33 - 120 U/L    ALT 1,173 (H) 10 - 52 U/L    AST 5,535 (H) 9 - 39 U/L    Total Protein 5.5 (L) 6.4 - 8.2 g/dL   BUN   Result Value Ref Range    Urea Nitrogen 38 (H) 6 - 23 mg/dL   Creatinine, Serum   Result Value Ref Range    Creatinine 2.32 (H) 0.50 - 1.30 mg/dL    eGFR 32 (L) >60 mL/min/1.73m*2   Electrolyte panel   Result Value  Ref Range    Sodium 144 136 - 145 mmol/L    Potassium 3.7 3.5 - 5.3 mmol/L    Chloride 106 98 - 107 mmol/L    Bicarbonate 27 21 - 32 mmol/L    Anion Gap 15 10 - 20 mmol/L   Calcium, ionized   Result Value Ref Range    POCT Calcium, Ionized 1.16 1.1 - 1.33 mmol/L   Phosphorus   Result Value Ref Range    Phosphorus 6.4 (H) 2.5 - 4.9 mg/dL   Coagulation Screen   Result Value Ref Range    Protime 16.3 (H) 9.8 - 12.8 seconds    INR 1.4 (H) 0.9 - 1.1    aPTT 31 27 - 38 seconds   CBC   Result Value Ref Range    WBC 4.9 4.4 - 11.3 x10*3/uL    nRBC 0.0 0.0 - 0.0 /100 WBCs    RBC 3.09 (L) 4.50 - 5.90 x10*6/uL    Hemoglobin 9.0 (L) 13.5 - 17.5 g/dL    Hematocrit 25.4 (L) 41.0 - 52.0 %    MCV 82 80 - 100 fL    MCH 29.1 26.0 - 34.0 pg    MCHC 35.4 32.0 - 36.0 g/dL    RDW 15.9 (H) 11.5 - 14.5 %    Platelets 98 (L) 150 - 450 x10*3/uL   Basic Metabolic Panel   Result Value Ref Range    Glucose 184 (H) 74 - 99 mg/dL    Sodium 144 136 - 145 mmol/L    Potassium 3.7 3.5 - 5.3 mmol/L    Chloride 106 98 - 107 mmol/L    Bicarbonate 27 21 - 32 mmol/L    Anion Gap 15 10 - 20 mmol/L    Urea Nitrogen 38 (H) 6 - 23 mg/dL    Creatinine 2.32 (H) 0.50 - 1.30 mg/dL    eGFR 32 (L) >60 mL/min/1.73m*2    Calcium 8.7 8.6 - 10.6 mg/dL   Blood Gas Arterial Full Panel   Result Value Ref Range    POCT pH, Arterial 7.43 (H) 7.38 - 7.42 pH    POCT pCO2, Arterial 41 38 - 42 mm Hg    POCT pO2, Arterial 143 (H) 85 - 95 mm Hg    POCT SO2, Arterial 98 94 - 100 %    POCT Oxy Hemoglobin, Arterial 95.8 94.0 - 98.0 %    POCT Hematocrit Calculated, Arterial 38.0 (L) 41.0 - 52.0 %    POCT Sodium, Arterial 139 136 - 145 mmol/L    POCT Potassium, Arterial 3.8 3.5 - 5.3 mmol/L    POCT Chloride, Arterial 106 98 - 107 mmol/L    POCT Ionized Calcium, Arterial 1.15 1.10 - 1.33 mmol/L    POCT Glucose, Arterial 193 (H) 74 - 99 mg/dL    POCT Lactate, Arterial 1.3 0.4 - 2.0 mmol/L    POCT Base Excess, Arterial 2.6 -2.0 - 3.0 mmol/L    POCT HCO3 Calculated, Arterial 27.2 (H) 22.0  - 26.0 mmol/L    POCT Hemoglobin, Arterial 12.6 (L) 13.5 - 17.5 g/dL    POCT Anion Gap, Arterial 10 10 - 25 mmo/L    Patient Temperature 37.0 degrees Celsius    FiO2 40 %   POCT GLUCOSE   Result Value Ref Range    POCT Glucose 184 (H) 74 - 99 mg/dL   Blood Gas Arterial Full Panel   Result Value Ref Range    POCT pH, Arterial 7.42 7.38 - 7.42 pH    POCT pCO2, Arterial 40 38 - 42 mm Hg    POCT pO2, Arterial 100 (H) 85 - 95 mm Hg    POCT SO2, Arterial 98 94 - 100 %    POCT Oxy Hemoglobin, Arterial 95.9 94.0 - 98.0 %    POCT Hematocrit Calculated, Arterial 34.0 (L) 41.0 - 52.0 %    POCT Sodium, Arterial 139 136 - 145 mmol/L    POCT Potassium, Arterial 4.2 3.5 - 5.3 mmol/L    POCT Chloride, Arterial 105 98 - 107 mmol/L    POCT Ionized Calcium, Arterial 1.12 1.10 - 1.33 mmol/L    POCT Glucose, Arterial 199 (H) 74 - 99 mg/dL    POCT Lactate, Arterial 1.3 0.4 - 2.0 mmol/L    POCT Base Excess, Arterial 1.3 -2.0 - 3.0 mmol/L    POCT HCO3 Calculated, Arterial 25.9 22.0 - 26.0 mmol/L    POCT Hemoglobin, Arterial 11.3 (L) 13.5 - 17.5 g/dL    POCT Anion Gap, Arterial 12 10 - 25 mmo/L    Patient Temperature 37.0 degrees Celsius    FiO2 32 %   TEG Clot Global Profile   Result Value Ref Range    R (Reaction Time) K 8.2 4.6 - 9.1 min    K (Clot Kinetics) 1.4 0.8 - 2.1 min    ANGLE 72.4 63.0 - 78.0 deg    MA (Max Amplitude) K 57.9 52.0 - 69.0 mm    R (Reaction Time) KH 7.9 4.3 - 8.3 min    MA (Max Amplitude) RT 60.2 52.0 - 70.0 mm    MA ( Sergio Amplitude) FF 20.6 15.0 - 32.0 mm    FLEV 376 278 - 581 mg/dL   POCT GLUCOSE   Result Value Ref Range    POCT Glucose 219 (H) 74 - 99 mg/dL   CBC and Auto Differential   Result Value Ref Range    WBC 6.1 4.4 - 11.3 x10*3/uL    nRBC 0.0 0.0 - 0.0 /100 WBCs    RBC 3.19 (L) 4.50 - 5.90 x10*6/uL    Hemoglobin 9.3 (L) 13.5 - 17.5 g/dL    Hematocrit 26.4 (L) 41.0 - 52.0 %    MCV 83 80 - 100 fL    MCH 29.2 26.0 - 34.0 pg    MCHC 35.2 32.0 - 36.0 g/dL    RDW 16.3 (H) 11.5 - 14.5 %    Platelets 94 (L) 150  - 450 x10*3/uL    Neutrophils % 92.4 40.0 - 80.0 %    Immature Granulocytes %, Automated 0.3 0.0 - 0.9 %    Lymphocytes % 2.0 13.0 - 44.0 %    Monocytes % 4.9 2.0 - 10.0 %    Eosinophils % 0.2 0.0 - 6.0 %    Basophils % 0.2 0.0 - 2.0 %    Neutrophils Absolute 5.65 1.20 - 7.70 x10*3/uL    Immature Granulocytes Absolute, Automated 0.02 0.00 - 0.70 x10*3/uL    Lymphocytes Absolute 0.12 (L) 1.20 - 4.80 x10*3/uL    Monocytes Absolute 0.30 0.10 - 1.00 x10*3/uL    Eosinophils Absolute 0.01 0.00 - 0.70 x10*3/uL    Basophils Absolute 0.01 0.00 - 0.10 x10*3/uL   Renal Function Panel   Result Value Ref Range    Glucose 219 (H) 74 - 99 mg/dL    Sodium 144 136 - 145 mmol/L    Potassium 4.2 3.5 - 5.3 mmol/L    Chloride 105 98 - 107 mmol/L    Bicarbonate 25 21 - 32 mmol/L    Anion Gap 18 10 - 20 mmol/L    Urea Nitrogen 41 (H) 6 - 23 mg/dL    Creatinine 2.52 (H) 0.50 - 1.30 mg/dL    eGFR 29 (L) >60 mL/min/1.73m*2    Calcium 8.8 8.6 - 10.6 mg/dL    Phosphorus 7.1 (H) 2.5 - 4.9 mg/dL    Albumin 3.4 3.4 - 5.0 g/dL   Magnesium   Result Value Ref Range    Magnesium 2.13 1.60 - 2.40 mg/dL   Calcium, Ionized   Result Value Ref Range    POCT Calcium, Ionized 1.11 1.1 - 1.33 mmol/L   Coagulation Screen   Result Value Ref Range    Protime 15.7 (H) 9.8 - 12.8 seconds    INR 1.4 (H) 0.9 - 1.1    aPTT 31 27 - 38 seconds   Fibrinogen   Result Value Ref Range    Fibrinogen 234 200 - 400 mg/dL   Gamma-Glutamyl Transferase   Result Value Ref Range     (H) 5 - 64 U/L   Hepatic function panel   Result Value Ref Range    Albumin 3.4 3.4 - 5.0 g/dL    Bilirubin, Total 7.7 (H) 0.0 - 1.2 mg/dL    Bilirubin, Direct 5.2 (H) 0.0 - 0.3 mg/dL    Alkaline Phosphatase 133 (H) 33 - 120 U/L    ALT 1,080 (H) 10 - 52 U/L    AST 4,929 (H) 9 - 39 U/L    Total Protein 5.5 (L) 6.4 - 8.2 g/dL   Blood Gas Arterial Full Panel   Result Value Ref Range    POCT pH, Arterial 7.42 7.38 - 7.42 pH    POCT pCO2, Arterial 37 (L) 38 - 42 mm Hg    POCT pO2, Arterial 83 (L) 85  - 95 mm Hg    POCT SO2, Arterial 98 94 - 100 %    POCT Oxy Hemoglobin, Arterial 95.2 94.0 - 98.0 %    POCT Hematocrit Calculated, Arterial 28.0 (L) 41.0 - 52.0 %    POCT Sodium, Arterial 140 136 - 145 mmol/L    POCT Potassium, Arterial 4.3 3.5 - 5.3 mmol/L    POCT Chloride, Arterial 107 98 - 107 mmol/L    POCT Ionized Calcium, Arterial 1.13 1.10 - 1.33 mmol/L    POCT Glucose, Arterial 217 (H) 74 - 99 mg/dL    POCT Lactate, Arterial 1.3 0.4 - 2.0 mmol/L    POCT Base Excess, Arterial -0.3 -2.0 - 3.0 mmol/L    POCT HCO3 Calculated, Arterial 24.0 22.0 - 26.0 mmol/L    POCT Hemoglobin, Arterial 9.4 (L) 13.5 - 17.5 g/dL    POCT Anion Gap, Arterial 13 10 - 25 mmo/L    Patient Temperature 37.0 degrees Celsius    FiO2 32 %   Blood Gas Arterial Full Panel   Result Value Ref Range    POCT pH, Arterial 7.45 (H) 7.38 - 7.42 pH    POCT pCO2, Arterial 34 (L) 38 - 42 mm Hg    POCT pO2, Arterial 61 (L) 85 - 95 mm Hg    POCT SO2, Arterial 92 (L) 94 - 100 %    POCT Oxy Hemoglobin, Arterial 88.8 (L) 94.0 - 98.0 %    POCT Hematocrit Calculated, Arterial 30.0 (L) 41.0 - 52.0 %    POCT Sodium, Arterial 140 136 - 145 mmol/L    POCT Potassium, Arterial 4.0 3.5 - 5.3 mmol/L    POCT Chloride, Arterial 107 98 - 107 mmol/L    POCT Ionized Calcium, Arterial 1.13 1.10 - 1.33 mmol/L    POCT Glucose, Arterial 226 (H) 74 - 99 mg/dL    POCT Lactate, Arterial 1.3 0.4 - 2.0 mmol/L    POCT Base Excess, Arterial -0.1 -2.0 - 3.0 mmol/L    POCT HCO3 Calculated, Arterial 23.6 22.0 - 26.0 mmol/L    POCT Hemoglobin, Arterial 10.1 (L) 13.5 - 17.5 g/dL    POCT Anion Gap, Arterial 13 10 - 25 mmo/L    Patient Temperature 37.0 degrees Celsius    FiO2 21 %   POCT GLUCOSE   Result Value Ref Range    POCT Glucose 239 (H) 74 - 99 mg/dL       Assessment/Plan   Assessment & Plan  Hepatic cirrhosis, unspecified hepatic cirrhosis type, unspecified whether ascites present (Multi)    ESRD (end stage renal disease) (Multi)    Goran Ibrahim is a 54 y.o. male with PMHx of HTN,  HFpEF, CKD4, and cryptogenic liver cirrhosis (hx of alcohol use, but not heavy per chart review) c/b HE and EV s/p banding in June 2024, now presenting to SICU from OR 1/20 s/p simultaneous liver and kidney transplant by Dr. Wright on 1/20.     Plan:  NEURO: History of hepatic encephalopathy. Acute post-op pain, well controlled. A&Ox3, no focal deficits.  - ongoing neuro and pain assessments   - PRN Dilaudid for pain control  - add lidocaine patches  - PT/OT consult -> OOB in chair today     CV: History of HTN, HFpEF. Baseline BP 130s-150s/80s. Baseline echo (7/29/24) with EF 62%, RV normal, mild AVR. Negative NM stress test 11/2023. Arrived to SICU on cleviprex infusion, weaned off overnight.  - Continuous EKG/abp monitoring.  - Goal map 65-90  - trial labetalol 10mg IV  - Additional volume resuscitation as clinically indicated  - Home meds: amlodipine 5mg daily, carvedilol 6.25mg BID, furosemide 40mg BID     PULM: Former smoker (40 year history). PFTs performed 12/2023 that suggested mild spirometric airflow obstruction. Arrived to ICU intubated, extubated post-op overnight. Currently on 4L NC.  - Wean O2 as tolerated, maintaining SpO2 >92%  - encourage Q1h incentive spirometer  - Daily CXR  - ABG prn     GI: Hx of cryptogenic liver cirrhosis (hx of alcohol use, but not heavy per chart review) c/b HE and EV s/p banding in June 2024. Now s/p SLK transplant on 1/20. Post-op Liver US with decreased RIs of the hepatic arteries, otherwise unremarkable. Drains with serosanguinous output. ALT/AST downtrending, bilirubin uptrending.   - NG tube clamp trial  - NPO, okay for ice chips and meds -> advance to CLD once NGT is removed  - continue PPI (home med)  - trend LFTs/GGT q8h  - repeat liver US with dopplers per Transplant  - serial abdominal exams  - monitor drain output  - add bowel regimen  - add ursodiol 300mg q8h  - IS/ppx per Transplant: methylpred taper, MMF, basiliximab     : History of CKD4. Baseline cr  ~2.1-3.5. Now s/p Kidney Transplant on 1/20. Post-op Kidney US unremarkable. Adequate UOP.  - Check renal function panel q8h and PRN  - Maintenance IVF -> PL at 60ml/hr  - Additional volume resuscitation as clinically indicated  - Maintain UOP >0.5ml/kg/hr   - Maintain chaudhary for strict I&Os  - Replete electrolytes judiciously  - Immunosuppression as above in GI     HEME: Acute blood loss anemia. Coagulapathy. Hypofibrinogenemia. Thrombocytopenia. OR EBL 6L. Intra-op patient received 10 PRBC, 10 FFP, 8 Plts, 2 cryo, and 1939 cellsaver, Riastap x2, Kcentra 2.  - Check CBC, coags, and fibrinogen q8h and PRN  - SCDs for DVT ppx  - no subcutaneous heparin at this time  - okay for ASA per Transplant  - ongoing monitoring for s/s bleeding  - maintain active T&S (1/20)     ENDO: No known history of DM or thyroid disorder. A1c 5.3 (9/2023). Arrived to SICU on insulin infusion, transitioned to SSI overnight.  - Q4h BG and SSI Lispro per ICU protocol -> increased to #4 SSI  - add on A1c -> 6.6     ID: Afebrile. Leukopenia, resolved.   - trend q4h WBC, q4h temps  - Post-op Zosyn x72 hrs  - continue prophylactic Fluconazole x30 days  - close monitoring for s/s infection. Low threshold to culture.     Lines:   - RIJ MAC w/ mini MAC (1/20) -> remove today   - RIJ trialysis line (1/20)  - R radial arterial line (1/20)  - 9F LUE FRANCISCO -> remove  - PIV x2     Dispo: Continue ICU care. Patient seen and discussed with ICU attending Dr. Drummond.      Matt Quijano, APRN-CNP  SICU phone [phone]     Critical Care time: 50 minutes

## 2025-01-21 NOTE — CONSULTS
[facility]   [facility]  INITIAL CONSULT NOTE       Reason For Consult  Post-OLT co-management     SUBJECTIVE     History Of Present Illness  Goran Ibrahim is a 53 yo male with a past medical history of decompensated MetALD (ascites, variceal bleed, HE) c/b EV s/p banding in June 2024, advanced CKD (baseline creatinine around 2.5), long standing tobacco use, duodenal ulcer, H.pylori infection (negative biopsies in June 2024), and short segment Matute's esophagus (not biopsy confirmed due to the presence of varices) who was listed for SLK. He was subsequently brought in for DCD liver (caval piggyback, rCHA to dSMA/celiac stack HA, duct to duct [small to large with v plasty]) and right kidney transplant which he underwent on 1/20/2025 with Dr. Wright. Hepatology has been consulted to co-manage him post-OLT.     Initial presentation (from Dr. Francois's outpatient note):  He had a number of hospitalizations at [facility] and [facility] from May to July 2023. At that time he developed symptoms which he attributed to a viral gastroenteritis in April 2023.  He started taking Pepto-Bismol.  At that time he noticed that his stools were dark and black.  He assumed that his dark stools were related to Pepto-Bismol use.  On May 18, 2023 is when he first took himself to the hospital at [facility].  He actually drove to the hospital and was confused.  He was pulled over by a  and had difficulty with his speech.  Ultimately, during that index hospitalization he was diagnosed with hepatic encephalopathy secondary to severe GI bleeding.  His GI bleeding was a major issue with recurrent GI bleeding events over days and weeks. He was diagnosed with a duodenal ulcer which tested positive for H. pylori.  He also was diagnosed with esophageal varices.  During his first hospitalization he had also developed ascites and underwent an index paracentesis  procedure. At one point he was in the ICU and had required a number of blood transfusions.  He reported he had 3 endoscopy procedures in total and 4 colonoscopy procedures; in addition to multiple scans trying to determine where the GI bleeding was originating from. His last drink was in May 2023, and his alcohol biomarkers were negative leading up to transplant. He was a smoker as recently as Nov 2024.     His transplant operation was uncomplicated. Intra-op, he received 10 PRBCs, 10 FFPs, 8 Plts, 2 cryo, 2 riastap, 2 Kcentra.     Patient was seen in the TSICU today. He was extubated last evening after his transplant operation. He was on CVVH until this morning. He got simulect yesterday. He is on Methylpred and 1000 mg BID of MMF. He is also on Fluconazole and Zosyn. He has been started on Ursodiol.     Labs this morning show downtrending AST (5535 --> 4929), ALT (1173 --> 964). T.bili has uptrended slighltly from 6.9 to 7.7. Alk Phos has also uptrended slightly from 109 --> 133. GGT is 204. INR is 1.5. CBC shows hemoglobin of 10.    Patient received 120 mg of lasix yesterday and has made 3055 ml of urine in the last 24 hours. All 4 KAITLYNN drains are draining appropriately.     Post-transplant renal USG with dopplers is unremarkable. Post-transplant liver USG with dopplers shows decreased resistive  indices of the hepatic arteries (0.4-0.5).      Review of Systems  A 12 point ROS was performed and is negative except for HPI above.      Past Medical History:    Past Medical History:   Diagnosis Date    Cirrhosis (Multi)     CKD (chronic kidney disease)     Hypertension        Home Medications  Medications Prior to Admission   Medication Sig Dispense Refill Last Dose/Taking    amLODIPine (Norvasc) 5 mg tablet Take 1 tablet (5 mg) by mouth once daily. 90 tablet 1 1/19/2025 Morning    carvedilol (Coreg) 6.25 mg tablet Take 1 tablet (6.25 mg) by mouth 2 times a day. 60 tablet 11 1/19/2025 Evening    cholecalciferol  (Vitamin D-3) 50 MCG (2000 UT) tablet Take 1 tablet (50 mcg) by mouth once daily. 30 tablet 11 1/19/2025 Morning    furosemide (Lasix) 40 mg tablet Take 1 tablet (40 mg) by mouth 2 times daily (morning and late afternoon). 60 tablet 11 1/19/2025 Morning    lactulose 20 gram/30 mL oral solution Take 30 mL (20 g) by mouth 2 times a day. Take 30mL up to 3 times daily - Titrate to having 3-4 bowel movements daily 5400 mL 3 Past Week Morning    pantoprazole (ProtoNix) 40 mg EC tablet Take 1 tablet (40 mg) by mouth once daily in the morning. Take before meals. 30 tablet 11 1/19/2025    rifAXIMin (Xifaxan) 550 mg tablet Take 1 tablet (550 mg) by mouth every 12 hours. 60 tablet 11 1/19/2025 Evening    sodium bicarbonate 650 mg tablet Take 1 tablet (650 mg) by mouth 3 times a day. 90 tablet 11 1/19/2025         Surgical History:    Past Surgical History:   Procedure Laterality Date    IR ANGIOGRAM CELIAC  5/19/2023    IR ANGIOGRAM CELIAC 5/19/2023    US GUIDED ABDOMINAL PARACENTESIS  6/22/2023    US GUIDED ABDOMINAL PARACENTESIS 6/22/2023    US GUIDED ABDOMINAL PARACENTESIS  5/19/2023    US GUIDED ABDOMINAL PARACENTESIS 5/19/2023       Allergies:  No Known Allergies    Social History:  He reports smoking cigarettes daily, since the age of 13.  He estimates 5 to 10 cigarettes/day for the last 40 years.  He stopped smoking in Nov 2024. His last alcohol use was May of 2023.  He describes weekend drinking 1-2 servings of hard liquor, which would be at the bar.  He denies daily alcohol use. Drinking was mostly on the weekends.  He said in his 20s or 30s his drinking was a little heavier he would have binge type drinking at the bar on Fridays and Saturdays.  He denies any recreational drug use.  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  He said he is very careful about not drinking and driving.  He has never had a DUI.     Family History:  No known FH of liver disease or liver  cancer.     EXAM     Vitals:    Vitals:    01/21/25 1200 01/21/25 1300 01/21/25 1400 01/21/25 1500   BP: (!) 160/99 (!) 186/84 152/84 136/77   Pulse: 84 61 59 80   Resp: (!) 28 21 14 20   Temp:       TempSrc:       SpO2: 95% 96% 96% 93%   Weight:       Height:         Failed to redirect to the Timeline version of the [employer] SmartLink.    Intake/Output Summary (Last 24 hours) at 1/21/2025 1510  Last data filed at 1/21/2025 1400  Gross per 24 hour   Intake 14307.4 ml   Output 88189 ml   Net 2685.4 ml         Physical Exam  General: well-nourished, no acute distress  HEENT: no pallor, scleral icterus +ve   Respiratory: CTA bilaterally, normal work of breathing  Cardiovascular: S1, S2 heard   Abdomen: Chevron incision with dressing over it. Distended abdomen. 3 KAITLYNN drains on Right side and 1 KAITLYNN drain of Left side draining serosanguineous discharge   Extremities: no edema, no asterixis  Neuro: alert and oriented, CNII-XII grossly intact, moves all 4 extremities with no focal deficits  Skin: jaundiced   : chaudhary in place with clear yellow urine     OBJECTIVE                                                                              Medications       Current Facility-Administered Medications:     aspirin EC tablet 81 mg, 81 mg, oral, Daily, CHRIS Cui, 81 mg at 01/21/25 1321    [START ON 1/24/2025] basiliximab (Simulect) 20 mg in sodium chloride 0.9% 50 mL IV, 20 mg, intravenous, Once, Lizandro Zavala MD    calcium gluconate 1 g in sodium chloride (iso) IV 50 mL, 1 g, intravenous, q6h PRN, CHRIS Martinez, Stopped at 01/21/25 1426    calcium gluconate 2 g in sodium chloride (iso)  mL, 2 g, intravenous, q6h PRN, CHRIS Martinez    dextrose 50 % injection 12.5 g, 12.5 g, intravenous, q15 min PRN, Lew Raymond MD    dextrose 50 % injection 25 g, 25 g, intravenous, q15 min PRN, Lew Raymond MD    electrolyte-A (Plasmalyte-A) solution, 60 mL/hr, intravenous, Continuous, Matt L  Asmm, APRN-CNP, Last Rate: 60 mL/hr at 01/21/25 0834, 60 mL/hr at 01/21/25 0834    fluconazole (Diflucan) 400 mg in sodium chloride (iso)  mL, 400 mg, intravenous, q24h, Lew Raymond MD, Stopped at 01/21/25 1016    glucagon (Glucagen) injection 1 mg, 1 mg, intramuscular, q15 min PRN, Lew Raymond MD    glucagon (Glucagen) injection 1 mg, 1 mg, intramuscular, q15 min PRN, Lew Raymond MD    HYDROmorphone (Dilaudid) injection 0.4 mg, 0.4 mg, intravenous, q4h PRN, Lew Raymond MD    HYDROmorphone PF (Dilaudid) injection 0.2 mg, 0.2 mg, intravenous, q4h PRN, CHRIS Martinez    insulin lispro injection 0-20 Units, 0-20 Units, subcutaneous, q4h, CHRIS Martinez    lidocaine 4 % patch 2 patch, 2 patch, transdermal, Daily, CHRIS Martinez, 2 patch at 01/21/25 1405    magnesium sulfate 2 g in sterile water for injection 50 mL, 2 g, intravenous, q6h PRN, CHRIS Martinez    magnesium sulfate 4 g in sterile water for injection 100 mL, 4 g, intravenous, q6h PRN, CHRIS Martinez    [START ON 1/22/2025] methylPREDNISolone sod succinate (SOLU-Medrol) injection 125 mg, 125 mg, intravenous, Daily, CHRIS Cui    [START ON 1/24/2025] methylPREDNISolone sod succinate (SOLU-Medrol) injection 60 mg, 60 mg, intravenous, Once, Joanne L PurviCHRIS paredes    mycophenolate (Cellcept) 1,000 mg in dextrose 5% 167 mL (5.988 mg/mL) IV, 1,000 mg, intravenous, q12h EDINSON, CHRIS Martinez, Stopped at 01/21/25 1328    oxygen (O2) therapy, , inhalation, Continuous - Inhalation, Lew Raymond MD, 36 percent at 01/21/25 0800    pantoprazole (ProtoNix) injection 40 mg, 40 mg, intravenous, q12h, ZION Martinez-GABRIELE    piperacillin-tazobactam (Zosyn) 3.375 g in dextrose (iso) IV 50 mL, 3.375 g, intravenous, q6h, Lew Raymond MD, Stopped at 01/21/25 1328    [START ON 1/25/2025] predniSONE (Deltasone) tablet 20 mg, 20 mg, oral, Daily, Joanne Loja,  APRN-CNP    PrismaSol BGK 2/3.5 CRRT solution, 25 mL/kg/hr, CRRT, Continuous, Lisa Murray MD, Last Rate: 3,050 mL/hr at 01/20/25 1345, 25 mL/kg/hr at 01/20/25 1345    ursodiol (Actigall) capsule 300 mg, 300 mg, oral, q8h, Matt Quijano, APRN-CNP, 300 mg at 01/21/25 1321                                                                            Labs     Reviewed.                                                                            Imaging       US kidney transplant 1/20/2025:  IMPRESSION:  Unremarkable ultrasound and Doppler evaluation of the transplant  kidney as detailed above    US Liver with doppler 1/20/2025:  IMPRESSION:  Status post orthotopic liver transplant with decreased resistive  indices of the hepatic arteries (0.4-0.5), attention on follow-up  imaging is recommended. The hepatic vasculature is patent.        US liver with doppler 11/4/2024:  IMPRESSION:  Cirrhotic morphology of the liver with sequela of portal hypertension including significant splenomegaly and reversal flow within the splenic vein as detailed.                                                                           GI Procedures     7/2023 EGD  Impression:                                - Normal upper third of esophagus.                             - Grade I esophageal varices.                             - Esophageal mucosal changes suggestive of                             short-segment Matute's esophagus, classified as                             Matute's stage C0-M2 per [place] criteria.                             Biopsy is contraindicated.                             - Small hiatal hernia.                             - Non-bleeding duodenal ulcer with no stigmata                             of bleeding. Appears to be healing well, without                             any signs of recent blood loss.                             - Mucosal changes in the duodenum. Biopsied.                             - No blood  was present throughout the entire                               exam.      6/2023 EGD  Impression:                                   - Normal esophagus.                             - Normal stomach.                             - Non-bleeding duodenal ulcer with a visible                             vessel. Injected. Treated with argon plasma                             coagulation (APC).                             - The examination was otherwise normal.                             - No specimens collected.      EGD 6/7/24     Findings  Irregular Z-line 38 cm from the incisors  Three medium grade II varices (no red tiera sign) in the esophagus; no bleeding was identified; placed 3 bands successfully, resulting in partial eradication  Cobblestone, edematous, erythematous and friable mucosa in the body of the stomach and antrum; performed cold forceps biopsy to rule out H. pylori;  Patchy erythematous and friable mucosa in the duodenal bulb;  The duodenum appeared normal.     EGD 11/1/24  Impression  Irregular Z-line  Matute's esophagus  Multiple small varices in the lower third of the esophagus  Mild and friable portal hypertensive gastropathy in the cardia, fundus of the stomach, body of the stomach and antrum  Abnormal mucosa with erosion in the 1st part of the duodenum  The 2nd part of the duodenum appeared normal.     6/22/23 [facility]: Colonoscopy with [place] Score of 2/2/2: one 5 mm inflammatory type polyp in the sigmoid colon.       ASSESSMENT / PLAN                  ASSESSMENT/PLAN:  Goran Ibrahim is a 55 yo male with a past medical history of decompensated MetALD (ascites, variceal bleed, HE) c/b EV s/p banding in June 2024, advanced CKD (baseline creatinine around 2.5), long standing tobacco use, duodenal ulcer, H.pylori infection (negative biopsies in June 2024), and short segment Matute's esophagus (not biopsy confirmed due to the presence of varices) who was listed for SLK. He was subsequently  brought in for DCD liver (caval piggyback, rCHA to dSMA/celiac stack HA, duct to duct [small to large with v plasty]) and right kidney transplant which he underwent on 1/20/2025 with Dr. Wright. Hepatology has been consulted to co-manage him post-OLT.     Overall, patient is doing well post-SLK, and he has been extubated and CVVH has been discontinued. His T. bili is a bit high, so he has been started on Ursodiol. Additionally, his post-liver transplant liver USG with dopplers shows decreased resistive indices of the hepatic arteries (0.4-0.5).     RECS:  Immunosuppression and prophylactic antibiotics/antifungals per transplant surgery team   Agree with repeat liver USG today   Await explant pathology results  Strict Ins and Outs   Patient will need EGD at some point (once he is fully recovered from transplant) as an outpatient with biopsies to confirm short segment Matute's esophagus that was found during EGD in 2023/2024     Patient was seen and discussed with Hepatology attending, Dr. Norris Velasco.     Thank you for the consultation. Hepatology will continue to the follow the patient.     - During weekday hours of 7am- 5pm, please do not hesitate to contact me on [employer] Chat or page [phone] if there are any further questions   - After hours, on weekends, and on holidays, please page the on-call GI fellow at [phone]. Thank you.     Venice Meier MD MPH   GI and Hepatology Fellow    [facility]

## 2025-01-21 NOTE — PROGRESS NOTES
53 y/o M with PMH of HTN, HFpEF, CKD4, and cryptogenic liver cirrhosis (hx of alcohol use, but not heavy per chart review) c/b hepatic encephalopathy and esophageal varices s/p banding in June 2024, now presenting to SICU from OR 1/20 s/p liver and kidney transplant with Dr. Wright.      OR uncomplicated. 6 L EBL. 10 PRBCs, 10 FFPs, 8 Plts, 2 cryo, 2 riastap, 2 Kcentra. First TEG on arrival to SICU is normal.     Neuro: History of hepatic encephalopathy. Sedated.   CV: PMH of HTN, HFpEF. Last ECHO 7/2024 EF 62%, RV normal. Negative stress test 11/2023. No pressors. Arrived on cleviprex to SICU now off.    PULM: Former smoker. PFTs suggested airflow obstruction. Extubated. Incentive spirometer. CXR.   GI: PMH GERD and of cryptogenic liver cirrhosis c/b HE and EV s/p banding. Now s/p SLK transplant on 1/20. NPO. NG to LIWS. PPI for GI prophylaxis. IS/ppx per Transplant: methylpred taper, basiliximab.   : PMH of CKD4. Baseline creat 2.1-3.5. S/p SLK on 1/20. MIVF. Keep chaudhary.   HEME: Acute blood loss anemia. Balanced resuscitation. TEG normal. SCDs for DVT prophylaxis. Holding subcutaneous  heparin and ASA.   ENDO: No PMH. Arrived on insulin gtt, now off. SSI  ID: Post op Zosyn for 24 hrs. Fluconazole X30 days.        Lines:   - RIJ MAC w mini   - RIJ trialysis line  - right radial arterial line  - PIV x2  - 9F L RICC     Jon Edwards MD  PGY-5 Anesthesiology and critical care medicine.     Saw and examined with resident and fellow. Reviewed note above and agree    Due to the high probability of life threatening clinical decompensation, the patient required critical care time evaluating and managing this patient.  Critical care time included obtaining a history, examining the patient, ordering and reviewing studies, discussing, developing, and implementing a management plan, evaluating the patient's response to treatment, and discussion with other care team providers. I saw and evaluated the patient myself.  I reviewed the provider's documentation and discussed the patient with the provider. Critical care time was performed exclusive of billable procedures.    Critical Care Time: 41 minutes

## 2025-01-21 NOTE — ANESTHESIA PROCEDURE NOTES
Arterial Line:    Date/Time: 1/20/2025 8:30 AM    Staffing  Performed: [facility]   Authorized by: Tay Johnson DO    Performed by: KATT Mathew    An arterial line was placed. Procedure performed using surface landmarks.in the OR for the following indication(s): continuous blood pressure monitoring.    A  (size), 1 and 3/8 inch (length), Angiocath (type) catheter was placed into the Right radial artery, secured by Tegaderm,   Seldinger technique used.  Events:  patient tolerated procedure well with no complications.

## 2025-01-21 NOTE — PROGRESS NOTES
SICU Staff Progress Note    Briefly, the patient is a 54 year old male with a past medical history significant for HTN, HFpEF, CKD4, and cryptogenic liver cirrhosis (hx of alcohol use, but not heavy per chart review) c/b hepatic encephalopathy and esophageal varices s/p banding in June 2024, now presenting to SICU from OR 1/20 s/p simultaneous liver and kidney transplant by Dr. Wright on 1/20.    I evaluated the patient with an advanced practice provider. I oversaw this patient's care, and I reviewed the necessary labs, vitals, and radiographic imaging.     Plan:  Neuro: AAO x 4; no focal deficits; pain well controlled; continue analgesia PRN; PT/OT and ambulate as tolerated  Cardiovascular: borderline hypertensive on arterial waveform; will gradually re-introduce home regimen as hemodynamics permit (carvedilol/amlodipine); remove CVC and other large bore resuscitative access  Respiratory: needs aggressive pulmonary toilet (increased mobility, IS, deep breathing, analgesia)  GI: Remove NGT; cautiously advance diet; repeat liver US per request of transplant surgery; continue to trend LFTs  : urine output continues to be satisfactory; optimize preload; gentle maintenance today  Endo: ISS, previously on in insulin infusion  Heme: no significant transfusion requirement post-operatively with appropriate TEG; decrease frequency of labs; start ASA  ID:  continue Zosyn/Fluconazole for transplant protocol  Immune: continue MMF/Simulect/Steroid taper  Dispo: SICU; may be eligible for floor transfer today or tomorrow if no active issues    Hoda ROSA  SICU Staff  P. [phone]    Due to the high probability of life threatening clinical decompensation, the patient required critical care time evaluating and managing this patient.  Critical care time included obtaining a history, examining the patient, ordering and reviewing studies, discussing, developing, and implementing a management plan, evaluating the patient's response to  treatment, and discussion with other care team providers. I saw and evaluated the patient myself. I reviewed the provider's documentation and discussed the patient with the provider. Critical care time was performed exclusive of billable procedures.    Critical Care Time: 35 minutes

## 2025-01-21 NOTE — PROGRESS NOTES
Pharmacist Post-Transplant Note    The Clinical Transplant Pharmacist is aware that Goran Ibrahim has been admitted and has undergone kidney and liver transplantation. A transplant pharmacist will be rounding with the multidisciplinary transplant team and making recommendations on his medication regimen.     The patient's medications have been reviewed in conjunction with the multidisciplinary transplant team. The pharmacist is in agreement with the plan of care for medications at this time.    Patient and donor factors were screened to determine the appropriate post-transplant protocol and current immunosuppression plan includes:    Induction Regimen:  Basiliximab    Maintenance Regimen:  Tacrolimus, Mycophenolate mofetil, and Methylprednisolone/Prednisone Taper    Maintenance immunosuppression and anti-infective prophylaxis will begin according to protocol. Home medications will begin when the patient is clinically stable. Of note, CMV D-/R- so patient will not require CMV prophylaxis but will require 3 months of HSV prophylaxis with acyclovir per protocol.    Chantel Ventura, PharmD, BCTXP   Solid Organ Transplant Clinical Pharmacy Specialist

## 2025-01-22 PROBLEM — T38.0X5A STEROID-INDUCED HYPERGLYCEMIA: Status: ACTIVE | Noted: 2025-01-22

## 2025-01-22 PROBLEM — R73.9 STEROID-INDUCED HYPERGLYCEMIA: Status: ACTIVE | Noted: 2025-01-22

## 2025-01-22 LAB
ALBUMIN SERPL BCP-MCNC: 3.2 G/DL (ref 3.4–5)
ALBUMIN SERPL BCP-MCNC: 3.3 G/DL (ref 3.4–5)
ALBUMIN SERPL BCP-MCNC: 3.4 G/DL (ref 3.4–5)
ALP SERPL-CCNC: 160 U/L (ref 33–120)
ALP SERPL-CCNC: 167 U/L (ref 33–120)
ALT SERPL W P-5'-P-CCNC: 689 U/L (ref 10–52)
ALT SERPL W P-5'-P-CCNC: 868 U/L (ref 10–52)
ANION GAP BLDA CALCULATED.4IONS-SCNC: 12 MMO/L (ref 10–25)
ANION GAP SERPL CALC-SCNC: 19 MMOL/L (ref 10–20)
ANION GAP SERPL CALC-SCNC: 21 MMOL/L (ref 10–20)
APTT PPP: 30 SECONDS (ref 27–38)
APTT PPP: 30 SECONDS (ref 27–38)
AST SERPL W P-5'-P-CCNC: 1169 U/L (ref 9–39)
AST SERPL W P-5'-P-CCNC: 666 U/L (ref 9–39)
BASE EXCESS BLDA CALC-SCNC: 0.5 MMOL/L (ref -2–3)
BILIRUB DIRECT SERPL-MCNC: 3.7 MG/DL (ref 0–0.3)
BILIRUB DIRECT SERPL-MCNC: 3.7 MG/DL (ref 0–0.3)
BILIRUB SERPL-MCNC: 4.9 MG/DL (ref 0–1.2)
BILIRUB SERPL-MCNC: 5.3 MG/DL (ref 0–1.2)
BLOOD EXPIRATION DATE: NORMAL
BLOOD EXPIRATION DATE: NORMAL
BODY TEMPERATURE: 37 DEGREES CELSIUS
BUN SERPL-MCNC: 65 MG/DL (ref 6–23)
BUN SERPL-MCNC: 76 MG/DL (ref 6–23)
CA-I BLD-SCNC: 1.08 MMOL/L (ref 1.1–1.33)
CA-I BLD-SCNC: 1.09 MMOL/L (ref 1.1–1.33)
CA-I BLD-SCNC: 1.11 MMOL/L (ref 1.1–1.33)
CA-I BLDA-SCNC: 1.12 MMOL/L (ref 1.1–1.33)
CALCIUM SERPL-MCNC: 8.4 MG/DL (ref 8.6–10.6)
CALCIUM SERPL-MCNC: 8.5 MG/DL (ref 8.6–10.6)
CFT FORM KAOLIN IND BLD RES TEG: 1 MIN (ref 0.8–2.1)
CFT FORM KAOLIN IND BLD RES TEG: 1.6 MIN (ref 0.8–2.1)
CFT FORM KAOLIN IND BLD RES TEG: 2.3 MIN (ref 0.8–2.1)
CHLORIDE BLDA-SCNC: 103 MMOL/L (ref 98–107)
CHLORIDE SERPL-SCNC: 101 MMOL/L (ref 98–107)
CHLORIDE SERPL-SCNC: 99 MMOL/L (ref 98–107)
CLOT ANGLE.KAOLIN INDUCED BLD RES TEG: 58 DEG (ref 63–78)
CLOT ANGLE.KAOLIN INDUCED BLD RES TEG: 66 DEG (ref 63–78)
CLOT ANGLE.KAOLIN INDUCED BLD RES TEG: 76 DEG (ref 63–78)
CLOT INIT KAO IND P HEP NEUT BLD RES TEG: 10.7 MIN (ref 4.3–8.3)
CLOT INIT KAO IND P HEP NEUT BLD RES TEG: 11.8 MIN (ref 4.3–8.3)
CLOT INIT KAO IND P HEP NEUT BLD RES TEG: 12.4 MIN (ref 4.6–9.1)
CLOT INIT KAO IND P HEP NEUT BLD RES TEG: 5.6 MIN (ref 4.3–8.3)
CLOT INIT KAO IND P HEP NEUT BLD RES TEG: 5.7 MIN (ref 4.6–9.1)
CLOT INIT KAO IND P HEP NEUT BLD RES TEG: 8.6 MIN (ref 4.6–9.1)
CO2 SERPL-SCNC: 23 MMOL/L (ref 21–32)
CO2 SERPL-SCNC: 23 MMOL/L (ref 21–32)
CREAT SERPL-MCNC: 2.8 MG/DL (ref 0.5–1.3)
CREAT SERPL-MCNC: 3.07 MG/DL (ref 0.5–1.3)
DISPENSE STATUS: NORMAL
DISPENSE STATUS: NORMAL
EGFRCR SERPLBLD CKD-EPI 2021: 23 ML/MIN/1.73M*2
EGFRCR SERPLBLD CKD-EPI 2021: 26 ML/MIN/1.73M*2
ERYTHROCYTE [DISTWIDTH] IN BLOOD BY AUTOMATED COUNT: 16.3 % (ref 11.5–14.5)
ERYTHROCYTE [DISTWIDTH] IN BLOOD BY AUTOMATED COUNT: 16.5 % (ref 11.5–14.5)
FIBRINOGEN BLD CALC-MCNC: 347 MG/DL (ref 278–581)
FIBRINOGEN BLD CALC-MCNC: 358 MG/DL (ref 278–581)
FIBRINOGEN BLD CALC-MCNC: 454 MG/DL (ref 278–581)
FLOW ALLOCROSSMATCH: NORMAL
GGT SERPL-CCNC: 305 U/L (ref 5–64)
GGT SERPL-CCNC: 323 U/L (ref 5–64)
GLUCOSE BLD MANUAL STRIP-MCNC: 127 MG/DL (ref 74–99)
GLUCOSE BLD MANUAL STRIP-MCNC: 129 MG/DL (ref 74–99)
GLUCOSE BLD MANUAL STRIP-MCNC: 138 MG/DL (ref 74–99)
GLUCOSE BLD MANUAL STRIP-MCNC: 141 MG/DL (ref 74–99)
GLUCOSE BLD MANUAL STRIP-MCNC: 149 MG/DL (ref 74–99)
GLUCOSE BLD MANUAL STRIP-MCNC: 157 MG/DL (ref 74–99)
GLUCOSE BLD MANUAL STRIP-MCNC: 160 MG/DL (ref 74–99)
GLUCOSE BLD MANUAL STRIP-MCNC: 175 MG/DL (ref 74–99)
GLUCOSE BLD MANUAL STRIP-MCNC: 175 MG/DL (ref 74–99)
GLUCOSE BLD MANUAL STRIP-MCNC: 179 MG/DL (ref 74–99)
GLUCOSE BLD MANUAL STRIP-MCNC: 195 MG/DL (ref 74–99)
GLUCOSE BLD MANUAL STRIP-MCNC: 207 MG/DL (ref 74–99)
GLUCOSE BLD MANUAL STRIP-MCNC: 208 MG/DL (ref 74–99)
GLUCOSE BLD MANUAL STRIP-MCNC: 296 MG/DL (ref 74–99)
GLUCOSE BLD MANUAL STRIP-MCNC: 47 MG/DL (ref 74–99)
GLUCOSE BLD MANUAL STRIP-MCNC: 51 MG/DL (ref 74–99)
GLUCOSE BLDA-MCNC: 166 MG/DL (ref 74–99)
GLUCOSE SERPL-MCNC: 170 MG/DL (ref 74–99)
GLUCOSE SERPL-MCNC: 194 MG/DL (ref 74–99)
HCO3 BLDA-SCNC: 24.3 MMOL/L (ref 22–26)
HCT VFR BLD AUTO: 25.4 % (ref 41–52)
HCT VFR BLD AUTO: 27.3 % (ref 41–52)
HCT VFR BLD EST: 31 % (ref 41–52)
HCV RNA SERPL NAA+PROBE-ACNC: NOT DETECTED K[IU]/ML
HCV RNA SERPL NAA+PROBE-LOG IU: NORMAL {LOG_IU}/ML
HGB BLD-MCNC: 8.9 G/DL (ref 13.5–17.5)
HGB BLD-MCNC: 9.8 G/DL (ref 13.5–17.5)
HGB BLDA-MCNC: 10.3 G/DL (ref 13.5–17.5)
HLA RESULTS: NORMAL
HLA RESULTS: NORMAL
HLA-A+B+C AB NFR SER: NORMAL %
HLA-DP+DQ+DR AB NFR SER: NORMAL %
INHALED O2 CONCENTRATION: 32 %
INR PPP: 1.6 (ref 0.9–1.1)
INR PPP: 1.6 (ref 0.9–1.1)
LACTATE BLDA-SCNC: 1.1 MMOL/L (ref 0.4–2)
MA KAOLIN BLD RES TEG: 56 MM (ref 52–69)
MA KAOLIN BLD RES TEG: 58 MM (ref 52–69)
MA KAOLIN BLD RES TEG: 58 MM (ref 52–69)
MA KAOLIN+TF BLD RES TEG: 57 MM (ref 52–70)
MA KAOLIN+TF BLD RES TEG: 57 MM (ref 52–70)
MA KAOLIN+TF BLD RES TEG: 62 MM (ref 52–70)
MA TF IND+IIB-IIIA INH BLD RES TEG: 19 MM (ref 15–32)
MA TF IND+IIB-IIIA INH BLD RES TEG: 20 MM (ref 15–32)
MA TF IND+IIB-IIIA INH BLD RES TEG: 25 MM (ref 15–32)
MAGNESIUM SERPL-MCNC: 2.27 MG/DL (ref 1.6–2.4)
MAGNESIUM SERPL-MCNC: 2.29 MG/DL (ref 1.6–2.4)
MCH RBC QN AUTO: 29.2 PG (ref 26–34)
MCH RBC QN AUTO: 29.5 PG (ref 26–34)
MCHC RBC AUTO-ENTMCNC: 35 G/DL (ref 32–36)
MCHC RBC AUTO-ENTMCNC: 35.9 G/DL (ref 32–36)
MCV RBC AUTO: 82 FL (ref 80–100)
MCV RBC AUTO: 83 FL (ref 80–100)
NRBC BLD-RTO: 0 /100 WBCS (ref 0–0)
NRBC BLD-RTO: 0 /100 WBCS (ref 0–0)
OXYHGB MFR BLDA: 93.7 % (ref 94–98)
PCO2 BLDA: 35 MM HG (ref 38–42)
PH BLDA: 7.45 PH (ref 7.38–7.42)
PHOSPHATE SERPL-MCNC: 5.8 MG/DL (ref 2.5–4.9)
PHOSPHATE SERPL-MCNC: 6.9 MG/DL (ref 2.5–4.9)
PLATELET # BLD AUTO: 72 X10*3/UL (ref 150–450)
PLATELET # BLD AUTO: 91 X10*3/UL (ref 150–450)
PO2 BLDA: 77 MM HG (ref 85–95)
POTASSIUM BLDA-SCNC: 3.7 MMOL/L (ref 3.5–5.3)
POTASSIUM SERPL-SCNC: 3.7 MMOL/L (ref 3.5–5.3)
POTASSIUM SERPL-SCNC: 4.1 MMOL/L (ref 3.5–5.3)
PRODUCT BLOOD TYPE: 5100
PRODUCT BLOOD TYPE: 5100
PRODUCT CODE: NORMAL
PRODUCT CODE: NORMAL
PROT SERPL-MCNC: 5.4 G/DL (ref 6.4–8.2)
PROT SERPL-MCNC: 5.5 G/DL (ref 6.4–8.2)
PROTHROMBIN TIME: 18.5 SECONDS (ref 9.8–12.8)
PROTHROMBIN TIME: 18.5 SECONDS (ref 9.8–12.8)
RBC # BLD AUTO: 3.05 X10*6/UL (ref 4.5–5.9)
RBC # BLD AUTO: 3.32 X10*6/UL (ref 4.5–5.9)
SAO2 % BLDA: 96 % (ref 94–100)
SODIUM BLDA-SCNC: 136 MMOL/L (ref 136–145)
SODIUM SERPL-SCNC: 139 MMOL/L (ref 136–145)
SODIUM SERPL-SCNC: 139 MMOL/L (ref 136–145)
TEST COMMENT: ABNORMAL
TEST COMMENT: ABNORMAL
TEST COMMENT: NORMAL
UNIT ABO: NORMAL
UNIT ABO: NORMAL
UNIT NUMBER: NORMAL
UNIT NUMBER: NORMAL
UNIT RH: NORMAL
UNIT RH: NORMAL
UNIT VOLUME: 250
UNIT VOLUME: 326
WBC # BLD AUTO: 6.5 X10*3/UL (ref 4.4–11.3)
WBC # BLD AUTO: 7.7 X10*3/UL (ref 4.4–11.3)

## 2025-01-22 PROCEDURE — 99232 SBSQ HOSP IP/OBS MODERATE 35: CPT | Performed by: STUDENT IN AN ORGANIZED HEALTH CARE EDUCATION/TRAINING PROGRAM

## 2025-01-22 PROCEDURE — 80076 HEPATIC FUNCTION PANEL: CPT | Mod: CCI

## 2025-01-22 PROCEDURE — 85610 PROTHROMBIN TIME: CPT

## 2025-01-22 PROCEDURE — 99291 CRITICAL CARE FIRST HOUR: CPT | Performed by: STUDENT IN AN ORGANIZED HEALTH CARE EDUCATION/TRAINING PROGRAM

## 2025-01-22 PROCEDURE — 1100000001 HC PRIVATE ROOM DAILY

## 2025-01-22 PROCEDURE — 85730 THROMBOPLASTIN TIME PARTIAL: CPT

## 2025-01-22 PROCEDURE — 84100 ASSAY OF PHOSPHORUS: CPT

## 2025-01-22 PROCEDURE — 99233 SBSQ HOSP IP/OBS HIGH 50: CPT

## 2025-01-22 PROCEDURE — 2500000004 HC RX 250 GENERAL PHARMACY W/ HCPCS (ALT 636 FOR OP/ED): Mod: SE

## 2025-01-22 PROCEDURE — P9045 ALBUMIN (HUMAN), 5%, 250 ML: HCPCS | Mod: JZ,SE,TB | Performed by: PHYSICIAN ASSISTANT

## 2025-01-22 PROCEDURE — 2500000002 HC RX 250 W HCPCS SELF ADMINISTERED DRUGS (ALT 637 FOR MEDICARE OP, ALT 636 FOR OP/ED): Mod: SE

## 2025-01-22 PROCEDURE — 82330 ASSAY OF CALCIUM: CPT

## 2025-01-22 PROCEDURE — 84132 ASSAY OF SERUM POTASSIUM: CPT

## 2025-01-22 PROCEDURE — 2500000005 HC RX 250 GENERAL PHARMACY W/O HCPCS: Mod: SE

## 2025-01-22 PROCEDURE — 2500000001 HC RX 250 WO HCPCS SELF ADMINISTERED DRUGS (ALT 637 FOR MEDICARE OP): Mod: SE

## 2025-01-22 PROCEDURE — 2500000004 HC RX 250 GENERAL PHARMACY W/ HCPCS (ALT 636 FOR OP/ED): Mod: JZ,SE,TB | Performed by: PHYSICIAN ASSISTANT

## 2025-01-22 PROCEDURE — 97116 GAIT TRAINING THERAPY: CPT | Mod: GP | Performed by: PHYSICAL THERAPIST

## 2025-01-22 PROCEDURE — 83735 ASSAY OF MAGNESIUM: CPT

## 2025-01-22 PROCEDURE — 99255 IP/OBS CONSLTJ NEW/EST HI 80: CPT | Performed by: PHYSICIAN ASSISTANT

## 2025-01-22 PROCEDURE — 99233 SBSQ HOSP IP/OBS HIGH 50: CPT | Performed by: INTERNAL MEDICINE

## 2025-01-22 PROCEDURE — 82977 ASSAY OF GGT: CPT

## 2025-01-22 PROCEDURE — 85027 COMPLETE CBC AUTOMATED: CPT

## 2025-01-22 PROCEDURE — 82248 BILIRUBIN DIRECT: CPT

## 2025-01-22 PROCEDURE — 82947 ASSAY GLUCOSE BLOOD QUANT: CPT

## 2025-01-22 PROCEDURE — 37799 UNLISTED PX VASCULAR SURGERY: CPT

## 2025-01-22 PROCEDURE — 84132 ASSAY OF SERUM POTASSIUM: CPT | Performed by: STUDENT IN AN ORGANIZED HEALTH CARE EDUCATION/TRAINING PROGRAM

## 2025-01-22 RX ORDER — HEPARIN SODIUM 5000 [USP'U]/ML
5000 INJECTION, SOLUTION INTRAVENOUS; SUBCUTANEOUS EVERY 8 HOURS
Status: DISCONTINUED | OUTPATIENT
Start: 2025-01-22 | End: 2025-01-28 | Stop reason: HOSPADM

## 2025-01-22 RX ORDER — TACROLIMUS 1 MG/1
2 CAPSULE ORAL
Status: DISCONTINUED | OUTPATIENT
Start: 2025-01-22 | End: 2025-01-25

## 2025-01-22 RX ORDER — OXYCODONE HYDROCHLORIDE 5 MG/1
5 TABLET ORAL EVERY 6 HOURS PRN
Status: DISCONTINUED | OUTPATIENT
Start: 2025-01-22 | End: 2025-01-26

## 2025-01-22 RX ORDER — INSULIN LISPRO 100 [IU]/ML
0-10 INJECTION, SOLUTION INTRAVENOUS; SUBCUTANEOUS EVERY 4 HOURS
Status: DISCONTINUED | OUTPATIENT
Start: 2025-01-22 | End: 2025-01-23

## 2025-01-22 RX ORDER — ALBUMIN HUMAN 50 G/1000ML
25 SOLUTION INTRAVENOUS ONCE
Status: COMPLETED | OUTPATIENT
Start: 2025-01-22 | End: 2025-01-22

## 2025-01-22 RX ORDER — CARVEDILOL 6.25 MG/1
6.25 TABLET ORAL 2 TIMES DAILY
Status: DISCONTINUED | OUTPATIENT
Start: 2025-01-22 | End: 2025-01-28 | Stop reason: HOSPADM

## 2025-01-22 RX ORDER — LIDOCAINE HYDROCHLORIDE 10 MG/ML
5 INJECTION, SOLUTION INFILTRATION; PERINEURAL ONCE
Status: COMPLETED | OUTPATIENT
Start: 2025-01-22 | End: 2025-01-22

## 2025-01-22 RX ORDER — OXYCODONE HYDROCHLORIDE 5 MG/1
10 TABLET ORAL EVERY 4 HOURS PRN
Status: DISCONTINUED | OUTPATIENT
Start: 2025-01-22 | End: 2025-01-26

## 2025-01-22 RX ADMIN — INSULIN HUMAN 9.5 UNITS/HR: 1 INJECTION, SOLUTION INTRAVENOUS at 00:33

## 2025-01-22 RX ADMIN — SENNOSIDES AND DOCUSATE SODIUM 2 TABLET: 50; 8.6 TABLET ORAL at 21:07

## 2025-01-22 RX ADMIN — CARVEDILOL 6.25 MG: 6.25 TABLET, FILM COATED ORAL at 21:07

## 2025-01-22 RX ADMIN — URSODIOL 300 MG: 300 CAPSULE ORAL at 12:32

## 2025-01-22 RX ADMIN — TACROLIMUS 2 MG: 1 CAPSULE ORAL at 18:23

## 2025-01-22 RX ADMIN — METHYLPREDNISOLONE SODIUM SUCCINATE 125 MG: 125 INJECTION, POWDER, FOR SOLUTION INTRAMUSCULAR; INTRAVENOUS at 08:07

## 2025-01-22 RX ADMIN — DEXTROSE MONOHYDRATE 12.5 G: 25 INJECTION, SOLUTION INTRAVENOUS at 21:07

## 2025-01-22 RX ADMIN — ALBUMIN HUMAN 25 G: 0.05 INJECTION, SOLUTION INTRAVENOUS at 09:32

## 2025-01-22 RX ADMIN — HEPARIN SODIUM 5000 UNITS: 5000 INJECTION INTRAVENOUS; SUBCUTANEOUS at 09:32

## 2025-01-22 RX ADMIN — FLUCONAZOLE 400 MG: 200 TABLET ORAL at 08:05

## 2025-01-22 RX ADMIN — LIDOCAINE HYDROCHLORIDE 5 ML: 10 INJECTION, SOLUTION INFILTRATION; PERINEURAL at 12:32

## 2025-01-22 RX ADMIN — PANTOPRAZOLE SODIUM 40 MG: 40 TABLET, DELAYED RELEASE ORAL at 08:06

## 2025-01-22 RX ADMIN — PANTOPRAZOLE SODIUM 40 MG: 40 TABLET, DELAYED RELEASE ORAL at 21:07

## 2025-01-22 RX ADMIN — Medication 3 L/MIN: at 08:05

## 2025-01-22 RX ADMIN — AMLODIPINE BESYLATE 5 MG: 5 TABLET ORAL at 08:06

## 2025-01-22 RX ADMIN — SODIUM CHLORIDE, SODIUM GLUCONATE, SODIUM ACETATE, POTASSIUM CHLORIDE AND MAGNESIUM CHLORIDE 60 ML/HR: 526; 502; 368; 37; 30 INJECTION, SOLUTION INTRAVENOUS at 13:15

## 2025-01-22 RX ADMIN — HEPARIN SODIUM 5000 UNITS: 5000 INJECTION INTRAVENOUS; SUBCUTANEOUS at 16:45

## 2025-01-22 RX ADMIN — LIDOCAINE 4% 2 PATCH: 40 PATCH TOPICAL at 08:06

## 2025-01-22 RX ADMIN — CALCIUM GLUCONATE 1 G: 20 INJECTION, SOLUTION INTRAVENOUS at 04:49

## 2025-01-22 RX ADMIN — CARVEDILOL 6.25 MG: 6.25 TABLET, FILM COATED ORAL at 08:05

## 2025-01-22 RX ADMIN — MYCOPHENOLATE MOFETIL 1000 MG: 250 CAPSULE ORAL at 06:21

## 2025-01-22 RX ADMIN — PIPERACILLIN SODIUM AND TAZOBACTAM SODIUM 3.38 G: 3; .375 INJECTION, SOLUTION INTRAVENOUS at 12:26

## 2025-01-22 RX ADMIN — SENNOSIDES AND DOCUSATE SODIUM 2 TABLET: 50; 8.6 TABLET ORAL at 08:05

## 2025-01-22 RX ADMIN — MYCOPHENOLATE MOFETIL 1000 MG: 250 CAPSULE ORAL at 18:23

## 2025-01-22 RX ADMIN — PIPERACILLIN SODIUM AND TAZOBACTAM SODIUM 3.38 G: 3; .375 INJECTION, SOLUTION INTRAVENOUS at 18:23

## 2025-01-22 RX ADMIN — URSODIOL 300 MG: 300 CAPSULE ORAL at 06:21

## 2025-01-22 RX ADMIN — INSULIN LISPRO 4 UNITS: 100 INJECTION, SOLUTION INTRAVENOUS; SUBCUTANEOUS at 16:45

## 2025-01-22 RX ADMIN — PIPERACILLIN SODIUM AND TAZOBACTAM SODIUM 3.38 G: 3; .375 INJECTION, SOLUTION INTRAVENOUS at 06:21

## 2025-01-22 RX ADMIN — PIPERACILLIN SODIUM AND TAZOBACTAM SODIUM 3.38 G: 3; .375 INJECTION, SOLUTION INTRAVENOUS at 00:06

## 2025-01-22 RX ADMIN — URSODIOL 300 MG: 300 CAPSULE ORAL at 21:07

## 2025-01-22 RX ADMIN — INSULIN LISPRO 2 UNITS: 100 INJECTION, SOLUTION INTRAVENOUS; SUBCUTANEOUS at 12:26

## 2025-01-22 RX ADMIN — ASPIRIN 81 MG: 81 TABLET, COATED ORAL at 08:05

## 2025-01-22 RX ADMIN — INSULIN HUMAN 10 UNITS: 100 INJECTION, SUSPENSION SUBCUTANEOUS at 12:26

## 2025-01-22 ASSESSMENT — COGNITIVE AND FUNCTIONAL STATUS - GENERAL
MOVING TO AND FROM BED TO CHAIR: A LITTLE
MOVING FROM LYING ON BACK TO SITTING ON SIDE OF FLAT BED WITH BEDRAILS: A LITTLE
CLIMB 3 TO 5 STEPS WITH RAILING: A LITTLE
MOBILITY SCORE: 20
WALKING IN HOSPITAL ROOM: A LITTLE
CLIMB 3 TO 5 STEPS WITH RAILING: A LOT
MOBILITY SCORE: 17
TURNING FROM BACK TO SIDE WHILE IN FLAT BAD: A LITTLE
DAILY ACTIVITIY SCORE: 23
MOVING TO AND FROM BED TO CHAIR: A LITTLE
STANDING UP FROM CHAIR USING ARMS: A LITTLE
DRESSING REGULAR LOWER BODY CLOTHING: A LITTLE
WALKING IN HOSPITAL ROOM: A LITTLE
STANDING UP FROM CHAIR USING ARMS: A LITTLE

## 2025-01-22 ASSESSMENT — PAIN - FUNCTIONAL ASSESSMENT
PAIN_FUNCTIONAL_ASSESSMENT: 0-10

## 2025-01-22 ASSESSMENT — PAIN SCALES - GENERAL
PAINLEVEL_OUTOF10: 0 - NO PAIN
PAINLEVEL_OUTOF10: 0 - NO PAIN
PAINLEVEL_OUTOF10: 3
PAINLEVEL_OUTOF10: 3
PAINLEVEL_OUTOF10: 0 - NO PAIN
PAINLEVEL_OUTOF10: 2

## 2025-01-22 NOTE — SIGNIFICANT EVENT
01/21/25 1130   Patient Interaction   Organ Liver   Type of Interaction Phone conference   Interdisciplinary Rounds   Attendance Surgeon;Physician;CHERYL;Coordinator;Pharmacist   Topics Discussed Medications;Blood test results;Activity;Imaging/test results     Transplant Surgery Multidisciplinary Team Note    Goran Ibrahim is a 54 y.o. male   POD# 2 from a Kidney and Liver from a DCD donor. His post operative complications: None    24 Hour Events  1. No acute events    Last Recorded Vitals  Visit Vitals  /75   Pulse 66   Temp 36.9 °C (98.4 °F) (Temporal)   Resp 18      Intake/Output last 3 Shifts:    Intake/Output Summary (Last 24 hours) at 1/22/2025 1125  Last data filed at 1/22/2025 1011  Gross per 24 hour   Intake 2241.21 ml   Output 2353 ml   Net -111.79 ml      Vitals:    01/22/25 0600   Weight: 126 kg (277 lb 5.4 oz)        Assessment/Plan   Principal Problem:    S/P SLK  - starting SQH today Q 8 hours  - discontinue KAITLYNN X2 today  - on insulin drip, may transfer to floor later today if off drip      Management after organ transplant  Active Problems:  Patient Active Problem List   Diagnosis    Preop cardiovascular exam    Hypertension    Liver cirrhosis (Multi)    Pre-kidney transplant, listed    Pre-liver transplant, listed    Esophageal varices in alcoholic cirrhosis (Multi)    Hepatic cirrhosis, unspecified hepatic cirrhosis type, unspecified whether ascites present (Multi)    ESRD (end stage renal disease) (Multi)        Immunosuppression reviewed and adjusted       Induction: Simulect       Tacrolimus goal 8-10 ng/mL. Current dose  2  mg BID, starting tonight         MMF 1000 mg PO BID       Solumedrol taper  DVT prophylaxis SCDS  PT/OT  Diet: 75 g diabetic diet  Anticipated discharge TBD    Joanne Loja, APRN-CNP

## 2025-01-22 NOTE — CONSULTS
Reason For Consult  ESLD, CKD s/p SLK    History Of Present Illness  Goran Ibrahim is a 54 y.o. male with decompensated MetALD (ascites, variceal bleed, HE) c/b EV s/p banding in June 2024, advanced CKD (baseline creatinine around 2.5), long standing tobacco use, duodenal ulcer, H.pylori infection (negative biopsies in June 2024), and short segment Matute's esophagus (not biopsy confirmed due to the presence of varices) who is s/p SLK on 1/20/2025 by Dr. Wright.     Pt was started on intra-op CVVH which was discontinued 1/21 AM.     Post-op course uneventful with pt extubated overnight, good UOP. Cr stable.     Organ info: [id number] (SLK), DCD, KDPI 72%, PRA 0%, HCV -/-, CMV -/-, EBV +/+, HBV -/-, donor toxo neg. no + donor cultures. Simulect induction.     Post-op labs with Cr ~2.5. elevated LFTs which are downtrending. Allograft imaging unremarkable.     ROS neg other than stated above.     Past Medical History  He has a past medical history of Cirrhosis (Multi), CKD (chronic kidney disease), and Hypertension.    Surgical History  He has a past surgical history that includes US guided abdominal paracentesis (6/22/2023); IR angiogram celiac (5/19/2023); and US guided abdominal paracentesis (5/19/2023).     Social History  He reports that he has quit smoking. His smoking use included cigarettes. He has never used smokeless tobacco. He reports that he does not currently use alcohol. He reports that he does not use drugs.    Family History  No family history on file.     Allergies  Patient has no known allergies.     Scheduled medications  [START ON 1/22/2025] amLODIPine, 5 mg, oral, Daily  aspirin, 81 mg, oral, Daily  [START ON 1/24/2025] basiliximab, 20 mg, intravenous, Once  carvedilol, 3.125 mg, oral, BID  [START ON 1/22/2025] fluconazole, 400 mg, oral, Daily  [Held by provider] insulin lispro, 0-20 Units, subcutaneous, q4h  lidocaine, 2 patch, transdermal, Daily  [START ON 1/22/2025] methylPREDNISolone sodium  "succinate (PF), 125 mg, intravenous, Daily  [START ON 1/24/2025] methylPREDNISolone sodium succinate (PF), 60 mg, intravenous, Once  mycophenolate, 1,000 mg, oral, q12h EDINSON  oxygen, , inhalation, Continuous - Inhalation  pantoprazole, 40 mg, oral, BID  piperacillin-tazobactam, 3.375 g, intravenous, q6h  [START ON 1/25/2025] predniSONE, 20 mg, oral, Daily  sennosides-docusate sodium, 2 tablet, oral, BID  ursodiol, 300 mg, oral, q8h      Continuous medications  electrolyte-A, 60 mL/hr, Last Rate: 60 mL/hr (01/21/25 0834)  insulin regular infusion, 0-20 Units/hr, Last Rate: 7.5 Units/hr (01/21/25 2118)  PrismaSol BGK 2/3.5, 25 mL/kg/hr, Last Rate: 25 mL/kg/hr (01/20/25 1345)      PRN medications  PRN medications: calcium gluconate, calcium gluconate, dextrose, dextrose, glucagon, glucagon, insulin regular, magnesium sulfate, magnesium sulfate    Physical Exam     Last Recorded Vitals  Blood pressure 147/88, pulse 74, temperature 36.2 °C (97.2 °F), resp. rate 21, height 1.778 m (5' 10\"), weight 126 kg (276 lb 14.4 oz), SpO2 95%.    A&ox3, no distress, pleasant  MMM, no lesions  Lungs with equal air entry, no added sounds  Rrr, no m/r/g  Abd soft, nt, nd  No allograft tenderness  No edema b/l    Results for orders placed or performed during the hospital encounter of 01/20/25 (from the past 24 hours)   POCT GLUCOSE   Result Value Ref Range    POCT Glucose 199 (H) 74 - 99 mg/dL   TEG Clot Global Profile   Result Value Ref Range    R (Reaction Time) K 8.9 4.6 - 9.1 min    K (Clot Kinetics) 1.7 0.8 - 2.1 min    ANGLE 71.1 63.0 - 78.0 deg    MA (Max Amplitude) K 56.5 52.0 - 69.0 mm    R (Reaction Time) KH 8.2 4.3 - 8.3 min    MA (Max Amplitude) RT 59.1 52.0 - 70.0 mm    MA ( Sergio Amplitude) FF 20.2 15.0 - 32.0 mm    FLEV 369 278 - 581 mg/dL   Blood Gas Arterial Full Panel   Result Value Ref Range    POCT pH, Arterial 7.43 (H) 7.38 - 7.42 pH    POCT pCO2, Arterial 41 38 - 42 mm Hg    POCT pO2, Arterial 132 (H) 85 - 95 mm Hg    " POCT SO2, Arterial 99 94 - 100 %    POCT Oxy Hemoglobin, Arterial 95.8 94.0 - 98.0 %    POCT Hematocrit Calculated, Arterial 28.0 (L) 41.0 - 52.0 %    POCT Sodium, Arterial 139 136 - 145 mmol/L    POCT Potassium, Arterial 3.8 3.5 - 5.3 mmol/L    POCT Chloride, Arterial 107 98 - 107 mmol/L    POCT Ionized Calcium, Arterial 1.24 1.10 - 1.33 mmol/L    POCT Glucose, Arterial 186 (H) 74 - 99 mg/dL    POCT Lactate, Arterial 1.1 0.4 - 2.0 mmol/L    POCT Base Excess, Arterial 2.6 -2.0 - 3.0 mmol/L    POCT HCO3 Calculated, Arterial 27.2 (H) 22.0 - 26.0 mmol/L    POCT Hemoglobin, Arterial 9.3 (L) 13.5 - 17.5 g/dL    POCT Anion Gap, Arterial 9 (L) 10 - 25 mmo/L    Patient Temperature 37.0 degrees Celsius    FiO2 40 %   POCT GLUCOSE   Result Value Ref Range    POCT Glucose 173 (H) 74 - 99 mg/dL   Magnesium   Result Value Ref Range    Magnesium 1.63 1.60 - 2.40 mg/dL   Hepatic function panel   Result Value Ref Range    Albumin 3.3 (L) 3.4 - 5.0 g/dL    Bilirubin, Total 6.9 (H) 0.0 - 1.2 mg/dL    Bilirubin, Direct 5.1 (H) 0.0 - 0.3 mg/dL    Alkaline Phosphatase 109 33 - 120 U/L    ALT 1,173 (H) 10 - 52 U/L    AST 5,535 (H) 9 - 39 U/L    Total Protein 5.5 (L) 6.4 - 8.2 g/dL   BUN   Result Value Ref Range    Urea Nitrogen 38 (H) 6 - 23 mg/dL   Creatinine, Serum   Result Value Ref Range    Creatinine 2.32 (H) 0.50 - 1.30 mg/dL    eGFR 32 (L) >60 mL/min/1.73m*2   Electrolyte panel   Result Value Ref Range    Sodium 144 136 - 145 mmol/L    Potassium 3.7 3.5 - 5.3 mmol/L    Chloride 106 98 - 107 mmol/L    Bicarbonate 27 21 - 32 mmol/L    Anion Gap 15 10 - 20 mmol/L   Calcium, ionized   Result Value Ref Range    POCT Calcium, Ionized 1.16 1.1 - 1.33 mmol/L   Phosphorus   Result Value Ref Range    Phosphorus 6.4 (H) 2.5 - 4.9 mg/dL   Coagulation Screen   Result Value Ref Range    Protime 16.3 (H) 9.8 - 12.8 seconds    INR 1.4 (H) 0.9 - 1.1    aPTT 31 27 - 38 seconds   CBC   Result Value Ref Range    WBC 4.9 4.4 - 11.3 x10*3/uL    nRBC  0.0 0.0 - 0.0 /100 WBCs    RBC 3.09 (L) 4.50 - 5.90 x10*6/uL    Hemoglobin 9.0 (L) 13.5 - 17.5 g/dL    Hematocrit 25.4 (L) 41.0 - 52.0 %    MCV 82 80 - 100 fL    MCH 29.1 26.0 - 34.0 pg    MCHC 35.4 32.0 - 36.0 g/dL    RDW 15.9 (H) 11.5 - 14.5 %    Platelets 98 (L) 150 - 450 x10*3/uL   Basic Metabolic Panel   Result Value Ref Range    Glucose 184 (H) 74 - 99 mg/dL    Sodium 144 136 - 145 mmol/L    Potassium 3.7 3.5 - 5.3 mmol/L    Chloride 106 98 - 107 mmol/L    Bicarbonate 27 21 - 32 mmol/L    Anion Gap 15 10 - 20 mmol/L    Urea Nitrogen 38 (H) 6 - 23 mg/dL    Creatinine 2.32 (H) 0.50 - 1.30 mg/dL    eGFR 32 (L) >60 mL/min/1.73m*2    Calcium 8.7 8.6 - 10.6 mg/dL   Blood Gas Arterial Full Panel   Result Value Ref Range    POCT pH, Arterial 7.43 (H) 7.38 - 7.42 pH    POCT pCO2, Arterial 41 38 - 42 mm Hg    POCT pO2, Arterial 143 (H) 85 - 95 mm Hg    POCT SO2, Arterial 98 94 - 100 %    POCT Oxy Hemoglobin, Arterial 95.8 94.0 - 98.0 %    POCT Hematocrit Calculated, Arterial 38.0 (L) 41.0 - 52.0 %    POCT Sodium, Arterial 139 136 - 145 mmol/L    POCT Potassium, Arterial 3.8 3.5 - 5.3 mmol/L    POCT Chloride, Arterial 106 98 - 107 mmol/L    POCT Ionized Calcium, Arterial 1.15 1.10 - 1.33 mmol/L    POCT Glucose, Arterial 193 (H) 74 - 99 mg/dL    POCT Lactate, Arterial 1.3 0.4 - 2.0 mmol/L    POCT Base Excess, Arterial 2.6 -2.0 - 3.0 mmol/L    POCT HCO3 Calculated, Arterial 27.2 (H) 22.0 - 26.0 mmol/L    POCT Hemoglobin, Arterial 12.6 (L) 13.5 - 17.5 g/dL    POCT Anion Gap, Arterial 10 10 - 25 mmo/L    Patient Temperature 37.0 degrees Celsius    FiO2 40 %   POCT GLUCOSE   Result Value Ref Range    POCT Glucose 184 (H) 74 - 99 mg/dL   Blood Gas Arterial Full Panel   Result Value Ref Range    POCT pH, Arterial 7.42 7.38 - 7.42 pH    POCT pCO2, Arterial 40 38 - 42 mm Hg    POCT pO2, Arterial 100 (H) 85 - 95 mm Hg    POCT SO2, Arterial 98 94 - 100 %    POCT Oxy Hemoglobin, Arterial 95.9 94.0 - 98.0 %    POCT Hematocrit  Calculated, Arterial 34.0 (L) 41.0 - 52.0 %    POCT Sodium, Arterial 139 136 - 145 mmol/L    POCT Potassium, Arterial 4.2 3.5 - 5.3 mmol/L    POCT Chloride, Arterial 105 98 - 107 mmol/L    POCT Ionized Calcium, Arterial 1.12 1.10 - 1.33 mmol/L    POCT Glucose, Arterial 199 (H) 74 - 99 mg/dL    POCT Lactate, Arterial 1.3 0.4 - 2.0 mmol/L    POCT Base Excess, Arterial 1.3 -2.0 - 3.0 mmol/L    POCT HCO3 Calculated, Arterial 25.9 22.0 - 26.0 mmol/L    POCT Hemoglobin, Arterial 11.3 (L) 13.5 - 17.5 g/dL    POCT Anion Gap, Arterial 12 10 - 25 mmo/L    Patient Temperature 37.0 degrees Celsius    FiO2 32 %   TEG Clot Global Profile   Result Value Ref Range    R (Reaction Time) K 8.2 4.6 - 9.1 min    K (Clot Kinetics) 1.4 0.8 - 2.1 min    ANGLE 72.4 63.0 - 78.0 deg    MA (Max Amplitude) K 57.9 52.0 - 69.0 mm    R (Reaction Time) KH 7.9 4.3 - 8.3 min    MA (Max Amplitude) RT 60.2 52.0 - 70.0 mm    MA ( Sergio Amplitude) FF 20.6 15.0 - 32.0 mm    FLEV 376 278 - 581 mg/dL   POCT GLUCOSE   Result Value Ref Range    POCT Glucose 219 (H) 74 - 99 mg/dL   CBC and Auto Differential   Result Value Ref Range    WBC 6.1 4.4 - 11.3 x10*3/uL    nRBC 0.0 0.0 - 0.0 /100 WBCs    RBC 3.19 (L) 4.50 - 5.90 x10*6/uL    Hemoglobin 9.3 (L) 13.5 - 17.5 g/dL    Hematocrit 26.4 (L) 41.0 - 52.0 %    MCV 83 80 - 100 fL    MCH 29.2 26.0 - 34.0 pg    MCHC 35.2 32.0 - 36.0 g/dL    RDW 16.3 (H) 11.5 - 14.5 %    Platelets 94 (L) 150 - 450 x10*3/uL    Neutrophils % 92.4 40.0 - 80.0 %    Immature Granulocytes %, Automated 0.3 0.0 - 0.9 %    Lymphocytes % 2.0 13.0 - 44.0 %    Monocytes % 4.9 2.0 - 10.0 %    Eosinophils % 0.2 0.0 - 6.0 %    Basophils % 0.2 0.0 - 2.0 %    Neutrophils Absolute 5.65 1.20 - 7.70 x10*3/uL    Immature Granulocytes Absolute, Automated 0.02 0.00 - 0.70 x10*3/uL    Lymphocytes Absolute 0.12 (L) 1.20 - 4.80 x10*3/uL    Monocytes Absolute 0.30 0.10 - 1.00 x10*3/uL    Eosinophils Absolute 0.01 0.00 - 0.70 x10*3/uL    Basophils Absolute 0.01  0.00 - 0.10 x10*3/uL   Renal Function Panel   Result Value Ref Range    Glucose 219 (H) 74 - 99 mg/dL    Sodium 144 136 - 145 mmol/L    Potassium 4.2 3.5 - 5.3 mmol/L    Chloride 105 98 - 107 mmol/L    Bicarbonate 25 21 - 32 mmol/L    Anion Gap 18 10 - 20 mmol/L    Urea Nitrogen 41 (H) 6 - 23 mg/dL    Creatinine 2.52 (H) 0.50 - 1.30 mg/dL    eGFR 29 (L) >60 mL/min/1.73m*2    Calcium 8.8 8.6 - 10.6 mg/dL    Phosphorus 7.1 (H) 2.5 - 4.9 mg/dL    Albumin 3.4 3.4 - 5.0 g/dL   Magnesium   Result Value Ref Range    Magnesium 2.13 1.60 - 2.40 mg/dL   Calcium, Ionized   Result Value Ref Range    POCT Calcium, Ionized 1.11 1.1 - 1.33 mmol/L   Coagulation Screen   Result Value Ref Range    Protime 15.7 (H) 9.8 - 12.8 seconds    INR 1.4 (H) 0.9 - 1.1    aPTT 31 27 - 38 seconds   Fibrinogen   Result Value Ref Range    Fibrinogen 234 200 - 400 mg/dL   Gamma-Glutamyl Transferase   Result Value Ref Range     (H) 5 - 64 U/L   Hepatic function panel   Result Value Ref Range    Albumin 3.4 3.4 - 5.0 g/dL    Bilirubin, Total 7.7 (H) 0.0 - 1.2 mg/dL    Bilirubin, Direct 5.2 (H) 0.0 - 0.3 mg/dL    Alkaline Phosphatase 133 (H) 33 - 120 U/L    ALT 1,080 (H) 10 - 52 U/L    AST 4,929 (H) 9 - 39 U/L    Total Protein 5.5 (L) 6.4 - 8.2 g/dL   Hemoglobin A1c   Result Value Ref Range    Hemoglobin A1C 6.6 (H) See comment %    Estimated Average Glucose 143 Not Established mg/dL   Blood Gas Arterial Full Panel   Result Value Ref Range    POCT pH, Arterial 7.42 7.38 - 7.42 pH    POCT pCO2, Arterial 37 (L) 38 - 42 mm Hg    POCT pO2, Arterial 83 (L) 85 - 95 mm Hg    POCT SO2, Arterial 98 94 - 100 %    POCT Oxy Hemoglobin, Arterial 95.2 94.0 - 98.0 %    POCT Hematocrit Calculated, Arterial 28.0 (L) 41.0 - 52.0 %    POCT Sodium, Arterial 140 136 - 145 mmol/L    POCT Potassium, Arterial 4.3 3.5 - 5.3 mmol/L    POCT Chloride, Arterial 107 98 - 107 mmol/L    POCT Ionized Calcium, Arterial 1.13 1.10 - 1.33 mmol/L    POCT Glucose, Arterial 217 (H) 74  - 99 mg/dL    POCT Lactate, Arterial 1.3 0.4 - 2.0 mmol/L    POCT Base Excess, Arterial -0.3 -2.0 - 3.0 mmol/L    POCT HCO3 Calculated, Arterial 24.0 22.0 - 26.0 mmol/L    POCT Hemoglobin, Arterial 9.4 (L) 13.5 - 17.5 g/dL    POCT Anion Gap, Arterial 13 10 - 25 mmo/L    Patient Temperature 37.0 degrees Celsius    FiO2 32 %   Blood Gas Arterial Full Panel   Result Value Ref Range    POCT pH, Arterial 7.45 (H) 7.38 - 7.42 pH    POCT pCO2, Arterial 34 (L) 38 - 42 mm Hg    POCT pO2, Arterial 61 (L) 85 - 95 mm Hg    POCT SO2, Arterial 92 (L) 94 - 100 %    POCT Oxy Hemoglobin, Arterial 88.8 (L) 94.0 - 98.0 %    POCT Hematocrit Calculated, Arterial 30.0 (L) 41.0 - 52.0 %    POCT Sodium, Arterial 140 136 - 145 mmol/L    POCT Potassium, Arterial 4.0 3.5 - 5.3 mmol/L    POCT Chloride, Arterial 107 98 - 107 mmol/L    POCT Ionized Calcium, Arterial 1.13 1.10 - 1.33 mmol/L    POCT Glucose, Arterial 226 (H) 74 - 99 mg/dL    POCT Lactate, Arterial 1.3 0.4 - 2.0 mmol/L    POCT Base Excess, Arterial -0.1 -2.0 - 3.0 mmol/L    POCT HCO3 Calculated, Arterial 23.6 22.0 - 26.0 mmol/L    POCT Hemoglobin, Arterial 10.1 (L) 13.5 - 17.5 g/dL    POCT Anion Gap, Arterial 13 10 - 25 mmo/L    Patient Temperature 37.0 degrees Celsius    FiO2 21 %   POCT GLUCOSE   Result Value Ref Range    POCT Glucose 239 (H) 74 - 99 mg/dL   POCT GLUCOSE   Result Value Ref Range    POCT Glucose 249 (H) 74 - 99 mg/dL   BUN   Result Value Ref Range    Urea Nitrogen 51 (H) 6 - 23 mg/dL   Creatinine, Serum   Result Value Ref Range    Creatinine 2.83 (H) 0.50 - 1.30 mg/dL    eGFR 25 (L) >60 mL/min/1.73m*2   Electrolyte panel   Result Value Ref Range    Sodium 143 136 - 145 mmol/L    Potassium 4.0 3.5 - 5.3 mmol/L    Chloride 104 98 - 107 mmol/L    Bicarbonate 25 21 - 32 mmol/L    Anion Gap 18 10 - 20 mmol/L   Calcium, ionized   Result Value Ref Range    POCT Calcium, Ionized 1.08 (L) 1.1 - 1.33 mmol/L   Phosphorus   Result Value Ref Range    Phosphorus 7.6 (H) 2.5 -  4.9 mg/dL   Magnesium   Result Value Ref Range    Magnesium 2.18 1.60 - 2.40 mg/dL   Coagulation Screen   Result Value Ref Range    Protime 16.4 (H) 9.8 - 12.8 seconds    INR 1.5 (H) 0.9 - 1.1    aPTT 33 27 - 38 seconds   CBC   Result Value Ref Range    WBC 6.8 4.4 - 11.3 x10*3/uL    nRBC 0.0 0.0 - 0.0 /100 WBCs    RBC 3.37 (L) 4.50 - 5.90 x10*6/uL    Hemoglobin 10.0 (L) 13.5 - 17.5 g/dL    Hematocrit 28.0 (L) 41.0 - 52.0 %    MCV 83 80 - 100 fL    MCH 29.7 26.0 - 34.0 pg    MCHC 35.7 32.0 - 36.0 g/dL    RDW 16.2 (H) 11.5 - 14.5 %    Platelets 89 (L) 150 - 450 x10*3/uL   Gamma-Glutamyl Transferase   Result Value Ref Range     (H) 5 - 64 U/L   Hepatic Function Panel   Result Value Ref Range    Albumin 3.5 3.4 - 5.0 g/dL    Bilirubin, Total 6.6 (H) 0.0 - 1.2 mg/dL    Bilirubin, Direct 4.6 (H) 0.0 - 0.3 mg/dL    Alkaline Phosphatase 157 (H) 33 - 120 U/L     (H) 10 - 52 U/L    AST 2,871 (H) 9 - 39 U/L    Total Protein 5.5 (L) 6.4 - 8.2 g/dL   Basic Metabolic Panel   Result Value Ref Range    Glucose 265 (H) 74 - 99 mg/dL    Sodium 143 136 - 145 mmol/L    Potassium 4.0 3.5 - 5.3 mmol/L    Chloride 104 98 - 107 mmol/L    Bicarbonate 25 21 - 32 mmol/L    Anion Gap 18 10 - 20 mmol/L    Urea Nitrogen 51 (H) 6 - 23 mg/dL    Creatinine 2.83 (H) 0.50 - 1.30 mg/dL    eGFR 25 (L) >60 mL/min/1.73m*2    Calcium 8.4 (L) 8.6 - 10.6 mg/dL   Blood Gas Arterial Full Panel   Result Value Ref Range    POCT pH, Arterial 7.41 7.38 - 7.42 pH    POCT pCO2, Arterial 36 (L) 38 - 42 mm Hg    POCT pO2, Arterial 78 (L) 85 - 95 mm Hg    POCT SO2, Arterial 95 94 - 100 %    POCT Oxy Hemoglobin, Arterial 93.8 (L) 94.0 - 98.0 %    POCT Hematocrit Calculated, Arterial 29.0 (L) 41.0 - 52.0 %    POCT Sodium, Arterial 141 136 - 145 mmol/L    POCT Potassium, Arterial 3.9 3.5 - 5.3 mmol/L    POCT Chloride, Arterial 107 98 - 107 mmol/L    POCT Ionized Calcium, Arterial 1.09 (L) 1.10 - 1.33 mmol/L    POCT Glucose, Arterial 263 (H) 74 - 99 mg/dL     POCT Lactate, Arterial 1.6 0.4 - 2.0 mmol/L    POCT Base Excess, Arterial -1.5 -2.0 - 3.0 mmol/L    POCT HCO3 Calculated, Arterial 22.8 22.0 - 26.0 mmol/L    POCT Hemoglobin, Arterial 9.7 (L) 13.5 - 17.5 g/dL    POCT Anion Gap, Arterial 15 10 - 25 mmo/L    Patient Temperature 37.0 degrees Celsius    FiO2 36 %   POCT GLUCOSE   Result Value Ref Range    POCT Glucose 365 (H) 74 - 99 mg/dL   POCT GLUCOSE   Result Value Ref Range    POCT Glucose 327 (H) 74 - 99 mg/dL   POCT GLUCOSE   Result Value Ref Range    POCT Glucose 288 (H) 74 - 99 mg/dL   POCT GLUCOSE   Result Value Ref Range    POCT Glucose 259 (H) 74 - 99 mg/dL   Magnesium   Result Value Ref Range    Magnesium 2.20 1.60 - 2.40 mg/dL   Hepatic Function Panel   Result Value Ref Range    Albumin 3.5 3.4 - 5.0 g/dL    Bilirubin, Total 5.8 (H) 0.0 - 1.2 mg/dL    Bilirubin, Direct 4.0 (H) 0.0 - 0.3 mg/dL    Alkaline Phosphatase 173 (H) 33 - 120 U/L     (H) 10 - 52 U/L    AST 1,795 (H) 9 - 39 U/L    Total Protein 5.7 (L) 6.4 - 8.2 g/dL   Gamma-Glutamyl Transferase   Result Value Ref Range     (H) 5 - 64 U/L   Coagulation Screen   Result Value Ref Range    Protime 18.3 (H) 9.8 - 12.8 seconds    INR 1.6 (H) 0.9 - 1.1    aPTT 31 27 - 38 seconds   CBC   Result Value Ref Range    WBC 7.2 4.4 - 11.3 x10*3/uL    nRBC 0.0 0.0 - 0.0 /100 WBCs    RBC 3.50 (L) 4.50 - 5.90 x10*6/uL    Hemoglobin 10.1 (L) 13.5 - 17.5 g/dL    Hematocrit 28.2 (L) 41.0 - 52.0 %    MCV 81 80 - 100 fL    MCH 28.9 26.0 - 34.0 pg    MCHC 35.8 32.0 - 36.0 g/dL    RDW 16.5 (H) 11.5 - 14.5 %    Platelets 90 (L) 150 - 450 x10*3/uL   Blood Gas Arterial Full Panel   Result Value Ref Range    POCT pH, Arterial 7.43 (H) 7.38 - 7.42 pH    POCT pCO2, Arterial 36 (L) 38 - 42 mm Hg    POCT pO2, Arterial 79 (L) 85 - 95 mm Hg    POCT SO2, Arterial 96 94 - 100 %    POCT Oxy Hemoglobin, Arterial 94.2 94.0 - 98.0 %    POCT Hematocrit Calculated, Arterial 32.0 (L) 41.0 - 52.0 %    POCT Sodium, Arterial 135  (L) 136 - 145 mmol/L    POCT Potassium, Arterial 3.7 3.5 - 5.3 mmol/L    POCT Chloride, Arterial 103 98 - 107 mmol/L    POCT Ionized Calcium, Arterial 1.12 1.10 - 1.33 mmol/L    POCT Glucose, Arterial 279 (H) 74 - 99 mg/dL    POCT Lactate, Arterial 1.7 0.4 - 2.0 mmol/L    POCT Base Excess, Arterial -0.2 -2.0 - 3.0 mmol/L    POCT HCO3 Calculated, Arterial 23.9 22.0 - 26.0 mmol/L    POCT Hemoglobin, Arterial 10.6 (L) 13.5 - 17.5 g/dL    POCT Anion Gap, Arterial 12 10 - 25 mmo/L    Patient Temperature 37.0 degrees Celsius    FiO2 32 %   Basic Metabolic Panel   Result Value Ref Range    Glucose 279 (H) 74 - 99 mg/dL    Sodium 140 136 - 145 mmol/L    Potassium 3.7 3.5 - 5.3 mmol/L    Chloride 101 98 - 107 mmol/L    Bicarbonate 24 21 - 32 mmol/L    Anion Gap 19 10 - 20 mmol/L    Urea Nitrogen 58 (H) 6 - 23 mg/dL    Creatinine 2.75 (H) 0.50 - 1.30 mg/dL    eGFR 26 (L) >60 mL/min/1.73m*2    Calcium 8.5 (L) 8.6 - 10.6 mg/dL   Phosphorus   Result Value Ref Range    Phosphorus 6.1 (H) 2.5 - 4.9 mg/dL   POCT GLUCOSE   Result Value Ref Range    POCT Glucose 277 (H) 74 - 99 mg/dL       Liver US:  1. Interval increase in the main portal vein velocity of the liver  transplant compared to 01/20/2025 ultrasound measuring up to 126.5  cm/sec, with slightly turbulent flow. No evidence of focal narrowing  or gradient. Attention on follow-up is recommended.  2. Interval improvement of the previously noted low resistive indices  throughout the hepatic arteries with interval improvement in the  arterial waveforms compared to 01/20/2025 ultrasound.  3. Stable splenomegaly.    Kidney US:   Unremarkable ultrasound and Doppler evaluation of the transplant  kidney as detailed above       Assessment/Plan     54 y.o. male with decompensated MetALD (ascites, variceal bleed, HE) c/b EV s/p banding in June 2024, advanced CKD (baseline creatinine around 2.5), long standing tobacco use, duodenal ulcer, H.pylori infection (negative biopsies in [place]  2024), and short segment Matute's esophagus (not biopsy confirmed due to the presence of varices) who is s/p SLK on 1/20/2025 by Dr. Wright.     Pt was started on intra-op CVVH which was discontinued 1/21 AM.     Organ info: [id number] (SLK), DCD, KDPI 72%, PRA 0%, HCV -/-, CMV -/-, EBV +/+, HBV -/-, donor toxo neg. no + donor cultures. Simulect induction.     Allograft function:  - Liver: elevated LFTs which are now downtrending. Liver US acceptable  - kidney: stable serum Cr. On CRRT intra-op, discontinued 1/21 am. Recorded UOP ~3L  - metabolic parameters stable.  - no current indication for RRT> will monitor  - No significant hypervolemia on exam.   - Hb, Ca, Phos acceptable  - avoid potential nephrotoxins. Dose meds for CrCl. Ensure adequate hydration    Immunosuppression:  - Simulect induction, Dose #1 1/20, dose #2 1/24   - maint IS: Tac, MMF, pred taper protocol. Dosing per txp surgery  - Ppx: Bactrim, fluconazole, acyclovir    Will follow    I spent 60 minutes in the professional and overall care of this patient.      Billy Coles MD

## 2025-01-22 NOTE — CARE PLAN
The patient's goals for the shift include Pt wants to go to surgury    The clinical goals for the shift include Pt will remain stable      Problem: Pain - Adult  Goal: Verbalizes/displays adequate comfort level or baseline comfort level  Outcome: Progressing     Problem: Safety - Adult  Goal: Free from fall injury  Outcome: Progressing     Problem: Discharge Planning  Goal: Discharge to home or other facility with appropriate resources  Outcome: Progressing     Problem: Chronic Conditions and Co-morbidities  Goal: Patient's chronic conditions and co-morbidity symptoms are monitored and maintained or improved  Outcome: Progressing     Problem: Fall/Injury  Goal: Not fall by end of shift  Outcome: Progressing  Goal: Be free from injury by end of the shift  Outcome: Progressing  Goal: Verbalize understanding of personal risk factors for fall in the hospital  Outcome: Progressing  Goal: Verbalize understanding of risk factor reduction measures to prevent injury from fall in the home  Outcome: Progressing  Goal: Use assistive devices by end of the shift  Outcome: Progressing  Goal: Pace activities to prevent fatigue by end of the shift  Outcome: Progressing     Problem: Knowledge Deficit  Goal: Patient/family/caregiver demonstrates understanding of disease process, treatment plan, medications, and discharge instructions  Outcome: Progressing     Problem: Skin  Goal: Decreased wound size/increased tissue granulation at next dressing change  Outcome: Progressing  Flowsheets (Taken 1/22/2025 0644)  Decreased wound size/increased tissue granulation at next dressing change:   Promote sleep for wound healing   Protective dressings over bony prominences  Goal: Participates in plan/prevention/treatment measures  Outcome: Progressing  Flowsheets (Taken 1/22/2025 0644)  Participates in plan/prevention/treatment measures:   Discuss with provider PT/OT consult   Elevate heels   Increase activity/out of bed for meals  Goal:  Prevent/manage excess moisture  Outcome: Progressing  Flowsheets (Taken 1/22/2025 0644)  Prevent/manage excess moisture:   Cleanse incontinence/protect with barrier cream   Follow provider orders for dressing changes   Monitor for/manage infection if present   Moisturize dry skin  Goal: Prevent/minimize sheer/friction injuries  Outcome: Progressing  Flowsheets (Taken 1/22/2025 0644)  Prevent/minimize sheer/friction injuries:   Complete micro-shifts as needed if patient unable. Adjust patient position to relieve pressure points, not a full turn   HOB 30 degrees or less   Increase activity/out of bed for meals   Turn/reposition every 2 hours/use positioning/transfer devices   Use pull sheet  Goal: Promote/optimize nutrition  Outcome: Progressing  Flowsheets (Taken 1/22/2025 0644)  Promote/optimize nutrition:   Discuss with provider if NPO > 2 days   Monitor/record intake including meals  Goal: Promote skin healing  Outcome: Progressing  Flowsheets (Taken 1/22/2025 0644)  Promote skin healing:   Ensure correct size (line/device) and apply per  instructions   Assess skin/pad under line(s)/device(s)   Protective dressings over bony prominences   Rotate device position/do not position patient on device   Turn/reposition every 2 hours/use positioning/transfer devices     Problem: Pain  Goal: Takes deep breaths with improved pain control throughout the shift  Outcome: Progressing  Goal: Turns in bed with improved pain control throughout the shift  Outcome: Progressing  Goal: Walks with improved pain control throughout the shift  Outcome: Progressing  Goal: Performs ADL's with improved pain control throughout shift  Outcome: Progressing  Goal: Participates in PT with improved pain control throughout the shift  Outcome: Progressing  Goal: Free from opioid side effects throughout the shift  Outcome: Progressing  Goal: Free from acute confusion related to pain meds throughout the shift  Outcome: Progressing

## 2025-01-22 NOTE — PROGRESS NOTES
"[facility]  [facility]  CONSULT FOLLOW-UP     Reason For Consult  Post-SLK co-management     SUBJECTIVE     No acute events overnight. Patient is on insulin gtt given steroid induced hyperglycemia after SLK transplant. Endocrinology has been consulted for the same.     He is also getting 500 ml of 5% albumin.     Overall feels well. Was on liquid diet which has been able to tolerate. Drains have put out 619 ml in the last 24 hours. Plan per transplant surgery is to pull the 2 perihepatic drains.     His UOP is 1965 ml in the last 24 hours. Not passing gas or stool yet.     His GGT has uptrendd from 275 --> 305 --> 323. Hemoglobin and platelets are stable when compared to previous day. INR is 1.6.     His LFTS are downtrending slowly (AST has downtrended from 1795 --> 1169, ALT has downtrended from 897 --> 868, ALP has downtrended from 173 --> 167, T.bili has downtrended from 5.8 --> 5.3).     Creatinine is similar to yesterday (2.83 --> 2.75 --> 2.80).    Repeat US liver with doppler shows interval improvement of the previously noted low resistive indices throughout the hepatic arteries.     Tacrolimus will be started today, at 2 mg BID dosing. He is getting MMF 1000 mg BID. Next dose of Simulect on Friday.     EXAM     Last Recorded Vitals  Blood pressure 142/81, pulse 65, temperature 37.3 °C (99.1 °F), temperature source Temporal, resp. rate 18, height 1.778 m (5' 10\"), weight 126 kg (277 lb 5.4 oz), SpO2 93%.      Intake/Output Summary (Last 24 hours) at 1/22/2025 1723  Last data filed at 1/22/2025 1500  Gross per 24 hour   Intake 2179.54 ml   Output 2210 ml   Net -30.46 ml          Physical Exam  General: well-nourished, no acute distress  HEENT: no pallor, scleral icterus +ve   Respiratory: CTA bilaterally, normal work of breathing  Cardiovascular: S1, S2 heard   Abdomen: Chevron incision with dressing over it. Distended abdomen. Bowel sounds not well " appreciated. 3 KAITLYNN drains on Right side and 1 KAITLYNN drain of Left side draining serosanguineous discharge   Extremities: no edema, no asterixis  Neuro: alert and oriented, CNII-XII grossly intact, moves all 4 extremities with no focal deficits  Skin: jaundiced   : chaudhary in place with clear yellow urine     OBJECTIVE                                                                              Medications             Current Facility-Administered Medications:     amLODIPine (Norvasc) tablet 5 mg, 5 mg, oral, Daily, Joanne L [place], APRN-CNP, 5 mg at 01/22/25 0806    aspirin EC tablet 81 mg, 81 mg, oral, Daily, Joanne L Purvi, APRN-CNP, 81 mg at 01/22/25 0805    [START ON 1/24/2025] basiliximab (Simulect) 20 mg in sodium chloride 0.9% 50 mL IV, 20 mg, intravenous, Once, Joanne L [place], APRN-CNP    carvedilol (Coreg) tablet 6.25 mg, 6.25 mg, oral, BID, Joanne L Purvi, APRN-CNP, 6.25 mg at 01/22/25 0805    dextrose 50 % injection 12.5 g, 12.5 g, intravenous, q15 min PRN, Joanne L Purvi, APRN-CNP    dextrose 50 % injection 25 g, 25 g, intravenous, q15 min PRN, Joanne L Purvi, APRN-CNP    electrolyte-A (Plasmalyte-A) solution, 60 mL/hr, intravenous, Continuous, Joanne L Purvi, APRN-CNP, Last Rate: 60 mL/hr at 01/22/25 1315, 60 mL/hr at 01/22/25 1315    fluconazole (Diflucan) tablet 400 mg, 400 mg, oral, Daily, Joanne L Purvi, APRN-CNP, 400 mg at 01/22/25 0805    glucagon (Glucagen) injection 1 mg, 1 mg, intramuscular, q15 min PRN, Joanne L Purvi, APRN-CNP    heparin (porcine) injection 5,000 Units, 5,000 Units, subcutaneous, q8h, Joanne L [place], APRN-CNP, 5,000 Units at 01/22/25 1645    insulin lispro injection 0-10 Units, 0-10 Units, subcutaneous, q4h, Joanne L [place], APRN-CNP, 4 Units at 01/22/25 1645    insulin NPH (Isophane) (HumuLIN N,NovoLIN N) injection 10 Units, 10 Units, subcutaneous, q24h [facility], Joanne Loja, APRN-CNP, 10 Units at 01/22/25 1226    lidocaine 4 % patch 2 patch, 2 patch, transdermal, Daily, Joanne Loja, APRN-CNP, 2 patch at  01/22/25 0806    methylPREDNISolone sod succinate (SOLU-Medrol) injection 125 mg, 125 mg, intravenous, Daily, Joanne L [place], APRN-CNP, 125 mg at 01/22/25 0807    [START ON 1/24/2025] methylPREDNISolone sod succinate (SOLU-Medrol) injection 60 mg, 60 mg, intravenous, Once, Joanne L Purvi, APRN-CNP    mycophenolate (Cellcept) capsule 1,000 mg, 1,000 mg, oral, q12h EDINSON, Joanne L Purvi, APRN-CNP, 1,000 mg at 01/22/25 0621    oxyCODONE (Roxicodone) immediate release tablet 10 mg, 10 mg, oral, q4h PRN, Joanne L [place], APRN-CNP    oxyCODONE (Roxicodone) immediate release tablet 5 mg, 5 mg, oral, q6h PRN, Joanne L Purvi, APRN-CNP    pantoprazole (ProtoNix) EC tablet 40 mg, 40 mg, oral, BID, Joanne L [place], APRN-CNP, 40 mg at 01/22/25 0806    piperacillin-tazobactam (Zosyn) 3.375 g in dextrose (iso) IV 50 mL, 3.375 g, intravenous, q6h, Joanne L Purvi, APRN-CNP, Stopped at 01/22/25 1254    [START ON 1/25/2025] predniSONE (Deltasone) tablet 20 mg, 20 mg, oral, Daily, Joanne L Purvi, APRN-CNP    sennosides-docusate sodium (Yoselyn-Colace) 8.6-50 mg per tablet 2 tablet, 2 tablet, oral, BID, Joanne L [place], APRN-CNP, 2 tablet at 01/22/25 0805    tacrolimus (Prograf) capsule 2 mg, 2 mg, oral, q12h EDINSON, Joanne L [place], APRN-CNP    ursodiol (Actigall) capsule 300 mg, 300 mg, oral, q8h, Joanne L [place], APRN-CNP, 300 mg at 01/22/25 1232                                                                            Labs     Reviewed.                                                                         Imaging:     US Liver with doppler 1/21/2025:  IMPRESSION:  1. Interval increase in the main portal vein velocity of the liver  transplant compared to 01/20/2025 ultrasound measuring up to 126.5 cm/sec, with slightly turbulent flow. No evidence of focal narrowing or gradient. Attention on follow-up is recommended.  2. Interval improvement of the previously noted low resistive indices  throughout the hepatic arteries with interval improvement in the  arterial  waveforms compared to 01/20/2025 ultrasound.  3. Stable splenomegaly.    US kidney transplant 1/20/2025:  IMPRESSION:  Unremarkable ultrasound and Doppler evaluation of the transplant  kidney as detailed above     US Liver with doppler 1/20/2025:  IMPRESSION:  Status post orthotopic liver transplant with decreased resistive  indices of the hepatic arteries (0.4-0.5), attention on follow-up  imaging is recommended. The hepatic vasculature is patent.        US liver with doppler 11/4/2024:  IMPRESSION:  Cirrhotic morphology of the liver with sequela of portal hypertension including significant splenomegaly and reversal flow within the splenic vein as detailed.                                                                         GI Procedures     7/2023 EGD  Impression:                                - Normal upper third of esophagus.                             - Grade I esophageal varices.                             - Esophageal mucosal changes suggestive of                             short-segment Matute's esophagus, classified as                             Matute's stage C0-M2 per [place] criteria.                             Biopsy is contraindicated.                             - Small hiatal hernia.                             - Non-bleeding duodenal ulcer with no stigmata                             of bleeding. Appears to be healing well, without                             any signs of recent blood loss.                             - Mucosal changes in the duodenum. Biopsied.                             - No blood was present throughout the entire                               exam.      6/2023 EGD  Impression:                                   - Normal esophagus.                             - Normal stomach.                             - Non-bleeding duodenal ulcer with a visible                             vessel. Injected. Treated with argon plasma                             coagulation (APC).                              - The examination was otherwise normal.                             - No specimens collected.      EGD 6/7/24  Findings  Irregular Z-line 38 cm from the incisors  Three medium grade II varices (no red tiera sign) in the esophagus; no bleeding was identified; placed 3 bands successfully, resulting in partial eradication  Cobblestone, edematous, erythematous and friable mucosa in the body of the stomach and antrum; performed cold forceps biopsy to rule out H. pylori;  Patchy erythematous and friable mucosa in the duodenal bulb;  The duodenum appeared normal.     EGD 11/1/24  Impression  Irregular Z-line  Matute's esophagus  Multiple small varices in the lower third of the esophagus  Mild and friable portal hypertensive gastropathy in the cardia, fundus of the stomach, body of the stomach and antrum  Abnormal mucosa with erosion in the 1st part of the duodenum  The 2nd part of the duodenum appeared normal.      6/22/23 [facility]: Colonoscopy with Gratis Score of 2/2/2: one 5 mm inflammatory type polyp in the sigmoid colon.       ASSESSMENT / PLAN     ASSESSMENT/PLAN:  Goran Ibrahim is a 55 yo male with a past medical history of decompensated MetALD (ascites, variceal bleed, HE) c/b EV s/p banding in June 2024, advanced CKD (baseline creatinine around 2.5), long standing tobacco use, duodenal ulcer, H.pylori infection (negative biopsies in June 2024), and short segment Matute's esophagus (not biopsy confirmed due to the presence of varices) who was listed for SLK. He was subsequently brought in for DCD liver (caval piggyback, rCHA to dSMA/celiac stack HA, duct to duct [small to large with v plasty]) and right kidney transplant which he underwent on 1/20/2025 with Dr. Wright. Hepatology has been consulted to co-manage him post-OLT.      Overall, patient is doing well post-SLK, and he has been extubated and CVVH has been discontinued. His T. bili is a bit high, so he has been started on Ursodiol.  Additionally, his immediate post-liver transplant liver US with dopplers showed decreased resistive indices of the hepatic arteries (0.4-0.5), but repeat US liver with dopplers on 1/21/2025 showed interval improvement of the previously noted low resistive indices in the hepatic arteries. Transaminases are downtrending. Creatinine stably elevated and he is making good UOP.     RECS:  Immunosuppression and prophylactic antibiotics/antifungals per transplant surgery team   Await explant pathology results  Appreciate Endocrinology recs for steroid induced hyperglycemia   Strict Ins and Outs   Patient will need EGD at some point (once he is fully recovered from transplant) as an outpatient with biopsies to confirm short segment Matute's esophagus that was found during EGD in 2023/2024     Patient was seen and discussed with Hepatology attending, Dr. Norris Velasco.     Thank you for the consultation. Hepatology will continue to follow.     - During weekday hours of 7am- 5pm, please do not hesitate to contact me on [employer] Chat or page [phone] if there are any further questions   - After hours, on weekends, and on holidays, please page the on-call GI fellow at [phone]. Thank you.     Venice Meier MD MPH   GI and Hepatology Fellow   [facility]

## 2025-01-22 NOTE — PROGRESS NOTES
Physical Therapy    Physical Therapy Treatment    Patient Name: Goran Ibrahim  MRN: [id number]  Department: [facility] 9  Room: [facility]9087-A  Today's Date: 1/22/2025  Time Calculation  Start Time: 1444  Stop Time: 1507  Time Calculation (min): 23 min    Assessment/Plan   PT Assessment  PT Assessment Results: Decreased strength, Decreased range of motion, Decreased endurance, Decreased mobility, Impaired balance, Pain  Rehab Prognosis: Good  Barriers to Discharge Home: Physical needs  Physical Needs: Stair navigation into home limited by function/safety, Ambulating household distances limited by function/safety  Evaluation/Treatment Tolerance: Patient tolerated treatment well  Medical Staff Made Aware: Yes  Strengths: Ability to acquire knowledge, Attitude of self  Barriers to Participation: Housing layout  End of Session Communication: Bedside nurse  Assessment Comment: Pt tolerated treatment well this date currently limited 2/2 pain. Pt able to mobilize in bed w/ Rosa, t/f sit<>stand CGA, and amb 200' w/ FWW CGA. Demos decreased BLE strength, balance, cardiovascular endurance, activity tolerace, abillity to mobilize in bed, transfer ability, and gait quality. Skilled PT interventions can help improve the deficits noted above.  End of Session Patient Position: Bed, 3 rail up, Alarm off, not on at start of session  PT Plan  Inpatient/Swing Bed or Outpatient: Inpatient  PT Plan  Treatment/Interventions: Bed mobility, Transfer training, Gait training, Stair training, Balance training, Strengthening, Endurance training, Range of motion, Therapeutic exercise, Therapeutic activity, Home exercise program  PT Plan: Ongoing PT  PT Frequency: 5 times per week  PT Discharge Recommendations: Low intensity level of continued care  Equipment Recommended upon Discharge: Wheeled walker  PT Recommended Transfer Status: Assist x1, Assistive device  PT - OK to Discharge: Yes  General Visit Information:   PT  Visit  PT Received On:  01/22/25  Response to Previous Treatment: Patient with no complaints from previous session.  General  Family/Caregiver Present: Yes  Caregiver Feedback: Sister and her boyfriend  Prior to Session Communication: Bedside nurse  Patient Position Received: Bed, 3 rail up, Alarm off, not on at start of session  General Comment: Pt supine in bed on arrival, agreeable to participate. On 4 L NC, x2JP drains, IV, tele, chaudhary.    Subjective   Precautions:  Precautions  Hearing/Visual Limitations: WFL  Medical Precautions: Fall precautions  Post-Surgical Precautions: Abdominal surgery precautions  Precautions Comment: [id number]     Date/Time Vitals Session Patient Position Pulse Resp SpO2 BP MAP (mmHg)    01/22/25 1500 --  --  79  18  93 %  134/82  97              Objective   Pain:  Pain Assessment  Pain Assessment: 0-10  0-10 (Numeric) Pain Score: 3  Cognition:  Cognition  Overall Cognitive Status: Within Functional Limits  Orientation Level: Oriented X4  Coordination:  Movements are Fluid and Coordinated: Yes  Postural Control:  Postural Control  Postural Control: Within Functional Limits  Static Sitting Balance  Static Sitting-Balance Support: Bilateral upper extremity supported, Feet supported  Static Sitting-Level of Assistance: Close supervision  Static Sitting-Comment/Number of Minutes: ~5 min  Static Standing Balance  Static Standing-Balance Support: Bilateral upper extremity supported, No upper extremity supported  Static Standing-Level of Assistance: Contact guard  Static Standing-Comment/Number of Minutes: ~2 min w/BUE support on FWW CGA  Extremity/Trunk Assessments:  Activity Tolerance:  Activity Tolerance  Endurance: Tolerates 10 - 20 min exercise with multiple rests  Early Mobility/Exercise Safety Screen: Proceed with mobilization - No exclusion criteria met  Treatments:  Therapeutic Activity  Therapeutic Activity Performed: Yes  Therapeutic Activity 1: Bed mobility Rosa w/ increased time and vc/tc's to mobillize  BLE to EOB and required Rosa to intiate trunk movement. Amb 200' w/ FWW CGA and vc's to maintain proper posture and keep AD close to pt to increase safety while amb.    Bed Mobility  Bed Mobility: Yes  Bed Mobility 1  Bed Mobility 1: Supine to sitting, Sitting to supine  Level of Assistance 1: Minimum assistance  Bed Mobility Comments 1: Inc time to perform with assistance initiating trunk movement and BLE. VCs for pt to utilize hand rail with contralateral UE and inch BLE to EOB.    Ambulation/Gait Training  Ambulation/Gait Training Performed: Yes  Ambulation/Gait Training 1  Surface 1: Level tile  Device 1: Rolling walker  Assistance 1: Contact guard  Quality of Gait 1: Inconsistent stride length, Decreased step length  Comments/Distance (ft) 1: Amb 200' w/ FWW CGA vc's for AD management to increase safety while amb. Pt amb around ICU negotiating various objects with FWW.  Transfers  Transfer: Yes  Transfer 1  Transfer From 1: Sit to, Stand to  Transfer to 1: Stand, Sit  Technique 1: Sit to stand, Stand to sit  Transfer Level of Assistance 1: Contact guard  Trials/Comments 1: inc time to intiate movement with one hand using walker and other pushing from bed.    Stairs  Stairs: No    Outcome Measures:  [facility] Basic Mobility  Turning from your back to your side while in a flat bed without using bedrails: A little  Moving from lying on your back to sitting on the side of a flat bed without using bedrails: A little  Moving to and from bed to chair (including a wheelchair): A little  Standing up from a chair using your arms (e.g. wheelchair or bedside chair): A little  To walk in hospital room: A little  Climbing 3-5 steps with railing: A lot  Basic Mobility - Total Score: 17    FSS-ICU  Ambulation: Walks >/ or equal to 150 feet with minimal assistance x1  Rolling: Minimal assistance (performs 75% or more of task)  Sitting: Modified independence, requires use of assistive device  Transfer Sit-to-Stand: Minimal  assistance (performs 75% or more of task)  Transfer Supine-to-Sit: Minimal assistance (performs 75% or more of task)  Total Score: 22    Early Mobility/Exercise Safety Screen: Proceed with mobilization - No exclusion criteria met  ICU Mobility Scale: Walking with assistance of 1 person [8]    Education Documentation  Precautions, taught by [employer] Call at 1/22/2025  3:59 PM.  Learner: Patient  Readiness: Acceptance  Method: Explanation  Response: Verbalizes Understanding  Comment: Edu pt on POC.    Body Mechanics, taught by [employer] Call at 1/22/2025  3:59 PM.  Learner: Patient  Readiness: Acceptance  Method: Explanation  Response: Verbalizes Understanding  Comment: Edu pt on POC.    Mobility Training, taught by [employer] Call at 1/22/2025  3:59 PM.  Learner: Patient  Readiness: Acceptance  Method: Explanation  Response: Verbalizes Understanding  Comment: Edu pt on POC.    Education Comments  No comments found.      OP EDUCATION:       Encounter Problems       Encounter Problems (Active)       Balance       STG - Maintains dynamic standing balance SUP without upper extremity support to decrease the risk of falls. (Progressing)       Start:  01/21/25    Expected End:  02/11/25               Mobility       STG - Patient will ambulate 50' w/ LRAD SUP to promote functional IND in the home. (Progressing)       Start:  01/21/25    Expected End:  02/11/25            STG - Patient will ascend and descend 3 stairs w/ 1 HR SUP to increase safety entering/exiting the home.  (Progressing)       Start:  01/21/25    Expected End:  02/11/25               Mobility       Pt will mobilize supine<>sitting EOB SUP to promote functional IND. (Progressing)       Start:  01/21/25    Expected End:  02/11/25               PT Transfers       STG - Patient will transfer sit to and from stand w/LRAD SUP to improve functional mobility.  (Progressing)       Start:  01/21/25    Expected End:  02/11/25               Pain - Adult             Arnie Quijano, [facility]  Student physical therapist working under the direct supervision of licensed therapist

## 2025-01-22 NOTE — PROGRESS NOTES
"Goran Ibrahim is a 54 y.o. male now POD # 2 s/p SLK transplant.    Subjective   Doing well. Extubated. No products since OR       Objective   Physical Exam  Gen: A+OX3; NAD  Abd: S/NT/ND. Incisions C/D/I.  Drains: Serosanguinous    Ext: trace LE edema    Last Recorded Vitals  Blood pressure 143/75, pulse 63, temperature 36.9 °C (98.4 °F), temperature source Temporal, resp. rate 15, height 1.778 m (5' 10\"), weight 126 kg (277 lb 5.4 oz), SpO2 97%.  Intake/Output last 3 Shifts:  I/O last 3 completed shifts:  In: 5819.3 (46.3 mL/kg) [I.V.:1218.6 (9.7 mL/kg); Blood:389; IV Piggyback:4211.7]  Out: 45178 (88.3 mL/kg) [Urine:3450 (0.8 mL/kg/hr); Emesis/NG output:625; Drains:1039; Blood:6000]  Weight: 125.8 kg      Lab Results   Component Value Date    CREATININE 2.80 (H) 01/22/2025    K 3.7 01/22/2025    GLUCOSE 170 (H) 01/22/2025    HGB 9.8 (L) 01/22/2025    WBC 7.7 01/22/2025    PLT 91 (L) 01/22/2025    CALCIUM 8.4 (L) 01/22/2025         Current Facility-Administered Medications:     amLODIPine (Norvasc) tablet 5 mg, 5 mg, oral, Daily, AMARIS MartinezCNP, 5 mg at 01/22/25 0806    aspirin EC tablet 81 mg, 81 mg, oral, Daily, ZION Cui-CNP, 81 mg at 01/22/25 0805    [START ON 1/24/2025] basiliximab (Simulect) 20 mg in sodium chloride 0.9% 50 mL IV, 20 mg, intravenous, Once, Lizandro Zavala MD    calcium gluconate 1 g in sodium chloride (iso) IV 50 mL, 1 g, intravenous, q6h PRN, CHRIS Martinez, Stopped at 01/22/25 0600    calcium gluconate 2 g in sodium chloride (iso)  mL, 2 g, intravenous, q6h PRN, CHRIS Martinez    carvedilol (Coreg) tablet 6.25 mg, 6.25 mg, oral, BID, CHRIS Martinez, 6.25 mg at 01/22/25 0805    dextrose 50 % injection 12.5 g, 12.5 g, intravenous, q15 min PRN, CHRIS Martinez    dextrose 50 % injection 25 g, 25 g, intravenous, q15 min PRN, CHRIS Martinez    electrolyte-A (Plasmalyte-A) solution, 60 mL/hr, intravenous, " Continuous, ZION Martinez-CNP, Last Rate: 60 mL/hr at 01/21/25 0834, 60 mL/hr at 01/21/25 0834    fluconazole (Diflucan) tablet 400 mg, 400 mg, oral, Daily, ZION Martinez-CNP, 400 mg at 01/22/25 0805    glucagon (Glucagen) injection 1 mg, 1 mg, intramuscular, q15 min PRN, ZION Martinez-CNP    glucagon (Glucagen) injection 1 mg, 1 mg, intramuscular, q15 min PRN, ZION Martinez-CNP    [Held by provider] insulin lispro injection 0-20 Units, 0-20 Units, subcutaneous, q4h, CHRIS Martinez    insulin regular (HumuLIN, NovoLIN) bolus from bag 2-6 Units, 2-6 Units, intravenous, PRN, CHRIS Martinez, 2 Units at 01/21/25 2010    insulin regular 100 unit/100 mL (1 unit/mL) in 0.9 % NaCl infusion, 0-20 Units/hr, intravenous, Continuous, CHRIS Martinez, Last Rate: 1.5 mL/hr at 01/22/25 0704, 1.5 Units/hr at 01/22/25 0704    lidocaine 4 % patch 2 patch, 2 patch, transdermal, Daily, CHRIS Martinez, 2 patch at 01/22/25 0806    magnesium sulfate 2 g in sterile water for injection 50 mL, 2 g, intravenous, q6h PRN, ZION Martinez-CNP    magnesium sulfate 4 g in sterile water for injection 100 mL, 4 g, intravenous, q6h PRN, ZION Martinez-GABRIELE    methylPREDNISolone sod succinate (SOLU-Medrol) injection 125 mg, 125 mg, intravenous, Daily, JoanneZION Rivera-CNP, 125 mg at 01/22/25 0807    [START ON 1/24/2025] methylPREDNISolone sod succinate (SOLU-Medrol) injection 60 mg, 60 mg, intravenous, Once, Joanne MORGAN [place] GrangeW[place] paredesN-CNP    mycophenolate (Cellcept) capsule 1,000 mg, 1,000 mg, oral, q12h EDINSON, CHRIS Martinez, 1,000 mg at 01/22/25 0621    oxyCODONE (Roxicodone) immediate release tablet 10 mg, 10 mg, oral, q4h PRN, CHRIS Martinez    oxyCODONE (Roxicodone) immediate release tablet 5 mg, 5 mg, oral, q6h PRN, CHRIS Martinez    oxygen (O2) therapy, , inhalation, Continuous - Inhalation, Lew Raymond,  MD, Last Rate: 180,000 mL/hr at 01/22/25 0805, 3 L/min at 01/22/25 0805    pantoprazole (ProtoNix) EC tablet 40 mg, 40 mg, oral, BID, ZION Martinez-CNP, 40 mg at 01/22/25 0806    piperacillin-tazobactam (Zosyn) 3.375 g in dextrose (iso) IV 50 mL, 3.375 g, intravenous, q6h, Lew Raymond MD, Stopped at 01/22/25 0727    [START ON 1/25/2025] predniSONE (Deltasone) tablet 20 mg, 20 mg, oral, Daily, Joanne Loja APRN-CNP    PrismaSol BGK 2/3.5 CRRT solution, 25 mL/kg/hr, CRRT, Continuous, Lisa Murray MD, Last Rate: 3,050 mL/hr at 01/20/25 1345, 25 mL/kg/hr at 01/20/25 1345    sennosides-docusate sodium (Yoselyn-Colace) 8.6-50 mg per tablet 2 tablet, 2 tablet, oral, BID, ZION Martinez-CNP, 2 tablet at 01/22/25 0805    ursodiol (Actigall) capsule 300 mg, 300 mg, oral, q8h, CHRIS Martinez, 300 mg at 01/22/25 0621     Assessment/Plan    S/p SLK 1/20/25   DCD, caval piggyback, rCHA to dSMA/celiac stack HA, duct to duct (small to large with v plasty)  Transanimates starting to drop  Expect bili to be slow to clear  Interval liver US good  Pull perihepatic drains, keep biliary and kidney drain  Start ASA 81. Start heparin  Maint IVF. Give 500 ml albumin  Cont katerin  5 day chaudhary    Immunosuppression   Simulect 1/20 - second dose 3-4 days  Start tac 2:2  MMF 1000 BID  Steroid taper     I spent 35 minutes in the professional and overall care of this patient.      Priscila Wright MD

## 2025-01-22 NOTE — PROGRESS NOTES
SICU Staff Progress Note     Briefly, the patient is a 54 year old male with a past medical history significant for HTN, HFpEF, CKD4, and cryptogenic liver cirrhosis (hx of alcohol use, but not heavy per chart review) c/b hepatic encephalopathy and esophageal varices s/p banding in June 2024, now presenting to SICU from OR 1/20 s/p simultaneous liver and kidney transplant by Dr. Wright on 1/20.     I evaluated the patient with an advanced practice provider. I oversaw this patient's care, and I reviewed the necessary labs, vitals, and radiographic imaging.      Plan:  Neuro: AAO x 4; no focal deficits; pain well controlled; continue analgesia PRN; PT/OT and ambulate as tolerated  Cardiovascular: borderline hypertensive on arterial waveform; will continue to re-introduce home regimen as hemodynamics permit (carvedilol/amlodipine); remove HD catheter and arterial line  Respiratory: needs aggressive pulmonary toilet (increased mobility, IS, deep breathing, analgesia)  GI: NGT removed; cautiously advance to diabetic diet; repeat liver US per request of transplant surgery within acceptable limits; continue to trend LFTs daily  : urine output continues to be satisfactory; optimize preload with gentle fluid bolus; gentle maintenance today if patient cannot titrate PO fluid intake  Endo: restarted insulin infusion for hyperglycemia in the 300s. Endocrine consult today  Heme: no significant transfusion requirement post-operatively; labs daily and PRN; start SQH; continue ASA  ID:  continue Zosyn/Fluconazole for transplant protocol  Immune: continue MMF/Simulect/Steroid taper  Dispo: SICU; may be eligible for floor transfer today if able to maintain glycemic control off insulin infusion     Hoda ROSA  SICU Staff  P. [id number]     Due to the high probability of life threatening clinical decompensation, the patient required critical care time evaluating and managing this patient.  Critical care time included obtaining a history,  examining the patient, ordering and reviewing studies, discussing, developing, and implementing a management plan, evaluating the patient's response to treatment, and discussion with other care team providers. I saw and evaluated the patient myself. I reviewed the provider's documentation and discussed the patient with the provider. Critical care time was performed exclusive of billable procedures.     Critical Care Time: 32 minutes

## 2025-01-22 NOTE — CONSULTS
Inpatient consult to Endocrinology  Consult performed by: Chelsea Hernandez PA-C  Consult ordered by: Matt Quijano, APRN-CNP  Reason for consult: Steroid induced hyperglycemia after liver transplant  Assessment/Recommendations: Pt would benefit from weight-based insulin regimen with NPH timed to match glucocorticoid dose delivery time.             Reason For Consult  Steroid induced hyperglycemia after liver transplant     History Of Present Illness  Goran Ibrahim is a 54 y.o. male presenting with past medical history of decompensated MetALD (ascites, variceal bleed, HE) c/b EV s/p banding in June 2024, advanced CKD (baseline creatinine around 2.5), long standing tobacco use, duodenal ulcer, H.pylori infection (negative biopsies in June 2024), and short segment Matute's esophagus (not biopsy confirmed due to the presence of varices) who was listed for SLK. He was subsequently brought in for DCD liver (caval piggyback, rCHA to dSMA/celiac stack HA, duct to duct [small to large with v plasty]) and right kidney transplant which he underwent on 1/20/2025 with Dr. Wright. Hepatology has been consulted to co-manage him post-OLT.    Diabetes History  Type of diabetes: none  Seen by PCP or Endocrinology: PCP  Frequency of glucose checks: none needed at home  Glucose review: hyperglycemia requiring insulin gtt after transplantation up to 9.5u/hr  Frequency of Hypoglycemia:  none  Hypoglycemia unawareness: no  Severe hypoglycemia requiring assistance from others:  no    Past Medical History  He has a past medical history of Cirrhosis (Multi), CKD (chronic kidney disease), and Hypertension.    Surgical History  He has a past surgical history that includes US guided abdominal paracentesis (6/22/2023); IR angiogram celiac (5/19/2023); and US guided abdominal paracentesis (5/19/2023).     Social History  He reports that he has quit smoking. His smoking use included cigarettes. He has never used smokeless tobacco. He reports  "that he does not currently use alcohol. He reports that he does not use drugs.    Family History  No family history on file.     Allergies  Patient has no known allergies.    Review of Systems   All other systems reviewed and are negative.       Physical Exam  Constitutional:       Appearance: He is obese. He is ill-appearing.   HENT:      Head: Normocephalic and atraumatic.      Nose: Nose normal.      Mouth/Throat:      Mouth: Mucous membranes are dry.      Pharynx: Oropharynx is clear.   Eyes:      Extraocular Movements: Extraocular movements intact.      Conjunctiva/sclera: Conjunctivae normal.   Cardiovascular:      Rate and Rhythm: Normal rate and regular rhythm.      Pulses: Normal pulses.      Heart sounds: Normal heart sounds.   Pulmonary:      Effort: Pulmonary effort is normal.      Breath sounds: Normal breath sounds.   Abdominal:      General: There is distension.   Skin:     General: Skin is warm and dry.   Neurological:      General: No focal deficit present.      Mental Status: He is alert and oriented to person, place, and time. Mental status is at baseline.   Psychiatric:         Mood and Affect: Mood normal.         Behavior: Behavior normal.         Thought Content: Thought content normal.         Judgment: Judgment normal.          ROS, PMH, FH/SH, surgical history and allergies have been reviewed.    Last Recorded Vitals  Blood pressure 144/77, pulse 73, temperature 36.6 °C (97.9 °F), temperature source Temporal, resp. rate 19, height 1.778 m (5' 10\"), weight 126 kg (277 lb 5.4 oz), SpO2 94%.    Relevant Results  Results from last 7 days   Lab Units 01/22/25  1322 01/22/25  1143 01/22/25  1039 01/22/25  0907 01/22/25  0803 01/22/25  0317 01/22/25  0219 01/21/25  2117 01/21/25  1955 01/21/25  1622 01/21/25  1212 01/21/25  0740 01/21/25  0416 01/21/25  0032 01/21/25  0015   POCT GLUCOSE mg/dL 195* 175* 175* 160* 149*   < >  --    < >  --    < >  --    < >  --    < >  --    GLUCOSE mg/dL  --   --  "  --   --   --   --  170*  --  279*  --  265*  --  219*  --  184*    < > = values in this interval not displayed.     Scheduled medications  amLODIPine, 5 mg, oral, Daily  aspirin, 81 mg, oral, Daily  [START ON 1/24/2025] basiliximab, 20 mg, intravenous, Once  carvedilol, 6.25 mg, oral, BID  fluconazole, 400 mg, oral, Daily  heparin (porcine), 5,000 Units, subcutaneous, q8h  insulin lispro, 0-10 Units, subcutaneous, q4h  insulin NPH (Isophane), 10 Units, subcutaneous, q24h EDINSON  lidocaine, 2 patch, transdermal, Daily  methylPREDNISolone sodium succinate (PF), 125 mg, intravenous, Daily  [START ON 1/24/2025] methylPREDNISolone sodium succinate (PF), 60 mg, intravenous, Once  mycophenolate, 1,000 mg, oral, q12h EDINSON  oxygen, , inhalation, Continuous - Inhalation  pantoprazole, 40 mg, oral, BID  piperacillin-tazobactam, 3.375 g, intravenous, q6h  [START ON 1/25/2025] predniSONE, 20 mg, oral, Daily  sennosides-docusate sodium, 2 tablet, oral, BID  tacrolimus, 2 mg, oral, q12h EDINSON  ursodiol, 300 mg, oral, q8h      Continuous medications  electrolyte-A, 60 mL/hr, Last Rate: 60 mL/hr (01/22/25 1315)  PrismaSol BGK 2/3.5, 25 mL/kg/hr, Last Rate: 25 mL/kg/hr (01/20/25 1345)      PRN medications  PRN medications: calcium gluconate, calcium gluconate, dextrose, dextrose, glucagon, glucagon, magnesium sulfate, magnesium sulfate, oxyCODONE, oxyCODONE       Assessment/Plan   Assessment & Plan  Hepatic cirrhosis, unspecified hepatic cirrhosis type, unspecified whether ascites present (Multi)    ESRD (end stage renal disease) (Multi)    Steroid-induced hyperglycemia       PLAN  Steroids:  methylpred 500mg intra-op  methylpred 250 mg x1d  methylpred 125mg x1d  methylpred 125mg x1d  methylpred 60mg x1d  pred 20mg ongoing    Nutrition: PO  75g CHO/meal    - start NPH 10u        If NPO: continue same    - start lispro corrective scale #2 q4h if NPO or if persistently hyperglycemic   = 0u  151-200 = 2u  201-250 = 4u  251-300 =  6u  301-350 = 8u  351-400 = 10u    -Accuchecks (not BMP) q4hr  - Goal -180  -Hypoglycemia protocol  -Will continue to follow and titrate insulin accordingly     Discharge planning:   [] patient may expect to discharge home on NPH and lispro with CGM, final doses TBD by titration  []will provide CGM sample prior to discharge   []will enroll pt in  pharmacy platinum plan program  []follow up with Chelsea Hernandez PA-C in PTDM clinic       I spent 80 minutes in the professional and overall care of this patient.      Chelsea Hernandez PA-C

## 2025-01-23 LAB
ALBUMIN SERPL BCP-MCNC: 3.3 G/DL (ref 3.4–5)
ALP SERPL-CCNC: 170 U/L (ref 33–120)
ALT SERPL W P-5'-P-CCNC: 525 U/L (ref 10–52)
ANION GAP SERPL CALC-SCNC: 19 MMOL/L (ref 10–20)
AST SERPL W P-5'-P-CCNC: 309 U/L (ref 9–39)
BILIRUB DIRECT SERPL-MCNC: 2.6 MG/DL (ref 0–0.3)
BILIRUB SERPL-MCNC: 3.7 MG/DL (ref 0–1.2)
BLOOD EXPIRATION DATE: NORMAL
BUN SERPL-MCNC: 91 MG/DL (ref 6–23)
CA-I BLD-SCNC: 1.09 MMOL/L (ref 1.1–1.33)
CALCIUM SERPL-MCNC: 8.3 MG/DL (ref 8.6–10.6)
CHLORIDE SERPL-SCNC: 97 MMOL/L (ref 98–107)
CO2 SERPL-SCNC: 24 MMOL/L (ref 21–32)
CREAT SERPL-MCNC: 3.1 MG/DL (ref 0.5–1.3)
DISPENSE STATUS: NORMAL
EGFRCR SERPLBLD CKD-EPI 2021: 23 ML/MIN/1.73M*2
ERYTHROCYTE [DISTWIDTH] IN BLOOD BY AUTOMATED COUNT: 16.4 % (ref 11.5–14.5)
GGT SERPL-CCNC: 411 U/L (ref 5–64)
GLUCOSE BLD MANUAL STRIP-MCNC: 191 MG/DL (ref 74–99)
GLUCOSE BLD MANUAL STRIP-MCNC: 202 MG/DL (ref 74–99)
GLUCOSE BLD MANUAL STRIP-MCNC: 203 MG/DL (ref 74–99)
GLUCOSE BLD MANUAL STRIP-MCNC: 259 MG/DL (ref 74–99)
GLUCOSE BLD MANUAL STRIP-MCNC: 273 MG/DL (ref 74–99)
GLUCOSE BLD MANUAL STRIP-MCNC: 300 MG/DL (ref 74–99)
GLUCOSE SERPL-MCNC: 219 MG/DL (ref 74–99)
HCT VFR BLD AUTO: 25.1 % (ref 41–52)
HGB BLD-MCNC: 8.7 G/DL (ref 13.5–17.5)
MAGNESIUM SERPL-MCNC: 2.47 MG/DL (ref 1.6–2.4)
MCH RBC QN AUTO: 29.3 PG (ref 26–34)
MCHC RBC AUTO-ENTMCNC: 34.7 G/DL (ref 32–36)
MCV RBC AUTO: 85 FL (ref 80–100)
NRBC BLD-RTO: 0 /100 WBCS (ref 0–0)
PHOSPHATE SERPL-MCNC: 7.1 MG/DL (ref 2.5–4.9)
PLATELET # BLD AUTO: 73 X10*3/UL (ref 150–450)
POTASSIUM SERPL-SCNC: 3.9 MMOL/L (ref 3.5–5.3)
PRODUCT BLOOD TYPE: 5100
PRODUCT CODE: NORMAL
PROT SERPL-MCNC: 5.4 G/DL (ref 6.4–8.2)
RBC # BLD AUTO: 2.97 X10*6/UL (ref 4.5–5.9)
SODIUM SERPL-SCNC: 136 MMOL/L (ref 136–145)
TACROLIMUS BLD-MCNC: 2 NG/ML
UNIT ABO: NORMAL
UNIT NUMBER: NORMAL
UNIT RH: NORMAL
UNIT VOLUME: 284
UNIT VOLUME: 292
UNIT VOLUME: 350
WBC # BLD AUTO: 6 X10*3/UL (ref 4.4–11.3)
XM INTEP: NORMAL

## 2025-01-23 PROCEDURE — RXMED WILLOW AMBULATORY MEDICATION CHARGE

## 2025-01-23 PROCEDURE — 2500000001 HC RX 250 WO HCPCS SELF ADMINISTERED DRUGS (ALT 637 FOR MEDICARE OP): Mod: SE

## 2025-01-23 PROCEDURE — 2500000005 HC RX 250 GENERAL PHARMACY W/O HCPCS: Mod: SE

## 2025-01-23 PROCEDURE — 2500000004 HC RX 250 GENERAL PHARMACY W/ HCPCS (ALT 636 FOR OP/ED): Mod: SE

## 2025-01-23 PROCEDURE — 36415 COLL VENOUS BLD VENIPUNCTURE: CPT

## 2025-01-23 PROCEDURE — 84075 ASSAY ALKALINE PHOSPHATASE: CPT

## 2025-01-23 PROCEDURE — 2500000004 HC RX 250 GENERAL PHARMACY W/ HCPCS (ALT 636 FOR OP/ED): Mod: JZ,SE,TB

## 2025-01-23 PROCEDURE — 85027 COMPLETE CBC AUTOMATED: CPT

## 2025-01-23 PROCEDURE — 99233 SBSQ HOSP IP/OBS HIGH 50: CPT | Performed by: INTERNAL MEDICINE

## 2025-01-23 PROCEDURE — P9047 ALBUMIN (HUMAN), 25%, 50ML: HCPCS | Mod: JZ,SE,TB

## 2025-01-23 PROCEDURE — 1100000001 HC PRIVATE ROOM DAILY

## 2025-01-23 PROCEDURE — 82977 ASSAY OF GGT: CPT

## 2025-01-23 PROCEDURE — 2500000002 HC RX 250 W HCPCS SELF ADMINISTERED DRUGS (ALT 637 FOR MEDICARE OP, ALT 636 FOR OP/ED): Mod: SE

## 2025-01-23 PROCEDURE — 99232 SBSQ HOSP IP/OBS MODERATE 35: CPT | Performed by: STUDENT IN AN ORGANIZED HEALTH CARE EDUCATION/TRAINING PROGRAM

## 2025-01-23 PROCEDURE — 83735 ASSAY OF MAGNESIUM: CPT

## 2025-01-23 PROCEDURE — 82947 ASSAY GLUCOSE BLOOD QUANT: CPT

## 2025-01-23 PROCEDURE — 84100 ASSAY OF PHOSPHORUS: CPT

## 2025-01-23 PROCEDURE — 80197 ASSAY OF TACROLIMUS: CPT

## 2025-01-23 PROCEDURE — 80048 BASIC METABOLIC PNL TOTAL CA: CPT

## 2025-01-23 PROCEDURE — 82330 ASSAY OF CALCIUM: CPT

## 2025-01-23 PROCEDURE — 99233 SBSQ HOSP IP/OBS HIGH 50: CPT | Performed by: PHYSICIAN ASSISTANT

## 2025-01-23 PROCEDURE — 2500000002 HC RX 250 W HCPCS SELF ADMINISTERED DRUGS (ALT 637 FOR MEDICARE OP, ALT 636 FOR OP/ED): Mod: SE | Performed by: PHYSICIAN ASSISTANT

## 2025-01-23 RX ORDER — ACYCLOVIR 400 MG/1
400 TABLET ORAL 2 TIMES DAILY
Qty: 60 TABLET | Refills: 0 | Status: SHIPPED | OUTPATIENT
Start: 2025-01-23 | End: 2025-02-23

## 2025-01-23 RX ORDER — DOCUSATE SODIUM 100 MG/1
100 CAPSULE, LIQUID FILLED ORAL 2 TIMES DAILY PRN
Qty: 20 CAPSULE | Refills: 0 | Status: SHIPPED | OUTPATIENT
Start: 2025-01-23 | End: 2025-02-03

## 2025-01-23 RX ORDER — PANTOPRAZOLE SODIUM 40 MG/1
40 TABLET, DELAYED RELEASE ORAL
Qty: 30 TABLET | Refills: 0 | Status: SHIPPED | OUTPATIENT
Start: 2025-01-23 | End: 2025-02-23

## 2025-01-23 RX ORDER — TACROLIMUS 1 MG/1
3 CAPSULE ORAL
Qty: 180 CAPSULE | Refills: 0 | Status: SHIPPED | OUTPATIENT
Start: 2025-01-23 | End: 2025-01-24

## 2025-01-23 RX ORDER — INSULIN LISPRO 100 [IU]/ML
0-10 INJECTION, SOLUTION INTRAVENOUS; SUBCUTANEOUS EVERY 6 HOURS SCHEDULED
Status: DISCONTINUED | OUTPATIENT
Start: 2025-01-23 | End: 2025-01-26

## 2025-01-23 RX ORDER — URSODIOL 300 MG/1
300 CAPSULE ORAL 3 TIMES DAILY
Qty: 90 CAPSULE | Refills: 0 | Status: SHIPPED | OUTPATIENT
Start: 2025-01-23 | End: 2025-02-23

## 2025-01-23 RX ORDER — FLUCONAZOLE 200 MG/1
400 TABLET ORAL DAILY
Qty: 60 TABLET | Refills: 0 | Status: SHIPPED | OUTPATIENT
Start: 2025-01-24 | End: 2025-02-23

## 2025-01-23 RX ORDER — MYCOPHENOLATE MOFETIL 250 MG/1
1000 CAPSULE ORAL
Qty: 240 CAPSULE | Refills: 0 | Status: SHIPPED | OUTPATIENT
Start: 2025-01-23 | End: 2025-02-23

## 2025-01-23 RX ORDER — PREDNISONE 5 MG/1
20 TABLET ORAL DAILY
Qty: 120 TABLET | Refills: 0 | Status: SHIPPED | OUTPATIENT
Start: 2025-01-25 | End: 2025-02-24

## 2025-01-23 RX ORDER — SULFAMETHOXAZOLE AND TRIMETHOPRIM 400; 80 MG/1; MG/1
80 TABLET ORAL DAILY
Status: DISCONTINUED | OUTPATIENT
Start: 2025-01-23 | End: 2025-01-28 | Stop reason: HOSPADM

## 2025-01-23 RX ORDER — SULFAMETHOXAZOLE AND TRIMETHOPRIM 400; 80 MG/1; MG/1
1 TABLET ORAL DAILY
Qty: 30 TABLET | Refills: 0 | Status: SHIPPED | OUTPATIENT
Start: 2025-01-23 | End: 2025-02-23

## 2025-01-23 RX ORDER — ALBUMIN HUMAN 250 G/1000ML
25 SOLUTION INTRAVENOUS EVERY 8 HOURS
Status: COMPLETED | OUTPATIENT
Start: 2025-01-23 | End: 2025-01-25

## 2025-01-23 RX ORDER — CALCIUM GLUCONATE 20 MG/ML
1 INJECTION, SOLUTION INTRAVENOUS ONCE
Status: COMPLETED | OUTPATIENT
Start: 2025-01-23 | End: 2025-01-23

## 2025-01-23 RX ORDER — ASPIRIN 81 MG/1
81 TABLET ORAL DAILY
Qty: 30 TABLET | Refills: 0 | Status: SHIPPED | OUTPATIENT
Start: 2025-01-24 | End: 2025-02-23

## 2025-01-23 RX ORDER — POLYETHYLENE GLYCOL 3350 17 G/17G
17 POWDER, FOR SOLUTION ORAL DAILY
Qty: 5 PACKET | Refills: 0 | Status: SHIPPED | OUTPATIENT
Start: 2025-01-23 | End: 2025-01-29

## 2025-01-23 RX ORDER — TRAMADOL HYDROCHLORIDE 50 MG/1
50 TABLET ORAL EVERY 6 HOURS PRN
Qty: 15 TABLET | Refills: 0 | Status: SHIPPED | OUTPATIENT
Start: 2025-01-23 | End: 2025-01-29

## 2025-01-23 RX ORDER — ACETAMINOPHEN 325 MG/1
650 TABLET ORAL EVERY 6 HOURS PRN
Qty: 30 TABLET | Refills: 0 | Status: SHIPPED | OUTPATIENT
Start: 2025-01-23 | End: 2025-02-02

## 2025-01-23 RX ORDER — TACROLIMUS 0.5 MG/1
0.5 CAPSULE ORAL 2 TIMES DAILY
Qty: 60 CAPSULE | Refills: 0 | Status: SHIPPED | OUTPATIENT
Start: 2025-01-23 | End: 2025-01-24 | Stop reason: HOSPADM

## 2025-01-23 RX ORDER — ACYCLOVIR 400 MG/1
400 TABLET ORAL 2 TIMES DAILY
Status: DISCONTINUED | OUTPATIENT
Start: 2025-01-23 | End: 2025-01-28 | Stop reason: HOSPADM

## 2025-01-23 RX ADMIN — CARVEDILOL 6.25 MG: 6.25 TABLET, FILM COATED ORAL at 09:38

## 2025-01-23 RX ADMIN — TACROLIMUS 2 MG: 1 CAPSULE ORAL at 17:57

## 2025-01-23 RX ADMIN — PANTOPRAZOLE SODIUM 40 MG: 40 TABLET, DELAYED RELEASE ORAL at 09:39

## 2025-01-23 RX ADMIN — URSODIOL 300 MG: 300 CAPSULE ORAL at 20:31

## 2025-01-23 RX ADMIN — PANTOPRAZOLE SODIUM 40 MG: 40 TABLET, DELAYED RELEASE ORAL at 20:13

## 2025-01-23 RX ADMIN — METHYLPREDNISOLONE SODIUM SUCCINATE 125 MG: 125 INJECTION, POWDER, FOR SOLUTION INTRAMUSCULAR; INTRAVENOUS at 09:39

## 2025-01-23 RX ADMIN — LIDOCAINE 4% 2 PATCH: 40 PATCH TOPICAL at 09:40

## 2025-01-23 RX ADMIN — HEPARIN SODIUM 5000 UNITS: 5000 INJECTION INTRAVENOUS; SUBCUTANEOUS at 00:33

## 2025-01-23 RX ADMIN — ALBUMIN HUMAN 25 G: 0.25 SOLUTION INTRAVENOUS at 20:12

## 2025-01-23 RX ADMIN — SENNOSIDES AND DOCUSATE SODIUM 2 TABLET: 50; 8.6 TABLET ORAL at 20:13

## 2025-01-23 RX ADMIN — ALBUMIN HUMAN 25 G: 0.25 SOLUTION INTRAVENOUS at 11:39

## 2025-01-23 RX ADMIN — INSULIN LISPRO 6 UNITS: 100 INJECTION, SOLUTION INTRAVENOUS; SUBCUTANEOUS at 18:53

## 2025-01-23 RX ADMIN — INSULIN LISPRO 2 UNITS: 100 INJECTION, SOLUTION INTRAVENOUS; SUBCUTANEOUS at 12:07

## 2025-01-23 RX ADMIN — SENNOSIDES AND DOCUSATE SODIUM 2 TABLET: 50; 8.6 TABLET ORAL at 09:38

## 2025-01-23 RX ADMIN — ACYCLOVIR 400 MG: 400 TABLET ORAL at 11:39

## 2025-01-23 RX ADMIN — SODIUM CHLORIDE, SODIUM GLUCONATE, SODIUM ACETATE, POTASSIUM CHLORIDE AND MAGNESIUM CHLORIDE 60 ML/HR: 526; 502; 368; 37; 30 INJECTION, SOLUTION INTRAVENOUS at 04:53

## 2025-01-23 RX ADMIN — PIPERACILLIN SODIUM AND TAZOBACTAM SODIUM 3.38 G: 3; .375 INJECTION, SOLUTION INTRAVENOUS at 17:57

## 2025-01-23 RX ADMIN — CALCIUM GLUCONATE 1 G: 20 INJECTION, SOLUTION INTRAVENOUS at 09:39

## 2025-01-23 RX ADMIN — MYCOPHENOLATE MOFETIL 1000 MG: 250 CAPSULE ORAL at 06:22

## 2025-01-23 RX ADMIN — ASPIRIN 81 MG: 81 TABLET, COATED ORAL at 09:38

## 2025-01-23 RX ADMIN — HEPARIN SODIUM 5000 UNITS: 5000 INJECTION INTRAVENOUS; SUBCUTANEOUS at 09:39

## 2025-01-23 RX ADMIN — TACROLIMUS 2 MG: 1 CAPSULE ORAL at 06:22

## 2025-01-23 RX ADMIN — AMLODIPINE BESYLATE 5 MG: 5 TABLET ORAL at 09:38

## 2025-01-23 RX ADMIN — HEPARIN SODIUM 5000 UNITS: 5000 INJECTION INTRAVENOUS; SUBCUTANEOUS at 16:53

## 2025-01-23 RX ADMIN — INSULIN LISPRO 4 UNITS: 100 INJECTION, SOLUTION INTRAVENOUS; SUBCUTANEOUS at 04:52

## 2025-01-23 RX ADMIN — INSULIN LISPRO 6 UNITS: 100 INJECTION, SOLUTION INTRAVENOUS; SUBCUTANEOUS at 00:31

## 2025-01-23 RX ADMIN — MYCOPHENOLATE MOFETIL 1000 MG: 250 CAPSULE ORAL at 17:57

## 2025-01-23 RX ADMIN — PIPERACILLIN SODIUM AND TAZOBACTAM SODIUM 3.38 G: 3; .375 INJECTION, SOLUTION INTRAVENOUS at 12:55

## 2025-01-23 RX ADMIN — URSODIOL 300 MG: 300 CAPSULE ORAL at 12:35

## 2025-01-23 RX ADMIN — SULFAMETHOXAZOLE AND TRIMETHOPRIM 80 MG OF TRIMETHOPRIM: 400; 80 TABLET ORAL at 16:53

## 2025-01-23 RX ADMIN — URSODIOL 300 MG: 300 CAPSULE ORAL at 06:22

## 2025-01-23 RX ADMIN — FLUCONAZOLE 400 MG: 200 TABLET ORAL at 09:39

## 2025-01-23 RX ADMIN — PIPERACILLIN SODIUM AND TAZOBACTAM SODIUM 3.38 G: 3; .375 INJECTION, SOLUTION INTRAVENOUS at 00:28

## 2025-01-23 RX ADMIN — ACYCLOVIR 400 MG: 400 TABLET ORAL at 20:13

## 2025-01-23 RX ADMIN — CARVEDILOL 6.25 MG: 6.25 TABLET, FILM COATED ORAL at 20:13

## 2025-01-23 ASSESSMENT — COGNITIVE AND FUNCTIONAL STATUS - GENERAL
STANDING UP FROM CHAIR USING ARMS: A LITTLE
WALKING IN HOSPITAL ROOM: A LITTLE
MOVING TO AND FROM BED TO CHAIR: A LITTLE
MOBILITY SCORE: 20
MOBILITY SCORE: 20
DRESSING REGULAR LOWER BODY CLOTHING: A LITTLE
DRESSING REGULAR LOWER BODY CLOTHING: A LITTLE
CLIMB 3 TO 5 STEPS WITH RAILING: A LITTLE
DAILY ACTIVITIY SCORE: 23
MOVING TO AND FROM BED TO CHAIR: A LITTLE
CLIMB 3 TO 5 STEPS WITH RAILING: A LITTLE
WALKING IN HOSPITAL ROOM: A LITTLE
DAILY ACTIVITIY SCORE: 23
STANDING UP FROM CHAIR USING ARMS: A LITTLE

## 2025-01-23 ASSESSMENT — ENCOUNTER SYMPTOMS
ROS GI COMMENTS: APPROPRIATE TO PROCEDURE
ABDOMINAL DISTENTION: 1

## 2025-01-23 ASSESSMENT — PAIN - FUNCTIONAL ASSESSMENT
PAIN_FUNCTIONAL_ASSESSMENT: 0-10
PAIN_FUNCTIONAL_ASSESSMENT: 0-10

## 2025-01-23 ASSESSMENT — ACTIVITIES OF DAILY LIVING (ADL): LACK_OF_TRANSPORTATION: NO

## 2025-01-23 ASSESSMENT — PAIN SCALES - GENERAL
PAINLEVEL_OUTOF10: 0 - NO PAIN
PAINLEVEL_OUTOF10: 0 - NO PAIN

## 2025-01-23 NOTE — SIGNIFICANT EVENT
01/21/25 1130   Patient Interaction   Organ Liver   Type of Interaction Phone conference   Interdisciplinary Rounds   Attendance Surgeon;Physician;CHERYL;Coordinator;Pharmacist   Topics Discussed Medications;Blood test results;Activity;Imaging/test results     Transplant Surgery Multidisciplinary Team Note    Goran Ibrahim is a 54 y.o. male   POD# 3 from a Kidney and Liver from a DCD donor. His post operative complications: None    24 Hour Events  1. No acute events    Last Recorded Vitals  Visit Vitals  /87 (BP Location: Left arm, Patient Position: Lying)   Pulse 67   Temp 36.6 °C (97.9 °F) (Temporal)   Resp 16      Intake/Output last 3 Shifts:    Intake/Output Summary (Last 24 hours) at 1/23/2025 1124  Last data filed at 1/23/2025 1047  Gross per 24 hour   Intake 564 ml   Output 4935 ml   Net -4371 ml      Vitals:    01/23/25 1118   Weight: 129 kg (284 lb 1.6 oz)        Assessment/Plan   Principal Problem:    S/P SLK  -High volume urine output--> 4.5L  -concentrated albumin  -start acyclovir    Hyperglycemia  -appreciate endocrine recs  -DM educator      Management after organ transplant  Active Problems:  Patient Active Problem List   Diagnosis    Preop cardiovascular exam    Hypertension    Liver cirrhosis (Multi)    Pre-kidney transplant, listed    Pre-liver transplant, listed    Esophageal varices in alcoholic cirrhosis (Multi)    Hepatic cirrhosis, unspecified hepatic cirrhosis type, unspecified whether ascites present (Multi)    ESRD (end stage renal disease) (Multi)    Steroid-induced hyperglycemia        Immunosuppression reviewed and adjusted       Induction: Simulect       Tacrolimus goal 8-10 ng/mL. Current dose  2  mg BID, starting tonight         MMF 1000 mg PO BID       Solumedrol taper  DVT prophylaxis SCDS  PT/OT  Diet: 75 g diabetic diet  Anticipated discharge: Home Monday    Seema Matthews, APRN-CNP

## 2025-01-23 NOTE — PROGRESS NOTES
TCC met with patient at bedside to discuss discharge planning. Pharmacy- [facility], DME- 0. For medical appts, family or insurance provided transportation. Does patient feel safe- Yes. Current discharge recommmendations- Home care. Patient is agreeable to [facility] if area is servicable. TCC will follow for discharge planning.     01/23/25 1144   Discharge Planning   Living Arrangements Family members   Support Systems Family members   Assistance Needed Home Care   Type of Residence Private residence   Number of Stairs to Enter Residence 3   Number of Stairs Within Residence 13   Do you have animals or pets at home? No   Who is requesting discharge planning? Provider   Home or Post Acute Services None   Expected Discharge Disposition Home H   Does the patient need discharge transport arranged? Yes   RoundTrip coordination needed? Yes   Has discharge transport been arranged? No   Financial Resource Strain   How hard is it for you to pay for the very basics like food, housing, medical care, and heating? Not hard   Housing Stability   In the last 12 months, was there a time when you were not able to pay the mortgage or rent on time? N   At any time in the past 12 months, were you homeless or living in a shelter (including now)? N   Transportation Needs   In the past 12 months, has lack of transportation kept you from medical appointments or from getting medications? no   In the past 12 months, has lack of transportation kept you from meetings, work, or from getting things needed for daily living? No   Stroke Family Assessment   Stroke Family Assessment Needed No   Intensity of Service   Intensity of Service 0-30 min       ETIENNE Dodge, RN  [facility], [address]  Transitional Care Coordinator, Mon-Fri  Cell: [phone], Office: [phone]  Email: [email or web address]

## 2025-01-23 NOTE — CARE PLAN
The patient's goals for the shift include Pt wants to go to surgury    The clinical goals for the shift include Patient will remain HDS during shift    Problem: Pain - Adult  Goal: Verbalizes/displays adequate comfort level or baseline comfort level  Outcome: Progressing     Problem: Safety - Adult  Goal: Free from fall injury  Outcome: Progressing     Problem: Fall/Injury  Goal: Not fall by end of shift  Outcome: Progressing     Problem: Fall/Injury  Goal: Be free from injury by end of the shift  Outcome: Progressing     Problem: Fall/Injury  Goal: Verbalize understanding of personal risk factors for fall in the hospital  Outcome: Progressing     Problem: Knowledge Deficit  Goal: Patient/family/caregiver demonstrates understanding of disease process, treatment plan, medications, and discharge instructions  Outcome: Progressing     Problem: Skin  Goal: Prevent/minimize sheer/friction injuries  Outcome: Progressing     Problem: Skin  Goal: Promote/optimize nutrition  Outcome: Progressing     Problem: Skin  Goal: Promote skin healing  Outcome: Progressing

## 2025-01-23 NOTE — CARE PLAN
The patient's goals for the shift include Pt wants to go to surgury    The clinical goals for the shift include Patient will remain HDS      Problem: Pain - Adult  Goal: Verbalizes/displays adequate comfort level or baseline comfort level  Outcome: Progressing     Problem: Safety - Adult  Goal: Free from fall injury  Outcome: Progressing     Problem: Discharge Planning  Goal: Discharge to home or other facility with appropriate resources  Outcome: Progressing     Problem: Chronic Conditions and Co-morbidities  Goal: Patient's chronic conditions and co-morbidity symptoms are monitored and maintained or improved  Outcome: Progressing     Problem: Fall/Injury  Goal: Not fall by end of shift  Outcome: Progressing  Goal: Be free from injury by end of the shift  Outcome: Progressing  Goal: Verbalize understanding of personal risk factors for fall in the hospital  Outcome: Progressing  Goal: Verbalize understanding of risk factor reduction measures to prevent injury from fall in the home  Outcome: Progressing  Goal: Use assistive devices by end of the shift  Outcome: Progressing  Goal: Pace activities to prevent fatigue by end of the shift  Outcome: Progressing     Problem: Knowledge Deficit  Goal: Patient/family/caregiver demonstrates understanding of disease process, treatment plan, medications, and discharge instructions  Outcome: Progressing     Problem: Skin  Goal: Decreased wound size/increased tissue granulation at next dressing change  Outcome: Progressing  Goal: Participates in plan/prevention/treatment measures  Outcome: Progressing  Goal: Prevent/manage excess moisture  Outcome: Progressing  Goal: Prevent/minimize sheer/friction injuries  Outcome: Progressing  Goal: Promote/optimize nutrition  Outcome: Progressing  Goal: Promote skin healing  Outcome: Progressing     Problem: Pain  Goal: Takes deep breaths with improved pain control throughout the shift  Outcome: Progressing  Goal: Turns in bed with improved  pain control throughout the shift  Outcome: Progressing  Goal: Walks with improved pain control throughout the shift  Outcome: Progressing  Goal: Performs ADL's with improved pain control throughout shift  Outcome: Progressing  Goal: Participates in PT with improved pain control throughout the shift  Outcome: Progressing  Goal: Free from opioid side effects throughout the shift  Outcome: Progressing  Goal: Free from acute confusion related to pain meds throughout the shift  Outcome: Progressing

## 2025-01-23 NOTE — PROGRESS NOTES
"Goran Ibrahim is a 54 y.o. male now POD # 3 s/p SLK transplant.    Subjective   Doing well. To floor       Objective   Physical Exam  Gen: A+OX3; NAD  Abd: S/NT/ND. Incisions C/D/I.  Drains: Serosanguinous    Ext: trace LE edema    Last Recorded Vitals  Blood pressure 159/87, pulse 67, temperature 36.6 °C (97.9 °F), temperature source Temporal, resp. rate 16, height 1.778 m (5' 10\"), weight 126 kg (277 lb 5.4 oz), SpO2 96%.  Intake/Output last 3 Shifts:  I/O last 3 completed shifts:  In: 2289.5 (18.2 mL/kg) [I.V.:105.5 (0.8 mL/kg); IV Piggyback:2184]  Out: 6120 (48.6 mL/kg) [Urine:5420 (1.2 mL/kg/hr); Drains:700]  Weight: 125.8 kg      Lab Results   Component Value Date    CREATININE 3.10 (H) 01/23/2025    K 3.9 01/23/2025    GLUCOSE 219 (H) 01/23/2025    HGB 8.7 (L) 01/23/2025    WBC 6.0 01/23/2025    PLT 73 (L) 01/23/2025    CALCIUM 8.3 (L) 01/23/2025         Current Facility-Administered Medications:     albumin human 25 % solution 25 g, 25 g, intravenous, q8h, Seema Matthews, APRN-CNP    amLODIPine (Norvasc) tablet 5 mg, 5 mg, oral, Daily, Joanne L [place], APRN-CNP, 5 mg at 01/23/25 0938    aspirin EC tablet 81 mg, 81 mg, oral, Daily, Joanne L Purvi, APRN-CNP, 81 mg at 01/23/25 0938    [START ON 1/24/2025] basiliximab (Simulect) 20 mg in sodium chloride 0.9% 50 mL IV, 20 mg, intravenous, Once, Joanne L Purvi, APRN-CNP    carvedilol (Coreg) tablet 6.25 mg, 6.25 mg, oral, BID, Joanne L Purvi, APRN-CNP, 6.25 mg at 01/23/25 0938    dextrose 50 % injection 12.5 g, 12.5 g, intravenous, q15 min PRN, Joanne L Purvi, APRN-CNP, 12.5 g at 01/22/25 2107    dextrose 50 % injection 25 g, 25 g, intravenous, q15 min PRN, Joanne L Purvi, APRN-CNP    electrolyte-A (Plasmalyte-A) solution, 60 mL/hr, intravenous, Continuous, Joanne L Purvi, APRN-CNP, Last Rate: 60 mL/hr at 01/23/25 0453, 60 mL/hr at 01/23/25 0453    fluconazole (Diflucan) tablet 400 mg, 400 mg, oral, Daily, Joanne L Purvi, APRN-CNP, 400 mg at 01/23/25 0939    glucagon (Glucagen) " injection 1 mg, 1 mg, intramuscular, q15 min PRN, Joanne L [place], APRN-CNP    heparin (porcine) injection 5,000 Units, 5,000 Units, subcutaneous, q8h, Joanne L [place], APRN-CNP, 5,000 Units at 01/23/25 0939    insulin lispro injection 0-10 Units, 0-10 Units, subcutaneous, q6h EDINSON, Chelsea MORGAN Hernandez PA-C    insulin NPH (Isophane) (HumuLIN N,NovoLIN N) injection 8 Units, 8 Units, subcutaneous, q AM, Chelsea Hernandez, PA-C    lidocaine 4 % patch 2 patch, 2 patch, transdermal, Daily, Joanne L Purvi, APRN-CNP, 2 patch at 01/23/25 0940    [START ON 1/24/2025] methylPREDNISolone sod succinate (SOLU-Medrol) injection 60 mg, 60 mg, intravenous, Once, Joanne L Purvi, APRN-CNP    mycophenolate (Cellcept) capsule 1,000 mg, 1,000 mg, oral, q12h EDINSON, Joanne L [place], APRN-CNP, 1,000 mg at 01/23/25 0622    oxyCODONE (Roxicodone) immediate release tablet 10 mg, 10 mg, oral, q4h PRN, Joanne L Purvi, APRN-CNP    oxyCODONE (Roxicodone) immediate release tablet 5 mg, 5 mg, oral, q6h PRN, Joanne L [place], APRN-CNP    pantoprazole (ProtoNix) EC tablet 40 mg, 40 mg, oral, BID, Joanne L Purvi, APRN-CNP, 40 mg at 01/23/25 0939    piperacillin-tazobactam (Zosyn) 3.375 g in dextrose (iso) IV 50 mL, 3.375 g, intravenous, q6h, Joanne L Purvi, APRN-CNP, Stopped at 01/23/25 0058    [START ON 1/25/2025] predniSONE (Deltasone) tablet 20 mg, 20 mg, oral, Daily, Joanne L Purvi, APRN-CNP    sennosides-docusate sodium (Yoselyn-Colace) 8.6-50 mg per tablet 2 tablet, 2 tablet, oral, BID, Joanne L [place], APRN-CNP, 2 tablet at 01/23/25 0938    tacrolimus (Prograf) capsule 2 mg, 2 mg, oral, q12h EDINSON, Joanne L Purvi, APRN-CNP, 2 mg at 01/23/25 0622    ursodiol (Actigall) capsule 300 mg, 300 mg, oral, q8h, Joanne L [place], APRN-CNP, 300 mg at 01/23/25 0622     Assessment/Plan    S/p SLK 1/20/25   DCD, caval piggyback, rCHA to dSMA/celiac stack HA, duct to duct (small to large with v plasty)  Transanimates down trending  Expect bili to be slow to clear  Interval liver US good   keep  biliary and kidney drain  Start ASA 81. Start heparin  DC IVF. Start [albumin]  Cont katerin  5 day chaudhary  PT/OT    Immunosuppression   Simulect 1/20 - second dose 3-4 days  Start tac 2:2  MMF 1000 BID  Steroid taper     I spent 35 minutes in the professional and overall care of this patient.      Priscila Wright MD

## 2025-01-23 NOTE — CONSULTS
"Nutrition Initial Assessment:   Nutrition Assessment    Reason for Assessment: Dietitian discretion (s/p transplant)    Patient is a 54 y.o. male presenting POD #3 LKT from a DCD donor.     Nutrition History:  Food and Nutrient History: Met with pt and his brother this afternoon at bedside. Pt says he has had an okay appetite. He has not been feeling the urge to eat, but says it is because he is in a bed doing nothing. He says prior to admission he was eating well with three meals a day. Toast and peanut butter or jelly for breakfast. Lunch is usually left overs from the night before or something quick. Dinner is a \"normal dinner.\" He drinks juice, water, and milk.  He has tried ONS in the past but does not like the taste. He is agreeable in Uncrustable daily to provide additional kcal and protein.       Anthropometrics:  Height: 177.8 cm (5' 10\")   Weight: 129 kg (284 lb 1.6 oz)   BMI (Calculated): 40.76  IBW/kg (Dietitian Calculated): 75.5 kg  Percent of IBW: 171 %       Weight History:   Wt Readings from Last 10 Encounters:   01/23/25 129 kg (284 lb 1.6 oz)   01/09/25 121 kg (267 lb)   12/03/24 124 kg (274 lb 1.6 oz)   12/03/24 124 kg (274 lb 1.6 oz)   11/08/24 125 kg (275 lb 11.2 oz)   11/01/24 120 kg (265 lb)   10/23/24 124 kg (274 lb)   09/17/24 120 kg (264 lb 3.2 oz)   06/25/24 121 kg (266 lb 14.4 oz)   06/07/24 113 kg (250 lb)       Weight Change %:  Weight History / % Weight Change: Pt says he has not had any noticeable weight loss. Reports his UBW is arond 250lbs. Current weight is up ~ 5-9kg compared to his recent weights over the last 7 months.  Significant Weight Loss: No    Nutrition Focused Physical Exam Findings:    Subcutaneous Fat Loss:   Orbital Fat Pads: Mild-Moderate (slight dark circles and slight hollowing)  Buccal Fat Pads: Mild-Moderate (flat cheeks, minimal bounce)  Triceps: Mild-Moderate (less than ample fat tissue)  Muscle Wasting:  Temporalis: Mild-Moderate (slight " "depression)  Pectoralis (Clavicular Region): Well nourished (clavicle not visible)  Deltoid/Trapezius: Well nourished (rounded appearance at arm, shoulder, neck)  Quadriceps: Well nourished (well developed, well rounded)  Gastrocnemius: Defer (swelling)  Edema:   (non-pitting)  Physical Findings:  Skin:  (surgical site)    Nutrition Significant Labs:  CBC Trend:   Results from last 7 days   Lab Units 01/23/25  0604 01/22/25  1135 01/22/25 0219 01/21/25 1955   WBC AUTO x10*3/uL 6.0 6.5 7.7 7.2   RBC AUTO x10*6/uL 2.97* 3.05* 3.32* 3.50*   HEMOGLOBIN g/dL 8.7* 8.9* 9.8* 10.1*   HEMATOCRIT % 25.1* 25.4* 27.3* 28.2*   MCV fL 85 83 82 81   PLATELETS AUTO x10*3/uL 73* 72* 91* 90*    , BMP Trend:   Results from last 7 days   Lab Units 01/23/25  0542 01/22/25  1135 01/22/25 0219 01/21/25 1955   GLUCOSE mg/dL 219* 194* 170* 279*   CALCIUM mg/dL 8.3* 8.5* 8.4* 8.5*   SODIUM mmol/L 136 139 139 140   POTASSIUM mmol/L 3.9 4.1 3.7 3.7   CO2 mmol/L 24 23 23 24   CHLORIDE mmol/L 97* 99 101 101   BUN mg/dL 91* 76* 65* 58*   CREATININE mg/dL 3.10* 3.07* 2.80* 2.75*    , Renal Lab Trend:   Results from last 7 days   Lab Units 01/23/25  0542 01/22/25  1135 01/22/25 0219 01/21/25 1955   POTASSIUM mmol/L 3.9 4.1 3.7 3.7   PHOSPHORUS mg/dL 7.1* 6.9* 5.8* 6.1*   SODIUM mmol/L 136 139 139 140   MAGNESIUM mg/dL 2.47* 2.29 2.27 2.20   EGFR mL/min/1.73m*2 23* 23* 26* 26*   BUN mg/dL 91* 76* 65* 58*   CREATININE mg/dL 3.10* 3.07* 2.80* 2.75*    , Vit D:   Lab Results   Component Value Date    VITD25 20 (A) 09/28/2023    , Vit B12: No results found for: \"[id number]\"     Nutrition Specific Medications:  Scheduled medications  acyclovir, 400 mg, oral, BID  albumin human, 25 g, intravenous, q8h  amLODIPine, 5 mg, oral, Daily  aspirin, 81 mg, oral, Daily  [START ON 1/24/2025] basiliximab, 20 mg, intravenous, Once  carvedilol, 6.25 mg, oral, BID  fluconazole, 400 mg, oral, Daily  heparin (porcine), 5,000 Units, subcutaneous, q8h  insulin " lispro, 0-10 Units, subcutaneous, q6h EDINSON  [Held by provider] insulin NPH (Isophane), 8 Units, subcutaneous, q AM  lidocaine, 2 patch, transdermal, Daily  [START ON 1/24/2025] methylPREDNISolone sodium succinate (PF), 60 mg, intravenous, Once  mycophenolate, 1,000 mg, oral, q12h EDINSON  pantoprazole, 40 mg, oral, BID  piperacillin-tazobactam, 3.375 g, intravenous, q6h  [START ON 1/25/2025] predniSONE, 20 mg, oral, Daily  sennosides-docusate sodium, 2 tablet, oral, BID  sulfamethoxazole-trimethoprim, 80 mg of trimethoprim, oral, Daily  tacrolimus, 2 mg, oral, q12h EDINSON  ursodiol, 300 mg, oral, q8h      Continuous medications     PRN medications  PRN medications: dextrose, dextrose, glucagon, oxyCODONE, oxyCODONE      I/O:    ; Stool Appearance: Unable to assess (01/22/25 0800)    Dietary Orders (From admission, onward)       Start     Ordered    01/23/25 1448  Oral nutritional supplements  Until discontinued        Question Answer Comment   Deliver with Breakfast    Deliver with Dinner    Select supplement: Ensure High Protein        01/23/25 1447    01/22/25 0900  Adult diet Consistent Carb; CCD 75 gm/meal  Diet effective now        Question Answer Comment   Diet type Consistent Carb    Carb diet selection: CCD 75 gm/meal        01/22/25 0859    01/20/25 0239  May Participate in Room Service  ( ROOM SERVICE MAY PARTICIPATE)  Once        Question:  .  Answer:  Yes    01/20/25 0238                     Estimated Needs:   Total Energy Estimated Needs in 24 hours (kCal): 2265 kCal (+)  Method for Estimating Needs: 30kcal/kg IBW (+)  Total Protein Estimated Needs in 24 Hours (g): 113 g  Method for Estimating 24 Hour Protein Needs: 1.5g/kg IBW  Total Fluid Estimated Needs in 24 Hours (mL):  (per MD/team)  Method for Estimating 24 Hour Fluid Needs: per MD/team        Nutrition Diagnosis   Malnutrition Diagnosis  Patient has Malnutrition Diagnosis: Yes  Diagnosis Status: New  Malnutrition Diagnosis: Moderate malnutrition  related to chronic disease or condition  Related to: energy exenditure > energy intake  As Evidenced by: mild-moderate fat loss + muscle wasting  Additional Assessment Information: do suspect pt with intake < 100% of his energy needs given medical conditons and lighter meals          Nutrition Interventions/Recommendations   Nutrition prescription for oral nutrition    Nutrition Recommendations:  Individualized Nutrition Prescription Provided for : Uncrustable daily to provide an additional ~300kcal and 10gm protein    Recommend obtaining new vitamin D level and supplementing if insufficient.     Monitor labs to see if pt requires renal diet     Nutrition Interventions/Goals:   Interventions: Meals and snacks  Meals and Snacks: Carbohydrate-modified diet  Goal: Consume > 70% x 2 meals/day      Education Documentation  Discussed importance of adequate PO intake for healing post transplant. Explained pt's increased energy needs. Encouraged supplement use however he does not like them. Pointed out Food Safety Booklet to look over prior to further education tomorrow.       Nutrition Monitoring and Evaluation   Food/Nutrient Related History Monitoring  Monitoring and Evaluation Plan: Estimated Energy Intake  Estimated Energy Intake: Energy intake greater or equal to 75% of estimated energy needs    Anthropometric Measurements  Monitoring and Evaluation Plan: Body weight  Body Weight: Body weight - Maintain stable weight    Biochemical Data, Medical Tests and Procedures  Monitoring and Evaluation Plan: Electrolyte/renal panel, Glucose/endocrine profile  Electrolyte and Renal Panel: Electrolytes within normal limits  Glucose/Endocrine Profile: Glucose within normal limits ( mg/dL)              Time Spent (min): 60 minutes

## 2025-01-23 NOTE — HOSPITAL COURSE
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 Wright.  Pt received solumedrol and simulect for induction. Pt was initiated on Tacrolimus & Cellcept immunosuppression in conjunction with IV solumedrol taper.  Pt was started on acyclovir (CMV D - / R -) and bactrim for CMV & PCP prophylaxis per protocol. He was started on fluconazole as antifungal coverage per protocol. Operative course was uncomplicated.  Hospital course was uncomplicated.       Pt is tolerating a diabetic diet, maintaining adequate hydration with oral intake, ambulating independently and having BMs. Immunosuppression was managed by the transplant team. Patient is in stable condition for discharge home with liver telehealth today. Central line removed prior to discharge. RX delivered to bedside prior to discharge. The patient will f/u in the transplant institute and have labs drawn on Mondays and Thursdays in the walk-in clinic.

## 2025-01-23 NOTE — PROGRESS NOTES
"Goran Ibrahim is a 54 y.o. male on day 3 of admission presenting with Hepatic cirrhosis, unspecified hepatic cirrhosis type, unspecified whether ascites present (Multi).    Subjective   Pt seen and examined today. All questions answered regarding insulin/steroids.   Pt was informed that glucose went low yesterday due to late NPH timing and that NPH will be held today as it was not available from pharmacy until after 12:00p again today.      I have reviewed histories, allergies and medications have been reviewed and there are no changes       Objective   Review of Systems   Gastrointestinal:  Positive for abdominal distention.        Appropriate to procedure   All other systems reviewed and are negative.    Physical Exam  Constitutional:       Appearance: Normal appearance. He is normal weight.   HENT:      Head: Normocephalic and atraumatic.      Nose: Nose normal.      Mouth/Throat:      Mouth: Mucous membranes are dry.      Pharynx: Oropharynx is clear.   Eyes:      Extraocular Movements: Extraocular movements intact.      Conjunctiva/sclera: Conjunctivae normal.   Cardiovascular:      Rate and Rhythm: Normal rate and regular rhythm.      Pulses: Normal pulses.      Heart sounds: Normal heart sounds.   Pulmonary:      Effort: Pulmonary effort is normal.      Breath sounds: Normal breath sounds.   Abdominal:      General: There is distension.      Palpations: Abdomen is soft.   Skin:     General: Skin is warm and dry.   Neurological:      General: No focal deficit present.      Mental Status: He is alert and oriented to person, place, and time. Mental status is at baseline.   Psychiatric:         Mood and Affect: Mood normal.         Behavior: Behavior normal.         Thought Content: Thought content normal.         Judgment: Judgment normal.         Last Recorded Vitals  Blood pressure 160/89, pulse 66, temperature 36.8 °C (98.2 °F), temperature source Temporal, resp. rate 16, height 1.778 m (5' 10\"), weight 129 " kg (284 lb 1.6 oz), SpO2 97%.  Intake/Output last 3 Shifts:  I/O last 3 completed shifts:  In: 2289.5 (18.2 mL/kg) [I.V.:105.5 (0.8 mL/kg); IV Piggyback:2184]  Out: 6120 (48.6 mL/kg) [Urine:5420 (1.2 mL/kg/hr); Drains:700]  Weight: 125.8 kg     Relevant Results  Results from last 7 days   Lab Units 01/23/25  1119 01/23/25  0815 01/23/25  0542 01/23/25  0451 01/23/25  0025 01/22/25  2124 01/22/25  1143 01/22/25  1135 01/22/25  0317 01/22/25  0219 01/21/25  2117 01/21/25  1955 01/21/25  1622 01/21/25  1212   POCT GLUCOSE mg/dL 191* 203*  --  202* 273* 296*   < >  --    < >  --    < >  --    < >  --    GLUCOSE mg/dL  --   --  219*  --   --   --   --  194*  --  170*  --  279*  --  265*    < > = values in this interval not displayed.       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 male presenting with past medical history of decompensated MetALD (ascites, variceal bleed, HE) c/b EV s/p banding in June 2024, advanced CKD (baseline creatinine around 2.5), long standing tobacco use, duodenal ulcer, H.pylori infection (negative biopsies in June 2024), and short segment Matute's esophagus (not biopsy confirmed due to the presence of varices) who was listed for SLK. He was subsequently brought in for DCD liver (caval piggyback, rCHA to dSMA/celiac stack HA, duct to duct [small to large with v plasty]) and right kidney transplant which he underwent on 1/20/2025 with Dr. Wright. Hepatology has been consulted to co-manage him post-OLT.     Diabetes History  Type of diabetes: none  Seen by PCP or Endocrinology: PCP  Frequency of glucose checks: none needed at home  Glucose review: hyperglycemia requiring insulin gtt after transplantation up to 9.5u/hr  Frequency of Hypoglycemia:  none  Hypoglycemia unawareness: no  Severe hypoglycemia requiring assistance from others:  no  Assessment & Plan  Hepatic cirrhosis, unspecified hepatic cirrhosis  type, unspecified whether ascites present (Multi)    ESRD (end stage renal disease) (Multi)    Steroid-induced hyperglycemia    PLAN  Steroids:  methylpred 500mg intra-op  methylpred 250 mg x1d  methylpred 125mg x1d  methylpred 125mg x1d  methylpred 60mg x1d  pred 20mg ongoing     Nutrition: PO  75g CHO/meal     - decrease NPH 8u, give at same time as  AM solumedrol/prednisone       If NPO: continue same     - adjust lispro corrective scale to #2 q6h if NPO or if persistently hyperglycemic   = 0u  151-200 = 2u  201-250 = 4u  251-300 = 6u  301-350 = 8u  351-400 = 10u     -Accuchecks (not BMP) ACHS  - Goal -180  -Hypoglycemia protocol  -Will continue to follow and titrate insulin accordingly      Discharge planning:   [] patient may expect to discharge home on NPH and lispro with CGM, final doses TBD by titration  []will provide CGM sample prior to discharge - pt's phone gege 3 compatible  [x]will enroll pt in  pharmacy platinum plan program  [x]follow up with Chelsea Hernandez PA-C in PTDM clinic     I spent 50 minutes in the professional and overall care of this patient.      Chelsea Hernandez PA-C

## 2025-01-23 NOTE — PROGRESS NOTES
"[facility]  [facility]  CONSULT FOLLOW-UP     Reason For Consult  Post-SLK co-management     SUBJECTIVE     No acute events overnight. Patient has been transferred from ICU to [facility]. Still no bowel movements, but is passing gas. Appetite has started to , and he was eating lunch today and had finished his plate when seen with attending.     Labs showed that his creatinine has more or less remained the same as yesterday, 3.07-3.10.  His BUN is up trended from 76-91.  His tacrolimus level is 2.0.  LFTs showed that his AST, ALT are both downtrending.  AST is down trended from 666-309, and ALT is down trended from 689-525.  His bilirubin also has down trended from 3.7-2.6. GGT has uptrended from 323 --> 411.     His UOP is 4.5 liters in the last 24 hours. He got 25 g of 5% albumin yesterday. He is getting 25 g of 25% albumin every 8 hours today.       EXAM     Last Recorded Vitals  Blood pressure 160/89, pulse 66, temperature 36.8 °C (98.2 °F), temperature source Temporal, resp. rate 16, height 1.778 m (5' 10\"), weight 129 kg (284 lb 1.6 oz), SpO2 97%.      Intake/Output Summary (Last 24 hours) at 1/23/2025 1440  Last data filed at 1/23/2025 1432  Gross per 24 hour   Intake 524 ml   Output 4805 ml   Net -4281 ml          Physical Exam  General: well-nourished, no acute distress  HEENT: no pallor, scleral icterus +ve   Respiratory: CTA bilaterally, normal work of breathing  Cardiovascular: S1, S2 heard   Abdomen: Chevron incision with staples, no dehiscence or oozing. Mildly distended abdomen. Bowel sounds appreciated. 2 KAITLYNN drains on R side still in place    Extremities: no edema, no asterixis  Neuro: alert and oriented, CNII-XII grossly intact, moves all 4 extremities with no focal deficits  Skin: jaundiced   : chaudhary in place with clear yellow urine     OBJECTIVE                                                                              Medications         "     Current Facility-Administered Medications:     acyclovir (Zovirax) tablet 400 mg, 400 mg, oral, BID, Seema Matthews, APRN-CNP, 400 mg at 01/23/25 1139    albumin human 25 % solution 25 g, 25 g, intravenous, q8h, Seema Matthews, APRN-CNP, Stopped at 01/23/25 1239    amLODIPine (Norvasc) tablet 5 mg, 5 mg, oral, Daily, Joanne L Purvi, APRN-CNP, 5 mg at 01/23/25 0938    aspirin EC tablet 81 mg, 81 mg, oral, Daily, Joanne L Purvi, APRN-CNP, 81 mg at 01/23/25 0938    [START ON 1/24/2025] basiliximab (Simulect) 20 mg in sodium chloride 0.9% 50 mL IV, 20 mg, intravenous, Once, Joanne L Cisco, APRN-CNP    carvedilol (Coreg) tablet 6.25 mg, 6.25 mg, oral, BID, Joanne L Purvi, APRN-CNP, 6.25 mg at 01/23/25 0938    dextrose 50 % injection 12.5 g, 12.5 g, intravenous, q15 min PRN, Joanne L Purvi, APRN-CNP, 12.5 g at 01/22/25 2107    dextrose 50 % injection 25 g, 25 g, intravenous, q15 min PRN, Joanne L Purvi, APRN-CNP    fluconazole (Diflucan) tablet 400 mg, 400 mg, oral, Daily, Joanne L Cisco, APRN-CNP, 400 mg at 01/23/25 0939    glucagon (Glucagen) injection 1 mg, 1 mg, intramuscular, q15 min PRN, Joanne L Cisco, APRN-CNP    heparin (porcine) injection 5,000 Units, 5,000 Units, subcutaneous, q8h, Joanne L Cisco, APRN-CNP, 5,000 Units at 01/23/25 0939    insulin lispro injection 0-10 Units, 0-10 Units, subcutaneous, q6h Chelsea NEVES PA-C, 2 Units at 01/23/25 1207    [Held by provider] insulin NPH (Isophane) (HumuLIN N,NovoLIN N) injection 8 Units, 8 Units, subcutaneous, q AM, Seema Matthews, APRN-CNP    lidocaine 4 % patch 2 patch, 2 patch, transdermal, Daily, Joanne L Purvi, APRN-CNP, 2 patch at 01/23/25 0940    [START ON 1/24/2025] methylPREDNISolone sod succinate (SOLU-Medrol) injection 60 mg, 60 mg, intravenous, Once, Joanne L Cisco, APRN-CNP    mycophenolate (Cellcept) capsule 1,000 mg, 1,000 mg, oral, q12h EDINSON, Joanne L Cisco, APRN-CNP, 1,000 mg at 01/23/25 0622    oxyCODONE (Roxicodone) immediate release tablet 10 mg, 10  mg, oral, q4h PRN, Joanne L Purvi, APRN-CNP    oxyCODONE (Roxicodone) immediate release tablet 5 mg, 5 mg, oral, q6h PRN, Joanne L [place], APRN-CNP    pantoprazole (ProtoNix) EC tablet 40 mg, 40 mg, oral, BID, Joanne L [place], APRN-CNP, 40 mg at 01/23/25 0939    piperacillin-tazobactam (Zosyn) 3.375 g in dextrose (iso) IV 50 mL, 3.375 g, intravenous, q6h, Joanne L [place], APRN-CNP, Stopped at 01/23/25 1325    [START ON 1/25/2025] predniSONE (Deltasone) tablet 20 mg, 20 mg, oral, Daily, Joanne L Purvi, APRN-CNP    sennosides-docusate sodium (Yoselyn-Colace) 8.6-50 mg per tablet 2 tablet, 2 tablet, oral, BID, Joanne L [place], APRN-CNP, 2 tablet at 01/23/25 0938    tacrolimus (Prograf) capsule 2 mg, 2 mg, oral, q12h EDINSON, Joanne L [place], APRN-CNP, 2 mg at 01/23/25 0622    ursodiol (Actigall) capsule 300 mg, 300 mg, oral, q8h, Joanne L Purvi, APRN-CNP, 300 mg at 01/23/25 1235                                                                            Labs     Reviewed.                                                                         Imaging:     US Liver with doppler 1/21/2025:  IMPRESSION:  1. Interval increase in the main portal vein velocity of the liver  transplant compared to 01/20/2025 ultrasound measuring up to 126.5 cm/sec, with slightly turbulent flow. No evidence of focal narrowing or gradient. Attention on follow-up is recommended.  2. Interval improvement of the previously noted low resistive indices  throughout the hepatic arteries with interval improvement in the  arterial waveforms compared to 01/20/2025 ultrasound.  3. Stable splenomegaly.    US kidney transplant 1/20/2025:  IMPRESSION:  Unremarkable ultrasound and Doppler evaluation of the transplant  kidney as detailed above     US Liver with doppler 1/20/2025:  IMPRESSION:  Status post orthotopic liver transplant with decreased resistive  indices of the hepatic arteries (0.4-0.5), attention on follow-up  imaging is recommended. The hepatic vasculature is patent.         US liver with doppler 11/4/2024:  IMPRESSION:  Cirrhotic morphology of the liver with sequela of portal hypertension including significant splenomegaly and reversal flow within the splenic vein as detailed.                                                                         GI Procedures     7/2023 EGD  Impression:                                - Normal upper third of esophagus.                             - Grade I esophageal varices.                             - Esophageal mucosal changes suggestive of                             short-segment Matute's esophagus, classified as                             Matute's stage C0-M2 per [place] criteria.                             Biopsy is contraindicated.                             - Small hiatal hernia.                             - Non-bleeding duodenal ulcer with no stigmata                             of bleeding. Appears to be healing well, without                             any signs of recent blood loss.                             - Mucosal changes in the duodenum. Biopsied.                             - No blood was present throughout the entire                               exam.      6/2023 EGD  Impression:                                   - Normal esophagus.                             - Normal stomach.                             - Non-bleeding duodenal ulcer with a visible                             vessel. Injected. Treated with argon plasma                             coagulation (APC).                             - The examination was otherwise normal.                             - No specimens collected.      EGD 6/7/24  Findings  Irregular Z-line 38 cm from the incisors  Three medium grade II varices (no red tiera sign) in the esophagus; no bleeding was identified; placed 3 bands successfully, resulting in partial eradication  Cobblestone, edematous, erythematous and friable mucosa in the body of the stomach and antrum; performed cold  forceps biopsy to rule out H. pylori;  Patchy erythematous and friable mucosa in the duodenal bulb;  The duodenum appeared normal.     EGD 11/1/24  Impression  Irregular Z-line  Matute's esophagus  Multiple small varices in the lower third of the esophagus  Mild and friable portal hypertensive gastropathy in the cardia, fundus of the stomach, body of the stomach and antrum  Abnormal mucosa with erosion in the 1st part of the duodenum  The 2nd part of the duodenum appeared normal.      6/22/23 [facility]: Colonoscopy with [place] Score of 2/2/2: one 5 mm inflammatory type polyp in the sigmoid colon.       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 He was subsequently brought in for DCD liver (caval piggyback, rCHA to dSMA/celiac stack HA, duct to duct [small to large with v plasty]) and right kidney transplant which he underwent on 1/20/2025 with Dr. Wright. Hepatology has been consulted to co-manage him post-OLT.      Overall, patient is doing well post-SLK, and he has been extubated and CVVH has been discontinued. His T. bili is a bit high, so he has been started on Ursodiol. Additionally, his immediate post-liver transplant liver US with dopplers showed decreased resistive indices of the hepatic arteries (0.4-0.5), but repeat US liver with dopplers on 1/21/2025 showed interval improvement of the previously noted low resistive indices in the hepatic arteries. Transaminases are downtrending. Creatinine uptrending slowly. He is making good UOP. Patient transferred to [facility] as of 1/23/2024.     RECS:  Immunosuppression and  prophylactic antibiotics/antifungals per transplant surgery team   Await explant pathology results  Appreciate Endocrinology recs for steroid induced hyperglycemia   Continue Ursodiol   Strict Ins and Outs   Patient will need EGD at some point (once he is fully recovered from transplant) as an outpatient with biopsies to confirm short segment Matute's esophagus that was found during EGD in 2023/2024     Patient was seen and discussed with Hepatology attending, Dr. Norris Velasco.     Thank you for the consultation. Hepatology will continue to follow.     - During weekday hours of 7am- 5pm, please do not hesitate to contact me on "[employer] Chat or page [phone] if there are any further questions   - After hours, on weekends, and on holidays, please page the [place] GI [place] [phone]. Thank you.     Venice Meier MD MPH   GI and Hepatology Fellow   [facility]

## 2025-01-23 NOTE — CONSULTS
"Inpatient Diabetes Education Consult    Reason for Visit:  Goran Ibrahim is a 54 y.o. male who presents for steroid induced hyperglycemia s/p liver/kidney transplant.    Consulting Service/Provider: Transplant team    Visit Type: Initial visit    Visit Modality: In-person    Discharge Equipment/Supply Needs:       Patient has supplies at home:  has no hx diabetes and has no supplies    Patient History and Assessment:  New diagnosis: Steroid-induced hyperglycemia  Previous diagnosis:  A1c 6.6% -- pre DM?  Patient known to Diabetes Education department: No  Treatment prior to hospital admission:  n/a  Complications: chronic kidney disease and s/p liver and kidney transplant  PTA Medications:    Current Outpatient Medications   Medication Instructions    amLODIPine (NORVASC) 5 mg, oral, Daily    carvedilol (COREG) 6.25 mg, oral, 2 times daily    cholecalciferol (VITAMIN D-3) 50 mcg, oral, Daily    furosemide (LASIX) 40 mg, oral, 2 times daily (morning and late afternoon)    lactulose 20 g, oral, 2 times daily, Take 30mL up to 3 times daily - Titrate to having 3-4 bowel movements daily    pantoprazole (PROTONIX) 40 mg, oral, Daily before breakfast    rifAXIMin (XIFAXAN) 550 mg, oral, Every 12 hours scheduled    sodium bicarbonate 650 mg, oral, 3 times daily       Glucose   Date/Time Value Ref Range Status   01/23/2025 05:42  (H) 74 - 99 mg/dL Final   01/22/2025 11:35  (H) 74 - 99 mg/dL Final   01/22/2025 02:19  (H) 74 - 99 mg/dL Final   01/21/2025 07:55  (H) 74 - 99 mg/dL Final   01/21/2025 12:12  (H) 74 - 99 mg/dL Final   01/21/2025 04:16  (H) 74 - 99 mg/dL Final   01/21/2025 12:15  (H) 74 - 99 mg/dL Final   01/20/2025 09:30  (H) 74 - 99 mg/dL Final   01/20/2025 07:33  (H) 74 - 99 mg/dL Final   01/20/2025 02:53  (H) 74 - 99 mg/dL Final     No results found for: \"CPEPTIDE\"  Hemoglobin A1C   Date Value Ref Range Status   01/21/2025 6.6 (H) See comment % Final "   09/28/2023 5.3 % Final     Comment:          Diagnosis of Diabetes-Adults   Non-Diabetic: < or = 5.6%   Increased risk for developing diabetes: 5.7-6.4%   Diagnostic of diabetes: > or = 6.5%  .       Monitoring of Diabetes                Age (y)     Therapeutic Goal (%)   Adults:          >18           <7.0   Pediatrics:    13-18           <7.5                   7-12           <8.0                   0- 6            7.5-8.5   American Diabetes Association. Diabetes Care 33(S1), Jan 2010.     06/28/2023 5.2 4.3 - 5.6 % Final     Comment:     American Diabetes Association guidelines indicate that patients with HgbA1c in the range 5.7-6.4% are at increased risk for development of diabetes, and intervention by lifestyle modification may be beneficial. HgbA1c greater or equal to 6.5% is considered diagnostic of diabetes.   05/18/2023 6.8 (H) <5.7 % Final     Comment:     Normal less than 5.7%  Prediabetes 5.7% to 6.4%  Diabetes 6.5% or higher      --HgbA1C levels may not be accurate in patients who have renal disease, received recent blood transfusions, are anemic, or who have dyshemoglobinemia.       Patient Learning/Readiness Assessment:  Agreeable to diabetes ed visits to learn to manage steroid induced hyperglycemia    Interventions/Topics Covered:  See After Visit Summary for handouts/information sheets provided to patient.  Education Documentation  Insulin Dose Calculation, taught by Jennifer Sesay RN at 1/23/2025  2:12 PM.  Learner: Patient  Readiness: Acceptance  Method: Explanation, Demonstration, Handout, Teach-back  Response: Demonstrated Understanding, Needs Reinforcement    Insulin Administration, taught by Jennifer Sesay RN at 1/23/2025  2:12 PM.  Learner: Patient  Readiness: Acceptance  Method: Explanation, Demonstration, Handout, Teach-back  Response: Demonstrated Understanding, Needs Reinforcement    Insulin Types, taught by Jennifer Sesay RN at 1/23/2025  2:12 PM.  Learner: Patient  Readiness: Acceptance  Method:  "Explanation, Demonstration, Handout, Teach-back  Response: Demonstrated Understanding, Needs Reinforcement    Hyperglycemia, taught by Jennifer Sesay RN at 1/23/2025  2:12 PM.  Learner: Patient  Readiness: Acceptance  Method: Explanation, Handout  Response: Needs Reinforcement, Verbalizes Understanding    Self-Care Behaviors, taught by Jennifer Sesay RN at 1/23/2025  2:12 PM.  Learner: Patient  Readiness: Acceptance  Method: Explanation, Handout, Teach-back  Response: Needs Reinforcement, Verbalizes Understanding  Comment: steroid induced hyperglycemia -- has no hx DM but A1c suggests pre-DM levels.          Additional topics covered: Mr. Ibrahim states that his A1c was elevated in 2023 and he made lifestyle changes and was able to \"get it down so I didn't need any medicine\"  We reviewed his current regimen and how the NPH and Humalog insulins work.  Education re effects of steroids on BG.    Insulin pen teaching done and he is able to do return demo correctly with minimal prompting.  Education provided re sliding scale insulin.  We practiced BG scenarios and he is able to determine the correct insulin dose for meal time.    Discussion re how to manage episodes of hypoglycemia.  His phone supports CGM.    Additional materials provided: steroid and insulin handout    CGM:  will provide prior to discharge    Education Outcome/Recommendations:        Recommendations for bedside nursing: Allow patient to self-inject insulin (supervised)    Recommendations for Providers: Follow-up w/ PCP and/or Endocrinology    Additional Comments: Mr. Ibrahim is agreeable to return visit in am to review insulin regimen and practice with the insulin pen.  Time spent:  30 mins.         "

## 2025-01-24 ENCOUNTER — PHARMACY VISIT (OUTPATIENT)
Dept: PHARMACY | Facility: CLINIC | Age: 55
End: 2025-01-24
Payer: MEDICAID

## 2025-01-24 ENCOUNTER — DOCUMENTATION (OUTPATIENT)
Dept: TRANSPLANT | Facility: HOSPITAL | Age: 55
End: 2025-01-24
Payer: MEDICAID

## 2025-01-24 ENCOUNTER — DOCUMENTATION (OUTPATIENT)
Facility: HOSPITAL | Age: 55
End: 2025-01-24
Payer: MEDICAID

## 2025-01-24 ENCOUNTER — TELEPHONE (OUTPATIENT)
Facility: HOSPITAL | Age: 55
End: 2025-01-24
Payer: MEDICAID

## 2025-01-24 DIAGNOSIS — Z94.0 KIDNEY REPLACED BY TRANSPLANT (HHS-HCC): ICD-10-CM

## 2025-01-24 DIAGNOSIS — Z94.4 LIVER REPLACED BY TRANSPLANT (MULTI): ICD-10-CM

## 2025-01-24 LAB
ALBUMIN SERPL BCP-MCNC: 3.9 G/DL (ref 3.4–5)
ALP SERPL-CCNC: 164 U/L (ref 33–120)
ALT SERPL W P-5'-P-CCNC: 355 U/L (ref 10–52)
ANION GAP SERPL CALC-SCNC: 19 MMOL/L (ref 10–20)
AST SERPL W P-5'-P-CCNC: 114 U/L (ref 9–39)
BILIRUB DIRECT SERPL-MCNC: 1.9 MG/DL (ref 0–0.3)
BILIRUB SERPL-MCNC: 2.7 MG/DL (ref 0–1.2)
BUN SERPL-MCNC: 103 MG/DL (ref 6–23)
CA-I BLD-SCNC: 1.1 MMOL/L (ref 1.1–1.33)
CALCIUM SERPL-MCNC: 8.7 MG/DL (ref 8.6–10.6)
CHLORIDE SERPL-SCNC: 97 MMOL/L (ref 98–107)
CO2 SERPL-SCNC: 23 MMOL/L (ref 21–32)
CREAT SERPL-MCNC: 2.89 MG/DL (ref 0.5–1.3)
EGFRCR SERPLBLD CKD-EPI 2021: 25 ML/MIN/1.73M*2
ERYTHROCYTE [DISTWIDTH] IN BLOOD BY AUTOMATED COUNT: 16 % (ref 11.5–14.5)
GGT SERPL-CCNC: 433 U/L (ref 5–64)
GLUCOSE BLD MANUAL STRIP-MCNC: 188 MG/DL (ref 74–99)
GLUCOSE BLD MANUAL STRIP-MCNC: 204 MG/DL (ref 74–99)
GLUCOSE BLD MANUAL STRIP-MCNC: 282 MG/DL (ref 74–99)
GLUCOSE BLD MANUAL STRIP-MCNC: 324 MG/DL (ref 74–99)
GLUCOSE BLD MANUAL STRIP-MCNC: 326 MG/DL (ref 74–99)
GLUCOSE SERPL-MCNC: 253 MG/DL (ref 74–99)
HCT VFR BLD AUTO: 24.6 % (ref 41–52)
HGB BLD-MCNC: 8.2 G/DL (ref 13.5–17.5)
MAGNESIUM SERPL-MCNC: 2.7 MG/DL (ref 1.6–2.4)
MCH RBC QN AUTO: 29.8 PG (ref 26–34)
MCHC RBC AUTO-ENTMCNC: 33.3 G/DL (ref 32–36)
MCV RBC AUTO: 90 FL (ref 80–100)
NRBC BLD-RTO: 0 /100 WBCS (ref 0–0)
PHOSPHATE SERPL-MCNC: 6 MG/DL (ref 2.5–4.9)
PLATELET # BLD AUTO: 58 X10*3/UL (ref 150–450)
POTASSIUM SERPL-SCNC: 3.6 MMOL/L (ref 3.5–5.3)
PROT SERPL-MCNC: 6 G/DL (ref 6.4–8.2)
RBC # BLD AUTO: 2.75 X10*6/UL (ref 4.5–5.9)
SODIUM SERPL-SCNC: 135 MMOL/L (ref 136–145)
TACROLIMUS BLD-MCNC: 4.3 NG/ML
WBC # BLD AUTO: 3.4 X10*3/UL (ref 4.4–11.3)

## 2025-01-24 PROCEDURE — 84100 ASSAY OF PHOSPHORUS: CPT

## 2025-01-24 PROCEDURE — 82248 BILIRUBIN DIRECT: CPT

## 2025-01-24 PROCEDURE — 2500000002 HC RX 250 W HCPCS SELF ADMINISTERED DRUGS (ALT 637 FOR MEDICARE OP, ALT 636 FOR OP/ED): Mod: SE

## 2025-01-24 PROCEDURE — 2500000002 HC RX 250 W HCPCS SELF ADMINISTERED DRUGS (ALT 637 FOR MEDICARE OP, ALT 636 FOR OP/ED): Mod: SE | Performed by: PHYSICIAN ASSISTANT

## 2025-01-24 PROCEDURE — RXMED WILLOW AMBULATORY MEDICATION CHARGE

## 2025-01-24 PROCEDURE — P9047 ALBUMIN (HUMAN), 25%, 50ML: HCPCS | Mod: JZ,SE,TB

## 2025-01-24 PROCEDURE — 2500000004 HC RX 250 GENERAL PHARMACY W/ HCPCS (ALT 636 FOR OP/ED): Mod: JZ,SE,TB

## 2025-01-24 PROCEDURE — 99233 SBSQ HOSP IP/OBS HIGH 50: CPT | Performed by: NURSE PRACTITIONER

## 2025-01-24 PROCEDURE — 99232 SBSQ HOSP IP/OBS MODERATE 35: CPT | Performed by: STUDENT IN AN ORGANIZED HEALTH CARE EDUCATION/TRAINING PROGRAM

## 2025-01-24 PROCEDURE — 2500000004 HC RX 250 GENERAL PHARMACY W/ HCPCS (ALT 636 FOR OP/ED): Mod: SE

## 2025-01-24 PROCEDURE — 85027 COMPLETE CBC AUTOMATED: CPT

## 2025-01-24 PROCEDURE — 97116 GAIT TRAINING THERAPY: CPT | Mod: GP,CQ

## 2025-01-24 PROCEDURE — 83735 ASSAY OF MAGNESIUM: CPT

## 2025-01-24 PROCEDURE — 2500000001 HC RX 250 WO HCPCS SELF ADMINISTERED DRUGS (ALT 637 FOR MEDICARE OP): Mod: SE

## 2025-01-24 PROCEDURE — 80197 ASSAY OF TACROLIMUS: CPT

## 2025-01-24 PROCEDURE — 82947 ASSAY GLUCOSE BLOOD QUANT: CPT

## 2025-01-24 PROCEDURE — 97530 THERAPEUTIC ACTIVITIES: CPT | Mod: GP,CQ

## 2025-01-24 PROCEDURE — 36415 COLL VENOUS BLD VENIPUNCTURE: CPT

## 2025-01-24 PROCEDURE — 1100000001 HC PRIVATE ROOM DAILY

## 2025-01-24 PROCEDURE — 82977 ASSAY OF GGT: CPT

## 2025-01-24 PROCEDURE — 82330 ASSAY OF CALCIUM: CPT

## 2025-01-24 RX ORDER — ISOPROPYL ALCOHOL 70 ML/100ML
SWAB TOPICAL
Qty: 400 EACH | Refills: 0 | Status: SHIPPED | OUTPATIENT
Start: 2025-01-24 | End: 2025-01-26 | Stop reason: SDUPTHER

## 2025-01-24 RX ORDER — BLOOD-GLUCOSE SENSOR
1 EACH MISCELLANEOUS
Qty: 3 EACH | Refills: 11 | Status: SHIPPED | OUTPATIENT
Start: 2025-01-24 | End: 2025-02-23

## 2025-01-24 RX ORDER — POLYETHYLENE GLYCOL 3350 17 G/17G
17 POWDER, FOR SOLUTION ORAL DAILY
Status: DISCONTINUED | OUTPATIENT
Start: 2025-01-24 | End: 2025-01-28 | Stop reason: HOSPADM

## 2025-01-24 RX ORDER — INSULIN LISPRO 100 [IU]/ML
0-10 INJECTION, SOLUTION INTRAVENOUS; SUBCUTANEOUS
Qty: 15 ML | Refills: 0 | Status: SHIPPED | OUTPATIENT
Start: 2025-01-24 | End: 2025-01-26 | Stop reason: SDUPTHER

## 2025-01-24 RX ORDER — TACROLIMUS 1 MG/1
2 CAPSULE ORAL
Start: 2025-01-24 | End: 2025-01-26

## 2025-01-24 RX ORDER — LANCETS 33 GAUGE
1 EACH MISCELLANEOUS
Qty: 100 EACH | Refills: 0 | Status: SHIPPED | OUTPATIENT
Start: 2025-01-24 | End: 2025-02-26

## 2025-01-24 RX ORDER — FUROSEMIDE 10 MG/ML
20 INJECTION INTRAMUSCULAR; INTRAVENOUS ONCE
Status: COMPLETED | OUTPATIENT
Start: 2025-01-24 | End: 2025-01-24

## 2025-01-24 RX ORDER — DEXTROSE 4 G
TABLET,CHEWABLE ORAL
Qty: 1 EACH | Refills: 0 | Status: SHIPPED | OUTPATIENT
Start: 2025-01-24 | End: 2025-01-26 | Stop reason: SDUPTHER

## 2025-01-24 RX ORDER — BLOOD-GLUCOSE,RECEIVER,CONT
EACH MISCELLANEOUS
Qty: 1 EACH | Refills: 0 | Status: SHIPPED | OUTPATIENT
Start: 2025-01-24

## 2025-01-24 RX ORDER — IBUPROFEN 200 MG
CAPSULE ORAL
Qty: 100 EACH | Refills: 0 | Status: SHIPPED | OUTPATIENT
Start: 2025-01-24 | End: 2025-01-26 | Stop reason: SDUPTHER

## 2025-01-24 RX ORDER — PEN NEEDLE, DIABETIC 30 GX3/16"
1 NEEDLE, DISPOSABLE MISCELLANEOUS
Qty: 100 EACH | Refills: 0 | Status: SHIPPED | OUTPATIENT
Start: 2025-01-24 | End: 2025-02-26

## 2025-01-24 RX ADMIN — CARVEDILOL 6.25 MG: 6.25 TABLET, FILM COATED ORAL at 20:27

## 2025-01-24 RX ADMIN — ACYCLOVIR 400 MG: 400 TABLET ORAL at 08:31

## 2025-01-24 RX ADMIN — HEPARIN SODIUM 5000 UNITS: 5000 INJECTION INTRAVENOUS; SUBCUTANEOUS at 00:43

## 2025-01-24 RX ADMIN — PANTOPRAZOLE SODIUM 40 MG: 40 TABLET, DELAYED RELEASE ORAL at 08:31

## 2025-01-24 RX ADMIN — SULFAMETHOXAZOLE AND TRIMETHOPRIM 80 MG OF TRIMETHOPRIM: 400; 80 TABLET ORAL at 08:31

## 2025-01-24 RX ADMIN — TACROLIMUS 2 MG: 1 CAPSULE ORAL at 18:17

## 2025-01-24 RX ADMIN — ACYCLOVIR 400 MG: 400 TABLET ORAL at 20:28

## 2025-01-24 RX ADMIN — HEPARIN SODIUM 5000 UNITS: 5000 INJECTION INTRAVENOUS; SUBCUTANEOUS at 18:17

## 2025-01-24 RX ADMIN — FLUCONAZOLE 400 MG: 200 TABLET ORAL at 08:31

## 2025-01-24 RX ADMIN — SODIUM CHLORIDE 20 MG: 9 INJECTION, SOLUTION INTRAVENOUS at 11:30

## 2025-01-24 RX ADMIN — FUROSEMIDE 20 MG: 10 INJECTION, SOLUTION INTRAVENOUS at 11:29

## 2025-01-24 RX ADMIN — URSODIOL 300 MG: 300 CAPSULE ORAL at 20:33

## 2025-01-24 RX ADMIN — MYCOPHENOLATE MOFETIL 1000 MG: 250 CAPSULE ORAL at 18:17

## 2025-01-24 RX ADMIN — ASPIRIN 81 MG: 81 TABLET, COATED ORAL at 08:31

## 2025-01-24 RX ADMIN — ALBUMIN HUMAN 25 G: 0.25 SOLUTION INTRAVENOUS at 13:41

## 2025-01-24 RX ADMIN — TACROLIMUS 2 MG: 1 CAPSULE ORAL at 06:10

## 2025-01-24 RX ADMIN — PANTOPRAZOLE SODIUM 40 MG: 40 TABLET, DELAYED RELEASE ORAL at 20:28

## 2025-01-24 RX ADMIN — INSULIN LISPRO 8 UNITS: 100 INJECTION, SOLUTION INTRAVENOUS; SUBCUTANEOUS at 18:51

## 2025-01-24 RX ADMIN — SENNOSIDES AND DOCUSATE SODIUM 2 TABLET: 50; 8.6 TABLET ORAL at 08:31

## 2025-01-24 RX ADMIN — CARVEDILOL 6.25 MG: 6.25 TABLET, FILM COATED ORAL at 08:31

## 2025-01-24 RX ADMIN — ALBUMIN HUMAN 25 G: 0.25 SOLUTION INTRAVENOUS at 20:34

## 2025-01-24 RX ADMIN — HEPARIN SODIUM 5000 UNITS: 5000 INJECTION INTRAVENOUS; SUBCUTANEOUS at 08:31

## 2025-01-24 RX ADMIN — INSULIN HUMAN 8 UNITS: 100 INJECTION, SUSPENSION SUBCUTANEOUS at 08:32

## 2025-01-24 RX ADMIN — INSULIN LISPRO 4 UNITS: 100 INJECTION, SOLUTION INTRAVENOUS; SUBCUTANEOUS at 06:12

## 2025-01-24 RX ADMIN — URSODIOL 300 MG: 300 CAPSULE ORAL at 06:10

## 2025-01-24 RX ADMIN — POLYETHYLENE GLYCOL 3350 17 G: 17 POWDER, FOR SOLUTION ORAL at 12:27

## 2025-01-24 RX ADMIN — INSULIN LISPRO 6 UNITS: 100 INJECTION, SOLUTION INTRAVENOUS; SUBCUTANEOUS at 00:43

## 2025-01-24 RX ADMIN — SENNOSIDES AND DOCUSATE SODIUM 2 TABLET: 50; 8.6 TABLET ORAL at 20:28

## 2025-01-24 RX ADMIN — MYCOPHENOLATE MOFETIL 1000 MG: 250 CAPSULE ORAL at 06:10

## 2025-01-24 RX ADMIN — URSODIOL 300 MG: 300 CAPSULE ORAL at 12:35

## 2025-01-24 RX ADMIN — INSULIN LISPRO 2 UNITS: 100 INJECTION, SOLUTION INTRAVENOUS; SUBCUTANEOUS at 12:27

## 2025-01-24 RX ADMIN — AMLODIPINE BESYLATE 5 MG: 5 TABLET ORAL at 08:31

## 2025-01-24 RX ADMIN — METHYLPREDNISOLONE SODIUM SUCCINATE 60 MG: 125 INJECTION, POWDER, FOR SOLUTION INTRAMUSCULAR; INTRAVENOUS at 08:31

## 2025-01-24 RX ADMIN — ALBUMIN HUMAN 25 G: 0.25 SOLUTION INTRAVENOUS at 03:45

## 2025-01-24 ASSESSMENT — COGNITIVE AND FUNCTIONAL STATUS - GENERAL
MOVING FROM LYING ON BACK TO SITTING ON SIDE OF FLAT BED WITH BEDRAILS: A LITTLE
MOBILITY SCORE: 18
MOVING TO AND FROM BED TO CHAIR: A LITTLE
DRESSING REGULAR LOWER BODY CLOTHING: A LITTLE
CLIMB 3 TO 5 STEPS WITH RAILING: A LITTLE
MOVING TO AND FROM BED TO CHAIR: A LITTLE
WALKING IN HOSPITAL ROOM: A LITTLE
CLIMB 3 TO 5 STEPS WITH RAILING: A LITTLE
WALKING IN HOSPITAL ROOM: A LITTLE
STANDING UP FROM CHAIR USING ARMS: A LITTLE
STANDING UP FROM CHAIR USING ARMS: A LITTLE
MOBILITY SCORE: 20
TURNING FROM BACK TO SIDE WHILE IN FLAT BAD: A LITTLE
DAILY ACTIVITIY SCORE: 23

## 2025-01-24 ASSESSMENT — PAIN - FUNCTIONAL ASSESSMENT
PAIN_FUNCTIONAL_ASSESSMENT: 0-10
PAIN_FUNCTIONAL_ASSESSMENT: 0-10

## 2025-01-24 ASSESSMENT — PAIN SCALES - GENERAL
PAINLEVEL_OUTOF10: 3
PAINLEVEL_OUTOF10: 0 - NO PAIN
PAINLEVEL_OUTOF10: 0 - NO PAIN

## 2025-01-24 ASSESSMENT — PAIN SCALES - WONG BAKER: WONGBAKER_NUMERICALRESPONSE: NO HURT

## 2025-01-24 ASSESSMENT — ENCOUNTER SYMPTOMS
ROS GI COMMENTS: APPROPRIATE TO PROCEDURE
ABDOMINAL DISTENTION: 1

## 2025-01-24 NOTE — PROGRESS NOTES
"[facility]  [facility]  CONSULT FOLLOW-UP     Reason For Consult  Post-SLK co-management     SUBJECTIVE     No acute events overnight. UOP is still very high, 2875 in the last 24 hours. Passing gas, but still has not had a bowel movement. Getting miralax today in addition to colace and senna. He is getting 25g albumin 25% every 8 hours.     Got his 2nd Simulect dose today.     Creatinine has downtrended from 3.1 --> 2.89. BUN has uptrended from 91 --> 103. Magnesium was 2.7 this morning.     GGT has uptrended from 411 --> 433. H&H stable. All her counts have downtrended today including her platelets (could be dilutional). LFTs have also downtrended (ALT from 525 --> 355, AST from 309 --> 114, ALP from 170 --> 164, T.bili from 3.7 --> 2.7).     He is on Tacrolimus 2 mg BID and her tacrolimus level today was 4.3.     Had pharmacy teaching today.     Endocrinology has seen the patient and provided recommendations.     Possible discharge to home with home PT tomorrow.     EXAM     Last Recorded Vitals  Blood pressure 154/81, pulse 66, temperature 36.7 °C (98.1 °F), temperature source Temporal, resp. rate 17, height 1.778 m (5' 10\"), weight 129 kg (284 lb 6.3 oz), SpO2 90%.      Intake/Output Summary (Last 24 hours) at 1/24/2025 1410  Last data filed at 1/24/2025 1130  Gross per 24 hour   Intake 0 ml   Output 3845 ml   Net -3845 ml          Physical Exam  General: well-nourished, no acute distress  HEENT: no pallor, scleral icterus +ve   Respiratory: CTA bilaterally, normal work of breathing  Cardiovascular: S1, S2 heard   Abdomen: Chevron incision with staples, no dehiscence or oozing. Mildly distended abdomen. Bowel sounds appreciated. 2 KAITLYNN drains on R side still in place    Extremities: no edema, no asterixis  Neuro: alert and oriented, CNII-XII grossly intact, moves all 4 extremities with no focal deficits  Skin: jaundiced   : chaudhary in place with clear yellow urine "     OBJECTIVE                                                                              Medications             Current Facility-Administered Medications:     acyclovir (Zovirax) tablet 400 mg, 400 mg, oral, BID, Seema CRISTOPHER Matthews, APRN-CNP, 400 mg at 01/24/25 0831    albumin human 25 % solution 25 g, 25 g, intravenous, q8h, Seema NICHOLAS Cassie, APRN-CNP, Last Rate: 100 mL/hr at 01/24/25 1341, 25 g at 01/24/25 1341    amLODIPine (Norvasc) tablet 5 mg, 5 mg, oral, Daily, Joanne L [place], APRN-CNP, 5 mg at 01/24/25 0831    aspirin EC tablet 81 mg, 81 mg, oral, Daily, Joanne L Purvi, APRN-CNP, 81 mg at 01/24/25 0831    carvedilol (Coreg) tablet 6.25 mg, 6.25 mg, oral, BID, Joanne L [place], APRN-CNP, 6.25 mg at 01/24/25 0831    dextrose 50 % injection 12.5 g, 12.5 g, intravenous, q15 min PRN, Joanne L Purvi, APRN-CNP, 12.5 g at 01/22/25 2107    dextrose 50 % injection 25 g, 25 g, intravenous, q15 min PRN, Joanne L Purvi, APRN-CNP    fluconazole (Diflucan) tablet 400 mg, 400 mg, oral, Daily, Joanne L [place], APRN-CNP, 400 mg at 01/24/25 0831    glucagon (Glucagen) injection 1 mg, 1 mg, intramuscular, q15 min PRN, Joanne L [place], APRN-CNP    heparin (porcine) injection 5,000 Units, 5,000 Units, subcutaneous, q8h, Joanne L [place], APRN-CNP, 5,000 Units at 01/24/25 0831    insulin lispro injection 0-10 Units, 0-10 Units, subcutaneous, q6h [facility], Chelsea Hernandez PA-C, 2 Units at 01/24/25 1227    insulin NPH (Isophane) (HumuLIN N,NovoLIN N) injection 8 Units, 8 Units, subcutaneous, q AM, Chelsea MORGAN Hernandez PA-C, 8 Units at 01/24/25 0832    lidocaine 4 % patch 2 patch, 2 patch, transdermal, Daily, Joanne L [place], APRN-CNP, 2 patch at 01/23/25 0940    mycophenolate (Cellcept) capsule 1,000 mg, 1,000 mg, oral, q12h EDINSON, Joanne L Purvi, APRN-CNP, 1,000 mg at 01/24/25 0610    oxyCODONE (Roxicodone) immediate release tablet 10 mg, 10 mg, oral, q4h PRN, Joanne L [place], APRN-CNP    oxyCODONE (Roxicodone) immediate release tablet 5 mg, 5 mg, oral, q6h PRN,  Joanne L Purvi, APRN-CNP    pantoprazole (ProtoNix) EC tablet 40 mg, 40 mg, oral, BID, Joanne L Purvi, APRN-CNP, 40 mg at 01/24/25 0831    polyethylene glycol (Glycolax, Miralax) packet 17 g, 17 g, oral, Daily, Ruth Ashleygregoriolinda, APRN-CNP, 17 g at 01/24/25 1227    [START ON 1/25/2025] predniSONE (Deltasone) tablet 20 mg, 20 mg, oral, Daily, Joanne L Purvi, APRN-CNP    sennosides-docusate sodium (Yoselyn-Colace) 8.6-50 mg per tablet 2 tablet, 2 tablet, oral, BID, Joanne L [place], APRN-CNP, 2 tablet at 01/24/25 0831    sulfamethoxazole-trimethoprim (Bactrim) 400-80 mg per tablet 80 mg of trimethoprim, 80 mg of trimethoprim, oral, Daily, Seema Matthews, APRN-CNP, 80 mg of trimethoprim at 01/24/25 0831    tacrolimus (Prograf) capsule 2 mg, 2 mg, oral, q12h EDINSON, Joanne L Purvi, APRN-CNP, 2 mg at 01/24/25 0610    ursodiol (Actigall) capsule 300 mg, 300 mg, oral, q8h, Joanne L [place], APRN-CNP, 300 mg at 01/24/25 1235                                                                            Labs     Reviewed.                                                                         Imaging:     US Liver with doppler 1/21/2025:  IMPRESSION:  1. Interval increase in the main portal vein velocity of the liver  transplant compared to 01/20/2025 ultrasound measuring up to 126.5 cm/sec, with slightly turbulent flow. No evidence of focal narrowing or gradient. Attention on follow-up is recommended.  2. Interval improvement of the previously noted low resistive indices  throughout the hepatic arteries with interval improvement in the  arterial waveforms compared to 01/20/2025 ultrasound.  3. Stable splenomegaly.    US kidney transplant 1/20/2025:  IMPRESSION:  Unremarkable ultrasound and Doppler evaluation of the transplant  kidney as detailed above     US Liver with doppler 1/20/2025:  IMPRESSION:  Status post orthotopic liver transplant with decreased resistive  indices of the hepatic arteries (0.4-0.5), attention on follow-up  imaging is  recommended. The hepatic vasculature is patent.        US liver with doppler 11/4/2024:  IMPRESSION:  Cirrhotic morphology of the liver with sequela of portal hypertension including significant splenomegaly and reversal flow within the splenic vein as detailed.                                                                         GI Procedures     7/2023 EGD  Impression:                                - Normal upper third of esophagus.                             - Grade I esophageal varices.                             - Esophageal mucosal changes suggestive of                             short-segment Matute's esophagus, classified as                             Matute's stage C0-M2 per [place] criteria.                             Biopsy is contraindicated.                             - Small hiatal hernia.                             - Non-bleeding duodenal ulcer with no stigmata                             of bleeding. Appears to be healing well, without                             any signs of recent blood loss.                             - Mucosal changes in the duodenum. Biopsied.                             - No blood was present throughout the entire                               exam.      6/2023 EGD  Impression:                                   - Normal esophagus.                             - Normal stomach.                             - Non-bleeding duodenal ulcer with a visible                             vessel. Injected. Treated with argon plasma                             coagulation (APC).                             - The examination was otherwise normal.                             - No specimens collected.      EGD 6/7/24  Findings  Irregular Z-line 38 cm from the incisors  Three medium grade II varices (no red tiera sign) in the esophagus; no bleeding was identified; placed 3 bands successfully, resulting in partial eradication  Cobblestone, edematous, erythematous and friable mucosa in  the body of the stomach and antrum; performed cold forceps biopsy to rule out H. pylori;  Patchy erythematous and friable mucosa in the duodenal bulb;  The duodenum appeared normal.     EGD 11/1/24  Impression  Irregular Z-line  Matute's esophagus  Multiple small varices in the lower third of the esophagus  Mild and friable portal hypertensive gastropathy in the cardia, fundus of the stomach, body of the stomach and antrum  Abnormal mucosa with erosion in the 1st part of the duodenum  The 2nd part of the duodenum appeared normal.      6/22/23 [facility]: Colonoscopy with [place] Score of 2/2/2: one 5 mm inflammatory type polyp in the sigmoid colon.     ASSESSMENT / PLAN     ASSESSMENT/PLAN:  Goran Ibrahim is a 55 yo male with a past medical history of decompensated MetALD (ascites, variceal bleed, HE) c/b EV s/p banding in June 2024, advanced CKD (baseline creatinine around 2.5), long standing tobacco use, duodenal ulcer, H.pylori infection (negative biopsies in June 2024), and short segment Matute's esophagus (not biopsy confirmed due to the presence of varices) who was listed for SLK. He was subsequently brought in for DCD liver (caval piggyback, rCHA to dSMA/celiac stack HA, duct to duct [small to large with v plasty]) and right kidney transplant which he underwent on 1/20/2025 with Dr. Wright. Hepatology has been consulted to co-manage him post-OLT.      Overall, patient is doing well post-SLK, and he has been extubated and CVVH has been discontinued. His T. bili is a bit high, so he has been started on Ursodiol. Additionally, his immediate post-liver transplant liver US with dopplers showed decreased resistive indices of the hepatic arteries (0.4-0.5), but repeat US liver with dopplers on 1/21/2025 showed interval improvement of the previously noted low resistive indices in the hepatic arteries. LFTs are downtrending. He is making good UOP (auto-diuresing to some extent), but creatinine is downtrending.  Patient transferred to [facility] as of 1/23/2024. Patient getting close to discharge.     Update 1/24/2025:  Patient is doing well, still has not had a bowel movement. Education is being done today. Possible discharge tomorrow. Transplant team trying to keep up with fluid losses because patient is auto-diuresing. Simulect 2nd dose given today.     RECS:  Immunosuppression and prophylactic antibiotics/antifungals per transplant surgery team   Await explant pathology results  Continue Ursodiol   Strict Ins and Outs   Patient will need EGD at some point (once he is fully recovered from transplant) as an outpatient with biopsies to confirm short segment Matute's esophagus that was found during EGD in 2023/2024 (this can be addressed as outpatient)     Patient was seen and discussed with Hepatology attending, Dr. Norris Velasco.     Thank you for the consultation. Hepatology will continue to follow.     - During weekday hours of 7am- 5pm, please do not hesitate to contact me on [employer] Chat or page [phone] if there are any further questions   - After hours, on weekends, and on holidays, please page the [place] [phone]. Thank you.     Venice Meier MD MPH   GI and Hepatology Fellow   [facility]

## 2025-01-24 NOTE — PROGRESS NOTES
"Goran Ibrahim is a 54 y.o. male now POD # 4 s/p SLK transplant.    Subjective   Doing well.    Biliary drain 70, perinephric 180 cc    Objective   Physical Exam  Gen: A+OX3; NAD  Abd: S/NT/ND. Incisions C/D/I.  Drains: Serosanguinous    Ext: trace LE edema    Last Recorded Vitals  Blood pressure 147/82, pulse 68, temperature 36.1 °C (97 °F), temperature source Temporal, resp. rate 16, height 1.778 m (5' 10\"), weight 129 kg (284 lb 6.3 oz), SpO2 92%.  Intake/Output last 3 Shifts:  I/O last 3 completed shifts:  In: 414 (3.2 mL/kg) [P.O.:240; IV Piggyback:174]  Out: 7135 (55.3 mL/kg) [Urine:6625 (1.4 mL/kg/hr); Drains:510]  Weight: 129 kg      Lab Results   Component Value Date    CREATININE 2.89 (H) 01/24/2025    K 3.6 01/24/2025    GLUCOSE 253 (H) 01/24/2025    HGB 8.2 (L) 01/24/2025    WBC 3.4 (L) 01/24/2025    PLT 58 (L) 01/24/2025    CALCIUM 8.7 01/24/2025         Current Facility-Administered Medications:     acyclovir (Zovirax) tablet 400 mg, 400 mg, oral, BID, Seema Matthews APRN-CNP, 400 mg at 01/24/25 0831    albumin human 25 % solution 25 g, 25 g, intravenous, q8h, Seema Matthews APRN-CNP, Stopped at 01/24/25 0436    amLODIPine (Norvasc) tablet 5 mg, 5 mg, oral, Daily, Joanne L [place], APRN-CNP, 5 mg at 01/24/25 0831    aspirin EC tablet 81 mg, 81 mg, oral, Daily, Joanne L Purvi, APRN-CNP, 81 mg at 01/24/25 0831    basiliximab (Simulect) 20 mg in sodium chloride 0.9% 50 mL IV, 20 mg, intravenous, Once, Joanne L [place], APRN-CNP    carvedilol (Coreg) tablet 6.25 mg, 6.25 mg, oral, BID, Joanne L [place], APRN-CNP, 6.25 mg at 01/24/25 0831    dextrose 50 % injection 12.5 g, 12.5 g, intravenous, q15 min PRN, Joanne L [place], APRN-CNP, 12.5 g at 01/22/25 2107    dextrose 50 % injection 25 g, 25 g, intravenous, q15 min PRN, Joanne L [place], APRN-CNP    fluconazole (Diflucan) tablet 400 mg, 400 mg, oral, Daily, Joanne L Purvi, APRN-CNP, 400 mg at 01/24/25 0831    glucagon (Glucagen) injection 1 mg, 1 mg, intramuscular, q15 min " PRN, Joanne L Purvi, APRN-CNP    heparin (porcine) injection 5,000 Units, 5,000 Units, subcutaneous, q8h, Joanne L [place], APRN-CNP, 5,000 Units at 01/24/25 0831    insulin lispro injection 0-10 Units, 0-10 Units, subcutaneous, q6h EDINSON, Chlesea MORGAN Hernandez PA-C, 4 Units at 01/24/25 0612    insulin NPH (Isophane) (HumuLIN N,NovoLIN N) injection 8 Units, 8 Units, subcutaneous, q AM, Chelsea MORGAN Hernandez, PA-C, 8 Units at 01/24/25 0832    lidocaine 4 % patch 2 patch, 2 patch, transdermal, Daily, Joanne L Purvi, APRN-CNP, 2 patch at 01/23/25 0940    mycophenolate (Cellcept) capsule 1,000 mg, 1,000 mg, oral, q12h EDINSON, Joanne L [place], APRN-CNP, 1,000 mg at 01/24/25 0610    oxyCODONE (Roxicodone) immediate release tablet 10 mg, 10 mg, oral, q4h PRN, Joanne L [place], APRN-CNP    oxyCODONE (Roxicodone) immediate release tablet 5 mg, 5 mg, oral, q6h PRN, Joanne L Purvi, APRN-CNP    pantoprazole (ProtoNix) EC tablet 40 mg, 40 mg, oral, BID, Joanne L [place], APRN-CNP, 40 mg at 01/24/25 0831    [START ON 1/25/2025] predniSONE (Deltasone) tablet 20 mg, 20 mg, oral, Daily, Joanne L Purvi, APRN-CNP    sennosides-docusate sodium (Yoselyn-Colace) 8.6-50 mg per tablet 2 tablet, 2 tablet, oral, BID, Joanne L [place], APRN-CNP, 2 tablet at 01/24/25 0831    sulfamethoxazole-trimethoprim (Bactrim) 400-80 mg per tablet 80 mg of trimethoprim, 80 mg of trimethoprim, oral, Daily, CHRIS Haskins, 80 mg of trimethoprim at 01/24/25 0831    tacrolimus (Prograf) capsule 2 mg, 2 mg, oral, q12h EDINSON, Joanne L [place], APRN-CNP, 2 mg at 01/24/25 0610    ursodiol (Actigall) capsule 300 mg, 300 mg, oral, q8h, Joanne L [place], APRN-CNP, 300 mg at 01/24/25 0610     Assessment/Plan    S/p SLK 1/20/25   DCD, caval piggyback, rCHA to dSMA/celiac stack HA, duct to duct (small to large with v plasty)  Transanimates down trending  Expect bili to be slow to clear  Interval liver US good  kidney drain. Dc biliary KAITLYNN  ASA 81. Heparin DVT ppx  Cont [albumin]. Low dose lasix today  Cont  katerin  5 day chaudhary (remove sat)  PT/OT    Immunosuppression   Simulect 1/20 - second dose completed    tac 2:2  MMF 1000 BID  Steroid taper     I spent 35 minutes in the professional and overall care of this patient.      Priscila Wright MD

## 2025-01-24 NOTE — CONSULTS
Nutrition Diet Education  Dietitian discretion (s/p transplant)     Education Documentation  Nutrition Related Education, taught by Jasmine Monique RDN, LD at 1/24/2025 12:21 PM.  Learner: Family, Patient  Readiness: Acceptance  Method: Explanation, Handout  Response: Verbalizes Understanding  Comment: Discussed food safety following transplant. Advised the patient on proper meat preparation, re-heat deli meats and hot dogs, no raw fish, no unpasteurized dairy products, wash all fruits and vegetables, no grapefruit/pomegranate/starfruit/raw sprouts, handwashing, cross-contamination, etc. Pt denies any potential struggles following this diet. Advised pt on the importance of cooking all meats, fish and eggs to the proper temperatures. Provided patient with the food safety handout packet.

## 2025-01-24 NOTE — PROGRESS NOTES
"Mr. Ibrahim received a liver/kidney transplant on 1/20/2025. SW reviewed the pre transplant Social Work evaluation as well as interdisciplinary notes of this admission. Attended transplant interdisciplinary rounds. SW reviewed, participated and is in agreement with MDT Plan of Care. I met with patient who was awake, alert, oriented and able to discuss their condition.    Functional/Medical Status: SW met with Pt alone at the bedside. Pt was laying in bed during the visit. Pt reported he has been eating and drinking okay. Pt shared he has been ambulating \"as much as they let me.\"  Adaption to the Stress of Transplant: Pt denied any concerns related to transplant at this time.   Mental Health/Substance use: Pt reported his mood as \"good.\" Pt denied any current MH concerns. Pt denied any recent tobacco, alcohol, or illicit drug use. Pt shared he was still attending daily AA meetings prior to transplant.   Post Discharge Supports/Recovery Plan: Pt reported his primary support has changed to his brother, Lenny, and his secondary support has changed to his friend, Gerda Villalobos. Pt shared he has other friends who are also available to help if needed. Pt reported one of his supports will be picking him up and they will be rotating who stays with him once home. Pt inquired if he can utilize medical transportation for his appointments, and SW explained that the team recommends no public/medical transportation for several months. Pt expressed understanding.  Benefits: [employer]    Impressions: SW met with Pt alone at the bedside. Pt was laying in bed during the visit. Pt reported he has been eating and drinking okay. Pt shared he has been ambulating \"as much as they let me.\" Pt denied any concerns related to transplant at this time. Pt reported his mood as \"good.\" Pt denied any current MH concerns. Pt denied any recent tobacco, alcohol, or illicit drug use. Pt shared he was still attending daily AA meetings prior to " transplant. Pt reported his primary support has changed to his brother, Lenny, and his secondary support has changed to his friend, Gerda Villalobos. Pt shared he has other friends who are also available to help if needed. Pt reported one of his supports will be picking him up and they will be rotating who stays with him once home. Pt inquired if he can utilize medical transportation for his appointments, and SW explained that the team recommends no public/medical transportation for several months. Pt expressed understanding. Patient and family/support system are in agreement and report understanding of their treatment plan. They were provided an opportunity to ask questions, though denied any issues or concerns at this time.        PLAN:  We reviewed the importance of having lab work done twice a week or as directed; scheduled post-transplant appointments; reporting alarming symptoms; restrictions of activities and importance of adherence to medical regimen. We also discussed the precautions to take due to being immunosuppressed. Patient indicated understanding of this information along with the discharge plan and instructions. Patient was given the opportunity to discuss any issues. Patient was given social work contact information.

## 2025-01-24 NOTE — PROGRESS NOTES
Physical Therapy    Physical Therapy Treatment    Patient Name: Goran Ibrahim  MRN: [id number]  Department: [facility] 9  Room: [facility]9087-  Today's Date: 1/24/2025  Time Calculation  Start Time: 1736  Stop Time: 1801  Time Calculation (min): 25 min         Assessment/Plan   PT Assessment  PT Assessment Results: Decreased strength, Decreased endurance, Impaired balance, Decreased mobility  End of Session Communication: Bedside nurse  Assessment Comment: Pt ambulated 200' using FWW with SBA. Pt with heavy reliance on FWW throughout ambulation. Pt able to ascend/descend 7 stairs using B HR with SBA.  End of Session Patient Position: Bed, 3 rail up, Alarm off, not on at start of session  PT Plan  Inpatient/Swing Bed or Outpatient: Inpatient  PT Plan  Treatment/Interventions: Bed mobility, Transfer training, Gait training, Stair training, Balance training, Strengthening, Endurance training, Range of motion, Therapeutic exercise, Therapeutic activity, Home exercise program  PT Plan: Ongoing PT  PT Frequency: 5 times per week  PT Discharge Recommendations: Low intensity level of continued care  Equipment Recommended upon Discharge: Wheeled walker  PT Recommended Transfer Status: Assist x1, Assistive device  PT - OK to Discharge: Yes      General Visit Information:   PT  Visit  PT Received On: 01/24/25  General  Prior to Session Communication: Bedside nurse  Patient Position Received: Bed, 3 rail up, Alarm off, not on at start of session  General Comment: Pt supine in bed upon arrival. Pt agreeable to therapy after encouragement.    Subjective   Precautions:  Precautions  Medical Precautions: Fall precautions  Post-Surgical Precautions: Abdominal surgery precautions  Precautions Comment: Pt reeducated in abdominal precautions and importance of log roll            Objective   Pain:     Cognition:  Cognition  Overall Cognitive Status: Within Functional Limits    Activity Tolerance:  Activity Tolerance  Endurance: Tolerates 10 - 20  min exercise with multiple rests  Treatments:  Bed Mobility  Bed Mobility: Yes  Bed Mobility 1  Bed Mobility 1: Supine to sitting  Level of Assistance 1: Contact guard, Moderate tactile cues  Bed Mobility Comments 1: cuing for log roll technique  Bed Mobility 2  Bed Mobility  2: Sitting to supine  Level of Assistance 2: Moderate assistance  Bed Mobility Comments 2: assist with B LEs, cuing for log roll    Ambulation/Gait Training  Ambulation/Gait Training Performed: Yes  Ambulation/Gait Training 1  Surface 1: Level tile  Device 1: Rolling walker  Assistance 1: Close supervision  Quality of Gait 1: Decreased step length, Diminished heel strike, Forward flexed posture (heavy reliance on FWW)  Comments/Distance (ft) 1: 200' with multiple standing rest breaks due to fatigue  Transfers  Transfer: Yes  Transfer 1  Transfer From 1: Sit to, Stand to  Transfer to 1: Stand, Sit  Technique 1: Sit to stand, Stand to sit  Transfer Device 1: Walker  Transfer Level of Assistance 1: Close supervision  Trials/Comments 1: Pt pulls from FWW and requires cuing to correct.    Stairs  Stairs: Yes  Stairs  Rails 1: Bilateral  Curb Step 1: No  Device 1: Railing  Assistance 1: Close supervision  Comment/Number of Steps 1: 7 with standing rest break    Outcome Measures:  [facility] Basic Mobility  Turning from your back to your side while in a flat bed without using bedrails: A little  Moving from lying on your back to sitting on the side of a flat bed without using bedrails: A little  Moving to and from bed to chair (including a wheelchair): A little  Standing up from a chair using your arms (e.g. wheelchair or bedside chair): A little  To walk in hospital room: A little  Climbing 3-5 steps with railing: A little  Basic Mobility - Total Score: 18    Education Documentation  Precautions, taught by Doretha Shelley PTA at 1/24/2025  6:09 PM.  Learner: Patient  Readiness: Acceptance  Method: Explanation  Response: Verbalizes Understanding, Needs  Reinforcement  Comment: Pt educated in abdominal precautions and the importance of utilizing the log roll during bed mobility.    Education Comments  No comments found.        OP EDUCATION:       Encounter Problems       Encounter Problems (Active)       Balance       STG - Maintains dynamic standing balance SUP without upper extremity support to decrease the risk of falls. (Progressing)       Start:  01/21/25    Expected End:  02/11/25               Mobility       STG - Patient will ambulate 50' w/ LRAD SUP to promote functional IND in the home. (Progressing)       Start:  01/21/25    Expected End:  02/11/25            STG - Patient will ascend and descend 3 stairs w/ 1 HR SUP to increase safety entering/exiting the home.  (Progressing)       Start:  01/21/25    Expected End:  02/11/25               Mobility       Pt will mobilize supine<>sitting EOB SUP to promote functional IND. (Progressing)       Start:  01/21/25    Expected End:  02/11/25               PT Transfers       STG - Patient will transfer sit to and from stand w/LRAD SUP to improve functional mobility.  (Progressing)       Start:  01/21/25    Expected End:  02/11/25               Pain - Adult

## 2025-01-24 NOTE — PROGRESS NOTES
Pharmacist Transplant Discharge Note    Medications for Goran Ibrahim were reviewed in conjunction with the multidisciplinary transplant team. The pharmacist is in agreement with the plan of care for medications at this time.     All questions were answered to the patient's satisfaction, and the patient confirmed understanding.    The patient had his medications filled at [facility] and delivered prior to discharge. Further educational reinforcement should be provided in the outpatient clinic.    Chantel Ventura, [employer], BCTXP  Solid Organ Transplant Clinical Pharmacy Specialist

## 2025-01-24 NOTE — PROGRESS NOTES
Pharmacist Transplant Education Note    The medications for Goran Ibrahim were reviewed in conjunction with the multidisciplinary transplant team. The pharmacist is in agreement with the plan of care for medications at this time.     Approximately 60 minutes were spent with this patient providing medication education and reviewing new post-transplant medications. The patient's brother was also present for this education session.    An outpatient dosing schedule was created for all oral medications. This schedule was discussed in detail with the patient, including drug name, use, dose, and the appropriate timing of self-administration (i.e. with or without meals, with or without other medications). Also discussed side effects, drug interactions, and the importance of adherence. Both verbal and written instructions were given to the patient outlining the appropriate use of all current oral medications.     The patient demonstrated an acceptable understanding of his current medication list. All questions were answered to the patient's satisfaction, and the patient and his brother confirmed understanding.     Further educational reinforcement should be provided in the outpatient clinic.    Chantel Ventura, PharmD, BCTXP   Solid Organ Transplant Clinical Pharmacy Specialist

## 2025-01-24 NOTE — SIGNIFICANT EVENT
01/21/25 1130   Patient Interaction   Organ Liver   Type of Interaction Phone conference   Interdisciplinary Rounds   Attendance Surgeon;Physician;CHERYL;Coordinator;Pharmacist   Topics Discussed Medications;Blood test results;Activity;Imaging/test results     Transplant Surgery Multidisciplinary Team Note    Goran Ibrahim is a 54 y.o. male   POD# 4 from a Kidney and Liver from a DCD donor. His post operative complications: None    24 Hour Events  1. No acute events    Last Recorded Vitals  Visit Vitals  /82 (BP Location: Right arm, Patient Position: Lying)   Pulse 67   Temp 36.4 °C (97.5 °F) (Oral)   Resp 16      Intake/Output last 3 Shifts:    Intake/Output Summary (Last 24 hours) at 1/24/2025 1250  Last data filed at 1/24/2025 1130  Gross per 24 hour   Intake 0 ml   Output 3845 ml   Net -3845 ml      Vitals:    01/24/25 0600   Weight: 129 kg (284 lb 6.3 oz)        Assessment/Plan   Principal Problem:    S/P SLK  -High volume urine output--> 4.5L  -concentrated albumin  -start acyclovir    Hyperglycemia  -appreciate endocrine recs  -DM educator      Management after organ transplant  Active Problems:  Patient Active Problem List   Diagnosis    Preop cardiovascular exam    Hypertension    Liver cirrhosis (Multi)    Pre-kidney transplant, listed    Pre-liver transplant, listed    Esophageal varices in alcoholic cirrhosis (Multi)    Hepatic cirrhosis, unspecified hepatic cirrhosis type, unspecified whether ascites present (Multi)    ESRD (end stage renal disease) (Multi)    Steroid-induced hyperglycemia        Immunosuppression reviewed and adjusted       Induction: Simulect       Tacrolimus goal 8-10 ng/mL. Current dose  2  mg BID, starting tonight         MMF 1000 mg PO BID       Solumedrol taper  DVT prophylaxis SCDS  PT/OT  Diet: 75 g diabetic diet  Anticipated discharge: Home Saturday vs Monday     Ruth Javier, APRN-CNP

## 2025-01-24 NOTE — NURSING NOTE
Mr. Ibrahim is resting in bed.  States he may be discharged home tomorrow -- he is awaiting confirmation.    Insulin pen teaching reviewed.  He is able to do return demo correctly and without coaching.  We reviewed the insulin plan for home.  We practiced BG scenarios and he is able to determine the meal time dose of insulin correctly.    His phone is NOT compatible with either brand of CGM.  Endocrinology team made aware and will provide Dexcom reader.  2 sensors left at bedside for his use.  Time spent:  30 mins.

## 2025-01-24 NOTE — CARE PLAN
The patient's goals for the shift include Pt wants to go to surgury    The clinical goals for the shift include Patient will remain HDS throughout this shift      Problem: Pain - Adult  Goal: Verbalizes/displays adequate comfort level or baseline comfort level  Outcome: Progressing     Problem: Safety - Adult  Goal: Free from fall injury  Outcome: Progressing     Problem: Discharge Planning  Goal: Discharge to home or other facility with appropriate resources  Outcome: Progressing     Problem: Chronic Conditions and Co-morbidities  Goal: Patient's chronic conditions and co-morbidity symptoms are monitored and maintained or improved  Outcome: Progressing     Problem: Fall/Injury  Goal: Not fall by end of shift  Outcome: Progressing  Goal: Be free from injury by end of the shift  Outcome: Progressing  Goal: Verbalize understanding of personal risk factors for fall in the hospital  Outcome: Progressing  Goal: Verbalize understanding of risk factor reduction measures to prevent injury from fall in the home  Outcome: Progressing  Goal: Use assistive devices by end of the shift  Outcome: Progressing  Goal: Pace activities to prevent fatigue by end of the shift  Outcome: Progressing     Problem: Knowledge Deficit  Goal: Patient/family/caregiver demonstrates understanding of disease process, treatment plan, medications, and discharge instructions  Outcome: Progressing     Problem: Skin  Goal: Decreased wound size/increased tissue granulation at next dressing change  Outcome: Progressing  Flowsheets (Taken 1/24/2025 0429)  Decreased wound size/increased tissue granulation at next dressing change: Protective dressings over bony prominences  Goal: Participates in plan/prevention/treatment measures  Outcome: Progressing  Flowsheets (Taken 1/24/2025 0429)  Participates in plan/prevention/treatment measures: Increase activity/out of bed for meals  Goal: Prevent/manage excess moisture  Outcome: Progressing  Flowsheets (Taken  1/24/2025 0429)  Prevent/manage excess moisture:   Cleanse incontinence/protect with barrier cream   Follow provider orders for dressing changes  Goal: Prevent/minimize sheer/friction injuries  Outcome: Progressing  Flowsheets (Taken 1/24/2025 0429)  Prevent/minimize sheer/friction injuries: Turn/reposition every 2 hours/use positioning/transfer devices  Goal: Promote/optimize nutrition  Outcome: Progressing  Goal: Promote skin healing  Outcome: Progressing     Problem: Pain  Goal: Takes deep breaths with improved pain control throughout the shift  Outcome: Progressing  Goal: Turns in bed with improved pain control throughout the shift  Outcome: Progressing  Goal: Walks with improved pain control throughout the shift  Outcome: Progressing  Goal: Performs ADL's with improved pain control throughout shift  Outcome: Progressing  Goal: Participates in PT with improved pain control throughout the shift  Outcome: Progressing  Goal: Free from opioid side effects throughout the shift  Outcome: Progressing  Goal: Free from acute confusion related to pain meds throughout the shift  Outcome: Progressing

## 2025-01-24 NOTE — PROGRESS NOTES
01/24/25      Met with patient and friend Lenny in room.  Patient discharging to home over weekend.  Pill box filled by patient with coordinator supervision.  Discharge appointments given by hand.      Inpatient Discharge Education Provided              Reviewed signs and symptoms of rejection with patient             Reviewed signs and symptoms of infection with patient             Reviewed transplant medication indications with patient             Reviewed transplant medication administration with patient             Reviewed transplant medication side effects with patient             Patient verbalized good understanding of transplant medication indication, administration, and side effects             Patient demonstrated ability to fill pillbox without error or difficulty             Discussed with the patient the importance of following post-transplant lab and appointment schedules             Patient successfully completed discharge education test             Provided to patient all [facility] contact information for both during and after hours      Sparkle Unger RN

## 2025-01-24 NOTE — PROGRESS NOTES
Goran Ibrahim is a 54 y.o. male on day 4 of admission presenting with Hepatic cirrhosis, unspecified hepatic cirrhosis type, unspecified whether ascites present (Multi).    Subjective   Pt seen and examined today.   Yesterday patient received 125 of methypred, NPH held with glucoses in the 200's through the day. Was able to eat lunch yesterday, appetite remains poor.   Reviewed tentative homegoing plan.   Dexcom sensors and receivers to be delivered this afternoon.      I have reviewed histories, allergies and medications have been reviewed and there are no changes     Objective   Review of Systems   Gastrointestinal:  Positive for abdominal distention.        Appropriate to procedure   All other systems reviewed and are negative.    Physical Exam  Constitutional:       Appearance: Normal appearance. He is normal weight.   HENT:      Head: Normocephalic and atraumatic.      Nose: Nose normal.      Mouth/Throat:      Mouth: Mucous membranes are dry.      Pharynx: Oropharynx is clear.   Eyes:      Extraocular Movements: Extraocular movements intact.      Conjunctiva/sclera: Conjunctivae normal.   Cardiovascular:      Rate and Rhythm: Normal rate and regular rhythm.      Pulses: Normal pulses.      Heart sounds: Normal heart sounds.   Pulmonary:      Effort: Pulmonary effort is normal.      Breath sounds: Normal breath sounds.   Abdominal:      General: There is distension.      Palpations: Abdomen is soft.   Skin:     General: Skin is warm and dry.   Neurological:      General: No focal deficit present.      Mental Status: He is alert and oriented to person, place, and time. Mental status is at baseline.   Psychiatric:         Mood and Affect: Mood normal.         Behavior: Behavior normal.         Thought Content: Thought content normal.         Judgment: Judgment normal.     Last Recorded Vitals  Blood pressure 147/82, pulse 68, temperature 36.1 °C (97 °F), temperature source Temporal, resp. rate 16, height 1.778 m  "(5' 10\"), weight 129 kg (284 lb 6.3 oz), SpO2 92%.  Intake/Output last 3 Shifts:  I/O last 3 completed shifts:  In: 414 (3.2 mL/kg) [P.O.:240; IV Piggyback:174]  Out: 7135 (55.3 mL/kg) [Urine:6625 (1.4 mL/kg/hr); Drains:510]  Weight: 129 kg     Relevant Results  Results from last 7 days   Lab Units 01/24/25  0611 01/24/25  0537 01/23/25  2341 01/23/25  2040 01/23/25  1835 01/23/25  1119 01/23/25  0815 01/23/25  0542 01/22/25  1143 01/22/25  1135 01/22/25  0317 01/22/25  0219 01/21/25  2117 01/21/25 1955   POCT GLUCOSE mg/dL 204*  --  282* 300* 259* 191*   < >  --    < >  --    < >  --    < >  --    GLUCOSE mg/dL  --  253*  --   --   --   --   --  219*  --  194*  --  170*  --  279*    < > = values in this interval not displayed.     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 male presenting with past medical history of decompensated MetALD (ascites, variceal bleed, HE) c/b EV s/p banding in June 2024, advanced CKD (baseline creatinine around 2.5), long standing tobacco use, duodenal ulcer, H.pylori infection (negative biopsies in June 2024), and short segment Matute's esophagus (not biopsy confirmed due to the presence of varices) who was listed for SLK. He was subsequently brought in for DCD liver (caval piggyback, rCHA to dSMA/celiac stack HA, duct to duct [small to large with v plasty]) and right kidney transplant which he underwent on 1/20/2025 with Dr. Wright. Hepatology has been consulted to co-manage him post-OLT.     Diabetes History  Type of diabetes: type 2 diabetes  Seen by PCP or Endocrinology: PCP  Frequency of glucose checks: not checking   Glucose review:  NA, not checking  Frequency of Hypoglycemia:  none  Hypoglycemia unawareness: no  Severe hypoglycemia requiring assistance from others:  no    No diabetes medications  Assessment & Plan  Hepatic cirrhosis, unspecified hepatic cirrhosis type, unspecified whether " ascites present (Multi)    ESRD (end stage renal disease) (Multi)    Steroid-induced hyperglycemia    PLAN  Steroids:  methylpred 500mg intra-op  methylpred 250 mg x1d  methylpred 125mg x1d  methylpred 125mg x1d  methylpred 60mg x1d  pred 20mg ongoing     Nutrition: PO  75g CHO/meal     - continue NPH 8u, give at same time as  AM solumedrol/prednisone       If NPO: continue same     - adjust lispro corrective scale to #2 AC and nightly if NPO or if persistently hyperglycemic, please change to Q4   = 0u  151-200 = 2u  201-250 = 4u  251-300 = 6u  301-350 = 8u  351-400 = 10u     -Accuchecks (not BMP) ACHS  - Goal -180  -Hypoglycemia protocol  -Will continue to follow and titrate insulin accordingly  - diabetes educator consulted for insulin and CGM teaching       Discharge planning:   [] patient may expect to discharge home on NPH and lispro with CGM, final doses TBD by titration  [x]Dexcom G7 sensors and reader already delivered to bedside - phone not compatible  [x]will enroll pt in  pharmacy platinum plan program  [x]follow up with Chelsea Hernandez PA-C in PTDM clinic, scheduled 1/30/25    I spent 50 minutes in the professional and overall care of this patient.    ZION Cosme-CNP

## 2025-01-25 LAB
25(OH)D3 SERPL-MCNC: 41 NG/ML (ref 30–100)
ALBUMIN SERPL BCP-MCNC: 4.3 G/DL (ref 3.4–5)
ALP SERPL-CCNC: 179 U/L (ref 33–120)
ALT SERPL W P-5'-P-CCNC: 232 U/L (ref 10–52)
ANION GAP SERPL CALC-SCNC: 20 MMOL/L (ref 10–20)
AST SERPL W P-5'-P-CCNC: 64 U/L (ref 9–39)
BILIRUB DIRECT SERPL-MCNC: 1.4 MG/DL (ref 0–0.3)
BILIRUB SERPL-MCNC: 2.2 MG/DL (ref 0–1.2)
BUN SERPL-MCNC: 97 MG/DL (ref 6–23)
CA-I BLD-SCNC: 1.15 MMOL/L (ref 1.1–1.33)
CALCIUM SERPL-MCNC: 9 MG/DL (ref 8.6–10.6)
CHLORIDE SERPL-SCNC: 101 MMOL/L (ref 98–107)
CO2 SERPL-SCNC: 20 MMOL/L (ref 21–32)
CREAT SERPL-MCNC: 2.65 MG/DL (ref 0.5–1.3)
EGFRCR SERPLBLD CKD-EPI 2021: 27 ML/MIN/1.73M*2
ERYTHROCYTE [DISTWIDTH] IN BLOOD BY AUTOMATED COUNT: 16 % (ref 11.5–14.5)
GGT SERPL-CCNC: 469 U/L (ref 5–64)
GLUCOSE BLD MANUAL STRIP-MCNC: 122 MG/DL (ref 74–99)
GLUCOSE BLD MANUAL STRIP-MCNC: 184 MG/DL (ref 74–99)
GLUCOSE BLD MANUAL STRIP-MCNC: 249 MG/DL (ref 74–99)
GLUCOSE BLD MANUAL STRIP-MCNC: 264 MG/DL (ref 74–99)
GLUCOSE BLD MANUAL STRIP-MCNC: 267 MG/DL (ref 74–99)
GLUCOSE SERPL-MCNC: 180 MG/DL (ref 74–99)
HCT VFR BLD AUTO: 27.5 % (ref 41–52)
HGB BLD-MCNC: 8.4 G/DL (ref 13.5–17.5)
MAGNESIUM SERPL-MCNC: 2.66 MG/DL (ref 1.6–2.4)
MCH RBC QN AUTO: 29.9 PG (ref 26–34)
MCHC RBC AUTO-ENTMCNC: 30.5 G/DL (ref 32–36)
MCV RBC AUTO: 98 FL (ref 80–100)
NRBC BLD-RTO: 0 /100 WBCS (ref 0–0)
PHOSPHATE SERPL-MCNC: 3.7 MG/DL (ref 2.5–4.9)
PLATELET # BLD AUTO: 57 X10*3/UL (ref 150–450)
POTASSIUM SERPL-SCNC: 3.4 MMOL/L (ref 3.5–5.3)
PROT SERPL-MCNC: 6.4 G/DL (ref 6.4–8.2)
RBC # BLD AUTO: 2.81 X10*6/UL (ref 4.5–5.9)
SODIUM SERPL-SCNC: 138 MMOL/L (ref 136–145)
TACROLIMUS BLD-MCNC: 5.5 NG/ML
WBC # BLD AUTO: 3.8 X10*3/UL (ref 4.4–11.3)

## 2025-01-25 PROCEDURE — 2500000005 HC RX 250 GENERAL PHARMACY W/O HCPCS: Mod: SE

## 2025-01-25 PROCEDURE — 84100 ASSAY OF PHOSPHORUS: CPT

## 2025-01-25 PROCEDURE — 2500000002 HC RX 250 W HCPCS SELF ADMINISTERED DRUGS (ALT 637 FOR MEDICARE OP, ALT 636 FOR OP/ED): Mod: SE

## 2025-01-25 PROCEDURE — 99233 SBSQ HOSP IP/OBS HIGH 50: CPT | Performed by: INTERNAL MEDICINE

## 2025-01-25 PROCEDURE — 82306 VITAMIN D 25 HYDROXY: CPT | Performed by: PHYSICIAN ASSISTANT

## 2025-01-25 PROCEDURE — 2500000004 HC RX 250 GENERAL PHARMACY W/ HCPCS (ALT 636 FOR OP/ED): Mod: JZ,SE,TB

## 2025-01-25 PROCEDURE — 82248 BILIRUBIN DIRECT: CPT

## 2025-01-25 PROCEDURE — 2500000004 HC RX 250 GENERAL PHARMACY W/ HCPCS (ALT 636 FOR OP/ED): Mod: SE

## 2025-01-25 PROCEDURE — 80053 COMPREHEN METABOLIC PANEL: CPT

## 2025-01-25 PROCEDURE — 99232 SBSQ HOSP IP/OBS MODERATE 35: CPT | Performed by: STUDENT IN AN ORGANIZED HEALTH CARE EDUCATION/TRAINING PROGRAM

## 2025-01-25 PROCEDURE — 2500000001 HC RX 250 WO HCPCS SELF ADMINISTERED DRUGS (ALT 637 FOR MEDICARE OP): Mod: SE

## 2025-01-25 PROCEDURE — 99233 SBSQ HOSP IP/OBS HIGH 50: CPT | Performed by: NURSE PRACTITIONER

## 2025-01-25 PROCEDURE — 82330 ASSAY OF CALCIUM: CPT

## 2025-01-25 PROCEDURE — 2500000004 HC RX 250 GENERAL PHARMACY W/ HCPCS (ALT 636 FOR OP/ED): Mod: SE | Performed by: STUDENT IN AN ORGANIZED HEALTH CARE EDUCATION/TRAINING PROGRAM

## 2025-01-25 PROCEDURE — 82947 ASSAY GLUCOSE BLOOD QUANT: CPT

## 2025-01-25 PROCEDURE — 80197 ASSAY OF TACROLIMUS: CPT

## 2025-01-25 PROCEDURE — RXMED WILLOW AMBULATORY MEDICATION CHARGE

## 2025-01-25 PROCEDURE — 2500000002 HC RX 250 W HCPCS SELF ADMINISTERED DRUGS (ALT 637 FOR MEDICARE OP, ALT 636 FOR OP/ED): Mod: SE | Performed by: PHYSICIAN ASSISTANT

## 2025-01-25 PROCEDURE — 99232 SBSQ HOSP IP/OBS MODERATE 35: CPT | Performed by: TRANSPLANT SURGERY

## 2025-01-25 PROCEDURE — 36415 COLL VENOUS BLD VENIPUNCTURE: CPT

## 2025-01-25 PROCEDURE — 1100000001 HC PRIVATE ROOM DAILY

## 2025-01-25 PROCEDURE — 83735 ASSAY OF MAGNESIUM: CPT

## 2025-01-25 PROCEDURE — 85027 COMPLETE CBC AUTOMATED: CPT

## 2025-01-25 PROCEDURE — 82977 ASSAY OF GGT: CPT

## 2025-01-25 PROCEDURE — P9047 ALBUMIN (HUMAN), 25%, 50ML: HCPCS | Mod: JZ,SE,TB

## 2025-01-25 RX ORDER — INSULIN LISPRO 100 [IU]/ML
4 INJECTION, SOLUTION INTRAVENOUS; SUBCUTANEOUS
Status: DISCONTINUED | OUTPATIENT
Start: 2025-01-25 | End: 2025-01-26

## 2025-01-25 RX ORDER — TAMSULOSIN HYDROCHLORIDE 0.4 MG/1
0.4 CAPSULE ORAL DAILY
Qty: 30 CAPSULE | Refills: 0 | Status: SHIPPED | OUTPATIENT
Start: 2025-01-25 | End: 2025-02-26

## 2025-01-25 RX ORDER — POTASSIUM CHLORIDE 20 MEQ/1
20 TABLET, EXTENDED RELEASE ORAL ONCE
Status: COMPLETED | OUTPATIENT
Start: 2025-01-25 | End: 2025-01-25

## 2025-01-25 RX ORDER — TACROLIMUS 1 MG/1
3 CAPSULE ORAL
Status: DISCONTINUED | OUTPATIENT
Start: 2025-01-25 | End: 2025-01-28 | Stop reason: HOSPADM

## 2025-01-25 RX ORDER — TAMSULOSIN HYDROCHLORIDE 0.4 MG/1
0.4 CAPSULE ORAL DAILY
Status: DISCONTINUED | OUTPATIENT
Start: 2025-01-25 | End: 2025-01-28 | Stop reason: HOSPADM

## 2025-01-25 RX ADMIN — INSULIN LISPRO 2 UNITS: 100 INJECTION, SOLUTION INTRAVENOUS; SUBCUTANEOUS at 06:02

## 2025-01-25 RX ADMIN — FLUCONAZOLE 400 MG: 200 TABLET ORAL at 09:04

## 2025-01-25 RX ADMIN — PANTOPRAZOLE SODIUM 40 MG: 40 TABLET, DELAYED RELEASE ORAL at 09:05

## 2025-01-25 RX ADMIN — ASPIRIN 81 MG: 81 TABLET, COATED ORAL at 09:04

## 2025-01-25 RX ADMIN — INSULIN LISPRO 4 UNITS: 100 INJECTION, SOLUTION INTRAVENOUS; SUBCUTANEOUS at 17:58

## 2025-01-25 RX ADMIN — INSULIN HUMAN 8 UNITS: 100 INJECTION, SUSPENSION SUBCUTANEOUS at 09:05

## 2025-01-25 RX ADMIN — MYCOPHENOLATE MOFETIL 1000 MG: 250 CAPSULE ORAL at 06:01

## 2025-01-25 RX ADMIN — MYCOPHENOLATE MOFETIL 1000 MG: 250 CAPSULE ORAL at 17:57

## 2025-01-25 RX ADMIN — CARVEDILOL 6.25 MG: 6.25 TABLET, FILM COATED ORAL at 20:25

## 2025-01-25 RX ADMIN — ACYCLOVIR 400 MG: 400 TABLET ORAL at 20:24

## 2025-01-25 RX ADMIN — POTASSIUM CHLORIDE 20 MEQ: 1500 TABLET, EXTENDED RELEASE ORAL at 10:15

## 2025-01-25 RX ADMIN — TACROLIMUS 3 MG: 1 CAPSULE ORAL at 17:57

## 2025-01-25 RX ADMIN — ACYCLOVIR 400 MG: 400 TABLET ORAL at 09:04

## 2025-01-25 RX ADMIN — URSODIOL 300 MG: 300 CAPSULE ORAL at 05:54

## 2025-01-25 RX ADMIN — SULFAMETHOXAZOLE AND TRIMETHOPRIM 80 MG OF TRIMETHOPRIM: 400; 80 TABLET ORAL at 09:04

## 2025-01-25 RX ADMIN — CARVEDILOL 6.25 MG: 6.25 TABLET, FILM COATED ORAL at 09:04

## 2025-01-25 RX ADMIN — ALBUMIN HUMAN 25 G: 0.25 SOLUTION INTRAVENOUS at 03:36

## 2025-01-25 RX ADMIN — HEPARIN SODIUM 5000 UNITS: 5000 INJECTION INTRAVENOUS; SUBCUTANEOUS at 09:04

## 2025-01-25 RX ADMIN — HEPARIN SODIUM 5000 UNITS: 5000 INJECTION INTRAVENOUS; SUBCUTANEOUS at 17:57

## 2025-01-25 RX ADMIN — URSODIOL 300 MG: 300 CAPSULE ORAL at 13:13

## 2025-01-25 RX ADMIN — PANTOPRAZOLE SODIUM 40 MG: 40 TABLET, DELAYED RELEASE ORAL at 20:25

## 2025-01-25 RX ADMIN — TAMSULOSIN HYDROCHLORIDE 0.4 MG: 0.4 CAPSULE ORAL at 13:13

## 2025-01-25 RX ADMIN — TACROLIMUS 2 MG: 1 CAPSULE ORAL at 06:01

## 2025-01-25 RX ADMIN — HEPARIN SODIUM 5000 UNITS: 5000 INJECTION INTRAVENOUS; SUBCUTANEOUS at 00:43

## 2025-01-25 RX ADMIN — INSULIN HUMAN 2 UNITS: 100 INJECTION, SUSPENSION SUBCUTANEOUS at 12:24

## 2025-01-25 RX ADMIN — LIDOCAINE 4% 2 PATCH: 40 PATCH TOPICAL at 09:05

## 2025-01-25 RX ADMIN — PREDNISONE 20 MG: 20 TABLET ORAL at 09:04

## 2025-01-25 RX ADMIN — INSULIN LISPRO 4 UNITS: 100 INJECTION, SOLUTION INTRAVENOUS; SUBCUTANEOUS at 12:24

## 2025-01-25 RX ADMIN — URSODIOL 300 MG: 300 CAPSULE ORAL at 20:30

## 2025-01-25 RX ADMIN — INSULIN LISPRO 8 UNITS: 100 INJECTION, SOLUTION INTRAVENOUS; SUBCUTANEOUS at 00:43

## 2025-01-25 RX ADMIN — AMLODIPINE BESYLATE 5 MG: 5 TABLET ORAL at 09:04

## 2025-01-25 ASSESSMENT — COGNITIVE AND FUNCTIONAL STATUS - GENERAL
MOBILITY SCORE: 23
CLIMB 3 TO 5 STEPS WITH RAILING: A LITTLE
MOVING TO AND FROM BED TO CHAIR: A LITTLE
DAILY ACTIVITIY SCORE: 23
STANDING UP FROM CHAIR USING ARMS: A LITTLE
DAILY ACTIVITIY SCORE: 23
WALKING IN HOSPITAL ROOM: A LITTLE
MOBILITY SCORE: 20
DRESSING REGULAR LOWER BODY CLOTHING: A LITTLE
DRESSING REGULAR LOWER BODY CLOTHING: A LITTLE
CLIMB 3 TO 5 STEPS WITH RAILING: A LITTLE

## 2025-01-25 ASSESSMENT — ENCOUNTER SYMPTOMS
ROS GI COMMENTS: APPROPRIATE TO PROCEDURE
ABDOMINAL DISTENTION: 1

## 2025-01-25 ASSESSMENT — PAIN SCALES - GENERAL
PAINLEVEL_OUTOF10: 3
PAINLEVEL_OUTOF10: 2

## 2025-01-25 ASSESSMENT — PAIN - FUNCTIONAL ASSESSMENT: PAIN_FUNCTIONAL_ASSESSMENT: 0-10

## 2025-01-25 ASSESSMENT — PAIN SCALES - WONG BAKER: WONGBAKER_NUMERICALRESPONSE: NO HURT

## 2025-01-25 NOTE — PROGRESS NOTES
"Goran Ibrahim is a 54 y.o. male on day 5 of admission presenting with Hepatic cirrhosis, unspecified hepatic cirrhosis type, unspecified whether ascites present (Multi).    Subjective   Pt seen and examined at the bedside.  He is eating fairly well. Discussed insulin plan. Patient agreeable. Dexcom sensors and  at the bedside.      I have reviewed histories, allergies and medications have been reviewed and there are no changes     Objective   Review of Systems   Gastrointestinal:  Positive for abdominal distention.        Appropriate to procedure   All other systems reviewed and are negative.    Physical Exam  Constitutional:       Appearance: Normal appearance. He is normal weight.   HENT:      Head: Normocephalic and atraumatic.      Nose: Nose normal.      Mouth/Throat:      Mouth: Mucous membranes are dry.      Pharynx: Oropharynx is clear.   Eyes:      Extraocular Movements: Extraocular movements intact.      Conjunctiva/sclera: Conjunctivae normal.   Cardiovascular:      Rate and Rhythm: Normal rate and regular rhythm.      Pulses: Normal pulses.      Heart sounds: Normal heart sounds.   Pulmonary:      Effort: Pulmonary effort is normal.      Breath sounds: Normal breath sounds.   Abdominal:      General: There is distension.      Palpations: Abdomen is soft.   Skin:     General: Skin is warm and dry.   Neurological:      General: No focal deficit present.      Mental Status: He is alert and oriented to person, place, and time. Mental status is at baseline.   Psychiatric:         Mood and Affect: Mood normal.         Behavior: Behavior normal.         Thought Content: Thought content normal.         Judgment: Judgment normal.     Last Recorded Vitals  Blood pressure 149/81, pulse 65, temperature 36 °C (96.8 °F), temperature source Temporal, resp. rate 17, height 1.778 m (5' 10\"), weight 129 kg (284 lb 6.3 oz), SpO2 95%.  Intake/Output last 3 Shifts:  I/O last 3 completed shifts:  In: 1260 (9.8 mL/kg) " [P.O.:1160; Blood:100]  Out: 6955 (53.9 mL/kg) [Urine:6425 (1.4 mL/kg/hr); Drains:530]  Weight: 129 kg     Relevant Results  Results from last 7 days   Lab Units 01/25/25  0440 01/24/25 2025 01/24/25  1827 01/24/25  1137 01/24/25  0611 01/24/25  0537 01/23/25  0815 01/23/25  0542 01/22/25  1143 01/22/25  1135 01/22/25  0317 01/22/25  0219   POCT GLUCOSE mg/dL 184* 324* 326* 188* 204*  --    < >  --    < >  --    < >  --    GLUCOSE mg/dL 180*  --   --   --   --  253*  --  219*  --  194*  --  170*    < > = values in this interval not displayed.     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 male presenting with past medical history of decompensated MetALD (ascites, variceal bleed, HE) c/b EV s/p banding in June 2024, advanced CKD (baseline creatinine around 2.5), long standing tobacco use, duodenal ulcer, H.pylori infection (negative biopsies in June 2024), and short segment Matute's esophagus (not biopsy confirmed due to the presence of varices) who was listed for SLK. He was subsequently brought in for DCD liver (caval piggyback, rCHA to dSMA/celiac stack HA, duct to duct [small to large with v plasty]) and right kidney transplant which he underwent on 1/20/2025 with Dr. Wright. Hepatology has been consulted to co-manage him post-OLT.     Diabetes History  Type of diabetes: type 2 diabetes  Seen by PCP or Endocrinology: PCP  Frequency of glucose checks: not checking   Glucose review:  NA, not checking  Frequency of Hypoglycemia:  none  Hypoglycemia unawareness: no  Severe hypoglycemia requiring assistance from others:  no    No diabetes medications  Assessment & Plan  Hepatic cirrhosis, unspecified hepatic cirrhosis type, unspecified whether ascites present (Multi)    ESRD (end stage renal disease) (Multi)    Steroid-induced hyperglycemia    PLAN  Steroids:  methylpred 500mg intra-op  methylpred 250 mg x1d  methylpred 125mg  x1d  methylpred 125mg x1d  methylpred 60mg x1d  pred 20mg ongoing     Nutrition: PO  75g CHO/meal     - Recommend increasing NPH to 10u, give at same time as  AM solumedrol/prednisone       If NPO: continue same    - start lispro 4 units with meals; hold dose if patient is NPO, hold dose if patient eats less than 50% of the meal or if glucose is less than 90 before the meal     - continue lispro corrective scale to #2 AC and nightly if NPO or if persistently hyperglycemic, please change to Q4   = 0u  151-200 = 2u  201-250 = 4u  251-300 = 6u  301-350 = 8u  351-400 = 10u     -Accuchecks (not BMP) ACHS  - Goal -180  -Hypoglycemia protocol  -Will continue to follow and titrate insulin accordingly  - diabetes educator consulted for insulin and CGM teaching       Discharge planning:   [] patient may expect to discharge home on NPH and lispro with CGM, final doses TBD by titration  [x]Dexcom G7 sensors and reader already delivered to bedside - phone not compatible  [x]will enroll pt in  pharmacy platinum plan program  [x]follow up with Chelsea Hernandez PA-C in PTDM clinic, scheduled 1/30/25    I spent 50 minutes in the professional and overall care of this patient.    ZION Cosme-CNP

## 2025-01-25 NOTE — CARE PLAN
The patient's goals for the shift include Pt wants to go to surgury    The clinical goals for the shift include Patient will remain safe and vital signs stable throughout shift. @[id number] 07:00      Problem: Pain - Adult  Goal: Verbalizes/displays adequate comfort level or baseline comfort level  Outcome: Progressing     Problem: Safety - Adult  Goal: Free from fall injury  Outcome: Progressing     Problem: Discharge Planning  Goal: Discharge to home or other facility with appropriate resources  Outcome: Progressing     Problem: Fall/Injury  Goal: Not fall by end of shift  Outcome: Progressing  Goal: Be free from injury by end of the shift  Outcome: Progressing  Goal: Verbalize understanding of personal risk factors for fall in the hospital  Outcome: Progressing  Goal: Verbalize understanding of risk factor reduction measures to prevent injury from fall in the home  Outcome: Progressing  Goal: Use assistive devices by end of the shift  Outcome: Progressing  Goal: Pace activities to prevent fatigue by end of the shift  Outcome: Progressing

## 2025-01-25 NOTE — PROGRESS NOTES
"Goran Ibrahim is a 54 y.o. male now POD # 5 s/p SLK transplant.    Subjective   Doing well.    Biliary drain 70, perinephric 180 cc    Objective   Physical Exam  Gen: A+OX3; NAD  Abd: S/NT/ND. Incisions C/D/I.  Drains: Serosanguinous    Ext: trace LE edema    Last Recorded Vitals  Blood pressure 157/81, pulse 70, temperature 36.6 °C (97.9 °F), temperature source Temporal, resp. rate 17, height 1.778 m (5' 10\"), weight 129 kg (284 lb 6.3 oz), SpO2 96%.  Intake/Output last 3 Shifts:  I/O last 3 completed shifts:  In: 1260 (9.8 mL/kg) [P.O.:1160; Blood:100]  Out: 6955 (53.9 mL/kg) [Urine:6425 (1.4 mL/kg/hr); Drains:530]  Weight: 129 kg      Lab Results   Component Value Date    CREATININE 2.65 (H) 01/25/2025    K 3.4 (L) 01/25/2025    GLUCOSE 180 (H) 01/25/2025    HGB 8.4 (L) 01/25/2025    WBC 3.8 (L) 01/25/2025    PLT 57 (L) 01/25/2025    CALCIUM 9.0 01/25/2025         Current Facility-Administered Medications:     acyclovir (Zovirax) tablet 400 mg, 400 mg, oral, BID, Seema Matthews, APRN-CNP, 400 mg at 01/25/25 0904    amLODIPine (Norvasc) tablet 5 mg, 5 mg, oral, Daily, Joanne L Purvi, APRN-CNP, 5 mg at 01/25/25 0904    aspirin EC tablet 81 mg, 81 mg, oral, Daily, Joanne L Whick, APRN-CNP, 81 mg at 01/25/25 0904    carvedilol (Coreg) tablet 6.25 mg, 6.25 mg, oral, BID, Joanne L Whick, APRN-CNP, 6.25 mg at 01/25/25 0904    dextrose 50 % injection 12.5 g, 12.5 g, intravenous, q15 min PRN, Joanne L Whick, APRN-CNP, 12.5 g at 01/22/25 2107    dextrose 50 % injection 25 g, 25 g, intravenous, q15 min PRN, Joanne L Purvi, APRN-CNP    fluconazole (Diflucan) tablet 400 mg, 400 mg, oral, Daily, Joanne L Whick, APRN-CNP, 400 mg at 01/25/25 0904    glucagon (Glucagen) injection 1 mg, 1 mg, intramuscular, q15 min PRN, Joanne L Purvi, APRN-CNP    heparin (porcine) injection 5,000 Units, 5,000 Units, subcutaneous, q8h, Joanne L Whick, APRN-CNP, 5,000 Units at 01/25/25 0904    insulin lispro injection 0-10 Units, 0-10 Units, subcutaneous, q6h " EDINSON, Chelsea Hernandez PA-C, 2 Units at 01/25/25 0602    insulin lispro injection 4 Units, 4 Units, subcutaneous, TID AC, Lois Jacome PA-C, 4 Units at 01/25/25 1224    [START ON 1/26/2025] insulin NPH (Isophane) (HumuLIN N,NovoLIN N) injection 10 Units, 10 Units, subcutaneous, q AM, Lois Jacome PA-C    lidocaine 4 % patch 2 patch, 2 patch, transdermal, Daily, Joanne L Purvi, APRN-CNP, 2 patch at 01/25/25 0905    mycophenolate (Cellcept) capsule 1,000 mg, 1,000 mg, oral, q12h EDINSON, Joanne L Purvi, APRN-CNP, 1,000 mg at 01/25/25 0601    oxyCODONE (Roxicodone) immediate release tablet 10 mg, 10 mg, oral, q4h PRN, Joanne L [place], APRN-CNP    oxyCODONE (Roxicodone) immediate release tablet 5 mg, 5 mg, oral, q6h PRN, Joanne L Purvi, APRN-CNP    pantoprazole (ProtoNix) EC tablet 40 mg, 40 mg, oral, BID, Joanne L [place], APRN-CNP, 40 mg at 01/25/25 0905    polyethylene glycol (Glycolax, Miralax) packet 17 g, 17 g, oral, Daily, Ruth Javier, APRN-CNP, 17 g at 01/24/25 1227    predniSONE (Deltasone) tablet 20 mg, 20 mg, oral, Daily, Joanne L Purvi, APRN-CNP, 20 mg at 01/25/25 0904    sennosides-docusate sodium (Yoselyn-Colace) 8.6-50 mg per tablet 2 tablet, 2 tablet, oral, BID, Joanne L Purvi, APRN-CNP, 2 tablet at 01/24/25 2028    sulfamethoxazole-trimethoprim (Bactrim) 400-80 mg per tablet 80 mg of trimethoprim, 80 mg of trimethoprim, oral, Daily, ZION Haskins-CNP, 80 mg of trimethoprim at 01/25/25 0904    tacrolimus (Prograf) capsule 2 mg, 2 mg, oral, q12h EDINSON, Joanne L Purvi, APRN-CNP, 2 mg at 01/25/25 0601    ursodiol (Actigall) capsule 300 mg, 300 mg, oral, q8h, Joanne L [place], APRN-CNP, 300 mg at 01/25/25 0554     Assessment/Plan    S/p SLK 1/20/25   DCD, caval piggyback, rCHA to dSMA/celiac stack HA, duct to duct (small to large with v plasty)  Transanimates down trending  A bit cholestatic  Interval liver US good  ASA 81. Heparin DVT ppx  Cont katerin  Creatinine down trending, UOP good      Immunosuppression    Simulect completed   tac 2/2, increase to 3/3  MMF 1000 BID  Steroid taper     I spent 35 minutes in the professional and overall care of this patient.      Liam Hudson MD

## 2025-01-25 NOTE — PROGRESS NOTES
"[facility]  [facility]  CONSULT FOLLOW-UP     Reason For Consult  Post-SLK co-management     SUBJECTIVE     No acute events overnight. UOP is still very high, 4125 ml in the last 24 hours. He got 20 mg IV lasix yesterday.    1 drain was removed yesterday. He still has 1 drain remaining which has drained 325 ml in the last 24 hours.     Says he had 2 bowel movements (confirmed with RN). He got miralax yesterday.     Got his 2nd Simulect dose yesterday.     Creatinine continues to downtrend from 3.1 --> 2.89 --> 2.65. BUN has downtrended from 103 --> 97.     GGT has continued to uptrend from 411 --> 433 --> 469. H&H stable. ALP has uptrended from 164 --> 179. T.bili has downtrended from 2.7 --> 2.2. AST and ALT downtrended from 355 --> 232 and AST has downtrended from 114 --> 64.     Platelets are still low (similar to yesterday - 57).     He is on Tacrolimus 2 mg BID, today's tacro level is pending.     EXAM     Last Recorded Vitals  Blood pressure 149/81, pulse 68, temperature 36.5 °C (97.7 °F), temperature source Temporal, resp. rate 17, height 1.778 m (5' 10\"), weight 129 kg (284 lb 6.3 oz), SpO2 94%.      Intake/Output Summary (Last 24 hours) at 1/25/2025 0957  Last data filed at 1/25/2025 0900  Gross per 24 hour   Intake 1260 ml   Output 4230 ml   Net -2970 ml          Physical Exam  General: well-nourished, no acute distress  HEENT: no pallor, scleral icterus +ve   Respiratory: CTA bilaterally, normal work of breathing  Cardiovascular: S1, S2 heard   Abdomen: Chevron incision with staples, no dehiscence or oozing. Mildly distended abdomen. Bowel sounds appreciated. 1 KAITLYNN drain on R side still in place    Extremities: no edema, no asterixis  Neuro: alert and oriented, CNII-XII grossly intact, moves all 4 extremities with no focal deficits  Skin: jaundiced   : chaudhary in place with clear yellow urine     OBJECTIVE                                                    "                           Medications             Current Facility-Administered Medications:     acyclovir (Zovirax) tablet 400 mg, 400 mg, oral, BID, Seema CRISTOPHER Matthews, APRN-CNP, 400 mg at 01/25/25 0904    amLODIPine (Norvasc) tablet 5 mg, 5 mg, oral, Daily, Joanne L [place], APRN-CNP, 5 mg at 01/25/25 0904    aspirin EC tablet 81 mg, 81 mg, oral, Daily, Joanne L [place], APRN-CNP, 81 mg at 01/25/25 0904    carvedilol (Coreg) tablet 6.25 mg, 6.25 mg, oral, BID, Joanne L Purvi, APRN-CNP, 6.25 mg at 01/25/25 0904    dextrose 50 % injection 12.5 g, 12.5 g, intravenous, q15 min PRN, Joanne L Purvi, APRN-CNP, 12.5 g at 01/22/25 2107    dextrose 50 % injection 25 g, 25 g, intravenous, q15 min PRN, Joanne L Purvi, APRN-CNP    fluconazole (Diflucan) tablet 400 mg, 400 mg, oral, Daily, Joanne L [place], APRN-CNP, 400 mg at 01/25/25 0904    glucagon (Glucagen) injection 1 mg, 1 mg, intramuscular, q15 min PRN, Joanne L Purvi, APRN-CNP    heparin (porcine) injection 5,000 Units, 5,000 Units, subcutaneous, q8h, Joanne L [place], APRN-CNP, 5,000 Units at 01/25/25 0904    insulin lispro injection 0-10 Units, 0-10 Units, subcutaneous, q6h EDINSON, BOB RidleyC, 2 Units at 01/25/25 0602    insulin NPH (Isophane) (HumuLIN N,NovoLIN N) injection 8 Units, 8 Units, subcutaneous, q AM, YOUNG Ridley-C, 8 Units at 01/25/25 0905    lidocaine 4 % patch 2 patch, 2 patch, transdermal, Daily, Joanne L [place], APRN-CNP, 2 patch at 01/25/25 0905    mycophenolate (Cellcept) capsule 1,000 mg, 1,000 mg, oral, q12h EDINSON, Joanne L Purvi, APRN-CNP, 1,000 mg at 01/25/25 0601    oxyCODONE (Roxicodone) immediate release tablet 10 mg, 10 mg, oral, q4h PRN, Joanne L Purvi, APRN-CNP    oxyCODONE (Roxicodone) immediate release tablet 5 mg, 5 mg, oral, q6h PRN, Joanne L Purvi, APRN-CNP    pantoprazole (ProtoNix) EC tablet 40 mg, 40 mg, oral, BID, Joanne L [place], APRN-CNP, 40 mg at 01/25/25 0905    polyethylene glycol (Glycolax, Miralax) packet 17 g, 17 g, oral, Daily, Ruth GURROLA  Concepcion, APRN-CNP, 17 g at 01/24/25 1227    potassium chloride CR (Klor-Con M20) ER tablet 20 mEq, 20 mEq, oral, Once, Lois Jacome PA-C    predniSONE (Deltasone) tablet 20 mg, 20 mg, oral, Daily, Joanne L [place], APRN-CNP, 20 mg at 01/25/25 0904    sennosides-docusate sodium (Yoselyn-Colace) 8.6-50 mg per tablet 2 tablet, 2 tablet, oral, BID, Joanne L Purvi, APRN-CNP, 2 tablet at 01/24/25 2028    sulfamethoxazole-trimethoprim (Bactrim) 400-80 mg per tablet 80 mg of trimethoprim, 80 mg of trimethoprim, oral, Daily, Seema Matthews, APRN-CNP, 80 mg of trimethoprim at 01/25/25 0904    tacrolimus (Prograf) capsule 2 mg, 2 mg, oral, q12h EDINSON, Joanne L [place], APRN-CNP, 2 mg at 01/25/25 0601    ursodiol (Actigall) capsule 300 mg, 300 mg, oral, q8h, Joanne L Purvi, APRN-CNP, 300 mg at 01/25/25 0554                                                                            Labs     Reviewed.                                                                         Imaging:     US Liver with doppler 1/21/2025:  IMPRESSION:  1. Interval increase in the main portal vein velocity of the liver  transplant compared to 01/20/2025 ultrasound measuring up to 126.5 cm/sec, with slightly turbulent flow. No evidence of focal narrowing or gradient. Attention on follow-up is recommended.  2. Interval improvement of the previously noted low resistive indices  throughout the hepatic arteries with interval improvement in the  arterial waveforms compared to 01/20/2025 ultrasound.  3. Stable splenomegaly.    US kidney transplant 1/20/2025:  IMPRESSION:  Unremarkable ultrasound and Doppler evaluation of the transplant  kidney as detailed above     US Liver with doppler 1/20/2025:  IMPRESSION:  Status post orthotopic liver transplant with decreased resistive  indices of the hepatic arteries (0.4-0.5), attention on follow-up  imaging is recommended. The hepatic vasculature is patent.        US liver with doppler 11/4/2024:  IMPRESSION:  Cirrhotic  morphology of the liver with sequela of portal hypertension including significant splenomegaly and reversal flow within the splenic vein as detailed.                                                                         GI Procedures     7/2023 EGD  Impression:                                - Normal upper third of esophagus.                             - Grade I esophageal varices.                             - Esophageal mucosal changes suggestive of                             short-segment Matute's esophagus, classified as                             Matute's stage C0-M2 per Lapoint criteria.                             Biopsy is contraindicated.                             - Small hiatal hernia.                             - Non-bleeding duodenal ulcer with no stigmata                             of bleeding. Appears to be healing well, without                             any signs of recent blood loss.                             - Mucosal changes in the duodenum. Biopsied.                             - No blood was present throughout the entire                               exam.      6/2023 EGD  Impression:                                   - Normal esophagus.                             - Normal stomach.                             - Non-bleeding duodenal ulcer with a visible                             vessel. Injected. Treated with argon plasma                             coagulation (APC).                             - The examination was otherwise normal.                             - No specimens collected.      EGD 6/7/24  Findings  Irregular Z-line 38 cm from the incisors  Three medium grade II varices (no red tiera sign) in the esophagus; no bleeding was identified; placed 3 bands successfully, resulting in partial eradication  Cobblestone, edematous, erythematous and friable mucosa in the body of the stomach and antrum; performed cold forceps biopsy to rule out H. pylori;  Patchy erythematous and  friable mucosa in the duodenal bulb;  The duodenum appeared normal.     EGD 11/1/24  Impression  Irregular Z-line  Matute's esophagus  Multiple small varices in the lower third of the esophagus  Mild and friable portal hypertensive gastropathy in the cardia, fundus of the stomach, body of the stomach and antrum  Abnormal mucosa with erosion in the 1st part of the duodenum  The 2nd part of the duodenum appeared normal.      6/22/23 [facility]: Colonoscopy with [place] Score of 2/2/2: one 5 mm inflammatory type polyp in the sigmoid colon.     ASSESSMENT / PLAN     ASSESSMENT/PLAN:  Goran Ibrahim is a 55 yo male with a past medical history of decompensated MetALD (ascites, variceal bleed, HE) c/b EV s/p banding in June 2024, advanced CKD (baseline creatinine around 2.5), long standing tobacco use, duodenal ulcer, H.pylori infection (negative biopsies in June 2024), and short segment Matute's esophagus (not biopsy confirmed due to the presence of varices) who was listed for SLK. He was subsequently brought in for DCD liver (caval piggyback, rCHA to dSMA/celiac stack HA, duct to duct [small to large with v plasty]) and right kidney transplant which he underwent on 1/20/2025 with Dr. Wright. Hepatology has been consulted to co-manage him post-OLT.      Overall, patient is doing well post-SLK, and he has been extubated and CVVH has been discontinued. His T. bili is a bit high, so he has been started on Ursodiol. Additionally, his immediate post-liver transplant liver US with dopplers showed decreased resistive indices of the hepatic arteries (0.4-0.5), but repeat US liver with dopplers on 1/21/2025 showed interval improvement of the previously noted low resistive indices in the hepatic arteries. LFTs are downtrending. He is making good UOP (auto-diuresing to some extent), but creatinine is downtrending. Patient transferred to [facility] as of 1/23/2024. Patient getting close to discharge.     Update 1/25/2025:  Patient is doing  well, now having bowel movements after getting miralax. UOP still high, but got a small dose of IV lasix yesterday. Creatinine is downtrending. ALP and GGT have uptrended, but AST, ALT and T. Bili have downtrended. Platelets still on the lower side (50s), but hopefully will recover within in the coming days/weeks. Getting close to discharge to home with home PT.     RECS:  Immunosuppression and prophylactic antibiotics/antifungals per transplant surgery team   Await explant pathology results  Continue bowel regimen with miralax, docusate and senna   Continue Ursodiol   Strict Ins and Outs   Patient will need EGD at some point (once he is fully recovered from transplant) as an outpatient with biopsies to confirm short segment Matute's esophagus that was found during EGD in 2023/2024 (this can be addressed as outpatient)     Patient was seen and discussed with Hepatology attending, Dr. Norris Velasco.     Thank you for the consultation. Hepatology will continue to follow.     - During weekday hours of 7am- 5pm, please do not hesitate to contact me on [employer] Chat or page [phone] if there are any further questions   - After hours, on weekends, and on holidays, please page the [place] GI fellow at [phone]. Thank you.     Venice Meier MD MPH   GI and Hepatology Fellow   [facility]

## 2025-01-25 NOTE — CARE PLAN
The patient's goals for the shift include Pt wants to go to surgury    The clinical goals for the shift include Patient will remain safe and vital signs stable throughout shift. @[id number] 07:00      Problem: Pain - Adult  Goal: Verbalizes/displays adequate comfort level or baseline comfort level  Outcome: Progressing     Problem: Safety - Adult  Goal: Free from fall injury  Outcome: Progressing     Problem: Discharge Planning  Goal: Discharge to home or other facility with appropriate resources  Outcome: Progressing     Problem: Chronic Conditions and Co-morbidities  Goal: Patient's chronic conditions and co-morbidity symptoms are monitored and maintained or improved  Outcome: Progressing     Problem: Fall/Injury  Goal: Not fall by end of shift  Outcome: Progressing  Goal: Be free from injury by end of the shift  Outcome: Progressing  Goal: Verbalize understanding of personal risk factors for fall in the hospital  Outcome: Progressing  Goal: Verbalize understanding of risk factor reduction measures to prevent injury from fall in the home  Outcome: Progressing  Goal: Use assistive devices by end of the shift  Outcome: Progressing  Goal: Pace activities to prevent fatigue by end of the shift  Outcome: Progressing     Problem: Knowledge Deficit  Goal: Patient/family/caregiver demonstrates understanding of disease process, treatment plan, medications, and discharge instructions  Outcome: Progressing     Problem: Skin  Goal: Decreased wound size/increased tissue granulation at next dressing change  Outcome: Progressing  Goal: Participates in plan/prevention/treatment measures  Outcome: Progressing  Goal: Prevent/manage excess moisture  Outcome: Progressing  Goal: Prevent/minimize sheer/friction injuries  Outcome: Progressing  Goal: Promote/optimize nutrition  Outcome: Progressing  Goal: Promote skin healing  Outcome: Progressing     Problem: Pain  Goal: Takes deep breaths with improved pain control throughout the  shift  Outcome: Progressing  Goal: Turns in bed with improved pain control throughout the shift  Outcome: Progressing  Goal: Walks with improved pain control throughout the shift  Outcome: Progressing  Goal: Performs ADL's with improved pain control throughout shift  Outcome: Progressing  Goal: Participates in PT with improved pain control throughout the shift  Outcome: Progressing  Goal: Free from opioid side effects throughout the shift  Outcome: Progressing  Goal: Free from acute confusion related to pain meds throughout the shift  Outcome: Progressing

## 2025-01-26 LAB
ALBUMIN SERPL BCP-MCNC: 4 G/DL (ref 3.4–5)
ALP SERPL-CCNC: 205 U/L (ref 33–120)
ALT SERPL W P-5'-P-CCNC: 173 U/L (ref 10–52)
ANION GAP SERPL CALC-SCNC: 16 MMOL/L (ref 10–20)
AST SERPL W P-5'-P-CCNC: 46 U/L (ref 9–39)
BILIRUB DIRECT SERPL-MCNC: 1.4 MG/DL (ref 0–0.3)
BILIRUB SERPL-MCNC: 2.3 MG/DL (ref 0–1.2)
BUN SERPL-MCNC: 80 MG/DL (ref 6–23)
CA-I BLD-SCNC: 1.1 MMOL/L (ref 1.1–1.33)
CALCIUM SERPL-MCNC: 9 MG/DL (ref 8.6–10.6)
CHLORIDE SERPL-SCNC: 106 MMOL/L (ref 98–107)
CO2 SERPL-SCNC: 21 MMOL/L (ref 21–32)
CREAT SERPL-MCNC: 2.21 MG/DL (ref 0.5–1.3)
EGFRCR SERPLBLD CKD-EPI 2021: 34 ML/MIN/1.73M*2
ERYTHROCYTE [DISTWIDTH] IN BLOOD BY AUTOMATED COUNT: 16.2 % (ref 11.5–14.5)
GGT SERPL-CCNC: 499 U/L (ref 5–64)
GLUCOSE BLD MANUAL STRIP-MCNC: 142 MG/DL (ref 74–99)
GLUCOSE BLD MANUAL STRIP-MCNC: 152 MG/DL (ref 74–99)
GLUCOSE BLD MANUAL STRIP-MCNC: 173 MG/DL (ref 74–99)
GLUCOSE BLD MANUAL STRIP-MCNC: 196 MG/DL (ref 74–99)
GLUCOSE BLD MANUAL STRIP-MCNC: 238 MG/DL (ref 74–99)
GLUCOSE SERPL-MCNC: 140 MG/DL (ref 74–99)
HCT VFR BLD AUTO: 27.2 % (ref 41–52)
HGB BLD-MCNC: 9.1 G/DL (ref 13.5–17.5)
MAGNESIUM SERPL-MCNC: 2.35 MG/DL (ref 1.6–2.4)
MCH RBC QN AUTO: 29.8 PG (ref 26–34)
MCHC RBC AUTO-ENTMCNC: 33.5 G/DL (ref 32–36)
MCV RBC AUTO: 89 FL (ref 80–100)
NRBC BLD-RTO: 0 /100 WBCS (ref 0–0)
PHOSPHATE SERPL-MCNC: 2.5 MG/DL (ref 2.5–4.9)
PLATELET # BLD AUTO: 74 X10*3/UL (ref 150–450)
POTASSIUM SERPL-SCNC: 3.4 MMOL/L (ref 3.5–5.3)
PROT SERPL-MCNC: 6.2 G/DL (ref 6.4–8.2)
RBC # BLD AUTO: 3.05 X10*6/UL (ref 4.5–5.9)
SODIUM SERPL-SCNC: 140 MMOL/L (ref 136–145)
TACROLIMUS BLD-MCNC: 6.3 NG/ML
WBC # BLD AUTO: 6.4 X10*3/UL (ref 4.4–11.3)

## 2025-01-26 PROCEDURE — 99232 SBSQ HOSP IP/OBS MODERATE 35: CPT | Performed by: TRANSPLANT SURGERY

## 2025-01-26 PROCEDURE — 2500000001 HC RX 250 WO HCPCS SELF ADMINISTERED DRUGS (ALT 637 FOR MEDICARE OP): Mod: SE

## 2025-01-26 PROCEDURE — 2500000002 HC RX 250 W HCPCS SELF ADMINISTERED DRUGS (ALT 637 FOR MEDICARE OP, ALT 636 FOR OP/ED): Mod: SE | Performed by: STUDENT IN AN ORGANIZED HEALTH CARE EDUCATION/TRAINING PROGRAM

## 2025-01-26 PROCEDURE — 80048 BASIC METABOLIC PNL TOTAL CA: CPT

## 2025-01-26 PROCEDURE — 99233 SBSQ HOSP IP/OBS HIGH 50: CPT | Performed by: INTERNAL MEDICINE

## 2025-01-26 PROCEDURE — 2500000002 HC RX 250 W HCPCS SELF ADMINISTERED DRUGS (ALT 637 FOR MEDICARE OP, ALT 636 FOR OP/ED): Mod: SE

## 2025-01-26 PROCEDURE — 82977 ASSAY OF GGT: CPT

## 2025-01-26 PROCEDURE — 82947 ASSAY GLUCOSE BLOOD QUANT: CPT

## 2025-01-26 PROCEDURE — 2500000005 HC RX 250 GENERAL PHARMACY W/O HCPCS: Mod: SE

## 2025-01-26 PROCEDURE — 82330 ASSAY OF CALCIUM: CPT

## 2025-01-26 PROCEDURE — 1100000001 HC PRIVATE ROOM DAILY

## 2025-01-26 PROCEDURE — 80053 COMPREHEN METABOLIC PANEL: CPT

## 2025-01-26 PROCEDURE — 2500000004 HC RX 250 GENERAL PHARMACY W/ HCPCS (ALT 636 FOR OP/ED): Mod: SE

## 2025-01-26 PROCEDURE — 99233 SBSQ HOSP IP/OBS HIGH 50: CPT | Performed by: NURSE PRACTITIONER

## 2025-01-26 PROCEDURE — 2500000002 HC RX 250 W HCPCS SELF ADMINISTERED DRUGS (ALT 637 FOR MEDICARE OP, ALT 636 FOR OP/ED): Mod: SE | Performed by: PHYSICIAN ASSISTANT

## 2025-01-26 PROCEDURE — 83735 ASSAY OF MAGNESIUM: CPT

## 2025-01-26 PROCEDURE — 2500000004 HC RX 250 GENERAL PHARMACY W/ HCPCS (ALT 636 FOR OP/ED): Mod: SE | Performed by: STUDENT IN AN ORGANIZED HEALTH CARE EDUCATION/TRAINING PROGRAM

## 2025-01-26 PROCEDURE — RXMED WILLOW AMBULATORY MEDICATION CHARGE

## 2025-01-26 PROCEDURE — 84075 ASSAY ALKALINE PHOSPHATASE: CPT

## 2025-01-26 PROCEDURE — 84100 ASSAY OF PHOSPHORUS: CPT

## 2025-01-26 PROCEDURE — 36415 COLL VENOUS BLD VENIPUNCTURE: CPT

## 2025-01-26 PROCEDURE — 85027 COMPLETE CBC AUTOMATED: CPT

## 2025-01-26 PROCEDURE — 80197 ASSAY OF TACROLIMUS: CPT

## 2025-01-26 PROCEDURE — 2500000002 HC RX 250 W HCPCS SELF ADMINISTERED DRUGS (ALT 637 FOR MEDICARE OP, ALT 636 FOR OP/ED): Mod: SE | Performed by: NURSE PRACTITIONER

## 2025-01-26 RX ORDER — POTASSIUM CHLORIDE 20 MEQ/1
40 TABLET, EXTENDED RELEASE ORAL ONCE
Status: COMPLETED | OUTPATIENT
Start: 2025-01-26 | End: 2025-01-26

## 2025-01-26 RX ORDER — IBUPROFEN 200 MG
100 CAPSULE ORAL 4 TIMES DAILY
Qty: 400 STRIP | Refills: 3 | Status: SHIPPED | OUTPATIENT
Start: 2025-01-26 | End: 2025-02-25

## 2025-01-26 RX ORDER — DEXTROSE 50 % IN WATER (D50W) INTRAVENOUS SYRINGE
12.5
Status: DISCONTINUED | OUTPATIENT
Start: 2025-01-26 | End: 2025-01-28 | Stop reason: HOSPADM

## 2025-01-26 RX ORDER — AMLODIPINE BESYLATE 10 MG/1
10 TABLET ORAL DAILY
Status: DISCONTINUED | OUTPATIENT
Start: 2025-01-27 | End: 2025-01-28 | Stop reason: HOSPADM

## 2025-01-26 RX ORDER — LANCETS
1 EACH MISCELLANEOUS 4 TIMES DAILY
Qty: 400 EACH | Refills: 3 | Status: SHIPPED | OUTPATIENT
Start: 2025-01-26 | End: 2025-04-26

## 2025-01-26 RX ORDER — DEXTROSE 4 G
1 TABLET,CHEWABLE ORAL 4 TIMES DAILY
Qty: 1 EACH | Refills: 0 | Status: SHIPPED | OUTPATIENT
Start: 2025-01-26 | End: 2025-04-26

## 2025-01-26 RX ORDER — INSULIN LISPRO 100 [IU]/ML
0-10 INJECTION, SOLUTION INTRAVENOUS; SUBCUTANEOUS
Qty: 9 ML | Refills: 0 | Status: SHIPPED | OUTPATIENT
Start: 2025-01-26 | End: 2025-02-25

## 2025-01-26 RX ORDER — PEN NEEDLE, DIABETIC 30 GX3/16"
1 NEEDLE, DISPOSABLE MISCELLANEOUS 4 TIMES DAILY
Qty: 400 EACH | Refills: 3 | Status: SHIPPED | OUTPATIENT
Start: 2025-01-26 | End: 2025-04-26

## 2025-01-26 RX ORDER — INSULIN GLARGINE 100 [IU]/ML
8 INJECTION, SOLUTION SUBCUTANEOUS EVERY MORNING
Qty: 15 ML | Refills: 0 | Status: SHIPPED | OUTPATIENT
Start: 2025-01-26 | End: 2025-06-16

## 2025-01-26 RX ORDER — AMLODIPINE BESYLATE 10 MG/1
10 TABLET ORAL DAILY
Qty: 30 TABLET | Refills: 11 | Status: SHIPPED | OUTPATIENT
Start: 2025-01-27 | End: 2026-01-27

## 2025-01-26 RX ORDER — INSULIN GLARGINE 100 [IU]/ML
8 INJECTION, SOLUTION SUBCUTANEOUS DAILY
Status: DISCONTINUED | OUTPATIENT
Start: 2025-01-26 | End: 2025-01-28 | Stop reason: HOSPADM

## 2025-01-26 RX ORDER — INSULIN LISPRO 100 [IU]/ML
0-10 INJECTION, SOLUTION INTRAVENOUS; SUBCUTANEOUS
Status: DISCONTINUED | OUTPATIENT
Start: 2025-01-26 | End: 2025-01-28 | Stop reason: HOSPADM

## 2025-01-26 RX ORDER — INSULIN LISPRO 100 [IU]/ML
6 INJECTION, SOLUTION INTRAVENOUS; SUBCUTANEOUS
Status: DISCONTINUED | OUTPATIENT
Start: 2025-01-26 | End: 2025-01-27

## 2025-01-26 RX ORDER — TACROLIMUS 1 MG/1
3 CAPSULE ORAL
Qty: 180 CAPSULE | Refills: 0 | Status: SHIPPED | OUTPATIENT
Start: 2025-01-26 | End: 2025-02-25

## 2025-01-26 RX ORDER — ISOPROPYL ALCOHOL 70 ML/100ML
1 SWAB TOPICAL 4 TIMES DAILY
Qty: 400 EACH | Refills: 3 | Status: SHIPPED | OUTPATIENT
Start: 2025-01-26 | End: 2025-04-26

## 2025-01-26 RX ORDER — CARVEDILOL 6.25 MG/1
6.25 TABLET ORAL 2 TIMES DAILY
Start: 2025-01-26 | End: 2026-01-26

## 2025-01-26 RX ORDER — TRAMADOL HYDROCHLORIDE 50 MG/1
50 TABLET ORAL EVERY 6 HOURS PRN
Status: DISCONTINUED | OUTPATIENT
Start: 2025-01-26 | End: 2025-01-28 | Stop reason: HOSPADM

## 2025-01-26 RX ORDER — TRAMADOL HYDROCHLORIDE 50 MG/1
25 TABLET ORAL EVERY 6 HOURS PRN
Status: DISCONTINUED | OUTPATIENT
Start: 2025-01-26 | End: 2025-01-28 | Stop reason: HOSPADM

## 2025-01-26 RX ADMIN — TACROLIMUS 3 MG: 1 CAPSULE ORAL at 06:00

## 2025-01-26 RX ADMIN — MYCOPHENOLATE MOFETIL 1000 MG: 250 CAPSULE ORAL at 06:00

## 2025-01-26 RX ADMIN — PANTOPRAZOLE SODIUM 40 MG: 40 TABLET, DELAYED RELEASE ORAL at 20:07

## 2025-01-26 RX ADMIN — PANTOPRAZOLE SODIUM 40 MG: 40 TABLET, DELAYED RELEASE ORAL at 08:17

## 2025-01-26 RX ADMIN — INSULIN LISPRO 6 UNITS: 100 INJECTION, SOLUTION INTRAVENOUS; SUBCUTANEOUS at 08:22

## 2025-01-26 RX ADMIN — FLUCONAZOLE 400 MG: 200 TABLET ORAL at 08:17

## 2025-01-26 RX ADMIN — INSULIN LISPRO 6 UNITS: 100 INJECTION, SOLUTION INTRAVENOUS; SUBCUTANEOUS at 13:32

## 2025-01-26 RX ADMIN — ACYCLOVIR 400 MG: 400 TABLET ORAL at 08:17

## 2025-01-26 RX ADMIN — URSODIOL 300 MG: 300 CAPSULE ORAL at 20:47

## 2025-01-26 RX ADMIN — CARVEDILOL 6.25 MG: 6.25 TABLET, FILM COATED ORAL at 20:07

## 2025-01-26 RX ADMIN — INSULIN LISPRO 6 UNITS: 100 INJECTION, SOLUTION INTRAVENOUS; SUBCUTANEOUS at 18:21

## 2025-01-26 RX ADMIN — INSULIN LISPRO 2 UNITS: 100 INJECTION, SOLUTION INTRAVENOUS; SUBCUTANEOUS at 18:21

## 2025-01-26 RX ADMIN — PREDNISONE 20 MG: 20 TABLET ORAL at 08:17

## 2025-01-26 RX ADMIN — SULFAMETHOXAZOLE AND TRIMETHOPRIM 80 MG OF TRIMETHOPRIM: 400; 80 TABLET ORAL at 08:17

## 2025-01-26 RX ADMIN — URSODIOL 300 MG: 300 CAPSULE ORAL at 05:24

## 2025-01-26 RX ADMIN — ASPIRIN 81 MG: 81 TABLET, COATED ORAL at 08:17

## 2025-01-26 RX ADMIN — AMLODIPINE BESYLATE 5 MG: 5 TABLET ORAL at 08:17

## 2025-01-26 RX ADMIN — URSODIOL 300 MG: 300 CAPSULE ORAL at 13:32

## 2025-01-26 RX ADMIN — HEPARIN SODIUM 5000 UNITS: 5000 INJECTION INTRAVENOUS; SUBCUTANEOUS at 18:20

## 2025-01-26 RX ADMIN — INSULIN GLARGINE 8 UNITS: 100 INJECTION, SOLUTION SUBCUTANEOUS at 08:22

## 2025-01-26 RX ADMIN — TACROLIMUS 3 MG: 1 CAPSULE ORAL at 18:20

## 2025-01-26 RX ADMIN — ACYCLOVIR 400 MG: 400 TABLET ORAL at 20:07

## 2025-01-26 RX ADMIN — HEPARIN SODIUM 5000 UNITS: 5000 INJECTION INTRAVENOUS; SUBCUTANEOUS at 00:30

## 2025-01-26 RX ADMIN — INSULIN LISPRO 6 UNITS: 100 INJECTION, SOLUTION INTRAVENOUS; SUBCUTANEOUS at 00:29

## 2025-01-26 RX ADMIN — LIDOCAINE 4% 2 PATCH: 40 PATCH TOPICAL at 08:19

## 2025-01-26 RX ADMIN — MYCOPHENOLATE MOFETIL 1000 MG: 250 CAPSULE ORAL at 18:20

## 2025-01-26 RX ADMIN — HEPARIN SODIUM 5000 UNITS: 5000 INJECTION INTRAVENOUS; SUBCUTANEOUS at 08:30

## 2025-01-26 RX ADMIN — CARVEDILOL 6.25 MG: 6.25 TABLET, FILM COATED ORAL at 08:17

## 2025-01-26 RX ADMIN — TAMSULOSIN HYDROCHLORIDE 0.4 MG: 0.4 CAPSULE ORAL at 08:17

## 2025-01-26 RX ADMIN — INSULIN LISPRO 2 UNITS: 100 INJECTION, SOLUTION INTRAVENOUS; SUBCUTANEOUS at 08:22

## 2025-01-26 RX ADMIN — POTASSIUM CHLORIDE 40 MEQ: 1500 TABLET, EXTENDED RELEASE ORAL at 08:17

## 2025-01-26 ASSESSMENT — PAIN SCALES - WONG BAKER: WONGBAKER_NUMERICALRESPONSE: NO HURT

## 2025-01-26 ASSESSMENT — COGNITIVE AND FUNCTIONAL STATUS - GENERAL
TOILETING: A LITTLE
WALKING IN HOSPITAL ROOM: A LITTLE
MOVING TO AND FROM BED TO CHAIR: A LITTLE
DRESSING REGULAR LOWER BODY CLOTHING: A LITTLE
DAILY ACTIVITIY SCORE: 22
MOBILITY SCORE: 21
CLIMB 3 TO 5 STEPS WITH RAILING: A LITTLE

## 2025-01-26 ASSESSMENT — ENCOUNTER SYMPTOMS
ABDOMINAL DISTENTION: 1
ROS GI COMMENTS: APPROPRIATE TO PROCEDURE

## 2025-01-26 ASSESSMENT — PAIN - FUNCTIONAL ASSESSMENT: PAIN_FUNCTIONAL_ASSESSMENT: 0-10

## 2025-01-26 ASSESSMENT — PAIN SCALES - GENERAL
PAINLEVEL_OUTOF10: 0 - NO PAIN
PAINLEVEL_OUTOF10: 0 - NO PAIN

## 2025-01-26 NOTE — PROGRESS NOTES
"Goran Ibrahim is a 54 y.o. male on day 5 of admission presenting with Hepatic cirrhosis, unspecified hepatic cirrhosis type, unspecified whether ascites present (Multi).    POD#2 s/p SLK  Likely transfer to regular nursing floor today.   No acute issues. Recorded UOP ~2L.    Scheduled medications  acyclovir, 400 mg, oral, BID  amLODIPine, 5 mg, oral, Daily  aspirin, 81 mg, oral, Daily  carvedilol, 6.25 mg, oral, BID  fluconazole, 400 mg, oral, Daily  heparin (porcine), 5,000 Units, subcutaneous, q8h  insulin lispro, 0-10 Units, subcutaneous, q6h EDINSON  insulin lispro, 4 Units, subcutaneous, TID AC  [START ON 1/26/2025] insulin NPH (Isophane), 10 Units, subcutaneous, q AM  lidocaine, 2 patch, transdermal, Daily  mycophenolate, 1,000 mg, oral, q12h EDINSON  pantoprazole, 40 mg, oral, BID  polyethylene glycol, 17 g, oral, Daily  predniSONE, 20 mg, oral, Daily  sennosides-docusate sodium, 2 tablet, oral, BID  sulfamethoxazole-trimethoprim, 80 mg of trimethoprim, oral, Daily  tacrolimus, 3 mg, oral, q12h EDINSON  tamsulosin, 0.4 mg, oral, Daily  ursodiol, 300 mg, oral, q8h      Continuous medications     PRN medications  PRN medications: dextrose, dextrose, glucagon, oxyCODONE, oxyCODONE      Last Recorded Vitals  Blood pressure 154/81, pulse 73, temperature 36.6 °C (97.9 °F), temperature source Temporal, resp. rate 18, height 1.778 m (5' 10\"), weight 129 kg (284 lb 6.3 oz), SpO2 96%.  Intake/Output last 3 Shifts:  I/O last 3 completed shifts:  In: 1500 (11.6 mL/kg) [P.O.:1400; Blood:100]  Out: 6187 (48 mL/kg) [Urine:5727 (1.2 mL/kg/hr); Drains:460]  Weight: 129 kg     A&ox3, no distress, pleasant  MMM, no lesions  Lungs with equal air entry, no added sounds  Rrr, no m/r/g  Abd soft, nt, nd  No allograft tenderness, incision c/d/i  No edema b/l      Assessment/Plan     54 y.o. male with decompensated MetALD (ascites, variceal bleed, HE) c/b EV s/p banding in June 2024, advanced CKD (baseline creatinine around 2.5), long standing " tobacco use, duodenal ulcer, H.pylori infection (negative biopsies in June 2024), and short segment Matute's esophagus (not biopsy confirmed due to the presence of varices) who is s/p SLK on 1/20/2025 by Dr. Wright.      Pt was started on intra-op CVVH which was discontinued 1/21 AM.      Organ info: [id number] (SLK), DCD, KDPI 72%, PRA 0%, HCV -/-, CMV -/-, EBV +/+, HBV -/-, donor toxo neg. no + donor cultures. Simulect induction.      Allograft function:  - Liver: elevated LFTs which are now downtrending. Liver US acceptable  - kidney: stable serum Cr. On CRRT intra-op, discontinued 1/21 am. Recorded UOP ~2L  - metabolic parameters stable.  - no current indication for RRT.   - No significant hypervolemia on exam.   - Hb, Ca, Phos acceptable  - avoid potential nephrotoxins. Dose meds for CrCl. Ensure adequate hydration     Immunosuppression:  - Simulect induction, Dose #1 1/20, dose #2 1/24   - maint IS: Tac, MMF, pred taper protocol. Dosing per txp surgery. Goal Fk 8-10.  - Ppx: Bactrim, fluconazole, acyclovir     Will follow      Billy Coles MD

## 2025-01-26 NOTE — PROGRESS NOTES
"Goran Ibrahim is a 54 y.o. male now POD # 6 s/p SLK transplant.    Subjective   Doing well.        Objective   Physical Exam  Gen: A+OX3; NAD  Abd: S/NT/ND. Incisions C/D/I.  Drains: Serosanguinous    Ext: trace LE edema    Last Recorded Vitals  Blood pressure (!) 160/93, pulse 73, temperature 36.7 °C (98.1 °F), temperature source Temporal, resp. rate 18, height 1.778 m (5' 10\"), weight 129 kg (284 lb 6.3 oz), SpO2 93%.  Intake/Output last 3 Shifts:  I/O last 3 completed shifts:  In: 1570 (12.2 mL/kg) [P.O.:1470; Blood:100]  Out: 5617 (43.5 mL/kg) [Urine:5097 (1.1 mL/kg/hr); Drains:520]  Weight: 129 kg      Lab Results   Component Value Date    CREATININE 2.21 (H) 01/26/2025    K 3.4 (L) 01/26/2025    GLUCOSE 140 (H) 01/26/2025    HGB 9.1 (L) 01/26/2025    WBC 6.4 01/26/2025    PLT 74 (L) 01/26/2025    CALCIUM 9.0 01/26/2025     Lab Results   Component Value Date    WBC 6.4 01/26/2025    HGB 9.1 (L) 01/26/2025    HCT 27.2 (L) 01/26/2025    MCV 89 01/26/2025    PLT 74 (L) 01/26/2025     Lab Results   Component Value Date    GLUCOSE 140 (H) 01/26/2025    CALCIUM 9.0 01/26/2025     01/26/2025    K 3.4 (L) 01/26/2025    CO2 21 01/26/2025     01/26/2025    BUN 80 (H) 01/26/2025    CREATININE 2.21 (H) 01/26/2025     Lab Results   Component Value Date     (H) 01/26/2025    AST 46 (H) 01/26/2025     (H) 01/26/2025    ALKPHOS 205 (H) 01/26/2025    BILITOT 2.3 (H) 01/26/2025           Current Facility-Administered Medications:     acyclovir (Zovirax) tablet 400 mg, 400 mg, oral, BID, Seema Matthews, APRN-CNP, 400 mg at 01/26/25 0817    [START ON 1/27/2025] amLODIPine (Norvasc) tablet 10 mg, 10 mg, oral, Daily, Lois Jacome PA-C    aspirin EC tablet 81 mg, 81 mg, oral, Daily, Joanne L Purvi, APRN-CNP, 81 mg at 01/26/25 0817    carvedilol (Coreg) tablet 6.25 mg, 6.25 mg, oral, BID, Joanne L [place], APRN-CNP, 6.25 mg at 01/26/25 0817    dextrose 50 % injection 12.5 g, 12.5 g, intravenous, q15 min " PRN, CHRIS Cosme    fluconazole (Diflucan) tablet 400 mg, 400 mg, oral, Daily, Joanne Loja APRN-CNP, 400 mg at 01/26/25 0817    glucagon (Glucagen) injection 1 mg, 1 mg, intramuscular, q15 min PRN, CHRIS Cosme    heparin (porcine) injection 5,000 Units, 5,000 Units, subcutaneous, q8h, Joanne Loja APRN-CNP, 5,000 Units at 01/26/25 0830    insulin glargine (Lantus) injection 8 Units, 8 Units, subcutaneous, Daily, CHRIS Cosme, 8 Units at 01/26/25 0822    insulin lispro injection 0-10 Units, 0-10 Units, subcutaneous, TID AC, ZION oCsme-CNP, 2 Units at 01/26/25 0822    insulin lispro injection 6 Units, 6 Units, subcutaneous, TID AC, ZION Cosme-CNP, 6 Units at 01/26/25 0822    lidocaine 4 % patch 2 patch, 2 patch, transdermal, Daily, Joanne Loja APRN-CNP, 2 patch at 01/26/25 0819    mycophenolate (Cellcept) capsule 1,000 mg, 1,000 mg, oral, q12h EDINSON, Joanne L Purvi, APRN-CNP, 1,000 mg at 01/26/25 0600    oxyCODONE (Roxicodone) immediate release tablet 10 mg, 10 mg, oral, q4h PRN, Joanne L Purvi, APRN-CNP    oxyCODONE (Roxicodone) immediate release tablet 5 mg, 5 mg, oral, q6h PRN, Joanne L [place], APRN-CNP    pantoprazole (ProtoNix) EC tablet 40 mg, 40 mg, oral, BID, Joanne L [place], APRN-CNP, 40 mg at 01/26/25 0817    polyethylene glycol (Glycolax, Miralax) packet 17 g, 17 g, oral, Daily, Ruth Javier, ZION-CNP, 17 g at 01/24/25 1227    predniSONE (Deltasone) tablet 20 mg, 20 mg, oral, Daily, Joanne L Purvi, APRN-CNP, 20 mg at 01/26/25 0817    sennosides-docusate sodium (Yoselyn-Colace) 8.6-50 mg per tablet 2 tablet, 2 tablet, oral, BID, Joanne L [place], APRN-CNP, 2 tablet at 01/24/25 2028    sulfamethoxazole-trimethoprim (Bactrim) 400-80 mg per tablet 80 mg of trimethoprim, 80 mg of trimethoprim, oral, Daily, Seema [place], APRN-CNP, 80 mg of trimethoprim at 01/26/25 0817    tacrolimus (Prograf) capsule 3 mg, 3 mg, oral, q12h EDINSON,  Hector Rene MD, 3 mg at 01/26/25 0600    tamsulosin (Flomax) 24 hr capsule 0.4 mg, 0.4 mg, oral, Daily, Lois Jacome PA-C, 0.4 mg at 01/26/25 0817    ursodiol (Actigall) capsule 300 mg, 300 mg, oral, q8h, Joanne Loja, APRN-CNP, 300 mg at 01/26/25 0524     Assessment/Plan    S/p SLK 1/20/25   DCD, caval piggyback, rCHA to dSMA/celiac stack HA, duct to duct (small to large with v plasty)  Transanimates down trending  A bit cholestatic, continue katerin  ASA 81. Heparin DVT ppx  Creatinine down trending, UOP good    Immunosuppression   Simulect completed   tac 2/2, increase to 3/3  MMF 1000 BID  Steroid taper     I spent 35 minutes in the professional and overall care of this patient.      Liam Hudson MD

## 2025-01-26 NOTE — CARE PLAN
The patient's goals for the shift include Pt wants to go to surgury    The clinical goals for the shift include patient will remain safe and vital signs stable throughout shift. @[id number]      Problem: Pain - Adult  Goal: Verbalizes/displays adequate comfort level or baseline comfort level  Outcome: Progressing     Problem: Safety - Adult  Goal: Free from fall injury  Outcome: Progressing     Problem: Fall/Injury  Goal: Not fall by end of shift  Outcome: Progressing  Goal: Be free from injury by end of the shift  Outcome: Progressing  Goal: Verbalize understanding of personal risk factors for fall in the hospital  Outcome: Progressing  Goal: Verbalize understanding of risk factor reduction measures to prevent injury from fall in the home  Outcome: Progressing  Goal: Use assistive devices by end of the shift  Outcome: Progressing  Goal: Pace activities to prevent fatigue by end of the shift  Outcome: Progressing

## 2025-01-26 NOTE — PROGRESS NOTES
"Goran Ibrahim is a 54 y.o. male on day 5 of admission presenting with Hepatic cirrhosis, unspecified hepatic cirrhosis type, unspecified whether ascites present (Multi).    No acute issues.   Voiding well Cr relatively stable, BUN 90s.  No acute issues. Recorded UOP ~4L.    Scheduled medications  acyclovir, 400 mg, oral, BID  amLODIPine, 5 mg, oral, Daily  aspirin, 81 mg, oral, Daily  carvedilol, 6.25 mg, oral, BID  fluconazole, 400 mg, oral, Daily  heparin (porcine), 5,000 Units, subcutaneous, q8h  insulin lispro, 0-10 Units, subcutaneous, q6h EDINSON  insulin lispro, 4 Units, subcutaneous, TID AC  [START ON 1/26/2025] insulin NPH (Isophane), 10 Units, subcutaneous, q AM  lidocaine, 2 patch, transdermal, Daily  mycophenolate, 1,000 mg, oral, q12h EDINSON  pantoprazole, 40 mg, oral, BID  polyethylene glycol, 17 g, oral, Daily  predniSONE, 20 mg, oral, Daily  sennosides-docusate sodium, 2 tablet, oral, BID  sulfamethoxazole-trimethoprim, 80 mg of trimethoprim, oral, Daily  tacrolimus, 3 mg, oral, q12h EDINSON  tamsulosin, 0.4 mg, oral, Daily  ursodiol, 300 mg, oral, q8h      Continuous medications     PRN medications  PRN medications: dextrose, dextrose, glucagon, oxyCODONE, oxyCODONE      Last Recorded Vitals  Blood pressure 154/81, pulse 73, temperature 36.6 °C (97.9 °F), temperature source Temporal, resp. rate 18, height 1.778 m (5' 10\"), weight 129 kg (284 lb 6.3 oz), SpO2 96%.  Intake/Output last 3 Shifts:  I/O last 3 completed shifts:  In: 1500 (11.6 mL/kg) [P.O.:1400; Blood:100]  Out: 6187 (48 mL/kg) [Urine:5727 (1.2 mL/kg/hr); Drains:460]  Weight: 129 kg     A&ox3, no distress, pleasant  MMM, no lesions  Lungs with equal air entry, no added sounds  Rrr, no m/r/g  Abd soft, nt, nd  No allograft tenderness, incision c/d/I, drains+  No edema b/l    Results for orders placed or performed during the hospital encounter of 01/20/25 (from the past 24 hours)   Calcium, Ionized   Result Value Ref Range    POCT Calcium, Ionized " 1.15 1.1 - 1.33 mmol/L   CBC   Result Value Ref Range    WBC 3.8 (L) 4.4 - 11.3 x10*3/uL    nRBC 0.0 0.0 - 0.0 /100 WBCs    RBC 2.81 (L) 4.50 - 5.90 x10*6/uL    Hemoglobin 8.4 (L) 13.5 - 17.5 g/dL    Hematocrit 27.5 (L) 41.0 - 52.0 %    MCV 98 80 - 100 fL    MCH 29.9 26.0 - 34.0 pg    MCHC 30.5 (L) 32.0 - 36.0 g/dL    RDW 16.0 (H) 11.5 - 14.5 %    Platelets 57 (L) 150 - 450 x10*3/uL   Tacrolimus level   Result Value Ref Range    Tacrolimus  5.5 <=15.0 ng/mL   Gamma-Glutamyl Transferase   Result Value Ref Range     (H) 5 - 64 U/L   Hepatic Function Panel   Result Value Ref Range    Albumin 4.3 3.4 - 5.0 g/dL    Bilirubin, Total 2.2 (H) 0.0 - 1.2 mg/dL    Bilirubin, Direct 1.4 (H) 0.0 - 0.3 mg/dL    Alkaline Phosphatase 179 (H) 33 - 120 U/L     (H) 10 - 52 U/L    AST 64 (H) 9 - 39 U/L    Total Protein 6.4 6.4 - 8.2 g/dL   Magnesium   Result Value Ref Range    Magnesium 2.66 (H) 1.60 - 2.40 mg/dL   Phosphorus   Result Value Ref Range    Phosphorus 3.7 2.5 - 4.9 mg/dL   Basic Metabolic Panel   Result Value Ref Range    Glucose 180 (H) 74 - 99 mg/dL    Sodium 138 136 - 145 mmol/L    Potassium 3.4 (L) 3.5 - 5.3 mmol/L    Chloride 101 98 - 107 mmol/L    Bicarbonate 20 (L) 21 - 32 mmol/L    Anion Gap 20 10 - 20 mmol/L    Urea Nitrogen 97 (HH) 6 - 23 mg/dL    Creatinine 2.65 (H) 0.50 - 1.30 mg/dL    eGFR 27 (L) >60 mL/min/1.73m*2    Calcium 9.0 8.6 - 10.6 mg/dL   POCT GLUCOSE   Result Value Ref Range    POCT Glucose 184 (H) 74 - 99 mg/dL   Vitamin D 25-Hydroxy,Total (for eval of Vitamin D levels)   Result Value Ref Range    Vitamin D, 25-Hydroxy, Total 41 30 - 100 ng/mL   POCT GLUCOSE   Result Value Ref Range    POCT Glucose 122 (H) 74 - 99 mg/dL   POCT GLUCOSE   Result Value Ref Range    POCT Glucose 267 (H) 74 - 99 mg/dL   POCT GLUCOSE   Result Value Ref Range    POCT Glucose 249 (H) 74 - 99 mg/dL         Assessment/Plan     54 y.o. male with decompensated MetALD (ascites, variceal bleed, HE) c/b EV s/p  banding in June 2024, advanced CKD (baseline creatinine around 2.5), long standing tobacco use, duodenal ulcer, H.pylori infection (negative biopsies in June 2024), and short segment Matute's esophagus (not biopsy confirmed due to the presence of varices) who is s/p SLK on 1/20/2025 by Dr. Wright.      Pt was started on intra-op CVVH which was discontinued 1/21 AM.      Organ info: [id number] (SLK), DCD, KDPI 72%, PRA 0%, HCV -/-, CMV -/-, EBV +/+, HBV -/-, donor toxo neg. no + donor cultures. Simulect induction.      Allograft function:  - Liver: LFTs improving. Liver US acceptable  - kidney: serum Cr 2.65, relatively stable. BUN remains elevated . No s/sx of uremia. On CRRT intra-op, discontinued 1/21 am. Recorded UOP ~4L. S/p albumin infusions.   - metabolic parameters acceptable.  - No significant hypervolemia on exam.   - Hb, Ca, Phos acceptable  - avoid potential nephrotoxins. Dose meds for CrCl. Ensure adequate hydration     Immunosuppression:  - Simulect induction, Dose #1 1/20, dose #2 1/24   - maint IS: Tac, MMF, pred taper protocol. Dosing per txp surgery. Goal Fk 8-10.  - Ppx: Bactrim, fluconazole, acyclovir     Will follow    Billy Coles MD

## 2025-01-26 NOTE — PROGRESS NOTES
"Goran Ibrahim is a 54 y.o. male on day 5 of admission presenting with Hepatic cirrhosis, unspecified hepatic cirrhosis type, unspecified whether ascites present (Multi).    POD#3 s/p SLK  Out of ICU. Recovering well. LFTs trending down.   Cr relatively stable, BUN 90s.  No acute issues. Recorded UOP ~4.5L.    Scheduled medications  acyclovir, 400 mg, oral, BID  amLODIPine, 5 mg, oral, Daily  aspirin, 81 mg, oral, Daily  carvedilol, 6.25 mg, oral, BID  fluconazole, 400 mg, oral, Daily  heparin (porcine), 5,000 Units, subcutaneous, q8h  insulin lispro, 0-10 Units, subcutaneous, q6h EDINSON  insulin lispro, 4 Units, subcutaneous, TID AC  [START ON 1/26/2025] insulin NPH (Isophane), 10 Units, subcutaneous, q AM  lidocaine, 2 patch, transdermal, Daily  mycophenolate, 1,000 mg, oral, q12h EDINSON  pantoprazole, 40 mg, oral, BID  polyethylene glycol, 17 g, oral, Daily  predniSONE, 20 mg, oral, Daily  sennosides-docusate sodium, 2 tablet, oral, BID  sulfamethoxazole-trimethoprim, 80 mg of trimethoprim, oral, Daily  tacrolimus, 3 mg, oral, q12h EDINSON  tamsulosin, 0.4 mg, oral, Daily  ursodiol, 300 mg, oral, q8h      Continuous medications     PRN medications  PRN medications: dextrose, dextrose, glucagon, oxyCODONE, oxyCODONE      Last Recorded Vitals  Blood pressure 154/81, pulse 73, temperature 36.6 °C (97.9 °F), temperature source Temporal, resp. rate 18, height 1.778 m (5' 10\"), weight 129 kg (284 lb 6.3 oz), SpO2 96%.  Intake/Output last 3 Shifts:  I/O last 3 completed shifts:  In: 1500 (11.6 mL/kg) [P.O.:1400; Blood:100]  Out: 6187 (48 mL/kg) [Urine:5727 (1.2 mL/kg/hr); Drains:460]  Weight: 129 kg     A&ox3, no distress, pleasant  MMM, no lesions  Lungs with equal air entry, no added sounds  Rrr, no m/r/g  Abd soft, nt, nd  No allograft tenderness, incision c/d/I, drains+  No edema b/l    Results for orders placed or performed during the hospital encounter of 01/20/25 (from the past 24 hours)   Calcium, Ionized   Result Value " Ref Range    POCT Calcium, Ionized 1.15 1.1 - 1.33 mmol/L   CBC   Result Value Ref Range    WBC 3.8 (L) 4.4 - 11.3 x10*3/uL    nRBC 0.0 0.0 - 0.0 /100 WBCs    RBC 2.81 (L) 4.50 - 5.90 x10*6/uL    Hemoglobin 8.4 (L) 13.5 - 17.5 g/dL    Hematocrit 27.5 (L) 41.0 - 52.0 %    MCV 98 80 - 100 fL    MCH 29.9 26.0 - 34.0 pg    MCHC 30.5 (L) 32.0 - 36.0 g/dL    RDW 16.0 (H) 11.5 - 14.5 %    Platelets 57 (L) 150 - 450 x10*3/uL   Tacrolimus level   Result Value Ref Range    Tacrolimus  5.5 <=15.0 ng/mL   Gamma-Glutamyl Transferase   Result Value Ref Range     (H) 5 - 64 U/L   Hepatic Function Panel   Result Value Ref Range    Albumin 4.3 3.4 - 5.0 g/dL    Bilirubin, Total 2.2 (H) 0.0 - 1.2 mg/dL    Bilirubin, Direct 1.4 (H) 0.0 - 0.3 mg/dL    Alkaline Phosphatase 179 (H) 33 - 120 U/L     (H) 10 - 52 U/L    AST 64 (H) 9 - 39 U/L    Total Protein 6.4 6.4 - 8.2 g/dL   Magnesium   Result Value Ref Range    Magnesium 2.66 (H) 1.60 - 2.40 mg/dL   Phosphorus   Result Value Ref Range    Phosphorus 3.7 2.5 - 4.9 mg/dL   Basic Metabolic Panel   Result Value Ref Range    Glucose 180 (H) 74 - 99 mg/dL    Sodium 138 136 - 145 mmol/L    Potassium 3.4 (L) 3.5 - 5.3 mmol/L    Chloride 101 98 - 107 mmol/L    Bicarbonate 20 (L) 21 - 32 mmol/L    Anion Gap 20 10 - 20 mmol/L    Urea Nitrogen 97 (HH) 6 - 23 mg/dL    Creatinine 2.65 (H) 0.50 - 1.30 mg/dL    eGFR 27 (L) >60 mL/min/1.73m*2    Calcium 9.0 8.6 - 10.6 mg/dL   POCT GLUCOSE   Result Value Ref Range    POCT Glucose 184 (H) 74 - 99 mg/dL   Vitamin D 25-Hydroxy,Total (for eval of Vitamin D levels)   Result Value Ref Range    Vitamin D, 25-Hydroxy, Total 41 30 - 100 ng/mL   POCT GLUCOSE   Result Value Ref Range    POCT Glucose 122 (H) 74 - 99 mg/dL   POCT GLUCOSE   Result Value Ref Range    POCT Glucose 267 (H) 74 - 99 mg/dL   POCT GLUCOSE   Result Value Ref Range    POCT Glucose 249 (H) 74 - 99 mg/dL         Assessment/Plan     54 y.o. male with decompensated MetALD (ascites,  variceal bleed, HE) c/b EV s/p banding in June 2024, advanced CKD (baseline creatinine around 2.5), long standing tobacco use, duodenal ulcer, H.pylori infection (negative biopsies in June 2024), and short segment Matute's esophagus (not biopsy confirmed due to the presence of varices) who is s/p SLK on 1/20/2025 by Dr. Wright.      Pt was started on intra-op CVVH which was discontinued 1/21 AM.      Organ info: [id number] (SLK), DCD, KDPI 72%, PRA 0%, HCV -/-, CMV -/-, EBV +/+, HBV -/-, donor toxo neg. no + donor cultures. Simulect induction.      Allograft function:  - Liver: LFTs improving. Liver US acceptable  - kidney: serum Cr ~3, relatively stable. UN remains elevated . No s/sx of uremia. On CRRT intra-op, discontinued 1/21 am. Recorded UOP ~4.5L.  - metabolic parameters stable. no indication for RRT.   - No significant hypervolemia on exam.   - Hb, Ca, Phos acceptable  - avoid potential nephrotoxins. Dose meds for CrCl. Ensure adequate hydration     Immunosuppression:  - Simulect induction, Dose #1 1/20, dose #2 1/24   - maint IS: Tac, MMF, pred taper protocol. Dosing per txp surgery. Goal Fk 8-10.  - Ppx: Bactrim, fluconazole, acyclovir     Will follow    Billy Coles MD

## 2025-01-26 NOTE — PROGRESS NOTES
"Goran Ibrahim is a 54 y.o. male on day 6 of admission presenting with Hepatic cirrhosis, unspecified hepatic cirrhosis type, unspecified whether ascites present (Multi).    Subjective   Pt seen and examined at the bedside.  He is eating small meals.  Discussed insulin plan to switch from NPH to glarine.  Patient agreeable. Dexcom sensors and  at the bedside.      I have reviewed histories, allergies and medications have been reviewed and there are no changes     Objective   Review of Systems   Gastrointestinal:  Positive for abdominal distention.        Appropriate to procedure   All other systems reviewed and are negative.    Physical Exam  Constitutional:       Appearance: Normal appearance. He is normal weight.   HENT:      Head: Normocephalic and atraumatic.      Nose: Nose normal.      Mouth/Throat:      Mouth: Mucous membranes are dry.      Pharynx: Oropharynx is clear.   Eyes:      Extraocular Movements: Extraocular movements intact.      Conjunctiva/sclera: Conjunctivae normal.   Cardiovascular:      Rate and Rhythm: Normal rate and regular rhythm.      Pulses: Normal pulses.      Heart sounds: Normal heart sounds.   Pulmonary:      Effort: Pulmonary effort is normal.      Breath sounds: Normal breath sounds.   Abdominal:      General: There is distension.      Palpations: Abdomen is soft.   Skin:     General: Skin is warm and dry.   Neurological:      General: No focal deficit present.      Mental Status: He is alert and oriented to person, place, and time. Mental status is at baseline.   Psychiatric:         Mood and Affect: Mood normal.         Behavior: Behavior normal.         Thought Content: Thought content normal.         Judgment: Judgment normal.     Last Recorded Vitals  Blood pressure (!) 159/95, pulse 69, temperature 36.4 °C (97.5 °F), temperature source Temporal, resp. rate 18, height 1.778 m (5' 10\"), weight 129 kg (284 lb 6.3 oz), SpO2 96%.  Intake/Output last 3 Shifts:  I/O last 3 " completed shifts:  In: 1570 (12.2 mL/kg) [P.O.:1470; Blood:100]  Out: 5617 (43.5 mL/kg) [Urine:5097 (1.1 mL/kg/hr); Drains:520]  Weight: 129 kg     Relevant Results  Results from last 7 days   Lab Units 01/26/25  0623 01/26/25  0521 01/25/25  2140 01/25/25  1756 01/25/25  1521 01/25/25  1115 01/25/25  0440 01/24/25  0611 01/24/25  0537 01/23/25  0815 01/23/25  0542 01/22/25  1143 01/22/25  1135   POCT GLUCOSE mg/dL 152*  --  264* 249* 267* 122* 184*   < >  --    < >  --    < >  --    GLUCOSE mg/dL  --  140*  --   --   --   --  180*  --  253*  --  219*  --  194*    < > = values in this interval not displayed.     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 male presenting with past medical history of decompensated MetALD (ascites, variceal bleed, HE) c/b EV s/p banding in June 2024, advanced CKD (baseline creatinine around 2.5), long standing tobacco use, duodenal ulcer, H.pylori infection (negative biopsies in June 2024), and short segment Matute's esophagus (not biopsy confirmed due to the presence of varices) who was listed for SLK. He was subsequently brought in for DCD liver (caval piggyback, rCHA to dSMA/celiac stack HA, duct to duct [small to large with v plasty]) and right kidney transplant which he underwent on 1/20/2025 with Dr. Wright. Hepatology has been consulted to co-manage him post-OLT.     Diabetes History  Type of diabetes: type 2 diabetes  Seen by PCP or Endocrinology: PCP  Frequency of glucose checks: not checking   Glucose review:  NA, not checking  Frequency of Hypoglycemia:  none  Hypoglycemia unawareness: no  Severe hypoglycemia requiring assistance from others:  no    No diabetes medications  Assessment & Plan  Hepatic cirrhosis, unspecified hepatic cirrhosis type, unspecified whether ascites present (Multi)    ESRD (end stage renal disease) (Multi)    Steroid-induced  hyperglycemia    PLAN  Steroids:  methylpred 500mg intra-op  methylpred 250 mg x1d  methylpred 125mg x1d  methylpred 125mg x1d  methylpred 60mg x1d  pred 20mg ongoing     Nutrition: PO  75g CHO/meal    Required lispro correction overnight to bring fasting BG down, has been hyperglycemic previous mornings. Will DC NPH and start Glargine     - DC NPH     - Start 8 units of glargine Q AM       If NPO: decrease to 4 units    - increase lispro to 6 units with meals; hold dose if patient is NPO, hold dose if patient eats less than 50% of the meal or if glucose is less than 90 before the meal     - Adjust lispro corrective scale to #2 AC if NPO or if persistently hyperglycemic, please change to Q4   = 0u  151-200 = 2u  201-250 = 4u  251-300 = 6u  301-350 = 8u  351-400 = 10u     -Accuchecks (not BMP) ACHS  - Goal -180  -Hypoglycemia protocol  -Will continue to follow and titrate insulin accordingly  - diabetes educator consulted for insulin and CGM teaching       Discharge planning:   [] patient may expect to discharge home on glargine and lispro with CGM, final doses TBD by titration  [x]Dexcom G7 sensors and reader already delivered to bedside - phone not compatible  [x]will enroll pt in  pharmacy platinum plan program  [x]follow up with Chelsea Hernandez PA-C in PTDM clinic, scheduled 1/30/25    I spent 50 minutes in the professional and overall care of this patient.    Neva Durand, APRN-CNP

## 2025-01-27 ENCOUNTER — DOCUMENTATION (OUTPATIENT)
Dept: TRANSPLANT | Facility: HOSPITAL | Age: 55
End: 2025-01-27
Payer: MEDICAID

## 2025-01-27 ENCOUNTER — TELEPHONE (OUTPATIENT)
Dept: PRIMARY CARE | Facility: CLINIC | Age: 55
End: 2025-01-27
Payer: MEDICAID

## 2025-01-27 ENCOUNTER — PHARMACY VISIT (OUTPATIENT)
Dept: PHARMACY | Facility: CLINIC | Age: 55
End: 2025-01-27
Payer: MEDICAID

## 2025-01-27 VITALS
TEMPERATURE: 97.5 F | SYSTOLIC BLOOD PRESSURE: 145 MMHG | WEIGHT: 272.05 LBS | OXYGEN SATURATION: 95 % | HEIGHT: 70 IN | DIASTOLIC BLOOD PRESSURE: 87 MMHG | RESPIRATION RATE: 20 BRPM | HEART RATE: 67 BPM | BODY MASS INDEX: 38.95 KG/M2

## 2025-01-27 LAB
ALBUMIN SERPL BCP-MCNC: 3.9 G/DL (ref 3.4–5)
ALP SERPL-CCNC: 212 U/L (ref 33–120)
ALT SERPL W P-5'-P-CCNC: 131 U/L (ref 10–52)
ANION GAP SERPL CALC-SCNC: 16 MMOL/L (ref 10–20)
AST SERPL W P-5'-P-CCNC: 30 U/L (ref 9–39)
ATRIAL RATE: 67 BPM
BILIRUB DIRECT SERPL-MCNC: 1.3 MG/DL (ref 0–0.3)
BILIRUB SERPL-MCNC: 2.2 MG/DL (ref 0–1.2)
BUN SERPL-MCNC: 69 MG/DL (ref 6–23)
CALCIUM SERPL-MCNC: 8.7 MG/DL (ref 8.6–10.6)
CHLORIDE SERPL-SCNC: 106 MMOL/L (ref 98–107)
CO2 SERPL-SCNC: 21 MMOL/L (ref 21–32)
CREAT SERPL-MCNC: 1.89 MG/DL (ref 0.5–1.3)
EGFRCR SERPLBLD CKD-EPI 2021: 41 ML/MIN/1.73M*2
ERYTHROCYTE [DISTWIDTH] IN BLOOD BY AUTOMATED COUNT: 16.7 % (ref 11.5–14.5)
GGT SERPL-CCNC: 464 U/L (ref 5–64)
GLUCOSE BLD MANUAL STRIP-MCNC: 158 MG/DL (ref 74–99)
GLUCOSE BLD MANUAL STRIP-MCNC: 163 MG/DL (ref 74–99)
GLUCOSE BLD MANUAL STRIP-MCNC: 182 MG/DL (ref 74–99)
GLUCOSE BLD MANUAL STRIP-MCNC: 267 MG/DL (ref 74–99)
GLUCOSE SERPL-MCNC: 157 MG/DL (ref 74–99)
HCT VFR BLD AUTO: 28.5 % (ref 41–52)
HGB BLD-MCNC: 9.1 G/DL (ref 13.5–17.5)
MAGNESIUM SERPL-MCNC: 2.21 MG/DL (ref 1.6–2.4)
MCH RBC QN AUTO: 29 PG (ref 26–34)
MCHC RBC AUTO-ENTMCNC: 31.9 G/DL (ref 32–36)
MCV RBC AUTO: 91 FL (ref 80–100)
NRBC BLD-RTO: 0 /100 WBCS (ref 0–0)
P AXIS: 35 DEGREES
P OFFSET: 180 MS
P ONSET: 119 MS
PHOSPHATE SERPL-MCNC: 2.7 MG/DL (ref 2.5–4.9)
PLATELET # BLD AUTO: 84 X10*3/UL (ref 150–450)
POTASSIUM SERPL-SCNC: 3.7 MMOL/L (ref 3.5–5.3)
PR INTERVAL: 184 MS
PROT SERPL-MCNC: 6.1 G/DL (ref 6.4–8.2)
Q ONSET: 211 MS
QRS COUNT: 11 BEATS
QRS DURATION: 108 MS
QT INTERVAL: 440 MS
QTC CALCULATION(BAZETT): 464 MS
QTC FREDERICIA: 456 MS
R AXIS: -37 DEGREES
RBC # BLD AUTO: 3.14 X10*6/UL (ref 4.5–5.9)
SODIUM SERPL-SCNC: 139 MMOL/L (ref 136–145)
T AXIS: 32 DEGREES
T OFFSET: 431 MS
TACROLIMUS BLD-MCNC: 8.6 NG/ML
VENTRICULAR RATE: 67 BPM
WBC # BLD AUTO: 8.2 X10*3/UL (ref 4.4–11.3)

## 2025-01-27 PROCEDURE — 2500000001 HC RX 250 WO HCPCS SELF ADMINISTERED DRUGS (ALT 637 FOR MEDICARE OP): Mod: SE | Performed by: PHYSICIAN ASSISTANT

## 2025-01-27 PROCEDURE — 2500000001 HC RX 250 WO HCPCS SELF ADMINISTERED DRUGS (ALT 637 FOR MEDICARE OP): Mod: SE

## 2025-01-27 PROCEDURE — 82947 ASSAY GLUCOSE BLOOD QUANT: CPT

## 2025-01-27 PROCEDURE — 80197 ASSAY OF TACROLIMUS: CPT

## 2025-01-27 PROCEDURE — 82977 ASSAY OF GGT: CPT

## 2025-01-27 PROCEDURE — 2500000004 HC RX 250 GENERAL PHARMACY W/ HCPCS (ALT 636 FOR OP/ED): Mod: SE | Performed by: STUDENT IN AN ORGANIZED HEALTH CARE EDUCATION/TRAINING PROGRAM

## 2025-01-27 PROCEDURE — 2500000005 HC RX 250 GENERAL PHARMACY W/O HCPCS: Mod: SE

## 2025-01-27 PROCEDURE — 83735 ASSAY OF MAGNESIUM: CPT

## 2025-01-27 PROCEDURE — 82248 BILIRUBIN DIRECT: CPT

## 2025-01-27 PROCEDURE — 2500000002 HC RX 250 W HCPCS SELF ADMINISTERED DRUGS (ALT 637 FOR MEDICARE OP, ALT 636 FOR OP/ED): Mod: SE

## 2025-01-27 PROCEDURE — 36415 COLL VENOUS BLD VENIPUNCTURE: CPT

## 2025-01-27 PROCEDURE — 2500000002 HC RX 250 W HCPCS SELF ADMINISTERED DRUGS (ALT 637 FOR MEDICARE OP, ALT 636 FOR OP/ED): Mod: SE | Performed by: PHYSICIAN ASSISTANT

## 2025-01-27 PROCEDURE — 84100 ASSAY OF PHOSPHORUS: CPT

## 2025-01-27 PROCEDURE — 99232 SBSQ HOSP IP/OBS MODERATE 35: CPT | Performed by: STUDENT IN AN ORGANIZED HEALTH CARE EDUCATION/TRAINING PROGRAM

## 2025-01-27 PROCEDURE — 2500000004 HC RX 250 GENERAL PHARMACY W/ HCPCS (ALT 636 FOR OP/ED): Mod: SE

## 2025-01-27 PROCEDURE — RXMED WILLOW AMBULATORY MEDICATION CHARGE

## 2025-01-27 PROCEDURE — 1100000001 HC PRIVATE ROOM DAILY

## 2025-01-27 PROCEDURE — 85027 COMPLETE CBC AUTOMATED: CPT

## 2025-01-27 PROCEDURE — 99233 SBSQ HOSP IP/OBS HIGH 50: CPT

## 2025-01-27 PROCEDURE — 99233 SBSQ HOSP IP/OBS HIGH 50: CPT | Performed by: INTERNAL MEDICINE

## 2025-01-27 PROCEDURE — 2500000002 HC RX 250 W HCPCS SELF ADMINISTERED DRUGS (ALT 637 FOR MEDICARE OP, ALT 636 FOR OP/ED): Mod: SE | Performed by: NURSE PRACTITIONER

## 2025-01-27 RX ORDER — INSULIN LISPRO 100 [IU]/ML
8 INJECTION, SOLUTION INTRAVENOUS; SUBCUTANEOUS
Start: 2025-01-27 | End: 2025-01-30 | Stop reason: SINTOL

## 2025-01-27 RX ORDER — INSULIN LISPRO 100 [IU]/ML
8 INJECTION, SOLUTION INTRAVENOUS; SUBCUTANEOUS
Status: DISCONTINUED | OUTPATIENT
Start: 2025-01-27 | End: 2025-01-28 | Stop reason: HOSPADM

## 2025-01-27 RX ADMIN — URSODIOL 300 MG: 300 CAPSULE ORAL at 05:20

## 2025-01-27 RX ADMIN — INSULIN LISPRO 8 UNITS: 100 INJECTION, SOLUTION INTRAVENOUS; SUBCUTANEOUS at 12:55

## 2025-01-27 RX ADMIN — ACYCLOVIR 400 MG: 400 TABLET ORAL at 08:48

## 2025-01-27 RX ADMIN — INSULIN LISPRO 2 UNITS: 100 INJECTION, SOLUTION INTRAVENOUS; SUBCUTANEOUS at 10:06

## 2025-01-27 RX ADMIN — PANTOPRAZOLE SODIUM 40 MG: 40 TABLET, DELAYED RELEASE ORAL at 20:40

## 2025-01-27 RX ADMIN — HEPARIN SODIUM 5000 UNITS: 5000 INJECTION INTRAVENOUS; SUBCUTANEOUS at 08:48

## 2025-01-27 RX ADMIN — INSULIN GLARGINE 8 UNITS: 100 INJECTION, SOLUTION SUBCUTANEOUS at 10:05

## 2025-01-27 RX ADMIN — ACYCLOVIR 400 MG: 400 TABLET ORAL at 20:40

## 2025-01-27 RX ADMIN — FLUCONAZOLE 400 MG: 200 TABLET ORAL at 08:48

## 2025-01-27 RX ADMIN — ASPIRIN 81 MG: 81 TABLET, COATED ORAL at 08:48

## 2025-01-27 RX ADMIN — CARVEDILOL 6.25 MG: 6.25 TABLET, FILM COATED ORAL at 20:40

## 2025-01-27 RX ADMIN — TAMSULOSIN HYDROCHLORIDE 0.4 MG: 0.4 CAPSULE ORAL at 08:48

## 2025-01-27 RX ADMIN — LIDOCAINE 4% 2 PATCH: 40 PATCH TOPICAL at 08:49

## 2025-01-27 RX ADMIN — URSODIOL 300 MG: 300 CAPSULE ORAL at 20:40

## 2025-01-27 RX ADMIN — PANTOPRAZOLE SODIUM 40 MG: 40 TABLET, DELAYED RELEASE ORAL at 08:48

## 2025-01-27 RX ADMIN — MYCOPHENOLATE MOFETIL 1000 MG: 250 CAPSULE ORAL at 18:02

## 2025-01-27 RX ADMIN — TACROLIMUS 3 MG: 1 CAPSULE ORAL at 18:04

## 2025-01-27 RX ADMIN — HEPARIN SODIUM 5000 UNITS: 5000 INJECTION INTRAVENOUS; SUBCUTANEOUS at 01:19

## 2025-01-27 RX ADMIN — PREDNISONE 20 MG: 20 TABLET ORAL at 10:16

## 2025-01-27 RX ADMIN — INSULIN LISPRO 6 UNITS: 100 INJECTION, SOLUTION INTRAVENOUS; SUBCUTANEOUS at 08:45

## 2025-01-27 RX ADMIN — INSULIN LISPRO 2 UNITS: 100 INJECTION, SOLUTION INTRAVENOUS; SUBCUTANEOUS at 12:50

## 2025-01-27 RX ADMIN — INSULIN LISPRO 8 UNITS: 100 INJECTION, SOLUTION INTRAVENOUS; SUBCUTANEOUS at 18:02

## 2025-01-27 RX ADMIN — SULFAMETHOXAZOLE AND TRIMETHOPRIM 80 MG OF TRIMETHOPRIM: 400; 80 TABLET ORAL at 08:48

## 2025-01-27 RX ADMIN — CARVEDILOL 6.25 MG: 6.25 TABLET, FILM COATED ORAL at 08:48

## 2025-01-27 RX ADMIN — URSODIOL 300 MG: 300 CAPSULE ORAL at 12:50

## 2025-01-27 RX ADMIN — INSULIN LISPRO 6 UNITS: 100 INJECTION, SOLUTION INTRAVENOUS; SUBCUTANEOUS at 18:02

## 2025-01-27 RX ADMIN — AMLODIPINE BESYLATE 10 MG: 10 TABLET ORAL at 08:45

## 2025-01-27 RX ADMIN — MYCOPHENOLATE MOFETIL 1000 MG: 250 CAPSULE ORAL at 06:08

## 2025-01-27 RX ADMIN — HEPARIN SODIUM 5000 UNITS: 5000 INJECTION INTRAVENOUS; SUBCUTANEOUS at 16:54

## 2025-01-27 RX ADMIN — TACROLIMUS 3 MG: 1 CAPSULE ORAL at 06:08

## 2025-01-27 ASSESSMENT — ENCOUNTER SYMPTOMS
ROS GI COMMENTS: APPROPRIATE TO PROCEDURE
ABDOMINAL DISTENTION: 1

## 2025-01-27 ASSESSMENT — COGNITIVE AND FUNCTIONAL STATUS - GENERAL
TURNING FROM BACK TO SIDE WHILE IN FLAT BAD: A LITTLE
MOBILITY SCORE: 18
CLIMB 3 TO 5 STEPS WITH RAILING: A LOT
STANDING UP FROM CHAIR USING ARMS: A LITTLE
WALKING IN HOSPITAL ROOM: A LITTLE
WALKING IN HOSPITAL ROOM: A LITTLE
MOVING TO AND FROM BED TO CHAIR: A LITTLE
MOVING TO AND FROM BED TO CHAIR: A LITTLE
MOBILITY SCORE: 18
TURNING FROM BACK TO SIDE WHILE IN FLAT BAD: A LITTLE
STANDING UP FROM CHAIR USING ARMS: A LITTLE
CLIMB 3 TO 5 STEPS WITH RAILING: A LOT

## 2025-01-27 ASSESSMENT — PAIN SCALES - GENERAL
PAINLEVEL_OUTOF10: 4
PAINLEVEL_OUTOF10: 0 - NO PAIN

## 2025-01-27 ASSESSMENT — PAIN DESCRIPTION - LOCATION: LOCATION: ABDOMEN

## 2025-01-27 ASSESSMENT — PAIN - FUNCTIONAL ASSESSMENT: PAIN_FUNCTIONAL_ASSESSMENT: 0-10

## 2025-01-27 NOTE — CARE PLAN
Problem: Pain - Adult  Goal: Verbalizes/displays adequate comfort level or baseline comfort level  Outcome: Progressing     Problem: Safety - Adult  Goal: Free from fall injury  Outcome: Met     Problem: Discharge Planning  Goal: Discharge to home or other facility with appropriate resources  Outcome: Progressing     Problem: Chronic Conditions and Co-morbidities  Goal: Patient's chronic conditions and co-morbidity symptoms are monitored and maintained or improved  Outcome: Progressing     Problem: Fall/Injury  Goal: Not fall by end of shift  Outcome: Met  Goal: Be free from injury by end of the shift  Outcome: Met  Goal: Verbalize understanding of personal risk factors for fall in the hospital  Outcome: Met  Goal: Verbalize understanding of risk factor reduction measures to prevent injury from fall in the home  Outcome: Met  Goal: Use assistive devices by end of the shift  Outcome: Met  Goal: Pace activities to prevent fatigue by end of the shift  Outcome: Progressing     Problem: Fall/Injury  Goal: Not fall by end of shift  Outcome: Met  Goal: Be free from injury by end of the shift  Outcome: Met  Goal: Verbalize understanding of personal risk factors for fall in the hospital  Outcome: Met  Goal: Verbalize understanding of risk factor reduction measures to prevent injury from fall in the home  Outcome: Met  Goal: Use assistive devices by end of the shift  Outcome: Met  Goal: Pace activities to prevent fatigue by end of the shift  Outcome: Progressing     Problem: Knowledge Deficit  Goal: Patient/family/caregiver demonstrates understanding of disease process, treatment plan, medications, and discharge instructions  Outcome: Progressing     Problem: Skin  Goal: Participates in plan/prevention/treatment measures  Outcome: Met  Goal: Prevent/manage excess moisture  Outcome: Progressing  Goal: Prevent/minimize sheer/friction injuries  Outcome: Progressing  Goal: Promote/optimize nutrition  Outcome: Met  Goal: Promote  skin healing  Outcome: Progressing     Problem: Pain  Goal: Takes deep breaths with improved pain control throughout the shift  Outcome: Met  Goal: Turns in bed with improved pain control throughout the shift  Outcome: Met  Goal: Walks with improved pain control throughout the shift  Outcome: Progressing  Goal: Performs ADL's with improved pain control throughout shift  Outcome: Progressing  Goal: Participates in PT with improved pain control throughout the shift  Outcome: Met  Goal: Free from opioid side effects throughout the shift  Outcome: Met  Goal: Free from acute confusion related to pain meds throughout the shift  Outcome: Met     Problem: Diabetes  Goal: Achieve decreasing blood glucose levels by end of shift  Outcome: Progressing  Goal: Increase stability of blood glucose readings by end of shift  Outcome: Progressing  Goal: Maintain electrolyte levels within acceptable range throughout shift  Outcome: Met  Goal: Maintain glucose levels >70mg/dl to <250mg/dl throughout shift  Outcome: Met  Goal: No changes in neurological exam by end of shift  Outcome: Met  Goal: Learn about and adhere to nutrition recommendations by end of shift  Outcome: Progressing  Goal: Vital signs within normal range for age by end of shift  Outcome: Progressing  Goal: Increase self care and/or family involovement by end of shift  Outcome: Progressing       The clinical goals for the shift include patient will remain safe and vital signs stable.    .

## 2025-01-27 NOTE — PROGRESS NOTES
"Goran Ibrahim is a 54 y.o. male on day 6 of admission presenting with Hepatic cirrhosis, unspecified hepatic cirrhosis type, unspecified whether ascites present (Multi).    No acute issues.   Voiding well.  Cr slowly improving, 2.2 today.  Recorded UOP 2.2L.    Scheduled medications  acyclovir, 400 mg, oral, BID  [START ON 1/27/2025] amLODIPine, 10 mg, oral, Daily  aspirin, 81 mg, oral, Daily  carvedilol, 6.25 mg, oral, BID  fluconazole, 400 mg, oral, Daily  heparin (porcine), 5,000 Units, subcutaneous, q8h  insulin glargine, 8 Units, subcutaneous, Daily  insulin lispro, 0-10 Units, subcutaneous, TID AC  insulin lispro, 6 Units, subcutaneous, TID AC  lidocaine, 2 patch, transdermal, Daily  mycophenolate, 1,000 mg, oral, q12h EDINSON  pantoprazole, 40 mg, oral, BID  polyethylene glycol, 17 g, oral, Daily  predniSONE, 20 mg, oral, Daily  sennosides-docusate sodium, 2 tablet, oral, BID  sulfamethoxazole-trimethoprim, 80 mg of trimethoprim, oral, Daily  tacrolimus, 3 mg, oral, q12h EDINSON  tamsulosin, 0.4 mg, oral, Daily  ursodiol, 300 mg, oral, q8h      Continuous medications     PRN medications  PRN medications: dextrose, glucagon, traMADol, traMADol      Last Recorded Vitals  Blood pressure 173/77, pulse 77, temperature 36.4 °C (97.5 °F), temperature source Temporal, resp. rate 18, height 1.778 m (5' 10\"), weight 123 kg (272 lb 0.8 oz), SpO2 96%.  Intake/Output last 3 Shifts:  I/O last 3 completed shifts:  In: 1510 (12.2 mL/kg) [P.O.:1510]  Out: 4127 (33.4 mL/kg) [Urine:3597 (0.8 mL/kg/hr); Drains:530]  Weight: 123.4 kg     A&ox3, no distress, pleasant  MMM, no lesions  Lungs with equal air entry, no added sounds  Rrr, no m/r/g  Abd soft, nt, nd  No allograft tenderness, incision c/d/I, drains+  No edema b/l    Results for orders placed or performed during the hospital encounter of 01/20/25 (from the past 24 hours)   POCT GLUCOSE   Result Value Ref Range    POCT Glucose 264 (H) 74 - 99 mg/dL   Calcium, Ionized   Result " Value Ref Range    POCT Calcium, Ionized 1.10 1.1 - 1.33 mmol/L   CBC   Result Value Ref Range    WBC 6.4 4.4 - 11.3 x10*3/uL    nRBC 0.0 0.0 - 0.0 /100 WBCs    RBC 3.05 (L) 4.50 - 5.90 x10*6/uL    Hemoglobin 9.1 (L) 13.5 - 17.5 g/dL    Hematocrit 27.2 (L) 41.0 - 52.0 %    MCV 89 80 - 100 fL    MCH 29.8 26.0 - 34.0 pg    MCHC 33.5 32.0 - 36.0 g/dL    RDW 16.2 (H) 11.5 - 14.5 %    Platelets 74 (L) 150 - 450 x10*3/uL   Hepatic Function Panel   Result Value Ref Range    Albumin 4.0 3.4 - 5.0 g/dL    Bilirubin, Total 2.3 (H) 0.0 - 1.2 mg/dL    Bilirubin, Direct 1.4 (H) 0.0 - 0.3 mg/dL    Alkaline Phosphatase 205 (H) 33 - 120 U/L     (H) 10 - 52 U/L    AST 46 (H) 9 - 39 U/L    Total Protein 6.2 (L) 6.4 - 8.2 g/dL   Magnesium   Result Value Ref Range    Magnesium 2.35 1.60 - 2.40 mg/dL   Tacrolimus level   Result Value Ref Range    Tacrolimus  6.3 <=15.0 ng/mL   Phosphorus   Result Value Ref Range    Phosphorus 2.5 2.5 - 4.9 mg/dL   Basic Metabolic Panel   Result Value Ref Range    Glucose 140 (H) 74 - 99 mg/dL    Sodium 140 136 - 145 mmol/L    Potassium 3.4 (L) 3.5 - 5.3 mmol/L    Chloride 106 98 - 107 mmol/L    Bicarbonate 21 21 - 32 mmol/L    Anion Gap 16 10 - 20 mmol/L    Urea Nitrogen 80 (H) 6 - 23 mg/dL    Creatinine 2.21 (H) 0.50 - 1.30 mg/dL    eGFR 34 (L) >60 mL/min/1.73m*2    Calcium 9.0 8.6 - 10.6 mg/dL   Gamma-Glutamyl Transferase   Result Value Ref Range     (H) 5 - 64 U/L   POCT GLUCOSE   Result Value Ref Range    POCT Glucose 152 (H) 74 - 99 mg/dL   POCT GLUCOSE   Result Value Ref Range    POCT Glucose 173 (H) 74 - 99 mg/dL   POCT GLUCOSE   Result Value Ref Range    POCT Glucose 142 (H) 74 - 99 mg/dL   POCT GLUCOSE   Result Value Ref Range    POCT Glucose 196 (H) 74 - 99 mg/dL         Assessment/Plan     54 y.o. male with decompensated MetALD (ascites, variceal bleed, HE) c/b EV s/p banding in June 2024, advanced CKD (baseline creatinine around 2.5), long standing tobacco use, duodenal ulcer,  H.pylori infection (negative biopsies in June 2024), and short segment Matute's esophagus (not biopsy confirmed due to the presence of varices) who is s/p SLK on 1/20/2025 by Dr. Wright.      Pt was started on intra-op CVVH which was discontinued 1/21 AM.      Organ info: [id number] (SLK), DCD, KDPI 72%, PRA 0%, HCV -/-, CMV -/-, EBV +/+, HBV -/-, donor toxo neg. no + donor cultures. Simulect induction.      Allograft function:  - Liver: LFTs improving. Liver US acceptable  - kidney: serum Cr 2.2, improving. BUN improving as well. No s/sx of uremia. On CRRT intra-op, discontinued 1/21 am. Nonoliguric with UOP 2-3L/day. S/p albumin infusions.   - metabolic parameters acceptable.  - No significant hypervolemia on exam.   - Hb, Ca, Phos acceptable  - avoid potential nephrotoxins. Dose meds for CrCl. Ensure adequate hydration     Immunosuppression:  - Simulect induction completed.   - maint IS: Tac, MMF 1g bid, pred taper protocol. Dosing per txp surgery. Goal Fk 8-10.  - Ppx: Bactrim, fluconazole, acyclovir     Will follow    Billy Coles MD

## 2025-01-27 NOTE — CARE PLAN
The patient's goals for the shift include Pt wants to go to surgury    The clinical goals for the shift include patient will remain safe and vital signs stable.      Problem: Pain - Adult  Goal: Verbalizes/displays adequate comfort level or baseline comfort level  Outcome: Progressing     Problem: Safety - Adult  Goal: Free from fall injury  Outcome: Progressing     Problem: Fall/Injury  Goal: Not fall by end of shift  Outcome: Progressing  Goal: Be free from injury by end of the shift  Outcome: Progressing  Goal: Verbalize understanding of personal risk factors for fall in the hospital  Outcome: Progressing  Goal: Verbalize understanding of risk factor reduction measures to prevent injury from fall in the home  Outcome: Progressing  Goal: Use assistive devices by end of the shift  Outcome: Progressing  Goal: Pace activities to prevent fatigue by end of the shift  Outcome: Progressing

## 2025-01-27 NOTE — PROGRESS NOTES
TRANSPLANT SURGERY PROGRESS NOTE    Goran Ibrahim underwent transplant surgery on 1/20/2025 (Liver / Kidney) and was evaluated on multidisciplinary inpatient rounds.  I specifically evaluated the management of immunosuppression to ensure adequate exposure required to decrease the risk of rejection.  Furthermore, I reviewed potential side effects including tremor, hyperglycemia, leukopenia, infection, and other neurologic changes.  I reviewed and adjusted infectious prophylaxis based on the patients clinical condition and  protocols.     24 EVENTS:  No acute events    DIAGNOSIS:  Liver replaced by transplant Z94.4  Kidney replaced by transplant    PHYSICAL EXAMINATION:  Vitals:    01/27/25 0700   BP: 156/88   Pulse: 66   Resp: 18   Temp: 37 °C (98.6 °F)   SpO2: 97%        01/25 1900 - 01/27 0659  In: 1590 [P.O.:1590]  Out: 3200 [Urine:2570; Drains:630]     Weight change:     General Appearance - NAD, Good speech, oriented and alert  Abdomen - soft , not tender, no guarding, no rigidity. No hepatosplenomegaly. Normal bowel sounds. No masses and ascites.   Wound c/d/i   Drain: Sero-sang  Neuro/Psych - appropriate mood and affect. Motor power V/V all extremities. CN I -XII were grossly intact.  Skin - No visible rash      MEDICATION LIST: REVIEWED  acyclovir, 400 mg, BID  amLODIPine, 10 mg, Daily  aspirin, 81 mg, Daily  carvedilol, 6.25 mg, BID  fluconazole, 400 mg, Daily  heparin (porcine), 5,000 Units, q8h  insulin glargine, 8 Units, Daily  insulin lispro, 0-10 Units, TID AC  insulin lispro, 8 Units, TID AC  lidocaine, 2 patch, Daily  mycophenolate, 1,000 mg, q12h EDINSON  pantoprazole, 40 mg, BID  polyethylene glycol, 17 g, Daily  predniSONE, 20 mg, Daily  sennosides-docusate sodium, 2 tablet, BID  sulfamethoxazole-trimethoprim, 80 mg of trimethoprim, Daily  tacrolimus, 3 mg, q12h EDINSON  tamsulosin, 0.4 mg, Daily  ursodiol, 300 mg, q8h         dextrose, 12.5 g, q15 min PRN  glucagon, 1 mg, q15 min PRN  traMADol, 25  mg, q6h PRN  traMADol, 50 mg, q6h PRN        ALLERGY:  No Known Allergies    LABS:  Results for orders placed or performed during the hospital encounter of 01/20/25 (from the past 24 hours)   POCT GLUCOSE   Result Value Ref Range    POCT Glucose 142 (H) 74 - 99 mg/dL   POCT GLUCOSE   Result Value Ref Range    POCT Glucose 196 (H) 74 - 99 mg/dL   POCT GLUCOSE   Result Value Ref Range    POCT Glucose 238 (H) 74 - 99 mg/dL   CBC   Result Value Ref Range    WBC 8.2 4.4 - 11.3 x10*3/uL    nRBC 0.0 0.0 - 0.0 /100 WBCs    RBC 3.14 (L) 4.50 - 5.90 x10*6/uL    Hemoglobin 9.1 (L) 13.5 - 17.5 g/dL    Hematocrit 28.5 (L) 41.0 - 52.0 %    MCV 91 80 - 100 fL    MCH 29.0 26.0 - 34.0 pg    MCHC 31.9 (L) 32.0 - 36.0 g/dL    RDW 16.7 (H) 11.5 - 14.5 %    Platelets 84 (L) 150 - 450 x10*3/uL   Gamma-Glutamyl Transferase   Result Value Ref Range     (H) 5 - 64 U/L   Hepatic Function Panel   Result Value Ref Range    Albumin 3.9 3.4 - 5.0 g/dL    Bilirubin, Total 2.2 (H) 0.0 - 1.2 mg/dL    Bilirubin, Direct 1.3 (H) 0.0 - 0.3 mg/dL    Alkaline Phosphatase 212 (H) 33 - 120 U/L     (H) 10 - 52 U/L    AST 30 9 - 39 U/L    Total Protein 6.1 (L) 6.4 - 8.2 g/dL   Magnesium   Result Value Ref Range    Magnesium 2.21 1.60 - 2.40 mg/dL   Tacrolimus level   Result Value Ref Range    Tacrolimus  8.6 <=15.0 ng/mL   Phosphorus   Result Value Ref Range    Phosphorus 2.7 2.5 - 4.9 mg/dL   Basic Metabolic Panel   Result Value Ref Range    Glucose 157 (H) 74 - 99 mg/dL    Sodium 139 136 - 145 mmol/L    Potassium 3.7 3.5 - 5.3 mmol/L    Chloride 106 98 - 107 mmol/L    Bicarbonate 21 21 - 32 mmol/L    Anion Gap 16 10 - 20 mmol/L    Urea Nitrogen 69 (H) 6 - 23 mg/dL    Creatinine 1.89 (H) 0.50 - 1.30 mg/dL    eGFR 41 (L) >60 mL/min/1.73m*2    Calcium 8.7 8.6 - 10.6 mg/dL   POCT GLUCOSE   Result Value Ref Range    POCT Glucose 163 (H) 74 - 99 mg/dL      Lab Results   Component Value Date     (H) 01/27/2025    AST 30 01/27/2025      (H) 01/27/2025    ALKPHOS 212 (H) 01/27/2025    BILITOT 2.2 (H) 01/27/2025         ASSESSMENT AND PLAN:    Mr. Ibrahim is a 54 y.o. male who underwent  transplant surgery on 1/20/2025 (Liver / Kidney).    1. Immunosuppression reviewed and adjusted        Simulect induction, completed  Tacrolimus: current dose 3 mg BID. Plan continue      Today's tacrolimus level is 8.6, Goal tacrolimus trough level is 8-10      Mycophenolate: Yes - 1000 mg BID      Prednisone: Yes - 20 mg daily    2. IV hydration      Replacement completed: Yes      Maintenance: Discontinue    3. Electrolyte     Reviewed renal profile.      DGF: No    4. Hypertension medications reviewed        Blood Pressures         1/26/2025  1527 1/26/2025  2030 1/27/2025  0004 1/27/2025  0501 1/27/2025  0700    BP: 173/77 135/75 145/87 153/77 156/88              Continue current management     5. Wound/drain/chaudhary and pain management       Chaudhary cathter:  Discontinued, Voiding    6. GI prophylaxis        Reg diet  On PPI     7. DVT Prophylaxis      SCDs      Subcutaneous Heparin: Yes    8. ID prophylaxis      CMV, PJP and fungal prophylaxis per  Transplant [facility] Protocol      Valcyte: Yes    9. Discharge planned for: tomorrow    Case was presented at Multi Disciplinary team rounds   Attending physician, consulting physician, , pharmacist, residents and fellow were present at the meeting.    Laura Holguin MD    Transplant Surgery Attending

## 2025-01-27 NOTE — PROGRESS NOTES
"Goran Ibrahim is a 54 y.o. male on day 7 of admission presenting with Hepatic cirrhosis, unspecified hepatic cirrhosis type, unspecified whether ascites present (Multi).    Subjective   Pt seen and examined at the bedside.  Printed out and reviewed homegoing insulin plan with patient and family member.  Dexcom g7 sensor and reciever set up for patient  All questions answered     I have reviewed histories, allergies and medications have been reviewed and there are no changes     Objective   Review of Systems   Gastrointestinal:  Positive for abdominal distention.        Appropriate to procedure   All other systems reviewed and are negative.    Physical Exam  Constitutional:       Appearance: Normal appearance. He is normal weight.   HENT:      Head: Normocephalic and atraumatic.      Nose: Nose normal.      Mouth/Throat:      Mouth: Mucous membranes are dry.      Pharynx: Oropharynx is clear.   Eyes:      Extraocular Movements: Extraocular movements intact.      Conjunctiva/sclera: Conjunctivae normal.   Cardiovascular:      Rate and Rhythm: Normal rate and regular rhythm.      Pulses: Normal pulses.      Heart sounds: Normal heart sounds.   Pulmonary:      Effort: Pulmonary effort is normal.      Breath sounds: Normal breath sounds.   Abdominal:      General: There is distension.      Palpations: Abdomen is soft.   Skin:     General: Skin is warm and dry.   Neurological:      General: No focal deficit present.      Mental Status: He is alert and oriented to person, place, and time. Mental status is at baseline.   Psychiatric:         Mood and Affect: Mood normal.         Behavior: Behavior normal.         Thought Content: Thought content normal.         Judgment: Judgment normal.     Last Recorded Vitals  Blood pressure 119/68, pulse 67, temperature 37 °C (98.6 °F), temperature source Temporal, resp. rate 18, height 1.778 m (5' 10\"), weight 123 kg (270 lb 14.4 oz), SpO2 95%.  Intake/Output last 3 Shifts:  I/O last 3 " completed shifts:  In: 1590 (12.9 mL/kg) [P.O.:1590]  Out: 3200 (26 mL/kg) [Urine:2570 (0.6 mL/kg/hr); Drains:630]  Weight: 122.9 kg     Relevant Results  Results from last 7 days   Lab Units 01/27/25  1144 01/27/25  0700 01/27/25  0546 01/26/25  2029 01/26/25  1525 01/26/25  1139 01/26/25  0623 01/26/25  0521 01/25/25  1115 01/25/25  0440 01/24/25  0611 01/24/25  0537 01/23/25  0815 01/23/25  0542   POCT GLUCOSE mg/dL 158* 163*  --  238* 196* 142*   < >  --    < > 184*   < >  --    < >  --    GLUCOSE mg/dL  --   --  157*  --   --   --   --  140*  --  180*  --  253*  --  219*    < > = values in this interval not displayed.     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 male presenting with past medical history of decompensated MetALD (ascites, variceal bleed, HE) c/b EV s/p banding in June 2024, advanced CKD (baseline creatinine around 2.5), long standing tobacco use, duodenal ulcer, H.pylori infection (negative biopsies in June 2024), and short segment Matute's esophagus (not biopsy confirmed due to the presence of varices) who was listed for SLK. He was subsequently brought in for DCD liver (caval piggyback, rCHA to dSMA/celiac stack HA, duct to duct [small to large with v plasty]) and right kidney transplant which he underwent on 1/20/2025 with Dr. Wright. Hepatology has been consulted to co-manage him post-OLT.     Diabetes History  Type of diabetes: type 2 diabetes  Seen by PCP or Endocrinology: PCP  Frequency of glucose checks: not checking   Glucose review:  NA, not checking  Frequency of Hypoglycemia:  none  Hypoglycemia unawareness: no  Severe hypoglycemia requiring assistance from others:  no    No diabetes medications  Assessment & Plan  Hepatic cirrhosis, unspecified hepatic cirrhosis type, unspecified whether ascites present (Multi)    ESRD (end stage renal disease) (Multi)    Steroid-induced  hyperglycemia    PLAN  Steroids:  methylpred 500mg intra-op  methylpred 250 mg x1d  methylpred 125mg x1d  methylpred 125mg x1d  methylpred 60mg x1d  pred 20mg ongoing     Nutrition: PO  75g CHO/meal      - continue 8 units of glargine Q AM       If NPO: decrease to 4 units    - increase lispro to 8 units with meals; hold dose if patient is NPO, hold dose if patient eats less than 50% of the meal or if glucose is less than 90 before the meal     - continue lispro corrective scale to #2 AC if NPO or if persistently hyperglycemic, please change to Q4   = 0u  151-200 = 2u  201-250 = 4u  251-300 = 6u  301-350 = 8u  351-400 = 10u     -Accuchecks (not BMP) ACHS  - Goal -180  -Hypoglycemia protocol  -Will continue to follow and titrate insulin accordingly  - diabetes educator consulted for insulin and CGM teaching       Discharge planning:   [x] patient may expect to discharge home on glargine and lispro doses above, printed out homegoing regimen below  [x]Dexcom G7 sensors and reader already delivered to bedside - phone not compatible. Sensor set up and placed on patient.   [x]will enroll pt in  pharmacy platinum plan program  [x]follow up with Chelsea Hernandez PA-C in PTDM clinic, scheduled 1/30/25    I spent 50 minutes in the professional and overall care of this patient.    Final discharge recs given, endo team to sign off, please contact us via secure chat or at pager [phone] with any questions.       Shara Kline PA-C        INSULIN INSTRUCTIONS   Breakfast time Lunch time Dinner time  Bedtime   Lantus/Glargine = 8 units      Humalog/Lispro = 8 units plus scale Humalog/Lispro = 8 units plus scale  Humalog/Lispro = 8 units plus scale      CORRECTIVE SCALE  GLUCOSE READING   HUMALOG/LISPRO SCALE DOSE    70 - 149 0   150 - 200 2   201 - 250 4   251 - 300 6   301 - 350 8   351 - 400+ 10     If glucose remains over 400, call your diabetes provider, repeat 10u units every 4 hours and drink sugar free  liquids (water, Gatorade zero, chicken broth) until you glucose improves or you hear back from medical professional. If your glucose remains over 400 and you develop symptoms such as nausea/vomiting or confusion, go to the emergency room as soon as possible.

## 2025-01-27 NOTE — SIGNIFICANT EVENT
01/21/25 1130   Patient Interaction   Organ Liver   Type of Interaction Phone conference   Interdisciplinary Rounds   Attendance Surgeon;Physician;CHERYL;Pharmacist   Topics Discussed Medications;Blood test results;Activity;Imaging/test results     Transplant Surgery Multidisciplinary Team Note    Goran Ibrahim is a 54 y.o. male   POD# 7 from a Kidney and Liver from a DCD donor. His post operative complications: None    24 Hour Events  1. No acute events    Last Recorded Vitals  Visit Vitals  /68 (BP Location: Left arm, Patient Position: Lying)   Pulse 67   Temp 37 °C (98.6 °F) (Temporal)   Resp 18      Intake/Output last 3 Shifts:    Intake/Output Summary (Last 24 hours) at 1/27/2025 1344  Last data filed at 1/27/2025 1148  Gross per 24 hour   Intake 1160 ml   Output 1595 ml   Net -435 ml      Vitals:    01/27/25 0544   Weight: 123 kg (270 lb 14.4 oz)        Assessment/Plan   Principal Problem:    S/P SLK    Hyperglycemia  -appreciate endocrine recs  -DM educator      Management after organ transplant  Active Problems:  Patient Active Problem List   Diagnosis    Preop cardiovascular exam    Hypertension    Liver cirrhosis (Multi)    Pre-kidney transplant, listed    Pre-liver transplant, listed    Esophageal varices in alcoholic cirrhosis (Multi)    Hepatic cirrhosis, unspecified hepatic cirrhosis type, unspecified whether ascites present (Multi)    ESRD (end stage renal disease) (Multi)    Steroid-induced hyperglycemia    Kidney replaced by transplant ([facility])    Liver replaced by transplant (Multi)        Immunosuppression reviewed and adjusted       Induction: Simulect       Tacrolimus goal 8-10 ng/mL. Current dose  3  mg BID, starting tonight         MMF 1000 mg PO BID       Solumedrol taper  DVT prophylaxis SCDS  PT/OT  Diet: 75 g diabetic diet  Anticipated discharge: Home Tuesday     Ruth Javier, APRN-CNP

## 2025-01-27 NOTE — TELEPHONE ENCOUNTER
----- Message from Seun Rodrigues sent at 1/26/2025  7:50 PM EST -----  Slight worsening lipid, will review in detail at NOV. Cont following low fat/heart healthy diet.  Follow up as scheduled. -Dr. Rodrigues

## 2025-01-27 NOTE — PROGRESS NOTES
Per [employer] online; Authorization #: [id number] Effective: 01/20/2025. Pending OSOTC approval. Received OSOTC approval from liver team. Uploaded osotc and progress notes to online auth.

## 2025-01-27 NOTE — CONSULTS
Nutrition Note:     RDN agrees with current + discharge nutrition plan. Discussed pt in IDT rounds this morning.  Patient and brother were provided with Food Safety education + booklet. Patient was also provided with food thermometer.  Insulin regimen per Endocrinology. Follow up with outpatient nutrition as scheduled.

## 2025-01-28 ENCOUNTER — DOCUMENTATION (OUTPATIENT)
Dept: HOME HEALTH SERVICES | Facility: HOME HEALTH | Age: 55
End: 2025-01-28
Payer: MEDICAID

## 2025-01-28 VITALS
DIASTOLIC BLOOD PRESSURE: 87 MMHG | OXYGEN SATURATION: 97 % | BODY MASS INDEX: 35.76 KG/M2 | HEART RATE: 64 BPM | WEIGHT: 249.78 LBS | HEIGHT: 70 IN | SYSTOLIC BLOOD PRESSURE: 142 MMHG | TEMPERATURE: 97.5 F | RESPIRATION RATE: 16 BRPM

## 2025-01-28 DIAGNOSIS — Z94.0 KIDNEY REPLACED BY TRANSPLANT (HHS-HCC): ICD-10-CM

## 2025-01-28 DIAGNOSIS — Z94.4 LIVER REPLACED BY TRANSPLANT (MULTI): ICD-10-CM

## 2025-01-28 LAB
ALBUMIN SERPL BCP-MCNC: 3.8 G/DL (ref 3.4–5)
ALP SERPL-CCNC: 205 U/L (ref 33–120)
ALT SERPL W P-5'-P-CCNC: 93 U/L (ref 10–52)
ANION GAP SERPL CALC-SCNC: 17 MMOL/L (ref 10–20)
AST SERPL W P-5'-P-CCNC: 22 U/L (ref 9–39)
BILIRUB DIRECT SERPL-MCNC: 1 MG/DL (ref 0–0.3)
BILIRUB SERPL-MCNC: 1.9 MG/DL (ref 0–1.2)
BUN SERPL-MCNC: 58 MG/DL (ref 6–23)
CALCIUM SERPL-MCNC: 8.5 MG/DL (ref 8.6–10.6)
CHLORIDE SERPL-SCNC: 106 MMOL/L (ref 98–107)
CO2 SERPL-SCNC: 20 MMOL/L (ref 21–32)
CREAT SERPL-MCNC: 1.76 MG/DL (ref 0.5–1.3)
EGFRCR SERPLBLD CKD-EPI 2021: 45 ML/MIN/1.73M*2
ERYTHROCYTE [DISTWIDTH] IN BLOOD BY AUTOMATED COUNT: 17 % (ref 11.5–14.5)
GGT SERPL-CCNC: 407 U/L (ref 5–64)
GLUCOSE BLD MANUAL STRIP-MCNC: 109 MG/DL (ref 74–99)
GLUCOSE BLD MANUAL STRIP-MCNC: 147 MG/DL (ref 74–99)
GLUCOSE SERPL-MCNC: 113 MG/DL (ref 74–99)
HCT VFR BLD AUTO: 28 % (ref 41–52)
HGB BLD-MCNC: 9.2 G/DL (ref 13.5–17.5)
MAGNESIUM SERPL-MCNC: 2.05 MG/DL (ref 1.6–2.4)
MCH RBC QN AUTO: 30.1 PG (ref 26–34)
MCHC RBC AUTO-ENTMCNC: 32.9 G/DL (ref 32–36)
MCV RBC AUTO: 92 FL (ref 80–100)
NRBC BLD-RTO: 0 /100 WBCS (ref 0–0)
PHOSPHATE SERPL-MCNC: 3.2 MG/DL (ref 2.5–4.9)
PLATELET # BLD AUTO: 99 X10*3/UL (ref 150–450)
POTASSIUM SERPL-SCNC: 3.8 MMOL/L (ref 3.5–5.3)
PROT SERPL-MCNC: 6 G/DL (ref 6.4–8.2)
RBC # BLD AUTO: 3.06 X10*6/UL (ref 4.5–5.9)
SODIUM SERPL-SCNC: 139 MMOL/L (ref 136–145)
TACROLIMUS BLD-MCNC: 10.3 NG/ML
WBC # BLD AUTO: 8.2 X10*3/UL (ref 4.4–11.3)

## 2025-01-28 PROCEDURE — 83735 ASSAY OF MAGNESIUM: CPT

## 2025-01-28 PROCEDURE — 2500000001 HC RX 250 WO HCPCS SELF ADMINISTERED DRUGS (ALT 637 FOR MEDICARE OP): Mod: SE

## 2025-01-28 PROCEDURE — 2500000002 HC RX 250 W HCPCS SELF ADMINISTERED DRUGS (ALT 637 FOR MEDICARE OP, ALT 636 FOR OP/ED): Mod: SE

## 2025-01-28 PROCEDURE — 97530 THERAPEUTIC ACTIVITIES: CPT | Mod: GP

## 2025-01-28 PROCEDURE — 82248 BILIRUBIN DIRECT: CPT

## 2025-01-28 PROCEDURE — 99232 SBSQ HOSP IP/OBS MODERATE 35: CPT | Performed by: STUDENT IN AN ORGANIZED HEALTH CARE EDUCATION/TRAINING PROGRAM

## 2025-01-28 PROCEDURE — 99238 HOSP IP/OBS DSCHRG MGMT 30/<: CPT

## 2025-01-28 PROCEDURE — 99233 SBSQ HOSP IP/OBS HIGH 50: CPT | Performed by: INTERNAL MEDICINE

## 2025-01-28 PROCEDURE — 2500000004 HC RX 250 GENERAL PHARMACY W/ HCPCS (ALT 636 FOR OP/ED): Mod: SE

## 2025-01-28 PROCEDURE — 2500000001 HC RX 250 WO HCPCS SELF ADMINISTERED DRUGS (ALT 637 FOR MEDICARE OP): Mod: SE | Performed by: PHYSICIAN ASSISTANT

## 2025-01-28 PROCEDURE — 85027 COMPLETE CBC AUTOMATED: CPT

## 2025-01-28 PROCEDURE — 2500000002 HC RX 250 W HCPCS SELF ADMINISTERED DRUGS (ALT 637 FOR MEDICARE OP, ALT 636 FOR OP/ED): Mod: SE | Performed by: NURSE PRACTITIONER

## 2025-01-28 PROCEDURE — 80053 COMPREHEN METABOLIC PANEL: CPT

## 2025-01-28 PROCEDURE — 2500000004 HC RX 250 GENERAL PHARMACY W/ HCPCS (ALT 636 FOR OP/ED): Mod: SE | Performed by: STUDENT IN AN ORGANIZED HEALTH CARE EDUCATION/TRAINING PROGRAM

## 2025-01-28 PROCEDURE — 82977 ASSAY OF GGT: CPT

## 2025-01-28 PROCEDURE — 84100 ASSAY OF PHOSPHORUS: CPT

## 2025-01-28 PROCEDURE — 2500000002 HC RX 250 W HCPCS SELF ADMINISTERED DRUGS (ALT 637 FOR MEDICARE OP, ALT 636 FOR OP/ED): Mod: SE | Performed by: PHYSICIAN ASSISTANT

## 2025-01-28 PROCEDURE — 82947 ASSAY GLUCOSE BLOOD QUANT: CPT

## 2025-01-28 PROCEDURE — 36415 COLL VENOUS BLD VENIPUNCTURE: CPT

## 2025-01-28 PROCEDURE — 2500000005 HC RX 250 GENERAL PHARMACY W/O HCPCS: Mod: SE

## 2025-01-28 PROCEDURE — 80197 ASSAY OF TACROLIMUS: CPT

## 2025-01-28 RX ORDER — LIDOCAINE HYDROCHLORIDE 10 MG/ML
5 INJECTION, SOLUTION INFILTRATION; PERINEURAL ONCE
Status: DISCONTINUED | OUTPATIENT
Start: 2025-01-28 | End: 2025-01-28 | Stop reason: HOSPADM

## 2025-01-28 RX ORDER — LIDOCAINE HYDROCHLORIDE 10 MG/ML
INJECTION, SOLUTION INFILTRATION; PERINEURAL
Status: DISCONTINUED
Start: 2025-01-28 | End: 2025-01-28 | Stop reason: HOSPADM

## 2025-01-28 RX ADMIN — MYCOPHENOLATE MOFETIL 1000 MG: 250 CAPSULE ORAL at 06:14

## 2025-01-28 RX ADMIN — URSODIOL 300 MG: 300 CAPSULE ORAL at 06:14

## 2025-01-28 RX ADMIN — ACYCLOVIR 400 MG: 400 TABLET ORAL at 09:21

## 2025-01-28 RX ADMIN — INSULIN LISPRO 8 UNITS: 100 INJECTION, SOLUTION INTRAVENOUS; SUBCUTANEOUS at 09:33

## 2025-01-28 RX ADMIN — INSULIN LISPRO 8 UNITS: 100 INJECTION, SOLUTION INTRAVENOUS; SUBCUTANEOUS at 14:47

## 2025-01-28 RX ADMIN — URSODIOL 300 MG: 300 CAPSULE ORAL at 13:16

## 2025-01-28 RX ADMIN — LIDOCAINE 4% 2 PATCH: 40 PATCH TOPICAL at 09:22

## 2025-01-28 RX ADMIN — HEPARIN SODIUM 5000 UNITS: 5000 INJECTION INTRAVENOUS; SUBCUTANEOUS at 02:10

## 2025-01-28 RX ADMIN — CARVEDILOL 6.25 MG: 6.25 TABLET, FILM COATED ORAL at 09:21

## 2025-01-28 RX ADMIN — HEPARIN SODIUM 5000 UNITS: 5000 INJECTION INTRAVENOUS; SUBCUTANEOUS at 09:32

## 2025-01-28 RX ADMIN — PANTOPRAZOLE SODIUM 40 MG: 40 TABLET, DELAYED RELEASE ORAL at 09:21

## 2025-01-28 RX ADMIN — FLUCONAZOLE 400 MG: 200 TABLET ORAL at 09:20

## 2025-01-28 RX ADMIN — INSULIN GLARGINE 8 UNITS: 100 INJECTION, SOLUTION SUBCUTANEOUS at 09:33

## 2025-01-28 RX ADMIN — TAMSULOSIN HYDROCHLORIDE 0.4 MG: 0.4 CAPSULE ORAL at 09:21

## 2025-01-28 RX ADMIN — PREDNISONE 20 MG: 20 TABLET ORAL at 09:21

## 2025-01-28 RX ADMIN — AMLODIPINE BESYLATE 10 MG: 10 TABLET ORAL at 09:21

## 2025-01-28 RX ADMIN — TACROLIMUS 3 MG: 1 CAPSULE ORAL at 06:14

## 2025-01-28 RX ADMIN — ASPIRIN 81 MG: 81 TABLET, COATED ORAL at 09:21

## 2025-01-28 RX ADMIN — SULFAMETHOXAZOLE AND TRIMETHOPRIM 80 MG OF TRIMETHOPRIM: 400; 80 TABLET ORAL at 09:21

## 2025-01-28 ASSESSMENT — COGNITIVE AND FUNCTIONAL STATUS - GENERAL
MOVING TO AND FROM BED TO CHAIR: A LITTLE
MOBILITY SCORE: 18
WALKING IN HOSPITAL ROOM: A LITTLE
CLIMB 3 TO 5 STEPS WITH RAILING: A LITTLE
MOVING FROM LYING ON BACK TO SITTING ON SIDE OF FLAT BED WITH BEDRAILS: A LITTLE
STANDING UP FROM CHAIR USING ARMS: A LITTLE
TURNING FROM BACK TO SIDE WHILE IN FLAT BAD: A LITTLE

## 2025-01-28 ASSESSMENT — PAIN DESCRIPTION - LOCATION: LOCATION: ABDOMEN

## 2025-01-28 ASSESSMENT — PAIN SCALES - GENERAL: PAINLEVEL_OUTOF10: 4

## 2025-01-28 NOTE — PROGRESS NOTES
TRANSPLANT SURGERY PROGRESS NOTE    Goran Ibrahim underwent transplant surgery on 1/20/2025 (Liver / Kidney) and was evaluated on multidisciplinary inpatient rounds.  I specifically evaluated the management of immunosuppression to ensure adequate exposure required to decrease the risk of rejection.  Furthermore, I reviewed potential side effects including tremor, hyperglycemia, leukopenia, infection, and other neurologic changes.  I reviewed and adjusted infectious prophylaxis based on the patients clinical condition and  protocols.     24 EVENTS:  No acute events    DIAGNOSIS:  Liver replaced by transplant Z94.4  Kidney replaced by transplant    PHYSICAL EXAMINATION:  Vitals:    01/28/25 0757   BP: 142/87   Pulse: 64   Resp: 16   Temp: 36.4 °C (97.5 °F)   SpO2: 97%        01/26 1900 - 01/28 0659  In: 1880 [P.O.:1880]  Out: 3280 [Urine:2900; Drains:380]     Weight change: -10.1 kg (-22 lb 4.3 oz)    General Appearance - NAD, Good speech, oriented and alert  Abdomen - soft , not tender, no guarding, no rigidity. No hepatosplenomegaly. Normal bowel sounds. No masses and ascites.   Wound c/d/i   Drain: Sero-sang  Neuro/Psych - appropriate mood and affect. Motor power V/V all extremities. CN I -XII were grossly intact.  Skin - No visible rash      MEDICATION LIST: REVIEWED  acyclovir, 400 mg, BID  amLODIPine, 10 mg, Daily  aspirin, 81 mg, Daily  carvedilol, 6.25 mg, BID  fluconazole, 400 mg, Daily  heparin (porcine), 5,000 Units, q8h  insulin glargine, 8 Units, Daily  insulin lispro, 0-10 Units, TID AC  insulin lispro, 8 Units, TID AC  lidocaine, 5 mL, Once  lidocaine, 2 patch, Daily  mycophenolate, 1,000 mg, q12h EDINSON  pantoprazole, 40 mg, BID  polyethylene glycol, 17 g, Daily  predniSONE, 20 mg, Daily  sennosides-docusate sodium, 2 tablet, BID  sulfamethoxazole-trimethoprim, 80 mg of trimethoprim, Daily  tacrolimus, 3 mg, q12h EDINSON  tamsulosin, 0.4 mg, Daily  ursodiol, 300 mg, q8h         dextrose, 12.5 g, q15  min PRN  glucagon, 1 mg, q15 min PRN  traMADol, 25 mg, q6h PRN  traMADol, 50 mg, q6h PRN        ALLERGY:  No Known Allergies    LABS:  Results for orders placed or performed during the hospital encounter of 01/20/25 (from the past 24 hours)   POCT GLUCOSE   Result Value Ref Range    POCT Glucose 158 (H) 74 - 99 mg/dL   POCT GLUCOSE   Result Value Ref Range    POCT Glucose 267 (H) 74 - 99 mg/dL   POCT GLUCOSE   Result Value Ref Range    POCT Glucose 182 (H) 74 - 99 mg/dL   CBC   Result Value Ref Range    WBC 8.2 4.4 - 11.3 x10*3/uL    nRBC 0.0 0.0 - 0.0 /100 WBCs    RBC 3.06 (L) 4.50 - 5.90 x10*6/uL    Hemoglobin 9.2 (L) 13.5 - 17.5 g/dL    Hematocrit 28.0 (L) 41.0 - 52.0 %    MCV 92 80 - 100 fL    MCH 30.1 26.0 - 34.0 pg    MCHC 32.9 32.0 - 36.0 g/dL    RDW 17.0 (H) 11.5 - 14.5 %    Platelets 99 (L) 150 - 450 x10*3/uL   Gamma-Glutamyl Transferase   Result Value Ref Range     (H) 5 - 64 U/L   Hepatic Function Panel   Result Value Ref Range    Albumin 3.8 3.4 - 5.0 g/dL    Bilirubin, Total 1.9 (H) 0.0 - 1.2 mg/dL    Bilirubin, Direct 1.0 (H) 0.0 - 0.3 mg/dL    Alkaline Phosphatase 205 (H) 33 - 120 U/L    ALT 93 (H) 10 - 52 U/L    AST 22 9 - 39 U/L    Total Protein 6.0 (L) 6.4 - 8.2 g/dL   Magnesium   Result Value Ref Range    Magnesium 2.05 1.60 - 2.40 mg/dL   Phosphorus   Result Value Ref Range    Phosphorus 3.2 2.5 - 4.9 mg/dL   Basic Metabolic Panel   Result Value Ref Range    Glucose 113 (H) 74 - 99 mg/dL    Sodium 139 136 - 145 mmol/L    Potassium 3.8 3.5 - 5.3 mmol/L    Chloride 106 98 - 107 mmol/L    Bicarbonate 20 (L) 21 - 32 mmol/L    Anion Gap 17 10 - 20 mmol/L    Urea Nitrogen 58 (H) 6 - 23 mg/dL    Creatinine 1.76 (H) 0.50 - 1.30 mg/dL    eGFR 45 (L) >60 mL/min/1.73m*2    Calcium 8.5 (L) 8.6 - 10.6 mg/dL   POCT GLUCOSE   Result Value Ref Range    POCT Glucose 109 (H) 74 - 99 mg/dL      Lab Results   Component Value Date    ALT 93 (H) 01/28/2025    AST 22 01/28/2025     (H) 01/28/2025     ALKPHOS 205 (H) 01/28/2025    BILITOT 1.9 (H) 01/28/2025         ASSESSMENT AND PLAN:    Mr. Ibrahim is a 54 y.o. male who underwent  transplant surgery on 1/20/2025 (Liver / Kidney).    1. Immunosuppression reviewed and adjusted        Simulect induction, completed  Tacrolimus: current dose 3 mg BID. Plan continue      Today's tacrolimus level is pending, Goal tacrolimus trough level is 8-10; continue same dose      Mycophenolate: Yes - 1000 mg BID      Prednisone: Yes - 20 mg daily    2. IV hydration      Replacement completed: Yes      Maintenance: Discontinue    3. Electrolyte     Reviewed renal profile.      DGF: No    4. Hypertension medications reviewed        Blood Pressures         1/27/2025  1611 1/27/2025  1918 1/27/2025  2344 1/28/2025  0437 1/28/2025  0757    BP: 138/83 163/90 155/80 145/84 142/87              Continue current management     5. Wound/drain/chaudhary and pain management       Chaudhary cathter:  Discontinued, Voiding    6. GI prophylaxis        Reg diet  On PPI     7. DVT Prophylaxis      SCDs      Subcutaneous Heparin: Yes    8. ID prophylaxis      CMV, PJP and fungal prophylaxis per [facility] Protocol      Valcyte: Yes    9. Discharge planned for: today, DC drain    10. Labs and outpatient follow-up this Friday    Case was presented at Multi Disciplinary team rounds   Attending physician, consulting physician, , pharmacist, residents and fellow were present at the meeting.    Laura Holguin MD    Transplant Surgery Attending

## 2025-01-28 NOTE — PROGRESS NOTES
"[facility] sent out a \"blanket\" home care referral for patient. [facility] awaiting home care response; Nine denials currently. [facility] will continue to follow for discharge planning. Referral placed for [facility].      ETIENNE Dodge, RN  [facility], [facility]  Transitional Care Coordinator, Mon-Fri  Cell: [phone], Office: [phone]  Email: [email or web address]   "

## 2025-01-28 NOTE — HH CARE COORDINATION
Home Care received a referral for Physical Therapy and Occupational Therapy. Unfortunately, we are unable to accept and process the referral at this time.    Reason:  Patient's Home is Outside of [facility] Service Area    Patients, please reach out to the referring provider or your PCP to assist in obtaining an alternative home care agency and/or guidance to meet your needs.    Providers, please reach out to  Home Care at [phone] with any questions regarding the declined referral.

## 2025-01-28 NOTE — PROGRESS NOTES
"[facility]  [facility]  CONSULT FOLLOW-UP     Reason For Consult  Post-SLK co-management     SUBJECTIVE     No acute events overnight. Overall feels well and he is able to walk around with the walker.     UOP is 2325 ml. His drain has put out 210 ml in the last 24 hours, and it was removed today.     His creatinine has downtrended from 1.89 to 1.76. BUN has downtrended from 69 --> 58.     Tacrolimus level today is 10.3, which is on Tacrolimus 3 mg BID.     Hemoglobin is stable (9.1 --> 9.2). His platelets have uptrended from 84 --> 99.     GGT has uptrended 464 --> 407. His AST is 22 (was 30 yesterday), ALT is 93 (was 131 yesterday), and his ALP is 205 (was 212 yesterday).     Plan is for discharge home today with home PT.     EXAM     Last Recorded Vitals  Blood pressure 142/87, pulse 64, temperature 36.4 °C (97.5 °F), temperature source Temporal, resp. rate 16, height 1.778 m (5' 10\"), weight 113 kg (249 lb 12.5 oz), SpO2 97%.      Intake/Output Summary (Last 24 hours) at 1/28/2025 1636  Last data filed at 1/28/2025 1500  Gross per 24 hour   Intake 1900 ml   Output 2935 ml   Net -1035 ml          Physical Exam  General: well-nourished, no acute distress  HEENT: no pallor  Respiratory: CTA bilaterally, normal work of breathing  Cardiovascular: S1, S2 heard   Abdomen: Chevron incision with staples, no dehiscence or oozing. Mildly distended abdomen. Bowel sounds appreciated.  Extremities: no edema, no asterixis  Neuro: alert and oriented, CNII-XII grossly intact, moves all 4 extremities with no focal deficits    OBJECTIVE                                                                              Medications             Current Facility-Administered Medications:     acyclovir (Zovirax) tablet 400 mg, 400 mg, oral, BID, CHRIS Haskins, 400 mg at 01/28/25 0921    amLODIPine (Norvasc) tablet 10 mg, 10 mg, oral, Daily, Lois Jacome PA-C, 10 mg at " 01/28/25 0921    aspirin EC tablet 81 mg, 81 mg, oral, Daily, Joanne L Purvi, APRN-CNP, 81 mg at 01/28/25 0921    carvedilol (Coreg) tablet 6.25 mg, 6.25 mg, oral, BID, Joanne L Republic, APRN-CNP, 6.25 mg at 01/28/25 0921    dextrose 50 % injection 12.5 g, 12.5 g, intravenous, q15 min PRN, CHRIS Cosme    fluconazole (Diflucan) tablet 400 mg, 400 mg, oral, Daily, Joanne L Purvi, APRN-CNP, 400 mg at 01/28/25 0920    glucagon (Glucagen) injection 1 mg, 1 mg, intramuscular, q15 min PRN, CHRIS Cosme    heparin (porcine) injection 5,000 Units, 5,000 Units, subcutaneous, q8h, ZION Cui-CNP, 5,000 Units at 01/28/25 0932    insulin glargine (Lantus) injection 8 Units, 8 Units, subcutaneous, Daily, ZION Cosme-CNP, 8 Units at 01/28/25 0933    insulin lispro injection 0-10 Units, 0-10 Units, subcutaneous, TID AC, ZION Cosme-CNP, 6 Units at 01/27/25 1802    insulin lispro injection 8 Units, 8 Units, subcutaneous, TID AC, Ruth Javier, ZION-CNP, 8 Units at 01/28/25 1447    lidocaine (Xylocaine) 10 mg/mL (1 %) injection 5 mL, 5 mL, infiltration, Once, Joanne Loja APRN-CNP    lidocaine (Xylocaine) injection 10 mg/mL (1 %)  - [employer]nic[employer] Override Pull, , , ,     lidocaine 4 % patch 2 patch, 2 patch, transdermal, Daily, Joanne L Purvi, APRN-CNP, 2 patch at 01/28/25 0922    mycophenolate (Cellcept) capsule 1,000 mg, 1,000 mg, oral, q12h EDINSON, Joanne L Republic, APRN-CNP, 1,000 mg at 01/28/25 0614    pantoprazole (ProtoNix) EC tablet 40 mg, 40 mg, oral, BID, Joanne L Republic, APRN-CNP, 40 mg at 01/28/25 0921    polyethylene glycol (Glycolax, Miralax) packet 17 g, 17 g, oral, Daily, Ruth Javier, APRN-CNP, 17 g at 01/24/25 1227    predniSONE (Deltasone) tablet 20 mg, 20 mg, oral, Daily, Joanne L Purvi, APRN-CNP, 20 mg at 01/28/25 0921    sennosides-docusate sodium (Yoselyn-Colace) 8.6-50 mg per tablet 2 tablet, 2 tablet, oral, BID, Joanne L Purvi, APRN-CNP, 2 tablet at  01/24/25 2028    sulfamethoxazole-trimethoprim (Bactrim) 400-80 mg per tablet 80 mg of trimethoprim, 80 mg of trimethoprim, oral, Daily, Seema Matthews, APRN-CNP, 80 mg of trimethoprim at 01/28/25 0921    tacrolimus (Prograf) capsule 3 mg, 3 mg, oral, q12h EDINSON, Hector Rene MD, 3 mg at 01/28/25 0614    tamsulosin (Flomax) 24 hr capsule 0.4 mg, 0.4 mg, oral, Daily, Lois C Springstubb, PA-C, 0.4 mg at 01/28/25 0921    traMADol (Ultram) tablet 25 mg, 25 mg, oral, q6h PRN, Lois C Springstubb, PA-C    traMADol (Ultram) tablet 50 mg, 50 mg, oral, q6h PRN, Lois C Springstubb, PA-C    ursodiol (Actigall) capsule 300 mg, 300 mg, oral, q8h, ZION Cui-CNP, 300 mg at 01/28/25 1316    Current Outpatient Medications:     cholecalciferol (Vitamin D-3) 50 MCG (2000 UT) tablet, Take 1 tablet (50 mcg) by mouth once daily., Disp: 30 tablet, Rfl: 11    acetaminophen (TylenoL) 325 mg tablet, Take 2 tablets (650 mg) by mouth every 6 hours if needed for mild pain (1 - 3) for up to 10 days., Disp: 30 tablet, Rfl: 0    acyclovir (Zovirax) 400 mg tablet, Take 1 tablet (400 mg) by mouth 2 times a day., Disp: 60 tablet, Rfl: 0    alcohol swabs pads, medicated, Apply 1 each topically 4 times a day. Use prior to checking glucose or injecting insulin, Disp: 400 each, Rfl: 3    amLODIPine (Norvasc) 10 mg tablet, Take 1 tablet (10 mg) by mouth once daily. Do not fill before January 27, 2025., Disp: 30 tablet, Rfl: 11    aspirin 81 mg EC tablet, Take 1 tablet (81 mg) by mouth once daily., Disp: 30 tablet, Rfl: 0    blood sugar diagnostic (Blood Glucose Test) strip, 100 strips 4 times a day. Use to check glucose 4 times daily, before meals and at bedtime, Disp: 400 strip, Rfl: 3    blood-glucose meter misc, 1 each 4 times a day. Use to check glucose 4 times daily, before meals and at bedtime, Disp: 1 each, Rfl: 0    blood-glucose meter,continuous (Dexcom G7 ) misc, Use as instructed, Disp: 1 each, Rfl: 0    blood-glucose sensor  (Dexcom G7 Sensor) device, 1 each 3 times a day before meals. Change sensor every 10 days, Disp: 3 each, Rfl: 11    carvedilol (Coreg) 6.25 mg tablet, Take 1 tablet (6.25 mg) by mouth 2 times a day. Hold for SBP <110 or HR <55, Disp: , Rfl:     docusate sodium (Colace) 100 mg capsule, Take 1 capsule (100 mg) by mouth 2 times a day as needed for constipation for up to 10 days., Disp: 20 capsule, Rfl: 0    fluconazole (Diflucan) 200 mg tablet, Take 2 tablets (400 mg) by mouth once daily., Disp: 60 tablet, Rfl: 0    insulin glargine (Basaglar KwikPen U-100 Insulin) 100 unit/mL (3 mL) pen, Inject 8 Units under the skin once daily in the morning. Take in AM with Prednisone, Disp: 15 mL, Rfl: 0    insulin lispro (HumaLOG KwikPen Insulin) 100 unit/mL injection, Inject 0-10 Units under the skin 3 times daily (morning, midday, late afternoon). Inject 6 units of Lispro subcutaneously three times a day before meals in addition to the following sliding scale:  0 unit(s) if Blood glucose is between  2 unit(s) if Blood glucose is between 151-200 4 unit(s) if Blood glucose is between 201-250 6 unit(s) if Blood glucose is between 251-300 8 unit(s) if Blood glucose is between 301-350 10 unit(s) if Blood glucose is between 351-400  If blood glucose is greater than 400 mg/dL, give max insulin per sliding scale AND then contact provider., Disp: 9 mL, Rfl: 0    insulin lispro 100 unit/mL injection, Inject 8 Units under the skin 3 times a day before meals. Take as directed per insulin instructions., Disp: , Rfl:     lancets 33 gauge misc, 1 Lancet 3 times a day before meals., Disp: 100 each, Rfl: 0    lancets misc, 1 each 4 times a day. Use to check glucose 4 times daily, before meals and at bedtime, Disp: 400 each, Rfl: 3    mycophenolate (Cellcept) 250 mg capsule, Take 4 capsules (1,000 mg) by mouth every 12 hours., Disp: 240 capsule, Rfl: 0    pantoprazole (ProtoNix) 40 mg EC tablet, Take 1 tablet (40 mg) by mouth once daily in  "the morning. Take before meals. Do not crush, chew, or split., Disp: 30 tablet, Rfl: 0    pen needle, diabetic 31 gauge x 3/16\" needle, 1 each 3 times a day before meals., Disp: 100 each, Rfl: 0    pen needle, diabetic 32 gauge x 5/32\" needle, 1 each 4 times a day. Use to inject insulin 4 times daily, Disp: 400 each, Rfl: 3    polyethylene glycol (Glycolax, Miralax) 17 gram packet, Take 17 g by mouth once daily for 5 days., Disp: 5 packet, Rfl: 0    predniSONE (Deltasone) 5 mg tablet, Take 4 tablets (20 mg) by mouth once daily. Do not start before January 25, 2025., Disp: 120 tablet, Rfl: 0    sulfamethoxazole-trimethoprim (Bactrim) 400-80 mg tablet, Take 1 tablet by mouth once daily., Disp: 30 tablet, Rfl: 0    tacrolimus (Prograf) 1 mg capsule, Take 3 capsules (3 mg) by mouth every 12 hours. Meds to beds. ADOD 1/27, Disp: 180 capsule, Rfl: 0    tamsulosin (Flomax) 0.4 mg 24 hr capsule, Take 1 capsule (0.4 mg) by mouth once daily., Disp: 30 capsule, Rfl: 0    traMADol (Ultram) 50 mg tablet, Take 1 tablet (50 mg) by mouth every 6 hours if needed for severe pain (7 - 10) or moderate pain (4 - 6) for up to 5 days., Disp: 15 tablet, Rfl: 0    ursodiol (Actigall) 300 mg capsule, Take 1 capsule (300 mg) by mouth 3 times a day., Disp: 90 capsule, Rfl: 0                                                                            Labs     Reviewed.                                                                         Imaging:     US Liver with doppler 1/21/2025:  IMPRESSION:  1. Interval increase in the main portal vein velocity of the liver  transplant compared to 01/20/2025 ultrasound measuring up to 126.5 cm/sec, with slightly turbulent flow. No evidence of focal narrowing or gradient. Attention on follow-up is recommended.  2. Interval improvement of the previously noted low resistive indices  throughout the hepatic arteries with interval improvement in the  arterial waveforms compared to 01/20/2025 ultrasound.  3. " Stable splenomegaly.    US kidney transplant 1/20/2025:  IMPRESSION:  Unremarkable ultrasound and Doppler evaluation of the kidney as detailed above     US Liver with doppler 1/20/2025:  IMPRESSION:  Status post orthotopic liver transplant with decreased resistive  indices of the hepatic arteries (0.4-0.5), attention on follow-up  imaging is recommended. The hepatic vasculature is patent.        US liver with doppler 11/4/2024:  IMPRESSION:  Cirrhotic morphology of the liver with sequela of portal hypertension including significant splenomegaly and reversal flow within the splenic vein as detailed.                                                                         GI Procedures     7/2023 EGD  Impression:                                - Normal upper third of esophagus.                             - Grade I esophageal varices.                             - Esophageal mucosal changes suggestive of                             short-segment Matute's esophagus, classified as                             Matute's stage C0-M2 per [place] criteria.                             Biopsy is contraindicated.                             - Small hiatal hernia.                             - Non-bleeding duodenal ulcer with no                               stigmata of bleeding. Appears to be healing                               well, without any signs of recent blood loss.                             - Mucosal changes in the duodenum. Biopsied.                             - No blood was present throughout the entire                               exam.      6/2023 EGD  Impression:                                   - Normal esophagus.                             - Normal stomach.                             - Non-bleeding duodenal ulcer with a visible                             vessel. Injected. Treated with argon plasma                             coagulation (APC).                             - The examination was otherwise  normal.                             - No specimens collected.      EGD 6/7/24  Findings  Irregular Z-line 38 cm from the incisors  Three medium grade II varices (no red tiera sign) in the esophagus; no bleeding was identified; placed 3 bands successfully, resulting in partial eradication  Cobblestone, edematous, erythematous and friable mucosa in the body of the stomach and antrum; performed cold forceps biopsy to rule out H. pylori;  Patchy erythematous and friable mucosa in the duodenal bulb;  The duodenum appeared normal.     EGD 11/1/24  Impression  Irregular Z-line  Matute's esophagus  Multiple small varices in the lower third of the esophagus  Mild and friable portal hypertensive gastropathy in the cardia, fundus of the stomach, body of the stomach and antrum  Abnormal mucosa with erosion in the 1st part of the duodenum  The 2nd part of the duodenum appeared normal.      6/22/23 [facility]: Colonoscopy with [place] Score of 2/2/2: one 5 mm inflammatory type polyp in the sigmoid colon.     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 He was subsequently brought in for DCD liver (caval piggyback, rCHA to dSMA/celiac stack HA, duct to duct [small to large with v plasty]) and right kidney transplant which he underwent on 1/20/2025 with Dr. Wright. Hepatology has been consulted to co-manage him post-OLT.      Overall, patient is doing well post-SLK, and he has been extubated and CVVH has been discontinued. His T. bili is a bit high, so he has been started on Ursodiol. Additionally, his immediate post-liver transplant liver  US with dopplers showed decreased resistive indices of the hepatic arteries (0.4-0.5), but repeat US liver with dopplers on 1/21/2025 showed interval improvement of the previously noted low resistive indices in the hepatic arteries. LFTs are downtrending. He is making good UOP (auto-diuresing to some extent), but creatinine is downtrending. Patient transferred to [facility] as of 1/23/2024. Patient is planned for discharge on 1/28/2025.     Update 1/28/2025:  Patient is doing well. Making good urine, and creatinine is downtrending. ALP is downtrending. GGT is downtrending. All drains have been removed. Planned for discharge today. He has the Dexcom G7 for continuous glucose monitoring after discharge.     RECS:  Reasonable to discharge today with home PT  Patient will need labs done as outpatient (first follow-up with transplant surgery team on Friday) and then twice weekly thereafter   Immunosuppression and prophylactic antibiotics/antifungals per transplant surgery team   Await explant pathology results  Continue bowel regimen with miralax, docusate and senna after discharge   Continue Ursodiol after discharge   Patient will need EGD at some point (once he is fully recovered from transplant) as an outpatient with biopsies to confirm short segment Matute's esophagus that was found during EGD in 2023/2024 (this can be addressed as outpatient)     Patient was seen and discussed with Hepatology attending, Dr. Migue Motta.     Thank you for the consultation. Hepatology will SIGN OFF at this time.     - During weekday hours of 7am- 5pm, please do not hesitate to contact me on [employer] Chat or page [phone] if there are any further questions   - After hours, on weekends, and on holidays, please [place] [phone]. Thank you.     Venice Meier MD MPH   GI and Hepatology Fellow   [facility]

## 2025-01-28 NOTE — PROGRESS NOTES
"[facility]  [facility] [place]  CONSULT FOLLOW-UP     Reason For Consult  Post-SLK co-management     SUBJECTIVE     No acute events overnight. UOP is 1925 ml in the last 24 hours. Patient feels well. Bateman has been removed.     He still has one KAITLYNN drain on the right side that has put out 380 ml in the last 24 hours.     He is on Tacrolimus 3 mg BID, today's tacrolimus level is 8.6.     His T.bili is similar to yesterday (2.2 vs. 2.3), ALP is 212 (was 205 yesterday, and it has been steadily uptrending, ALT is downtrending (172 --> 131) and AST has normalized (46 --> 30). GGT has also downtrended (499 --> 464). CBC shows hemoglobin same as yesterday (9.1), but platelets have improved (74 --> 84).     Creatinine has downtrended from 2.21 --> 1.89.     EXAM     Last Recorded Vitals  Blood pressure 163/90, pulse 65, temperature 37 °C (98.6 °F), temperature source Temporal, resp. rate 18, height 1.778 m (5' 10\"), weight 123 kg (270 lb 14.4 oz), SpO2 97%.      Intake/Output Summary (Last 24 hours) at 1/27/2025 2249  Last data filed at 1/27/2025 1918  Gross per 24 hour   Intake 1680 ml   Output 2450 ml   Net -770 ml          Physical Exam  General: well-nourished, no acute distress  HEENT: no pallor  Respiratory: CTA bilaterally, normal work of breathing  Cardiovascular: S1, S2 heard   Abdomen: Chevron incision with staples, no dehiscence or oozing. Mildly distended abdomen. Bowel sounds appreciated. 1 KAITLYNN drain on R side still in place    Extremities: no edema, no asterixis  Neuro: alert and oriented, CNII-XII grossly intact, moves all 4 extremities with no focal deficits    OBJECTIVE                                                                              Medications             Current Facility-Administered Medications:     acyclovir (Zovirax) tablet 400 mg, 400 mg, oral, BID, ZION Haskins-CNP, 400 mg at 01/27/25 2040    amLODIPine (Norvasc) tablet 10 mg, 10 mg, " oral, Daily, Lois Jacome PA-C, 10 mg at 01/27/25 0845    aspirin EC tablet 81 mg, 81 mg, oral, Daily, Joanne L Purvi, APRN-CNP, 81 mg at 01/27/25 0848    carvedilol (Coreg) tablet 6.25 mg, 6.25 mg, oral, BID, Joanne L Purvi, APRN-CNP, 6.25 mg at 01/27/25 2040    dextrose 50 % injection 12.5 g, 12.5 g, intravenous, q15 min PRN, ZION Cosme-CNP    fluconazole (Diflucan) tablet 400 mg, 400 mg, oral, Daily, Joanne Loja, APRN-CNP, 400 mg at 01/27/25 0848    glucagon (Glucagen) injection 1 mg, 1 mg, intramuscular, q15 min PRN, AMARIS CosmeCNP    heparin (porcine) injection 5,000 Units, 5,000 Units, subcutaneous, q8h, Joanne Loja, APRN-CNP, 5,000 Units at 01/27/25 1654    insulin glargine (Lantus) injection 8 Units, 8 Units, subcutaneous, Daily, ZION Cosme-CNP, 8 Units at 01/27/25 1005    insulin lispro injection 0-10 Units, 0-10 Units, subcutaneous, TID AC, ZION Cosme-CNP, 6 Units at 01/27/25 1802    insulin lispro injection 8 Units, 8 Units, subcutaneous, TID AC, Ruth Javier, ZION-CNP, 8 Units at 01/27/25 1802    lidocaine 4 % patch 2 patch, 2 patch, transdermal, Daily, Joanneyana Loja, APRN-CNP, 2 patch at 01/27/25 0849    mycophenolate (Cellcept) capsule 1,000 mg, 1,000 mg, oral, q12h EDINSON, Joanne L Purvi, APRN-CNP, 1,000 mg at 01/27/25 1802    pantoprazole (ProtoNix) EC tablet 40 mg, 40 mg, oral, BID, Joanne L Purvi, APRN-CNP, 40 mg at 01/27/25 2040    polyethylene glycol (Glycolax, Miralax) packet 17 g, 17 g, oral, Daily, Ruth Javier, APRN-CNP, 17 g at 01/24/25 1227    predniSONE (Deltasone) tablet 20 mg, 20 mg, oral, Daily, Joanne Loja, APRN-CNP, 20 mg at 01/27/25 1016    sennosides-docusate sodium (Yoselyn-Colace) 8.6-50 mg per tablet 2 tablet, 2 tablet, oral, BID, Joanne Guerreroz, APRN-CNP, 2 tablet at 01/24/25 2028    sulfamethoxazole-trimethoprim (Bactrim) 400-80 mg per tablet 80 mg of trimethoprim, 80 mg of trimethoprim, oral, Daily, Seema NICHOLAS  Cassie, APRN-CNP, 80 mg of trimethoprim at 01/27/25 0848    tacrolimus (Prograf) capsule 3 mg, 3 mg, oral, q12h EDINSON, Hector Rene MD, 3 mg at 01/27/25 1804    tamsulosin (Flomax) 24 hr capsule 0.4 mg, 0.4 mg, oral, Daily, Lois C Springstubb, PA-C, 0.4 mg at 01/27/25 0848    traMADol (Ultram) tablet 25 mg, 25 mg, oral, q6h PRN, Lois C Springstubb, PA-C    traMADol (Ultram) tablet 50 mg, 50 mg, oral, q6h PRN, Lois C Springstubb, PA-C    ursodiol (Actigall) capsule 300 mg, 300 mg, oral, q8h, Joanne Loja APRN-CNP, 300 mg at 01/27/25 2040                                                                            Labs     Reviewed.                                                                         Imaging:     US Liver with doppler 1/21/2025:  IMPRESSION:  1. Interval increase in the main portal vein velocity of the liver  transplant compared to 01/20/2025 ultrasound measuring up to 126.5 cm/sec, with slightly turbulent flow. No evidence of focal narrowing or gradient. Attention on follow-up is recommended.  2. Interval improvement of the previously noted low resistive indices  throughout the hepatic arteries with interval improvement in the  arterial waveforms compared to 01/20/2025 ultrasound.  3. Stable splenomegaly.    US kidney transplant 1/20/2025:  IMPRESSION:  Unremarkable ultrasound and Doppler evaluation of the transplant  kidney as detailed above     US Liver with doppler 1/20/2025:  IMPRESSION:  Status post orthotopic liver transplant with decreased resistive  indices of the hepatic arteries (0.4-0.5), attention on follow-up  imaging is recommended. The hepatic vasculature is patent.        US liver with doppler 11/4/2024:  IMPRESSION:  Cirrhotic morphology of the liver with sequela of portal hypertension including significant splenomegaly and reversal flow within the splenic vein as detailed.                                                                         GI Procedures     7/2023  EGD  Impression:                                - Normal upper third of esophagus.                             - Grade I esophageal varices.                             - Esophageal mucosal changes suggestive of                             short-segment Matute's esophagus, classified as                             Matute's stage C0-M2 per [place] criteria.                             Biopsy is contraindicated.                             - Small hiatal hernia.                             - Non-bleeding duodenal ulcer with no stigmata                             of bleeding. Appears to be healing well, without                             any signs of recent blood loss.                             - Mucosal changes in the duodenum. Biopsied.                             - No blood was present throughout the entire                               exam.      6/2023 EGD  Impression:                                   - Normal esophagus.                             - Normal stomach.                             - Non-bleeding duodenal ulcer with a visible                             vessel. Injected. Treated with argon plasma                             coagulation (APC).                             - The examination was otherwise normal.                             - No specimens collected.      EGD 6/7/24  Findings  Irregular Z-line 38 cm from the incisors  Three medium grade II varices (no red tiera sign) in the esophagus; no bleeding was identified; placed 3 bands successfully, resulting in partial eradication  Cobblestone, edematous, erythematous and friable mucosa in the body of the stomach and antrum; performed cold forceps biopsy to rule out H. pylori;  Patchy erythematous and friable mucosa in the duodenal bulb;  The duodenum appeared normal.     EGD 11/1/24  Impression  Irregular Z-line  Matute's esophagus  Multiple small varices in the lower third of the esophagus  Mild and friable portal hypertensive gastropathy  in the cardia, fundus of the stomach, body of the stomach and antrum  Abnormal mucosa with erosion in the 1st part of the duodenum  The 2nd part of the duodenum appeared normal.      6/22/23 [facility]: Colonoscopy with [place] Score of 2/2/2: one 5 mm inflammatory type polyp in the sigmoid colon.     ASSESSMENT / PLAN     ASSESSMENT/PLAN:  Goran Ibrahim is a 55 yo male with a past medical history of decompensated MetALD (ascites, variceal bleed, HE) c/b EV s/p banding in June 2024, advanced CKD (baseline creatinine around 2.5), long standing tobacco use, duodenal ulcer, H.pylori infection (negative biopsies in June 2024), and short segment Matute's esophagus (not biopsy confirmed due to the presence of varices) who was listed for SLK. He was subsequently brought in for DCD liver (caval piggyback, rCHA to dSMA/celiac stack HA, duct to duct [small to large with v plasty]) and right kidney transplant which he underwent on 1/20/2025 with Dr. Wright. Hepatology has been consulted to co-manage him post-OLT.      Overall, patient is doing well post-SLK, and he has been extubated and CVVH has been discontinued. His T. bili is a bit high, so he has been started on Ursodiol. Additionally, his immediate post-liver transplant liver US with dopplers showed decreased resistive indices of the hepatic arteries (0.4-0.5), but repeat US liver with dopplers on 1/21/2025 showed interval improvement of the previously noted low resistive indices in the hepatic arteries. LFTs are downtrending. He is making good UOP (auto-diuresing to some extent), but creatinine is downtrending. Patient transferred to [facility] as of 1/23/2024. Patient getting close to discharge.     Update 1/27/2025:  Patient is doing well. Making good urine, and creatinine is downtrending. Except ALP which continues to uptrend slowly, AST/ALT/T.bili and GGT have all downtrended. Still has 1 drain in place that is putting out quite a bit (380 ml in the last 24 hours).  Tacrolimus level at goal.     RECS:  If ALP continues to rise, then patient might need further investigation such as MRCP and potentially ERCP to rule out post-OLT biliary pathology (such as stricture)  Patient should continue to work with PT   Immunosuppression and prophylactic antibiotics/antifungals per transplant surgery team   Await explant pathology results  Continue bowel regimen with miralax, docusate and senna   Continue Ursodiol   Strict Ins and Outs   Patient will need EGD at some point (once he is fully recovered from transplant) as an outpatient with biopsies to confirm short segment Matute's esophagus that was found during EGD in 2023/2024 (this can be addressed as outpatient)     Patient was seen and discussed with Hepatology attending, Dr. Migue Motta.     Thank you for the consultation. Hepatology will continue to follow.     - During weekday hours of 7am- 5pm, please do not hesitate to contact me on [employer] Chat or page [phone] if there are any further questions   - After hours, on weekends, and on holidays, please page the on-call GI fellow at [phone]. Thank you.     Venice Meier MD MPH   GI and Hepatology Fellow   [facility] [place]

## 2025-01-28 NOTE — PROGRESS NOTES
Pharmacist Transplant Discharge Note    Medications for Goran Ibrahim were reviewed in conjunction with the multidisciplinary transplant team. The pharmacist is in agreement with the plan of care for medications at this time.     Approximately 10 minutes were spent with this patient providing follow-up education and reviewing his finalized list of discharge medications. An updated outpatient dosing schedule was provided to the patient. All questions were answered to the patient's satisfaction, and the patient confirmed understanding.    The patient had his medications filled at [facility] and delivered prior to discharge. Further educational reinforcement should be provided in the outpatient clinic.    Chantel Ventura, PharmD, BCTXP  Solid Organ Transplant Clinical Pharmacy Specialist

## 2025-01-28 NOTE — PROGRESS NOTES
Physical Therapy    Physical Therapy Treatment    Patient Name: Goran Ibrahim  MRN: [id number]  Department: [facility] 9  Room: [facility]9087-A  Today's Date: 1/28/2025  Time Calculation  Start Time: 1150  Stop Time: 1209  Time Calculation (min): 19 min       Assessment/Plan   PT Assessment  PT Assessment Results: Decreased strength, Decreased endurance, Impaired balance, Decreased mobility  Barriers to Discharge Home: No anticipated barriers  End of Session Communication: Bedside nurse  Assessment Comment: Pt demonstrated improved ambulation distance w/ only 1 standing rest break. Pt declined further ambulation/therapy because he wanted to go back to his room and get dressed. Pt's brother present in room & stating that he walked with pt earlier in the day around the unit. RN aware.  End of Session Patient Position: Up in room (Pt's brother present & assisting pt.)  PT Plan  Inpatient/Swing Bed or Outpatient: Inpatient  PT Plan  Treatment/Interventions: Bed mobility, Transfer training, Gait training, Stair training, Balance training, Strengthening, Endurance training, Range of motion, Therapeutic exercise, Therapeutic activity, Home exercise program  PT Plan: Ongoing PT  PT Frequency: 5 times per week  PT Discharge Recommendations: Low intensity level of continued care  Equipment Recommended upon Discharge: Wheeled walker  PT Recommended Transfer Status: Stand by assist, Assistive device  PT - OK to Discharge: Yes      General Visit Information:   PT  Visit  PT Received On: 01/28/25  General  Family/Caregiver Present: Yes  Caregiver Feedback: Pt brother present & supportive  Prior to Session Communication: Bedside nurse  Patient Position Received: Up in bathroom  General Comment: Pt up in bathroom upon PT entry into room. Agreeable to therapy. Pt's brother present in room & supportive.    Precautions:  Precautions  Medical Precautions: Fall precautions  Post-Surgical Precautions: Abdominal surgery precautions        Objective  "    Pain:  Pain Assessment  Pain Assessment:  (Pt reports \"discomfort\" rather than pain.)       Coordination:  Movements are Fluid and Coordinated: Yes      Postural Control:  Postural Control  Postural Control: Within Functional Limits  Static Standing Balance  Static Standing-Balance Support: Bilateral upper extremity supported  Static Standing-Level of Assistance: Close supervision  Static Standing-Comment/Number of Minutes: 2 min; FWW  Dynamic Standing Balance  Dynamic Standing-Balance Support: Bilateral upper extremity supported  Dynamic Standing-Level of Assistance: Close supervision  Dynamic Standing-Balance: Turning  Dynamic Standing-Comments: FWW    Activity Tolerance:  Activity Tolerance  Endurance: Tolerates 10 - 20 min exercise with multiple rests    Treatments:  Therapeutic Exercise  Therapeutic Exercise Performed: No    Therapeutic Activity  Therapeutic Activity Performed: Yes  Therapeutic Activity 1: Basic transfers, ambulation, static standing- see flowsheet for details    Bed Mobility  Bed Mobility: No    Ambulation/Gait Training  Ambulation/Gait Training Performed: Yes  Ambulation/Gait Training 1  Surface 1: Level tile  Device 1: Rolling walker  Assistance 1: Close supervision  Quality of Gait 1: Decreased step length, Diminished heel strike, Forward flexed posture  Comments/Distance (ft) 1: 330' w/ standing rest breaks 2/2 fatigue/SOB    Transfers  Transfer: Yes  Transfer 1  Transfer From 1: Sit to  Transfer to 1: Stand  Transfer Device 1: Walker  Transfer Level of Assistance 1: Close supervision  Transfers 2  Transfer From 2: Stand to  Transfer to 2: Sit  Transfer Device 2: Walker  Transfer Level of Assistance 2: Close supervision    Stairs  Stairs: No    Outcome Measures:  [facility] Basic Mobility  Turning from your back to your side while in a flat bed without using bedrails: A little  Moving from lying on your back to sitting on the side of a flat bed without using bedrails: A little  Moving to " and from bed to chair (including a wheelchair): A little  Standing up from a chair using your arms (e.g. wheelchair or bedside chair): A little  To walk in hospital room: A little  Climbing 3-5 steps with railing: A little  Basic Mobility - Total Score: 18    Education Documentation  Precautions, taught by Roxi Henry PT at 1/28/2025 12:27 PM.  Learner: Family, Patient  Readiness: Acceptance  Method: Explanation, Demonstration  Response: Verbalizes Understanding, Demonstrated Understanding, Needs Reinforcement    Body Mechanics, taught by Roxi Henry PT at 1/28/2025 12:27 PM.  Learner: Family, Patient  Readiness: Acceptance  Method: Explanation, Demonstration  Response: Verbalizes Understanding, Demonstrated Understanding, Needs Reinforcement    Mobility Training, taught by Roxi Henry PT at 1/28/2025 12:27 PM.  Learner: Family, Patient  Readiness: Acceptance  Method: Explanation, Demonstration  Response: Verbalizes Understanding, Demonstrated Understanding, Needs Reinforcement    Education Comments  No comments found.         Encounter Problems       Encounter Problems (Active)       Balance       STG - Maintains dynamic standing balance SUP without upper extremity support to decrease the risk of falls. (Progressing)       Start:  01/21/25    Expected End:  02/11/25               Mobility       STG - Patient will ambulate 50' w/ LRAD SUP to promote functional IND in the home. (Met)       Start:  01/21/25    Expected End:  02/11/25    Resolved:  01/28/25         STG - Patient will ascend and descend 3 stairs w/ 1 HR SUP to increase safety entering/exiting the home.  (Progressing)       Start:  01/21/25    Expected End:  02/11/25               Mobility       Pt will mobilize supine<>sitting EOB SUP to promote functional IND. (Progressing)       Start:  01/21/25    Expected End:  02/11/25               Pain - Adult             Encounter Problems (Resolved)       PT Transfers       STG - Patient will transfer sit to  and from stand w/LRAD SUP to improve functional mobility.  (Met)       Start:  01/21/25    Expected End:  02/11/25    Resolved:  01/28/25              Roxi Henry, PT, DPT

## 2025-01-28 NOTE — NURSING NOTE
Mr. Ibrahim is sitting at side of bed.  He is feeling well today and feels prepared for discharge later today.  We reviewed his insulin regimen and he is comfortable with the basal/bolus + SS regimen.  He is comfortable with insulin pen use.  He has done self injection of insulin but will also have his family assist him with this once he is home.    He is wearing the DexCom G7.  We reviewed the function and he is satisfied as this will decrease the number of fingersticks for him.  Will sign off at this time as he is to be discharged today.  Time spent:  15 mins.

## 2025-01-28 NOTE — DISCHARGE SUMMARY
Discharge Diagnosis  Hepatic cirrhosis, unspecified hepatic cirrhosis type, unspecified whether ascites present (Multi)    Issues Requiring Follow-Up  SLK 1/20/25    DM  Appreciate Endocrine recs.   Pt will follow up with Chelsea Hernandez in Transplant Clinic.    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 Wright.  Pt received solumedrol and simulect for induction. Pt was initiated on Tacrolimus & Cellcept immunosuppression in conjunction with IV solumedrol taper.  Pt was started on acyclovir (CMV D - / R -) and bactrim for CMV & PCP prophylaxis per protocol. He was started on fluconazole as antifungal coverage per protocol. Operative course was uncomplicated. Hospital course was uncomplicated.     Pt is tolerating a diabetic diet, maintaining adequate hydration with oral intake, ambulating independently and having BMs. Immunosuppression was managed by the transplant team. Patient is in stable condition for discharge home with liver telehealth today. Central line removed prior to discharge. RX delivered to bedside prior to discharge. The patient will f/u in the transplant institute and have labs drawn on Mondays and Thursdays in the walk-in clinic.     Pertinent Physical Exam At Time of Discharge  Physical Exam  Vitals reviewed.   HENT:      Head: Normocephalic.   Cardiovascular:      Rate and Rhythm: Normal rate.      Pulses: Normal pulses.   Pulmonary:      Effort: Pulmonary effort is normal.   Abdominal:      Palpations: Abdomen is soft.   Musculoskeletal:         General: Normal range of motion.      Cervical back: Normal range of motion.   Skin:     General: Skin is warm.      Comments: RLQ incision and mercedes  "incision c/d/I with staples    Neurological:      Mental Status: He is alert and oriented to person, place, and time.   Psychiatric:         Behavior: Behavior normal.         Home Medications     Medication List      START taking these medications     acetaminophen 325 mg tablet; Commonly known as: TylenoL; Take 2 tablets   (650 mg) by mouth every 6 hours if needed for mild pain (1 - 3) for up to   10 days.   acyclovir 400 mg tablet; Commonly known as: Zovirax; Take 1 tablet (400   mg) by mouth 2 times a day.   alcohol swabs pads, medicated; Apply 1 each topically 4 times a day. Use   prior to checking glucose or injecting insulin   aspirin 81 mg EC tablet; Take 1 tablet (81 mg) by mouth once daily.   * BD Ultra-Fine Mini Pen Needle 31 gauge x 3/16\" needle; Generic drug:   pen needle, diabetic; 1 each 3 times a day before meals.   * pen needle, diabetic 32 gauge x 5/32\" needle; 1 each 4 times a day.   Use to inject insulin 4 times daily   Blood Glucose Test strip; Generic drug: blood sugar diagnostic; 100   strips 4 times a day. Use to check glucose 4 times daily, before meals and   at bedtime   blood-glucose meter misc; 1 each 4 times a day. Use to check glucose 4   times daily, before meals and at bedtime   Dexcom G7  misc; Generic drug: blood-glucose meter,continuous;   Use as instructed   Dexcom G7 Sensor device; Generic drug: blood-glucose sensor; 1 each 3   times a day before meals. Change sensor every 10 days   docusate sodium 100 mg capsule; Commonly known as: Colace; Take 1   capsule (100 mg) by mouth 2 times a day as needed for constipation for up   to 10 days.   fluconazole 200 mg tablet; Commonly known as: Diflucan; Take 2 tablets   (400 mg) by mouth once daily.   * insulin lispro 100 unit/mL injection; Commonly known as: HumaLOG   KwikPen Insulin; Inject 0-10 Units under the skin 3 times daily (morning,   midday, late afternoon). Inject 6 units of Lispro subcutaneously three   times a day " before meals in addition to the following sliding scale:  0   unit(s) if Blood glucose is between  2 unit(s) if Blood glucose is   between 151-200 4 unit(s) if Blood glucose is between 201-250 6 unit(s) if   Blood glucose is between 251-300 8 unit(s) if Blood glucose is between   301-350 10 unit(s) if Blood glucose is between 351-400  If blood glucose   is greater than 400 mg/dL, give max insulin per sliding scale AND then   contact provider.   * insulin lispro 100 unit/mL injection; Inject 8 Units under the skin 3   times a day before meals. Take as directed per insulin instructions.   Lantus Solostar U-100 Insulin 100 unit/mL (3 mL) pen; Generic drug:   insulin glargine; Inject 8 Units under the skin once daily in the morning.   Take in AM with Prednisone   mycophenolate 250 mg capsule; Commonly known as: Cellcept; Take 4   capsules (1,000 mg) by mouth every 12 hours.   polyethylene glycol 17 gram packet; Commonly known as: Glycolax,   Miralax; Take 17 g by mouth once daily for 5 days.   predniSONE 5 mg tablet; Commonly known as: Deltasone; Take 4 tablets (20   mg) by mouth once daily. Do not start before January 25, 2025.   sulfamethoxazole-trimethoprim 400-80 mg tablet; Commonly known as:   Bactrim; Take 1 tablet by mouth once daily.   tacrolimus 1 mg capsule; Commonly known as: Prograf; Take 3 capsules (3   mg) by mouth every 12 hours. Meds to beds. ADOD 1/27   tamsulosin 0.4 mg 24 hr capsule; Commonly known as: Flomax; Take 1   capsule (0.4 mg) by mouth once daily.   traMADol 50 mg tablet; Commonly known as: Ultram; Take 1 tablet (50 mg)   by mouth every 6 hours if needed for severe pain (7 - 10) or moderate pain   (4 - 6) for up to 5 days.   * TRUEplus Lancets 33 gauge misc; Generic drug: lancets; 1 Lancet 3   times a day before meals.   * lancets misc; 1 each 4 times a day. Use to check glucose 4 times   daily, before meals and at bedtime   ursodiol 300 mg capsule; Commonly known as: Actigall; Take 1  capsule   (300 mg) by mouth 3 times a day.  * This list has 6 medication(s) that are the same as other medications   prescribed for you. Read the directions carefully, and ask your doctor or   other care provider to review them with you.     CHANGE how you take these medications     amLODIPine 10 mg tablet; Commonly known as: Norvasc; Take 1 tablet (10   mg) by mouth once daily. Do not fill before January 27, 2025.; What   changed: medication strength, how much to take   carvedilol 6.25 mg tablet; Commonly known as: Coreg; Take 1 tablet (6.25   mg) by mouth 2 times a day. Hold for SBP <110 or HR <55; What changed:   additional instructions   pantoprazole 40 mg EC tablet; Commonly known as: ProtoNix; Take 1 tablet   (40 mg) by mouth once daily in the morning. Take before meals. Do not   crush, chew, or split.; What changed: additional instructions     CONTINUE taking these medications     cholecalciferol 50 MCG (2000 UT) tablet; Commonly known as: Vitamin D-3;   Take 1 tablet (50 mcg) by mouth once daily.     STOP taking these medications     furosemide 40 mg tablet; Commonly known as: Lasix   lactulose 20 gram/30 mL oral solution   rifAXIMin 550 mg tablet; Commonly known as: Xifaxan   sodium bicarbonate 650 mg tablet       Outpatient Follow-Up  Future Appointments   Date Time Provider Department Center   1/30/2025 11:30 AM TXP ABDOMINAL SURGEON [id number]   1/30/2025  1:30 PM Chelsea Hernandez PA-C [id number]   2/5/2025 10:00 AM Jatin Varghese PharmD [id number]   2/7/2025  9:00 AM TXP ABDOMINAL SURGEON [id number]   2/7/2025  9:30 AM Adrienne Licona RDN, MARCUS [id number]   2/14/2025  9:00 AM TXP ABDOMINAL SURGEON [id number]   2/20/2025 11:30 AM Kennedy Torres MD [facility]   2/21/2025  9:00 AM TXP ABDOMINAL SURGEON [id number]   2/28/2025  9:00 AM TXP ABDOMINAL SURGEON [id number]   3/7/2025  9:00 AM TXP ABDOMINAL SURGEON [id number]   6/19/2025 10:20 AM Seun Rodrigues MD [id number]       Ruth Javier, APRN-CNP

## 2025-01-28 NOTE — CARE PLAN
The patient's goals for the shift include Pt wants to go to surgury    The clinical goals for the shift include patient will remain safe and vital signs stable.

## 2025-01-29 ENCOUNTER — TELEPHONE (OUTPATIENT)
Dept: TRANSPLANT | Facility: HOSPITAL | Age: 55
End: 2025-01-29
Payer: MEDICAID

## 2025-01-29 NOTE — TELEPHONE ENCOUNTER
Post Transplant Discharge Call:  Left voicemail for Goran to check on him.  Reminded him of his appointment tomorrow.

## 2025-01-30 ENCOUNTER — LAB (OUTPATIENT)
Dept: LAB | Facility: HOSPITAL | Age: 55
End: 2025-01-30
Payer: MEDICAID

## 2025-01-30 ENCOUNTER — OFFICE VISIT (OUTPATIENT)
Facility: HOSPITAL | Age: 55
End: 2025-01-30
Payer: MEDICAID

## 2025-01-30 ENCOUNTER — LAB REQUISITION (OUTPATIENT)
Dept: LAB | Facility: CLINIC | Age: 55
End: 2025-01-30
Payer: MEDICAID

## 2025-01-30 ENCOUNTER — APPOINTMENT (OUTPATIENT)
Facility: HOSPITAL | Age: 55
End: 2025-01-30
Payer: MEDICAID

## 2025-01-30 ENCOUNTER — TELEPHONE (OUTPATIENT)
Facility: HOSPITAL | Age: 55
End: 2025-01-30

## 2025-01-30 VITALS
DIASTOLIC BLOOD PRESSURE: 91 MMHG | SYSTOLIC BLOOD PRESSURE: 165 MMHG | HEART RATE: 76 BPM | OXYGEN SATURATION: 96 % | TEMPERATURE: 98.2 F | WEIGHT: 265.2 LBS | BODY MASS INDEX: 38.05 KG/M2

## 2025-01-30 VITALS
OXYGEN SATURATION: 96 % | TEMPERATURE: 98.2 F | DIASTOLIC BLOOD PRESSURE: 91 MMHG | HEART RATE: 76 BPM | WEIGHT: 265.2 LBS | SYSTOLIC BLOOD PRESSURE: 165 MMHG | BODY MASS INDEX: 38.05 KG/M2

## 2025-01-30 DIAGNOSIS — Z94.4 LIVER REPLACED BY TRANSPLANT (MULTI): ICD-10-CM

## 2025-01-30 DIAGNOSIS — Z94.0 KIDNEY REPLACED BY TRANSPLANT (HHS-HCC): ICD-10-CM

## 2025-01-30 DIAGNOSIS — E11.9 TYPE 2 DIABETES MELLITUS WITHOUT COMPLICATION, UNSPECIFIED WHETHER LONG TERM INSULIN USE (MULTI): Primary | ICD-10-CM

## 2025-01-30 DIAGNOSIS — N18.6 END STAGE RENAL DISEASE (MULTI): ICD-10-CM

## 2025-01-30 DIAGNOSIS — Z94.4 LIVER TRANSPLANT STATUS: ICD-10-CM

## 2025-01-30 DIAGNOSIS — T38.0X5A STEROID-INDUCED HYPERGLYCEMIA: ICD-10-CM

## 2025-01-30 DIAGNOSIS — E11.9 TYPE 2 DIABETES MELLITUS WITHOUT COMPLICATION, UNSPECIFIED WHETHER LONG TERM INSULIN USE (MULTI): ICD-10-CM

## 2025-01-30 DIAGNOSIS — R18.8 OTHER ASCITES: ICD-10-CM

## 2025-01-30 DIAGNOSIS — R73.9 STEROID-INDUCED HYPERGLYCEMIA: ICD-10-CM

## 2025-01-30 DIAGNOSIS — Z94.0 KIDNEY REPLACED BY TRANSPLANT (HHS-HCC): Primary | ICD-10-CM

## 2025-01-30 PROBLEM — Z76.82 PRE-LIVER TRANSPLANT, LISTED: Status: RESOLVED | Noted: 2024-06-25 | Resolved: 2025-01-30

## 2025-01-30 PROBLEM — Z01.810 PREOP CARDIOVASCULAR EXAM: Status: RESOLVED | Noted: 2023-12-08 | Resolved: 2025-01-30

## 2025-01-30 LAB
ALBUMIN SERPL BCP-MCNC: 3.8 G/DL (ref 3.4–5)
ALP SERPL-CCNC: 205 U/L (ref 33–120)
ALT SERPL W P-5'-P-CCNC: 53 U/L (ref 10–52)
ANION GAP SERPL CALC-SCNC: 14 MMOL/L (ref 10–20)
AST SERPL W P-5'-P-CCNC: 17 U/L (ref 9–39)
BASOPHILS # BLD AUTO: 0.16 X10*3/UL (ref 0–0.1)
BASOPHILS NFR BLD AUTO: 1.3 %
BILIRUB DIRECT SERPL-MCNC: 0.8 MG/DL (ref 0–0.3)
BILIRUB SERPL-MCNC: 1.8 MG/DL (ref 0–1.2)
BUN SERPL-MCNC: 46 MG/DL (ref 6–23)
CALCIUM SERPL-MCNC: 8.6 MG/DL (ref 8.6–10.6)
CHLORIDE SERPL-SCNC: 107 MMOL/L (ref 98–107)
CO2 SERPL-SCNC: 21 MMOL/L (ref 21–32)
CREAT SERPL-MCNC: 1.68 MG/DL (ref 0.5–1.3)
CREAT UR-MCNC: 67.2 MG/DL (ref 20–370)
EGFRCR SERPLBLD CKD-EPI 2021: 47 ML/MIN/1.73M*2
EOSINOPHIL # BLD AUTO: 0.38 X10*3/UL (ref 0–0.7)
EOSINOPHIL NFR BLD AUTO: 3 %
ERYTHROCYTE [DISTWIDTH] IN BLOOD BY AUTOMATED COUNT: 17.6 % (ref 11.5–14.5)
GGT SERPL-CCNC: 346 U/L (ref 5–64)
GLUCOSE SERPL-MCNC: 154 MG/DL (ref 74–99)
HCT VFR BLD AUTO: 31.6 % (ref 41–52)
HGB BLD-MCNC: 10 G/DL (ref 13.5–17.5)
HLA CLS I TYP PNL BLD/T DONR HIGH RES: NORMAL
HLA RESULTS: NORMAL
HLA-DP2 QL: NORMAL
HLA-DQB1 HIGH RES: NORMAL
HLA-DRB1 HIGH RES: NORMAL
IMM GRANULOCYTES # BLD AUTO: 0.54 X10*3/UL (ref 0–0.7)
IMM GRANULOCYTES NFR BLD AUTO: 4.3 % (ref 0–0.9)
LYMPHOCYTES # BLD AUTO: 0.99 X10*3/UL (ref 1.2–4.8)
LYMPHOCYTES NFR BLD AUTO: 7.9 %
MAGNESIUM SERPL-MCNC: 1.69 MG/DL (ref 1.6–2.4)
MCH RBC QN AUTO: 29.5 PG (ref 26–34)
MCHC RBC AUTO-ENTMCNC: 31.6 G/DL (ref 32–36)
MCV RBC AUTO: 93 FL (ref 80–100)
MONOCYTES # BLD AUTO: 1.11 X10*3/UL (ref 0.1–1)
MONOCYTES NFR BLD AUTO: 8.9 %
NEUTROPHILS # BLD AUTO: 9.34 X10*3/UL (ref 1.2–7.7)
NEUTROPHILS NFR BLD AUTO: 74.6 %
NRBC BLD-RTO: 0 /100 WBCS (ref 0–0)
PHOSPHATE SERPL-MCNC: 3 MG/DL (ref 2.5–4.9)
PLATELET # BLD AUTO: 230 X10*3/UL (ref 150–450)
POTASSIUM SERPL-SCNC: 4.3 MMOL/L (ref 3.5–5.3)
PROT SERPL-MCNC: 6.6 G/DL (ref 6.4–8.2)
PROT UR-ACNC: 141 MG/DL (ref 5–25)
PROT/CREAT UR: 2.1 MG/MG CREAT (ref 0–0.17)
RBC # BLD AUTO: 3.39 X10*6/UL (ref 4.5–5.9)
SODIUM SERPL-SCNC: 138 MMOL/L (ref 136–145)
TACROLIMUS BLD-MCNC: 27 NG/ML
WBC # BLD AUTO: 12.5 X10*3/UL (ref 4.4–11.3)

## 2025-01-30 PROCEDURE — 84156 ASSAY OF PROTEIN URINE: CPT

## 2025-01-30 PROCEDURE — 3060F POS MICROALBUMINURIA REV: CPT | Performed by: PHYSICIAN ASSISTANT

## 2025-01-30 PROCEDURE — 80197 ASSAY OF TACROLIMUS: CPT

## 2025-01-30 PROCEDURE — 99214 OFFICE O/P EST MOD 30 MIN: CPT | Mod: 24

## 2025-01-30 PROCEDURE — 3044F HG A1C LEVEL LT 7.0%: CPT | Performed by: PHYSICIAN ASSISTANT

## 2025-01-30 PROCEDURE — 83735 ASSAY OF MAGNESIUM: CPT

## 2025-01-30 PROCEDURE — 85025 COMPLETE CBC W/AUTO DIFF WBC: CPT

## 2025-01-30 PROCEDURE — 82248 BILIRUBIN DIRECT: CPT

## 2025-01-30 PROCEDURE — 82570 ASSAY OF URINE CREATININE: CPT

## 2025-01-30 PROCEDURE — 87799 DETECT AGENT NOS DNA QUANT: CPT

## 2025-01-30 PROCEDURE — 80053 COMPREHEN METABOLIC PANEL: CPT

## 2025-01-30 PROCEDURE — 99214 OFFICE O/P EST MOD 30 MIN: CPT | Performed by: PHYSICIAN ASSISTANT

## 2025-01-30 PROCEDURE — 3080F DIAST BP >= 90 MM HG: CPT | Performed by: PHYSICIAN ASSISTANT

## 2025-01-30 PROCEDURE — 82977 ASSAY OF GGT: CPT

## 2025-01-30 PROCEDURE — 3080F DIAST BP >= 90 MM HG: CPT | Performed by: TRANSPLANT SURGERY

## 2025-01-30 PROCEDURE — 84100 ASSAY OF PHOSPHORUS: CPT

## 2025-01-30 PROCEDURE — 3077F SYST BP >= 140 MM HG: CPT | Performed by: PHYSICIAN ASSISTANT

## 2025-01-30 PROCEDURE — 99214 OFFICE O/P EST MOD 30 MIN: CPT

## 2025-01-30 PROCEDURE — 3048F LDL-C <100 MG/DL: CPT | Performed by: PHYSICIAN ASSISTANT

## 2025-01-30 PROCEDURE — 3077F SYST BP >= 140 MM HG: CPT | Performed by: TRANSPLANT SURGERY

## 2025-01-30 RX ORDER — FUROSEMIDE 20 MG/1
20 TABLET ORAL
Qty: 60 TABLET | Refills: 1 | Status: SHIPPED | OUTPATIENT
Start: 2025-01-30 | End: 2025-03-31

## 2025-01-30 RX ORDER — INSULIN LISPRO 100 [IU]/ML
INJECTION, SOLUTION INTRAVENOUS; SUBCUTANEOUS
Start: 2025-01-30

## 2025-01-30 ASSESSMENT — PAIN SCALES - GENERAL
PAINLEVEL_OUTOF10: 0-NO PAIN
PAINLEVEL_OUTOF10: 0-NO PAIN

## 2025-01-30 NOTE — PROGRESS NOTES
Update- [facility] home care agency has accepted the patient. [facility] Order, AVS and DC Summary sent to agency. [facility] will continue to follow for discharge planning.      ETIENNE Dodge, RN  [facility], [facility]9  Transitional Care Coordinator, Mon-Fri  Cell: [phone], Office: [phone]  Email: [email or web address]

## 2025-01-30 NOTE — TELEPHONE ENCOUNTER
On call liver txp coordinator received critical alert for Tacrolimus 27. VM left for patient with request for call back to review if medications were taken prior to getting labs drawn.

## 2025-01-30 NOTE — PATIENT INSTRUCTIONS
Kidney replaced by transplant ([facility])  Steroid-induced hyperglycemia  Type 2 diabetes mellitus without complication, unspecified whether long term insulin use (Multi)      Type 2 diabetes mellitus, is at goal. A1C due for update in 3 months    RX changes: see insulin plan changes below   Education:  blood sugar goals, complications of diabetes mellitus, hypoglycemia prevention and treatment, and self-monitoring of blood glucose skills  Follow up: I recommend diabetes care be 5-6 weeks.    INSULIN INSTRUCTIONS (prednisone 20mg)    LANTUS = 8 units once every morning    HUMALOG       BREAKFAST = 8 units plus sliding scale (take 15 min before meal)       LUNCH = 8 units plus sliding scale (take 15 min before meal)       DINNER = 4 units plus sliding scale (take 15 min before meal)    SLIDING SCALE    = 0u  151-200 = 2u  201-250 = 4u  251-300 = 6u  301-350 = 8u  351-400 = 10u  Over 400 = repeat 10u every 4 hours     INSULIN INSTRUCTIONS (prednisone 15mg)    LANTUS = 8 units once every morning    HUMALOG       BREAKFAST = 6 units plus sliding scale (take 15 min before meal)       LUNCH = 6 units plus sliding scale (take 15 min before meal)       DINNER = 4 units plus sliding scale (take 15 min before meal)    SLIDING SCALE    = 0u  151-200 = 2u  201-250 = 4u  251-300 = 6u  301-350 = 8u  351-400 = 10u  Over 400 = repeat 10u every 4 hours     INSULIN INSTRUCTIONS (prednisone 10mg)    LANTUS = 6 units once every morning    HUMALOG       BREAKFAST = 4 units plus sliding scale (take 15 min before meal)       LUNCH = 4 units plus sliding scale (take 15 min before meal)       DINNER = 2 units plus sliding scale (take 15 min before meal)    SLIDING SCALE    = 0u  151-200 = 2u  201-250 = 4u  251-300 = 6u  301-350 = 8u  351-400 = 10u  Over 400 = repeat 10u every 4 hours

## 2025-01-30 NOTE — PROGRESS NOTES
TRANSPLANT SURGERY FOLLOW UP    Reason for visit:    Goran Ibrahim underwent transplant surgery,  1/20/2025 (Liver / Kidney), and returns to clinic for follow up evaluation focusing on their current immunosuppression. Specifically, Goran Ibrahim's regiment was evaluated to ensure adequate levels to prevent life threatening or organ function impairing rejection while not increasing risk of adverse effects including malignancy, infection, bone health, or accelerated cardiovascular disease.  I reviewed common side effects including tremor, hair loss, GI toxicity, and agitation.  The patient reported that they were experiencing:  tremor .    The long-term management of maintenance immunosuppression in organ transplant recipients remains complex given differences in clinical characteristics, comorbid conditions, and prior rejection episodes.  These factors were evaluated at this visit and used in formulating this plan of care. Immunosuppression following the transplant is medically necessary to closely monitor and to reduce the risk of post-operative complications distinct from the post operative management of the transplant surgical procedure.     Interval history  Discharged after liver kidney  Has moderate ascites with some wound leakage  Feeling tired but improving.     Patient Active Problem List   Diagnosis    Hypertension    Liver cirrhosis (Multi)    Pre-kidney transplant, listed    Esophageal varices in alcoholic cirrhosis (Multi)    Hepatic cirrhosis, unspecified hepatic cirrhosis type, unspecified whether ascites present (Multi)    ESRD (end stage renal disease) (Multi)    Steroid-induced hyperglycemia    Kidney replaced by transplant ([facility])    Liver transplant status       Medications:  Current Outpatient Medications   Medication Instructions    acetaminophen (TYLENOL) 650 mg, oral, Every 6 hours PRN    acyclovir (ZOVIRAX) 400 mg, oral, 2 times daily    alcohol swabs pads, medicated 1 each, topical  (top), 4 times daily, Use prior to checking glucose or injecting insulin    amLODIPine (NORVASC) 10 mg, oral, Daily    aspirin 81 mg, oral, Daily    blood sugar diagnostic (Blood Glucose Test) strip 100 strips, miscellaneous, 4 times daily, Use to check glucose 4 times daily, before meals and at bedtime    blood-glucose meter misc 1 each, miscellaneous, 4 times daily, Use to check glucose 4 times daily, before meals and at bedtime    blood-glucose meter,continuous (Dexcom G7 ) misc Use as instructed    blood-glucose sensor (Dexcom G7 Sensor) device 1 each, miscellaneous, 3 times daily before meals, Change sensor every 10 days    carvedilol (COREG) 6.25 mg, oral, 2 times daily, Hold for SBP <110 or HR <55    cholecalciferol (VITAMIN D-3) 50 mcg, oral, Daily    docusate sodium (COLACE) 100 mg, oral, 2 times daily PRN    fluconazole (DIFLUCAN) 400 mg, oral, Daily    furosemide (LASIX) 20 mg, oral, 2 times daily (morning and late afternoon)    insulin lispro (HUMALOG KWIKPEN INSULIN) 0-10 Units, subcutaneous, 3 times daily (morning, midday, late afternoon), Inject 6 units of Lispro subcutaneously three times a day before meals in addition to the following sliding scale:   0 unit(s) if Blood glucose is between   2 unit(s) if Blood glucose is between 151-200  4 unit(s) if Blood glucose is between 201-250  6 unit(s) if Blood glucose is between 251-300  8 unit(s) if Blood glucose is between 301-350  10 unit(s) if Blood glucose is between 351-400    If blood glucose is greater than 400 mg/dL, give max insulin per sliding scale AND then contact provider.    insulin lispro 100 unit/mL injection Inject 8 units with breakfast and lunch, and 4 units with dinner, increase each dose by sliding scale, expect up to 30 units/day    lancets 33 gauge misc 1 Lancet, miscellaneous, 3 times daily before meals    lancets misc 1 each, miscellaneous, 4 times daily, Use to check glucose 4 times daily, before meals and at bedtime  "   Lantus Solostar U-100 Insulin 8 Units, subcutaneous, Every morning, Take in AM with Prednisone    mycophenolate (CELLCEPT) 1,000 mg, oral, Every 12 hours scheduled (0630,1830)    pantoprazole (PROTONIX) 40 mg, oral, Daily before breakfast, Do not crush, chew, or split.    pen needle, diabetic 31 gauge x 3/16\" needle 1 each, miscellaneous, 3 times daily before meals    pen needle, diabetic 32 gauge x 5/32\" needle 1 each, miscellaneous, 4 times daily, Use to inject insulin 4 times daily    predniSONE (Deltasone) 5 mg tablet Take 4 tablets (20 mg) by mouth once daily. Do not start before January 25, 2025.    sulfamethoxazole-trimethoprim (Bactrim) 400-80 mg tablet 1 tablet, oral, Daily    tacrolimus (PROGRAF) 3 mg, oral, Every 12 hours scheduled (0630,1830), Meds to beds. ADOD 1/27    tamsulosin (FLOMAX) 0.4 mg, oral, Daily    ursodiol (ACTIGALL) 300 mg, oral, 3 times daily       Physical Exam  Vitals:    01/30/25 1150   BP: (!) 165/91   Pulse: 76   Temp: 36.8 °C (98.2 °F)   SpO2: 96%          Physical Exam  Constitutional:       Appearance: He is obese.   Cardiovascular:      Rate and Rhythm: Normal rate.      Pulses: Normal pulses.   Pulmonary:      Effort: Pulmonary effort is normal.      Breath sounds: No stridor.   Abdominal:      General: There is distension.      Palpations: Abdomen is soft. There is no mass.      Hernia: No hernia is present.   Musculoskeletal:         General: Swelling present.   Skin:     General: Skin is warm and dry.      Capillary Refill: Capillary refill takes less than 2 seconds.   Neurological:      General: No focal deficit present.      Mental Status: He is alert.   Psychiatric:         Mood and Affect: Mood normal.       ALLERGY:  No Known Allergies    LABS:  No results found for this or any previous visit (from the past 24 hours).   Lab Results   Component Value Date    ALT 93 (H) 01/28/2025    AST 22 01/28/2025     (H) 01/28/2025    ALKPHOS 205 (H) 01/28/2025    BILITOT " 1.9 (H) 01/28/2025         ASSESSMENT AND PLAN:    Mr. Ibrahim is a 54 y.o. male who underwent  transplant surgery on 1/20/2025 (Liver / Kidney).    1. Immunosuppression reviewed and adjusted       Tacrolimus: Yes - 3 mg po BID        Mycophenolate: Yes - 1000 mg po BID      Prednisone: Yes - 10 mg daily      Belatacept: N/A     2. Prophylaxis adherence protocol to prevent immunosuppression related infection      Valcyte: Yes - 900 daily      PCP prophylaxis: Transplant Prophylactics: Bactrim    4. Hypertension         Blood Pressures         1/30/2025  1150             BP: 165/91              Current antihypertensives were evaluated        5. Wound/KAITLYNN      [place] ready for removal: No      KAITLYNN:  No    6.  GI prophylaxis        On PPI     7.  Follow up:       Labs  2 times per week       RTC: 1 week(s)    I spent a total of 40 min providing care for this patient including record review, examination, face to face counseling, and documentation.     Goran Carrillo MD    Transplant Surgery Attending

## 2025-01-30 NOTE — PROGRESS NOTES
Subjective   Goran Ibrahim is a 54 y.o. male who presents for follow-up of Type 2 diabetes mellitus. The initial diagnosis of diabetes was made {ago/age:36064}. The patient {DOES/ DOES NOT:92402} have a known family history of diabetes.    Known complications due to diabetes included {Diagnoses; complications diabetes:1215}    Cardiovascular risk factors include {risk factors heart disease:510}. The patient {is/is not:86949} on an ACE inhibitor or angiotensin II receptor blocker.  The patient {HAS HAS NOT:64572} been previously hospitalized due to diabetic ketoacidosis.     Current symptoms/problems include {Symptoms; diabetes:42771}. His {course:70595}.     Current diabetes regimen is as follows: {therapy; diabetes meds:60083}     The patient {is/is not:17563} currently checking the blood glucose *** times per day.    Patient is using: {glucometer/continuous glucose monitor:48436}    Hypoglycemia frequency: ***  Hypoglycemia awareness: {YES/NO:45357}    Exercise: {exercise:10579}   Meal panning: He is using {meal plannin}.    Review of Systems    Objective   BP (!) 165/91   Pulse 76   Temp 36.8 °C (98.2 °F) (Temporal)   Wt 120 kg (265 lb 3.2 oz)   SpO2 96%   BMI 38.05 kg/m²   Physical Exam    Lab Review  Glucose (mg/dL)   Date Value   2025 113 (H)   2025 157 (H)   2025 140 (H)     Hemoglobin A1C (%)   Date Value   2025 6.6 (H)   2023 5.3   2023 5.2   2023 6.8 (H)     Bicarbonate (mmol/L)   Date Value   2025 20 (L)   2025 21   2025 21     Urea Nitrogen (mg/dL)   Date Value   2025 58 (H)   2025 69 (H)   2025 80 (H)     Creatinine (mg/dL)   Date Value   2025 1.76 (H)   2025 1.89 (H)   2025 2.21 (H)       Health Maintenance:   Foot Exam:   Eye Exam:  Lipid Panel:  Urine Albumin:    Assessment/Plan   Diagnoses and all orders for this visit:  Kidney replaced by transplant ([facility])  Steroid-induced hyperglycemia  -      insulin lispro 100 unit/mL injection; Inject 8 units with breakfast and lunch, and 4 units with dinner, increase each dose by sliding scale, expect up to 30 units/day  Type 2 diabetes mellitus without complication, unspecified whether long term insulin use (Multi)  -     insulin lispro 100 unit/mL injection; Inject 8 units with breakfast and lunch, and 4 units with dinner, increase each dose by sliding scale, expect up to 30 units/day      Type 2 diabetes mellitus, is at goal. A1C due for update in 3 months    RX changes:  see insulin plan changes below    Education:  blood sugar goals, complications of diabetes mellitus, hypoglycemia prevention and treatment, and self-monitoring of blood glucose skills  Follow up: I recommend diabetes care be  5-6 weeks .    INSULIN INSTRUCTIONS (prednisone 20mg)    LANTUS = 8 units once every morning    HUMALOG       BREAKFAST = 8 units plus sliding scale (take 15 min before meal)       LUNCH = 8 units plus sliding scale (take 15 min before meal)       DINNER = 4 units plus sliding scale (take 15 min before meal)    SLIDING SCALE    = 0u  151-200 = 2u  201-250 = 4u  251-300 = 6u  301-350 = 8u  351-400 = 10u  Over 400 = repeat 10u every 4 hours     INSULIN INSTRUCTIONS (prednisone 15mg)    LANTUS = 8 units once every morning    HUMALOG       BREAKFAST = 6 units plus sliding scale (take 15 min before meal)       LUNCH = 6 units plus sliding scale (take 15 min before meal)       DINNER = 4 units plus sliding scale (take 15 min before meal)    SLIDING SCALE    = 0u  151-200 = 2u  201-250 = 4u  251-300 = 6u  301-350 = 8u  351-400 = 10u  Over 400 = repeat 10u every 4 hours     INSULIN INSTRUCTIONS (prednisone 10mg)    LANTUS = 6 units once every morning    HUMALOG       BREAKFAST = 4 units plus sliding scale (take 15 min before meal)       LUNCH = 4 units plus sliding scale (take 15 min before meal)       DINNER = 2 units plus sliding scale (take 15 min before  meal)    SLIDING SCALE    = 0u  151-200 = 2u  201-250 = 4u  251-300 = 6u  301-350 = 8u  351-400 = 10u  Over 400 = repeat 10u every 4 hours

## 2025-01-31 ENCOUNTER — DOCUMENTATION (OUTPATIENT)
Facility: HOSPITAL | Age: 55
End: 2025-01-31
Payer: MEDICAID

## 2025-01-31 DIAGNOSIS — Z94.4 LIVER REPLACED BY TRANSPLANT (MULTI): ICD-10-CM

## 2025-01-31 LAB
BKV DNA SERPL NAA+PROBE-LOG#: NORMAL {LOG_COPIES}/ML
LABORATORY COMMENT REPORT: NORMAL
LABORATORY COMMENT REPORT: NOT DETECTED
PATH REPORT.ADDENDUM SPEC: NORMAL
PATH REPORT.FINAL DX SPEC: NORMAL
PATH REPORT.GROSS SPEC: NORMAL
PATH REPORT.RELEVANT HX SPEC: NORMAL
PATH REPORT.TOTAL CANCER: NORMAL
RESIDENT REVIEW: NORMAL

## 2025-02-03 ENCOUNTER — TELEPHONE (OUTPATIENT)
Dept: TRANSPLANT | Facility: HOSPITAL | Age: 55
End: 2025-02-03

## 2025-02-03 ENCOUNTER — APPOINTMENT (OUTPATIENT)
Dept: LAB | Facility: HOSPITAL | Age: 55
End: 2025-02-03
Payer: MEDICAID

## 2025-02-03 ENCOUNTER — LAB (OUTPATIENT)
Dept: LAB | Facility: HOSPITAL | Age: 55
End: 2025-02-03
Payer: MEDICAID

## 2025-02-03 PROBLEM — E11.9 TYPE 2 DIABETES MELLITUS WITHOUT COMPLICATION (MULTI): Status: ACTIVE | Noted: 2025-02-03

## 2025-02-03 LAB
ALBUMIN SERPL BCP-MCNC: 3.8 G/DL (ref 3.4–5)
ALP SERPL-CCNC: 161 U/L (ref 33–120)
ALT SERPL W P-5'-P-CCNC: 22 U/L (ref 10–52)
ANION GAP SERPL CALC-SCNC: 15 MMOL/L (ref 10–20)
AST SERPL W P-5'-P-CCNC: 12 U/L (ref 9–39)
BASOPHILS # BLD AUTO: 0.22 X10*3/UL (ref 0–0.1)
BASOPHILS NFR BLD AUTO: 1.4 %
BILIRUB DIRECT SERPL-MCNC: 0.4 MG/DL (ref 0–0.3)
BILIRUB SERPL-MCNC: 1 MG/DL (ref 0–1.2)
BUN SERPL-MCNC: 52 MG/DL (ref 6–23)
CALCIUM SERPL-MCNC: 8.5 MG/DL (ref 8.6–10.3)
CHLORIDE SERPL-SCNC: 108 MMOL/L (ref 98–107)
CO2 SERPL-SCNC: 18 MMOL/L (ref 21–32)
CREAT SERPL-MCNC: 2.13 MG/DL (ref 0.5–1.3)
EGFRCR SERPLBLD CKD-EPI 2021: 36 ML/MIN/1.73M*2
EOSINOPHIL # BLD AUTO: 0.12 X10*3/UL (ref 0–0.7)
EOSINOPHIL NFR BLD AUTO: 0.8 %
ERYTHROCYTE [DISTWIDTH] IN BLOOD BY AUTOMATED COUNT: 17.6 % (ref 11.5–14.5)
GLUCOSE SERPL-MCNC: 216 MG/DL (ref 74–99)
HCT VFR BLD AUTO: 32.7 % (ref 41–52)
HGB BLD-MCNC: 10.4 G/DL (ref 13.5–17.5)
IMM GRANULOCYTES # BLD AUTO: 0.46 X10*3/UL (ref 0–0.7)
IMM GRANULOCYTES NFR BLD AUTO: 3 % (ref 0–0.9)
LYMPHOCYTES # BLD AUTO: 1.35 X10*3/UL (ref 1.2–4.8)
LYMPHOCYTES NFR BLD AUTO: 8.7 %
MAGNESIUM SERPL-MCNC: 1.33 MG/DL (ref 1.6–2.4)
MCH RBC QN AUTO: 29.7 PG (ref 26–34)
MCHC RBC AUTO-ENTMCNC: 31.8 G/DL (ref 32–36)
MCV RBC AUTO: 93 FL (ref 80–100)
MONOCYTES # BLD AUTO: 1.18 X10*3/UL (ref 0.1–1)
MONOCYTES NFR BLD AUTO: 7.6 %
NEUTROPHILS # BLD AUTO: 12.13 X10*3/UL (ref 1.2–7.7)
NEUTROPHILS NFR BLD AUTO: 78.5 %
NRBC BLD-RTO: 0 /100 WBCS (ref 0–0)
PHOSPHATE SERPL-MCNC: 3.5 MG/DL (ref 2.5–4.9)
PLATELET # BLD AUTO: 315 X10*3/UL (ref 150–450)
POTASSIUM SERPL-SCNC: 4.4 MMOL/L (ref 3.5–5.3)
PROT SERPL-MCNC: 6.9 G/DL (ref 6.4–8.2)
RBC # BLD AUTO: 3.5 X10*6/UL (ref 4.5–5.9)
SODIUM SERPL-SCNC: 137 MMOL/L (ref 136–145)
WBC # BLD AUTO: 15.5 X10*3/UL (ref 4.4–11.3)

## 2025-02-03 PROCEDURE — 82248 BILIRUBIN DIRECT: CPT

## 2025-02-03 PROCEDURE — 80197 ASSAY OF TACROLIMUS: CPT

## 2025-02-03 PROCEDURE — 82977 ASSAY OF GGT: CPT

## 2025-02-03 PROCEDURE — 87799 DETECT AGENT NOS DNA QUANT: CPT

## 2025-02-03 PROCEDURE — 80053 COMPREHEN METABOLIC PANEL: CPT

## 2025-02-03 PROCEDURE — 85025 COMPLETE CBC W/AUTO DIFF WBC: CPT

## 2025-02-03 PROCEDURE — 83735 ASSAY OF MAGNESIUM: CPT

## 2025-02-03 PROCEDURE — 84100 ASSAY OF PHOSPHORUS: CPT

## 2025-02-03 ASSESSMENT — ENCOUNTER SYMPTOMS: FATIGUE: 1

## 2025-02-03 NOTE — TELEPHONE ENCOUNTER
Dr. Wright reviewed labs, creatinine a bit high, to stop diuretics and encourage hydration.  Spoke to patient, he is feeling ok, discussed removing diuretics.

## 2025-02-04 ENCOUNTER — TELEPHONE (OUTPATIENT)
Dept: TRANSPLANT | Facility: HOSPITAL | Age: 55
End: 2025-02-04
Payer: MEDICAID

## 2025-02-04 DIAGNOSIS — Z94.4 LIVER TRANSPLANT RECIPIENT (MULTI): ICD-10-CM

## 2025-02-04 DIAGNOSIS — Z94.0 KIDNEY REPLACED BY TRANSPLANT (HHS-HCC): ICD-10-CM

## 2025-02-04 DIAGNOSIS — Z94.4 LIVER REPLACED BY TRANSPLANT (MULTI): ICD-10-CM

## 2025-02-04 LAB
BKV DNA SERPL NAA+PROBE-LOG#: NORMAL {LOG_COPIES}/ML
FLOW AUTOCROSSMATCH: NORMAL
GGT SERPL-CCNC: 237 U/L (ref 5–64)
HLA RESULTS: NORMAL
LABORATORY COMMENT REPORT: NOT DETECTED
TACROLIMUS BLD-MCNC: 19 NG/ML

## 2025-02-04 RX ORDER — TACROLIMUS 1 MG/1
2 CAPSULE ORAL
Qty: 120 CAPSULE | Refills: 0 | Status: SHIPPED | OUTPATIENT
Start: 2025-02-04 | End: 2025-02-07

## 2025-02-04 NOTE — TELEPHONE ENCOUNTER
Labs reviewed by Dr. Wright, hold tacrolimus tonight, restart tomorrow at 2mg every 12 hours.  Spoke with Goran about dose change.

## 2025-02-04 NOTE — DOCUMENTATION CLARIFICATION NOTE
"    PATIENT:               MANDA QUINN  ACCT #:                  [id number]  MRN:                       [id number]  :                       1970  ADMIT DATE:       2025 2:19 AM  DISCH DATE:        2025 4:14 PM  RESPONDING PROVIDER #:        [id number]          PROVIDER RESPONSE TEXT:    I agree with dietician diagnosis of moderate malnutrition on 25.    CDI QUERY TEXT:    Clarification        Instruction:    Based on your assessment of the patient and the clinical information, please provide the requested documentation by clicking on the appropriate radio button and enter any additional information if prompted.    Question: Please further clarify this patient nutritional status as    When answering this query, please exercise your independent professional judgment. The fact that a question is being asked, does not imply that any particular answer is desired or expected.    The patient's clinical indicators include:  Clinical Information: 54 yr old male hx cryptogenic cirrhosis and had simultaneous kidney and liver transplants on 25.    Clinical Indicators:  pt states has not been feeling the urge to eat.  Documentation from nutrition consult on 25 shows \"Diagnosis Status: New  Malnutrition Diagnosis: Moderate malnutrition related to chronic disease or condition  Related to: energy exenditure > energy intake as evidenced by: mild-moderate fat loss + muscle wasting  Additional Assessment Information: do suspect pt with intake < 100% of his energy needs given medical conditions and lighter meals\"    Treatment: Nutrition consult which showed \"Uncrustable daily to provide an additional   300kcal and 10gm protein. Recommend obtaining new vitamin D level and supplementing if insufficient.  Monitor labs to see if pt requires renal diet  Nutrition Interventions/Goals:  Interventions: Meals and snacks     - Meals and Snacks: Carbohydrate-modified diet    - Goal: Consume > 70% x 2 meals/day    Risk " Factors: recent surgery, diabetes  Options provided:  -- I agree with dietician diagnosis of moderate malnutrition on 1/23/25.  -- Other - I will add my own diagnosis  -- Refer to Clinical Documentation Reviewer    Query created by: Abril Fink on 2/4/2025 3:51 PM      Electronically signed by:  PETE PEREZ 2/4/2025 4:11 PM

## 2025-02-05 ENCOUNTER — APPOINTMENT (OUTPATIENT)
Dept: PHARMACY | Facility: HOSPITAL | Age: 55
End: 2025-02-05
Payer: MEDICAID

## 2025-02-05 DIAGNOSIS — T38.0X5A STEROID-INDUCED HYPERGLYCEMIA: ICD-10-CM

## 2025-02-05 DIAGNOSIS — Z94.4 LIVER TRANSPLANT RECIPIENT (MULTI): ICD-10-CM

## 2025-02-05 DIAGNOSIS — R73.9 STEROID-INDUCED HYPERGLYCEMIA: ICD-10-CM

## 2025-02-05 DIAGNOSIS — Z94.0 KIDNEY REPLACED BY TRANSPLANT (HHS-HCC): ICD-10-CM

## 2025-02-06 ENCOUNTER — LAB (OUTPATIENT)
Dept: LAB | Facility: HOSPITAL | Age: 55
End: 2025-02-06
Payer: MEDICAID

## 2025-02-06 LAB
ALBUMIN SERPL BCP-MCNC: 4 G/DL (ref 3.4–5)
ALP SERPL-CCNC: 162 U/L (ref 33–120)
ALT SERPL W P-5'-P-CCNC: 14 U/L (ref 10–52)
ANION GAP SERPL CALC-SCNC: 16 MMOL/L (ref 10–20)
APPEARANCE UR: CLEAR
AST SERPL W P-5'-P-CCNC: 12 U/L (ref 9–39)
BACTERIA #/AREA URNS AUTO: ABNORMAL /HPF
BASOPHILS # BLD AUTO: 0.21 X10*3/UL (ref 0–0.1)
BASOPHILS NFR BLD AUTO: 1.4 %
BILIRUB DIRECT SERPL-MCNC: 0.3 MG/DL (ref 0–0.3)
BILIRUB SERPL-MCNC: 0.9 MG/DL (ref 0–1.2)
BILIRUB UR STRIP.AUTO-MCNC: NEGATIVE MG/DL
BUN SERPL-MCNC: 60 MG/DL (ref 6–23)
CALCIUM SERPL-MCNC: 8.9 MG/DL (ref 8.6–10.3)
CHLORIDE SERPL-SCNC: 107 MMOL/L (ref 98–107)
CO2 SERPL-SCNC: 13 MMOL/L (ref 21–32)
COLOR UR: ABNORMAL
CREAT SERPL-MCNC: 2.64 MG/DL (ref 0.5–1.3)
CREAT UR-MCNC: 71.8 MG/DL (ref 20–370)
EGFRCR SERPLBLD CKD-EPI 2021: 28 ML/MIN/1.73M*2
EOSINOPHIL # BLD AUTO: 0.1 X10*3/UL (ref 0–0.7)
EOSINOPHIL NFR BLD AUTO: 0.7 %
ERYTHROCYTE [DISTWIDTH] IN BLOOD BY AUTOMATED COUNT: 17.2 % (ref 11.5–14.5)
GLUCOSE SERPL-MCNC: 169 MG/DL (ref 74–99)
GLUCOSE UR STRIP.AUTO-MCNC: NORMAL MG/DL
HCT VFR BLD AUTO: 33.3 % (ref 41–52)
HGB BLD-MCNC: 10.7 G/DL (ref 13.5–17.5)
HOLD SPECIMEN: NORMAL
IMM GRANULOCYTES # BLD AUTO: 0.43 X10*3/UL (ref 0–0.7)
IMM GRANULOCYTES NFR BLD AUTO: 2.9 % (ref 0–0.9)
KETONES UR STRIP.AUTO-MCNC: NEGATIVE MG/DL
LEUKOCYTE ESTERASE UR QL STRIP.AUTO: ABNORMAL
LYMPHOCYTES # BLD AUTO: 1.43 X10*3/UL (ref 1.2–4.8)
LYMPHOCYTES NFR BLD AUTO: 9.7 %
MAGNESIUM SERPL-MCNC: 1.25 MG/DL (ref 1.6–2.4)
MCH RBC QN AUTO: 30 PG (ref 26–34)
MCHC RBC AUTO-ENTMCNC: 32.1 G/DL (ref 32–36)
MCV RBC AUTO: 93 FL (ref 80–100)
MONOCYTES # BLD AUTO: 0.9 X10*3/UL (ref 0.1–1)
MONOCYTES NFR BLD AUTO: 6.1 %
NEUTROPHILS # BLD AUTO: 11.62 X10*3/UL (ref 1.2–7.7)
NEUTROPHILS NFR BLD AUTO: 79.2 %
NITRITE UR QL STRIP.AUTO: NEGATIVE
NRBC BLD-RTO: 0 /100 WBCS (ref 0–0)
PH UR STRIP.AUTO: 6.5 [PH]
PHOSPHATE SERPL-MCNC: 4.1 MG/DL (ref 2.5–4.9)
PLATELET # BLD AUTO: 339 X10*3/UL (ref 150–450)
POTASSIUM SERPL-SCNC: 5 MMOL/L (ref 3.5–5.3)
PROT SERPL-MCNC: 7.3 G/DL (ref 6.4–8.2)
PROT UR STRIP.AUTO-MCNC: ABNORMAL MG/DL
PROT UR-ACNC: 83 MG/DL (ref 5–25)
PROT/CREAT UR: 1.16 MG/MG CREAT (ref 0–0.17)
RBC # BLD AUTO: 3.57 X10*6/UL (ref 4.5–5.9)
RBC # UR STRIP.AUTO: ABNORMAL MG/DL
RBC #/AREA URNS AUTO: ABNORMAL /HPF
SODIUM SERPL-SCNC: 131 MMOL/L (ref 136–145)
SP GR UR STRIP.AUTO: 1.01
SQUAMOUS #/AREA URNS AUTO: ABNORMAL /HPF
TACROLIMUS BLD-MCNC: 18 NG/ML
UROBILINOGEN UR STRIP.AUTO-MCNC: NORMAL MG/DL
WBC # BLD AUTO: 14.7 X10*3/UL (ref 4.4–11.3)
WBC #/AREA URNS AUTO: ABNORMAL /HPF

## 2025-02-06 PROCEDURE — 84100 ASSAY OF PHOSPHORUS: CPT

## 2025-02-06 PROCEDURE — 83735 ASSAY OF MAGNESIUM: CPT

## 2025-02-06 PROCEDURE — 80053 COMPREHEN METABOLIC PANEL: CPT

## 2025-02-06 PROCEDURE — 82248 BILIRUBIN DIRECT: CPT

## 2025-02-06 PROCEDURE — 85025 COMPLETE CBC W/AUTO DIFF WBC: CPT

## 2025-02-06 PROCEDURE — 84156 ASSAY OF PROTEIN URINE: CPT

## 2025-02-06 PROCEDURE — 87086 URINE CULTURE/COLONY COUNT: CPT

## 2025-02-06 PROCEDURE — 80197 ASSAY OF TACROLIMUS: CPT

## 2025-02-06 PROCEDURE — 82570 ASSAY OF URINE CREATININE: CPT

## 2025-02-06 PROCEDURE — 82977 ASSAY OF GGT: CPT

## 2025-02-06 PROCEDURE — 81001 URINALYSIS AUTO W/SCOPE: CPT

## 2025-02-06 NOTE — PROGRESS NOTES
"Reason for Nutrition Visit:  Pt is a 54 y.o. male referred for food safety education s/p SLK transplant on 1/20/2025.     Transplant Coordinator: Joanne Bryant RN    Past Medical Hx:  Patient Active Problem List   Diagnosis    Hypertension    Liver cirrhosis (Multi)    Pre-kidney transplant, listed    Esophageal varices in alcoholic cirrhosis (Multi)    Hepatic cirrhosis, unspecified hepatic cirrhosis type, unspecified whether ascites present (Multi)    ESRD (end stage renal disease) (Multi)    Steroid-induced hyperglycemia    Kidney replaced by transplant ([facility]CC)    Liver transplant status    Type 2 diabetes mellitus without complication (Multi)      Lab Results   Component Value Date    HGBA1C 6.6 (H) 01/21/2025    HGBA1C 5.3 09/28/2023    HGBA1C 5.2 06/28/2023     02/03/2025    K 4.4 02/03/2025    PHOS 3.5 02/03/2025     (H) 02/03/2025    CO2 18 (L) 02/03/2025    BUN 52 (H) 02/03/2025    CREATININE 2.13 (H) 02/03/2025    CALCIUM 8.5 (L) 02/03/2025    ALBUMIN 3.8 02/03/2025    PROT 6.9 02/03/2025    BILITOT 1.0 02/03/2025    ALKPHOS 161 (H) 02/03/2025    ALT 22 02/03/2025    AST 12 02/03/2025    GLUCOSE 216 (H) 02/03/2025    CHOL 150 01/13/2025    TRIG 157 (H) 01/13/2025    HDL 23.1 01/13/2025     Lab Results   Component Value Date    HGB 10.7 (L) 02/06/2025     Iron Panel + Serum Ferritin Trend:   Recent Labs     09/28/23  1152   IRON 65   TIBC 301   IRONSAT 22*   FERRITIN 26   , Vitamin B12: No results found for: \"[id number]\" , and Vitamin D:   Lab Results   Component Value Date    VITD25 41 01/25/2025      Food History and Nutrition Assessment    Appetite: Poor   Intake: 25-50%  GI Symptoms : None   Swallowing Difficulty: No problems with swallowing  Dentition : own  Allergies: None  Intolerance: None    Sleep duration/quality : 7+ hours and disrupted sleep - waking up often to urinate.   Sleep disorders: none    Food History:  Met with patient and patient's brother, Lenny. Patient's appetite " "is poor, eating \"half as much\" as prior to transplant and is experiencing loss of taste to most foods.  Patient's brother states trying to eat a balanced meal with meat + starch + vegetable, however, portion sizes are small. Meat is ~2-3 oz with only a few bites of starch and vegetables each. Often eats leftovers for lunch. Does not snack throughout day.    24 Hour Diet Recall:  Meal 1 (occasional): Half of an egg cheese sandwich or peanut butter and crackers  AM Snack: None  Meal 2: PBJ or leftover pork tenderloin with brussels sprouts or burrito   PM Snack: None  Meal 3: Taco salad or pork tenderloin (2-3 oz) with [place] sprouts or ham with green beans or bean soup with cornbread and salad or beef stew or meatloaf with mashed potatoes and [place] sprouts   Late Snack: None  Beverages: Water, Mountain dew (1/2 can per day)  ONS: None     Food Preparation: Family - Brother   Cooking Skills/Barriers: None reported  Grocery Shopping: Family - Brother     Supplements: Vitamin D daily    Estimated Energy Needs:    Calorie Needs (30 - 35 kcal/kg IBW): 8966-8928 kcal  Protein Needs (1.0 - 1.3 g/kg IBW): 76-98 g    Weight History:  CBW: 110 kg (242 lbs)   BMI: 34.8 kg/m2  IBW: 75.5 kg    Wt Readings from Last 10 Encounters:   02/07/25 110 kg (242 lb 6.4 oz)   01/30/25 120 kg (265 lb 3.2 oz)   01/30/25 120 kg (265 lb 3.2 oz)   01/28/25 113 kg (249 lb 12.5 oz)   01/09/25 121 kg (267 lb)   12/03/24 124 kg (274 lb 1.6 oz)   12/03/24 124 kg (274 lb 1.6 oz)   11/08/24 125 kg (275 lb 11.2 oz)   11/01/24 120 kg (265 lb)   10/23/24 124 kg (274 lb)     Weight change:  Note significant weight loss of 9.0% in 1 month may be influenced by fluid shifts.  Patient stated weight: Brother states he has noticed wt change but is uncertain of accuracy of scale at home. Has fluctuated between 240-254 lbs since transplant. Brother did state patient was on Lasix but stopped earlier this week.     Nutrition Focused Physical Exam:    NFPE " "conducted 1/23/25 found mild-moderate subcutaneous fat loss and mild-moderate muscle loss.   Performed/Deferred: Performed    Muscle Wasting:  Temporal: Moderate  Shoulder: None  Clavicle: None  Interosseous Muscle: Moderate    Loss of Subcutaneous Fat:  Eyes: Moderate  Perioral: Moderate  Triceps: Mild    Other Physical Findings:  Edema: none    Malnutrition Present: Moderate Malnutrition    Nutrition Diagnosis    Diagnosis Status: Ongoing  Diagnosis: Malnutrition; moderate chronic disease or condition related related to  increased needs for recovery after transplant   as evidence by  moderate loss of muscle mass and mild-moderate loss of subcutaneous fat.    Additional Nutrition-Related Diagnoses: Food and nutrition related knowledge deficit related to lack of or limited prior nutrition related education as evidence by no prior knowledge of need for food safety related recommendations/ kidney liver transplant 18 days ago.     Nutrition Education, Intervention, and Goals    Eat consistent meals and snacks to provide your body with a regular supply of energy and protein. Discussed protein sources including chicken, beef, fish, eggs, and dairy products.   Encourage intake of protein-calorie oral nutrition supplement (Ensure, Boost, etc.) each day to provide additional calories and protein when appetite and intake are low to meet higher energy needs after transplant.     Discussed how medications may cause taste alterations (dysguesia). Tips for managing taste changes were discussed:   Practice good oral hygiene.   Try using herbs, spices, marinades, and sauces to add favor to food.  Add sugar to improve the flavor of salty foods. Add salt to decrease the sweetness of sugary foods  Rinse mouth with baking soda and saltwater rinse (1 quart water mixed with ¾ teaspoon of salt and 1 teaspoon of baking soda) before and after meals to cleanse the taste buds.    Reviewed \"Food Safety: A Need-to-Know Guide for Those at Risk\" " education booklet with patient. The food safety guide highlights include:   Cook meat, poultry and seafood to a safe internal temperature. (Temps found on p. 22 of your Food Safety Booklet.) Do not eat any meat, poultry or seafood that is raw or undercooked. (No pink meats, no sushi, no raw oysters.) Using a meat thermometer for accuracy is recommended.  Do not eat deli meats or hot dogs cold. Reheat them in the microwave until they are steaming hot. This typically takes 30 seconds, depending on the microwave.  Eggs need to have a firm yolk. No runny eggs. No raw cookie dough.  Do not consume any milk or milk products including cheese that is not pasteurized. Pasteurized milk is ok. Hard cheese or soft cheese from pasteurized milk is also ok.  Avoid cross-contamination of foods while cooking by having a cutting board for meats and a separate cutting board for produce.  Do not eat unwashed fruits and vegetables. Do not eat raw sprouts. If you want to eat bean sprouts or alfalfa sprouts for example, they will need to be cooked.   It is not safe for you to eat from buffets.  Eat leftovers within 2 days of being cooked.   Please never consume grapefruit, pomegranate or star fruit as they are not compatible with your medications.  Avoid turmeric in all forms due to the interactions with the immunosuppressives.  If you are a tea drinker...  Plain black tea or de tea are preferred tea types  Matcha or green tea (Limit to 1-2 cups per day)  Most food type flavored tea is fine (apple, lemon, mint, caramel)  Avoid: green tea extract, chamomile, tumeric, Bergamot flavoring (Vern Mendoza, [place]), Chamomile, Lemongrass, Valerian, [place], Rooibos, Andrea, Turmeric (Curcumin), Cordyceps (Cordyceps mushroom), Dandelion, Echinacea (Coneflower, Black Hubbard), Ginseng and Feverfew. Some are meant to boost the immune system, so we avoid due to the risk of causing rejection. Some, like turmeric and andrea, have drug interactions with  immunosuppression.    Educational Handouts: N/A    Nutrition Goals  Nutrition Goals: Maintain stable weight  Oral intake greater than 50%  Meat/Protein Foods: Increase  Eat breakfast consistently  Compliance with food safety guidance    Nutrition Monitoring and Evaluation    Additional Recommendations/Referrals: N/A    Follow up: It was a pleasure speaking with you today, Mr. Ibrahim! Let's schedule a follow-up in 1 month(s) to check in on your progress. I'll have the 's put you on my schedule for March 7th at 10:00 am for a in-person appointment. Take Care!

## 2025-02-07 ENCOUNTER — HOSPITAL ENCOUNTER (OUTPATIENT)
Dept: RADIOLOGY | Facility: HOSPITAL | Age: 55
Discharge: HOME | End: 2025-02-07
Payer: MEDICAID

## 2025-02-07 ENCOUNTER — NUTRITION (OUTPATIENT)
Facility: HOSPITAL | Age: 55
End: 2025-02-07
Payer: MEDICAID

## 2025-02-07 ENCOUNTER — OFFICE VISIT (OUTPATIENT)
Facility: HOSPITAL | Age: 55
End: 2025-02-07
Payer: MEDICAID

## 2025-02-07 ENCOUNTER — TELEPHONE (OUTPATIENT)
Facility: HOSPITAL | Age: 55
End: 2025-02-07

## 2025-02-07 VITALS
HEART RATE: 78 BPM | SYSTOLIC BLOOD PRESSURE: 119 MMHG | OXYGEN SATURATION: 97 % | DIASTOLIC BLOOD PRESSURE: 81 MMHG | WEIGHT: 242.4 LBS | BODY MASS INDEX: 34.78 KG/M2 | TEMPERATURE: 98.3 F

## 2025-02-07 DIAGNOSIS — Z94.0 KIDNEY REPLACED BY TRANSPLANT (HHS-HCC): ICD-10-CM

## 2025-02-07 DIAGNOSIS — E83.42 HYPOMAGNESEMIA: ICD-10-CM

## 2025-02-07 DIAGNOSIS — Z94.4 LIVER REPLACED BY TRANSPLANT (MULTI): ICD-10-CM

## 2025-02-07 DIAGNOSIS — Z94.4 LIVER TRANSPLANT RECIPIENT (MULTI): ICD-10-CM

## 2025-02-07 LAB
BACTERIA UR CULT: NORMAL
GGT SERPL-CCNC: 191 U/L (ref 5–64)

## 2025-02-07 PROCEDURE — 76776 US EXAM K TRANSPL W/DOPPLER: CPT

## 2025-02-07 PROCEDURE — 99214 OFFICE O/P EST MOD 30 MIN: CPT | Performed by: STUDENT IN AN ORGANIZED HEALTH CARE EDUCATION/TRAINING PROGRAM

## 2025-02-07 RX ORDER — TACROLIMUS 1 MG/1
1 CAPSULE ORAL 2 TIMES DAILY
Qty: 60 CAPSULE | Refills: 11 | Status: SHIPPED | OUTPATIENT
Start: 2025-02-07

## 2025-02-07 RX ORDER — PREDNISONE 5 MG/1
10 TABLET ORAL DAILY
Qty: 60 TABLET | Refills: 11 | Status: SHIPPED | OUTPATIENT
Start: 2025-02-07

## 2025-02-07 RX ORDER — CALCIUM CARBONATE 300MG(750)
800 TABLET,CHEWABLE ORAL 2 TIMES DAILY
Qty: 240 TABLET | Refills: 11 | Status: SHIPPED | OUTPATIENT
Start: 2025-02-07

## 2025-02-07 ASSESSMENT — PAIN SCALES - GENERAL: PAINLEVEL_OUTOF10: 0-NO PAIN

## 2025-02-07 NOTE — PATIENT INSTRUCTIONS
Decrease prednisone to 10mg (2 pills) daily  Decrease tacrolimus to 1mg twice daily  Start magnesium oxide 800mg (two pills) twice daily.   3.    Continue labs twice weekly- Monday and Thursday  4.   We will schedule an ultrasound of your kidney  5.   Follow up next week as scheduled.

## 2025-02-07 NOTE — PROGRESS NOTES
TRANSPLANT SURGERY FOLLOW UP    Reason for visit:    Goran Ibrahim underwent transplant surgery,  1/20/2025 (Liver / Kidney), and returns to clinic for follow up evaluation focusing on their current immunosuppression. Specifically, Goran Ibrahim's regiment was evaluated to ensure adequate levels to prevent life threatening or organ function impairing rejection while not increasing risk of adverse effects including malignancy, infection, bone health, or accelerated cardiovascular disease.  I reviewed common side effects including tremor, hair loss, GI toxicity, and agitation.  The patient reported that they were experiencing: none.    The long-term management of maintenance immunosuppression in organ transplant recipients remains complex given differences in clinical characteristics, comorbid conditions, and prior rejection episodes.  These factors were evaluated at this visit and used in formulating this plan of care. Immunosuppression following the transplant is medically necessary to closely monitor and to reduce the risk of post-operative complications distinct from the post operative management of the transplant surgical procedure.     Interval history     Doing well. May not be drinking as much as should.     Patient Active Problem List   Diagnosis    Hypertension    Liver cirrhosis (Multi)    Pre-kidney transplant, listed    Esophageal varices in alcoholic cirrhosis (Multi)    Hepatic cirrhosis, unspecified hepatic cirrhosis type, unspecified whether ascites present (Multi)    ESRD (end stage renal disease) (Multi)    Steroid-induced hyperglycemia    Kidney replaced by transplant ([facility]HS-HCC)    Liver transplant status    Type 2 diabetes mellitus without complication (Multi)       Medications:  Current Outpatient Medications   Medication Instructions    acyclovir (ZOVIRAX) 400 mg, oral, 2 times daily    alcohol swabs pads, medicated 1 each, topical (top), 4 times daily, Use prior to checking glucose or  injecting insulin    amLODIPine (NORVASC) 10 mg, oral, Daily    aspirin 81 mg, oral, Daily    blood sugar diagnostic (Blood Glucose Test) strip 100 strips, miscellaneous, 4 times daily, Use to check glucose 4 times daily, before meals and at bedtime    blood-glucose meter misc 1 each, miscellaneous, 4 times daily, Use to check glucose 4 times daily, before meals and at bedtime    blood-glucose meter,continuous (Dexcom G7 ) misc Use as instructed    blood-glucose sensor (Dexcom G7 Sensor) device 1 each, miscellaneous, 3 times daily before meals, Change sensor every 10 days    carvedilol (COREG) 6.25 mg, oral, 2 times daily, Hold for SBP <110 or HR <55    cholecalciferol (VITAMIN D-3) 50 mcg, oral, Daily    fluconazole (DIFLUCAN) 400 mg, oral, Daily    insulin lispro (HUMALOG KWIKPEN INSULIN) 0-10 Units, subcutaneous, 3 times daily (morning, midday, late afternoon), Inject 6 units of Lispro subcutaneously three times a day before meals in addition to the following sliding scale:   0 unit(s) if Blood glucose is between   2 unit(s) if Blood glucose is between 151-200  4 unit(s) if Blood glucose is between 201-250  6 unit(s) if Blood glucose is between 251-300  8 unit(s) if Blood glucose is between 301-350  10 unit(s) if Blood glucose is between 351-400    If blood glucose is greater than 400 mg/dL, give max insulin per sliding scale AND then contact provider.    insulin lispro 100 unit/mL injection Inject 8 units with breakfast and lunch, and 4 units with dinner, increase each dose by sliding scale, expect up to 30 units/day    lancets 33 gauge misc 1 Lancet, miscellaneous, 3 times daily before meals    lancets misc 1 each, miscellaneous, 4 times daily, Use to check glucose 4 times daily, before meals and at bedtime    Lantus Solostar U-100 Insulin 8 Units, subcutaneous, Every morning, Take in AM with Prednisone    mycophenolate (CELLCEPT) 1,000 mg, oral, Every 12 hours scheduled (0630,1830)     "pantoprazole (PROTONIX) 40 mg, oral, Daily before breakfast, Do not crush, chew, or split.    pen needle, diabetic 31 gauge x 3/16\" needle 1 each, miscellaneous, 3 times daily before meals    pen needle, diabetic 32 gauge x 5/32\" needle 1 each, miscellaneous, 4 times daily, Use to inject insulin 4 times daily    predniSONE (Deltasone) 5 mg tablet Take 4 tablets (20 mg) by mouth once daily. Do not start before January 25, 2025.    sulfamethoxazole-trimethoprim (Bactrim) 400-80 mg tablet 1 tablet, oral, Daily    tacrolimus (PROGRAF) 2 mg, oral, Every 12 hours scheduled (0630,1830)    tamsulosin (FLOMAX) 0.4 mg, oral, Daily    ursodiol (ACTIGALL) 300 mg, oral, 3 times daily       Physical Exam  Vitals:    02/07/25 0917   BP: 119/81   Pulse: 78   Temp: 36.8 °C (98.3 °F)   SpO2: 97%          Physical Exam  Constitutional:       Appearance: He is obese.   Cardiovascular:      Rate and Rhythm: Normal rate.      Pulses: Normal pulses.   Pulmonary:      Effort: Pulmonary effort is normal.      Breath sounds: No stridor.   Abdominal:      General: There is distension.      Palpations: Abdomen is soft. There is no mass.      Hernia: No hernia is present.   Musculoskeletal:         General: Swelling present.   Skin:     General: Skin is warm and dry.      Capillary Refill: Capillary refill takes less than 2 seconds.   Neurological:      General: No focal deficit present.      Mental Status: He is alert.   Psychiatric:         Mood and Affect: Mood normal.       ALLERGY:  No Known Allergies    LABS:  No results found for this or any previous visit (from the past 24 hours).   Lab Results   Component Value Date    ALT 14 02/06/2025    AST 12 02/06/2025     (H) 02/06/2025    ALKPHOS 162 (H) 02/06/2025    BILITOT 0.9 02/06/2025       ASSESSMENT AND PLAN:    Mr. Ibrahim is a 54 y.o. male who underwent  transplant surgery on 1/20/2025 (Liver / Kidney).  DCD, caval piggyback, rCHA to dSMA/celiac stack HA, duct to duct (small to " large with v plasty)     S/p SLK  - Excellent liver function. Continue katerin TID for now  - AKUA - suspect 2/2 high tac. Will get renal US  - Stop lasix    1. Immunosuppression reviewed and adjusted       Tacrolimus: Yes - 1 mg po BID  (drop from 2/2)      Mycophenolate: Yes - 1000 mg po BID      Prednisone: Yes - drop to10 mg daily      Belatacept: N/A     2. Prophylaxis adherence protocol to prevent immunosuppression related infection      Valcyte: Yes - 900 daily      PCP prophylaxis: Transplant Prophylactics: Bactrim    4. Hypertension   /81   Pulse 78   Temp 36.8 °C (98.3 °F) (Temporal)   Wt 110 kg (242 lb 6.4 oz)   SpO2 97%   BMI 34.78 kg/m²       Current antihypertensives were evaluated. No changes        5. Wound/KAITLYNN      [place] ready for removal: No       KAITLYNN:  No    6.  Nutrition       On PPI        Seeing dietician today    7.  Follow up:       Labs  2 times per week       RTC: 1 week(s)    I spent a total of 40 min providing care for this patient including record review, examination, face to face counseling, and documentation.     Priscila Wright MD    Transplant Surgery Attending

## 2025-02-10 ENCOUNTER — HOSPITAL ENCOUNTER (INPATIENT)
Facility: HOSPITAL | Age: 55
LOS: 4 days | Discharge: HOME | End: 2025-02-14
Attending: TRANSPLANT SURGERY | Admitting: TRANSPLANT SURGERY
Payer: MEDICAID

## 2025-02-10 ENCOUNTER — APPOINTMENT (OUTPATIENT)
Dept: CARDIOLOGY | Facility: HOSPITAL | Age: 55
End: 2025-02-10
Payer: MEDICAID

## 2025-02-10 ENCOUNTER — LAB (OUTPATIENT)
Dept: LAB | Facility: HOSPITAL | Age: 55
End: 2025-02-10
Payer: MEDICAID

## 2025-02-10 ENCOUNTER — TELEPHONE (OUTPATIENT)
Dept: TRANSPLANT | Facility: HOSPITAL | Age: 55
End: 2025-02-10

## 2025-02-10 ENCOUNTER — APPOINTMENT (OUTPATIENT)
Dept: RADIOLOGY | Facility: HOSPITAL | Age: 55
End: 2025-02-10
Payer: MEDICAID

## 2025-02-10 DIAGNOSIS — Z94.4 LIVER TRANSPLANT STATUS: ICD-10-CM

## 2025-02-10 DIAGNOSIS — R73.9 STEROID-INDUCED HYPERGLYCEMIA: ICD-10-CM

## 2025-02-10 DIAGNOSIS — Z94.0 KIDNEY REPLACED BY TRANSPLANT (HHS-HCC): ICD-10-CM

## 2025-02-10 DIAGNOSIS — B95.2 BACTEREMIA DUE TO ENTEROCOCCUS: ICD-10-CM

## 2025-02-10 DIAGNOSIS — E11.9 TYPE 2 DIABETES MELLITUS WITHOUT COMPLICATION, UNSPECIFIED WHETHER LONG TERM INSULIN USE (MULTI): ICD-10-CM

## 2025-02-10 DIAGNOSIS — I10 HYPERTENSION, UNSPECIFIED TYPE: ICD-10-CM

## 2025-02-10 DIAGNOSIS — Z94.4 LIVER REPLACED BY TRANSPLANT (MULTI): ICD-10-CM

## 2025-02-10 DIAGNOSIS — T38.0X5A STEROID-INDUCED HYPERGLYCEMIA: ICD-10-CM

## 2025-02-10 DIAGNOSIS — Z94.4 LIVER TRANSPLANT RECIPIENT (MULTI): ICD-10-CM

## 2025-02-10 DIAGNOSIS — Z78.9 IMPAIRED MOBILITY AND ACTIVITIES OF DAILY LIVING: ICD-10-CM

## 2025-02-10 DIAGNOSIS — I15.8 OTHER SECONDARY HYPERTENSION: ICD-10-CM

## 2025-02-10 DIAGNOSIS — R78.81 BACTEREMIA DUE TO ENTEROCOCCUS: ICD-10-CM

## 2025-02-10 DIAGNOSIS — E87.5 HYPERKALEMIA: ICD-10-CM

## 2025-02-10 DIAGNOSIS — Z74.09 IMPAIRED MOBILITY AND ACTIVITIES OF DAILY LIVING: ICD-10-CM

## 2025-02-10 DIAGNOSIS — E87.5 HYPERKALEMIA: Primary | ICD-10-CM

## 2025-02-10 DIAGNOSIS — R79.81 HYPOCARBIA: ICD-10-CM

## 2025-02-10 PROBLEM — Z76.82 PRE-KIDNEY TRANSPLANT, LISTED: Status: RESOLVED | Noted: 2024-06-25 | Resolved: 2025-02-10

## 2025-02-10 LAB
ALBUMIN SERPL BCP-MCNC: 3.8 G/DL (ref 3.4–5)
ALBUMIN SERPL BCP-MCNC: 4 G/DL (ref 3.4–5)
ALBUMIN SERPL BCP-MCNC: 4.2 G/DL (ref 3.4–5)
ALP SERPL-CCNC: 150 U/L (ref 33–120)
ALT SERPL W P-5'-P-CCNC: 9 U/L (ref 10–52)
ANION GAP BLDV CALCULATED.4IONS-SCNC: 17 MMOL/L (ref 10–25)
ANION GAP SERPL CALC-SCNC: 18 MMOL/L
ANION GAP SERPL CALC-SCNC: 19 MMOL/L
ANION GAP SERPL CALC-SCNC: 19 MMOL/L (ref 10–20)
AST SERPL W P-5'-P-CCNC: 10 U/L (ref 9–39)
BASE EXCESS BLDV CALC-SCNC: -17 MMOL/L (ref -2–3)
BASOPHILS # BLD AUTO: 0.17 X10*3/UL (ref 0–0.1)
BASOPHILS NFR BLD AUTO: 1.1 %
BILIRUB DIRECT SERPL-MCNC: 0.4 MG/DL (ref 0–0.3)
BILIRUB SERPL-MCNC: 0.9 MG/DL (ref 0–1.2)
BODY TEMPERATURE: 37 DEGREES CELSIUS
BUN SERPL-MCNC: 75 MG/DL (ref 6–23)
BUN SERPL-MCNC: 78 MG/DL (ref 6–23)
BUN SERPL-MCNC: 81 MG/DL (ref 6–23)
CA-I BLDV-SCNC: 1.4 MMOL/L (ref 1.1–1.33)
CALCIUM SERPL-MCNC: 10.1 MG/DL (ref 8.6–10.6)
CALCIUM SERPL-MCNC: 9.6 MG/DL (ref 8.6–10.3)
CALCIUM SERPL-MCNC: 9.9 MG/DL (ref 8.6–10.6)
CHLORIDE BLDV-SCNC: 105 MMOL/L (ref 98–107)
CHLORIDE SERPL-SCNC: 103 MMOL/L (ref 98–107)
CHLORIDE SERPL-SCNC: 103 MMOL/L (ref 98–107)
CHLORIDE SERPL-SCNC: 105 MMOL/L (ref 98–107)
CO2 SERPL-SCNC: 11 MMOL/L (ref 21–32)
CO2 SERPL-SCNC: 11 MMOL/L (ref 21–32)
CO2 SERPL-SCNC: 14 MMOL/L (ref 21–32)
CREAT SERPL-MCNC: 2.37 MG/DL (ref 0.5–1.3)
CREAT SERPL-MCNC: 2.52 MG/DL (ref 0.5–1.3)
CREAT SERPL-MCNC: 2.61 MG/DL (ref 0.5–1.3)
EGFRCR SERPLBLD CKD-EPI 2021: 28 ML/MIN/1.73M*2
EGFRCR SERPLBLD CKD-EPI 2021: 29 ML/MIN/1.73M*2
EGFRCR SERPLBLD CKD-EPI 2021: 31 ML/MIN/1.73M*2
EOSINOPHIL # BLD AUTO: 0.1 X10*3/UL (ref 0–0.7)
EOSINOPHIL NFR BLD AUTO: 0.7 %
ERYTHROCYTE [DISTWIDTH] IN BLOOD BY AUTOMATED COUNT: 17.2 % (ref 11.5–14.5)
ERYTHROCYTE [DISTWIDTH] IN BLOOD BY AUTOMATED COUNT: 17.9 % (ref 11.5–14.5)
GGT SERPL-CCNC: 139 U/L (ref 5–64)
GLUCOSE BLD MANUAL STRIP-MCNC: 206 MG/DL (ref 74–99)
GLUCOSE BLDV-MCNC: 227 MG/DL (ref 74–99)
GLUCOSE SERPL-MCNC: 143 MG/DL (ref 74–99)
GLUCOSE SERPL-MCNC: 178 MG/DL (ref 74–99)
GLUCOSE SERPL-MCNC: 203 MG/DL (ref 74–99)
HCO3 BLDV-SCNC: 9.5 MMOL/L (ref 22–26)
HCT VFR BLD AUTO: 33 % (ref 41–52)
HCT VFR BLD AUTO: 34.5 % (ref 41–52)
HCT VFR BLD EST: 41 % (ref 41–52)
HGB BLD-MCNC: 10.9 G/DL (ref 13.5–17.5)
HGB BLD-MCNC: 11 G/DL (ref 13.5–17.5)
HGB BLDV-MCNC: 13.5 G/DL (ref 13.5–17.5)
IMM GRANULOCYTES # BLD AUTO: 0.21 X10*3/UL (ref 0–0.7)
IMM GRANULOCYTES NFR BLD AUTO: 1.4 % (ref 0–0.9)
INHALED O2 CONCENTRATION: 21 %
INR PPP: 1.3 (ref 0.9–1.1)
LACTATE BLDV-SCNC: 1 MMOL/L (ref 0.4–2)
LYMPHOCYTES # BLD AUTO: 1.09 X10*3/UL (ref 1.2–4.8)
LYMPHOCYTES NFR BLD AUTO: 7.2 %
MAGNESIUM SERPL-MCNC: 1.4 MG/DL (ref 1.6–2.4)
MAGNESIUM SERPL-MCNC: 1.61 MG/DL (ref 1.6–2.4)
MCH RBC QN AUTO: 29.6 PG (ref 26–34)
MCH RBC QN AUTO: 29.9 PG (ref 26–34)
MCHC RBC AUTO-ENTMCNC: 31.9 G/DL (ref 32–36)
MCHC RBC AUTO-ENTMCNC: 33 G/DL (ref 32–36)
MCV RBC AUTO: 90 FL (ref 80–100)
MCV RBC AUTO: 93 FL (ref 80–100)
MONOCYTES # BLD AUTO: 1.06 X10*3/UL (ref 0.1–1)
MONOCYTES NFR BLD AUTO: 7 %
NEUTROPHILS # BLD AUTO: 12.48 X10*3/UL (ref 1.2–7.7)
NEUTROPHILS NFR BLD AUTO: 82.6 %
NRBC BLD-RTO: 0 /100 WBCS (ref 0–0)
NRBC BLD-RTO: 0 /100 WBCS (ref 0–0)
OXYHGB MFR BLDV: 93.6 % (ref 45–75)
PCO2 BLDV: 25 MM HG (ref 41–51)
PH BLDV: 7.19 PH (ref 7.33–7.43)
PHOSPHATE SERPL-MCNC: 4.3 MG/DL (ref 2.5–4.9)
PHOSPHATE SERPL-MCNC: 4.5 MG/DL (ref 2.5–4.9)
PHOSPHATE SERPL-MCNC: 4.6 MG/DL (ref 2.5–4.9)
PLATELET # BLD AUTO: 282 X10*3/UL (ref 150–450)
PLATELET # BLD AUTO: 297 X10*3/UL (ref 150–450)
PO2 BLDV: 76 MM HG (ref 35–45)
POTASSIUM BLDV-SCNC: 6.6 MMOL/L (ref 3.5–5.3)
POTASSIUM SERPL-SCNC: 5.4 MMOL/L (ref 3.5–5.3)
POTASSIUM SERPL-SCNC: 6.1 MMOL/L (ref 3.5–5.3)
POTASSIUM SERPL-SCNC: 6.3 MMOL/L (ref 3.5–5.3)
PROT SERPL-MCNC: 7.7 G/DL (ref 6.4–8.2)
PROTHROMBIN TIME: 14.6 SECONDS (ref 9.8–12.8)
RBC # BLD AUTO: 3.65 X10*6/UL (ref 4.5–5.9)
RBC # BLD AUTO: 3.71 X10*6/UL (ref 4.5–5.9)
SAO2 % BLDV: 96 % (ref 45–75)
SODIUM BLDV-SCNC: 125 MMOL/L (ref 136–145)
SODIUM SERPL-SCNC: 127 MMOL/L (ref 136–145)
SODIUM SERPL-SCNC: 128 MMOL/L (ref 136–145)
SODIUM SERPL-SCNC: 131 MMOL/L (ref 136–145)
TACROLIMUS BLD-MCNC: 13.7 NG/ML
WBC # BLD AUTO: 15.1 X10*3/UL (ref 4.4–11.3)
WBC # BLD AUTO: 16.8 X10*3/UL (ref 4.4–11.3)

## 2025-02-10 PROCEDURE — 2500000004 HC RX 250 GENERAL PHARMACY W/ HCPCS (ALT 636 FOR OP/ED): Mod: SE

## 2025-02-10 PROCEDURE — 83605 ASSAY OF LACTIC ACID: CPT

## 2025-02-10 PROCEDURE — 2500000001 HC RX 250 WO HCPCS SELF ADMINISTERED DRUGS (ALT 637 FOR MEDICARE OP): Mod: SE

## 2025-02-10 PROCEDURE — 80053 COMPREHEN METABOLIC PANEL: CPT

## 2025-02-10 PROCEDURE — 85610 PROTHROMBIN TIME: CPT | Performed by: INTERNAL MEDICINE

## 2025-02-10 PROCEDURE — 2500000004 HC RX 250 GENERAL PHARMACY W/ HCPCS (ALT 636 FOR OP/ED): Mod: SE | Performed by: INTERNAL MEDICINE

## 2025-02-10 PROCEDURE — 99222 1ST HOSP IP/OBS MODERATE 55: CPT | Performed by: TRANSPLANT SURGERY

## 2025-02-10 PROCEDURE — 36415 COLL VENOUS BLD VENIPUNCTURE: CPT

## 2025-02-10 PROCEDURE — 93005 ELECTROCARDIOGRAM TRACING: CPT

## 2025-02-10 PROCEDURE — 2020000001 HC ICU ROOM DAILY

## 2025-02-10 PROCEDURE — 87799 DETECT AGENT NOS DNA QUANT: CPT

## 2025-02-10 PROCEDURE — 2500000005 HC RX 250 GENERAL PHARMACY W/O HCPCS: Mod: SE

## 2025-02-10 PROCEDURE — 83735 ASSAY OF MAGNESIUM: CPT

## 2025-02-10 PROCEDURE — 84132 ASSAY OF SERUM POTASSIUM: CPT

## 2025-02-10 PROCEDURE — 71045 X-RAY EXAM CHEST 1 VIEW: CPT

## 2025-02-10 PROCEDURE — 82977 ASSAY OF GGT: CPT

## 2025-02-10 PROCEDURE — 85027 COMPLETE CBC AUTOMATED: CPT

## 2025-02-10 PROCEDURE — 82040 ASSAY OF SERUM ALBUMIN: CPT

## 2025-02-10 PROCEDURE — 82248 BILIRUBIN DIRECT: CPT

## 2025-02-10 PROCEDURE — 82947 ASSAY GLUCOSE BLOOD QUANT: CPT

## 2025-02-10 PROCEDURE — 2500000002 HC RX 250 W HCPCS SELF ADMINISTERED DRUGS (ALT 637 FOR MEDICARE OP, ALT 636 FOR OP/ED): Mod: SE

## 2025-02-10 PROCEDURE — 80197 ASSAY OF TACROLIMUS: CPT

## 2025-02-10 PROCEDURE — 87040 BLOOD CULTURE FOR BACTERIA: CPT

## 2025-02-10 PROCEDURE — 84100 ASSAY OF PHOSPHORUS: CPT

## 2025-02-10 PROCEDURE — 84132 ASSAY OF SERUM POTASSIUM: CPT | Performed by: INTERNAL MEDICINE

## 2025-02-10 PROCEDURE — 87077 CULTURE AEROBIC IDENTIFY: CPT

## 2025-02-10 PROCEDURE — 85025 COMPLETE CBC W/AUTO DIFF WBC: CPT

## 2025-02-10 RX ORDER — PREDNISONE 10 MG/1
10 TABLET ORAL DAILY
Status: DISCONTINUED | OUTPATIENT
Start: 2025-02-11 | End: 2025-02-14 | Stop reason: HOSPADM

## 2025-02-10 RX ORDER — ASPIRIN 81 MG/1
81 TABLET ORAL DAILY
Status: DISCONTINUED | OUTPATIENT
Start: 2025-02-10 | End: 2025-02-14 | Stop reason: HOSPADM

## 2025-02-10 RX ORDER — TAMSULOSIN HYDROCHLORIDE 0.4 MG/1
0.4 CAPSULE ORAL DAILY
Status: DISCONTINUED | OUTPATIENT
Start: 2025-02-10 | End: 2025-02-14 | Stop reason: HOSPADM

## 2025-02-10 RX ORDER — CALCIUM GLUCONATE 20 MG/ML
2 INJECTION, SOLUTION INTRAVENOUS ONCE
Status: COMPLETED | OUTPATIENT
Start: 2025-02-10 | End: 2025-02-10

## 2025-02-10 RX ORDER — DEXTROSE MONOHYDRATE 100 MG/ML
50 INJECTION, SOLUTION INTRAVENOUS CONTINUOUS
Status: DISCONTINUED | OUTPATIENT
Start: 2025-02-10 | End: 2025-02-11

## 2025-02-10 RX ORDER — POLYETHYLENE GLYCOL 3350 17 G/17G
17 POWDER, FOR SOLUTION ORAL DAILY
Status: DISCONTINUED | OUTPATIENT
Start: 2025-02-10 | End: 2025-02-14 | Stop reason: HOSPADM

## 2025-02-10 RX ORDER — INSULIN GLARGINE 100 [IU]/ML
8 INJECTION, SOLUTION SUBCUTANEOUS DAILY
Status: DISCONTINUED | OUTPATIENT
Start: 2025-02-11 | End: 2025-02-11

## 2025-02-10 RX ORDER — INSULIN LISPRO 100 [IU]/ML
0-10 INJECTION, SOLUTION INTRAVENOUS; SUBCUTANEOUS
Status: DISCONTINUED | OUTPATIENT
Start: 2025-02-11 | End: 2025-02-14 | Stop reason: HOSPADM

## 2025-02-10 RX ORDER — ACYCLOVIR 400 MG/1
400 TABLET ORAL 2 TIMES DAILY
Status: DISCONTINUED | OUTPATIENT
Start: 2025-02-10 | End: 2025-02-14 | Stop reason: HOSPADM

## 2025-02-10 RX ORDER — DEXTROSE 50 % IN WATER (D50W) INTRAVENOUS SYRINGE
25 ONCE
Status: COMPLETED | OUTPATIENT
Start: 2025-02-10 | End: 2025-02-10

## 2025-02-10 RX ORDER — INSULIN LISPRO 100 [IU]/ML
4 INJECTION, SOLUTION INTRAVENOUS; SUBCUTANEOUS
Status: DISCONTINUED | OUTPATIENT
Start: 2025-02-10 | End: 2025-02-14 | Stop reason: HOSPADM

## 2025-02-10 RX ORDER — TACROLIMUS 1 MG/1
1 CAPSULE ORAL
Status: DISCONTINUED | OUTPATIENT
Start: 2025-02-10 | End: 2025-02-10

## 2025-02-10 RX ORDER — PANTOPRAZOLE SODIUM 40 MG/1
40 TABLET, DELAYED RELEASE ORAL
Status: DISCONTINUED | OUTPATIENT
Start: 2025-02-11 | End: 2025-02-14 | Stop reason: HOSPADM

## 2025-02-10 RX ORDER — FUROSEMIDE 10 MG/ML
100 INJECTION INTRAMUSCULAR; INTRAVENOUS ONCE
Status: COMPLETED | OUTPATIENT
Start: 2025-02-10 | End: 2025-02-10

## 2025-02-10 RX ORDER — DEXTROSE 50 % IN WATER (D50W) INTRAVENOUS SYRINGE
25
Status: DISCONTINUED | OUTPATIENT
Start: 2025-02-10 | End: 2025-02-14 | Stop reason: HOSPADM

## 2025-02-10 RX ORDER — URSODIOL 300 MG/1
300 CAPSULE ORAL 3 TIMES DAILY
Status: DISCONTINUED | OUTPATIENT
Start: 2025-02-10 | End: 2025-02-14 | Stop reason: HOSPADM

## 2025-02-10 RX ORDER — SULFAMETHOXAZOLE AND TRIMETHOPRIM 400; 80 MG/1; MG/1
1 TABLET ORAL DAILY
Status: DISCONTINUED | OUTPATIENT
Start: 2025-02-10 | End: 2025-02-10

## 2025-02-10 RX ORDER — INSULIN LISPRO 100 [IU]/ML
8 INJECTION, SOLUTION INTRAVENOUS; SUBCUTANEOUS
Status: DISCONTINUED | OUTPATIENT
Start: 2025-02-10 | End: 2025-02-14 | Stop reason: HOSPADM

## 2025-02-10 RX ORDER — SODIUM BICARBONATE 1 MEQ/ML
100 SYRINGE (ML) INTRAVENOUS ONCE
Status: COMPLETED | OUTPATIENT
Start: 2025-02-10 | End: 2025-02-10

## 2025-02-10 RX ORDER — HEPARIN SODIUM 5000 [USP'U]/ML
5000 INJECTION, SOLUTION INTRAVENOUS; SUBCUTANEOUS EVERY 8 HOURS
Status: DISCONTINUED | OUTPATIENT
Start: 2025-02-10 | End: 2025-02-14 | Stop reason: HOSPADM

## 2025-02-10 RX ORDER — DEXTROSE 50 % IN WATER (D50W) INTRAVENOUS SYRINGE
12.5
Status: DISCONTINUED | OUTPATIENT
Start: 2025-02-10 | End: 2025-02-14 | Stop reason: HOSPADM

## 2025-02-10 RX ORDER — AMLODIPINE BESYLATE 10 MG/1
10 TABLET ORAL DAILY
Status: DISCONTINUED | OUTPATIENT
Start: 2025-02-10 | End: 2025-02-13

## 2025-02-10 RX ORDER — CARVEDILOL 6.25 MG/1
6.25 TABLET ORAL 2 TIMES DAILY
Status: DISCONTINUED | OUTPATIENT
Start: 2025-02-10 | End: 2025-02-14 | Stop reason: HOSPADM

## 2025-02-10 RX ORDER — FLUCONAZOLE 200 MG/1
400 TABLET ORAL DAILY
Status: DISCONTINUED | OUTPATIENT
Start: 2025-02-10 | End: 2025-02-11

## 2025-02-10 RX ORDER — MYCOPHENOLATE MOFETIL 250 MG/1
1000 CAPSULE ORAL
Status: DISCONTINUED | OUTPATIENT
Start: 2025-02-10 | End: 2025-02-12

## 2025-02-10 RX ORDER — LANOLIN ALCOHOL/MO/W.PET/CERES
800 CREAM (GRAM) TOPICAL 2 TIMES DAILY
Status: DISCONTINUED | OUTPATIENT
Start: 2025-02-10 | End: 2025-02-14 | Stop reason: HOSPADM

## 2025-02-10 RX ORDER — CHOLECALCIFEROL (VITAMIN D3) 25 MCG
2000 TABLET ORAL DAILY
Status: DISCONTINUED | OUTPATIENT
Start: 2025-02-10 | End: 2025-02-14 | Stop reason: HOSPADM

## 2025-02-10 RX ADMIN — DEXTROSE MONOHYDRATE 50 ML/HR: 100 INJECTION, SOLUTION INTRAVENOUS at 23:21

## 2025-02-10 RX ADMIN — MAGNESIUM OXIDE TAB 400 MG (241.3 MG ELEMENTAL MG) 800 MG: 400 (241.3 MG) TAB at 21:24

## 2025-02-10 RX ADMIN — SODIUM BICARBONATE 100 MEQ: 84 INJECTION INTRAVENOUS at 21:32

## 2025-02-10 RX ADMIN — CALCIUM GLUCONATE 2 G: 20 INJECTION, SOLUTION INTRAVENOUS at 21:52

## 2025-02-10 RX ADMIN — CARVEDILOL 6.25 MG: 6.25 TABLET, FILM COATED ORAL at 21:24

## 2025-02-10 RX ADMIN — URSODIOL 300 MG: 300 CAPSULE ORAL at 21:24

## 2025-02-10 RX ADMIN — INSULIN HUMAN 10 UNITS: 100 INJECTION, SOLUTION PARENTERAL at 21:44

## 2025-02-10 RX ADMIN — SODIUM ZIRCONIUM CYCLOSILICATE 10 G: 10 POWDER, FOR SUSPENSION ORAL at 21:24

## 2025-02-10 RX ADMIN — ACYCLOVIR 400 MG: 400 TABLET ORAL at 21:24

## 2025-02-10 RX ADMIN — DEXTROSE MONOHYDRATE 25 G: 25 INJECTION, SOLUTION INTRAVENOUS at 21:44

## 2025-02-10 RX ADMIN — FUROSEMIDE 100 MG: 10 INJECTION, SOLUTION INTRAMUSCULAR; INTRAVENOUS at 22:56

## 2025-02-10 SDOH — ECONOMIC STABILITY: FOOD INSECURITY: HOW HARD IS IT FOR YOU TO PAY FOR THE VERY BASICS LIKE FOOD, HOUSING, MEDICAL CARE, AND HEATING?: SOMEWHAT HARD

## 2025-02-10 SDOH — ECONOMIC STABILITY: HOUSING INSECURITY: AT ANY TIME IN THE PAST 12 MONTHS, WERE YOU HOMELESS OR LIVING IN A SHELTER (INCLUDING NOW)?: NO

## 2025-02-10 SDOH — ECONOMIC STABILITY: HOUSING INSECURITY: IN THE LAST 12 MONTHS, WAS THERE A TIME WHEN YOU WERE NOT ABLE TO PAY THE MORTGAGE OR RENT ON TIME?: NO

## 2025-02-10 SDOH — SOCIAL STABILITY: SOCIAL INSECURITY: DOES ANYONE TRY TO KEEP YOU FROM HAVING/CONTACTING OTHER FRIENDS OR DOING THINGS OUTSIDE YOUR HOME?: NO

## 2025-02-10 SDOH — SOCIAL STABILITY: SOCIAL INSECURITY: ARE YOU OR HAVE YOU BEEN THREATENED OR ABUSED PHYSICALLY, EMOTIONALLY, OR SEXUALLY BY ANYONE?: NO

## 2025-02-10 SDOH — SOCIAL STABILITY: SOCIAL INSECURITY: WITHIN THE LAST YEAR, HAVE YOU BEEN AFRAID OF YOUR PARTNER OR EX-PARTNER?: NO

## 2025-02-10 SDOH — ECONOMIC STABILITY: FOOD INSECURITY: WITHIN THE PAST 12 MONTHS, THE FOOD YOU BOUGHT JUST DIDN'T LAST AND YOU DIDN'T HAVE MONEY TO GET MORE.: NEVER TRUE

## 2025-02-10 SDOH — ECONOMIC STABILITY: TRANSPORTATION INSECURITY: IN THE PAST 12 MONTHS, HAS LACK OF TRANSPORTATION KEPT YOU FROM MEDICAL APPOINTMENTS OR FROM GETTING MEDICATIONS?: NO

## 2025-02-10 SDOH — ECONOMIC STABILITY: FOOD INSECURITY: WITHIN THE PAST 12 MONTHS, YOU WORRIED THAT YOUR FOOD WOULD RUN OUT BEFORE YOU GOT THE MONEY TO BUY MORE.: NEVER TRUE

## 2025-02-10 SDOH — ECONOMIC STABILITY: INCOME INSECURITY: IN THE PAST 12 MONTHS HAS THE ELECTRIC, GAS, OIL, OR WATER COMPANY THREATENED TO SHUT OFF SERVICES IN YOUR HOME?: NO

## 2025-02-10 SDOH — ECONOMIC STABILITY: HOUSING INSECURITY: IN THE PAST 12 MONTHS, HOW MANY TIMES HAVE YOU MOVED WHERE YOU WERE LIVING?: 1

## 2025-02-10 SDOH — SOCIAL STABILITY: SOCIAL INSECURITY: ARE THERE ANY APPARENT SIGNS OF INJURIES/BEHAVIORS THAT COULD BE RELATED TO ABUSE/NEGLECT?: NO

## 2025-02-10 SDOH — SOCIAL STABILITY: SOCIAL INSECURITY: HAVE YOU HAD ANY THOUGHTS OF HARMING ANYONE ELSE?: NO

## 2025-02-10 SDOH — SOCIAL STABILITY: SOCIAL INSECURITY: HAS ANYONE EVER THREATENED TO HURT YOUR FAMILY OR YOUR PETS?: NO

## 2025-02-10 SDOH — SOCIAL STABILITY: SOCIAL INSECURITY: HAVE YOU HAD THOUGHTS OF HARMING ANYONE ELSE?: NO

## 2025-02-10 SDOH — SOCIAL STABILITY: SOCIAL INSECURITY: WERE YOU ABLE TO COMPLETE ALL THE BEHAVIORAL HEALTH SCREENINGS?: YES

## 2025-02-10 SDOH — SOCIAL STABILITY: SOCIAL INSECURITY: DO YOU FEEL UNSAFE GOING BACK TO THE PLACE WHERE YOU ARE LIVING?: NO

## 2025-02-10 SDOH — SOCIAL STABILITY: SOCIAL INSECURITY: WITHIN THE LAST YEAR, HAVE YOU BEEN HUMILIATED OR EMOTIONALLY ABUSED IN OTHER WAYS BY YOUR PARTNER OR EX-PARTNER?: NO

## 2025-02-10 SDOH — SOCIAL STABILITY: SOCIAL INSECURITY: ABUSE: ADULT

## 2025-02-10 SDOH — SOCIAL STABILITY: SOCIAL INSECURITY: DO YOU FEEL ANYONE HAS EXPLOITED OR TAKEN ADVANTAGE OF YOU FINANCIALLY OR OF YOUR PERSONAL PROPERTY?: NO

## 2025-02-10 ASSESSMENT — ACTIVITIES OF DAILY LIVING (ADL)
DRESSING YOURSELF: NEEDS ASSISTANCE
LACK_OF_TRANSPORTATION: NO
HEARING - RIGHT EAR: FUNCTIONAL
TOILETING: INDEPENDENT
JUDGMENT_ADEQUATE_SAFELY_COMPLETE_DAILY_ACTIVITIES: YES
BATHING: INDEPENDENT
FEEDING YOURSELF: INDEPENDENT
HEARING - LEFT EAR: FUNCTIONAL
WALKS IN HOME: NEEDS ASSISTANCE
GROOMING: NEEDS ASSISTANCE
ADEQUATE_TO_COMPLETE_ADL: YES
LACK_OF_TRANSPORTATION: NO
ASSISTIVE_DEVICE: WALKER
PATIENT'S MEMORY ADEQUATE TO SAFELY COMPLETE DAILY ACTIVITIES?: YES

## 2025-02-10 ASSESSMENT — LIFESTYLE VARIABLES
SKIP TO QUESTIONS 9-10: 1
AUDIT-C TOTAL SCORE: 0
HOW OFTEN DO YOU HAVE 6 OR MORE DRINKS ON ONE OCCASION: NEVER
AUDIT-C TOTAL SCORE: 0
HOW MANY STANDARD DRINKS CONTAINING ALCOHOL DO YOU HAVE ON A TYPICAL DAY: PATIENT DOES NOT DRINK
HOW OFTEN DO YOU HAVE A DRINK CONTAINING ALCOHOL: NEVER

## 2025-02-10 ASSESSMENT — COGNITIVE AND FUNCTIONAL STATUS - GENERAL
DAILY ACTIVITIY SCORE: 20
DRESSING REGULAR UPPER BODY CLOTHING: A LITTLE
WALKING IN HOSPITAL ROOM: A LITTLE
HELP NEEDED FOR BATHING: A LITTLE
TOILETING: A LITTLE
MOVING TO AND FROM BED TO CHAIR: A LITTLE
CLIMB 3 TO 5 STEPS WITH RAILING: A LOT
TURNING FROM BACK TO SIDE WHILE IN FLAT BAD: A LITTLE
PATIENT BASELINE BEDBOUND: NO
MOBILITY SCORE: 18
STANDING UP FROM CHAIR USING ARMS: A LITTLE
DRESSING REGULAR LOWER BODY CLOTHING: A LITTLE

## 2025-02-10 ASSESSMENT — COLUMBIA-SUICIDE SEVERITY RATING SCALE - C-SSRS
6. HAVE YOU EVER DONE ANYTHING, STARTED TO DO ANYTHING, OR PREPARED TO DO ANYTHING TO END YOUR LIFE?: NO
1. IN THE PAST MONTH, HAVE YOU WISHED YOU WERE DEAD OR WISHED YOU COULD GO TO SLEEP AND NOT WAKE UP?: NO
2. HAVE YOU ACTUALLY HAD ANY THOUGHTS OF KILLING YOURSELF?: NO

## 2025-02-10 ASSESSMENT — PAIN SCALES - GENERAL: PAINLEVEL_OUTOF10: 0 - NO PAIN

## 2025-02-10 NOTE — TELEPHONE ENCOUNTER
Left message for Goran.  Labs reviewed by Dr. Wright, critical high potassium.  To admit to hospital.  WBC also up.  Left voicemail for Goran explaining reasoning for coming to the hospital and to expect a call from admitting.  Also told him to bring in his pill box to make sure it is correct.

## 2025-02-10 NOTE — H&P
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 Wright/Dr. Trey Kline, also has HTN and T2DM who presents as a direct admit for hyperkalemia. Patient presented after interval labs demonstrated a potassium of 6.1 w/out evidence of hemolysis. 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 He has been making 1800-2000cc of urine daily per patient's brother. Endorses some mild RLQ pain. Per patient's brother, he has had a poor appetite and low energy. Denies recent sick contacts, fevers, chills, HA, vision changes, dizziness, CP, palpitations, SOB, N/V, constipation, diarrhea.    Kidney Info:  Etiology: DCD  CMV: Negative/Negative  EBV: Positive/Positive  HCV Ab/HEATH: Negative/Negative  HBcAb: Negative/Negative  ABsAg/HBV HEATH: Negative    Recent imaging includes:    US kidney transplant 2/7/25 --> unremarkable except for 0.3cm stone in superior pole of transplant kidney    US liver transplant 1/21/25 --> Increased portal vein velocity from imaging 1/20 measuring up to 126.5 w/out obvious evidence of stenosis. Improvement in hepatic artery waveforms.    ROS:  A 12-point ROS was performed and was unremarkable except as above.    PMH:  Past Medical History:   Diagnosis Date    Cirrhosis (Multi)     CKD (chronic kidney disease)     Hypertension      PSH:  Past Surgical History:   Procedure Laterality Date    IR ANGIOGRAM CELIAC  5/19/2023    IR ANGIOGRAM CELIAC 5/19/2023    US GUIDED ABDOMINAL PARACENTESIS  6/22/2023    US GUIDED ABDOMINAL PARACENTESIS 6/22/2023    US GUIDED ABDOMINAL PARACENTESIS  5/19/2023    US GUIDED ABDOMINAL PARACENTESIS 5/19/2023     Soc Hx:  Social History     Socioeconomic History    Marital status: Single     Spouse name:  Not on file    Number of children: Not on file    Years of education: Not on file    Highest education level: Not on file   Occupational History    Not on file   Tobacco Use    Smoking status: Former     Types: Cigarettes    Smokeless tobacco: Never   Vaping Use    Vaping status: Never Used   Substance and Sexual Activity    Alcohol use: Not Currently     Comment: 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    Drug use: Never    Sexual activity: Yes     Partners: Female   Other Topics Concern    Not on file   Social History Narrative    Not on file     Social Drivers of Health     Financial Resource Strain: Low Risk  (1/23/2025)    Overall Financial Resource Strain (CARDIA)     Difficulty of Paying Living Expenses: Not hard at all   Food Insecurity: No Food Insecurity (1/20/2025)    Hunger Vital Sign     Worried About Running Out of Food in the Last Year: Never true     Ran Out of Food in the Last Year: Never true   Transportation Needs: No Transportation Needs (1/23/2025)    PRAPARE - Transportation     Lack of Transportation (Medical): No     Lack of Transportation (Non-Medical): No   Physical Activity: Not on file   Stress: Not on file   Social Connections: Not on file   Intimate Partner Violence: Not At Risk (1/20/2025)    Humiliation, Afraid, Rape, and Kick questionnaire     Fear of Current or Ex-Partner: No     Emotionally Abused: No     Physically Abused: No     Sexually Abused: No   Housing Stability: Low Risk  (1/23/2025)    Housing Stability Vital Sign     Unable to Pay for Housing in the Last Year: No     Number of Times Moved in the Last Year: 0     Homeless in the Last Year: No     Fam Hx:  No family history on file.   Allergies:  No Known Allergies  Current Medications:  No current facility-administered medications on file prior to encounter.     Current Outpatient Medications on File Prior to Encounter   Medication Sig Dispense Refill    acyclovir (Zovirax) 400 mg tablet Take 1 tablet (400 mg) by mouth 2  times a day. 60 tablet 0    alcohol swabs pads, medicated Apply 1 each topically 4 times a day. Use prior to checking glucose or injecting insulin 400 each 3    amLODIPine (Norvasc) 10 mg tablet Take 1 tablet (10 mg) by mouth once daily. Do not fill before 2025. 30 tablet 11    aspirin 81 mg EC tablet Take 1 tablet (81 mg) by mouth once daily. 30 tablet 0    blood sugar diagnostic (Blood Glucose Test) strip 100 strips 4 times a day. Use to check glucose 4 times daily, before meals and at bedtime 400 strip 3    blood-glucose meter misc 1 each 4 times a day. Use to check glucose 4 times daily, before meals and at bedtime 1 each 0    blood-glucose meter,continuous (Dexcom G7 ) misc Use as instructed 1 each 0    blood-glucose sensor (Dexcom G7 Sensor) device 1 each 3 times a day before meals. Change sensor every 10 days 3 each 11    carvedilol (Coreg) 6.25 mg tablet Take 1 tablet (6.25 mg) by mouth 2 times a day. Hold for SBP <110 or HR <55      cholecalciferol (Vitamin D-3) 50 MCG (2000 UT) tablet Take 1 tablet (50 mcg) by mouth once daily. 30 tablet 11    [] docusate sodium (Colace) 100 mg capsule Take 1 capsule (100 mg) by mouth 2 times a day as needed for constipation for up to 10 days. 20 capsule 0    fluconazole (Diflucan) 200 mg tablet Take 2 tablets (400 mg) by mouth once daily. 60 tablet 0    insulin glargine (Basaglar KwikPen U-100 Insulin) 100 unit/mL (3 mL) pen Inject 8 Units under the skin once daily in the morning. Take in AM with Prednisone 15 mL 0    insulin lispro (HumaLOG KwikPen Insulin) 100 unit/mL injection Inject 0-10 Units under the skin 3 times daily (morning, midday, late afternoon). Inject 6 units of Lispro subcutaneously three times a day before meals in addition to the following sliding scale:   0 unit(s) if Blood glucose is between   2 unit(s) if Blood glucose is between 151-200  4 unit(s) if Blood glucose is between 201-250  6 unit(s) if Blood glucose is  "between 251-300  8 unit(s) if Blood glucose is between 301-350  10 unit(s) if Blood glucose is between 351-400    If blood glucose is greater than 400 mg/dL, give max insulin per sliding scale AND then contact provider. 9 mL 0    insulin lispro 100 unit/mL injection Inject 8 units with breakfast and lunch, and 4 units with dinner, increase each dose by sliding scale, expect up to 30 units/day      lancets 33 gauge misc 1 Lancet 3 times a day before meals. 100 each 0    lancets misc 1 each 4 times a day. Use to check glucose 4 times daily, before meals and at bedtime 400 each 3    magnesium oxide (Mag-Ox) 400 mg tablet Take 2 tablets (800 mg) by mouth 2 times a day. 240 tablet 11    mycophenolate (Cellcept) 250 mg capsule Take 4 capsules (1,000 mg) by mouth every 12 hours. 240 capsule 0    pantoprazole (ProtoNix) 40 mg EC tablet Take 1 tablet (40 mg) by mouth once daily in the morning. Take before meals. Do not crush, chew, or split. 30 tablet 0    pen needle, diabetic 31 gauge x 3/16\" needle 1 each 3 times a day before meals. 100 each 0    pen needle, diabetic 32 gauge x 5/32\" needle 1 each 4 times a day. Use to inject insulin 4 times daily 400 each 3    predniSONE (Deltasone) 5 mg tablet Take 2 tablets (10 mg) by mouth once daily. 60 tablet 11    sulfamethoxazole-trimethoprim (Bactrim) 400-80 mg tablet Take 1 tablet by mouth once daily. 30 tablet 0    tacrolimus (Prograf) 1 mg capsule Take 1 capsule (1 mg) by mouth 2 times a day. 60 capsule 11    tamsulosin (Flomax) 0.4 mg 24 hr capsule Take 1 capsule (0.4 mg) by mouth once daily. 30 capsule 0    ursodiol (Actigall) 300 mg capsule Take 1 capsule (300 mg) by mouth 3 times a day. 90 capsule 0    [DISCONTINUED] furosemide (Lasix) 20 mg tablet Take 1 tablet (20 mg) by mouth 2 times daily (morning and late afternoon). 60 tablet 1    [DISCONTINUED] predniSONE (Deltasone) 5 mg tablet Take 4 tablets (20 mg) by mouth once daily. Do not start before January 25, 2025. 120 " tablet 0    [DISCONTINUED] tacrolimus (Prograf) 1 mg capsule Take 3 capsules (3 mg) by mouth every 12 hours. Meds to beds. ADOD 1/27 180 capsule 0    [DISCONTINUED] tacrolimus (Prograf) 1 mg capsule Take 2 capsules (2 mg) by mouth every 12 hours. 120 capsule 0         Objective   Vitals:  Visit Vitals  /89 (BP Location: Right arm, Patient Position: Sitting)   Pulse 71   Temp 36 °C (96.8 °F) (Temporal)   Resp 17       Physical Exam:  GEN: No acute distress. Alert, awake and conversive.  HEENT: Sclera anicteric. Moist mucous membranes.  RESP: Breathing non-labored, equal chest rise. On RA.  CV: Regular rate, normotensive  GI: Abdomen soft, moderately distended, nontender. Kidney transplant incision in RLQ closed w/ staples, c/d/I, healing well without erythema or drainage. Liver transplant incision in RUQ closed w/ staples, no erythema. Some serous drainage from the posterior aspect of the incision. No clear dehiscence. Remainder of incision c/d/i. Prior drain sites sutured, no drainage or erythema. Some ecchymosis around drain sites.  : Voiding spontaneously.  MSK: No gross deformities. Moves all extremities spontaneously.  NEURO: Alert and oriented x3. No focal deficits.  SKIN:  Warm and dry    Labs within past 24h:  Results for orders placed or performed in visit on 02/10/25 (from the past 24 hours)   Basic Metabolic Panel   Result Value Ref Range    Glucose 178 (H) 74 - 99 mg/dL    Sodium 128 (L) 136 - 145 mmol/L    Potassium 6.1 (HH) 3.5 - 5.3 mmol/L    Chloride 105 98 - 107 mmol/L    Bicarbonate 11 (L) 21 - 32 mmol/L    Anion Gap 18 mmol/L    Urea Nitrogen 75 (H) 6 - 23 mg/dL    Creatinine 2.61 (H) 0.50 - 1.30 mg/dL    eGFR 28 (L) >60 mL/min/1.73m*2    Calcium 9.6 8.6 - 10.3 mg/dL   CBC and Auto Differential   Result Value Ref Range    WBC 15.1 (H) 4.4 - 11.3 x10*3/uL    nRBC 0.0 0.0 - 0.0 /100 WBCs    RBC 3.71 (L) 4.50 - 5.90 x10*6/uL    Hemoglobin 11.0 (L) 13.5 - 17.5 g/dL    Hematocrit 34.5 (L) 41.0 -  52.0 %    MCV 93 80 - 100 fL    MCH 29.6 26.0 - 34.0 pg    MCHC 31.9 (L) 32.0 - 36.0 g/dL    RDW 17.9 (H) 11.5 - 14.5 %    Platelets 282 150 - 450 x10*3/uL    Neutrophils % 82.6 40.0 - 80.0 %    Immature Granulocytes %, Automated 1.4 (H) 0.0 - 0.9 %    Lymphocytes % 7.2 13.0 - 44.0 %    Monocytes % 7.0 2.0 - 10.0 %    Eosinophils % 0.7 0.0 - 6.0 %    Basophils % 1.1 0.0 - 2.0 %    Neutrophils Absolute 12.48 (H) 1.20 - 7.70 x10*3/uL    Immature Granulocytes Absolute, Automated 0.21 0.00 - 0.70 x10*3/uL    Lymphocytes Absolute 1.09 (L) 1.20 - 4.80 x10*3/uL    Monocytes Absolute 1.06 (H) 0.10 - 1.00 x10*3/uL    Eosinophils Absolute 0.10 0.00 - 0.70 x10*3/uL    Basophils Absolute 0.17 (H) 0.00 - 0.10 x10*3/uL   Magnesium   Result Value Ref Range    Magnesium 1.40 (L) 1.60 - 2.40 mg/dL   Hepatic Function Panel   Result Value Ref Range    Albumin 4.0 3.4 - 5.0 g/dL    Bilirubin, Total 0.9 0.0 - 1.2 mg/dL    Bilirubin, Direct 0.4 (H) 0.0 - 0.3 mg/dL    Alkaline Phosphatase 150 (H) 33 - 120 U/L    ALT 9 (L) 10 - 52 U/L    AST 10 9 - 39 U/L    Total Protein 7.7 6.4 - 8.2 g/dL   Gamma-Glutamyl Transferase   Result Value Ref Range     (H) 5 - 64 U/L   Tacrolimus   Result Value Ref Range    Tacrolimus  13.7 <=15.0 ng/mL   Phosphorus   Result Value Ref Range    Phosphorus 4.3 2.5 - 4.9 mg/dL     All pertinent labs were reviewed.    Imaging within past 24h:  No pertinent imaging obtained within 24 hours. All pertinent imaging from prior visits reviewed.    ASSESSMENT  Goran Ibrahim is a 54 y.o. male with history of HTN, T2DM, and cryptogenic cirrhosis s/p SLK transplant 1/20  who presents to [facility] for concerns of hyperkalemia and AKUA possibly 2/2 tacro toxicity. Patient is in stable condition.    PLAN:  - Admit to transplant surgery  - Obtain STAT labs: CBC, RFP, Mg, ABG, UA, EBV/CMV PCR, blood cx  - Obtain STAT EKG  - Obtain STAT imaging: CXR  - Telemetry  - Potassium restricted diet  - Continue acyclovir, cellcept,  prednisone, PPI  - Hold bactrim  - Hold tacrolimus until a level is obtained in the morning  - DVT ppx: SQH, SCDs    Patient's exam, labs, and findings discussed with Dr. Carrillo , who agrees with plan as above.    Neva Ortiz MD  PGY-1 General Surgery  Transplant Surgery [id number]

## 2025-02-11 DIAGNOSIS — Z94.4 LIVER REPLACED BY TRANSPLANT (MULTI): ICD-10-CM

## 2025-02-11 LAB
ALBUMIN SERPL BCP-MCNC: 3.8 G/DL (ref 3.4–5)
ALBUMIN SERPL BCP-MCNC: 4.1 G/DL (ref 3.4–5)
ALP SERPL-CCNC: 142 U/L (ref 33–120)
ALT SERPL W P-5'-P-CCNC: 9 U/L (ref 10–52)
ANION GAP SERPL CALC-SCNC: 20 MMOL/L
APPEARANCE UR: CLEAR
AST SERPL W P-5'-P-CCNC: 11 U/L (ref 9–39)
BILIRUB DIRECT SERPL-MCNC: 0.3 MG/DL (ref 0–0.3)
BILIRUB SERPL-MCNC: 0.8 MG/DL (ref 0–1.2)
BILIRUB UR STRIP.AUTO-MCNC: NEGATIVE MG/DL
BUN SERPL-MCNC: 82 MG/DL (ref 6–23)
CALCIUM SERPL-MCNC: 10.1 MG/DL (ref 8.6–10.6)
CHLORIDE SERPL-SCNC: 100 MMOL/L (ref 98–107)
CMV DNA SERPL NAA+PROBE-LOG IU: NORMAL {LOG_IU}/ML
CO2 SERPL-SCNC: 14 MMOL/L (ref 21–32)
COLOR UR: COLORLESS
CREAT SERPL-MCNC: 2.66 MG/DL (ref 0.5–1.3)
EBV DNA SPEC NAA+PROBE-LOG#: ABNORMAL {LOG_COPIES}/ML
EGFRCR SERPLBLD CKD-EPI 2021: 27 ML/MIN/1.73M*2
ERYTHROCYTE [DISTWIDTH] IN BLOOD BY AUTOMATED COUNT: 17.3 % (ref 11.5–14.5)
GLUCOSE BLD MANUAL STRIP-MCNC: 167 MG/DL (ref 74–99)
GLUCOSE BLD MANUAL STRIP-MCNC: 187 MG/DL (ref 74–99)
GLUCOSE BLD MANUAL STRIP-MCNC: 192 MG/DL (ref 74–99)
GLUCOSE BLD MANUAL STRIP-MCNC: 200 MG/DL (ref 74–99)
GLUCOSE BLD MANUAL STRIP-MCNC: 200 MG/DL (ref 74–99)
GLUCOSE BLD MANUAL STRIP-MCNC: 227 MG/DL (ref 74–99)
GLUCOSE BLD MANUAL STRIP-MCNC: 232 MG/DL (ref 74–99)
GLUCOSE BLD MANUAL STRIP-MCNC: 250 MG/DL (ref 74–99)
GLUCOSE SERPL-MCNC: 192 MG/DL (ref 74–99)
GLUCOSE UR STRIP.AUTO-MCNC: NORMAL MG/DL
HCT VFR BLD AUTO: 33 % (ref 41–52)
HGB BLD-MCNC: 11 G/DL (ref 13.5–17.5)
HOLD SPECIMEN: NORMAL
KETONES UR STRIP.AUTO-MCNC: NEGATIVE MG/DL
LABORATORY COMMENT REPORT: ABNORMAL
LABORATORY COMMENT REPORT: NOT DETECTED
LACTATE SERPL-SCNC: 1.1 MMOL/L (ref 0.4–2)
LEUKOCYTE ESTERASE UR QL STRIP.AUTO: NEGATIVE
MAGNESIUM SERPL-MCNC: 1.58 MG/DL (ref 1.6–2.4)
MCH RBC QN AUTO: 30.3 PG (ref 26–34)
MCHC RBC AUTO-ENTMCNC: 33.3 G/DL (ref 32–36)
MCV RBC AUTO: 91 FL (ref 80–100)
NITRITE UR QL STRIP.AUTO: NEGATIVE
NRBC BLD-RTO: 0 /100 WBCS (ref 0–0)
PH UR STRIP.AUTO: 6.5 [PH]
PHOSPHATE SERPL-MCNC: 4.3 MG/DL (ref 2.5–4.9)
PLATELET # BLD AUTO: 269 X10*3/UL (ref 150–450)
POTASSIUM SERPL-SCNC: 5.2 MMOL/L (ref 3.5–5.3)
PROT SERPL-MCNC: 7.3 G/DL (ref 6.4–8.2)
PROT UR STRIP.AUTO-MCNC: NEGATIVE MG/DL
RBC # BLD AUTO: 3.63 X10*6/UL (ref 4.5–5.9)
RBC # UR STRIP.AUTO: NEGATIVE MG/DL
SODIUM SERPL-SCNC: 129 MMOL/L (ref 136–145)
SP GR UR STRIP.AUTO: 1.01
TACROLIMUS BLD-MCNC: 12.1 NG/ML
UROBILINOGEN UR STRIP.AUTO-MCNC: NORMAL MG/DL
WBC # BLD AUTO: 15.5 X10*3/UL (ref 4.4–11.3)

## 2025-02-11 PROCEDURE — 83735 ASSAY OF MAGNESIUM: CPT

## 2025-02-11 PROCEDURE — 2500000004 HC RX 250 GENERAL PHARMACY W/ HCPCS (ALT 636 FOR OP/ED): Mod: SE

## 2025-02-11 PROCEDURE — 99254 IP/OBS CNSLTJ NEW/EST MOD 60: CPT | Performed by: INTERNAL MEDICINE

## 2025-02-11 PROCEDURE — 2500000002 HC RX 250 W HCPCS SELF ADMINISTERED DRUGS (ALT 637 FOR MEDICARE OP, ALT 636 FOR OP/ED): Mod: SE

## 2025-02-11 PROCEDURE — 80069 RENAL FUNCTION PANEL: CPT

## 2025-02-11 PROCEDURE — RXMED WILLOW AMBULATORY MEDICATION CHARGE

## 2025-02-11 PROCEDURE — 85027 COMPLETE CBC AUTOMATED: CPT

## 2025-02-11 PROCEDURE — 2500000001 HC RX 250 WO HCPCS SELF ADMINISTERED DRUGS (ALT 637 FOR MEDICARE OP): Mod: SE

## 2025-02-11 PROCEDURE — 82947 ASSAY GLUCOSE BLOOD QUANT: CPT

## 2025-02-11 PROCEDURE — 80197 ASSAY OF TACROLIMUS: CPT

## 2025-02-11 PROCEDURE — 36415 COLL VENOUS BLD VENIPUNCTURE: CPT

## 2025-02-11 PROCEDURE — 87077 CULTURE AEROBIC IDENTIFY: CPT

## 2025-02-11 PROCEDURE — 81003 URINALYSIS AUTO W/O SCOPE: CPT

## 2025-02-11 PROCEDURE — 2020000001 HC ICU ROOM DAILY

## 2025-02-11 PROCEDURE — 2500000004 HC RX 250 GENERAL PHARMACY W/ HCPCS (ALT 636 FOR OP/ED): Mod: SE | Performed by: INTERNAL MEDICINE

## 2025-02-11 RX ORDER — MAGNESIUM SULFATE HEPTAHYDRATE 40 MG/ML
4 INJECTION, SOLUTION INTRAVENOUS ONCE
Status: COMPLETED | OUTPATIENT
Start: 2025-02-11 | End: 2025-02-11

## 2025-02-11 RX ORDER — INSULIN GLARGINE 100 [IU]/ML
10 INJECTION, SOLUTION SUBCUTANEOUS DAILY
Status: DISCONTINUED | OUTPATIENT
Start: 2025-02-12 | End: 2025-02-14 | Stop reason: HOSPADM

## 2025-02-11 RX ORDER — SODIUM BICARBONATE 650 MG/1
1300 TABLET ORAL 3 TIMES DAILY
Status: DISCONTINUED | OUTPATIENT
Start: 2025-02-11 | End: 2025-02-14 | Stop reason: HOSPADM

## 2025-02-11 RX ORDER — SODIUM BICARBONATE 650 MG/1
1300 TABLET ORAL 3 TIMES DAILY
Qty: 180 TABLET | Refills: 0 | Status: SHIPPED | OUTPATIENT
Start: 2025-02-11 | End: 2025-02-21 | Stop reason: SDUPTHER

## 2025-02-11 RX ADMIN — PIPERACILLIN SODIUM AND TAZOBACTAM SODIUM 2.25 G: 2; .25 INJECTION, SOLUTION INTRAVENOUS at 15:06

## 2025-02-11 RX ADMIN — PIPERACILLIN SODIUM AND TAZOBACTAM SODIUM 2.25 G: 2; .25 INJECTION, SOLUTION INTRAVENOUS at 21:05

## 2025-02-11 RX ADMIN — INSULIN GLARGINE 8 UNITS: 100 INJECTION, SOLUTION SUBCUTANEOUS at 10:11

## 2025-02-11 RX ADMIN — MAGNESIUM OXIDE TAB 400 MG (241.3 MG ELEMENTAL MG) 800 MG: 400 (241.3 MG) TAB at 21:05

## 2025-02-11 RX ADMIN — TAMSULOSIN HYDROCHLORIDE 0.4 MG: 0.4 CAPSULE ORAL at 10:10

## 2025-02-11 RX ADMIN — AMLODIPINE BESYLATE 10 MG: 10 TABLET ORAL at 10:10

## 2025-02-11 RX ADMIN — URSODIOL 300 MG: 300 CAPSULE ORAL at 15:14

## 2025-02-11 RX ADMIN — ACYCLOVIR 400 MG: 400 TABLET ORAL at 21:04

## 2025-02-11 RX ADMIN — CARVEDILOL 6.25 MG: 6.25 TABLET, FILM COATED ORAL at 10:10

## 2025-02-11 RX ADMIN — MAGNESIUM SULFATE IN WATER 4 G: 4 INJECTION, SOLUTION INTRAVENOUS at 10:10

## 2025-02-11 RX ADMIN — SODIUM BICARBONATE 1300 MG: 650 TABLET ORAL at 21:05

## 2025-02-11 RX ADMIN — SODIUM BICARBONATE 1300 MG: 650 TABLET ORAL at 15:14

## 2025-02-11 RX ADMIN — INSULIN LISPRO 2 UNITS: 100 INJECTION, SOLUTION INTRAVENOUS; SUBCUTANEOUS at 14:13

## 2025-02-11 RX ADMIN — INSULIN LISPRO 8 UNITS: 100 INJECTION, SOLUTION INTRAVENOUS; SUBCUTANEOUS at 14:13

## 2025-02-11 RX ADMIN — MYCOPHENOLATE MOFETIL 1000 MG: 250 CAPSULE ORAL at 18:20

## 2025-02-11 RX ADMIN — INSULIN LISPRO 2 UNITS: 100 INJECTION, SOLUTION INTRAVENOUS; SUBCUTANEOUS at 10:05

## 2025-02-11 RX ADMIN — PREDNISONE 10 MG: 10 TABLET ORAL at 10:10

## 2025-02-11 RX ADMIN — MYCOPHENOLATE MOFETIL 1000 MG: 250 CAPSULE ORAL at 06:26

## 2025-02-11 RX ADMIN — SODIUM CHLORIDE, POTASSIUM CHLORIDE, SODIUM LACTATE AND CALCIUM CHLORIDE 500 ML: 600; 310; 30; 20 INJECTION, SOLUTION INTRAVENOUS at 10:11

## 2025-02-11 RX ADMIN — SODIUM ZIRCONIUM CYCLOSILICATE 10 G: 10 POWDER, FOR SUSPENSION ORAL at 15:13

## 2025-02-11 RX ADMIN — ACYCLOVIR 400 MG: 400 TABLET ORAL at 10:09

## 2025-02-11 RX ADMIN — URSODIOL 300 MG: 300 CAPSULE ORAL at 21:05

## 2025-02-11 RX ADMIN — URSODIOL 300 MG: 300 CAPSULE ORAL at 10:10

## 2025-02-11 RX ADMIN — INSULIN LISPRO 8 UNITS: 100 INJECTION, SOLUTION INTRAVENOUS; SUBCUTANEOUS at 10:05

## 2025-02-11 RX ADMIN — ASPIRIN 81 MG: 81 TABLET, COATED ORAL at 10:10

## 2025-02-11 RX ADMIN — INSULIN LISPRO 4 UNITS: 100 INJECTION, SOLUTION INTRAVENOUS; SUBCUTANEOUS at 18:58

## 2025-02-11 RX ADMIN — SODIUM BICARBONATE 1300 MG: 650 TABLET ORAL at 10:10

## 2025-02-11 RX ADMIN — SODIUM ZIRCONIUM CYCLOSILICATE 10 G: 10 POWDER, FOR SUSPENSION ORAL at 10:10

## 2025-02-11 RX ADMIN — SODIUM ZIRCONIUM CYCLOSILICATE 10 G: 10 POWDER, FOR SUSPENSION ORAL at 21:05

## 2025-02-11 RX ADMIN — Medication 2000 UNITS: at 10:10

## 2025-02-11 RX ADMIN — CARVEDILOL 6.25 MG: 6.25 TABLET, FILM COATED ORAL at 21:04

## 2025-02-11 RX ADMIN — MAGNESIUM OXIDE TAB 400 MG (241.3 MG ELEMENTAL MG) 800 MG: 400 (241.3 MG) TAB at 10:10

## 2025-02-11 RX ADMIN — PANTOPRAZOLE SODIUM 40 MG: 40 TABLET, DELAYED RELEASE ORAL at 06:26

## 2025-02-11 ASSESSMENT — COGNITIVE AND FUNCTIONAL STATUS - GENERAL
DRESSING REGULAR LOWER BODY CLOTHING: A LITTLE
TURNING FROM BACK TO SIDE WHILE IN FLAT BAD: A LITTLE
TOILETING: A LITTLE
STANDING UP FROM CHAIR USING ARMS: A LITTLE
DAILY ACTIVITIY SCORE: 20
MOBILITY SCORE: 18
DRESSING REGULAR UPPER BODY CLOTHING: A LITTLE
MOVING TO AND FROM BED TO CHAIR: A LITTLE
WALKING IN HOSPITAL ROOM: A LITTLE
CLIMB 3 TO 5 STEPS WITH RAILING: A LOT
HELP NEEDED FOR BATHING: A LITTLE

## 2025-02-11 ASSESSMENT — PAIN SCALES - GENERAL: PAINLEVEL_OUTOF10: 0 - NO PAIN

## 2025-02-11 NOTE — H&P
SICU History & Physical    Subjective   HPI:  Goran Ibrahim is a 54 year old M with a PMHx significant for cryptogenic cirrhosis s/p simultaneous OLT/DDKT 1/20/25 w/ Dr. Wright/Dr. Trey Kline, also has HTN and T2DM who presents as a direct admit for hyperkalemia. Patient presented after interval labs demonstrated a potassium of 6.1 w/out evidence of hemolysis. 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 He has been making 1800-2000cc of urine daily per patient's brother. Endorses some mild RLQ pain. Per patient's brother, he has had a poor appetite and low energy. Denies recent sick contacts, fevers, chills, HA, vision changes, dizziness, CP, palpitations, SOB, N/V, constipation, diarrhea.     Admitted to the SICU for hyperkalemia.    Past Medical History:   Diagnosis Date    Cirrhosis (Multi)     CKD (chronic kidney disease)     Hypertension      Past Surgical History:   Procedure Laterality Date    IR ANGIOGRAM CELIAC  5/19/2023    IR ANGIOGRAM CELIAC 5/19/2023    US GUIDED ABDOMINAL PARACENTESIS  6/22/2023    US GUIDED ABDOMINAL PARACENTESIS 6/22/2023    US GUIDED ABDOMINAL PARACENTESIS  5/19/2023    US GUIDED ABDOMINAL PARACENTESIS 5/19/2023     Medications Prior to Admission   Medication Sig Dispense Refill Last Dose/Taking    acyclovir (Zovirax) 400 mg tablet Take 1 tablet (400 mg) by mouth 2 times a day. 60 tablet 0     alcohol swabs pads, medicated Apply 1 each topically 4 times a day. Use prior to checking glucose or injecting insulin 400 each 3     amLODIPine (Norvasc) 10 mg tablet Take 1 tablet (10 mg) by mouth once daily. Do not fill before January 27, 2025. 30 tablet 11     aspirin 81 mg EC tablet Take 1 tablet (81 mg) by mouth once daily. 30 tablet 0     blood sugar diagnostic (Blood Glucose Test) strip 100 strips 4 times a day. Use to check glucose 4 times daily, before meals and at bedtime 400 strip 3      blood-glucose meter misc 1 each 4 times a day. Use to check glucose 4 times daily, before meals and at bedtime 1 each 0     blood-glucose meter,continuous (Dexcom G7 ) misc Use as instructed 1 each 0     blood-glucose sensor (Dexcom G7 Sensor) device 1 each 3 times a day before meals. Change sensor every 10 days 3 each 11     carvedilol (Coreg) 6.25 mg tablet Take 1 tablet (6.25 mg) by mouth 2 times a day. Hold for SBP <110 or HR <55       cholecalciferol (Vitamin D-3) 50 MCG (2000 UT) tablet Take 1 tablet (50 mcg) by mouth once daily. 30 tablet 11     [] docusate sodium (Colace) 100 mg capsule Take 1 capsule (100 mg) by mouth 2 times a day as needed for constipation for up to 10 days. 20 capsule 0     fluconazole (Diflucan) 200 mg tablet Take 2 tablets (400 mg) by mouth once daily. 60 tablet 0     insulin glargine (Basaglar KwikPen U-100 Insulin) 100 unit/mL (3 mL) pen Inject 8 Units under the skin once daily in the morning. Take in AM with Prednisone 15 mL 0     insulin lispro (HumaLOG KwikPen Insulin) 100 unit/mL injection Inject 0-10 Units under the skin 3 times daily (morning, midday, late afternoon). Inject 6 units of Lispro subcutaneously three times a day before meals in addition to the following sliding scale:   0 unit(s) if Blood glucose is between   2 unit(s) if Blood glucose is between 151-200  4 unit(s) if Blood glucose is between 201-250  6 unit(s) if Blood glucose is between 251-300  8 unit(s) if Blood glucose is between 301-350  10 unit(s) if Blood glucose is between 351-400    If blood glucose is greater than 400 mg/dL, give max insulin per sliding scale AND then contact provider. 9 mL 0     insulin lispro 100 unit/mL injection Inject 8 units with breakfast and lunch, and 4 units with dinner, increase each dose by sliding scale, expect up to 30 units/day       lancets 33 gauge misc 1 Lancet 3 times a day before meals. 100 each 0     lancets misc 1 each 4 times a day. Use to  "check glucose 4 times daily, before meals and at bedtime 400 each 3     magnesium oxide (Mag-Ox) 400 mg tablet Take 2 tablets (800 mg) by mouth 2 times a day. 240 tablet 11     mycophenolate (Cellcept) 250 mg capsule Take 4 capsules (1,000 mg) by mouth every 12 hours. 240 capsule 0     pantoprazole (ProtoNix) 40 mg EC tablet Take 1 tablet (40 mg) by mouth once daily in the morning. Take before meals. Do not crush, chew, or split. 30 tablet 0     pen needle, diabetic 31 gauge x 3/16\" needle 1 each 3 times a day before meals. 100 each 0     pen needle, diabetic 32 gauge x 5/32\" needle 1 each 4 times a day. Use to inject insulin 4 times daily 400 each 3     predniSONE (Deltasone) 5 mg tablet Take 2 tablets (10 mg) by mouth once daily. 60 tablet 11     sulfamethoxazole-trimethoprim (Bactrim) 400-80 mg tablet Take 1 tablet by mouth once daily. 30 tablet 0     tacrolimus (Prograf) 1 mg capsule Take 1 capsule (1 mg) by mouth 2 times a day. 60 capsule 11     tamsulosin (Flomax) 0.4 mg 24 hr capsule Take 1 capsule (0.4 mg) by mouth once daily. 30 capsule 0     ursodiol (Actigall) 300 mg capsule Take 1 capsule (300 mg) by mouth 3 times a day. 90 capsule 0      Patient has no known allergies.  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    Review of Systems:  Review of Systems    Scheduled Medications:   acyclovir, 400 mg, oral, BID  amLODIPine, 10 mg, oral, Daily  aspirin, 81 mg, oral, Daily  calcium gluconate, 2 g, intravenous, Once  carvedilol, 6.25 mg, oral, BID  cholecalciferol, 2,000 Units, oral, Daily  fluconazole, 400 mg, oral, Daily  furosemide, 100 mg, intravenous, Once  heparin (porcine), 5,000 Units, subcutaneous, q8h  [START ON 2/11/2025] insulin glargine, " 8 Units, subcutaneous, Daily  [START ON 2/11/2025] insulin lispro, 0-10 Units, subcutaneous, TID AC  insulin lispro, 4 Units, subcutaneous, Daily with evening meal  insulin lispro, 8 Units, subcutaneous, Daily with breakfast  insulin lispro, 8 Units, subcutaneous, Daily with lunch  magnesium oxide, 800 mg, oral, BID  mycophenolate, 1,000 mg, oral, q12h EDINSON  [START ON 2/11/2025] pantoprazole, 40 mg, oral, Daily before breakfast  polyethylene glycol, 17 g, oral, Daily  [START ON 2/11/2025] predniSONE, 10 mg, oral, Daily  sodium zirconium cyclosilicate, 10 g, oral, TID  tamsulosin, 0.4 mg, oral, Daily  ursodiol, 300 mg, oral, TID         Continuous Medications:   dextrose 10 % in water (D10W), 50 mL/hr         PRN Medications:   PRN medications: dextrose, dextrose, glucagon, glucagon    Objective   Vitals:  Most Recent:  Vitals:    02/10/25 1915   BP: 136/89   Pulse: 71   Resp: 17   Temp: 36 °C (96.8 °F)   SpO2: 97%       24hr Min/Max:  Temp  Min: 36 °C (96.8 °F)  Max: 36 °C (96.8 °F)  Pulse  Min: 71  Max: 71  BP  Min: 136/89  Max: 136/89  Resp  Min: 17  Max: 17  SpO2  Min: 97 %  Max: 97 %    I/O:  No intake/output data recorded.    Hemodynamic parameters for last 24 hours:         Vent settings:       LDA:         Physical Exam:   GEN: No acute distress. Alert, awake and conversive.  HEENT: Sclera anicteric. Moist mucous membranes.  RESP: Breathing non-labored, equal chest rise. On RA.  CV: Regular rate, normotensive  GI: Abdomen soft, moderately distended, nontender. Kidney transplant incision in RLQ closed w/ staples, c/d/I, healing well without erythema or drainage. Liver transplant incision in RUQ closed w/ staples, no erythema. Some serous drainage from the posterior aspect of the incision. No clear dehiscence. Remainder of incision c/d/i. Prior drain sites sutured, no drainage or erythema. Some ecchymosis around drain sites.  : Voiding spontaneously.  MSK: No gross deformities. Moves all extremities  spontaneously.  NEURO: Alert and oriented x3. No focal deficits.  SKIN:  Warm and dry    Lab/Radiology/Diagnostic Review:  Results for orders placed or performed during the hospital encounter of 02/10/25 (from the past 24 hours)   Blood Gas Venous Full Panel   Result Value Ref Range    POCT pH, Venous 7.19 (LL) 7.33 - 7.43 pH    POCT pCO2, Venous 25 (L) 41 - 51 mm Hg    POCT pO2, Venous 76 (H) 35 - 45 mm Hg    POCT SO2, Venous 96 (H) 45 - 75 %    POCT Oxy Hemoglobin, Venous 93.6 (H) 45.0 - 75.0 %    POCT Hematocrit Calculated, Venous 41.0 41.0 - 52.0 %    POCT Sodium, Venous 125 (L) 136 - 145 mmol/L    POCT Potassium, Venous 6.6 (HH) 3.5 - 5.3 mmol/L    POCT Chloride, Venous 105 98 - 107 mmol/L    POCT Ionized Calicum, Venous 1.40 (H) 1.10 - 1.33 mmol/L    POCT Glucose, Venous 227 (H) 74 - 99 mg/dL    POCT Lactate, Venous 1.0 0.4 - 2.0 mmol/L    POCT Base Excess, Venous -17.0 (L) -2.0 - 3.0 mmol/L    POCT HCO3 Calculated, Venous 9.5 (L) 22.0 - 26.0 mmol/L    POCT Hemoglobin, Venous 13.5 13.5 - 17.5 g/dL    POCT Anion Gap, Venous 17.0 10.0 - 25.0 mmol/L    Patient Temperature 37.0 degrees Celsius    FiO2 21 %   Renal function panel   Result Value Ref Range    Glucose 203 (H) 74 - 99 mg/dL    Sodium 127 (L) 136 - 145 mmol/L    Potassium 6.3 (HH) 3.5 - 5.3 mmol/L    Chloride 103 98 - 107 mmol/L    Bicarbonate 11 (L) 21 - 32 mmol/L    Anion Gap 19 mmol/L    Urea Nitrogen 78 (H) 6 - 23 mg/dL    Creatinine 2.52 (H) 0.50 - 1.30 mg/dL    eGFR 29 (L) >60 mL/min/1.73m*2    Calcium 9.9 8.6 - 10.6 mg/dL    Phosphorus 4.6 2.5 - 4.9 mg/dL    Albumin 4.2 3.4 - 5.0 g/dL   CBC   Result Value Ref Range    WBC 16.8 (H) 4.4 - 11.3 x10*3/uL    nRBC 0.0 0.0 - 0.0 /100 WBCs    RBC 3.65 (L) 4.50 - 5.90 x10*6/uL    Hemoglobin 10.9 (L) 13.5 - 17.5 g/dL    Hematocrit 33.0 (L) 41.0 - 52.0 %    MCV 90 80 - 100 fL    MCH 29.9 26.0 - 34.0 pg    MCHC 33.0 32.0 - 36.0 g/dL    RDW 17.2 (H) 11.5 - 14.5 %    Platelets 297 150 - 450 x10*3/uL    Magnesium   Result Value Ref Range    Magnesium 1.61 1.60 - 2.40 mg/dL   POCT GLUCOSE   Result Value Ref Range    POCT Glucose 206 (H) 74 - 99 mg/dL     XR chest 1 view    Result Date: 2/10/2025  STUDY: XR CHEST 1 VIEW;  2/10/2025 8:46 pm   INDICATION: Signs/Symptoms:r/o infection s/p SLK.     COMPARISON: Chest x-ray 01/21/2025   ACCESSION NUMBER(S): [id number]   ORDERING CLINICIAN: PETE SAPP   FINDINGS: AP radiograph of the chest was provided.     CARDIOMEDIASTINAL SILHOUETTE: Cardiomediastinal silhouette is normal in size and configuration.   LUNGS: Perihilar congestion. Diffuse interstitial opacities. No focal consolidation, pleural effusion, or pneumothorax.   ABDOMEN: No remarkable upper abdominal findings.   BONES: No acute osseous changes.       1. Findings suggestive of mild pulmonary edema. Recommend correlation with volume status. 2. No focal consolidation to suggest pneumonia.   I personally reviewed the images/study and I agree with the findings as stated by Brianna Cordero DO, PGY-3. This study was interpreted at [place], [place].   MACRO: None     Dictation workstation:   [id number]    US kidney transplant    Result Date: 2/7/2025  Interpreted By:  Joni Mars and Sheng Max STUDY: US KIDNEY TRANSPLANT;  2/7/2025 12:33 pm   INDICATION: Signs/Symptoms:s/p liver/keidney transplant 2 weeks ago, rising creatinine. Patient received liver and kidney transplant on 01/20/2025..   ,Z94.0 Kidney transplant status   COMPARISON: Renal ultrasound 01/20/2025, 11/04/2024   ACCESSION NUMBER(S): [id number]   ORDERING CLINICIAN: MARIAM MARTELL   TECHNIQUE: Grayscale, color, and spectral Doppler of the kidney transplant were performed.  This examination was interpreted at [facility].   FINDINGS: TRANSPLANT KIDNEY: Transplant kidney location:  The renal transplant is located in the right iliac fossa. The transplant kidney  measures 11.6 cm  in craniocaudal dimension. There is no hydronephrosis and hydroureter. Echogenic stone in the superior pole measuring 0.3 cm (image 41/41). Small perinephric fluid collections measuring 6.7 x 2.0 x 5.0 cm inferiorly (image 33/41) and 2.8 x 0.9 cm superiorly (image 34/41). The inferior collection demonstrates multiloculated septations which can represent a hematoma. Partially imaged nephrostomy stent coiled in the renal pelvis (image 30/41).   DOPPLER EVALUATION:   RESISTIVE INDICES: The resistive indices were as follows: * 0.64/0.69 cm/s previously 0.62/0.58 cm/s in the superior pole * 0.63/0.64 cm/s previously 0.61/0.65 cm/s in the midpole * 0.70/0.65 cm/s previously 0.68/0.58 cm/s in the inferior pole. Wave forms are normal.   TRANSPLANT RENAL ARTERY AND VEIN: The main renal artery velocity is * 114.9 cm/s previously 91.0 cm/s at the hilum. * 114.9 cm/s previously 136.9 cm/s at the mid segment * 142.3 cm/s previously 122.6 cm/s at the anastomosis   The adjacent iliac artery velocities: * 113.2 cm/s previously 220.7 cm/s proximal to anastomosis * 165.0 cm/s previously 199.7 cm/s at the anastomosis * 166.7 cm/s previously 117.9 cm/s distal to anastomosis   Transplant renal vein velocity: * 59.9 cm/s previously 15.3 cm/s at the hilum * 46.5 cm/s previously 19.4 cm/s at the midportion * 24.8 cm/s previously 26.0 cm/s at the anastomosis   Peak velocity in the adjacent iliac vein is: * 30.7 cm/s previously 27.9 cm/s proximal to anastomosis * 24.6 cm/s previously 14.8 cm/s distal to anastomosis   The urinary bladder appears unremarkable. Suggestion of a stent within the urinary bladder (image 30/56).       1. Two perinephric fluid collections surrounding the transplant kidney, the inferior collection demonstrates multilocular septations which can represent a hematoma. 2. Echogenic stone within the superior pole of the transplant kidney measuring up to 0.3 cm. 3. Partially imaged nephrostomy stent coiled  in the renal pelvis and urinary bladder. 4. Unremarkable Doppler interrogation of the transplant kidney.     I personally reviewed the images/study and I agree with the findings as stated by Dr. Mark Artis. This study was interpreted at [facility], [place], [place].   MACRO: None     Signed by: Joni Mars 2/7/2025 1:16 PM Dictation workstation:   [id number]    Electrocardiogram, 12-lead    Result Date: 1/27/2025  Normal sinus rhythm Left axis deviation Incomplete left bundle branch block Abnormal ECG When compared with ECG of 26-APR-2024 10:24, Vent. rate has increased BY  23 BPM Incomplete left bundle branch block has replaced Incomplete right bundle branch block Confirmed by Domenic Sun ([id number]) on 1/27/2025 9:28:58 AM    XR chest 1 view    Result Date: 1/22/2025  Interpreted By:  Deion Reese, STUDY: XR CHEST 1 VIEW; 1/21/2025 11:55 pm   INDICATION: Signs/Symptoms:daily sicu.   COMPARISON: Radiograph dated 01/21/2025   ACCESSION NUMBER(S): [id number]   ORDERING CLINICIAN: MARIAM MARTELL   FINDINGS: Right IJ central venous catheter is in place with the tip projecting over upper right atrium   Cardiac silhouette size is slightly prominent, unchanged.   Ground-glass and patchy faint airspace opacity, improved compared to prior study. Cannot exclude trace bilateral pleural effusion. No sizable pneumothorax.   No acute osseous abnormality.       1. Interval improvement in the aeration of the lungs with residual mild ground-glass and patchy airspace opacity which is likely due to areas of atelectasis and mild edema. 2. Cannot exclude trace bilateral pleural effusion.       Signed by: Deion Cui 1/22/2025 4:01 PM Dictation workstation:   [id number]    US liver with doppler    Result Date: 1/21/2025  Interpreted By:  Cliff Roche and Sheng Max STUDY: US LIVER WITH DOPPLER  1/21/2025 12:49 pm   INDICATION: 53 y/o   M with  Signs/Symptoms:POD 1 OLT        Per EMR: 54-year-old male history of orthotopic liver transplant on 01/20. LFTs and bili are stabilizing. Ultrasound for further evaluation.   COMPARISON: Liver ultrasound 01/2025, 11/04/2024, 04/02/2024   ACCESSION NUMBER(S): [id number]   ORDERING CLINICIAN: CHUY KEITH   TECHNIQUE: Routine ultrasound of the right upper quadrant was performed. Static images were obtained for remote interpretation.   FINDINGS: TRANSPLANT LIVER: The transplant liver measures 19.3 cm in craniocaudal length with normal echogenicity. No focal mass.   BILE DUCTS: Intrahepatic ducts: Nondilated. Common bile duct diameter: 4 mm   GALLBLADDER: The gallbladder is surgically removed.     DOPPLER EVALUATION:   HEPATIC ARTERIES: The following demonstrate patency and appropriate direction of flow: Main hepatic artery RI 0.61, previously 0.52; Right hepatic artery RI 0.60/0.54, previously 0.40; Left hepatic artery RI 0.56, previously 0.51/0.51;   PORTAL VEIN: The portal vein is patent and measures 11 mm. Normal respiratory variation. Portal vein velocities are calculated as follows:   * main portal vein 126.5 cm/s previously 117.7 cm/s proximal the anastomosis * main portal vein 112.7 cm/s previously 92.4 cm/s at the anastomosis * main portal vein 81.8 cm/s previously 57.9/71.7 cm/s distal the anastomosis * left portal vein branch 24.8 cm/s previously 14.4/22.8 cm/s, anterograde flow * right portal vein anterior branch 27.0 cm/s previously 26.1 cm/s, anterograde flow * right portal vein posterior branch 31.1 cm/s previously 21.3 cm/s, anterograde flow * splenic vein at the portal vein confluence 27.0/33.3 cm/s previously 24.9 cm/s   HEPATIC VEIN: The main, left and right hepatic veins are patent. The main hepatic vein demonstrates monophasic flow in the left and right hepatic veins demonstrate monophasic flow.   PANCREAS: Not visualized due to shadowing from overlying bowel gas.   RIGHT KIDNEY: The right kidney measures 9.0 cm in length. No  hydronephrosis or renal calculi are seen.   SPLEEN: Measures 19.1 cm in craniocaudal dimension, slightly enlarged. No focal splenic lesion identified.   PERITONEAL FLUID: None seen in the right upper quadrant.       1. Interval increase in the main portal vein velocity of the liver transplant compared to 01/20/2025 ultrasound measuring up to 126.5 cm/sec, with slightly turbulent flow. No evidence of focal narrowing or gradient. Attention on follow-up is recommended. 2. Interval improvement of the previously noted low resistive indices throughout the hepatic arteries with interval improvement in the arterial waveforms compared to 01/20/2025 ultrasound. 3. Stable splenomegaly.     I personally reviewed the images/study and I agree with the findings as stated by Dr. Mark Artis. This study was interpreted at [place], [place].   MACRO: None     Signed by: Cliff Roche 1/21/2025 9:18 PM Dictation workstation:   [id number]    XR chest 1 view    Result Date: 1/21/2025  Interpreted By:  Cristino Salas  and Maria Grant STUDY: XR CHEST 1 VIEW; XR ABDOMEN 1 VIEW;  1/21/2025 7:17 am   INDICATION: Signs/Symptoms:daily sicu; Signs/Symptoms:confirm NGT placement, to be performed with AM chest XR.   COMPARISON: Chest radiograph 01/20/2025   ACCESSION NUMBER(S): [id number]; [id number]   ORDERING CLINICIAN: MARIAM MARTELL   FINDINGS: AP radiograph of the chest and AP radiograph of the abdomen were provided.   Interval extubation. Enteric tube in place with tip and sidehole projecting over the gastric body. Right internal jugular approach central venous catheter tip projects over the superior cavoatrial junction. Numerous surgical staples overlying the upper abdomen. Surgical drain projects over the right upper abdominal quadrant.   CARDIOMEDIASTINAL SILHOUETTE: The cardiomediastinal silhouette is stable but mildly prominent.   LUNGS: Low lung volumes with bronchovascular crowding.  Small left pleural effusion with hazy opacities of the left lung base. Similar mild alveolar opacities of the right perihilar region with mild diffuse interstitial prominence. No pneumothorax.   ABDOMEN: Postsurgical changes of the abdomen with an overall nonobstructive bowel-gas pattern in the visualized upper abdomen. No discrete evidence of pneumoperitoneum is identified.   BONES: No acute osseous abnormality.       1. Similar small left pleural effusion with hazy left basilar opacities. 2. Similar mild alveolar and interstitial edema. 3. Nonobstructive bowel-gas pattern in the visualized upper abdomen without discrete evidence of pneumoperitoneum. 4. Medical devices as above.   I personally reviewed the image(s)/study and resident interpretation as stated by Dr. Juanita Sifuentes MD. I agree with the findings as stated. This study was interpreted at [facility], [place], [place].   MACRO: None   Signed by: Cristino Salas 1/21/2025 7:08 PM Dictation workstation:   [id number]    XR abdomen 1 view    Result Date: 1/21/2025  Interpreted By:  Cristino Salas,  and Maria Grant STUDY: XR CHEST 1 VIEW; XR ABDOMEN 1 VIEW;  1/21/2025 7:17 am   INDICATION: Signs/Symptoms:daily sicu; Signs/Symptoms:confirm NGT placement, to be performed with AM chest XR.   COMPARISON: Chest radiograph 01/20/2025   ACCESSION NUMBER(S): [id number]; [id number]   ORDERING CLINICIAN: MARIAM MARTELL   FINDINGS: AP radiograph of the chest and AP radiograph of the abdomen were provided.   Interval extubation. Enteric tube in place with tip and sidehole projecting over the gastric body. Right internal jugular approach central venous catheter tip projects over the superior cavoatrial junction. Numerous surgical staples overlying the upper abdomen. Surgical drain projects over the right upper abdominal quadrant.   CARDIOMEDIASTINAL SILHOUETTE: The cardiomediastinal silhouette is stable but mildly prominent.   LUNGS:  Low lung volumes with bronchovascular crowding. Small left pleural effusion with hazy opacities of the left lung base. Similar mild alveolar opacities of the right perihilar region with mild diffuse interstitial prominence. No pneumothorax.   ABDOMEN: Postsurgical changes of the abdomen with an overall nonobstructive bowel-gas pattern in the visualized upper abdomen. No discrete evidence of pneumoperitoneum is identified.   BONES: No acute osseous abnormality.       1. Similar small left pleural effusion with hazy left basilar opacities. 2. Similar mild alveolar and interstitial edema. 3. Nonobstructive bowel-gas pattern in the visualized upper abdomen without discrete evidence of pneumoperitoneum. 4. Medical devices as above.   I personally reviewed the image(s)/study and resident interpretation as stated by Dr. Juanita Sifuentes MD. I agree with the findings as stated. This study was interpreted at [facility], [place], [place].   MACRO: None   Signed by: Cristino Salas 1/21/2025 7:08 PM Dictation workstation:   [id number]    XR chest 1 view    Result Date: 1/21/2025  Interpreted By:  Cristino Salas and [place] Lesley STUDY: XR CHEST 1 VIEW;  1/20/2025 9:29 pm   INDICATION: Signs/Symptoms:post op.   COMPARISON: Chest radiograph 01/20/2025 time stamped 2:55 a.m.   ACCESSION NUMBER(S): [id number]   ORDERING CLINICIAN: MARIAM MARTELL   FINDINGS: AP radiograph of the chest was provided.   Interval intubation with endotracheal tube tip terminating 3.0 cm above the richard. Enteric tube in place, incompletely visualized. Right internal jugular approach central venous catheter tip projects over the superior cavoatrial junction.   CARDIOMEDIASTINAL SILHOUETTE: Cardiomediastinal silhouette is mildly prominent but stable in size and configuration.   LUNGS: Slight blunting of the left costophrenic angle. No right pleural effusion. Mild patchy alveolar opacities throughout the left lung base  and right perihilar region. Mild interstitial prominence and scattered subsegmental atelectasis. No pneumothorax.   ABDOMEN: No remarkable upper abdominal findings.   BONES: No acute osseous abnormality.       1. Small left pleural effusion with overlying hazy opacities, which may reflect atelectasis or edema, with superimposed infiltrate not excluded. 2. Mild patchy alveolar opacities in the right perihilar region with diffuse interstitial prominence, which may reflect mild pulmonary edema. 3. Stable prominence of the cardiomediastinal silhouette. 4. Medical devices as above.   I personally reviewed the image(s)/study and resident interpretation as stated by Dr. Juanita Sifuentes MD. I agree with the findings as stated. This study was interpreted at [facility], [place], [place].   MACRO: None   Signed by: Cristino Salas 1/21/2025 7:05 PM Dictation workstation:   [id number]    US kidney transplant    Result Date: 1/21/2025  Interpreted By:  Marek Gee and Jiang Sirui STUDY: US KIDNEY TRANSPLANT;  1/20/2025 10:14 pm   INDICATION: Signs/Symptoms:s/p kidney transplant.     COMPARISON: None.   ACCESSION NUMBER(S): [id number]   ORDERING CLINICIAN: MARIAM MARTELL   TECHNIQUE: Grayscale, color, and spectral Doppler of the kidney transplant were performed.  This examination was interpreted at [facility].   FINDINGS: TRANSPLANT KIDNEY: Transplant kidney location:  The renal transplant is located in the right iliac fossa. The transplant kidney iclrvmri95.2 cm in craniocaudal dimension. There is no hydronephrosis and hydroureter. No perinephric fluid collections are seen.   DOPPLER EVALUATION:   RESISTIVE INDICES: The resistive indices were as follows: 0.6 in the superior pole, 0.7 in the midpole, and 0.7 in the inferior pole. Wave forms are normal.   TRANSPLANT RENAL ARTERY AND VEIN: The main renal artery velocity is 136.9 cm/s,91.0 cm/s.  The arterial  anastomosis velocity is 123. The adjacent iliac artery velocity is 221 cm/s Transplant renal vein is patent. The peak velocity in the main renal vein is 19.4 cm/s, in the adjacent iliac vein 28 cm/s. The venous anastomosis velocity is 26.       Unremarkable ultrasound and Doppler evaluation of the transplant kidney as detailed above   I personally reviewed the image(s) / study and I agree with the findings as stated by Guerita Beaver MD. This study was interpreted at [facility], [place], [place].   MACRO: None     Signed by: Marek Gee 1/21/2025 6:12 AM Dictation workstation:   [id number]    US liver with doppler    Result Date: 1/21/2025  Interpreted By:  Marek Gee and Jiang Sirui STUDY: US LIVER WITH DOPPLER;  1/20/2025 10:02 pm   INDICATION: Signs/Symptoms:s/p orthotopic liver transplant.     COMPARISON: None.   ACCESSION NUMBER(S): [id number]   ORDERING CLINICIAN: MARIAM MARTELL   TECHNIQUE: Multiple images of the right upper quadrant were obtained.  Gray scale, color Doppler and spectral Doppler waveform analysis was performed.   FINDINGS: LIVER: The transplant liver measures 20.5 cm and is grossly unremarkable and free of any focal lesions.   GALLBLADDER: Surgically absent   BILIARY SYSTEM: No evidence of intra or extrahepatic biliary dilatation is identified; the common bile duct is not well visualized.   DOPPLER EVALUATION:   HEPATIC ARTERIES: Hepatic artery and its right and left branches RI's are estimated at 0.5, 0.4 and 0.5, respectively.   PORTAL VEIN: Portal vein is patent and measures 0.98. There is normal respiratory variation. Portal vein velocities are calculated as follows: main portal vein 92 cm/sec, antegrade flow; left portal vein branch 22.8 cm/s, antegrade flow; right portal vein anterior branch 26, antegrade flow; right portal vein posterior branch 21, antegrade flow. The splenic vein is also patent.   HEPATIC VEIN: The right, middle and left hepatic veins are patent  and demonstrate biphasic antegrade flow. IVC appears also patent.   PANCREAS: The pancreas is poorly visualized due to overlying bowel gas.   RIGHT KIDNEY: The right kidney measures 9.9 cm in length. No hydronephrosis or renal calculi are seen.   SPLEEN: The spleen measures 18.9 and is grossly unremarkable.   PERITONEUM AND RETROPERITONEUM: No free fluid is identified.       Status post orthotopic liver transplant with decreased resistive indices of the hepatic arteries (0.4-0.5), attention on follow-up imaging is recommended. The hepatic vasculature is patent.   I personally reviewed the image(s) / study and I agree with the findings as stated by Guerita Beaver MD. This study was interpreted at [place], [place].   MACRO: None   Signed by: Marek Gee 1/21/2025 6:11 AM Dictation workstation:   [id number]    Anesthesia Intraoperative Transesophageal Echocardiogram    Result Date: 1/20/2025  [facility] - Dept of Anesthesiology    [address] [place] [address]       Tel [phone] and Fax [phone] TRANSESOPHAGEAL ECHOCARDIOGRAM REPORT  Patient Name:        MANDA Medeiros Physician: [id number] Johnson DO Study Date:          1/20/2025            Ordering Provider: [id number] JOHNSON MRN/PID:             [id number]             Fellow: Accession#:          [id number]         Nurse: Date of Birth/Age:   1970 / 54 years Sonographer: Gender assigned at   M                    Additional Staff: Birth: BSA / BMI:           m2 / kg/m2           Encounter#:        [id number] Study Type:    ANESTHESIA INTRAOPERATIVE BELLO Diagnosis/ICD: Cardiomyopathy, unspecified-I42.9 CPT Code:      Echocardiography, BELLO for monitoring-[id number] PHYSICIAN INTERPRETATION: Left Ventricle: The left ventricular systolic function is normal, with a visually estimated ejection fraction of 65-70%. The left ventricular  cavity size is normal. The left ventricular septal wall thickness is normal. Left ventricular diastolic filling was not assessed, due to E/A wave fusion. Left Atrium: The left atrium is normal in size. There is no evidence of a patent foramen ovale. There is no thrombus visualized in the left atrial appendage. Right Ventricle: The right ventricle is normal in size. There is normal right ventricular global systolic function. Right Atrium: The right atrium is normal in size. Aortic Valve: The aortic valve is structurally normal. There is no evidence of aortic valve regurgitation. Mitral Valve: The mitral valve is normal in structure. There is trace mitral valve regurgitation. Tricuspid Valve: The tricuspid valve is structurally normal. There is trace tricuspid regurgitation. RVSP 33. Pulmonic Valve: The pulmonic valve is structurally normal. There is physiologic pulmonic valve regurgitation. Pericardium: There is no pericardial effusion noted. Aorta: The aortic root is normal. In comparison to the previous echocardiogram(s): Not performed on intraoperative study.  CONCLUSIONS:  1. The left ventricular systolic function is normal, with a visually estimated ejection fraction of 65-70%.  2. There is normal right ventricular global systolic function. POST PROCEDURE REPORT: LV function is unchanged from pre-pump exam. RV function is unchanged from pre-pump exam. All transesophageal echocardiogram findings discussed with surgeon.  QUANTITATIVE DATA SUMMARY:  LV SYSTOLIC FUNCTION BY 2D PLANIMETRY (MOD):                      Normal Ranges: EF-Visual:      68 % LV EF Reported: 68 %  [id number] Tay Johnson DO Electronically signed on 1/20/2025 at 2:29:45 PM  ** Final **     XR chest 1 view    Result Date: 1/20/2025  Interpreted By:  Cristino Salas,  and Penny Lang STUDY: XR CHEST 1 VIEW;  1/20/2025 3:02 am   INDICATION: Signs/Symptoms:LIver Transplant Protocol.   COMPARISON: Chest radiograph 04/19/2024   ACCESSION  NUMBER(S): [id number]   ORDERING CLINICIAN: MARIAM MARTELL   FINDINGS: AP radiograph of the chest was provided.       CARDIOMEDIASTINAL SILHOUETTE: Cardiomediastinal silhouette is normal in size and configuration.   LUNGS: No focal consolidation, pleural effusion or pneumothorax.   ABDOMEN: No remarkable upper abdominal findings.   BONES: No acute osseous changes.       1.  No evidence of acute cardiopulmonary process.   I personally reviewed the images/study and I agree with the findings as stated. This study was interpreted at [facility], [place], [place].   MACRO: None   Signed by: Cristino Salas 1/20/2025 8:34 AM Dictation workstation:   [id number]      Assessment/Plan    Assessment:  Goran Ibrahim is a 54 y.o. male with PMHx of HTN, HFpEF, CKD4, and cryptogenic liver cirrhosis (hx of alcohol use, but not heavy per chart review) c/b HE and EV s/p banding in June 2024, now presenting to SICU from OR 1/20 s/p simultaneous liver and kidney transplant by Dr. Martell on 1/20. Admitted to ICU for hyperkalemia.      Plan:  NEURO: History of hepatic encephalopathy. A&Ox3, no focal deficits.  - ongoing neuro and pain assessments   - PRN oxycodone for pain control  - lidocaine patches  - PT/OT consult -> OOB to chair as tolerated     CV: History of HTN, HFpEF. Baseline BP 130s-150s/80s. Baseline echo (7/29/24) with EF 62%, RV normal, mild AVR. Negative NM stress test 11/2023. No EKG changes with hyperkalemia  - Continuous EKG/abp monitoring.  - Goal map 65-90  - continue home amlodipine 10mg daily and carvedilol 6.25mg BID  - Additional volume resuscitation as clinically indicated  - Home meds: amlodipine 10mg daily, carvedilol 6.25mg BID, furosemide 40mg BID     PULM: Former smoker (40 year history). PFTs performed 12/2023 that suggested mild spirometric airflow obstruction. Arrived to ICU on NC.  - Wean O2 as tolerated, maintaining SpO2 >92%  - encourage Q1h incentive spirometer  - ABG prn      GI: Hx of cryptogenic liver cirrhosis (hx of alcohol use, but not heavy per chart review) c/b HE and EV s/p banding in June 2024. Now s/p SLK transplant on 1/20. Admitted to SICU 2/10 for hyperkalemia and concern for tacro toxicity.  - diabetic diet  - continue PPI (home med)  - serial abdominal exams  - bowel regimen  - continue ursodiol  - IS/ppx per Transplant: holding tacro with concern for toxicity     : History of CKD4. Baseline cr ~2.1-3.5. Now s/p Kidney Transplant on 1/20. Admitted to ICU for hyperkalemia as high 6.3 on RFP.  - s/p insulin and lasix for hyperkalemia  - no indication for HD at this time  - Maintain UOP >0.5ml/kg/hr   - Maintain chaudhary for strict I&Os  - continue  - Replete electrolytes judiciously  - Immunosuppression as above in GI     HEME:   - Daily CBC and coags  - SCDs for DVT ppx  - SQH  - ASA  - maintain active T&S (2/10)     ENDO: No known history of DM or thyroid disorder. A1c 6.6 (1/21/25).  - Continue lantus 8u  - Continue AC lispro regimen (4/8/8)  - Q4h BG and SSI #2 Lispro per ICU protocol     ID: Afebrile. Leukopenia, resolved.   - Daily CBC     Lines:   - R radial arterial line (1/20) -> remove today  - PIV x2     Dispo: Admit to ICU. Seen and discussed with ICU attending, Dr. Mcginnis     [facility] [phone]

## 2025-02-11 NOTE — PROGRESS NOTES
02/11/25 1300   Discharge Planning   Living Arrangements Friends   Support Systems Friends/neighbors   Assistance Needed None   Type of Residence Private residence   Home or Post Acute Services None   Expected Discharge Disposition Home   Does the patient need discharge transport arranged? Yes   RoundTrip coordination needed? Yes   Has discharge transport been arranged? No   Financial Resource Strain   How hard is it for you to pay for the very basics like food, housing, medical care, and heating? Somewhat   Housing Stability   In the last 12 months, was there a time when you were not able to pay the mortgage or rent on time? N   Transportation Needs   In the past 12 months, has lack of transportation kept you from medical appointments or from getting medications? no       DC Planning:  Went in and met with the pt, confirmed demographics.     Transitional Care Coordination Progress Note:  Patient discussed during interdisciplinary rounds.     Plan per Medical/Surgical team:   Home. No home going needs anticipated for this pt at this time.        Discharge disposition: Home. No home going needs anticipated for this pt at this time.      Potential Barriers: None    ADOD:  2/12    This TCC will continue to follow for home going needs and safe DC plan.      Ivy Reeves. DEAN. TCC.

## 2025-02-11 NOTE — PROGRESS NOTES
Pharmacy Medication History Review    Goran Ibrahim is a 54 y.o. male admitted for Hyperkalemia. Pharmacy reviewed the patient's ftaby-[id number]-[id number] medications for accuracy.    Medications ADDED:  none  Medications CHANGED:  none  Medications REMOVED:   none     The list below reflects the updated PTA list.   Prior to Admission Medications   Prescriptions Last Dose Informant   acyclovir (Zovirax) 400 mg tablet  Brother   Sig: Take 1 tablet (400 mg) by mouth 2 times a day.   alcohol swabs pads, medicated  Brother   Sig: Apply 1 each topically 4 times a day. Use prior to checking glucose or injecting insulin   amLODIPine (Norvasc) 10 mg tablet  Brother   Sig: Take 1 tablet (10 mg) by mouth once daily. Do not fill before 2025.   aspirin 81 mg EC tablet  Brother   Sig: Take 1 tablet (81 mg) by mouth once daily.   blood sugar diagnostic (Blood Glucose Test) strip  Brother   Si strips 4 times a day. Use to check glucose 4 times daily, before meals and at bedtime   blood-glucose meter misc  Brother   Si each 4 times a day. Use to check glucose 4 times daily, before meals and at bedtime   blood-glucose meter,continuous (Dexcom G7 ) misc  Brother   Sig: Use as instructed   blood-glucose sensor (Dexcom G7 Sensor) device  Brother   Si each 3 times a day before meals. Change sensor every 10 days   carvedilol (Coreg) 6.25 mg tablet  Brother   Sig: Take 1 tablet (6.25 mg) by mouth 2 times a day. Hold for SBP <110 or HR <55   cholecalciferol (Vitamin D-3) 50 MCG (2000 UT) tablet  Brother   Sig: Take 1 tablet (50 mcg) by mouth once daily.   docusate sodium (Colace) 100 mg capsule     Sig: Take 1 capsule (100 mg) by mouth 2 times a day as needed for constipation for up to 10 days.   fluconazole (Diflucan) 200 mg tablet  Brother   Sig: Take 2 tablets (400 mg) by mouth once daily.   insulin glargine (Basaglar KwikPen U-100 Insulin) 100 unit/mL (3 mL) pen  Brother   Sig: Inject 8 Units under the skin  "once daily in the morning. Take in AM with Prednisone   insulin lispro (HumaLOG KwikPen Insulin) 100 unit/mL injection  Brother   Sig: Inject 0-10 Units under the skin 3 times daily (morning, midday, late afternoon). Inject 6 units of Lispro subcutaneously three times a day before meals in addition to the following sliding scale:   0 unit(s) if Blood glucose is between   2 unit(s) if Blood glucose is between 151-200  4 unit(s) if Blood glucose is between 201-250  6 unit(s) if Blood glucose is between 251-300  8 unit(s) if Blood glucose is between 301-350  10 unit(s) if Blood glucose is between 351-400    If blood glucose is greater than 400 mg/dL, give max insulin per sliding scale AND then contact provider.   insulin lispro 100 unit/mL injection  Brother   Sig: Inject 8 units with breakfast and lunch, and 4 units with dinner, increase each dose by sliding scale, expect up to 30 units/day   lancets 33 gauge misc  Brother   Si Lancet 3 times a day before meals.   lancets misc  Brother   Si each 4 times a day. Use to check glucose 4 times daily, before meals and at bedtime   magnesium oxide (Mag-Ox) 400 mg tablet  Brother   Sig: Take 2 tablets (800 mg) by mouth 2 times a day.   mycophenolate (Cellcept) 250 mg capsule  Brother   Sig: Take 4 capsules (1,000 mg) by mouth every 12 hours.   pantoprazole (ProtoNix) 40 mg EC tablet  Brother   Sig: Take 1 tablet (40 mg) by mouth once daily in the morning. Take before meals. Do not crush, chew, or split.   pen needle, diabetic 31 gauge x 3/16\" needle  Brother   Si each 3 times a day before meals.   pen needle, diabetic 32 gauge x 5/32\" needle  Brother   Si each 4 times a day. Use to inject insulin 4 times daily   predniSONE (Deltasone) 5 mg tablet  Brother   Sig: Take 2 tablets (10 mg) by mouth once daily.   sulfamethoxazole-trimethoprim (Bactrim) 400-80 mg tablet  Brother   Sig: Take 1 tablet by mouth once daily.   tacrolimus (Prograf) 1 mg capsule  " "Brother   Sig: Take 1 capsule (1 mg) by mouth 2 times a day.   tamsulosin (Flomax) 0.4 mg 24 hr capsule  Brother   Sig: Take 1 capsule (0.4 mg) by mouth once daily.   ursodiol (Actigall) 300 mg capsule  Brother   Sig: Take 1 capsule (300 mg) by mouth 3 times a day.      Facility-Administered Medications: None        Patient declines M2B at discharge.     Sources:   [facility]  Pharmacy dispense history  Patient interview did not know home meds;  brother manages meds  Sibling, brother brought in all Rx bottles, updated med list from transplant team with notes of changes.  Brother Lenny manages home meds and is extremely organized and systematic with the pillbox and keeping track of med changes and current list  Chart Review  Care Everywhere  Transplant office visit 2/7/25    Additional Comments:  none      Peter Dejesus, PharmD  Transitions of Care Pharmacist  02/11/25     Secure Chat preferred   If no response call [phone] or [employer] \"Med Rec\"    "

## 2025-02-11 NOTE — HOSPITAL COURSE
Goran Ibrahim is a 54 year old M with a PMHx significant for cryptogenic cirrhosis s/p simultaneous OLT/DDKT 1/20/25 w/ Dr. Wright/Dr. Trey Kline, also has HTN and T2DM who presented as a direct admit for hyperkalemia. Patient presented after interval labs demonstrated a potassium of 6.1 w/out evidence of hemolysis. 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 He has been making 1800-2000cc of urine daily per patient's brother.     Hyperkalemia was treated medically with insulin, D50, calcium gluconate, Lokelma, and lasix 100 mg. Fluconazole was discontinued as he was near course completion and possibly linked to elevated tac level and hyperkalemia. Found to have bacteremia with Enterococcus faecium without a clear source, possibly GI/biliary translocation in the setting of recent surgery. CT abdomen did not show any significant ascites but showed multifocal loculated fluid collection surrounding the superior and inferior poles of transplant kidney which may represent postsurgical seroma or subacute hematoma.  Two sets of repeat blood cultures and TTE is negative for valvular vegetations. Daptomycin initiated and switched to PO Linezolid for planned 2 week course from last negative blood culture (until 2/26). Staples and sutures from wounds were removed prior to discharge. Uretal stent removed 2/13 with Dr. Wright.    Team pharmacist met with patient and reviewed pill box. He is tolerating a diabetic diet and is stable for discharge home today. He will follow up in transplant clinic next week as scheduled and have labs drawn Monday and Thursday.

## 2025-02-11 NOTE — NURSING NOTE
4 Eyes Skin Assessment    Reason for assessment? Weekly skin rounds  Does the patient have a wound? no  Wound RN consult placed? N/A  Preventative foam dressing applied? No      Four eyes skin check performed with RUBI.

## 2025-02-11 NOTE — SIGNIFICANT EVENT
Lab Results   Component Value Date    GLUCOSE 143 (H) 02/10/2025    CALCIUM 10.1 02/10/2025     (L) 02/10/2025    K 5.4 (H) 02/10/2025    CO2 14 (L) 02/10/2025     02/10/2025    BUN 81 (H) 02/10/2025    CREATININE 2.37 (H) 02/10/2025     Would cancel ICU transfer.  Start sodium bicarb 1300 mg tid  Recheck lab again in am  Discussed with SICU team    Bridget Wren MD

## 2025-02-11 NOTE — SIGNIFICANT EVENT
01/21/25 1130   Patient Interaction   Organ Liver   Type of Interaction Phone conference   Interdisciplinary Rounds   Attendance Surgeon;Physician;CHERYL;Coordinator;Pharmacist   Topics Discussed Medications;Blood test results;Activity;Imaging/test results     Transplant Surgery Multidisciplinary Team Note    Goran Ibrahim is a 54 y.o. male   POD# 22 from a Kidney and Liver from a DCD donor. His post operative complications: None    24 Hour Events  1. No acute events    Last Recorded Vitals  Visit Vitals  /82 (BP Location: Right arm, Patient Position: Lying)   Pulse 66   Temp 36.4 °C (97.5 °F) (Temporal)   Resp 18      Intake/Output last 3 Shifts:    Intake/Output Summary (Last 24 hours) at 2/11/2025 1121  Last data filed at 2/11/2025 1104  Gross per 24 hour   Intake 240 ml   Output 1618 ml   Net -1378 ml      Vitals:    02/11/25 0515   Weight: 107 kg (235 lb 14.3 oz)        Assessment/Plan   Principal Problem:    S/P SLK  - kidney & liver function stable  - will remove staples & sutures from abdominal incision     Hyperkalemia   - stopping fluc & giving 500 ml LR bolus        Management after organ transplant  Active Problems:  Patient Active Problem List   Diagnosis    Hypertension    Liver cirrhosis (Multi)    Esophageal varices in alcoholic cirrhosis (Multi)    Hepatic cirrhosis, unspecified hepatic cirrhosis type, unspecified whether ascites present (Multi)    ESRD (end stage renal disease) (Multi)    Steroid-induced hyperglycemia    Kidney replaced by transplant (HHS-HCC)    Liver transplant status    Type 2 diabetes mellitus without complication (Multi)    Hyperkalemia        Immunosuppression reviewed and adjusted       Induction: Simulect       Tacrolimus goal 8-10 ng/mL. Current dose  2  mg BID, starting tonight         MMF 1000 mg PO BID       Solumedrol taper  DVT prophylaxis SCDS  PT/OT  Diet: 75 g diabetic diet  Anticipated discharge TBD    Joanne Loja, APRN-CNP

## 2025-02-11 NOTE — CARE PLAN
The patient's goals for the shift include      The clinical goals for the shift include maintain HDS during shift

## 2025-02-12 ENCOUNTER — ANESTHESIA EVENT (OUTPATIENT)
Dept: OPERATING ROOM | Facility: HOSPITAL | Age: 55
End: 2025-02-12
Payer: MEDICAID

## 2025-02-12 ENCOUNTER — APPOINTMENT (OUTPATIENT)
Dept: CARDIOLOGY | Facility: HOSPITAL | Age: 55
End: 2025-02-12
Payer: MEDICAID

## 2025-02-12 ENCOUNTER — APPOINTMENT (OUTPATIENT)
Dept: RADIOLOGY | Facility: HOSPITAL | Age: 55
End: 2025-02-12
Payer: MEDICAID

## 2025-02-12 LAB
ALBUMIN SERPL BCP-MCNC: 3.4 G/DL (ref 3.4–5)
ALP SERPL-CCNC: 125 U/L (ref 33–120)
ALT SERPL W P-5'-P-CCNC: 6 U/L (ref 10–52)
ANION GAP SERPL CALC-SCNC: 18 MMOL/L (ref 10–20)
AST SERPL W P-5'-P-CCNC: 10 U/L (ref 9–39)
BILIRUB DIRECT SERPL-MCNC: 0.3 MG/DL (ref 0–0.3)
BILIRUB SERPL-MCNC: 0.8 MG/DL (ref 0–1.2)
BUN SERPL-MCNC: 77 MG/DL (ref 6–23)
CALCIUM SERPL-MCNC: 9.2 MG/DL (ref 8.6–10.6)
CHLORIDE SERPL-SCNC: 99 MMOL/L (ref 98–107)
CO2 SERPL-SCNC: 15 MMOL/L (ref 21–32)
CREAT SERPL-MCNC: 2.75 MG/DL (ref 0.5–1.3)
EGFRCR SERPLBLD CKD-EPI 2021: 26 ML/MIN/1.73M*2
EJECTION FRACTION APICAL 4 CHAMBER: 56.8
EJECTION FRACTION: 53 %
ERYTHROCYTE [DISTWIDTH] IN BLOOD BY AUTOMATED COUNT: 17.6 % (ref 11.5–14.5)
GLUCOSE BLD MANUAL STRIP-MCNC: 190 MG/DL (ref 74–99)
GLUCOSE BLD MANUAL STRIP-MCNC: 204 MG/DL (ref 74–99)
GLUCOSE BLD MANUAL STRIP-MCNC: 204 MG/DL (ref 74–99)
GLUCOSE SERPL-MCNC: 169 MG/DL (ref 74–99)
HCT VFR BLD AUTO: 29.1 % (ref 41–52)
HGB BLD-MCNC: 9.6 G/DL (ref 13.5–17.5)
LEFT ATRIUM VOLUME AREA LENGTH INDEX BSA: 29.3 ML/M2
LEFT VENTRICLE INTERNAL DIMENSION DIASTOLE: 4.6 CM (ref 3.5–6)
LEFT VENTRICULAR OUTFLOW TRACT DIAMETER: 2.61 CM
MAGNESIUM SERPL-MCNC: 2.24 MG/DL (ref 1.6–2.4)
MCH RBC QN AUTO: 29.5 PG (ref 26–34)
MCHC RBC AUTO-ENTMCNC: 33 G/DL (ref 32–36)
MCV RBC AUTO: 90 FL (ref 80–100)
NRBC BLD-RTO: 0 /100 WBCS (ref 0–0)
PHOSPHATE SERPL-MCNC: 4 MG/DL (ref 2.5–4.9)
PLATELET # BLD AUTO: 206 X10*3/UL (ref 150–450)
POTASSIUM SERPL-SCNC: 4.2 MMOL/L (ref 3.5–5.3)
PROT SERPL-MCNC: 7.3 G/DL (ref 6.4–8.2)
RBC # BLD AUTO: 3.25 X10*6/UL (ref 4.5–5.9)
SODIUM SERPL-SCNC: 128 MMOL/L (ref 136–145)
TACROLIMUS BLD-MCNC: 7 NG/ML
TRICUSPID ANNULAR PLANE SYSTOLIC EXCURSION: 1.7 CM
WBC # BLD AUTO: 11.9 X10*3/UL (ref 4.4–11.3)

## 2025-02-12 PROCEDURE — 80197 ASSAY OF TACROLIMUS: CPT

## 2025-02-12 PROCEDURE — 74176 CT ABD & PELVIS W/O CONTRAST: CPT

## 2025-02-12 PROCEDURE — 80076 HEPATIC FUNCTION PANEL: CPT

## 2025-02-12 PROCEDURE — 2500000004 HC RX 250 GENERAL PHARMACY W/ HCPCS (ALT 636 FOR OP/ED): Mod: SE | Performed by: INTERNAL MEDICINE

## 2025-02-12 PROCEDURE — 80053 COMPREHEN METABOLIC PANEL: CPT

## 2025-02-12 PROCEDURE — 2500000004 HC RX 250 GENERAL PHARMACY W/ HCPCS (ALT 636 FOR OP/ED): Mod: SE

## 2025-02-12 PROCEDURE — 2500000002 HC RX 250 W HCPCS SELF ADMINISTERED DRUGS (ALT 637 FOR MEDICARE OP, ALT 636 FOR OP/ED): Mod: SE

## 2025-02-12 PROCEDURE — 36415 COLL VENOUS BLD VENIPUNCTURE: CPT

## 2025-02-12 PROCEDURE — 84100 ASSAY OF PHOSPHORUS: CPT

## 2025-02-12 PROCEDURE — 76776 US EXAM K TRANSPL W/DOPPLER: CPT

## 2025-02-12 PROCEDURE — 99254 IP/OBS CNSLTJ NEW/EST MOD 60: CPT | Performed by: INTERNAL MEDICINE

## 2025-02-12 PROCEDURE — 99232 SBSQ HOSP IP/OBS MODERATE 35: CPT | Performed by: TRANSPLANT SURGERY

## 2025-02-12 PROCEDURE — 2500000002 HC RX 250 W HCPCS SELF ADMINISTERED DRUGS (ALT 637 FOR MEDICARE OP, ALT 636 FOR OP/ED): Mod: SE | Performed by: INTERNAL MEDICINE

## 2025-02-12 PROCEDURE — 82947 ASSAY GLUCOSE BLOOD QUANT: CPT

## 2025-02-12 PROCEDURE — 93308 TTE F-UP OR LMTD: CPT | Performed by: INTERNAL MEDICINE

## 2025-02-12 PROCEDURE — 76776 US EXAM K TRANSPL W/DOPPLER: CPT | Performed by: RADIOLOGY

## 2025-02-12 PROCEDURE — 99233 SBSQ HOSP IP/OBS HIGH 50: CPT | Performed by: INTERNAL MEDICINE

## 2025-02-12 PROCEDURE — 2020000001 HC ICU ROOM DAILY

## 2025-02-12 PROCEDURE — 93325 DOPPLER ECHO COLOR FLOW MAPG: CPT | Performed by: INTERNAL MEDICINE

## 2025-02-12 PROCEDURE — 93325 DOPPLER ECHO COLOR FLOW MAPG: CPT

## 2025-02-12 PROCEDURE — 2500000001 HC RX 250 WO HCPCS SELF ADMINISTERED DRUGS (ALT 637 FOR MEDICARE OP): Mod: SE

## 2025-02-12 PROCEDURE — P9045 ALBUMIN (HUMAN), 5%, 250 ML: HCPCS | Mod: JZ,SE

## 2025-02-12 PROCEDURE — 74176 CT ABD & PELVIS W/O CONTRAST: CPT | Performed by: RADIOLOGY

## 2025-02-12 PROCEDURE — 85027 COMPLETE CBC AUTOMATED: CPT

## 2025-02-12 PROCEDURE — 83735 ASSAY OF MAGNESIUM: CPT

## 2025-02-12 RX ORDER — SODIUM CHLORIDE 9 MG/ML
75 INJECTION, SOLUTION INTRAVENOUS CONTINUOUS
Status: DISCONTINUED | OUTPATIENT
Start: 2025-02-13 | End: 2025-02-13

## 2025-02-12 RX ORDER — MYCOPHENOLATE MOFETIL 250 MG/1
500 CAPSULE ORAL
Status: DISCONTINUED | OUTPATIENT
Start: 2025-02-12 | End: 2025-02-14 | Stop reason: HOSPADM

## 2025-02-12 RX ORDER — ALBUMIN HUMAN 50 G/1000ML
50 SOLUTION INTRAVENOUS ONCE
Status: COMPLETED | OUTPATIENT
Start: 2025-02-12 | End: 2025-02-12

## 2025-02-12 RX ORDER — TACROLIMUS 0.5 MG/1
0.5 CAPSULE ORAL
Status: DISCONTINUED | OUTPATIENT
Start: 2025-02-12 | End: 2025-02-12

## 2025-02-12 RX ORDER — TACROLIMUS 0.5 MG/1
0.5 CAPSULE ORAL
Status: DISCONTINUED | OUTPATIENT
Start: 2025-02-12 | End: 2025-02-13

## 2025-02-12 RX ADMIN — INSULIN LISPRO 4 UNITS: 100 INJECTION, SOLUTION INTRAVENOUS; SUBCUTANEOUS at 17:52

## 2025-02-12 RX ADMIN — PIPERACILLIN SODIUM AND TAZOBACTAM SODIUM 2.25 G: 2; .25 INJECTION, SOLUTION INTRAVENOUS at 09:54

## 2025-02-12 RX ADMIN — ALBUMIN HUMAN 50 G: 0.05 INJECTION, SOLUTION INTRAVENOUS at 12:37

## 2025-02-12 RX ADMIN — INSULIN LISPRO 2 UNITS: 100 INJECTION, SOLUTION INTRAVENOUS; SUBCUTANEOUS at 09:54

## 2025-02-12 RX ADMIN — TAMSULOSIN HYDROCHLORIDE 0.4 MG: 0.4 CAPSULE ORAL at 10:00

## 2025-02-12 RX ADMIN — SODIUM CHLORIDE 500 ML: 9 INJECTION, SOLUTION INTRAVENOUS at 17:00

## 2025-02-12 RX ADMIN — INSULIN LISPRO 8 UNITS: 100 INJECTION, SOLUTION INTRAVENOUS; SUBCUTANEOUS at 09:54

## 2025-02-12 RX ADMIN — ASPIRIN 81 MG: 81 TABLET, COATED ORAL at 10:00

## 2025-02-12 RX ADMIN — MAGNESIUM OXIDE TAB 400 MG (241.3 MG ELEMENTAL MG) 800 MG: 400 (241.3 MG) TAB at 10:00

## 2025-02-12 RX ADMIN — ACYCLOVIR 400 MG: 400 TABLET ORAL at 20:24

## 2025-02-12 RX ADMIN — DAPTOMYCIN 850 MG: 500 INJECTION, POWDER, LYOPHILIZED, FOR SOLUTION INTRAVENOUS at 11:52

## 2025-02-12 RX ADMIN — URSODIOL 300 MG: 300 CAPSULE ORAL at 10:00

## 2025-02-12 RX ADMIN — MYCOPHENOLATE MOFETIL 1000 MG: 250 CAPSULE ORAL at 06:00

## 2025-02-12 RX ADMIN — PREDNISONE 10 MG: 10 TABLET ORAL at 10:00

## 2025-02-12 RX ADMIN — SODIUM BICARBONATE 1300 MG: 650 TABLET ORAL at 10:00

## 2025-02-12 RX ADMIN — PIPERACILLIN SODIUM AND TAZOBACTAM SODIUM 2.25 G: 2; .25 INJECTION, SOLUTION INTRAVENOUS at 17:00

## 2025-02-12 RX ADMIN — AMLODIPINE BESYLATE 10 MG: 10 TABLET ORAL at 10:00

## 2025-02-12 RX ADMIN — ACYCLOVIR 400 MG: 400 TABLET ORAL at 10:00

## 2025-02-12 RX ADMIN — URSODIOL 300 MG: 300 CAPSULE ORAL at 17:00

## 2025-02-12 RX ADMIN — Medication 2000 UNITS: at 10:00

## 2025-02-12 RX ADMIN — PIPERACILLIN SODIUM AND TAZOBACTAM SODIUM 2.25 G: 2; .25 INJECTION, SOLUTION INTRAVENOUS at 01:35

## 2025-02-12 RX ADMIN — CARVEDILOL 6.25 MG: 6.25 TABLET, FILM COATED ORAL at 10:00

## 2025-02-12 RX ADMIN — TACROLIMUS 0.5 MG: 0.5 CAPSULE ORAL at 18:35

## 2025-02-12 RX ADMIN — INSULIN GLARGINE 10 UNITS: 100 INJECTION, SOLUTION SUBCUTANEOUS at 10:01

## 2025-02-12 RX ADMIN — SODIUM BICARBONATE 1300 MG: 650 TABLET ORAL at 17:00

## 2025-02-12 RX ADMIN — URSODIOL 300 MG: 300 CAPSULE ORAL at 20:24

## 2025-02-12 RX ADMIN — PANTOPRAZOLE SODIUM 40 MG: 40 TABLET, DELAYED RELEASE ORAL at 06:00

## 2025-02-12 RX ADMIN — MAGNESIUM OXIDE TAB 400 MG (241.3 MG ELEMENTAL MG) 800 MG: 400 (241.3 MG) TAB at 20:24

## 2025-02-12 RX ADMIN — MYCOPHENOLATE MOFETIL 500 MG: 250 CAPSULE ORAL at 18:35

## 2025-02-12 RX ADMIN — SODIUM BICARBONATE 1300 MG: 650 TABLET ORAL at 20:24

## 2025-02-12 RX ADMIN — SODIUM CHLORIDE 75 ML/HR: 9 INJECTION, SOLUTION INTRAVENOUS at 23:40

## 2025-02-12 RX ADMIN — CARVEDILOL 6.25 MG: 6.25 TABLET, FILM COATED ORAL at 20:24

## 2025-02-12 RX ADMIN — PIPERACILLIN SODIUM AND TAZOBACTAM SODIUM 2.25 G: 2; .25 INJECTION, SOLUTION INTRAVENOUS at 21:24

## 2025-02-12 RX ADMIN — SODIUM ZIRCONIUM CYCLOSILICATE 10 G: 10 POWDER, FOR SUSPENSION ORAL at 11:52

## 2025-02-12 ASSESSMENT — COGNITIVE AND FUNCTIONAL STATUS - GENERAL
STANDING UP FROM CHAIR USING ARMS: A LITTLE
TOILETING: A LITTLE
DAILY ACTIVITIY SCORE: 20
DRESSING REGULAR LOWER BODY CLOTHING: A LITTLE
DRESSING REGULAR UPPER BODY CLOTHING: A LITTLE
MOVING TO AND FROM BED TO CHAIR: A LITTLE
TURNING FROM BACK TO SIDE WHILE IN FLAT BAD: A LITTLE
HELP NEEDED FOR BATHING: A LITTLE
CLIMB 3 TO 5 STEPS WITH RAILING: A LOT
WALKING IN HOSPITAL ROOM: A LITTLE
MOBILITY SCORE: 18

## 2025-02-12 ASSESSMENT — PAIN SCALES - GENERAL
PAINLEVEL_OUTOF10: 0 - NO PAIN

## 2025-02-12 ASSESSMENT — PAIN INTENSITY VAS: VAS_PAIN_BASICVITALS_IP: 0

## 2025-02-12 ASSESSMENT — PAIN SCALES - PAIN ASSESSMENT IN ADVANCED DEMENTIA (PAINAD)
BREATHING: NORMAL
TOTALSCORE: 0
BODYLANGUAGE: RELAXED
FACIALEXPRESSION: SMILING OR INEXPRESSIVE
CONSOLABILITY: NO NEED TO CONSOLE

## 2025-02-12 ASSESSMENT — PAIN SCALES - WONG BAKER
WONGBAKER_NUMERICALRESPONSE: NO HURT

## 2025-02-12 NOTE — SIGNIFICANT EVENT
01/21/25 1130   Patient Interaction   Organ Liver   Type of Interaction Phone conference   Interdisciplinary Rounds   Attendance Surgeon;Physician;CHERYL;Coordinator;Pharmacist   Topics Discussed Medications;Blood test results;Activity;Imaging/test results     Transplant Surgery Multidisciplinary Team Note    Goran Ibrahim is a 54 y.o. male   POD# 23 from a Kidney and Liver from a DCD donor. His post operative complications: None    24 Hour Events  1. No acute events    Last Recorded Vitals  Visit Vitals  /76 (BP Location: Right arm, Patient Position: Lying)   Pulse 66   Temp 36.6 °C (97.9 °F) (Temporal)   Resp 18      Intake/Output last 3 Shifts:    Intake/Output Summary (Last 24 hours) at 2/12/2025 1339  Last data filed at 2/12/2025 1200  Gross per 24 hour   Intake 120 ml   Output 1825 ml   Net -1705 ml      Vitals:    02/11/25 0515   Weight: 107 kg (235 lb 14.3 oz)        Assessment/Plan   Principal Problem:    S/P SLK  - kidney & liver function stable  - staples & sutures removed from abdominal incision   - ureteral stent removal tomorrow with Dr. Wright    Hyperkalemia   - fluconazole discontinued yesterday, K+ 4.2 this morning, continue lokemla  - 500 ml albumin & 500 ml NS today    Infectious Disease  - enterococcus faecium growing in 2/2 blood cultures  - Echo ordered, no vegetation noted    - CT A/P w contrast today  - ID consulted, starting dapto            Management after organ transplant  Active Problems:  Patient Active Problem List   Diagnosis    Hypertension    Liver cirrhosis (Multi)    Esophageal varices in alcoholic cirrhosis (Multi)    Hepatic cirrhosis, unspecified hepatic cirrhosis type, unspecified whether ascites present (Multi)    ESRD (end stage renal disease) (Multi)    Steroid-induced hyperglycemia    Kidney replaced by transplant (HHS-HCC)    Liver transplant status    Type 2 diabetes mellitus without complication (Multi)    Hyperkalemia        Immunosuppression reviewed and  adjusted       Induction: Simulect       Tacrolimus goal 8-10 ng/mL. Current dose  2  mg BID, starting tonight         MMF 1000 mg PO BID       Solumedrol taper  DVT prophylaxis SCDS  PT/OT  Diet: 75 g diabetic diet  Anticipated discharge TBD    Joanne Loja, APRN-CNP

## 2025-02-12 NOTE — PROGRESS NOTES
Goran Ibrahim is a 54 y.o. male on day 2 of admission presenting with Hyperkalemia.      Transitional Care Coordination Progress Note:  Patient discussed during interdisciplinary rounds.      Plan per Medical/Surgical team:   Home. No home going needs anticipated for this pt at this time.          Discharge disposition: Home. No home going needs anticipated for this pt at this time.       Potential Barriers: None     ADOD:  2/14     This TCC will continue to follow for home going needs and safe DC plan.      PRICE COOK

## 2025-02-12 NOTE — CONSULTS
Inpatient consult to Infectious Diseases  Consult performed by: Jacob Laughlin MD  Consult ordered by: Joanne Loja, APRN-CNP        Referred by     Primary MD: Seun Rodrigues MD    Reason For Consult Bacteremia    History Of Present Illness  Goran Ibrahim is a 54 y.o. male with history of hypertension, CKD, cirrhosis of liver complicated by esophageal varices and hepatic encephalopathy status post SLK (CMV D-/R-, 1/20/25, induction with Simulect), immunosuppression with tacrolimus CellCept and prednisone and anti-infective prophylaxis with acyclovir, clotrimazole and Bactrim admitted to the hospital for elevated potassium levels on February 10.    Patient underwent liver and kidney transplant on January 20 and had an uncomplicated postoperative course and discharged on January 28.    Patient had routine blood work on February 10 and noted to have hyperkalemia and some AKUA.  Patient is admitted to the hospital.  After admission patient was noted to have elevated white count and so 2 sets of blood cultures were performed and patient was given a dose of vancomycin and continued on Zosyn.  Blood cultures grew Enterococcus faecium and so ID is consulted.    Patient does not provide much medical history.  Patient denied fever or chills prior to admission.  Patient denied nausea vomiting or diarrhea.  Patient denied dysuria or increased frequency of urination.  Patient complains of generalized fatigue which he states is since surgery.  When specifically asked if he has abdominal pain patient states he always had abdominal pain.  Patient denies history of recurrent ascites requiring paracentesis.    Since admission surgical staples were removed and there is some drainage from the surgical incision and soaked his hospital gown.     Past Medical History  He has a past medical history of Cirrhosis (Multi), CKD (chronic kidney disease), and Hypertension.    Surgical History  He has a past surgical history that  includes US guided abdominal paracentesis (6/22/2023); IR angiogram celiac (5/19/2023); and US guided abdominal paracentesis (5/19/2023).     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  Allergies  Patient has no known allergies.     There is no immunization history for the selected administration types on file for this patient.  Medications  Home medications:  Medications Prior to Admission   Medication Sig Dispense Refill Last Dose/Taking    acyclovir (Zovirax) 400 mg tablet Take 1 tablet (400 mg) by mouth 2 times a day. 60 tablet 0     alcohol swabs pads, medicated Apply 1 each topically 4 times a day. Use prior to checking glucose or injecting insulin 400 each 3     amLODIPine (Norvasc) 10 mg tablet Take 1 tablet (10 mg) by mouth once daily. Do not fill before January 27, 2025. 30 tablet 11     aspirin 81 mg EC tablet Take 1 tablet (81 mg) by mouth once daily. 30 tablet 0     blood sugar diagnostic (Blood Glucose Test) strip 100 strips 4 times a day. Use to check glucose 4 times daily, before meals and at bedtime 400 strip 3     blood-glucose meter misc 1 each 4 times a day. Use to check glucose 4 times daily, before meals and at bedtime 1 each 0     blood-glucose meter,continuous (Dexcom G7 ) misc Use as instructed 1 each 0     blood-glucose sensor (Dexcom G7 Sensor) device 1 each 3 times a day before meals. Change sensor every 10 days 3 each 11     carvedilol (Coreg) 6.25 mg tablet Take 1 tablet (6.25 mg) by mouth 2 times a day. Hold for SBP <110 or HR  <55       cholecalciferol (Vitamin D-3) 50 MCG (2000 UT) tablet Take 1 tablet (50 mcg) by mouth once daily. 30 tablet 11     [] docusate sodium (Colace) 100 mg capsule Take 1 capsule (100 mg) by mouth 2 times a day as needed for constipation for up to 10 days. 20 capsule 0     fluconazole (Diflucan) 200 mg tablet Take 2 tablets (400 mg) by mouth once daily. 60 tablet 0     insulin glargine (Basaglar KwikPen U-100 Insulin) 100 unit/mL (3 mL) pen Inject 8 Units under the skin once daily in the morning. Take in AM with Prednisone 15 mL 0     insulin lispro (HumaLOG KwikPen Insulin) 100 unit/mL injection Inject 0-10 Units under the skin 3 times daily (morning, midday, late afternoon). Inject 6 units of Lispro subcutaneously three times a day before meals in addition to the following sliding scale:   0 unit(s) if Blood glucose is between   2 unit(s) if Blood glucose is between 151-200  4 unit(s) if Blood glucose is between 201-250  6 unit(s) if Blood glucose is between 251-300  8 unit(s) if Blood glucose is between 301-350  10 unit(s) if Blood glucose is between 351-400    If blood glucose is greater than 400 mg/dL, give max insulin per sliding scale AND then contact provider. 9 mL 0     insulin lispro 100 unit/mL injection Inject 8 units with breakfast and lunch, and 4 units with dinner, increase each dose by sliding scale, expect up to 30 units/day       lancets 33 gauge misc 1 Lancet 3 times a day before meals. 100 each 0     lancets misc 1 each 4 times a day. Use to check glucose 4 times daily, before meals and at bedtime 400 each 3     magnesium oxide (Mag-Ox) 400 mg tablet Take 2 tablets (800 mg) by mouth 2 times a day. 240 tablet 11     mycophenolate (Cellcept) 250 mg capsule Take 4 capsules (1,000 mg) by mouth every 12 hours. 240 capsule 0     pantoprazole (ProtoNix) 40 mg EC tablet Take 1 tablet (40 mg) by mouth once daily in the morning. Take before meals. Do not crush, chew, or split. 30 tablet 0   "   pen needle, diabetic 31 gauge x 3/16\" needle 1 each 3 times a day before meals. 100 each 0     pen needle, diabetic 32 gauge x 5/32\" needle 1 each 4 times a day. Use to inject insulin 4 times daily 400 each 3     predniSONE (Deltasone) 5 mg tablet Take 2 tablets (10 mg) by mouth once daily. 60 tablet 11     sulfamethoxazole-trimethoprim (Bactrim) 400-80 mg tablet Take 1 tablet by mouth once daily. 30 tablet 0     tacrolimus (Prograf) 1 mg capsule Take 1 capsule (1 mg) by mouth 2 times a day. 60 capsule 11     tamsulosin (Flomax) 0.4 mg 24 hr capsule Take 1 capsule (0.4 mg) by mouth once daily. 30 capsule 0     ursodiol (Actigall) 300 mg capsule Take 1 capsule (300 mg) by mouth 3 times a day. 90 capsule 0      Current medications:  Scheduled medications  acyclovir, 400 mg, oral, BID  albumin human, 50 g, intravenous, Once  amLODIPine, 10 mg, oral, Daily  aspirin, 81 mg, oral, Daily  carvedilol, 6.25 mg, oral, BID  cholecalciferol, 2,000 Units, oral, Daily  daptomycin, 10 mg/kg (Adjusted), intravenous, q24h  heparin (porcine), 5,000 Units, subcutaneous, q8h  insulin glargine, 10 Units, subcutaneous, Daily  insulin lispro, 0-10 Units, subcutaneous, TID AC  insulin lispro, 4 Units, subcutaneous, Daily with evening meal  insulin lispro, 8 Units, subcutaneous, Daily with breakfast  insulin lispro, 8 Units, subcutaneous, Daily with lunch  magnesium oxide, 800 mg, oral, BID  mycophenolate, 1,000 mg, oral, q12h EDINSON  pantoprazole, 40 mg, oral, Daily before breakfast  piperacillin-tazobactam, 2.25 g, intravenous, q6h  polyethylene glycol, 17 g, oral, Daily  predniSONE, 10 mg, oral, Daily  sodium bicarbonate, 1,300 mg, oral, TID  sodium chloride, 500 mL, intravenous, Once  sodium zirconium cyclosilicate, 10 g, oral, Daily with lunch  tacrolimus, 0.5 mg, oral, q12h EDINSON  tamsulosin, 0.4 mg, oral, Daily  ursodiol, 300 mg, oral, TID      Continuous medications  [START ON 2/13/2025] sodium chloride 0.9%, 75 mL/hr      PRN " medications  PRN medications: dextrose, dextrose, glucagon    Review of Systems negative except above     Objective  Range of Vitals (last 24 hours)  Heart Rate:  [62-66]   Temp:  [36.3 °C (97.3 °F)-36.9 °C (98.4 °F)]   Resp:  [16-18]   BP: (110-123)/(74-81)   SpO2:  [95 %-98 %]   Daily Weight  02/11/25 : 107 kg (235 lb 14.3 oz)    Body mass index is 33.85 kg/m².     Physical Exam  GENERAL APPEARANCE: Awake and alert and not in any distress  HEENT: Atraumatic and normocephalic.  Edentulous.  CARDIAC: Regular   LUNGS: Clear  ABDOMEN: Grossly distended but soft and nontender.  Surgical incisions appear clean with no erythema  EXTREMITIES: 1+ edema  SKIN: No rash or ulcers  CNS: Well-oriented and answers appropriately       Labs  Results from last 72 hours   Lab Units 02/12/25  0512 02/11/25  0510 02/10/25  1931 02/10/25  0933   WBC AUTO x10*3/uL 11.9* 15.5* 16.8* 15.1*   HEMOGLOBIN g/dL 9.6* 11.0* 10.9* 11.0*   HEMATOCRIT % 29.1* 33.0* 33.0* 34.5*   PLATELETS AUTO x10*3/uL 206 269 297 282   NEUTROS PCT AUTO %  --   --   --  82.6   LYMPHS PCT AUTO %  --   --   --  7.2   MONOS PCT AUTO %  --   --   --  7.0   EOS PCT AUTO %  --   --   --  0.7     Results from last 72 hours   Lab Units 02/12/25  0512 02/11/25  0510 02/10/25  2318   SODIUM mmol/L 128* 129* 131*   POTASSIUM mmol/L 4.2 5.2 5.4*   CHLORIDE mmol/L 99 100 103   CO2 mmol/L 15* 14* 14*   BUN mg/dL 77* 82* 81*   CREATININE mg/dL 2.75* 2.66* 2.37*   GLUCOSE mg/dL 169* 192* 143*   CALCIUM mg/dL 9.2 10.1 10.1   ANION GAP mmol/L 18 20 19   EGFR mL/min/1.73m*2 26* 27* 31*   PHOSPHORUS mg/dL 4.0 4.3 4.5     Results from last 72 hours   Lab Units 02/12/25  0512 02/11/25  0510 02/10/25  2318 02/10/25  1931 02/10/25  0933   ALK PHOS U/L 125*  --  142*  --  150*   BILIRUBIN TOTAL mg/dL 0.8  --  0.8  --  0.9   BILIRUBIN DIRECT mg/dL 0.3  --  0.3  --  0.4*   PROTEIN TOTAL g/dL 7.3  --  7.3  --  7.7   ALT U/L 6*  --  9*  --  9*   AST U/L 10  --  11  --  10   ALBUMIN g/dL 3.4  "4.1 3.8  3.8   < > 4.0    < > = values in this interval not displayed.     Estimated Creatinine Clearance: 36.7 mL/min (A) (by C-G formula based on SCr of 2.75 mg/dL (H)).  No results found for: \"CRP\", \"SEDRATE\"  HIV 1/2 Antigen/Antibody Screen with Reflex to Confirmation   Date Value Ref Range Status   01/20/2025 Nonreactive Nonreactive Final     Hepatitis C AB   Date Value Ref Range Status   01/20/2025 Nonreactive Nonreactive Final     Comment:     Results from patients taking biotin supplements or receiving high-dose biotin therapy should be interpreted with caution due to possible interference with this test. Providers may contact their local laboratory for further information.     Microbiology  Susceptibility data from last 90 days.  Collected Specimen Info Organism   02/11/25 Blood culture from Peripheral Venipuncture Enterococcus faecium   02/10/25 Blood culture from Peripheral Venipuncture Enterococcus faecium     Imaging         Assessment/Plan     Goran Ibrahim is a 54 y.o. male with history of hypertension, CKD, cirrhosis of liver complicated by esophageal varices and hepatic encephalopathy status post SLK (CMV D-/R-, 1/20/25, induction with Simulect), immunosuppression with tacrolimus CellCept and prednisone and anti-infective prophylaxis with acyclovir, clotrimazole and Bactrim admitted to the hospital for elevated potassium levels on February 10.    Problems  Bacteremia with Enterococcus faecium        No clear source of bacteremia noted on exam but most likely GI/biliary translocation in the setting of recent surgery.  Urine analysis is not consistent with UTI although urine culture in the past grew Enterococcus faecium and also on February 6 it grew mixed bacteria including Enterococcus spp (reviewed culture pictures in microbiology lab).  Abdomen is grossly distended and I am not certain if there is significant ascites or this is just obesity (patient states he is a big person and this is his " normal size belly).  We can start by imaging abdomen and if there is significant ascites a paracentesis can be considered.    2.  AKUA    3. Cirrhosis of liver complicated by esophageal varices and hepatic encephalopathy status post SLK (CMV D-/R-, 1/20/25, induction with Simulect)    Plan  Reviewed cultures in microbiology lab.  Enterococcus faecium appears to be vancomycin sensitive.  Given current AKUA, will avoid vancomycin and start daptomycin 10 mg/kg IV every 24 hours (creatinine clearance 34 mL/min).  Will consider vancomycin later once renal function stabilizes.  Imaging (ultrasound or CT) of abdomen to evaluate for ascites and if there is any significant ascites please consider diagnostic paracentesis.  Continue Zosyn 2.25 g IV every 6 hours for now.  This can be discontinued if imaging of abdomen does not show significant ascites.  Please repeat 1 or 2 sets of blood cultures tomorrow to document clearance of bacteremia.  Agree with transthoracic echo to assess for valvular vegetation although low suspicion for endocarditis.  Anti-infective prophylaxis with acyclovir and Bactrim per liver transplant protocol (Bactrim on hold for hyperkalemia).  Will follow.      Jacob Laughlin MD

## 2025-02-12 NOTE — CARE PLAN
Problem: Pain - Adult  Goal: Verbalizes/displays adequate comfort level or baseline comfort level  Outcome: Progressing  Flowsheets (Taken 2/12/2025 0900)  Verbalizes/displays adequate comfort level or baseline comfort level:   Encourage patient to monitor pain and request assistance   Assess pain using appropriate pain scale   Administer analgesics based on type and severity of pain and evaluate response   Implement non-pharmacological measures as appropriate and evaluate response   Consider cultural and social influences on pain and pain management   Notify Licensed Independent Practitioner if interventions unsuccessful or patient reports new pain     Problem: Safety - Adult  Goal: Free from fall injury  Outcome: Progressing  Flowsheets (Taken 2/12/2025 0900)  Free from fall injury:   Instruct family/caregiver on patient safety   Based on caregiver fall risk screen, instruct family/caregiver to ask for assistance with transferring infant if caregiver noted to have fall risk factors     Problem: Discharge Planning  Goal: Discharge to home or other facility with appropriate resources  Outcome: Progressing  Flowsheets (Taken 2/12/2025 0900)  Discharge to home or other facility with appropriate resources:   Identify barriers to discharge with patient and caregiver   Arrange for needed discharge resources and transportation as appropriate   Identify discharge learning needs (meds, wound care, etc)   Arrange for interpreters to assist at discharge as needed   Refer to discharge planning if patient needs post-hospital services based on physician order or complex needs related to functional status, cognitive ability or social support system   The patient's goals for the shift include      The clinical goals for the shift include Patient will remain free from falls/injuries during this shift.    Over the shift, the patient did remain safe throughout the shift

## 2025-02-12 NOTE — PROGRESS NOTES
TRANSPLANT SURGERY PROGRESS NOTE    Goran Ibrahim underwent transplant surgery on 1/20/2025 (Liver / Kidney) and was evaluated on multidisciplinary inpatient rounds.  I specifically evaluated the management of immunosuppression to ensure adequate exposure required to decrease the risk of rejection.  Furthermore, I reviewed potential side effects including tremor, hyperglycemia, leukopenia, infection, and other neurologic changes.  I reviewed and adjusted infectious prophylaxis based on the patients clinical condition and  protocols.     24 EVENTS:  Admitted with AKUA  Bacterimia     DIAGNOSIS:  Liver replaced by transplant Z94.4, immunosuppression      PHYSICAL EXAMINATION:  Vitals:    02/12/25 0805   BP: 110/76   Pulse: 66   Resp: 18   Temp: 36.6 °C (97.9 °F)   SpO2: 97%        02/10 1900 - 02/12 0659  In: 240 [P.O.:240]  Out: 3243 [Urine:3243]     Weight change:     General Appearance - NAD, Good speech, oriented and alert  Abdomen - soft , not tender, no guarding, no rigidity. No hepatosplenomegaly. Normal bowel sounds. + ascites   Wound c/d/i   Neuro/Psych - appropriate mood and affect. Motor power V/V all extremities. CN I -XII were grossly intact.  Skin - No visible rash      MEDICATION LIST: REVIEWED  acyclovir, 400 mg, BID  amLODIPine, 10 mg, Daily  aspirin, 81 mg, Daily  carvedilol, 6.25 mg, BID  cholecalciferol, 2,000 Units, Daily  daptomycin, 10 mg/kg (Adjusted), q24h  heparin (porcine), 5,000 Units, q8h  insulin glargine, 10 Units, Daily  insulin lispro, 0-10 Units, TID AC  insulin lispro, 4 Units, Daily with evening meal  insulin lispro, 8 Units, Daily with breakfast  insulin lispro, 8 Units, Daily with lunch  magnesium oxide, 800 mg, BID  mycophenolate, 500 mg, q12h EDINSON  pantoprazole, 40 mg, Daily before breakfast  piperacillin-tazobactam, 2.25 g, q6h  polyethylene glycol, 17 g, Daily  predniSONE, 10 mg, Daily  sodium bicarbonate, 1,300 mg, TID  sodium chloride, 500 mL, Once  sodium zirconium  cyclosilicate, 10 g, Daily with lunch  tacrolimus, 0.5 mg, q12h EDINSON  tamsulosin, 0.4 mg, Daily  ursodiol, 300 mg, TID      [START ON 2/13/2025] sodium chloride 0.9%      dextrose, 12.5 g, q15 min PRN  dextrose, 25 g, q15 min PRN  glucagon, 1 mg, q15 min PRN        ALLERGY:  No Known Allergies    LABS:  Results for orders placed or performed during the hospital encounter of 02/10/25 (from the past 24 hours)   POCT GLUCOSE   Result Value Ref Range    POCT Glucose 250 (H) 74 - 99 mg/dL   CBC   Result Value Ref Range    WBC 11.9 (H) 4.4 - 11.3 x10*3/uL    nRBC 0.0 0.0 - 0.0 /100 WBCs    RBC 3.25 (L) 4.50 - 5.90 x10*6/uL    Hemoglobin 9.6 (L) 13.5 - 17.5 g/dL    Hematocrit 29.1 (L) 41.0 - 52.0 %    MCV 90 80 - 100 fL    MCH 29.5 26.0 - 34.0 pg    MCHC 33.0 32.0 - 36.0 g/dL    RDW 17.6 (H) 11.5 - 14.5 %    Platelets 206 150 - 450 x10*3/uL   Magnesium   Result Value Ref Range    Magnesium 2.24 1.60 - 2.40 mg/dL   Tacrolimus   Result Value Ref Range    Tacrolimus  7.0 <=15.0 ng/mL   Hepatic Function Panel   Result Value Ref Range    Albumin 3.4 3.4 - 5.0 g/dL    Bilirubin, Total 0.8 0.0 - 1.2 mg/dL    Bilirubin, Direct 0.3 0.0 - 0.3 mg/dL    Alkaline Phosphatase 125 (H) 33 - 120 U/L    ALT 6 (L) 10 - 52 U/L    AST 10 9 - 39 U/L    Total Protein 7.3 6.4 - 8.2 g/dL   Phosphorus   Result Value Ref Range    Phosphorus 4.0 2.5 - 4.9 mg/dL   Basic Metabolic Panel   Result Value Ref Range    Glucose 169 (H) 74 - 99 mg/dL    Sodium 128 (L) 136 - 145 mmol/L    Potassium 4.2 3.5 - 5.3 mmol/L    Chloride 99 98 - 107 mmol/L    Bicarbonate 15 (L) 21 - 32 mmol/L    Anion Gap 18 10 - 20 mmol/L    Urea Nitrogen 77 (H) 6 - 23 mg/dL    Creatinine 2.75 (H) 0.50 - 1.30 mg/dL    eGFR 26 (L) >60 mL/min/1.73m*2    Calcium 9.2 8.6 - 10.6 mg/dL   POCT GLUCOSE   Result Value Ref Range    POCT Glucose 190 (H) 74 - 99 mg/dL   Transthoracic Echo (TTE) Limited   Result Value Ref Range    LVOT diam 2.61 cm    LA vol index A/L 29.3 ml/m2    Tricuspid  annular plane systolic excursion 1.7 cm    LV EF 53 %    LVIDd 4.60 cm    LV A4C EF 56.8       Lab Results   Component Value Date    ALT 6 (L) 02/12/2025    AST 10 02/12/2025     (H) 02/10/2025    ALKPHOS 125 (H) 02/12/2025    BILITOT 0.8 02/12/2025         ASSESSMENT AND PLAN:    Mr. Ibrahim is a 54 y.o. male who underwent  transplant surgery on 1/20/2025 (Liver / Kidney).    1. Immunosuppression reviewed and adjusted       Tacrolimus: current dose 0.5 mg BID. Plan continue      Today's tacrolimus level is 7.0, Goal tacrolimus trough level is 8-10      Mycophenolate: Yes - 750 mg po BID      Prednisone: Yes - 15 mg      Belatacept: No     2. IV hydration      Replacement completed: Yes      Maintenance: Discontinue    3. Electrolyte     Reviewed renal profile.      Hyperkalemia -- lokalma, low K diet    4. Hypertension medications reviewed        Blood Pressures         2/11/2025  1535 2/11/2025  1927 2/11/2025  2349 2/12/2025  0317 2/12/2025  0805    BP: 120/79 123/81 115/74 121/79 110/76              Continue current management     5. Wound/drain/chaudhary and pain management      Will not use a bed pan    6. GI prophylaxis       On PPI     7. DVT Prophylaxis      SCDs      Subcutaneous Heparin: Yes    8. ID prophylaxis      CMV, PJP and fungal prophylaxis per  [facility] Protocol      Valcyte: Yes    Case was presented at Multi Disciplinary team rounds   Attending physician, consulting physician, , pharmacist, residents and fellow were present at the meeting.    Goran Carrillo MD    Transplant Surgery Attending

## 2025-02-12 NOTE — PROGRESS NOTES
"        INPATIENT TRANSPLANT NEPHROLOGY PROGRESS NOTE          REASON FOR CONSULT:  Immunosuppressive medication management and nephrology related issues.    SUBJECTION:     Afebrile  Feels tired  UOP 1.9 L  No acute event overnight.    PHYCISCAL EXAMINATION:    Visit Vitals  /76 (BP Location: Right arm, Patient Position: Lying)   Pulse 66   Temp 36.6 °C (97.9 °F) (Temporal)   Resp 18   Ht 1.778 m (5' 10\")   Wt 107 kg (235 lb 14.3 oz)   SpO2 97%   BMI 33.85 kg/m²   Smoking Status Former   BSA 2.3 m²        02/10 1900 - 02/12 0659  In: 240 [P.O.:240]  Out: 3243 [Urine:3243]     GEN: No acute distress. Alert, awake and conversive.  HEENT: Sclera anicteric. Moist mucous membranes.  RESP: Breathing non-labored, equal chest rise. On RA.  CV: Regular rate, normotensive  GI: Abdomen soft, moderately distended, nontender. Kidney transplant incision in RLQ closed w/ staples, c/d/I, healing well without erythema or drainage. Liver transplant incision in RUQ closed w/ staples, no erythema. Some serous drainage from the posterior aspect of the incision. No clear dehiscence. Remainder of incision c/d/i. Prior drain sites sutured, no drainage or erythema. Some ecchymosis around drain sites.  : Voiding spontaneously.  MSK: No gross deformities. Moves all extremities spontaneously.  NEURO: Alert and oriented x3. No focal deficits.  SKIN:  Warm and dry    MEDICATION LIST: REVIEWED    acyclovir, 400 mg, BID  albumin human, 50 g, Once  amLODIPine, 10 mg, Daily  aspirin, 81 mg, Daily  carvedilol, 6.25 mg, BID  cholecalciferol, 2,000 Units, Daily  heparin (porcine), 5,000 Units, q8h  insulin glargine, 10 Units, Daily  insulin lispro, 0-10 Units, TID AC  insulin lispro, 4 Units, Daily with evening meal  insulin lispro, 8 Units, Daily with breakfast  insulin lispro, 8 Units, Daily with lunch  magnesium oxide, 800 mg, BID  mycophenolate, 1,000 mg, q12h EDINSON  pantoprazole, 40 mg, Daily before breakfast  piperacillin-tazobactam, 2.25 " g, q6h  polyethylene glycol, 17 g, Daily  predniSONE, 10 mg, Daily  sodium bicarbonate, 1,300 mg, TID  [Held by provider] sodium zirconium cyclosilicate, 10 g, TID  tamsulosin, 0.4 mg, Daily  ursodiol, 300 mg, TID         dextrose, 12.5 g, q15 min PRN  dextrose, 25 g, q15 min PRN  glucagon, 1 mg, q15 min PRN        ALLERGY:  No Known Allergies    LABS:  Results for orders placed or performed during the hospital encounter of 02/10/25 (from the past 24 hours)   POCT GLUCOSE   Result Value Ref Range    POCT Glucose 227 (H) 74 - 99 mg/dL   POCT GLUCOSE   Result Value Ref Range    POCT Glucose 232 (H) 74 - 99 mg/dL   POCT GLUCOSE   Result Value Ref Range    POCT Glucose 250 (H) 74 - 99 mg/dL   CBC   Result Value Ref Range    WBC 11.9 (H) 4.4 - 11.3 x10*3/uL    nRBC 0.0 0.0 - 0.0 /100 WBCs    RBC 3.25 (L) 4.50 - 5.90 x10*6/uL    Hemoglobin 9.6 (L) 13.5 - 17.5 g/dL    Hematocrit 29.1 (L) 41.0 - 52.0 %    MCV 90 80 - 100 fL    MCH 29.5 26.0 - 34.0 pg    MCHC 33.0 32.0 - 36.0 g/dL    RDW 17.6 (H) 11.5 - 14.5 %    Platelets 206 150 - 450 x10*3/uL   Magnesium   Result Value Ref Range    Magnesium 2.24 1.60 - 2.40 mg/dL   Hepatic Function Panel   Result Value Ref Range    Albumin 3.4 3.4 - 5.0 g/dL    Bilirubin, Total 0.8 0.0 - 1.2 mg/dL    Bilirubin, Direct 0.3 0.0 - 0.3 mg/dL    Alkaline Phosphatase 125 (H) 33 - 120 U/L    ALT 6 (L) 10 - 52 U/L    AST 10 9 - 39 U/L    Total Protein 7.3 6.4 - 8.2 g/dL   Phosphorus   Result Value Ref Range    Phosphorus 4.0 2.5 - 4.9 mg/dL   Basic Metabolic Panel   Result Value Ref Range    Glucose 169 (H) 74 - 99 mg/dL    Sodium 128 (L) 136 - 145 mmol/L    Potassium 4.2 3.5 - 5.3 mmol/L    Chloride 99 98 - 107 mmol/L    Bicarbonate 15 (L) 21 - 32 mmol/L    Anion Gap 18 10 - 20 mmol/L    Urea Nitrogen 77 (H) 6 - 23 mg/dL    Creatinine 2.75 (H) 0.50 - 1.30 mg/dL    eGFR 26 (L) >60 mL/min/1.73m*2    Calcium 9.2 8.6 - 10.6 mg/dL   POCT GLUCOSE   Result Value Ref Range    POCT Glucose 190 (H) 74 -  99 mg/dL        ASSESSMENT AND PLAN:    Mr. Ibrahim is a 54 y.o. male who underwent a kidney transplant surgery  on 1/20/2025 (Liver / Kidney).    Principal Problem:    Hyperkalemia  Active Problems:    Kidney replaced by transplant ([facility])      1. ESRD S/P Kidney transplant.   - Renal allograft function:   Lab Results   Component Value Date    CREATININE 2.75 (H) 02/12/2025     Estimated Creatinine Clearance: 36.7 mL/min (A) (by C-G formula based on SCr of 2.75 mg/dL (H)).    Intake/Output Summary (Last 24 hours) at 2/12/2025 0959  Last data filed at 2/12/2025 0805  Gross per 24 hour   Intake 240 ml   Output 1825 ml   Net -1585 ml     - challenge NS    - Continue to monitor UOP and Serum creatinine closely.   - Avoid nephrotoxic agents, NSAIDs and IV contrast   - Strict I/O.   - Renally dose all medications by the most recent CrCl from Cockcroft-Gault formula.    2. Immunosuppression   - continue current immunosuppression   - Monitor tacrolimus trough level   - Defer to tx surgery    3. Anemia and WBC   Lab Results   Component Value Date    WBC 11.9 (H) 02/12/2025    HGB 9.6 (L) 02/12/2025    HCT 29.1 (L) 02/12/2025    MCV 90 02/12/2025     02/12/2025     -Continue to monitor Hgb   -No indications for PRBC transfusion     4. Electrolyte   Lab Results   Component Value Date    GLUCOSE 169 (H) 02/12/2025    CALCIUM 9.2 02/12/2025     (L) 02/12/2025    K 4.2 02/12/2025    CO2 15 (L) 02/12/2025    CL 99 02/12/2025    BUN 77 (H) 02/12/2025    CREATININE 2.75 (H) 02/12/2025     Lab Results   Component Value Date    .2 (H) 01/08/2024    CALCIUM 9.2 02/12/2025    CAION 1.10 01/26/2025    PHOS 4.0 02/12/2025    VITD25 41 01/25/2025       - Reviewed renal profile.     6. Hypertension   Blood Pressures         2/11/2025  1535 2/11/2025  1927 2/11/2025  2349 2/12/2025  0317 2/12/2025  0805    BP: 120/79 123/81 115/74 121/79 110/76          -Goal BP < 140/90 mmHg   -continue current management     7.  Infection  ID consulted    8.  GI prophylaxis   - On PPI     9. DVT Prophylaxis  -Defer to primary team    Summary    Infection  Afebrile  Leukocytosis improved.  Blood culture grew E. Faecium   Urine culture  - NGTD  Noted hx of E. Faecium in September 2024  ID consulted. On Zosyn renally dose. Received 1 gram of vanc upon admission.  Would get Echo to rule out vegetation.    IS  Will discuss with liver tx surgery if they will consider decreasing MMF for bacteremia. Current dose is MMF 1 GRAM bid; pred 10 mg daily  Plan for stent removal with Dr. Wright tomorrow    Hyponatremia  Na = 128  Low PO intake, ? Likely not accurate  Cr increased slightly to 2.8 ( demi Cr post tx 1.6-1.8)  Will try NS @75 ml per hour, being NPO for OR tomorrow  Tac level is pending, Tac has been held since admission   Reviewed med list with pharmacist. No meds that potentially caused SIADH.    Hyperkalemia  K normal, decrease Lokelma to 10 gram daily  Consider Pentamidine for PJP prophy (Bactrim is on HOLD for hyperkalemia)    PT/OT/ Out of bed as tolerated.    * Case was discussed with primary team.  For questions, please contact transplant nephrology page x [phone]    Bridget Wren MD    Transplant Nephrologist

## 2025-02-12 NOTE — CONSULTS
INPATIENT INITIAL TRANSPLANT NEPHROLOGY CONSULT          SERVICE DATE: 2/12/2025   SERVICE TIME:  12:16 PM    REASON FOR CONSULT:  Immunosuppressive medication management and nephrology related issues.    REQUESTING PHYSICIAN: Goran Carrillo MD  PRIMARY CARE PHYSICIAN: Seun Rodrigues MD    ADMISSION DIAGNOSIS:   1. Hyperkalemia    2. Hypocarbia    3. Kidney replaced by transplant ([facility])    4. Liver transplant recipient (Multi)        TRANSPLANT DATE: 1/20/2025 (Liver / Kidney)    BLOOD TYPE: O    HPI:    Mr. Ibrahim is a 54 y.o. male with past medical history significant for     REVIEW OF SYSTEM:  Review of system was done system by system (10/14). Apart from HPI, other symptoms were negative.    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 Wright/Dr. Trey Kline, also has HTN and T2DM who presents as a direct admit for hyperkalemia. Patient presented after interval labs demonstrated a potassium of 6.1 w/out evidence of hemolysis. 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 He has been making 1800-2000cc of urine daily per patient's brother. Endorses some mild RLQ pain. Per patient's brother, he has had a poor appetite and low energy. Denies recent sick contacts, fevers, chills, HA, vision changes, dizziness, CP, palpitations, SOB, N/V, constipation, diarrhea.     Kidney Info:  Etiology: DCD  CMV: Negative/Negative  EBV: Positive/Positive  HCV Ab/HEATH: Negative/Negative  HBcAb: Negative/Negative  ABsAg/HBV HEATH: Negative     Recent imaging includes:     US kidney transplant 2/7/25 --> unremarkable except for 0.3cm stone in superior pole of transplant kidney     US liver transplant 1/21/25 --> Increased portal vein velocity from imaging 1/20 measuring up to 126.5 w/out  obvious evidence of stenosis. Improvement in hepatic artery waveforms.    PAST SURGICAL HISTORY:  Past Surgical History:   Procedure Laterality Date    IR ANGIOGRAM CELIAC  5/19/2023    IR ANGIOGRAM CELIAC 5/19/2023    US GUIDED ABDOMINAL PARACENTESIS  6/22/2023    US GUIDED ABDOMINAL PARACENTESIS 6/22/2023    US GUIDED ABDOMINAL PARACENTESIS  5/19/2023    US GUIDED ABDOMINAL PARACENTESIS 5/19/2023        SOCIAL HISTORY:  Social History     Socioeconomic History    Marital status: Single     Spouse name: Not on file    Number of children: Not on file    Years of education: Not on file    Highest education level: Not on file   Occupational History    Not on file   Tobacco Use    Smoking status: Former     Types: Cigarettes    Smokeless tobacco: Never   Vaping Use    Vaping status: Never Used   Substance and Sexual Activity    Alcohol use: Not Currently     Comment: 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    Drug use: Never    Sexual activity: Yes     Partners: Female   Other Topics Concern    Not on file   Social History Narrative    Not on file     Social Drivers of Health     Financial Resource Strain: Medium Risk (2/11/2025)    Overall Financial Resource Strain (CARDIA)     Difficulty of Paying Living Expenses: Somewhat hard   Food Insecurity: No Food Insecurity (2/10/2025)    Hunger Vital Sign     Worried About Running Out of Food in the Last Year: Never true     Ran Out of Food in the Last Year: Never true   Transportation Needs: No Transportation Needs (2/11/2025)    PRAPARE - Transportation     Lack of Transportation (Medical): No     Lack of Transportation (Non-Medical): No   Physical Activity: Not on file   Stress: Not on file   Social Connections: Not on file   Intimate Partner Violence: Not At Risk (2/10/2025)    Humiliation, Afraid, Rape, and Kick questionnaire     Fear of Current or Ex-Partner: No     Emotionally Abused: No     Physically Abused: No     Sexually Abused: No   Housing Stability: Low Risk   "(2/11/2025)    Housing Stability Vital Sign     Unable to Pay for Housing in the Last Year: No     Number of Times Moved in the Last Year: 1     Homeless in the Last Year: No       FAMILY HISTORY:  No family history on file.    MEDICATION LIST:  acyclovir, 400 mg, BID  albumin human, 50 g, Once  amLODIPine, 10 mg, Daily  aspirin, 81 mg, Daily  carvedilol, 6.25 mg, BID  cholecalciferol, 2,000 Units, Daily  daptomycin, 10 mg/kg (Adjusted), q24h  heparin (porcine), 5,000 Units, q8h  insulin glargine, 10 Units, Daily  insulin lispro, 0-10 Units, TID AC  insulin lispro, 4 Units, Daily with evening meal  insulin lispro, 8 Units, Daily with breakfast  insulin lispro, 8 Units, Daily with lunch  magnesium oxide, 800 mg, BID  mycophenolate, 1,000 mg, q12h EDINSON  pantoprazole, 40 mg, Daily before breakfast  piperacillin-tazobactam, 2.25 g, q6h  polyethylene glycol, 17 g, Daily  predniSONE, 10 mg, Daily  sodium bicarbonate, 1,300 mg, TID  sodium chloride, 500 mL, Once  sodium zirconium cyclosilicate, 10 g, Daily with lunch  tacrolimus, 0.5 mg, q12h EDINSON  tamsulosin, 0.4 mg, Daily  ursodiol, 300 mg, TID      [START ON 2/13/2025] sodium chloride 0.9%      dextrose, 12.5 g, q15 min PRN  dextrose, 25 g, q15 min PRN  glucagon, 1 mg, q15 min PRN        ALLERGY:  No Known Allergies    PHYCISCAL EXAMINATION:  Visit Vitals  /76 (BP Location: Right arm, Patient Position: Lying)   Pulse 66   Temp 36.6 °C (97.9 °F) (Temporal)   Resp 18   Ht 1.778 m (5' 10\")   Wt 107 kg (235 lb 14.3 oz)   SpO2 97%   BMI 33.85 kg/m²   Smoking Status Former   BSA 2.3 m²        02/10 1900 - 02/12 0659  In: 240 [P.O.:240]  Out: 3243 [Urine:3243]       GENERAL APPEARANCE: Awake and alert and not in any distress  HEENT: Atraumatic and normocephalic.  Edentulous.  CARDIAC: Regular   LUNGS: Clear  ABDOMEN: Grossly distended but soft and nontender.  Surgical incisions appear clean with no erythema  EXTREMITIES: 1+ edema  SKIN: No rash or ulcers  CNS: " Well-oriented and answers appropriately    LABS:  Results for orders placed or performed during the hospital encounter of 02/10/25 (from the past 24 hours)   POCT GLUCOSE   Result Value Ref Range    POCT Glucose 232 (H) 74 - 99 mg/dL   POCT GLUCOSE   Result Value Ref Range    POCT Glucose 250 (H) 74 - 99 mg/dL   CBC   Result Value Ref Range    WBC 11.9 (H) 4.4 - 11.3 x10*3/uL    nRBC 0.0 0.0 - 0.0 /100 WBCs    RBC 3.25 (L) 4.50 - 5.90 x10*6/uL    Hemoglobin 9.6 (L) 13.5 - 17.5 g/dL    Hematocrit 29.1 (L) 41.0 - 52.0 %    MCV 90 80 - 100 fL    MCH 29.5 26.0 - 34.0 pg    MCHC 33.0 32.0 - 36.0 g/dL    RDW 17.6 (H) 11.5 - 14.5 %    Platelets 206 150 - 450 x10*3/uL   Magnesium   Result Value Ref Range    Magnesium 2.24 1.60 - 2.40 mg/dL   Tacrolimus   Result Value Ref Range    Tacrolimus  7.0 <=15.0 ng/mL   Hepatic Function Panel   Result Value Ref Range    Albumin 3.4 3.4 - 5.0 g/dL    Bilirubin, Total 0.8 0.0 - 1.2 mg/dL    Bilirubin, Direct 0.3 0.0 - 0.3 mg/dL    Alkaline Phosphatase 125 (H) 33 - 120 U/L    ALT 6 (L) 10 - 52 U/L    AST 10 9 - 39 U/L    Total Protein 7.3 6.4 - 8.2 g/dL   Phosphorus   Result Value Ref Range    Phosphorus 4.0 2.5 - 4.9 mg/dL   Basic Metabolic Panel   Result Value Ref Range    Glucose 169 (H) 74 - 99 mg/dL    Sodium 128 (L) 136 - 145 mmol/L    Potassium 4.2 3.5 - 5.3 mmol/L    Chloride 99 98 - 107 mmol/L    Bicarbonate 15 (L) 21 - 32 mmol/L    Anion Gap 18 10 - 20 mmol/L    Urea Nitrogen 77 (H) 6 - 23 mg/dL    Creatinine 2.75 (H) 0.50 - 1.30 mg/dL    eGFR 26 (L) >60 mL/min/1.73m*2    Calcium 9.2 8.6 - 10.6 mg/dL   POCT GLUCOSE   Result Value Ref Range    POCT Glucose 190 (H) 74 - 99 mg/dL   Transthoracic Echo (TTE) Limited   Result Value Ref Range    LVOT diam 2.61 cm    LA vol index A/L 29.3 ml/m2    Tricuspid annular plane systolic excursion 1.7 cm    LV EF 53 %    LVIDd 4.60 cm    LV A4C EF 56.8         ASSESSMENT AND PLAN:  Mr. Ibrahim is a 54 y.o. male who underwent a kidney  transplant surgery on [unfilled] and was hospitalized for:    Principal Problem:    Hyperkalemia  Active Problems:    Kidney replaced by transplant ([facility])      1. ESRD S/P Kidney transplant. 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    Intake/Output Summary (Last 24 hours) at 2/12/2025 1216  Last data filed at 2/12/2025 1044  Gross per 24 hour   Intake 120 ml   Output 1825 ml   Net -1705 ml       - Ehsan Cr 1.6-1.8  - Continue to monitor UOP and Serum creatinine closely.   - Avoid nephrotoxic agents, NSAIDs and IV contrast   - Strict I/O.   - Renally dose all medications by the most recent CrCl from Cockcroft-Gault formula.    2. Immunosuppression   Defer it to liver tx, tx surgery    3. Anemia and WBC   Lab Results   Component Value Date    WBC 11.9 (H) 02/12/2025    HGB 9.6 (L) 02/12/2025    HCT 29.1 (L) 02/12/2025    MCV 90 02/12/2025     02/12/2025     -Continue to monitor Hgb   -No indications for PRBC transfusion     4. Electrolyte /Hyperkalemia    Lab Results   Component Value Date    GLUCOSE 169 (H) 02/12/2025    CALCIUM 9.2 02/12/2025     (L) 02/12/2025    K 4.2 02/12/2025    CO2 15 (L) 02/12/2025    CL 99 02/12/2025    BUN 77 (H) 02/12/2025    CREATININE 2.75 (H) 02/12/2025     - Reviewed renal profile.     2/10/25  @10:43 pm  Patient admitted for AKUA, Hyperkaelmia, acidosis in the setting of recent tacrolimus toxicity.      Cr trend : 1.6-->2.6 upon admission  Last tac level was 18 on 2/6 and was 19 on 2/3  K 6.3  HCO3=11  Na 127  pH from VBG  7.19     EKG @admission - no changes related to hyperkalemia  CXR @ admission - no rashawn pulmonary edema. Noted mild pulmonary congestion     Weight 107 kg, noted weight from last hospitalization was 113 kg     US Tx kidney 2/7- no hydronephrosis     PLAN      Medical treatment for hyperkalemia.   Calcium gluconate 2 gram  Bicarb 2 amps  Lokelma 10 g tid  Insulin/D50  Lasix 100 mg iv   HOLD Bactrim   HOLD tacrolimus  Transfer to SICU for close monitoring  Repeat ABG with K level, RFP, INR after med management. If K/acidosis doesn't improve, will need to place trialysis catheter and do HD.   Stent is still in.  Sent UA with reflex to culture, blood culture  Bladder scan for PVR  Empirical treatment with Vancomycin and Zosyn renally dose for leukocytosis WBC 16.8 (increased from 3-->-->15-->16.8 upon admission)     Discussed with Dr. Carrillo , tx surgery team attending     Discussed with Dr. Mcginnis, SICU attending.      @12:11 am  Lab Results   Component Value Date     GLUCOSE 143 (H) 02/10/2025     CALCIUM 10.1 02/10/2025      (L) 02/10/2025     K 5.4 (H) 02/10/2025     CO2 14 (L) 02/10/2025      02/10/2025     BUN 81 (H) 02/10/2025     CREATININE 2.37 (H) 02/10/2025      Would cancel ICU transfer.  Start sodium bicarb 1300 mg tid  Recheck lab again in am  Discussed with SICU team    6. Hypertension   Blood Pressures         2/11/2025  1535 2/11/2025  1927 2/11/2025  2349 2/12/2025  0317 2/12/2025  0805    BP: 120/79 123/81 115/74 121/79 110/76          - BP had been around   -Goal BP < 140/90 mmHg   -continue current management     7. Leukocytosis  Pan culture  Vanc, zosyn    8.  GI prophylaxis   - On PPI     9. DVT Prophylaxis  -Defer to primary team    * Case was discussed with primary team.  For questions, please contact transplant nephrology page x [phone].    Bridget Wren MD    Transplant Nephrologist

## 2025-02-12 NOTE — CARE PLAN
The patient's goals for the shift include      The clinical goals for the shift include Patient will remain free from falls/injuries during this shift.       Problem: Pain - Adult  Goal: Verbalizes/displays adequate comfort level or baseline comfort level  Outcome: Progressing     Problem: Safety - Adult  Goal: Free from fall injury  Outcome: Progressing     Problem: Discharge Planning  Goal: Discharge to home or other facility with appropriate resources  Outcome: Progressing     Problem: Chronic Conditions and Co-morbidities  Goal: Patient's chronic conditions and co-morbidity symptoms are monitored and maintained or improved  Outcome: Progressing     Problem: Nutrition  Goal: Nutrient intake appropriate for maintaining nutritional needs  Outcome: Progressing

## 2025-02-13 ENCOUNTER — ANESTHESIA (OUTPATIENT)
Dept: OPERATING ROOM | Facility: HOSPITAL | Age: 55
End: 2025-02-13
Payer: MEDICAID

## 2025-02-13 LAB
ALBUMIN SERPL BCP-MCNC: 3.9 G/DL (ref 3.4–5)
ALP SERPL-CCNC: 113 U/L (ref 33–120)
ALT SERPL W P-5'-P-CCNC: 6 U/L (ref 10–52)
ANION GAP SERPL CALC-SCNC: 19 MMOL/L (ref 10–20)
AST SERPL W P-5'-P-CCNC: 12 U/L (ref 9–39)
BACTERIA BLD AEROBE CULT: ABNORMAL
BACTERIA BLD CULT: ABNORMAL
BILIRUB DIRECT SERPL-MCNC: 0.3 MG/DL (ref 0–0.3)
BILIRUB SERPL-MCNC: 0.8 MG/DL (ref 0–1.2)
BUN SERPL-MCNC: 54 MG/DL (ref 6–23)
CALCIUM SERPL-MCNC: 9.2 MG/DL (ref 8.6–10.6)
CHLORIDE SERPL-SCNC: 102 MMOL/L (ref 98–107)
CK SERPL-CCNC: 11 U/L (ref 0–325)
CO2 SERPL-SCNC: 18 MMOL/L (ref 21–32)
CREAT SERPL-MCNC: 2.61 MG/DL (ref 0.5–1.3)
EGFRCR SERPLBLD CKD-EPI 2021: 28 ML/MIN/1.73M*2
ERYTHROCYTE [DISTWIDTH] IN BLOOD BY AUTOMATED COUNT: 17.9 % (ref 11.5–14.5)
FERRITIN SERPL-MCNC: 984 NG/ML (ref 20–300)
GLUCOSE BLD MANUAL STRIP-MCNC: 183 MG/DL (ref 74–99)
GLUCOSE BLD MANUAL STRIP-MCNC: 186 MG/DL (ref 74–99)
GLUCOSE BLD MANUAL STRIP-MCNC: 192 MG/DL (ref 74–99)
GLUCOSE SERPL-MCNC: 163 MG/DL (ref 74–99)
GRAM STN SPEC: ABNORMAL
HCT VFR BLD AUTO: 25.3 % (ref 41–52)
HGB BLD-MCNC: 8.7 G/DL (ref 13.5–17.5)
IRON SATN MFR SERPL: 14 % (ref 25–45)
IRON SERPL-MCNC: 25 UG/DL (ref 35–150)
MAGNESIUM SERPL-MCNC: 2.03 MG/DL (ref 1.6–2.4)
MCH RBC QN AUTO: 29.8 PG (ref 26–34)
MCHC RBC AUTO-ENTMCNC: 34.4 G/DL (ref 32–36)
MCV RBC AUTO: 87 FL (ref 80–100)
NRBC BLD-RTO: 0 /100 WBCS (ref 0–0)
PHOSPHATE SERPL-MCNC: 3.7 MG/DL (ref 2.5–4.9)
PLATELET # BLD AUTO: 170 X10*3/UL (ref 150–450)
POTASSIUM SERPL-SCNC: 3.7 MMOL/L (ref 3.5–5.3)
PROT SERPL-MCNC: 7.3 G/DL (ref 6.4–8.2)
RBC # BLD AUTO: 2.92 X10*6/UL (ref 4.5–5.9)
SODIUM SERPL-SCNC: 135 MMOL/L (ref 136–145)
TACROLIMUS BLD-MCNC: 5.5 NG/ML
TIBC SERPL-MCNC: 176 UG/DL (ref 240–445)
UIBC SERPL-MCNC: 151 UG/DL (ref 110–370)
WBC # BLD AUTO: 8.9 X10*3/UL (ref 4.4–11.3)

## 2025-02-13 PROCEDURE — 2500000001 HC RX 250 WO HCPCS SELF ADMINISTERED DRUGS (ALT 637 FOR MEDICARE OP): Mod: SE

## 2025-02-13 PROCEDURE — A52310 PR CYSTOSCOPY,REMV CALCULUS,SIMPLE: Performed by: ANESTHESIOLOGIST ASSISTANT

## 2025-02-13 PROCEDURE — 3700000002 HC GENERAL ANESTHESIA TIME - EACH INCREMENTAL 1 MINUTE: Performed by: STUDENT IN AN ORGANIZED HEALTH CARE EDUCATION/TRAINING PROGRAM

## 2025-02-13 PROCEDURE — 2500000004 HC RX 250 GENERAL PHARMACY W/ HCPCS (ALT 636 FOR OP/ED): Mod: SE

## 2025-02-13 PROCEDURE — 52310 CYSTOSCOPY AND TREATMENT: CPT | Performed by: STUDENT IN AN ORGANIZED HEALTH CARE EDUCATION/TRAINING PROGRAM

## 2025-02-13 PROCEDURE — 83540 ASSAY OF IRON: CPT | Performed by: INTERNAL MEDICINE

## 2025-02-13 PROCEDURE — 82728 ASSAY OF FERRITIN: CPT | Performed by: INTERNAL MEDICINE

## 2025-02-13 PROCEDURE — 3600000008 HC OR TIME - EACH INCREMENTAL 1 MINUTE - PROCEDURE LEVEL THREE: Performed by: STUDENT IN AN ORGANIZED HEALTH CARE EDUCATION/TRAINING PROGRAM

## 2025-02-13 PROCEDURE — 84100 ASSAY OF PHOSPHORUS: CPT

## 2025-02-13 PROCEDURE — 99233 SBSQ HOSP IP/OBS HIGH 50: CPT | Performed by: INTERNAL MEDICINE

## 2025-02-13 PROCEDURE — 83735 ASSAY OF MAGNESIUM: CPT

## 2025-02-13 PROCEDURE — 99232 SBSQ HOSP IP/OBS MODERATE 35: CPT | Performed by: INTERNAL MEDICINE

## 2025-02-13 PROCEDURE — 2500000004 HC RX 250 GENERAL PHARMACY W/ HCPCS (ALT 636 FOR OP/ED): Mod: SE | Performed by: STUDENT IN AN ORGANIZED HEALTH CARE EDUCATION/TRAINING PROGRAM

## 2025-02-13 PROCEDURE — 80197 ASSAY OF TACROLIMUS: CPT

## 2025-02-13 PROCEDURE — 36415 COLL VENOUS BLD VENIPUNCTURE: CPT

## 2025-02-13 PROCEDURE — A52310 PR CYSTOSCOPY,REMV CALCULUS,SIMPLE: Performed by: STUDENT IN AN ORGANIZED HEALTH CARE EDUCATION/TRAINING PROGRAM

## 2025-02-13 PROCEDURE — 3700000001 HC GENERAL ANESTHESIA TIME - INITIAL BASE CHARGE: Performed by: STUDENT IN AN ORGANIZED HEALTH CARE EDUCATION/TRAINING PROGRAM

## 2025-02-13 PROCEDURE — 82550 ASSAY OF CK (CPK): CPT

## 2025-02-13 PROCEDURE — 2500000004 HC RX 250 GENERAL PHARMACY W/ HCPCS (ALT 636 FOR OP/ED): Mod: SE | Performed by: ANESTHESIOLOGIST ASSISTANT

## 2025-02-13 PROCEDURE — 99232 SBSQ HOSP IP/OBS MODERATE 35: CPT | Performed by: TRANSPLANT SURGERY

## 2025-02-13 PROCEDURE — 0TP98DZ REMOVAL OF INTRALUMINAL DEVICE FROM URETER, VIA NATURAL OR ARTIFICIAL OPENING ENDOSCOPIC: ICD-10-PCS | Performed by: STUDENT IN AN ORGANIZED HEALTH CARE EDUCATION/TRAINING PROGRAM

## 2025-02-13 PROCEDURE — 2500000005 HC RX 250 GENERAL PHARMACY W/O HCPCS: Mod: SE | Performed by: STUDENT IN AN ORGANIZED HEALTH CARE EDUCATION/TRAINING PROGRAM

## 2025-02-13 PROCEDURE — RXMED WILLOW AMBULATORY MEDICATION CHARGE

## 2025-02-13 PROCEDURE — 2500000002 HC RX 250 W HCPCS SELF ADMINISTERED DRUGS (ALT 637 FOR MEDICARE OP, ALT 636 FOR OP/ED): Mod: SE

## 2025-02-13 PROCEDURE — 82248 BILIRUBIN DIRECT: CPT

## 2025-02-13 PROCEDURE — 85027 COMPLETE CBC AUTOMATED: CPT

## 2025-02-13 PROCEDURE — 82947 ASSAY GLUCOSE BLOOD QUANT: CPT

## 2025-02-13 PROCEDURE — 87040 BLOOD CULTURE FOR BACTERIA: CPT

## 2025-02-13 PROCEDURE — 80048 BASIC METABOLIC PNL TOTAL CA: CPT

## 2025-02-13 PROCEDURE — 2020000001 HC ICU ROOM DAILY

## 2025-02-13 PROCEDURE — 3600000003 HC OR TIME - INITIAL BASE CHARGE - PROCEDURE LEVEL THREE: Performed by: STUDENT IN AN ORGANIZED HEALTH CARE EDUCATION/TRAINING PROGRAM

## 2025-02-13 RX ORDER — HYDRALAZINE HYDROCHLORIDE 25 MG/1
25 TABLET, FILM COATED ORAL 2 TIMES DAILY
Status: DISCONTINUED | OUTPATIENT
Start: 2025-02-13 | End: 2025-02-14 | Stop reason: HOSPADM

## 2025-02-13 RX ORDER — LINEZOLID 600 MG/1
600 TABLET, FILM COATED ORAL 2 TIMES DAILY
Qty: 24 TABLET | Refills: 0 | Status: SHIPPED | OUTPATIENT
Start: 2025-02-14 | End: 2025-02-26

## 2025-02-13 RX ORDER — FENTANYL CITRATE 50 UG/ML
INJECTION, SOLUTION INTRAMUSCULAR; INTRAVENOUS AS NEEDED
Status: DISCONTINUED | OUTPATIENT
Start: 2025-02-13 | End: 2025-02-13

## 2025-02-13 RX ORDER — HYDRALAZINE HYDROCHLORIDE 25 MG/1
25 TABLET, FILM COATED ORAL 2 TIMES DAILY
Qty: 60 TABLET | Refills: 0 | Status: SHIPPED | OUTPATIENT
Start: 2025-02-13 | End: 2025-02-21 | Stop reason: SDUPTHER

## 2025-02-13 RX ORDER — LIDOCAINE HYDROCHLORIDE 20 MG/ML
INJECTION, SOLUTION INFILTRATION; PERINEURAL AS NEEDED
Status: DISCONTINUED | OUTPATIENT
Start: 2025-02-13 | End: 2025-02-13

## 2025-02-13 RX ORDER — POTASSIUM CHLORIDE 20 MEQ/1
20 TABLET, EXTENDED RELEASE ORAL EVERY 6 HOURS PRN
Status: DISCONTINUED | OUTPATIENT
Start: 2025-02-13 | End: 2025-02-13

## 2025-02-13 RX ORDER — CEFAZOLIN 1 G/1
INJECTION, POWDER, FOR SOLUTION INTRAVENOUS AS NEEDED
Status: DISCONTINUED | OUTPATIENT
Start: 2025-02-13 | End: 2025-02-13

## 2025-02-13 RX ORDER — TACROLIMUS 1 MG/1
1 CAPSULE ORAL
Status: DISCONTINUED | OUTPATIENT
Start: 2025-02-13 | End: 2025-02-14 | Stop reason: HOSPADM

## 2025-02-13 RX ORDER — SULFAMETHOXAZOLE AND TRIMETHOPRIM 400; 80 MG/1; MG/1
1 TABLET ORAL DAILY
Status: DISCONTINUED | OUTPATIENT
Start: 2025-02-13 | End: 2025-02-14 | Stop reason: HOSPADM

## 2025-02-13 RX ORDER — POTASSIUM CHLORIDE 20 MEQ/1
40 TABLET, EXTENDED RELEASE ORAL EVERY 6 HOURS PRN
Status: DISCONTINUED | OUTPATIENT
Start: 2025-02-13 | End: 2025-02-13

## 2025-02-13 RX ORDER — MIDAZOLAM HYDROCHLORIDE 1 MG/ML
INJECTION INTRAMUSCULAR; INTRAVENOUS AS NEEDED
Status: DISCONTINUED | OUTPATIENT
Start: 2025-02-13 | End: 2025-02-13

## 2025-02-13 RX ORDER — POTASSIUM CHLORIDE 1.5 G/1.58G
40 POWDER, FOR SOLUTION ORAL EVERY 6 HOURS PRN
Status: DISCONTINUED | OUTPATIENT
Start: 2025-02-13 | End: 2025-02-13

## 2025-02-13 RX ORDER — GLYCOPYRROLATE 0.2 MG/ML
INJECTION INTRAMUSCULAR; INTRAVENOUS AS NEEDED
Status: DISCONTINUED | OUTPATIENT
Start: 2025-02-13 | End: 2025-02-13

## 2025-02-13 RX ORDER — POTASSIUM CHLORIDE 1.5 G/1.58G
20 POWDER, FOR SOLUTION ORAL EVERY 6 HOURS PRN
Status: DISCONTINUED | OUTPATIENT
Start: 2025-02-13 | End: 2025-02-13

## 2025-02-13 RX ORDER — PROPOFOL 10 MG/ML
INJECTION, EMULSION INTRAVENOUS AS NEEDED
Status: DISCONTINUED | OUTPATIENT
Start: 2025-02-13 | End: 2025-02-13

## 2025-02-13 RX ORDER — CALCIUM GLUCONATE 20 MG/ML
2 INJECTION, SOLUTION INTRAVENOUS EVERY 6 HOURS PRN
Status: DISCONTINUED | OUTPATIENT
Start: 2025-02-13 | End: 2025-02-13

## 2025-02-13 RX ORDER — LINEZOLID 600 MG/1
600 TABLET, FILM COATED ORAL EVERY 12 HOURS SCHEDULED
Status: DISCONTINUED | OUTPATIENT
Start: 2025-02-14 | End: 2025-02-14 | Stop reason: HOSPADM

## 2025-02-13 RX ORDER — AMLODIPINE BESYLATE 5 MG/1
5 TABLET ORAL DAILY
Status: DISCONTINUED | OUTPATIENT
Start: 2025-02-14 | End: 2025-02-14

## 2025-02-13 RX ORDER — POTASSIUM CHLORIDE 14.9 MG/ML
20 INJECTION INTRAVENOUS EVERY 6 HOURS PRN
Status: DISCONTINUED | OUTPATIENT
Start: 2025-02-13 | End: 2025-02-13

## 2025-02-13 RX ORDER — CALCIUM GLUCONATE 20 MG/ML
1 INJECTION, SOLUTION INTRAVENOUS EVERY 6 HOURS PRN
Status: DISCONTINUED | OUTPATIENT
Start: 2025-02-13 | End: 2025-02-13

## 2025-02-13 RX ORDER — LIDOCAINE HYDROCHLORIDE 20 MG/ML
JELLY TOPICAL AS NEEDED
Status: DISCONTINUED | OUTPATIENT
Start: 2025-02-13 | End: 2025-02-13 | Stop reason: HOSPADM

## 2025-02-13 RX ORDER — MAGNESIUM SULFATE HEPTAHYDRATE 40 MG/ML
4 INJECTION, SOLUTION INTRAVENOUS EVERY 6 HOURS PRN
Status: DISCONTINUED | OUTPATIENT
Start: 2025-02-13 | End: 2025-02-13

## 2025-02-13 RX ORDER — MAGNESIUM SULFATE HEPTAHYDRATE 40 MG/ML
2 INJECTION, SOLUTION INTRAVENOUS EVERY 6 HOURS PRN
Status: DISCONTINUED | OUTPATIENT
Start: 2025-02-13 | End: 2025-02-13

## 2025-02-13 RX ADMIN — FENTANYL CITRATE 50 MCG: 50 INJECTION, SOLUTION INTRAMUSCULAR; INTRAVENOUS at 09:07

## 2025-02-13 RX ADMIN — HEPARIN SODIUM 5000 UNITS: 5000 INJECTION INTRAVENOUS; SUBCUTANEOUS at 18:22

## 2025-02-13 RX ADMIN — HEPARIN SODIUM 5000 UNITS: 5000 INJECTION INTRAVENOUS; SUBCUTANEOUS at 10:34

## 2025-02-13 RX ADMIN — URSODIOL 300 MG: 300 CAPSULE ORAL at 15:06

## 2025-02-13 RX ADMIN — TACROLIMUS 0.5 MG: 0.5 CAPSULE ORAL at 06:00

## 2025-02-13 RX ADMIN — MAGNESIUM OXIDE TAB 400 MG (241.3 MG ELEMENTAL MG) 800 MG: 400 (241.3 MG) TAB at 21:00

## 2025-02-13 RX ADMIN — MYCOPHENOLATE MOFETIL 500 MG: 250 CAPSULE ORAL at 18:16

## 2025-02-13 RX ADMIN — MYCOPHENOLATE MOFETIL 500 MG: 250 CAPSULE ORAL at 06:00

## 2025-02-13 RX ADMIN — FENTANYL CITRATE 50 MCG: 50 INJECTION, SOLUTION INTRAMUSCULAR; INTRAVENOUS at 09:05

## 2025-02-13 RX ADMIN — URSODIOL 300 MG: 300 CAPSULE ORAL at 11:00

## 2025-02-13 RX ADMIN — SODIUM CHLORIDE 75 ML/HR: 9 INJECTION, SOLUTION INTRAVENOUS at 05:52

## 2025-02-13 RX ADMIN — PIPERACILLIN SODIUM AND TAZOBACTAM SODIUM 2.25 G: 2; .25 INJECTION, SOLUTION INTRAVENOUS at 02:24

## 2025-02-13 RX ADMIN — ACYCLOVIR 400 MG: 400 TABLET ORAL at 10:57

## 2025-02-13 RX ADMIN — PANTOPRAZOLE SODIUM 40 MG: 40 TABLET, DELAYED RELEASE ORAL at 06:00

## 2025-02-13 RX ADMIN — ACYCLOVIR 400 MG: 400 TABLET ORAL at 21:00

## 2025-02-13 RX ADMIN — CARVEDILOL 6.25 MG: 6.25 TABLET, FILM COATED ORAL at 11:00

## 2025-02-13 RX ADMIN — Medication 2000 UNITS: at 10:57

## 2025-02-13 RX ADMIN — INSULIN LISPRO 2 UNITS: 100 INJECTION, SOLUTION INTRAVENOUS; SUBCUTANEOUS at 11:38

## 2025-02-13 RX ADMIN — CARVEDILOL 6.25 MG: 6.25 TABLET, FILM COATED ORAL at 21:00

## 2025-02-13 RX ADMIN — GLYCOPYRROLATE 0.2 MG: 0.2 INJECTION INTRAMUSCULAR; INTRAVENOUS at 08:57

## 2025-02-13 RX ADMIN — TACROLIMUS 1 MG: 1 CAPSULE ORAL at 18:16

## 2025-02-13 RX ADMIN — PROPOFOL 20 MG: 10 INJECTION, EMULSION INTRAVENOUS at 09:10

## 2025-02-13 RX ADMIN — INSULIN LISPRO 4 UNITS: 100 INJECTION, SOLUTION INTRAVENOUS; SUBCUTANEOUS at 18:16

## 2025-02-13 RX ADMIN — SODIUM BICARBONATE 1300 MG: 650 TABLET ORAL at 15:06

## 2025-02-13 RX ADMIN — ASPIRIN 81 MG: 81 TABLET, COATED ORAL at 10:59

## 2025-02-13 RX ADMIN — INSULIN LISPRO 8 UNITS: 100 INJECTION, SOLUTION INTRAVENOUS; SUBCUTANEOUS at 11:38

## 2025-02-13 RX ADMIN — MIDAZOLAM HYDROCHLORIDE 2 MG: 1 INJECTION, SOLUTION INTRAMUSCULAR; INTRAVENOUS at 08:57

## 2025-02-13 RX ADMIN — PROPOFOL 20 MG: 10 INJECTION, EMULSION INTRAVENOUS at 09:11

## 2025-02-13 RX ADMIN — SODIUM BICARBONATE 1300 MG: 650 TABLET ORAL at 10:58

## 2025-02-13 RX ADMIN — MAGNESIUM OXIDE TAB 400 MG (241.3 MG ELEMENTAL MG) 800 MG: 400 (241.3 MG) TAB at 10:58

## 2025-02-13 RX ADMIN — TAMSULOSIN HYDROCHLORIDE 0.4 MG: 0.4 CAPSULE ORAL at 10:59

## 2025-02-13 RX ADMIN — PROPOFOL 20 MG: 10 INJECTION, EMULSION INTRAVENOUS at 09:15

## 2025-02-13 RX ADMIN — DAPTOMYCIN 850 MG: 500 INJECTION, POWDER, LYOPHILIZED, FOR SOLUTION INTRAVENOUS at 11:03

## 2025-02-13 RX ADMIN — CEFAZOLIN 2 G: 1 INJECTION, POWDER, FOR SOLUTION INTRAMUSCULAR; INTRAVENOUS at 09:03

## 2025-02-13 RX ADMIN — SODIUM CHLORIDE, SODIUM LACTATE, POTASSIUM CHLORIDE, AND CALCIUM CHLORIDE: 600; 310; 30; 20 INJECTION, SOLUTION INTRAVENOUS at 08:47

## 2025-02-13 RX ADMIN — INSULIN LISPRO 2 UNITS: 100 INJECTION, SOLUTION INTRAVENOUS; SUBCUTANEOUS at 18:20

## 2025-02-13 RX ADMIN — LIDOCAINE HYDROCHLORIDE 50 MG: 20 INJECTION, SOLUTION INFILTRATION; PERINEURAL at 09:10

## 2025-02-13 RX ADMIN — HYDRALAZINE HYDROCHLORIDE 25 MG: 25 TABLET ORAL at 21:00

## 2025-02-13 RX ADMIN — URSODIOL 300 MG: 300 CAPSULE ORAL at 21:00

## 2025-02-13 RX ADMIN — SULFAMETHOXAZOLE AND TRIMETHOPRIM 1 TABLET: 400; 80 TABLET ORAL at 15:06

## 2025-02-13 RX ADMIN — PROPOFOL 20 MG: 10 INJECTION, EMULSION INTRAVENOUS at 09:12

## 2025-02-13 RX ADMIN — SODIUM BICARBONATE 1300 MG: 650 TABLET ORAL at 21:00

## 2025-02-13 SDOH — HEALTH STABILITY: MENTAL HEALTH: CURRENT SMOKER: 0

## 2025-02-13 ASSESSMENT — PAIN - FUNCTIONAL ASSESSMENT
PAIN_FUNCTIONAL_ASSESSMENT: 0-10
PAIN_FUNCTIONAL_ASSESSMENT: 0-10

## 2025-02-13 ASSESSMENT — COGNITIVE AND FUNCTIONAL STATUS - GENERAL
TURNING FROM BACK TO SIDE WHILE IN FLAT BAD: A LITTLE
MOVING TO AND FROM BED TO CHAIR: A LITTLE
WALKING IN HOSPITAL ROOM: A LITTLE
HELP NEEDED FOR BATHING: A LITTLE
DRESSING REGULAR LOWER BODY CLOTHING: A LITTLE
TOILETING: A LITTLE
MOBILITY SCORE: 18
CLIMB 3 TO 5 STEPS WITH RAILING: A LOT
DAILY ACTIVITIY SCORE: 20
STANDING UP FROM CHAIR USING ARMS: A LITTLE
DRESSING REGULAR UPPER BODY CLOTHING: A LITTLE

## 2025-02-13 ASSESSMENT — PAIN SCALES - GENERAL
PAINLEVEL_OUTOF10: 2
PAINLEVEL_OUTOF10: 0 - NO PAIN
PAINLEVEL_OUTOF10: 0 - NO PAIN

## 2025-02-13 ASSESSMENT — PAIN SCALES - WONG BAKER: WONGBAKER_NUMERICALRESPONSE: NO HURT

## 2025-02-13 NOTE — ANESTHESIA POSTPROCEDURE EVALUATION
Patient: Goran Ibrahim    Procedure Summary       Date: 02/13/25 Room / Location: [facility] OR 15 / [facility] OR    Anesthesia Start: 0859 Anesthesia Stop: 0933    Procedure: CYSTOSCOPY, WITH URETERAL STENT REMOVAL Diagnosis:       Kidney replaced by transplant ([facility])      (Kidney replaced by transplant ([facility]) [Z94.0])    Surgeons: Priscila Wright MD Responsible Provider: Joanne Krueger MD    Anesthesia Type: MAC ASA Status: 3            Anesthesia Type: MAC    Vitals Value Taken Time   /74 02/13/25 0933     02/13/25 0933   Pulse 65 02/13/25 0933   Resp 16 02/13/25 0933   SpO2 94 02/13/25 0933       Anesthesia Post Evaluation    Patient location during evaluation: floor  Patient participation: complete - patient participated  Level of consciousness: awake  Pain management: adequate  Airway patency: patent  Cardiovascular status: acceptable  Respiratory status: acceptable  Hydration status: acceptable  Postoperative Nausea and Vomiting: none  Comments: Patient SV on RA.  VSS.  Report given to floor nurse.        No notable events documented.

## 2025-02-13 NOTE — SIGNIFICANT EVENT
01/21/25 1130   Patient Interaction   Organ Liver   Type of Interaction Phone conference   Interdisciplinary Rounds   Attendance Surgeon;Physician;CHERYL;Coordinator;Pharmacist   Topics Discussed Medications;Blood test results;Activity;Imaging/test results     Transplant Surgery Multidisciplinary Team Note    Goran Ibrahim is a 54 y.o. male   POD# 24 from a Kidney and Liver from a DCD donor. His post operative complications: None    24 Hour Events  1. No acute events    Last Recorded Vitals  Visit Vitals  /89 (BP Location: Left arm, Patient Position: Lying)   Pulse 63   Temp 36 °C (96.8 °F) (Temporal)   Resp 18      Intake/Output last 3 Shifts:    Intake/Output Summary (Last 24 hours) at 2/13/2025 1204  Last data filed at 2/13/2025 1118  Gross per 24 hour   Intake 284 ml   Output 1400 ml   Net -1116 ml      Vitals:    02/13/25 0813   Weight: 105 kg (231 lb 0.7 oz)        Assessment/Plan   Principal Problem:    S/P SLK  - kidney & liver function stable  - staples & sutures removed from abdominal incision   - ureteral stent removal tomorrow with Dr. Wright    Hyperkalemia   - K+ 3.7--> lokelma stopped    Infectious Disease  - enterococcus faecium growing in 2/2 blood cultures  - No vegetation noted on echo   - CT A/P w contrast- loculated fluid collections  - ID consulted, started dapto  - Zosyn stopped    HTN  -continue coreg 6.25mg BID  -LE swelling, weaning amlod/switch to hydralazine            Management after organ transplant  Active Problems:  Patient Active Problem List   Diagnosis    Hypertension    Liver cirrhosis (Multi)    Esophageal varices in alcoholic cirrhosis (Multi)    Hepatic cirrhosis, unspecified hepatic cirrhosis type, unspecified whether ascites present (Multi)    ESRD (end stage renal disease) (Multi)    Steroid-induced hyperglycemia    Kidney replaced by transplant ([facility])    Liver transplant status    Type 2 diabetes mellitus without complication (Multi)    Hyperkalemia         Immunosuppression reviewed and adjusted       Induction: Simulect       Tacrolimus goal 8-10 ng/mL. Current dose  0.5  mg BID, starting tonight         MMF 1000 mg PO BID       Solumedrol taper  DVT prophylaxis SCDS  PT/OT  Diet: 75 g diabetic diet  Anticipated discharge TBD    Seema Matthews, APRN-CNP

## 2025-02-13 NOTE — OP NOTE
CYSTOSCOPY, WITH URETERAL STENT REMOVAL Operative Note     Date: 2025  OR Location: [facility] OR    Name: Goran Ibrahim, : 1970, Age: 54 y.o., MRN: [id number], Sex: male    Diagnosis  Pre-op Diagnosis      * Kidney replaced by transplant ([facility]) [Z94.0] Post-op Diagnosis     * Kidney replaced by transplant ([facility]) [Z94.0]     Procedures  CYSTOSCOPY, WITH URETERAL STENT REMOVAL  51122 - VA CYSTO W/SIMPLE REMOVAL STONE & STENT      Surgeons      * Priscila Wright - Primary    Resident/Fellow/Other Assistant:  Surgeons and Role:  * No surgeons found with a matching role *    Staff:   Circulator: Lizandro Cobb Person: Blank    Anesthesia Staff: Anesthesiologist: Joanne Krueger MD  C-AA: KATT Mathew    Procedure Summary  Anesthesia: Monitor Anesthesia Care  ASA: III  Estimated Blood Loss: 0  mL  Intra-op Medications:   Administrations occurring from 0840 to 0930 on 25:   Medication Name Total Dose   acyclovir (Zovirax) tablet 400 mg Cannot be calculated   aspirin EC tablet 81 mg Cannot be calculated   carvedilol (Coreg) tablet 6.25 mg Cannot be calculated   cholecalciferol (Vitamin D-3) tablet 2,000 Units Cannot be calculated   insulin glargine (Lantus) injection 10 Units Cannot be calculated   magnesium oxide (Mag-Ox) tablet 800 mg Cannot be calculated   polyethylene glycol (Glycolax, Miralax) packet 17 g Cannot be calculated   predniSONE (Deltasone) tablet 10 mg Cannot be calculated   sodium bicarbonate tablet 1,300 mg Cannot be calculated   tamsulosin (Flomax) 24 hr capsule 0.4 mg Cannot be calculated   ursodiol (Actigall) capsule 300 mg Cannot be calculated   amLODIPine (Norvasc) tablet 10 mg Cannot be calculated   ceFAZolin (Ancef) 1 g 2 g   dexmedeTOMIDine (Precedex) bolus from bag 16 mcg   fentaNYL (Sublimaze) injection 50 mcg/mL 100 mcg   glycopyrrolate (Robinul) injection 0.2 mg   LR bolus Cannot be calculated   lidocaine (Xylocaine) 2 % 50 mg   midazolam PF (Versed) injection 1  mg/mL 2 mg   propofol (Diprivan) injection 10 mg/mL 80 mg   sodium zirconium cyclosilicate (Lokelma) packet 10 g Cannot be calculated              Anesthesia Record               Intraprocedure I/O Totals          Intake    Dexmedetomidine 0.00 mL    The total shown is the total volume documented since Anesthesia Start was filed.    LR bolus 50.00 mL    Total Intake 50 mL       Output    Est. Blood Loss 0 mL    Total Output 0 mL       Net    Net Volume 50 mL          Findings: Single ureteral stent, removed in its entirety    Indications: Goran Ibrahim is an 54 y.o. male who is having surgery for Kidney replaced by transplant ([facility]) [Z94.0]. Presenting for ureteral stent removal.    The patient was seen in the preoperative area. The risks, benefits, complications, treatment options, non-operative alternatives, expected recovery and outcomes were discussed with the patient. The possibilities of reaction to medication, pulmonary aspiration, injury to surrounding structures, bleeding, recurrent infection, the need for additional procedures, failure to diagnose a condition, and creating a complication requiring transfusion or operation were discussed with the patient. The patient concurred with the proposed plan, giving informed consent.  The site of surgery was properly noted/marked if necessary per policy. The patient has been actively warmed in preoperative area. Preoperative antibiotics have been ordered and given within 1 hours of incision. Venous thrombosis prophylaxis are not indicated.    Procedure Details:  The patient was placed on the OR table in supine position. After pre-operative huddle and time out, MAC anesthesia was induced. The patient is prepped and draped in the usual sterile fashion. A flexible cystoscope was introduced to the bladder under direct visualization. The bladder mucosa appeared normal. The ureteroneocystostomy anastomosis appeared normal. The ureteral stent was visualized and grasped  with grasper and removed in one piece. Bladder was emptied with a red rubber catheter. All counts were correct. I was present for the entire procedure.     Complications:  None; patient tolerated the procedure well.    Disposition: PACU - hemodynamically stable.  Condition: stable        Attending Attestation: I was present and scrubbed for the entire procedure.    Priscila Wright  Phone Number: [phone]

## 2025-02-13 NOTE — ANESTHESIA PREPROCEDURE EVALUATION
Patient: Goran Ibrahim    Procedure Information       Date/Time: 02/13/25 0840    Procedure: CYSTOSCOPY, WITH URETERAL STENT REMOVAL    Location: [facility] OR 15 / [facility]    Surgeons: Priscila Wright MD            Relevant Problems   Anesthesia (within normal limits)      Cardiac   (+) Hypertension      GI   (+) Esophageal varices in alcoholic cirrhosis (Multi)      /Renal   (+) ESRD (end stage renal disease) (Multi)      Liver   (+) Hepatic cirrhosis, unspecified hepatic cirrhosis type, unspecified whether ascites present (Multi)   (+) Liver cirrhosis (Multi)      Endocrine   (+) Type 2 diabetes mellitus without complication (Multi)       Clinical information reviewed:   Tobacco  Allergies  Meds   Med Hx  Surg Hx   Fam Hx  Soc Hx        NPO Detail:  No data recorded     Physical Exam    Airway  Mallampati: II  TM distance: >3 FB  Neck ROM: full     Cardiovascular   Rhythm: regular  Rate: normal     Dental    Pulmonary    Abdominal            Anesthesia Plan    History of general anesthesia?: yes  History of complications of general anesthesia?: no    ASA 3     MAC     The patient is not a current smoker.  Patient was previously instructed to abstain from smoking on day of procedure.  Patient did not smoke on day of procedure.    intravenous induction   Anesthetic plan and risks discussed with patient.  Use of blood products discussed with patient who consented to blood products.    Plan discussed with CAA.

## 2025-02-13 NOTE — CARE PLAN
The patient's goals for the shift include      The clinical goals for the shift include Patient will remain free from falls/injuries during this shift    Problem: Pain - Adult  Goal: Verbalizes/displays adequate comfort level or baseline comfort level  Outcome: Progressing     Problem: Safety - Adult  Goal: Free from fall injury  Outcome: Progressing     Problem: Discharge Planning  Goal: Discharge to home or other facility with appropriate resources  Outcome: Progressing     Problem: Chronic Conditions and Co-morbidities  Goal: Patient's chronic conditions and co-morbidity symptoms are monitored and maintained or improved  Outcome: Progressing     Problem: Nutrition  Goal: Nutrient intake appropriate for maintaining nutritional needs  Outcome: Progressing

## 2025-02-13 NOTE — PROGRESS NOTES
Goran Ibrahim is a 54 y.o. male on day 3 of admission presenting with Hyperkalemia.    Subjective   Interval History: Patient feels better today.  Patient is just back from ureteral stent removal.  Patient's brother at bedside.  As per the brother patient is able to stand and take a few steps today and like when he came to the emergency room when he was very wobbly.      Objective   Range of Vitals (last 24 hours)  Heart Rate:  [62-71]   Temp:  [36 °C (96.8 °F)-36.5 °C (97.7 °F)]   Resp:  [16-18]   BP: (117-154)/(75-91)   Weight:  [105 kg (231 lb 0.7 oz)]   SpO2:  [95 %-98 %]   Daily Weight  02/13/25 : 105 kg (231 lb 0.7 oz)    Body mass index is 33.15 kg/m².    Physical Exam  GENERAL APPEARANCE: Awake and alert and not in any distress  HEENT: Atraumatic and normocephalic.  Edentulous.  CARDIAC: Regular   LUNGS: Clear  ABDOMEN: Grossly distended but soft and nontender.  Small area of surgical dehiscence noted over right side of the abdomen (upper abdomen) and is packed with gauze.  EXTREMITIES: 1+ edema      Antibiotics  DAPTOmycin (Cubicin) IV in 50 mL NS  sulfamethoxazole-trimethoprim - 400-80 mg, 400-80 mg    Relevant Results  Labs  Results from last 72 hours   Lab Units 02/13/25  0552 02/12/25  0512 02/11/25  0510   WBC AUTO x10*3/uL 8.9 11.9* 15.5*   HEMOGLOBIN g/dL 8.7* 9.6* 11.0*   HEMATOCRIT % 25.3* 29.1* 33.0*   PLATELETS AUTO x10*3/uL 170 206 269     Results from last 72 hours   Lab Units 02/13/25  0552 02/12/25  0512 02/11/25  0510   SODIUM mmol/L 135* 128* 129*   POTASSIUM mmol/L 3.7 4.2 5.2   CHLORIDE mmol/L 102 99 100   CO2 mmol/L 18* 15* 14*   BUN mg/dL 54* 77* 82*   CREATININE mg/dL 2.61* 2.75* 2.66*   GLUCOSE mg/dL 163* 169* 192*   CALCIUM mg/dL 9.2 9.2 10.1   ANION GAP mmol/L 19 18 20   EGFR mL/min/1.73m*2 28* 26* 27*   PHOSPHORUS mg/dL 3.7 4.0 4.3     Results from last 72 hours   Lab Units 02/13/25  0552 02/12/25  0512 02/11/25  0510 02/10/25  2318   ALK PHOS U/L 113 125*  --  142*   BILIRUBIN  "TOTAL mg/dL 0.8 0.8  --  0.8   BILIRUBIN DIRECT mg/dL 0.3 0.3  --  0.3   PROTEIN TOTAL g/dL 7.3 7.3  --  7.3   ALT U/L 6* 6*  --  9*   AST U/L 12 10  --  11   ALBUMIN g/dL 3.9 3.4 4.1 3.8  3.8     Estimated Creatinine Clearance: 38.4 mL/min (A) (by C-G formula based on SCr of 2.61 mg/dL (H)).  No results found for: \"CRP\"  Microbiology  Susceptibility data from last 14 days.  Collected Specimen Info Organism Ampicillin Gentamicin Synergy Penicillin Trimethoprim/Sulfamethoxazole Vancomycin   02/11/25 Blood culture from Peripheral Venipuncture Enterococcus faecium  R  S  R  R  S   02/10/25 Blood culture from Peripheral Venipuncture Enterococcus faecium                        Assessment/Plan   Goran Ibrahim is a 54 y.o. male with history of hypertension, CKD, cirrhosis of liver complicated by esophageal varices and hepatic encephalopathy status post SLK (CMV D-/R-, 1/20/25, induction with Simulect), immunosuppression with tacrolimus CellCept and prednisone and anti-infective prophylaxis with acyclovir, clotrimazole and Bactrim admitted to the hospital for elevated potassium levels on February 10.     Problems  Bacteremia with Enterococcus faecium (ampicillin resistant and vancomycin sensitive)        No clear source of bacteremia noted on exam but most likely GI/biliary translocation in the setting of recent surgery.  Urine analysis is not consistent with UTI although urine culture in the past grew Enterococcus faecium and also on February 6 it grew mixed bacteria including Enterococcus spp (reviewed culture pictures in microbiology lab).  Abdomen is grossly distended and I am not certain if there is significant ascites or this is just obesity (patient states he is a big person and this is his normal size belly).  We can start by imaging abdomen and if there is significant ascites a paracentesis can be considered.  2/13-CT abdomen did not show any significant ascites but showed multifocal loculated fluid collection " surrounding the superior and inferior poles of transplant kidney which may represent postsurgical seroma or subacute hematoma.     2.  AKUA     3. Cirrhosis of liver complicated by esophageal varices and hepatic encephalopathy status post SLK (CMV D-/R-, 1/20/25, induction with Simulect)    4.  Small surgical wound dehiscence over right upper abdomen       Serous fluid draining.     2/12/25  Reviewed cultures in microbiology lab.  Enterococcus faecium appears to be vancomycin sensitive.  Given current AKUA, will avoid vancomycin and start daptomycin 10 mg/kg IV every 24 hours (creatinine clearance 34 mL/min).  Will consider vancomycin later once renal function stabilizes.  Imaging (ultrasound or CT) of abdomen to evaluate for ascites and if there is any significant ascites please consider diagnostic paracentesis.  Continue Zosyn 2.25 g IV every 6 hours for now.  This can be discontinued if imaging of abdomen does not show significant ascites.  Please repeat 1 or 2 sets of blood cultures tomorrow to document clearance of bacteremia.  Agree with transthoracic echo to assess for valvular vegetation although low suspicion for endocarditis.  Anti-infective prophylaxis with acyclovir and Bactrim per liver transplant protocol (Bactrim on hold for hyperkalemia).    Plan  CT abdomen did not show any significant ascites but showed multifocal loculated fluid collection surrounding the superior and inferior poles of transplant kidney which may represent postsurgical seroma or subacute hematoma.  Two sets of blood cultures collected today and TTE is negative for valvular vegetations.  Recommend to stop Zosyn.  Continue daptomycin 10 mg/kg IV every 24 hours while patient is in the hospital and at discharge recommend linezolid 600 mg p.o. every 12 hours to complete total 2-week course from last negative blood cultures (until Feb 26).  Please check CBC with differential while patient is on linezolid to monitor for  leukopenia/thrombocytopenia.  Anti-infective prophylaxis with acyclovir and Bactrim per liver transplant protocol (Bactrim on hold for hyperkalemia).  Patient's care discussed with primary team.  Will follow peripherally.         Jacob Laughlin MD

## 2025-02-13 NOTE — PROGRESS NOTES
"        INPATIENT TRANSPLANT NEPHROLOGY PROGRESS NOTE          REASON FOR CONSULT:  Immunosuppressive medication management and nephrology related issues.    SUBJECTION:     OR this am for stent removal  Noted wound dehiscence over right upper abdomen with serous fluid draining.  UOP 1.7  Mild leg edema  Echo - no vegetation, RVSP- Low normal.  On Daptomycin. STOP Zosyn per ID  EBV Viremia <35  Reduced IS per tx surgery    PHYCISCAL EXAMINATION:    Visit Vitals  /89 (BP Location: Left arm, Patient Position: Lying)   Pulse 63   Temp 36 °C (96.8 °F) (Temporal)   Resp 18   Ht 1.778 m (5' 10\")   Wt 105 kg (231 lb 0.7 oz)   SpO2 98%   BMI 33.15 kg/m²   Smoking Status Former   BSA 2.28 m²        02/11 1900 - 02/13 0659  In: 460 [P.O.:460]  Out: 3025 [Urine:3025]     GEN: No acute distress. Alert, awake and conversive.  HEENT: Sclera anicteric. Moist mucous membranes.  RESP: Breathing non-labored, equal chest rise. On RA.  CV: Regular rate, normotensive  GI: Abdomen soft, moderately distended, nontender. Kidney transplant incision in RLQ closed w/ staples, c/d/I, healing well without erythema or drainage. Liver transplant incision in RUQ closed w/ staples, no erythema. Some serous drainage from the posterior aspect of the incision. No clear dehiscence. Remainder of incision c/d/i. Prior drain sites sutured, no drainage or erythema. Some ecchymosis around drain sites.  : Voiding spontaneously.  MSK: No gross deformities. Moves all extremities spontaneously.  NEURO: Alert and oriented x3. No focal deficits.  SKIN:  Warm and dry    MEDICATION LIST: REVIEWED    acyclovir, 400 mg, BID  [START ON 2/14/2025] amLODIPine, 5 mg, Daily  aspirin, 81 mg, Daily  carvedilol, 6.25 mg, BID  cholecalciferol, 2,000 Units, Daily  daptomycin, 10 mg/kg (Adjusted), q24h  heparin (porcine), 5,000 Units, q8h  hydrALAZINE, 25 mg, BID  insulin glargine, 10 Units, Daily  insulin lispro, 0-10 Units, TID AC  insulin lispro, 4 Units, Daily with " evening meal  insulin lispro, 8 Units, Daily with breakfast  insulin lispro, 8 Units, Daily with lunch  magnesium oxide, 800 mg, BID  mycophenolate, 500 mg, q12h EDINSON  pantoprazole, 40 mg, Daily before breakfast  polyethylene glycol, 17 g, Daily  predniSONE, 10 mg, Daily  sodium bicarbonate, 1,300 mg, TID  sulfamethoxazole-trimethoprim, 1 tablet, Daily  tacrolimus, 0.5 mg, q12h EDINSON  tamsulosin, 0.4 mg, Daily  ursodiol, 300 mg, TID      sodium chloride 0.9%, Last Rate: 75 mL/hr (02/13/25 0552)      calcium gluconate, 1 g, q6h PRN  calcium gluconate, 2 g, q6h PRN  dextrose, 12.5 g, q15 min PRN  dextrose, 25 g, q15 min PRN  glucagon, 1 mg, q15 min PRN  magnesium sulfate, 2 g, q6h PRN  magnesium sulfate, 4 g, q6h PRN  potassium chloride CR, 20 mEq, q6h PRN   Or  potassium chloride, 20 mEq, q6h PRN  potassium chloride CR, 40 mEq, q6h PRN   Or  potassium chloride, 40 mEq, q6h PRN  potassium chloride, 20 mEq, q6h PRN        ALLERGY:  No Known Allergies    LABS:  Results for orders placed or performed during the hospital encounter of 02/10/25 (from the past 24 hours)   POCT GLUCOSE   Result Value Ref Range    POCT Glucose 204 (H) 74 - 99 mg/dL   POCT GLUCOSE   Result Value Ref Range    POCT Glucose 204 (H) 74 - 99 mg/dL   CBC   Result Value Ref Range    WBC 8.9 4.4 - 11.3 x10*3/uL    nRBC 0.0 0.0 - 0.0 /100 WBCs    RBC 2.92 (L) 4.50 - 5.90 x10*6/uL    Hemoglobin 8.7 (L) 13.5 - 17.5 g/dL    Hematocrit 25.3 (L) 41.0 - 52.0 %    MCV 87 80 - 100 fL    MCH 29.8 26.0 - 34.0 pg    MCHC 34.4 32.0 - 36.0 g/dL    RDW 17.9 (H) 11.5 - 14.5 %    Platelets 170 150 - 450 x10*3/uL   Magnesium   Result Value Ref Range    Magnesium 2.03 1.60 - 2.40 mg/dL   Tacrolimus   Result Value Ref Range    Tacrolimus  5.5 <=15.0 ng/mL   Hepatic Function Panel   Result Value Ref Range    Albumin 3.9 3.4 - 5.0 g/dL    Bilirubin, Total 0.8 0.0 - 1.2 mg/dL    Bilirubin, Direct 0.3 0.0 - 0.3 mg/dL    Alkaline Phosphatase 113 33 - 120 U/L    ALT 6 (L) 10 - 52  U/L    AST 12 9 - 39 U/L    Total Protein 7.3 6.4 - 8.2 g/dL   Blood Culture    Specimen: Peripheral Venipuncture; Blood culture   Result Value Ref Range    Blood Culture Loaded on Instrument - Culture in progress    Phosphorus   Result Value Ref Range    Phosphorus 3.7 2.5 - 4.9 mg/dL   Basic Metabolic Panel   Result Value Ref Range    Glucose 163 (H) 74 - 99 mg/dL    Sodium 135 (L) 136 - 145 mmol/L    Potassium 3.7 3.5 - 5.3 mmol/L    Chloride 102 98 - 107 mmol/L    Bicarbonate 18 (L) 21 - 32 mmol/L    Anion Gap 19 10 - 20 mmol/L    Urea Nitrogen 54 (H) 6 - 23 mg/dL    Creatinine 2.61 (H) 0.50 - 1.30 mg/dL    eGFR 28 (L) >60 mL/min/1.73m*2    Calcium 9.2 8.6 - 10.6 mg/dL   Ferritin   Result Value Ref Range    Ferritin 984 (H) 20 - 300 ng/mL   Iron and TIBC   Result Value Ref Range    Iron 25 (L) 35 - 150 ug/dL    UIBC 151 110 - 370 ug/dL    TIBC 176 (L) 240 - 445 ug/dL    % Saturation 14 (L) 25 - 45 %   Blood Culture    Specimen: Peripheral Venipuncture; Blood culture   Result Value Ref Range    Blood Culture Loaded on Instrument - Culture in progress    POCT GLUCOSE   Result Value Ref Range    POCT Glucose 183 (H) 74 - 99 mg/dL   POCT GLUCOSE   Result Value Ref Range    POCT Glucose 186 (H) 74 - 99 mg/dL        ASSESSMENT AND PLAN:    Mr. Ibrahim is a 54 y.o. male who underwent a kidney transplant surgery  on 1/20/2025 (Liver / Kidney).    Principal Problem:    Hyperkalemia  Active Problems:    Kidney replaced by transplant ([facility])      1. ESRD S/P Kidney transplant.   - Renal allograft function:   Lab Results   Component Value Date    CREATININE 2.61 (H) 02/13/2025     Estimated Creatinine Clearance: 38.4 mL/min (A) (by C-G formula based on SCr of 2.61 mg/dL (H)).    Intake/Output Summary (Last 24 hours) at 2/13/2025 1140  Last data filed at 2/13/2025 1118  Gross per 24 hour   Intake 284 ml   Output 1800 ml   Net -1516 ml     - Cr down from 2.75 yesterday after NS challenge    - Continue to monitor UOP and  Serum creatinine closely.   - Avoid nephrotoxic agents, NSAIDs and IV contrast   - Strict I/O.   - Renally dose all medications by the most recent CrCl from Cockcroft-Gault formula.    2. Immunosuppression   - continue current immunosuppression   - Monitor tacrolimus trough level = 5.5 today   mg bid  Prednisone 10 mg daily  - Defer to tx surgery    3. Anemia and WBC   Lab Results   Component Value Date    WBC 8.9 02/13/2025    HGB 8.7 (L) 02/13/2025    HCT 25.3 (L) 02/13/2025    MCV 87 02/13/2025     02/13/2025     -Continue to monitor Hgb   -No indications for PRBC transfusion     4. Electrolyte   Lab Results   Component Value Date    GLUCOSE 163 (H) 02/13/2025    CALCIUM 9.2 02/13/2025     (L) 02/13/2025    K 3.7 02/13/2025    CO2 18 (L) 02/13/2025     02/13/2025    BUN 54 (H) 02/13/2025    CREATININE 2.61 (H) 02/13/2025     Lab Results   Component Value Date    .2 (H) 01/08/2024    CALCIUM 9.2 02/13/2025    CAION 1.10 01/26/2025    PHOS 3.7 02/13/2025    VITD25 41 01/25/2025       - Reviewed renal profile.     6. Hypertension   Blood Pressures         2/12/2025  2319 2/13/2025  0401 2/13/2025  0815 2/13/2025  0950 2/13/2025  1118    BP: 120/75 117/77 134/84 154/91 170/89          -Goal BP < 140/90 mmHg   -continue current management     7. Infection  ID consulted    8.  GI prophylaxis   - On PPI     9. DVT Prophylaxis  -Defer to primary team    Summary    Infection  Afebrile  Leukocytosis has resolved. WBC now = 8.9 (16.8)  Blood culture grew E. Faecium   Urine culture  - NGTD  Noted hx of E. Faecium in September 2024  Echo   ID consulted.   STOP Zosyn renally dose. Received 1 gram of vanc upon admission.  Continue Daptomycin; at discharge recommend linezolid 600 mg p.o. every 12 hours to complete total 2-week course from last negative blood cultures (until Feb 26).     IS  Defer to tx surgery  Consider Pred 5 mg daily for wound    Hyponatremia  Resolved  Na = 135 post NS  yesterday.   Reviewed med list with pharmacist. No meds that potentially caused SIADH.  Continue to monitor    Hyperkalemia  K normal, Resolved  Resume Bactrim SS Prophy  STOP Lokelma    Edema  Echo - Low normal RVSP  Would cut back amlodipine to 5 mg daily  Compression stockins  Start hydralazine 25 mg bid, may go up to 50 mg bid if needed    EBV Viremia  Weekly PCR PLASMA  Already on reduced IS    Dispo  PT/OT/ Out of bed as tolerated  Incentive spirometry    * Case was discussed with primary team.  For questions, please contact transplant nephrology page x [phone]    Bridget Wren MD    Transplant Nephrologist

## 2025-02-13 NOTE — BRIEF OP NOTE
Date: 2025  OR Location: [facility] OR    Name: Goran Ibrahim, : 1970, Age: 54 y.o., MRN: [id number], Sex: male    Diagnosis  Pre-op Diagnosis      * Kidney replaced by transplant ([facility]) [Z94.0] Post-op Diagnosis     * Kidney replaced by transplant ([facility]) [Z94.0]     Procedures  CYSTOSCOPY, WITH URETERAL STENT REMOVAL  19981 - [place] CYSTO W/SIMPLE REMOVAL STONE & STENT      Surgeons      * Priscila Wright - Primary    Resident/Fellow/Other Assistant:  Surgeons and Role:  * No surgeons found with a matching role *    Staff:   Circulator: Lizandro Cobb Person: Blank    Anesthesia Staff: Anesthesiologist: Joanne Krueger MD  C-AA: KATT Mathew    Procedure Summary  Anesthesia: Monitor Anesthesia Care  ASA: III  Estimated Blood Loss: 0mL  Intra-op Medications:   Administrations occurring from 0840 to 0930 on 25:   Medication Name Total Dose   acyclovir (Zovirax) tablet 400 mg Cannot be calculated   amLODIPine (Norvasc) tablet 10 mg Cannot be calculated   aspirin EC tablet 81 mg Cannot be calculated   carvedilol (Coreg) tablet 6.25 mg Cannot be calculated   ceFAZolin (Ancef) 1 g 2 g   cholecalciferol (Vitamin D-3) tablet 2,000 Units Cannot be calculated   dexmedeTOMIDine (Precedex) bolus from bag 16 mcg   fentaNYL (Sublimaze) injection 50 mcg/mL 100 mcg   glycopyrrolate (Robinul) injection 0.2 mg   insulin glargine (Lantus) injection 10 Units Cannot be calculated   LR bolus Cannot be calculated   lidocaine (Xylocaine) 2 % 50 mg   magnesium oxide (Mag-Ox) tablet 800 mg Cannot be calculated   midazolam PF (Versed) injection 1 mg/mL 2 mg   polyethylene glycol (Glycolax, Miralax) packet 17 g Cannot be calculated   predniSONE (Deltasone) tablet 10 mg Cannot be calculated   propofol (Diprivan) injection 10 mg/mL 80 mg   sodium bicarbonate tablet 1,300 mg Cannot be calculated   tamsulosin (Flomax) 24 hr capsule 0.4 mg Cannot be calculated   ursodiol (Actigall) capsule 300 mg Cannot be calculated    sodium zirconium cyclosilicate (Lokelma) packet 10 g Cannot be calculated              Anesthesia Record               Intraprocedure I/O Totals          Intake    Dexmedetomidine 0.00 mL    The total shown is the total volume documented since Anesthesia Start was filed.    LR bolus 50.00 mL    Total Intake 50 mL       Output    Est. Blood Loss 0 mL    Total Output 0 mL       Net    Net Volume 50 mL          Specimen: No specimens collected               Findings: double J removal with cystoscopy without complications    Complications:  None; patient tolerated the procedure well.     Disposition: PACU - hemodynamically stable.  Condition: stable  Specimens Collected: No specimens collected  Attending Attestation: I was present and scrubbed for the entire procedure.    Priscila Wright  Phone Number: [phone]

## 2025-02-13 NOTE — INTERVAL H&P NOTE
H&P reviewed. The patient was examined and there are no changes to the H&P.    Plan for cystoscopy ureteral stent removal    Ivone Sanchez MD  General Surgery Resident PGY-3   Transplant Surgery [id number]

## 2025-02-14 ENCOUNTER — DOCUMENTATION (OUTPATIENT)
Dept: HOME HEALTH SERVICES | Facility: HOME HEALTH | Age: 55
End: 2025-02-14
Payer: MEDICAID

## 2025-02-14 ENCOUNTER — TELEPHONE (OUTPATIENT)
Dept: PRIMARY CARE | Facility: CLINIC | Age: 55
End: 2025-02-14
Payer: MEDICAID

## 2025-02-14 ENCOUNTER — APPOINTMENT (OUTPATIENT)
Facility: HOSPITAL | Age: 55
End: 2025-02-14
Payer: MEDICAID

## 2025-02-14 ENCOUNTER — PHARMACY VISIT (OUTPATIENT)
Dept: PHARMACY | Facility: CLINIC | Age: 55
End: 2025-02-14
Payer: MEDICAID

## 2025-02-14 VITALS
TEMPERATURE: 98.2 F | RESPIRATION RATE: 16 BRPM | OXYGEN SATURATION: 97 % | BODY MASS INDEX: 33.08 KG/M2 | HEART RATE: 75 BPM | DIASTOLIC BLOOD PRESSURE: 72 MMHG | SYSTOLIC BLOOD PRESSURE: 116 MMHG | WEIGHT: 231.04 LBS | HEIGHT: 70 IN

## 2025-02-14 LAB
ALBUMIN SERPL BCP-MCNC: 3.7 G/DL (ref 3.4–5)
ALP SERPL-CCNC: 116 U/L (ref 33–120)
ALT SERPL W P-5'-P-CCNC: 6 U/L (ref 10–52)
ANION GAP SERPL CALC-SCNC: 17 MMOL/L (ref 10–20)
AST SERPL W P-5'-P-CCNC: 10 U/L (ref 9–39)
BACTERIA BLD CULT: NORMAL
BILIRUB DIRECT SERPL-MCNC: 0.3 MG/DL (ref 0–0.3)
BILIRUB SERPL-MCNC: 0.8 MG/DL (ref 0–1.2)
BUN SERPL-MCNC: 39 MG/DL (ref 6–23)
CALCIUM SERPL-MCNC: 9.2 MG/DL (ref 8.6–10.6)
CHLORIDE SERPL-SCNC: 102 MMOL/L (ref 98–107)
CO2 SERPL-SCNC: 18 MMOL/L (ref 21–32)
CREAT SERPL-MCNC: 2.04 MG/DL (ref 0.5–1.3)
EGFRCR SERPLBLD CKD-EPI 2021: 38 ML/MIN/1.73M*2
ERYTHROCYTE [DISTWIDTH] IN BLOOD BY AUTOMATED COUNT: 17.5 % (ref 11.5–14.5)
GLUCOSE BLD MANUAL STRIP-MCNC: 171 MG/DL (ref 74–99)
GLUCOSE BLD MANUAL STRIP-MCNC: 177 MG/DL (ref 74–99)
GLUCOSE BLD MANUAL STRIP-MCNC: 199 MG/DL (ref 74–99)
GLUCOSE SERPL-MCNC: 161 MG/DL (ref 74–99)
HCT VFR BLD AUTO: 24.9 % (ref 41–52)
HGB BLD-MCNC: 8.7 G/DL (ref 13.5–17.5)
MAGNESIUM SERPL-MCNC: 1.71 MG/DL (ref 1.6–2.4)
MCH RBC QN AUTO: 29.6 PG (ref 26–34)
MCHC RBC AUTO-ENTMCNC: 34.9 G/DL (ref 32–36)
MCV RBC AUTO: 85 FL (ref 80–100)
NRBC BLD-RTO: 0 /100 WBCS (ref 0–0)
PHOSPHATE SERPL-MCNC: 2.8 MG/DL (ref 2.5–4.9)
PLATELET # BLD AUTO: 177 X10*3/UL (ref 150–450)
POTASSIUM SERPL-SCNC: 3.6 MMOL/L (ref 3.5–5.3)
PROT SERPL-MCNC: 7.1 G/DL (ref 6.4–8.2)
RBC # BLD AUTO: 2.94 X10*6/UL (ref 4.5–5.9)
SODIUM SERPL-SCNC: 133 MMOL/L (ref 136–145)
TACROLIMUS BLD-MCNC: 6 NG/ML
WBC # BLD AUTO: 8.9 X10*3/UL (ref 4.4–11.3)

## 2025-02-14 PROCEDURE — 2500000001 HC RX 250 WO HCPCS SELF ADMINISTERED DRUGS (ALT 637 FOR MEDICARE OP): Mod: SE

## 2025-02-14 PROCEDURE — 80048 BASIC METABOLIC PNL TOTAL CA: CPT

## 2025-02-14 PROCEDURE — 2500000004 HC RX 250 GENERAL PHARMACY W/ HCPCS (ALT 636 FOR OP/ED): Mod: SE

## 2025-02-14 PROCEDURE — 80197 ASSAY OF TACROLIMUS: CPT

## 2025-02-14 PROCEDURE — RXMED WILLOW AMBULATORY MEDICATION CHARGE

## 2025-02-14 PROCEDURE — 97161 PT EVAL LOW COMPLEX 20 MIN: CPT | Mod: GP | Performed by: PHYSICAL THERAPIST

## 2025-02-14 PROCEDURE — 99238 HOSP IP/OBS DSCHRG MGMT 30/<: CPT

## 2025-02-14 PROCEDURE — 85027 COMPLETE CBC AUTOMATED: CPT

## 2025-02-14 PROCEDURE — 97165 OT EVAL LOW COMPLEX 30 MIN: CPT | Mod: GO

## 2025-02-14 PROCEDURE — 2500000002 HC RX 250 W HCPCS SELF ADMINISTERED DRUGS (ALT 637 FOR MEDICARE OP, ALT 636 FOR OP/ED): Mod: SE

## 2025-02-14 PROCEDURE — 84100 ASSAY OF PHOSPHORUS: CPT

## 2025-02-14 PROCEDURE — 83735 ASSAY OF MAGNESIUM: CPT

## 2025-02-14 PROCEDURE — 84075 ASSAY ALKALINE PHOSPHATASE: CPT

## 2025-02-14 PROCEDURE — 36415 COLL VENOUS BLD VENIPUNCTURE: CPT

## 2025-02-14 PROCEDURE — 97530 THERAPEUTIC ACTIVITIES: CPT | Mod: GP | Performed by: PHYSICAL THERAPIST

## 2025-02-14 PROCEDURE — 2500000004 HC RX 250 GENERAL PHARMACY W/ HCPCS (ALT 636 FOR OP/ED): Mod: SE | Performed by: STUDENT IN AN ORGANIZED HEALTH CARE EDUCATION/TRAINING PROGRAM

## 2025-02-14 PROCEDURE — 82947 ASSAY GLUCOSE BLOOD QUANT: CPT

## 2025-02-14 PROCEDURE — 99232 SBSQ HOSP IP/OBS MODERATE 35: CPT | Performed by: TRANSPLANT SURGERY

## 2025-02-14 RX ORDER — INSULIN GLARGINE 100 [IU]/ML
10 INJECTION, SOLUTION SUBCUTANEOUS EVERY MORNING
Start: 2025-02-14 | End: 2025-03-16

## 2025-02-14 RX ORDER — MYCOPHENOLATE MOFETIL 250 MG/1
500 CAPSULE ORAL
Start: 2025-02-14 | End: 2025-02-21 | Stop reason: SDUPTHER

## 2025-02-14 RX ORDER — INSULIN LISPRO 100 [IU]/ML
0-10 INJECTION, SOLUTION INTRAVENOUS; SUBCUTANEOUS
Start: 2025-02-14 | End: 2025-03-16

## 2025-02-14 RX ORDER — MAGNESIUM SULFATE HEPTAHYDRATE 40 MG/ML
2 INJECTION, SOLUTION INTRAVENOUS ONCE
Status: COMPLETED | OUTPATIENT
Start: 2025-02-14 | End: 2025-02-14

## 2025-02-14 RX ADMIN — ACYCLOVIR 400 MG: 400 TABLET ORAL at 08:52

## 2025-02-14 RX ADMIN — CARVEDILOL 6.25 MG: 6.25 TABLET, FILM COATED ORAL at 08:52

## 2025-02-14 RX ADMIN — INSULIN LISPRO 2 UNITS: 100 INJECTION, SOLUTION INTRAVENOUS; SUBCUTANEOUS at 13:05

## 2025-02-14 RX ADMIN — PANTOPRAZOLE SODIUM 40 MG: 40 TABLET, DELAYED RELEASE ORAL at 06:20

## 2025-02-14 RX ADMIN — HYDRALAZINE HYDROCHLORIDE 25 MG: 25 TABLET ORAL at 08:52

## 2025-02-14 RX ADMIN — SODIUM BICARBONATE 1300 MG: 650 TABLET ORAL at 08:51

## 2025-02-14 RX ADMIN — Medication 2000 UNITS: at 08:52

## 2025-02-14 RX ADMIN — HEPARIN SODIUM 5000 UNITS: 5000 INJECTION INTRAVENOUS; SUBCUTANEOUS at 06:18

## 2025-02-14 RX ADMIN — PREDNISONE 10 MG: 10 TABLET ORAL at 08:51

## 2025-02-14 RX ADMIN — INSULIN LISPRO 8 UNITS: 100 INJECTION, SOLUTION INTRAVENOUS; SUBCUTANEOUS at 09:01

## 2025-02-14 RX ADMIN — INSULIN LISPRO 2 UNITS: 100 INJECTION, SOLUTION INTRAVENOUS; SUBCUTANEOUS at 08:52

## 2025-02-14 RX ADMIN — MYCOPHENOLATE MOFETIL 500 MG: 250 CAPSULE ORAL at 06:19

## 2025-02-14 RX ADMIN — DARBEPOETIN ALFA 100 MCG: 100 INJECTION, SOLUTION INTRAVENOUS; SUBCUTANEOUS at 14:59

## 2025-02-14 RX ADMIN — SULFAMETHOXAZOLE AND TRIMETHOPRIM 1 TABLET: 400; 80 TABLET ORAL at 08:51

## 2025-02-14 RX ADMIN — TACROLIMUS 1 MG: 1 CAPSULE ORAL at 06:19

## 2025-02-14 RX ADMIN — LINEZOLID 600 MG: 600 TABLET, FILM COATED ORAL at 08:51

## 2025-02-14 RX ADMIN — MAGNESIUM OXIDE TAB 400 MG (241.3 MG ELEMENTAL MG) 800 MG: 400 (241.3 MG) TAB at 08:52

## 2025-02-14 RX ADMIN — URSODIOL 300 MG: 300 CAPSULE ORAL at 08:51

## 2025-02-14 RX ADMIN — MAGNESIUM SULFATE HEPTAHYDRATE 2 G: 40 INJECTION, SOLUTION INTRAVENOUS at 08:52

## 2025-02-14 RX ADMIN — INSULIN LISPRO 8 UNITS: 100 INJECTION, SOLUTION INTRAVENOUS; SUBCUTANEOUS at 15:01

## 2025-02-14 RX ADMIN — SODIUM BICARBONATE 1300 MG: 650 TABLET ORAL at 14:59

## 2025-02-14 RX ADMIN — URSODIOL 300 MG: 300 CAPSULE ORAL at 14:59

## 2025-02-14 RX ADMIN — ASPIRIN 81 MG: 81 TABLET, COATED ORAL at 08:51

## 2025-02-14 RX ADMIN — INSULIN GLARGINE 10 UNITS: 100 INJECTION, SOLUTION SUBCUTANEOUS at 09:01

## 2025-02-14 RX ADMIN — TAMSULOSIN HYDROCHLORIDE 0.4 MG: 0.4 CAPSULE ORAL at 08:51

## 2025-02-14 ASSESSMENT — COGNITIVE AND FUNCTIONAL STATUS - GENERAL
DAILY ACTIVITIY SCORE: 19
CLIMB 3 TO 5 STEPS WITH RAILING: A LITTLE
DRESSING REGULAR UPPER BODY CLOTHING: A LITTLE
HELP NEEDED FOR BATHING: A LITTLE
PERSONAL GROOMING: A LITTLE
TOILETING: A LITTLE
MOVING FROM LYING ON BACK TO SITTING ON SIDE OF FLAT BED WITH BEDRAILS: A LITTLE
TURNING FROM BACK TO SIDE WHILE IN FLAT BAD: A LITTLE
WALKING IN HOSPITAL ROOM: A LITTLE
DRESSING REGULAR LOWER BODY CLOTHING: A LITTLE
STANDING UP FROM CHAIR USING ARMS: A LITTLE
MOVING TO AND FROM BED TO CHAIR: A LITTLE
MOBILITY SCORE: 18

## 2025-02-14 ASSESSMENT — ACTIVITIES OF DAILY LIVING (ADL)
ADL_ASSISTANCE: INDEPENDENT
ADL_ASSISTANCE: INDEPENDENT
BATHING_ASSISTANCE: STAND BY

## 2025-02-14 ASSESSMENT — PAIN - FUNCTIONAL ASSESSMENT
PAIN_FUNCTIONAL_ASSESSMENT: 0-10

## 2025-02-14 ASSESSMENT — PAIN SCALES - GENERAL
PAINLEVEL_OUTOF10: 0 - NO PAIN

## 2025-02-14 NOTE — PROGRESS NOTES
Physical Therapy    Physical Therapy Evaluation & Treatment    Patient Name: Goran Ibrahim  MRN: [id number]  Department: [place] [address]  Room: [facility]  Today's Date: 2/14/2025   Time Calculation  Start Time: 0915  Stop Time: 0946  Time Calculation (min): 31 min    Assessment/Plan   PT Assessment  PT Assessment Results: Decreased strength, Decreased endurance, Impaired balance, Decreased mobility  Rehab Prognosis: Good  Barriers to Discharge Home: No anticipated barriers  Strengths: Support of Caregivers  Barriers to Participation: Comorbidities  End of Session Communication: Bedside nurse  Assessment Comment: Pt demonstrated decreased balance, endurance, strength and mobility. Pt would benefit from continued PT to increase safety and independence with all mobility.  End of Session Patient Position: Up in chair, Alarm off, caregiver present   IP OR SWING BED PT PLAN  Inpatient or Swing Bed: Inpatient  PT Plan  Treatment/Interventions: Bed mobility, Transfer training, Gait training, Stair training, Balance training, Strengthening, Endurance training, Therapeutic exercise, Therapeutic activity, Home exercise program  PT Plan: Ongoing PT  PT Frequency: 3 times per week  PT Discharge Recommendations: Low intensity level of continued care  Equipment Recommended upon Discharge: Wheeled walker  PT Recommended Transfer Status: Assist x1, Assistive device  PT - OK to Discharge: Yes      Subjective     General Visit Information:  General  Reason for Referral: Admitted for elevated potassium levels, CYSTOSCOPY, WITH URETERAL STENT REMOVAL 2/13  Past Medical History Relevant to Rehab: Hypertension, CKD, cirrhosis of liver complicated by esophageal varices and hepatic encephalopathy status post SLK (CMV D-/R-, 1/20/25, induction with Simulect), immunosuppression with tacrolimus CellCept and prednisone and anti-infective prophylaxis with acyclovir, clotrimazole and Bactrim  Family/Caregiver Present: Yes  Caregiver Feedback: Brother  present & supportive  Prior to Session Communication: Bedside nurse  Patient Position Received: Bed, 3 rail up, Alarm off, not on at start of session  Preferred Learning Style: verbal  General Comment: Pt supine in bed upon arrival and willing to participate in PT session. Session ended as breakfast arrived.  Home Living:  Home Living  Type of Home: House  Lives With:  (brother, works from home, pt reports will have people staying if needed)  Home Adaptive Equipment: None  Home Layout: Two level, Bed/bath upstairs, Stairs to alternate level with rails, Laundry in basement  Alternate Level Stairs-Number of Steps: 13 (1 handrail)  Entrance Stairs-Number of Steps: 3 (1 handrail)  Bathroom Shower/Tub: Tub/shower unit  Prior Level of Function:  Prior Function Per Pt/Caregiver Report  Level of [place]: Independent with ADLs and functional transfers  Receives Help From: Family  ADL Assistance: Independent  Ambulatory Assistance: Independent  Hand Dominance: Right  Prior Function Comments: Drives intermittently prior the sx in Jan, denies falls, brother completes cooking, laundry  Precautions:  Precautions  Medical Precautions: Fall precautions, Abdominal precautions  Post-Surgical Precautions: Abdominal surgery precautions     Date/Time Vitals Session Patient Position Pulse Resp SpO2 BP MAP (mmHg)    02/14/25 0915 During PT  Sitting  77  --  98 %  126/80  --                Objective   Pain:  Pain Assessment  Pain Assessment: 0-10  0-10 (Numeric) Pain Score: 0 - No pain  Cognition:  Cognition  Overall Cognitive Status: Within Functional Limits  Arousal/Alertness: Appropriate responses to stimuli  Orientation Level: Oriented X4    General Assessments:       Sensation  Light Touch: No apparent deficits    Strength  Strength Comments: BLE's 4/5 based on mobility    Static Sitting Balance  Static Sitting-Level of Assistance: Independent  Dynamic Sitting Balance  Dynamic Sitting-Level of Assistance: Modified  independent    Static Standing Balance  Static Standing-Level of Assistance: Close supervision  Dynamic Standing Balance  Dynamic Standing-Level of Assistance: Contact guard  Functional Assessments:  Bed Mobility  Bed Mobility: Yes  Bed Mobility 1  Bed Mobility 1: Supine to sitting  Level of Assistance 1: Close supervision, Minimal verbal cues  Bed Mobility Comments 1: HOB elevated, bedrail, cues for log roll    Transfers  Transfer: Yes  Transfer 1  Transfer From 1: Sit to  Transfer to 1: Stand  Technique 1: Sit to stand, Stand to sit  Transfer Device 1: Walker  Transfer Level of Assistance 1: Close supervision  Trials/Comments 1: cues for safe hand placement    Ambulation/Gait Training  Ambulation/Gait Training Performed: Yes  Ambulation/Gait Training 1  Surface 1: Level tile  Device 1: Rolling walker  Assistance 1: Contact guard, Minimal verbal cues  Quality of Gait 1: Decreased step length, Diminished heel strike, Forward flexed posture  Comments/Distance (ft) 1: ~125 ft, cues for safe walker placement with ambulation and taking rest breaks as needed    Stairs  Stairs: No       Treatments:       Therapeutic Activity  Therapeutic Activity Performed: Yes  Therapeutic Activity 1: Pt educated on PT POC, log roll, abdominal precautions, safety with mobility, use of walker and D/C planning      Ambulation/Gait Training  Ambulation/Gait Training Performed: Yes  Ambulation/Gait Training 1  Surface 1: Level tile  Device 1: Rolling walker  Assistance 1: Contact guard, Minimal verbal cues  Quality of Gait 1: Decreased step length, Diminished heel strike, Forward flexed posture  Comments/Distance (ft) 1: ~125 ft, cues for safe walker placement with ambulation and taking rest breaks as needed  Transfers  Transfer: Yes  Transfer 1  Transfer From 1: Sit to  Transfer to 1: Stand  Technique 1: Sit to stand, Stand to sit  Transfer Device 1: Walker  Transfer Level of Assistance 1: Close supervision  Trials/Comments 1: cues for safe  hand placement    Outcome Measures:  [facility] Basic Mobility  Turning from your back to your side while in a flat bed without using bedrails: A little  Moving from lying on your back to sitting on the side of a flat bed without using bedrails: A little  Moving to and from bed to chair (including a wheelchair): A little  Standing up from a chair using your arms (e.g. wheelchair or bedside chair): A little  To walk in hospital room: A little  Climbing 3-5 steps with railing: A little  Basic Mobility - Total Score: 18    Encounter Problems       Encounter Problems (Active)       Mobility       Pt will be David ambulation >200 ft with LRAD (Progressing)       Start:  02/14/25    Expected End:  02/28/25            Pt will be David ascend/descend 13 steps handrail (Progressing)       Start:  02/14/25    Expected End:  02/28/25               PT Transfers       Pt will be David with sit to stand and bed to chair transfers with LRAD (Progressing)       Start:  02/14/25    Expected End:  02/28/25            Pt will be David with bed mobility (Progressing)       Start:  02/14/25    Expected End:  02/28/25               Pain - Adult              Education Documentation  Precautions, taught by Angle Hinton PT at 2/14/2025 10:11 AM.  Learner: Patient  Readiness: Acceptance  Method: Explanation  Response: Verbalizes Understanding, Needs Reinforcement    Body Mechanics, taught by Angle Hinton PT at 2/14/2025 10:11 AM.  Learner: Patient  Readiness: Acceptance  Method: Explanation  Response: Verbalizes Understanding, Needs Reinforcement    Mobility Training, taught by Angle Hinton PT at 2/14/2025 10:11 AM.  Learner: Patient  Readiness: Acceptance  Method: Explanation  Response: Verbalizes Understanding, Needs Reinforcement    Education Comments  No comments found.

## 2025-02-14 NOTE — CARE PLAN
The patient's goals for the shift include      The clinical goals for the shift include patient will remain hemodynamically stable during shift    Problem: Pain - Adult  Goal: Verbalizes/displays adequate comfort level or baseline comfort level  Outcome: Progressing     Problem: Safety - Adult  Goal: Free from fall injury  Outcome: Progressing     Problem: Chronic Conditions and Co-morbidities  Goal: Patient's chronic conditions and co-morbidity symptoms are monitored and maintained or improved  Outcome: Progressing     Problem: Nutrition  Goal: Nutrient intake appropriate for maintaining nutritional needs  Outcome: Progressing

## 2025-02-14 NOTE — PROGRESS NOTES
TRANSPLANT SURGERY PROGRESS NOTE    Goran Ibrahim underwent transplant surgery on 1/20/2025 (Liver / Kidney) and was evaluated on multidisciplinary inpatient rounds.  I specifically evaluated the management of immunosuppression to ensure adequate exposure required to decrease the risk of rejection.  Furthermore, I reviewed potential side effects including tremor, hyperglycemia, leukopenia, infection, and other neurologic changes.  I reviewed and adjusted infectious prophylaxis based on the patient's clinical condition and  protocols.     24 EVENTS:  Admitted with AKUA now resolved. Likely tacrolimus toxicity  Bacterimia -- enterococcus faecium. Changed to linezolid. No fevers. Feels well.    DIAGNOSIS:  Liver replaced by transplant Z94.4, immunosuppression      PHYSICAL EXAMINATION:  Vitals:    02/14/25 1153   BP: 116/72   Pulse: 75   Resp: 16   Temp: 36.8 °C (98.2 °F)   SpO2: 97%        02/12 1900 - 02/14 0659  In: 2524 [P.O.:2340]  Out: 1950 [Urine:1950]     Weight change:     General Appearance - NAD, Good speech, oriented and alert  Abdomen - soft , not tender, no guarding, no rigidity. No hepatosplenomegaly. Normal bowel sounds. + ascites   Wound c/d/i   Neuro/Psych - appropriate mood and affect. Motor power V/V all extremities. CN I -XII were grossly intact.  Skin - No visible rash      MEDICATION LIST: REVIEWED  acyclovir, 400 mg, BID  aspirin, 81 mg, Daily  carvedilol, 6.25 mg, BID  cholecalciferol, 2,000 Units, Daily  darbepoetin aury, 100 mcg, Once  heparin (porcine), 5,000 Units, q8h  hydrALAZINE, 25 mg, BID  insulin glargine, 10 Units, Daily  insulin lispro, 0-10 Units, TID AC  insulin lispro, 4 Units, Daily with evening meal  insulin lispro, 8 Units, Daily with breakfast  insulin lispro, 8 Units, Daily with lunch  linezolid, 600 mg, q12h EDINSON  magnesium oxide, 800 mg, BID  mycophenolate, 500 mg, q12h EDINSON  pantoprazole, 40 mg, Daily before breakfast  polyethylene glycol, 17 g, Daily  predniSONE, 10  mg, Daily  sodium bicarbonate, 1,300 mg, TID  sulfamethoxazole-trimethoprim, 1 tablet, Daily  tacrolimus, 1 mg, q12h EDINSON  tamsulosin, 0.4 mg, Daily  ursodiol, 300 mg, TID           dextrose, 12.5 g, q15 min PRN  dextrose, 25 g, q15 min PRN  glucagon, 1 mg, q15 min PRN        ALLERGY:  No Known Allergies    LABS:  Results for orders placed or performed during the hospital encounter of 02/10/25 (from the past 24 hours)   POCT GLUCOSE   Result Value Ref Range    POCT Glucose 192 (H) 74 - 99 mg/dL   POCT GLUCOSE   Result Value Ref Range    POCT Glucose 199 (H) 74 - 99 mg/dL   CBC   Result Value Ref Range    WBC 8.9 4.4 - 11.3 x10*3/uL    nRBC 0.0 0.0 - 0.0 /100 WBCs    RBC 2.94 (L) 4.50 - 5.90 x10*6/uL    Hemoglobin 8.7 (L) 13.5 - 17.5 g/dL    Hematocrit 24.9 (L) 41.0 - 52.0 %    MCV 85 80 - 100 fL    MCH 29.6 26.0 - 34.0 pg    MCHC 34.9 32.0 - 36.0 g/dL    RDW 17.5 (H) 11.5 - 14.5 %    Platelets 177 150 - 450 x10*3/uL   Magnesium   Result Value Ref Range    Magnesium 1.71 1.60 - 2.40 mg/dL   Tacrolimus   Result Value Ref Range    Tacrolimus  6.0 <=15.0 ng/mL   Hepatic Function Panel   Result Value Ref Range    Albumin 3.7 3.4 - 5.0 g/dL    Bilirubin, Total 0.8 0.0 - 1.2 mg/dL    Bilirubin, Direct 0.3 0.0 - 0.3 mg/dL    Alkaline Phosphatase 116 33 - 120 U/L    ALT 6 (L) 10 - 52 U/L    AST 10 9 - 39 U/L    Total Protein 7.1 6.4 - 8.2 g/dL   Phosphorus   Result Value Ref Range    Phosphorus 2.8 2.5 - 4.9 mg/dL   Basic Metabolic Panel   Result Value Ref Range    Glucose 161 (H) 74 - 99 mg/dL    Sodium 133 (L) 136 - 145 mmol/L    Potassium 3.6 3.5 - 5.3 mmol/L    Chloride 102 98 - 107 mmol/L    Bicarbonate 18 (L) 21 - 32 mmol/L    Anion Gap 17 10 - 20 mmol/L    Urea Nitrogen 39 (H) 6 - 23 mg/dL    Creatinine 2.04 (H) 0.50 - 1.30 mg/dL    eGFR 38 (L) >60 mL/min/1.73m*2    Calcium 9.2 8.6 - 10.6 mg/dL   POCT GLUCOSE   Result Value Ref Range    POCT Glucose 171 (H) 74 - 99 mg/dL      Lab Results   Component Value Date    ALT 6  (L) 02/14/2025    AST 10 02/14/2025     (H) 02/10/2025    ALKPHOS 116 02/14/2025    BILITOT 0.8 02/14/2025         ASSESSMENT AND PLAN:    Mr. Ibrahim is a 54 y.o. male who underwent  transplant surgery on 1/20/2025 (Liver / Kidney).    1. Immunosuppression reviewed and adjusted       Tacrolimus: current dose 0.5 mg BID. Plan continue      Today's tacrolimus level is 6.0, Goal tacrolimus trough level is 8-10. Increasing slowly and actively bacteremic so no change today.       Mycophenolate: Yes - 750 mg po BID      Prednisone: Yes - 10 mg      Belatacept: No     2. IV hydration      Replacement completed: Yes      Maintenance: Discontinue    3. Electrolyte     Reviewed renal profile.      Hyperkalemia -- lokalma, low K diet, improved after tacrolimus dose lowered.     4. Hypertension medications reviewed        Blood Pressures         2/14/2025  0036 2/14/2025  0625 2/14/2025  0719 2/14/2025  0915 2/14/2025  1153    BP: 122/80 117/77 123/80 126/80 116/72              Continue current management. Well controlled on current meds    5. Wound/drain/chaudhary and pain management      No issues, healing well    6. GI prophylaxis       On PPI     7. DVT Prophylaxis      SCDs      Subcutaneous Heparin: Yes    8. ID prophylaxis      CMV, PJP and fungal prophylaxis per  Transplant [place] Protocol      Valcyte: Yes    Case was presented at Multi Disciplinary team rounds   Attending physician, consulting physician, , pharmacist, residents and fellow were present at the meeting.    Goran Carrillo MD    Transplant Surgery Attending

## 2025-02-14 NOTE — PROGRESS NOTES
Occupational Therapy                 Therapy Communication Note    Patient Name: Goran Ibrahim  MRN: [id number]  Department: [facility] 9  Room: [id number]  Today's Date: 2/14/2025     Discipline: Occupational Therapy    OT Missed Visit: Yes     Missed Visit Reason: Missed Visit Reason:  (Pt eating breakfast - will follow up later as able)    Missed Time: Attempt    Comment:

## 2025-02-14 NOTE — DISCHARGE SUMMARY
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 Wrihgt/Dr. Trey Kline, also has HTN and T2DM who presented as a direct admit for hyperkalemia. Patient presented after interval labs demonstrated a potassium of 6.1 w/out evidence of hemolysis. 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 He has been making 1800-2000cc of urine daily per patient's brother.     Hyperkalemia was treated medically with insulin, D50, calcium gluconate, Lokelma, and lasix 100 mg. Fluconazole was discontinued as he was near course completion and possibly linked to elevated tac level and hyperkalemia. Found to have bacteremia with Enterococcus faecium without a clear source, possibly GI/biliary translocation in the setting of recent surgery. CT abdomen did not show any significant ascites but showed multifocal loculated fluid collection surrounding the superior and inferior poles of transplant kidney which may represent postsurgical seroma or subacute hematoma.  Two sets of repeat blood cultures and TTE is negative for valvular vegetations. Daptomycin initiated and switched to PO Linezolid for planned 2 week course from last negative blood culture (until 2/26). Staples and sutures from wounds were removed prior to discharge. Uretal stent removed 2/13 with Dr. Wright.    Team pharmacist met with patient and reviewed pill box. He is tolerating a diabetic diet and is stable for discharge home today. He will  follow up in transplant clinic next week as scheduled and have labs drawn Monday and Thursday.    Pertinent Physical Exam At Time of Discharge  Physical Exam  HENT:      Head: Normocephalic.      Mouth/Throat:      Mouth: Mucous membranes are moist.   Eyes:      Extraocular Movements: Extraocular movements intact.   Cardiovascular:      Rate and Rhythm: Normal rate.   Pulmonary:      Effort: Pulmonary effort is normal.   Abdominal:      Palpations: Abdomen is soft.   Musculoskeletal:         General: Normal range of motion.      Cervical back: Normal range of motion.   Skin:     General: Skin is warm and dry.      Capillary Refill: Capillary refill takes less than 2 seconds.   Neurological:      General: No focal deficit present.      Mental Status: He is alert and oriented to person, place, and time.   Psychiatric:         Mood and Affect: Mood normal.         Behavior: Behavior normal.       Home Medications     Medication List      START taking these medications     hydrALAZINE 25 mg tablet; Commonly known as: Apresoline; Take 1 tablet   (25 mg) by mouth 2 times a day.   linezolid 600 mg tablet; Commonly known as: Zyvox; Take 1 tablet (600   mg) by mouth 2 times a day for 12 days. Do not fill before February 14, 2025.   sodium bicarbonate 650 mg tablet; Take 2 tablets (1,300 mg) by mouth 3   times a day.     CHANGE how you take these medications     Basaglar KwikPen U-100 Insulin 100 unit/mL (3 mL) pen; Generic drug:   insulin glargine; Inject 10 Units under the skin once daily in the   morning. Take in AM with Prednisone; What changed: how much to take   insulin lispro 100 unit/mL injection; Commonly known as: HumaLOG KwikPen   Insulin; Inject 0-10 Units under the skin 3 times daily (morning, midday,   late afternoon). Inject 8 units of Lispro subcutaneously two times a day   before breakfast and lunch and 4 units with dinner- in addition to the   following sliding scale:  0 unit(s) if Blood glucose is between  " 2   unit(s) if Blood glucose is between 151-200 4 unit(s) if Blood glucose is   between 201-250 6 unit(s) if Blood glucose is between 251-300 8 unit(s) if   Blood glucose is between 301-350 10 unit(s) if Blood glucose is between   351-400  If blood glucose is greater than 400 mg/dL, give max insulin per   sliding scale AND then contact provider.; What changed: additional   instructions, Another medication with the same name was removed. Continue   taking this medication, and follow the directions you see here.   mycophenolate 250 mg capsule; Commonly known as: Cellcept; Take 2   capsules (500 mg) by mouth every 12 hours.; What changed: how much to take     CONTINUE taking these medications     acyclovir 400 mg tablet; Commonly known as: Zovirax; Take 1 tablet (400   mg) by mouth 2 times a day.   alcohol swabs pads, medicated; Apply 1 each topically 4 times a day. Use   prior to checking glucose or injecting insulin   aspirin 81 mg EC tablet; Take 1 tablet (81 mg) by mouth once daily.   * BD Ultra-Fine Mini Pen Needle 31 gauge x 3/16\" needle; Generic drug:   pen needle, diabetic; 1 each 3 times a day before meals.   * pen needle, diabetic 32 gauge x 5/32\" needle; 1 each 4 times a day.   Use to inject insulin 4 times daily   Blood Glucose Test strip; Generic drug: blood sugar diagnostic; 100   strips 4 times a day. Use to check glucose 4 times daily, before meals and   at bedtime   blood-glucose meter misc; 1 each 4 times a day. Use to check glucose 4   times daily, before meals and at bedtime   carvedilol 6.25 mg tablet; Commonly known as: Coreg; Take 1 tablet (6.25   mg) by mouth 2 times a day. Hold for SBP <110 or HR <55   cholecalciferol 50 MCG (2000 UT) tablet; Commonly known as: Vitamin D-3;   Take 1 tablet (50 mcg) by mouth once daily.   Dexcom G7  misc; Generic drug: blood-glucose meter,continuous;   Use as instructed   Dexcom G7 Sensor device; Generic drug: blood-glucose sensor; 1 each 3 "   times a day before meals. Change sensor every 10 days   magnesium oxide 400 mg tablet; Commonly known as: Mag-Ox; Take 2 tablets   (800 mg) by mouth 2 times a day.; Notes to patient: Separate from   mycophenolate by at least 2 hours.   pantoprazole 40 mg EC tablet; Commonly known as: ProtoNix; Take 1 tablet   (40 mg) by mouth once daily in the morning. Take before meals. Do not   crush, chew, or split.   predniSONE 5 mg tablet; Commonly known as: Deltasone; Take 2 tablets (10   mg) by mouth once daily.   sulfamethoxazole-trimethoprim 400-80 mg tablet; Commonly known as:   Bactrim; Take 1 tablet by mouth once daily.   tacrolimus 1 mg capsule; Commonly known as: Prograf; Take 1 capsule (1   mg) by mouth 2 times a day.   tamsulosin 0.4 mg 24 hr capsule; Commonly known as: Flomax; Take 1   capsule (0.4 mg) by mouth once daily.   * TRUEplus Lancets 33 gauge misc; Generic drug: lancets; 1 Lancet 3   times a day before meals.   * lancets misc; 1 each 4 times a day. Use to check glucose 4 times   daily, before meals and at bedtime   ursodiol 300 mg capsule; Commonly known as: Actigall; Take 1 capsule   (300 mg) by mouth 3 times a day.  * This list has 4 medication(s) that are the same as other medications   prescribed for you. Read the directions carefully, and ask your doctor or   other care provider to review them with you.     STOP taking these medications     amLODIPine 10 mg tablet; Commonly known as: Norvasc   docusate sodium 100 mg capsule; Commonly known as: Colace   fluconazole 200 mg tablet; Commonly known as: Diflucan       Outpatient Follow-Up  Future Appointments   Date Time Provider Department [place]   2/20/2025 11:30 AM [facility]   2/21/2025  9:00 AM TXP ABDOMINAL SURGEON [id number]   2/28/2025  9:00 AM TXP ABDOMINAL SURGEON [id number]   3/7/2025  9:00 AM TXP ABDOMINAL SURGEON [id number]   3/7/2025 10:00 AM Adrienne Licona RDN, [facility]    3/13/2025 11:30 AM Chelsea Hernandez PA-C [id number]   6/19/2025 10:20 AM Seun Rodrigues MD [id number]       Seema Matthews, APRN-CNP

## 2025-02-14 NOTE — HH CARE COORDINATION
Home Care received a referral for Physical Therapy and Occupational Therapy. Unfortunately, we are unable to accept and process the referral at this time.    Reason:  Patient's Home is Outside of [facility] Area    Patients, please reach out to the referring provider or your PCP to assist in obtaining an alternative home care agency and/or guidance to meet your needs.    Providers, please reach out to  Home Care at [phone] with any questions regarding the declined referral.

## 2025-02-14 NOTE — PROGRESS NOTES
Pharmacist Post-Transplant Pillbox Review    Goran Ibrahim is a 54 y.o. male who underwent an liver and kidney transplant on 1/20/2025. I visited the patient and reviewed his pill box from home. I also made medication changes needed based on discharge medication list.    Post-Transplant Medication Discussion:    Pillbox  The medication pillbox was filled by patient and brother.  The pillbox was filled correctly.  The medication pillbox was updated by pharmacist and brother for the rest of the week    Pharmacy Interventions  Medication list reconciliation  Pillbox fill  Education  Facilitation of delivery of remainder of linezolid prescription from Meds to Beds    Alejandrina Goetz, PharmD, BCTXP  Solid Organ Transplant Clinical Pharmacy Specialist

## 2025-02-14 NOTE — PROGRESS NOTES
Goran Ibrahim is a 54 y.o. male on day 4 of admission presenting with Hyperkalemia.      Transitional Care Coordination Progress Note:  Patient discussed during interdisciplinary rounds.      Plan per Medical/Surgical team:   PT/OT rec'd Low for Home Care.    Pt currently using [facility], Referral sent.         Discharge disposition: HC: [facility]      Potential Barriers: None     ADOD:  2/14     This TCC will continue to follow for home going needs and safe DC plan.      PRICE COOK

## 2025-02-14 NOTE — PROGRESS NOTES
Occupational Therapy    Evaluation    Patient Name: Goran Ibrahim  MRN: [id number]  Today's Date: 2/14/2025  [facility]  Time Calculation  Start Time: 1350  Stop Time: 1411  Time Calculation (min): 21 min    Assessment  IP OT Assessment  OT Assessment: Pt demonstrates ability to participate in ADL/IADL activties but requires SBA assistance in grooming, UE/LE dressing, bathing, and toileting activities due to decreased endurance and safety. Pt would continue to benefit from OT services to increase progression towards PLOF in ADL/IADL's.  Prognosis: Good  Barriers to Discharge Home: No anticipated barriers  Evaluation/Treatment Tolerance: Patient tolerated treatment well  Medical Staff Made Aware: Yes  End of Session Communication: Bedside nurse  End of Session Patient Position: Bed, 3 rail up, Alarm off, not on at start of session, Alarm off, caregiver present  Plan:  Inpatient Plan  Treatment Interventions: ADL retraining, Functional transfer training, Endurance training, Patient/family training, Equipment evaluation/education, Compensatory technique education  OT Frequency: 2 times per week  OT Discharge Recommendations: Low intensity level of continued care  OT Recommended Transfer Status: Assist of 1  OT - OK to Discharge: Yes  OT Assessment  OT Assessment Results: Decreased ADL status, Decreased endurance, Decreased functional mobility, Decreased IADLs  Prognosis: Good  Barriers to Discharge: None  Evaluation/Treatment Tolerance: Patient tolerated treatment well  Medical Staff Made Aware: Yes  Strengths: Attitude of self, Support of Caregivers  Barriers to Participation: Comorbidities    Subjective   Current Problem:  1. Hyperkalemia  DISCONTINUED: sodium zirconium cyclosilicate (Lokelma) 10 gram packet      2. Hypocarbia  sodium bicarbonate 650 mg tablet      3. Kidney replaced by transplant ([facility])  Case Request Operating Room: CYSTOSCOPY, WITH URETERAL STENT REMOVAL    Case Request Operating Room:  CYSTOSCOPY, WITH URETERAL STENT REMOVAL    Transthoracic Echo (TTE) Limited    Transthoracic Echo (TTE) Limited    mycophenolate (Cellcept) 250 mg capsule      4. Liver transplant recipient (Multi)  Transthoracic Echo (TTE) Limited    Transthoracic Echo (TTE) Limited    mycophenolate (Cellcept) 250 mg capsule      5. Other secondary hypertension        6. Hypertension, unspecified type  hydrALAZINE (Apresoline) 25 mg tablet      7. Bacteremia due to Enterococcus  linezolid (Zyvox) 600 mg tablet      8. Steroid-induced hyperglycemia  insulin lispro (HumaLOG KwikPen Insulin) 100 unit/mL injection    insulin glargine (Basaglar KwikPen U-100 Insulin) 100 unit/mL (3 mL) pen      9. Type 2 diabetes mellitus without complication, unspecified whether long term insulin use (Multi)  insulin lispro (HumaLOG KwikPen Insulin) 100 unit/mL injection    insulin glargine (Basaglar KwikPen U-100 Insulin) 100 unit/mL (3 mL) pen      10. Liver replaced by transplant (Multi)  insulin lispro (HumaLOG KwikPen Insulin) 100 unit/mL injection    insulin glargine (Basaglar KwikPen U-100 Insulin) 100 unit/mL (3 mL) pen      11. Impaired mobility and activities of daily living  Referral to Home Health        General:  Reason for Referral: Admitted for elevated potassium levels, CYSTOSCOPY, WITH URETERAL STENT REMOVAL 2/13  Past Medical History Relevant to Rehab: Hypertension, CKD, cirrhosis of liver complicated by esophageal varices and hepatic encephalopathy status post SLK (CMV D-/R-, 1/20/25, induction with Simulect), immunosuppression with tacrolimus CellCept and prednisone and anti-infective prophylaxis with acyclovir, clotrimazole and Bactrim  Prior to Session Communication: Bedside nurse  Patient Position Received: Bed, 3 rail up, Alarm off, not on at start of session, Alarm off, caregiver present  Family/Caregiver Present: Yes  Caregiver Feedback: Brother present and engaged throughout session  General Comment: Pt supine in bed upon  arrival. Pt pleasant and agreeable to OT session   Precautions:  Medical Precautions: Fall precautions  Post-Surgical Precautions: Abdominal surgery precautions  Pain:  Pain Assessment  Pain Assessment: 0-10  0-10 (Numeric) Pain Score: 0 - No pain  Objective   Cognition:  Overall Cognitive Status: Within Functional Limits  Arousal/Alertness: Appropriate responses to stimuli  Orientation Level: Oriented X4  Following Commands: Follows one step commands without difficulty  Insight: Within function limits  Impulsive: Within functional limits  Processing Speed: Within funtional limits  Home Living:  Type of Home: House  Lives With: Siblings (Brother)  Home Adaptive Equipment: None  Home Layout: Two level, Bed/bath upstairs, Laundry in basement  Bathroom Shower/Tub: Tub/shower unit  Bathroom Toilet: Standard  Bathroom Equipment: Tub transfer bench   Prior Function:  Level of Walker: Independent with ADLs and functional transfers, Independent with homemaking with ambulation  Receives Help From: Family  ADL Assistance: Independent  Homemaking Assistance: Independent  Ambulatory Assistance: Independent  Vocational: Part time employment ()  Leisure: Singing karaoke and shooting guns  Hand Dominance: Right  Prior Function Comments: Pt reports no falls within the past 6 months  IADL History:  Homemaking Responsibilities: Yes  Meal Prep Responsibility: Primary  Laundry Responsibility: Primary  Cleaning Responsibility: Primary  Bill Paying/Finance Responsibility: Primary  Shopping Responsibility: Primary  Current License: Yes  Mode of Transportation: Car, Family  Occupation: Full time employment  Type of Occupation:   Leisure and Hobbies: Singing karaoke and shooting guns  ADL:  Eating Assistance: Independent (Anticipated)  Grooming Assistance: Stand by (Anticipated)  Bathing Assistance: Stand by (Anticipated)  UE Dressing Assistance: Stand by (Anticipated)  LE Dressing Assistance: Stand by  (Anticipated)  Toileting Assistance with Device: Stand by  Activity Tolerance:  Endurance: Endurance does not limit participation in activity  Balance:  Dynamic Standing Balance  Dynamic Standing-Balance Support: Bilateral upper extremity supported  Dynamic Standing-Level of Assistance: Close supervision  Dynamic Standing-Comments: FWW  Static Sitting Balance  Static Sitting-Balance Support: Feet supported, Bilateral upper extremity supported  Static Sitting-Level of Assistance: Close supervision  Static Standing Balance  Static Standing-Balance Support: Bilateral upper extremity supported  Static Standing-Level of Assistance: Close supervision  Static Standing-Comment/Number of Minutes: FWW  Bed Mobility/Transfers: Bed Mobility  Bed Mobility: Yes  Bed Mobility 1  Bed Mobility 1: Supine to sitting, Sitting to supine  Level of Assistance 1: Close supervision  Bed Mobility Comments 1: HOB elevated, Pt demos good follow through with log roll  Functional Mobility  Functional Mobility Performed: Yes  Functional Mobility 1  Surface 1: Level tile  Device 1: Rolling walker  Assistance 1: Close supervision  Comments 1: Pt ambulates ~150 ft in hallways with close supervision due to safety and decreased endurance.   and Transfers  Transfer: Yes  Transfer 1  Transfer From 1: Sit to, Stand to  Transfer to 1: Stand, Sit  Technique 1: Sit to stand, Stand to sit  Transfer Device 1: Walker  Transfer Level of Assistance 1: Close supervision  Trials/Comments 1: Cues for hand placement for safety  Transfers 2  Transfer From 2: Bed to, Toilet to  Transfer to 2: Toilet, Bed  Technique 2:  (Ambulating)  Transfer Device 2: Walker  Transfer Level of Assistance 2: Close supervision  Trials/Comments 2: Cues for hand placement on grab bars and FWW positioning  IADL's:   Homemaking Responsibilities: Yes  Meal Prep Responsibility: Primary  Laundry Responsibility: Primary  Cleaning Responsibility: Primary  Bill Paying/Finance Responsibility:  Primary  Shopping Responsibility: Primary  Current License: Yes  Mode of Transportation: Car, Family  Occupation: Full time employment  Type of Occupation:   Leisure and Hobbies: Singing karaoke and shooting guns  Vision: Vision - Basic Assessment  Current Vision: Wears glasses for distance only   and    Sensation:  Light Touch: No apparent deficits  Strength:  Strength Comments: SKYE WHITAKER  Perception:  Inattention/Neglect: Appears intact  Coordination:  Movements are Fluid and Coordinated: Yes   Hand Function:  Hand Function  Gross Grasp: Functional  Coordination: Functional  Extremities: RUE   RUE : Within Functional Limits, LUE   LUE: Within Functional Limits,  , and      Outcome Measures: [facility] Daily Activity  Putting on and taking off regular lower body clothing: A little  Bathing (including washing, rinsing, drying): A little  Putting on and taking off regular upper body clothing: A little  Toileting, which includes using toilet, bedpan or urinal: A little  Taking care of personal grooming such as brushing teeth: A little  Eating Meals: None  Daily Activity - Total Score: 19         ,     OT Adult Other Outcome Measures  4AT: Negative    Education Documentation  Body Mechanics, taught by MERCEDES Paredes at 2/14/2025  2:40 PM.  Learner: Patient  Readiness: Acceptance  Method: Explanation  Response: Verbalizes Understanding, Demonstrated Understanding    Precautions, taught by MERCEDES Paredes at 2/14/2025  2:40 PM.  Learner: Patient  Readiness: Acceptance  Method: Explanation  Response: Verbalizes Understanding, Demonstrated Understanding    ADL Training, taught by MERCEDES Paredes at 2/14/2025  2:40 PM.  Learner: Patient  Readiness: Acceptance  Method: Explanation  Response: Verbalizes Understanding, Demonstrated Understanding    Education Comments  No comments found.      Goals:     Encounter Problems       Encounter Problems (Active)       ADLs       Patient will perform UB and  LB bathing with modified independent level of assistance and PRN AE.       Start:  02/14/25    Expected End:  03/07/25            Patient with complete upper body dressing with independent level of assistance donning and doffing all UE clothes while edge of bed        Start:  02/14/25    Expected End:  03/07/25            Patient with complete lower body dressing with modified independent level of assistance donning and doffing all LE clothes  with PRN adaptive equipment while edge of bed        Start:  02/14/25    Expected End:  03/07/25            Patient will complete toileting including hygiene clothing management/hygiene with independent level of assistance.       Start:  02/14/25    Expected End:  03/07/25            Patient will complete simulated IADL tasks with modified independence level of assistance.        Start:  02/14/25    Expected End:  03/07/25               BALANCE       Pt will maintain dynamic standing balance during ADL task with modified independent level of assistance in order to demonstrate decreased risk of falling and improved postural control.       Start:  02/14/25    Expected End:  03/07/25               MOBILITY       Patient will perform Functional mobility max Household distances/Community Distances with modified independent level of assistance and least restrictive device in order to improve safety and functional mobility.       Start:  02/14/25    Expected End:  03/07/25               TRANSFERS       Patient will complete sit to stand transfer with modified independent level of assistance and least restrictive device in order to improve safety and prepare for out of bed mobility.       Start:  02/14/25    Expected End:  03/07/25 02/14/25 at 2:41 PM   MERCEDES EVANS   Rehab Office: [phone]

## 2025-02-14 NOTE — PROGRESS NOTES
TRANSPLANT SURGERY PROGRESS NOTE    Goran Ibrahim underwent transplant surgery on 1/20/2025 (Liver / Kidney) and was evaluated on multidisciplinary inpatient rounds.  I specifically evaluated the management of immunosuppression to ensure adequate exposure required to decrease the risk of rejection.  Furthermore, I reviewed potential side effects including tremor, hyperglycemia, leukopenia, infection, and other neurologic changes.  I reviewed and adjusted infectious prophylaxis based on the patient's clinical condition and  protocols.     24 EVENTS:  Admitted with AKUA now resolved. Likely tacrolimus toxicity  Bacterimia -- enterococcus feacium     DIAGNOSIS:  Liver replaced by transplant Z94.4, immunosuppression      PHYSICAL EXAMINATION:  Vitals:    02/14/25 1153   BP: 116/72   Pulse: 75   Resp: 16   Temp: 36.8 °C (98.2 °F)   SpO2: 97%        02/12 1900 - 02/14 0659  In: 2524 [P.O.:2340]  Out: 1950 [Urine:1950]     Weight change:     General Appearance - NAD, Good speech, oriented and alert  Abdomen - soft , not tender, no guarding, no rigidity. No hepatosplenomegaly. Normal bowel sounds. + ascites   Wound c/d/i   Neuro/Psych - appropriate mood and affect. Motor power V/V all extremities. CN I -XII were grossly intact.  Skin - No visible rash      MEDICATION LIST: REVIEWED  acyclovir, 400 mg, BID  aspirin, 81 mg, Daily  carvedilol, 6.25 mg, BID  cholecalciferol, 2,000 Units, Daily  darbepoetin aury, 100 mcg, Once  heparin (porcine), 5,000 Units, q8h  hydrALAZINE, 25 mg, BID  insulin glargine, 10 Units, Daily  insulin lispro, 0-10 Units, TID AC  insulin lispro, 4 Units, Daily with evening meal  insulin lispro, 8 Units, Daily with breakfast  insulin lispro, 8 Units, Daily with lunch  linezolid, 600 mg, q12h EDINSON  magnesium oxide, 800 mg, BID  mycophenolate, 500 mg, q12h EDINSON  pantoprazole, 40 mg, Daily before breakfast  polyethylene glycol, 17 g, Daily  predniSONE, 10 mg, Daily  sodium bicarbonate, 1,300 mg,  TID  sulfamethoxazole-trimethoprim, 1 tablet, Daily  tacrolimus, 1 mg, q12h EDINSON  tamsulosin, 0.4 mg, Daily  ursodiol, 300 mg, TID           dextrose, 12.5 g, q15 min PRN  dextrose, 25 g, q15 min PRN  glucagon, 1 mg, q15 min PRN        ALLERGY:  No Known Allergies    LABS:  Results for orders placed or performed during the hospital encounter of 02/10/25 (from the past 24 hours)   POCT GLUCOSE   Result Value Ref Range    POCT Glucose 192 (H) 74 - 99 mg/dL   POCT GLUCOSE   Result Value Ref Range    POCT Glucose 199 (H) 74 - 99 mg/dL   CBC   Result Value Ref Range    WBC 8.9 4.4 - 11.3 x10*3/uL    nRBC 0.0 0.0 - 0.0 /100 WBCs    RBC 2.94 (L) 4.50 - 5.90 x10*6/uL    Hemoglobin 8.7 (L) 13.5 - 17.5 g/dL    Hematocrit 24.9 (L) 41.0 - 52.0 %    MCV 85 80 - 100 fL    MCH 29.6 26.0 - 34.0 pg    MCHC 34.9 32.0 - 36.0 g/dL    RDW 17.5 (H) 11.5 - 14.5 %    Platelets 177 150 - 450 x10*3/uL   Magnesium   Result Value Ref Range    Magnesium 1.71 1.60 - 2.40 mg/dL   Tacrolimus   Result Value Ref Range    Tacrolimus  6.0 <=15.0 ng/mL   Hepatic Function Panel   Result Value Ref Range    Albumin 3.7 3.4 - 5.0 g/dL    Bilirubin, Total 0.8 0.0 - 1.2 mg/dL    Bilirubin, Direct 0.3 0.0 - 0.3 mg/dL    Alkaline Phosphatase 116 33 - 120 U/L    ALT 6 (L) 10 - 52 U/L    AST 10 9 - 39 U/L    Total Protein 7.1 6.4 - 8.2 g/dL   Phosphorus   Result Value Ref Range    Phosphorus 2.8 2.5 - 4.9 mg/dL   Basic Metabolic Panel   Result Value Ref Range    Glucose 161 (H) 74 - 99 mg/dL    Sodium 133 (L) 136 - 145 mmol/L    Potassium 3.6 3.5 - 5.3 mmol/L    Chloride 102 98 - 107 mmol/L    Bicarbonate 18 (L) 21 - 32 mmol/L    Anion Gap 17 10 - 20 mmol/L    Urea Nitrogen 39 (H) 6 - 23 mg/dL    Creatinine 2.04 (H) 0.50 - 1.30 mg/dL    eGFR 38 (L) >60 mL/min/1.73m*2    Calcium 9.2 8.6 - 10.6 mg/dL   POCT GLUCOSE   Result Value Ref Range    POCT Glucose 171 (H) 74 - 99 mg/dL      Lab Results   Component Value Date    ALT 6 (L) 02/14/2025    AST 10 02/14/2025      (H) 02/10/2025    ALKPHOS 116 02/14/2025    BILITOT 0.8 02/14/2025         ASSESSMENT AND PLAN:    Mr. Ibrahim is a 54 y.o. male who underwent  transplant surgery on 1/20/2025 (Liver / Kidney).    1. Immunosuppression reviewed and adjusted       Tacrolimus: current dose 0.5 mg BID. Plan continue      Today's tacrolimus level is 5.5, Goal tacrolimus trough level is 8-10. Will increase tomorrow if remains low      Mycophenolate: Yes - 750 mg po BID      Prednisone: Yes - 10 mg      Belatacept: No     2. IV hydration      Replacement completed: Yes      Maintenance: Discontinue    3. Electrolyte     Reviewed renal profile.      Hyperkalemia -- lokalma, low K diet, improved after tacrolimus dose lowered.     4. Hypertension medications reviewed        Blood Pressures         2/14/2025  0036 2/14/2025  0625 2/14/2025  0719 2/14/2025  0915 2/14/2025  1153    BP: 122/80 117/77 123/80 126/80 116/72              Continue current management. Well controlled    5. Wound/drain/chaudhary and pain management      Will not use a bed pan    6. GI prophylaxis       On PPI     7. DVT Prophylaxis      SCDs      Subcutaneous Heparin: Yes    8. ID prophylaxis      CMV, PJP and fungal prophylaxis per  Transplant [place] Protocol      Valcyte: Yes    Case was presented at Multi Disciplinary team rounds   Attending physician, consulting physician, , pharmacist, residents and fellow were present at the meeting.    Goran Carrillo MD    Transplant Surgery Attending

## 2025-02-16 LAB
BACTERIA BLD AEROBE CULT: ABNORMAL
BACTERIA BLD CULT: ABNORMAL
BACTERIA BLD CULT: NORMAL
BACTERIA BLD CULT: NORMAL
GRAM STN SPEC: ABNORMAL
GRAM STN SPEC: ABNORMAL

## 2025-02-17 ENCOUNTER — LAB (OUTPATIENT)
Dept: LAB | Facility: HOSPITAL | Age: 55
End: 2025-02-17
Payer: MEDICAID

## 2025-02-17 ENCOUNTER — TELEPHONE (OUTPATIENT)
Facility: HOSPITAL | Age: 55
End: 2025-02-17

## 2025-02-17 ENCOUNTER — TELEPHONE (OUTPATIENT)
Dept: TRANSPLANT | Facility: HOSPITAL | Age: 55
End: 2025-02-17

## 2025-02-17 DIAGNOSIS — T38.0X5A STEROID-INDUCED HYPERGLYCEMIA: ICD-10-CM

## 2025-02-17 DIAGNOSIS — Z94.4 LIVER TRANSPLANT RECIPIENT (MULTI): ICD-10-CM

## 2025-02-17 DIAGNOSIS — Z94.4 LIVER TRANSPLANT STATUS: Primary | ICD-10-CM

## 2025-02-17 DIAGNOSIS — R73.9 STEROID-INDUCED HYPERGLYCEMIA: ICD-10-CM

## 2025-02-17 DIAGNOSIS — Z94.4 LIVER TRANSPLANT STATUS: ICD-10-CM

## 2025-02-17 LAB
ALBUMIN SERPL BCP-MCNC: 3.9 G/DL (ref 3.4–5)
ALP SERPL-CCNC: 119 U/L (ref 33–120)
ALT SERPL W P-5'-P-CCNC: 8 U/L (ref 10–52)
ANION GAP SERPL CALC-SCNC: 17 MMOL/L (ref 10–20)
AST SERPL W P-5'-P-CCNC: 14 U/L (ref 9–39)
BACTERIA BLD CULT: NORMAL
BACTERIA BLD CULT: NORMAL
BASOPHILS # BLD AUTO: 0.14 X10*3/UL (ref 0–0.1)
BASOPHILS NFR BLD AUTO: 1.6 %
BILIRUB DIRECT SERPL-MCNC: 0.2 MG/DL (ref 0–0.3)
BILIRUB SERPL-MCNC: 0.7 MG/DL (ref 0–1.2)
BUN SERPL-MCNC: 32 MG/DL (ref 6–23)
CALCIUM SERPL-MCNC: 8.9 MG/DL (ref 8.6–10.3)
CHLORIDE SERPL-SCNC: 102 MMOL/L (ref 98–107)
CO2 SERPL-SCNC: 20 MMOL/L (ref 21–32)
CREAT SERPL-MCNC: 2.08 MG/DL (ref 0.5–1.3)
EGFRCR SERPLBLD CKD-EPI 2021: 37 ML/MIN/1.73M*2
EOSINOPHIL # BLD AUTO: 0.31 X10*3/UL (ref 0–0.7)
EOSINOPHIL NFR BLD AUTO: 3.4 %
ERYTHROCYTE [DISTWIDTH] IN BLOOD BY AUTOMATED COUNT: 18.3 % (ref 11.5–14.5)
GGT SERPL-CCNC: 96 U/L (ref 5–64)
GLUCOSE SERPL-MCNC: 154 MG/DL (ref 74–99)
HCT VFR BLD AUTO: 28.5 % (ref 41–52)
HGB BLD-MCNC: 9.1 G/DL (ref 13.5–17.5)
IMM GRANULOCYTES # BLD AUTO: 0.32 X10*3/UL (ref 0–0.7)
IMM GRANULOCYTES NFR BLD AUTO: 3.5 % (ref 0–0.9)
LYMPHOCYTES # BLD AUTO: 1.2 X10*3/UL (ref 1.2–4.8)
LYMPHOCYTES NFR BLD AUTO: 13.3 %
MAGNESIUM SERPL-MCNC: 1.65 MG/DL (ref 1.6–2.4)
MCH RBC QN AUTO: 29.4 PG (ref 26–34)
MCHC RBC AUTO-ENTMCNC: 31.9 G/DL (ref 32–36)
MCV RBC AUTO: 92 FL (ref 80–100)
MONOCYTES # BLD AUTO: 0.64 X10*3/UL (ref 0.1–1)
MONOCYTES NFR BLD AUTO: 7.1 %
NEUTROPHILS # BLD AUTO: 6.41 X10*3/UL (ref 1.2–7.7)
NEUTROPHILS NFR BLD AUTO: 71.1 %
NRBC BLD-RTO: 0 /100 WBCS (ref 0–0)
PHOSPHATE SERPL-MCNC: 2.3 MG/DL (ref 2.5–4.9)
PLATELET # BLD AUTO: 219 X10*3/UL (ref 150–450)
POTASSIUM SERPL-SCNC: 3.8 MMOL/L (ref 3.5–5.3)
PROT SERPL-MCNC: 6.9 G/DL (ref 6.4–8.2)
RBC # BLD AUTO: 3.09 X10*6/UL (ref 4.5–5.9)
SODIUM SERPL-SCNC: 135 MMOL/L (ref 136–145)
WBC # BLD AUTO: 9 X10*3/UL (ref 4.4–11.3)

## 2025-02-17 PROCEDURE — 85025 COMPLETE CBC W/AUTO DIFF WBC: CPT

## 2025-02-17 PROCEDURE — 82977 ASSAY OF GGT: CPT

## 2025-02-17 PROCEDURE — 83735 ASSAY OF MAGNESIUM: CPT

## 2025-02-17 PROCEDURE — 87801 DETECT AGNT MULT DNA AMPLI: CPT

## 2025-02-17 PROCEDURE — 84100 ASSAY OF PHOSPHORUS: CPT

## 2025-02-17 PROCEDURE — 80053 COMPREHEN METABOLIC PANEL: CPT

## 2025-02-17 PROCEDURE — 82248 BILIRUBIN DIRECT: CPT

## 2025-02-17 PROCEDURE — 80197 ASSAY OF TACROLIMUS: CPT

## 2025-02-17 RX ORDER — BLOOD-GLUCOSE SENSOR
1 EACH MISCELLANEOUS
Qty: 3 EACH | Refills: 11 | Status: SHIPPED | OUTPATIENT
Start: 2025-02-17 | End: 2025-03-19

## 2025-02-17 NOTE — TELEPHONE ENCOUNTER
Lenny called saying that 2 of the G7s the PT had had gone bad, wants 2 more to be sent to [facility] - ph - [phone] and Fax # [phone]

## 2025-02-18 DIAGNOSIS — Z94.4 LIVER REPLACED BY TRANSPLANT (MULTI): ICD-10-CM

## 2025-02-18 LAB — TACROLIMUS BLD-MCNC: 7.7 NG/ML

## 2025-02-19 ENCOUNTER — TELEPHONE (OUTPATIENT)
Dept: PRIMARY CARE | Facility: CLINIC | Age: 55
End: 2025-02-19
Payer: MEDICAID

## 2025-02-19 LAB — HIV1 RNA SERPL DONR QL PROBE AMP: NORMAL

## 2025-02-20 ENCOUNTER — APPOINTMENT (OUTPATIENT)
Dept: UROLOGY | Facility: HOSPITAL | Age: 55
End: 2025-02-20
Payer: MEDICAID

## 2025-02-20 ENCOUNTER — LAB (OUTPATIENT)
Dept: LAB | Facility: HOSPITAL | Age: 55
End: 2025-02-20
Payer: MEDICAID

## 2025-02-20 DIAGNOSIS — Z94.4 LIVER TRANSPLANT STATUS: Primary | ICD-10-CM

## 2025-02-20 LAB
ALBUMIN SERPL BCP-MCNC: 3.8 G/DL (ref 3.4–5)
ALP SERPL-CCNC: 131 U/L (ref 33–120)
ALT SERPL W P-5'-P-CCNC: 12 U/L (ref 10–52)
ANION GAP SERPL CALC-SCNC: 15 MMOL/L (ref 10–20)
AST SERPL W P-5'-P-CCNC: 13 U/L (ref 9–39)
BASOPHILS # BLD AUTO: 0.12 X10*3/UL (ref 0–0.1)
BASOPHILS NFR BLD AUTO: 1.3 %
BILIRUB DIRECT SERPL-MCNC: 0.2 MG/DL (ref 0–0.3)
BILIRUB SERPL-MCNC: 0.7 MG/DL (ref 0–1.2)
BUN SERPL-MCNC: 33 MG/DL (ref 6–23)
CALCIUM SERPL-MCNC: 8.9 MG/DL (ref 8.6–10.6)
CHLORIDE SERPL-SCNC: 101 MMOL/L (ref 98–107)
CO2 SERPL-SCNC: 25 MMOL/L (ref 21–32)
CREAT SERPL-MCNC: 2.09 MG/DL (ref 0.5–1.3)
EGFRCR SERPLBLD CKD-EPI 2021: 36 ML/MIN/1.73M*2
EOSINOPHIL # BLD AUTO: 0.34 X10*3/UL (ref 0–0.7)
EOSINOPHIL NFR BLD AUTO: 3.5 %
ERYTHROCYTE [DISTWIDTH] IN BLOOD BY AUTOMATED COUNT: 18.4 % (ref 11.5–14.5)
GGT SERPL-CCNC: 94 U/L (ref 5–64)
GLUCOSE SERPL-MCNC: 122 MG/DL (ref 74–99)
HCT VFR BLD AUTO: 28.5 % (ref 41–52)
HGB BLD-MCNC: 9.2 G/DL (ref 13.5–17.5)
IMM GRANULOCYTES # BLD AUTO: 0.22 X10*3/UL (ref 0–0.7)
IMM GRANULOCYTES NFR BLD AUTO: 2.3 % (ref 0–0.9)
LYMPHOCYTES # BLD AUTO: 1.25 X10*3/UL (ref 1.2–4.8)
LYMPHOCYTES NFR BLD AUTO: 13 %
MAGNESIUM SERPL-MCNC: 1.5 MG/DL (ref 1.6–2.4)
MCH RBC QN AUTO: 30.1 PG (ref 26–34)
MCHC RBC AUTO-ENTMCNC: 32.3 G/DL (ref 32–36)
MCV RBC AUTO: 93 FL (ref 80–100)
MONOCYTES # BLD AUTO: 0.72 X10*3/UL (ref 0.1–1)
MONOCYTES NFR BLD AUTO: 7.5 %
NEUTROPHILS # BLD AUTO: 6.95 X10*3/UL (ref 1.2–7.7)
NEUTROPHILS NFR BLD AUTO: 72.4 %
NRBC BLD-RTO: 0 /100 WBCS (ref 0–0)
PHOSPHATE SERPL-MCNC: 2.5 MG/DL (ref 2.5–4.9)
PLATELET # BLD AUTO: 171 X10*3/UL (ref 150–450)
POTASSIUM SERPL-SCNC: 4 MMOL/L (ref 3.5–5.3)
PROT SERPL-MCNC: 7.1 G/DL (ref 6.4–8.2)
RBC # BLD AUTO: 3.06 X10*6/UL (ref 4.5–5.9)
SODIUM SERPL-SCNC: 137 MMOL/L (ref 136–145)
TACROLIMUS BLD-MCNC: 5.3 NG/ML
WBC # BLD AUTO: 9.6 X10*3/UL (ref 4.4–11.3)

## 2025-02-20 PROCEDURE — 82977 ASSAY OF GGT: CPT

## 2025-02-20 PROCEDURE — 85025 COMPLETE CBC W/AUTO DIFF WBC: CPT

## 2025-02-20 PROCEDURE — 82248 BILIRUBIN DIRECT: CPT

## 2025-02-20 PROCEDURE — 80197 ASSAY OF TACROLIMUS: CPT

## 2025-02-20 PROCEDURE — 84100 ASSAY OF PHOSPHORUS: CPT

## 2025-02-20 PROCEDURE — 80053 COMPREHEN METABOLIC PANEL: CPT

## 2025-02-20 PROCEDURE — 83735 ASSAY OF MAGNESIUM: CPT

## 2025-02-21 ENCOUNTER — OFFICE VISIT (OUTPATIENT)
Facility: HOSPITAL | Age: 55
End: 2025-02-21
Payer: MEDICAID

## 2025-02-21 VITALS
TEMPERATURE: 99.2 F | OXYGEN SATURATION: 95 % | SYSTOLIC BLOOD PRESSURE: 153 MMHG | BODY MASS INDEX: 34.16 KG/M2 | WEIGHT: 238.1 LBS | HEART RATE: 86 BPM | DIASTOLIC BLOOD PRESSURE: 93 MMHG

## 2025-02-21 DIAGNOSIS — Z94.0 KIDNEY REPLACED BY TRANSPLANT (HHS-HCC): ICD-10-CM

## 2025-02-21 DIAGNOSIS — I10 HYPERTENSION, UNSPECIFIED TYPE: ICD-10-CM

## 2025-02-21 DIAGNOSIS — K70.30 ESOPHAGEAL VARICES IN ALCOHOLIC CIRRHOSIS (MULTI): ICD-10-CM

## 2025-02-21 DIAGNOSIS — Z94.4 LIVER REPLACED BY TRANSPLANT (MULTI): ICD-10-CM

## 2025-02-21 DIAGNOSIS — I85.10 ESOPHAGEAL VARICES IN ALCOHOLIC CIRRHOSIS (MULTI): ICD-10-CM

## 2025-02-21 DIAGNOSIS — Z94.4 LIVER TRANSPLANT RECIPIENT (MULTI): ICD-10-CM

## 2025-02-21 DIAGNOSIS — R79.81 HYPOCARBIA: ICD-10-CM

## 2025-02-21 PROCEDURE — 3060F POS MICROALBUMINURIA REV: CPT | Performed by: STUDENT IN AN ORGANIZED HEALTH CARE EDUCATION/TRAINING PROGRAM

## 2025-02-21 PROCEDURE — 3080F DIAST BP >= 90 MM HG: CPT | Performed by: STUDENT IN AN ORGANIZED HEALTH CARE EDUCATION/TRAINING PROGRAM

## 2025-02-21 PROCEDURE — 99214 OFFICE O/P EST MOD 30 MIN: CPT | Mod: 24 | Performed by: STUDENT IN AN ORGANIZED HEALTH CARE EDUCATION/TRAINING PROGRAM

## 2025-02-21 PROCEDURE — 3044F HG A1C LEVEL LT 7.0%: CPT | Performed by: STUDENT IN AN ORGANIZED HEALTH CARE EDUCATION/TRAINING PROGRAM

## 2025-02-21 PROCEDURE — 99214 OFFICE O/P EST MOD 30 MIN: CPT | Performed by: STUDENT IN AN ORGANIZED HEALTH CARE EDUCATION/TRAINING PROGRAM

## 2025-02-21 PROCEDURE — 3048F LDL-C <100 MG/DL: CPT | Performed by: STUDENT IN AN ORGANIZED HEALTH CARE EDUCATION/TRAINING PROGRAM

## 2025-02-21 PROCEDURE — 3077F SYST BP >= 140 MM HG: CPT | Performed by: STUDENT IN AN ORGANIZED HEALTH CARE EDUCATION/TRAINING PROGRAM

## 2025-02-21 RX ORDER — CARVEDILOL 6.25 MG/1
6.25 TABLET ORAL 2 TIMES DAILY
Qty: 180 TABLET | Refills: 3 | Status: SHIPPED | OUTPATIENT
Start: 2025-02-21 | End: 2026-02-21

## 2025-02-21 RX ORDER — PANTOPRAZOLE SODIUM 40 MG/1
40 TABLET, DELAYED RELEASE ORAL
Qty: 90 TABLET | Refills: 3 | Status: SHIPPED | OUTPATIENT
Start: 2025-02-21 | End: 2026-02-21

## 2025-02-21 RX ORDER — TAMSULOSIN HYDROCHLORIDE 0.4 MG/1
0.4 CAPSULE ORAL DAILY
Qty: 90 CAPSULE | Refills: 3 | Status: SHIPPED | OUTPATIENT
Start: 2025-02-21 | End: 2026-02-21

## 2025-02-21 RX ORDER — TACROLIMUS 0.5 MG/1
0.5 CAPSULE ORAL 2 TIMES DAILY
Qty: 60 CAPSULE | Refills: 11 | Status: SHIPPED | OUTPATIENT
Start: 2025-02-21 | End: 2025-03-11 | Stop reason: ALTCHOICE

## 2025-02-21 RX ORDER — MYCOPHENOLATE MOFETIL 250 MG/1
500 CAPSULE ORAL
Qty: 360 CAPSULE | Refills: 3 | Status: SHIPPED | OUTPATIENT
Start: 2025-02-21 | End: 2026-02-21

## 2025-02-21 RX ORDER — ACYCLOVIR 400 MG/1
400 TABLET ORAL 2 TIMES DAILY
Qty: 60 TABLET | Refills: 1 | Status: SHIPPED | OUTPATIENT
Start: 2025-02-21 | End: 2025-04-22

## 2025-02-21 RX ORDER — SODIUM BICARBONATE 650 MG/1
650 TABLET ORAL 2 TIMES DAILY
Qty: 60 TABLET | Refills: 0 | Status: SHIPPED | OUTPATIENT
Start: 2025-02-21 | End: 2025-03-13 | Stop reason: ALTCHOICE

## 2025-02-21 RX ORDER — HYDRALAZINE HYDROCHLORIDE 25 MG/1
25 TABLET, FILM COATED ORAL 2 TIMES DAILY
Qty: 180 TABLET | Refills: 3 | Status: SHIPPED | OUTPATIENT
Start: 2025-02-21 | End: 2026-02-21

## 2025-02-21 RX ORDER — SULFAMETHOXAZOLE AND TRIMETHOPRIM 400; 80 MG/1; MG/1
1 TABLET ORAL DAILY
Qty: 30 TABLET | Refills: 5 | Status: SHIPPED | OUTPATIENT
Start: 2025-02-21 | End: 2025-08-20

## 2025-02-21 SDOH — ECONOMIC STABILITY: GENERAL
WHICH OF THE FOLLOWING WOULD YOU LIKE TO GET CONNECTED TO IN ORDER TO RECEIVE A DISCOUNT OR FOR FREE? (CHOOSE ALL THAT APPLY): NO ASSISTANCE NEEDED

## 2025-02-21 SDOH — ECONOMIC STABILITY: GENERAL
WHICH OF THE FOLLOWING DO YOU KNOW HOW TO USE AND HAVE ACCESS TO EVERY DAY? (CHOOSE ALL THAT APPLY): SMARTPHONE WITH CELLULAR DATA PLAN;DESKTOP COMPUTER, LAPTOP COMPUTER, OR TABLET WITH BROADBAND INTERNET CONNECTION

## 2025-02-21 ASSESSMENT — PAIN SCALES - GENERAL: PAINLEVEL_OUTOF10: 0-NO PAIN

## 2025-02-21 NOTE — PATIENT INSTRUCTIONS
Increase tacrolimus to 1.5mg every 12 hours.  Stop Ursodiol.  Decrease Sodium Bicarbonate to 1 tablet twice a day.  Return to clinic in 1 weeks.  Labs Mon/Thurs

## 2025-02-21 NOTE — PROGRESS NOTES
TRANSPLANT SURGERY FOLLOW UP    Reason for visit:    Goran Ibrahim underwent transplant surgery,  1/20/2025 (Liver / Kidney), and returns to clinic for follow up evaluation focusing on their current immunosuppression. Specifically, Goran Ibrahim's regiment was evaluated to ensure adequate levels to prevent life threatening or organ function impairing rejection while not increasing risk of adverse effects including malignancy, infection, bone health, or accelerated cardiovascular disease.  I reviewed common side effects including tremor, hair loss, GI toxicity, and agitation.  The patient reported that they were experiencing: none.    The long-term management of maintenance immunosuppression in organ transplant recipients remains complex given differences in clinical characteristics, comorbid conditions, and prior rejection episodes.  These factors were evaluated at this visit and used in formulating this plan of care. Immunosuppression following the transplant is medically necessary to closely monitor and to reduce the risk of post-operative complications distinct from the post operative management of the transplant surgical procedure.     Interval history       Patient Active Problem List   Diagnosis   • Hypertension   • Liver cirrhosis (Multi)   • Esophageal varices in alcoholic cirrhosis (Multi)   • Hepatic cirrhosis, unspecified hepatic cirrhosis type, unspecified whether ascites present (Multi)   • ESRD (end stage renal disease) (Multi)   • Steroid-induced hyperglycemia   • Kidney replaced by transplant ([facility])   • Liver transplant status   • Type 2 diabetes mellitus without complication (Multi)   • Hyperkalemia       Medications:  Current Outpatient Medications   Medication Instructions   • acyclovir (ZOVIRAX) 400 mg, oral, 2 times daily   • alcohol swabs pads, medicated 1 each, topical (top), 4 times daily, Use prior to checking glucose or injecting insulin   • aspirin 81 mg, oral, Daily   • Basaglar  KwikPen U-100 Insulin 10 Units, subcutaneous, Every morning, Take in AM with Prednisone   • blood sugar diagnostic (Blood Glucose Test) strip 100 strips, miscellaneous, 4 times daily, Use to check glucose 4 times daily, before meals and at bedtime   • blood-glucose meter misc 1 each, miscellaneous, 4 times daily, Use to check glucose 4 times daily, before meals and at bedtime   • blood-glucose meter,continuous (Dexcom G7 ) misc Use as instructed   • blood-glucose sensor (Dexcom G7 Sensor) device 1 each, miscellaneous, 3 times daily before meals, Change sensor every 10 days   • carvedilol (COREG) 6.25 mg, oral, 2 times daily, Hold for SBP <110 or HR <55   • cholecalciferol (VITAMIN D-3) 50 mcg, oral, Daily   • hydrALAZINE (APRESOLINE) 25 mg, oral, 2 times daily   • insulin lispro (HUMALOG KWIKPEN INSULIN) 0-10 Units, subcutaneous, 3 times daily (morning, midday, late afternoon), Inject 8 units of Lispro subcutaneously two times a day before breakfast and lunch and 4 units with dinner- in addition to the following sliding scale:   0 unit(s) if Blood glucose is between   2 unit(s) if Blood glucose is between 151-200  4 unit(s) if Blood glucose is between 201-250  6 unit(s) if Blood glucose is between 251-300  8 unit(s) if Blood glucose is between 301-350  10 unit(s) if Blood glucose is between 351-400    If blood glucose is greater than 400 mg/dL, give max insulin per sliding scale AND then contact provider.   • lancets 33 gauge misc 1 Lancet, miscellaneous, 3 times daily before meals   • lancets misc 1 each, miscellaneous, 4 times daily, Use to check glucose 4 times daily, before meals and at bedtime   • linezolid (Zyvox) 600 mg tablet Take 1 tablet (600 mg) by mouth 2 times a day for 12 days. Do not fill before February 14, 2025.   • magnesium oxide (MAG-OX) 800 mg, oral, 2 times daily   • mycophenolate (CELLCEPT) 500 mg, oral, Every 12 hours scheduled (0630,1830)   • pantoprazole (PROTONIX) 40 mg,  "oral, Daily before breakfast, Do not crush, chew, or split.   • pen needle, diabetic 31 gauge x 3/16\" needle 1 each, miscellaneous, 3 times daily before meals   • pen needle, diabetic 32 gauge x 5/32\" needle 1 each, miscellaneous, 4 times daily, Use to inject insulin 4 times daily   • predniSONE (DELTASONE) 10 mg, oral, Daily   • sodium bicarbonate 1,300 mg, oral, 3 times daily   • sulfamethoxazole-trimethoprim (Bactrim) 400-80 mg tablet 1 tablet, oral, Daily   • tacrolimus (PROGRAF) 1 mg, oral, 2 times daily   • tamsulosin (FLOMAX) 0.4 mg, oral, Daily   • ursodiol (ACTIGALL) 300 mg, oral, 3 times daily       Physical Exam  Vitals:    02/21/25 0924   BP: (!) 153/93   Pulse: 86   Temp: 37.3 °C (99.2 °F)   SpO2: 95%          Physical Exam  Constitutional:       Appearance: He is obese.   Cardiovascular:      Rate and Rhythm: Normal rate.      Pulses: Normal pulses.   Pulmonary:      Effort: Pulmonary effort is normal.      Breath sounds: No stridor.   Abdominal:      General: There is distension.      Palpations: Abdomen is soft. There is no mass.      Hernia: No hernia is present.   Musculoskeletal:         General: Swelling present.   Skin:     General: Skin is warm and dry.      Capillary Refill: Capillary refill takes less than 2 seconds.   Neurological:      General: No focal deficit present.      Mental Status: He is alert.   Psychiatric:         Mood and Affect: Mood normal.     ALLERGY:  No Known Allergies    LABS:  No results found for this or any previous visit (from the past 24 hours).   Lab Results   Component Value Date    ALT 12 02/20/2025    AST 13 02/20/2025    GGT 94 (H) 02/20/2025    ALKPHOS 131 (H) 02/20/2025    BILITOT 0.7 02/20/2025       ASSESSMENT AND PLAN:    Mr. Ibrahim is a 54 y.o. male who underwent  transplant surgery on 1/20/2025 (Liver / Kidney).  DCD, caval piggyback, rCHA to dSMA/celiac stack HA, duct to duct (small to large with v plasty)   Explant - no malignancy     S/p SLK  - " Excellent liver function. Continue katerin TID for now  - AKUA - Continue to watch  - Stop ursodiol  - Recent bacteremia w/ enterococus. On linezolid until 2/26/25  - Renal stent out  - Decrease bicarb    2. Immunosuppression reviewed and adjusted       Tacrolimus: Yes - 1.5 mg po BID  (increase from 1 BID)      Mycophenolate: Yes - 500 mg po BID  ( )      Prednisone: Yes -  10 mg daily      Belatacept: N/A     3. Prophylaxis adherence protocol to prevent immunosuppression related infection      Valcyte: Yes - 900 daily      PCP prophylaxis: Transplant Prophylactics: Bactrim    4. Hypertension   BP (!) 153/93 (BP Location: Right arm, Patient Position: Sitting, BP Cuff Size: Adult)   Pulse 86   Temp 37.3 °C (99.2 °F) (Temporal)   Wt 108 kg (238 lb 1.6 oz)   SpO2 95%   BMI 34.16 kg/m²       Current antihypertensives were evaluated. No changes        5. Wound/KAITLYNN      Winger ready for removal: No       KAITLYNN:  No    6.  Nutrition       On PPI        Seeing dietician today    7.  Follow up:       Labs  2 times per week       RTC: 1 week(s)    I spent a total of 40 min providing care for this patient including record review, examination, face to face counseling, and documentation.     Priscila Wright MD    Transplant Surgery Attending

## 2025-02-24 ENCOUNTER — LAB (OUTPATIENT)
Dept: LAB | Facility: HOSPITAL | Age: 55
End: 2025-02-24
Payer: MEDICAID

## 2025-02-24 LAB
ALBUMIN SERPL BCP-MCNC: 3.9 G/DL (ref 3.4–5)
ALP SERPL-CCNC: 142 U/L (ref 33–120)
ALT SERPL W P-5'-P-CCNC: 14 U/L (ref 10–52)
ANION GAP SERPL CALC-SCNC: 14 MMOL/L (ref 10–20)
AST SERPL W P-5'-P-CCNC: 15 U/L (ref 9–39)
BASOPHILS # BLD AUTO: 0.06 X10*3/UL (ref 0–0.1)
BASOPHILS NFR BLD AUTO: 0.7 %
BILIRUB DIRECT SERPL-MCNC: 0.3 MG/DL (ref 0–0.3)
BILIRUB SERPL-MCNC: 0.9 MG/DL (ref 0–1.2)
BUN SERPL-MCNC: 31 MG/DL (ref 6–23)
CALCIUM SERPL-MCNC: 9 MG/DL (ref 8.6–10.3)
CHLORIDE SERPL-SCNC: 101 MMOL/L (ref 98–107)
CO2 SERPL-SCNC: 24 MMOL/L (ref 21–32)
CREAT SERPL-MCNC: 2.15 MG/DL (ref 0.5–1.3)
EGFRCR SERPLBLD CKD-EPI 2021: 35 ML/MIN/1.73M*2
EOSINOPHIL # BLD AUTO: 0.55 X10*3/UL (ref 0–0.7)
EOSINOPHIL NFR BLD AUTO: 6.3 %
ERYTHROCYTE [DISTWIDTH] IN BLOOD BY AUTOMATED COUNT: 18.9 % (ref 11.5–14.5)
GGT SERPL-CCNC: 120 U/L (ref 5–64)
GLUCOSE SERPL-MCNC: 140 MG/DL (ref 74–99)
HCT VFR BLD AUTO: 27.3 % (ref 41–52)
HGB BLD-MCNC: 8.9 G/DL (ref 13.5–17.5)
IMM GRANULOCYTES # BLD AUTO: 0.05 X10*3/UL (ref 0–0.7)
IMM GRANULOCYTES NFR BLD AUTO: 0.6 % (ref 0–0.9)
LYMPHOCYTES # BLD AUTO: 1.03 X10*3/UL (ref 1.2–4.8)
LYMPHOCYTES NFR BLD AUTO: 11.8 %
MAGNESIUM SERPL-MCNC: 1.39 MG/DL (ref 1.6–2.4)
MCH RBC QN AUTO: 29.9 PG (ref 26–34)
MCHC RBC AUTO-ENTMCNC: 32.6 G/DL (ref 32–36)
MCV RBC AUTO: 92 FL (ref 80–100)
MONOCYTES # BLD AUTO: 0.68 X10*3/UL (ref 0.1–1)
MONOCYTES NFR BLD AUTO: 7.8 %
NEUTROPHILS # BLD AUTO: 6.36 X10*3/UL (ref 1.2–7.7)
NEUTROPHILS NFR BLD AUTO: 72.8 %
NRBC BLD-RTO: 0 /100 WBCS (ref 0–0)
PHOSPHATE SERPL-MCNC: 2.9 MG/DL (ref 2.5–4.9)
PLATELET # BLD AUTO: 119 X10*3/UL (ref 150–450)
POTASSIUM SERPL-SCNC: 4.5 MMOL/L (ref 3.5–5.3)
PROT SERPL-MCNC: 6.9 G/DL (ref 6.4–8.2)
RBC # BLD AUTO: 2.98 X10*6/UL (ref 4.5–5.9)
SODIUM SERPL-SCNC: 134 MMOL/L (ref 136–145)
TACROLIMUS BLD-MCNC: 5.5 NG/ML
WBC # BLD AUTO: 8.7 X10*3/UL (ref 4.4–11.3)

## 2025-02-24 PROCEDURE — 82977 ASSAY OF GGT: CPT

## 2025-02-24 PROCEDURE — 83735 ASSAY OF MAGNESIUM: CPT

## 2025-02-24 PROCEDURE — 80197 ASSAY OF TACROLIMUS: CPT

## 2025-02-24 PROCEDURE — 84100 ASSAY OF PHOSPHORUS: CPT

## 2025-02-24 PROCEDURE — 80053 COMPREHEN METABOLIC PANEL: CPT

## 2025-02-24 PROCEDURE — 82248 BILIRUBIN DIRECT: CPT

## 2025-02-24 PROCEDURE — 85025 COMPLETE CBC W/AUTO DIFF WBC: CPT

## 2025-02-25 DIAGNOSIS — Z94.4 LIVER REPLACED BY TRANSPLANT (MULTI): ICD-10-CM

## 2025-02-26 ENCOUNTER — TELEPHONE (OUTPATIENT)
Dept: TRANSPLANT | Facility: HOSPITAL | Age: 55
End: 2025-02-26
Payer: MEDICAID

## 2025-02-26 DIAGNOSIS — Z94.4 LIVER TRANSPLANT RECIPIENT (MULTI): ICD-10-CM

## 2025-02-26 DIAGNOSIS — E11.9 TYPE 2 DIABETES MELLITUS WITHOUT COMPLICATION, UNSPECIFIED WHETHER LONG TERM INSULIN USE (MULTI): Primary | ICD-10-CM

## 2025-02-26 DIAGNOSIS — Z94.0 KIDNEY REPLACED BY TRANSPLANT (HHS-HCC): ICD-10-CM

## 2025-02-26 RX ORDER — TACROLIMUS 1 MG/1
2 CAPSULE ORAL 2 TIMES DAILY
Qty: 60 CAPSULE | Refills: 11 | Status: SHIPPED | OUTPATIENT
Start: 2025-02-26

## 2025-02-26 NOTE — TELEPHONE ENCOUNTER
Labs reviewed by Dr. Wright, increase tacrolimus to 2.5mg bid.  Spoke with Jevon about dose change.

## 2025-02-27 ENCOUNTER — LAB (OUTPATIENT)
Dept: LAB | Facility: HOSPITAL | Age: 55
End: 2025-02-27
Payer: MEDICAID

## 2025-02-27 DIAGNOSIS — Z94.4 LIVER TRANSPLANT STATUS: Primary | ICD-10-CM

## 2025-02-27 LAB
ALBUMIN SERPL BCP-MCNC: 4.1 G/DL (ref 3.4–5)
ALP SERPL-CCNC: 169 U/L (ref 33–120)
ALT SERPL W P-5'-P-CCNC: 15 U/L (ref 10–52)
ANION GAP SERPL CALC-SCNC: 18 MMOL/L (ref 10–20)
AST SERPL W P-5'-P-CCNC: 15 U/L (ref 9–39)
BASOPHILS # BLD AUTO: 0.16 X10*3/UL (ref 0–0.1)
BASOPHILS NFR BLD AUTO: 2.1 %
BILIRUB DIRECT SERPL-MCNC: 0.2 MG/DL (ref 0–0.3)
BILIRUB SERPL-MCNC: 0.8 MG/DL (ref 0–1.2)
BUN SERPL-MCNC: 33 MG/DL (ref 6–23)
CALCIUM SERPL-MCNC: 9 MG/DL (ref 8.6–10.3)
CHLORIDE SERPL-SCNC: 99 MMOL/L (ref 98–107)
CO2 SERPL-SCNC: 23 MMOL/L (ref 21–32)
CREAT SERPL-MCNC: 2.5 MG/DL (ref 0.5–1.3)
EGFRCR SERPLBLD CKD-EPI 2021: 29 ML/MIN/1.73M*2
EOSINOPHIL # BLD AUTO: 0.39 X10*3/UL (ref 0–0.7)
EOSINOPHIL NFR BLD AUTO: 5 %
ERYTHROCYTE [DISTWIDTH] IN BLOOD BY AUTOMATED COUNT: 19 % (ref 11.5–14.5)
GGT SERPL-CCNC: 148 U/L (ref 5–64)
GLUCOSE SERPL-MCNC: 156 MG/DL (ref 74–99)
HCT VFR BLD AUTO: 26.9 % (ref 41–52)
HGB BLD-MCNC: 8.8 G/DL (ref 13.5–17.5)
IMM GRANULOCYTES # BLD AUTO: 0.04 X10*3/UL (ref 0–0.7)
IMM GRANULOCYTES NFR BLD AUTO: 0.5 % (ref 0–0.9)
LYMPHOCYTES # BLD AUTO: 1.29 X10*3/UL (ref 1.2–4.8)
LYMPHOCYTES NFR BLD AUTO: 16.7 %
MAGNESIUM SERPL-MCNC: 1.35 MG/DL (ref 1.6–2.4)
MCH RBC QN AUTO: 30.1 PG (ref 26–34)
MCHC RBC AUTO-ENTMCNC: 32.7 G/DL (ref 32–36)
MCV RBC AUTO: 92 FL (ref 80–100)
MONOCYTES # BLD AUTO: 0.62 X10*3/UL (ref 0.1–1)
MONOCYTES NFR BLD AUTO: 8 %
NEUTROPHILS # BLD AUTO: 5.24 X10*3/UL (ref 1.2–7.7)
NEUTROPHILS NFR BLD AUTO: 67.7 %
NRBC BLD-RTO: 0 /100 WBCS (ref 0–0)
PHOSPHATE SERPL-MCNC: 2.8 MG/DL (ref 2.5–4.9)
PLATELET # BLD AUTO: 117 X10*3/UL (ref 150–450)
POTASSIUM SERPL-SCNC: 4.8 MMOL/L (ref 3.5–5.3)
PROT SERPL-MCNC: 7.3 G/DL (ref 6.4–8.2)
RBC # BLD AUTO: 2.92 X10*6/UL (ref 4.5–5.9)
SODIUM SERPL-SCNC: 135 MMOL/L (ref 136–145)
TACROLIMUS BLD-MCNC: 5.5 NG/ML
WBC # BLD AUTO: 7.7 X10*3/UL (ref 4.4–11.3)

## 2025-02-27 PROCEDURE — 80197 ASSAY OF TACROLIMUS: CPT

## 2025-02-27 PROCEDURE — 82248 BILIRUBIN DIRECT: CPT

## 2025-02-27 PROCEDURE — 85025 COMPLETE CBC W/AUTO DIFF WBC: CPT

## 2025-02-27 PROCEDURE — 82977 ASSAY OF GGT: CPT

## 2025-02-27 PROCEDURE — 80053 COMPREHEN METABOLIC PANEL: CPT

## 2025-02-27 PROCEDURE — 83735 ASSAY OF MAGNESIUM: CPT

## 2025-02-27 PROCEDURE — 84100 ASSAY OF PHOSPHORUS: CPT

## 2025-02-28 ENCOUNTER — OFFICE VISIT (OUTPATIENT)
Facility: HOSPITAL | Age: 55
End: 2025-02-28
Payer: MEDICAID

## 2025-02-28 ENCOUNTER — HOSPITAL ENCOUNTER (OUTPATIENT)
Dept: RADIOLOGY | Facility: HOSPITAL | Age: 55
Discharge: HOME | End: 2025-02-28
Payer: MEDICAID

## 2025-02-28 VITALS
BODY MASS INDEX: 33.78 KG/M2 | DIASTOLIC BLOOD PRESSURE: 90 MMHG | WEIGHT: 235.4 LBS | OXYGEN SATURATION: 96 % | TEMPERATURE: 98.8 F | SYSTOLIC BLOOD PRESSURE: 170 MMHG | HEART RATE: 103 BPM

## 2025-02-28 DIAGNOSIS — Z01.818 ENCOUNTER FOR PRE-TRANSPLANT EVALUATION FOR LIVER TRANSPLANT: ICD-10-CM

## 2025-02-28 DIAGNOSIS — Z01.818 ENCOUNTER FOR PRE-TRANSPLANT EVALUATION FOR KIDNEY TRANSPLANT: ICD-10-CM

## 2025-02-28 DIAGNOSIS — Z76.82 PRE-LIVER TRANSPLANT, LISTED: ICD-10-CM

## 2025-02-28 DIAGNOSIS — Z94.4 LIVER REPLACED BY TRANSPLANT (MULTI): ICD-10-CM

## 2025-02-28 DIAGNOSIS — Z94.0 KIDNEY REPLACED BY TRANSPLANT (HHS-HCC): ICD-10-CM

## 2025-02-28 DIAGNOSIS — Z92.25 PERSONAL HISTORY OF IMMUNOSUPRESSION THERAPY: Primary | ICD-10-CM

## 2025-02-28 PROCEDURE — 74176 CT ABD & PELVIS W/O CONTRAST: CPT

## 2025-02-28 PROCEDURE — 99214 OFFICE O/P EST MOD 30 MIN: CPT | Mod: 24,25 | Performed by: STUDENT IN AN ORGANIZED HEALTH CARE EDUCATION/TRAINING PROGRAM

## 2025-02-28 ASSESSMENT — PAIN SCALES - GENERAL: PAINLEVEL_OUTOF10: 0-NO PAIN

## 2025-02-28 NOTE — PROGRESS NOTES
TRANSPLANT SURGERY FOLLOW UP    Reason for visit:    Goran Ibrahim underwent transplant surgery,  1/20/2025 (Liver / Kidney), and returns to clinic for follow up evaluation focusing on their current immunosuppression. Specifically, Goran Ibrahim's regiment was evaluated to ensure adequate levels to prevent life threatening or organ function impairing rejection while not increasing risk of adverse effects including malignancy, infection, bone health, or accelerated cardiovascular disease.  I reviewed common side effects including tremor, hair loss, GI toxicity, and agitation.  The patient reported that they were experiencing: none.    The long-term management of maintenance immunosuppression in organ transplant recipients remains complex given differences in clinical characteristics, comorbid conditions, and prior rejection episodes.  These factors were evaluated at this visit and used in formulating this plan of care. Immunosuppression following the transplant is medically necessary to closely monitor and to reduce the risk of post-operative complications distinct from the post operative management of the transplant surgical procedure.     Interval history  Doing well    Patient Active Problem List   Diagnosis    Hypertension    Liver cirrhosis (Multi)    Esophageal varices in alcoholic cirrhosis (Multi)    Hepatic cirrhosis, unspecified hepatic cirrhosis type, unspecified whether ascites present (Multi)    ESRD (end stage renal disease) (Multi)    Steroid-induced hyperglycemia    Kidney replaced by transplant ([facility])    Liver transplant status    Type 2 diabetes mellitus without complication (Multi)    Hyperkalemia       Medications:    Current Outpatient Medications   Medication Instructions    acyclovir (ZOVIRAX) 400 mg, oral, 2 times daily    alcohol swabs pads, medicated 1 each, topical (top), 4 times daily, Use prior to checking glucose or injecting insulin    aspirin 81 mg, oral, Daily    Basaglar  "KwikPen U-100 Insulin 10 Units, subcutaneous, Every morning, Take in AM with Prednisone    blood-glucose meter misc 1 each, miscellaneous, 4 times daily, Use to check glucose 4 times daily, before meals and at bedtime    blood-glucose meter,continuous (Dexcom G7 ) misc Use as instructed    blood-glucose sensor (Dexcom G7 Sensor) device 1 each, miscellaneous, 3 times daily before meals, Change sensor every 10 days    carvedilol (COREG) 6.25 mg, oral, 2 times daily, Hold for SBP <110 or HR <55    cholecalciferol (VITAMIN D-3) 50 mcg, oral, Daily    hydrALAZINE (APRESOLINE) 25 mg, oral, 2 times daily    insulin lispro (HUMALOG KWIKPEN INSULIN) 0-10 Units, subcutaneous, 3 times daily (morning, midday, late afternoon), Inject 8 units of Lispro subcutaneously two times a day before breakfast and lunch and 4 units with dinner- in addition to the following sliding scale:   0 unit(s) if Blood glucose is between   2 unit(s) if Blood glucose is between 151-200  4 unit(s) if Blood glucose is between 201-250  6 unit(s) if Blood glucose is between 251-300  8 unit(s) if Blood glucose is between 301-350  10 unit(s) if Blood glucose is between 351-400    If blood glucose is greater than 400 mg/dL, give max insulin per sliding scale AND then contact provider.    lancets misc 1 each, miscellaneous, 4 times daily, Use to check glucose 4 times daily, before meals and at bedtime    magnesium oxide (MAG-OX) 800 mg, oral, 2 times daily    mycophenolate (CELLCEPT) 500 mg, oral, Every 12 hours scheduled (0630,1830)    pantoprazole (PROTONIX) 40 mg, oral, Daily before breakfast, Do not crush, chew, or split.    pen needle, diabetic 32 gauge x 5/32\" needle 1 each, miscellaneous, 4 times daily, Use to inject insulin 4 times daily    predniSONE (DELTASONE) 10 mg, oral, Daily    sodium bicarbonate 650 mg, oral, 2 times daily    sulfamethoxazole-trimethoprim (Bactrim) 400-80 mg tablet 1 tablet, oral, Daily    tacrolimus (PROGRAF) 0.5 " mg, oral, 2 times daily    tacrolimus (PROGRAF) 2 mg, oral, 2 times daily    tamsulosin (FLOMAX) 0.4 mg, oral, Daily       Physical Exam  Vitals:    02/28/25 0929   BP: 170/90   Pulse: 103   Temp: 37.1 °C (98.8 °F)   SpO2: 96%     General: Alert and oriented to time, place and person. Not in distress  ENT: unremarkable  Cardiovascular: Regular rate, normotensive  Respiratory: no signs of labored breathing on room air  Abdomen/ GI: SLK transplant incision well healed  Extremity: BLE trace edema -  Skin: no rash    ALLERGY:  No Known Allergies    LABS:  No results found for this or any previous visit (from the past 24 hours).   Lab Results   Component Value Date    ALT 15 02/27/2025    AST 15 02/27/2025     (H) 02/27/2025    ALKPHOS 169 (H) 02/27/2025    BILITOT 0.8 02/27/2025         ASSESSMENT AND PLAN:    Mr. Ibrahim is a 54 y.o. male who underwent  transplant surgery on 1/20/2025 (Liver / Kidney).    1. SLK 1/20/25  Excellent liver function    Mild AKUA - is hydrating well; will obtain DSA, CT to evaluate previous gilma-nephric fluid collections;   set-up for kidney biopsy - will cancel if AKUA resolves    Completed Linezolid for Enterococcal bacteremia    Immunosuppression reviewed and adjusted       Tacrolimus: Yes - 2.5 mg bid goal 8-10        Mycophenolate: Yes - 500 mg bid      Prednisone: Yes - 10 mg daily    2. Prophylaxis adherence protocol to prevent immunosuppression related infection      Acyclovir      Bactrim    3. Hypertension         Blood Pressures         2/28/2025  0929             BP: 170/90              Current antihypertensives:  Coreg         4. Wound/KAITLYNN      Well healed    5. GI prophylaxis       On PPI     6.  Follow up:       Labs  every 2 weeks       RTC: 2 week(s)    I spent a total of 32 min providing care for this patient including record review, examination, face to face counseling, and documentation.     Laura Holguin MD    Transplant Surgery Attending

## 2025-02-28 NOTE — PATIENT INSTRUCTIONS
No changes to medications today.  We are setting you up for a CT scan and kidney biopsy  Return to clinic in 2 weeks (we will book you the same day you see endocrine)  Labs twice a week

## 2025-03-03 ENCOUNTER — LAB (OUTPATIENT)
Dept: LAB | Facility: HOSPITAL | Age: 55
End: 2025-03-03
Payer: MEDICAID

## 2025-03-03 ENCOUNTER — TELEPHONE (OUTPATIENT)
Facility: HOSPITAL | Age: 55
End: 2025-03-03
Payer: MEDICAID

## 2025-03-03 DIAGNOSIS — Z94.4 LIVER TRANSPLANT STATUS: Primary | ICD-10-CM

## 2025-03-03 LAB
ALBUMIN SERPL BCP-MCNC: 4.1 G/DL (ref 3.4–5)
ALP SERPL-CCNC: 140 U/L (ref 33–120)
ALT SERPL W P-5'-P-CCNC: 15 U/L (ref 10–52)
ANION GAP SERPL CALC-SCNC: 14 MMOL/L (ref 10–20)
AST SERPL W P-5'-P-CCNC: 15 U/L (ref 9–39)
BASOPHILS # BLD AUTO: 0.11 X10*3/UL (ref 0–0.1)
BASOPHILS NFR BLD AUTO: 1.4 %
BILIRUB DIRECT SERPL-MCNC: 0.2 MG/DL (ref 0–0.3)
BILIRUB SERPL-MCNC: 0.6 MG/DL (ref 0–1.2)
BUN SERPL-MCNC: 37 MG/DL (ref 6–23)
CALCIUM SERPL-MCNC: 9.1 MG/DL (ref 8.6–10.3)
CHLORIDE SERPL-SCNC: 105 MMOL/L (ref 98–107)
CO2 SERPL-SCNC: 22 MMOL/L (ref 21–32)
CREAT SERPL-MCNC: 2.38 MG/DL (ref 0.5–1.3)
EGFRCR SERPLBLD CKD-EPI 2021: 31 ML/MIN/1.73M*2
EOSINOPHIL # BLD AUTO: 0.16 X10*3/UL (ref 0–0.7)
EOSINOPHIL NFR BLD AUTO: 2.1 %
ERYTHROCYTE [DISTWIDTH] IN BLOOD BY AUTOMATED COUNT: 19.2 % (ref 11.5–14.5)
GLUCOSE SERPL-MCNC: 139 MG/DL (ref 74–99)
HCT VFR BLD AUTO: 26 % (ref 41–52)
HGB BLD-MCNC: 8.3 G/DL (ref 13.5–17.5)
IMM GRANULOCYTES # BLD AUTO: 0.14 X10*3/UL (ref 0–0.7)
IMM GRANULOCYTES NFR BLD AUTO: 1.8 % (ref 0–0.9)
LYMPHOCYTES # BLD AUTO: 1.59 X10*3/UL (ref 1.2–4.8)
LYMPHOCYTES NFR BLD AUTO: 20.6 %
MAGNESIUM SERPL-MCNC: 1.5 MG/DL (ref 1.6–2.4)
MCH RBC QN AUTO: 30.7 PG (ref 26–34)
MCHC RBC AUTO-ENTMCNC: 31.9 G/DL (ref 32–36)
MCV RBC AUTO: 96 FL (ref 80–100)
MONOCYTES # BLD AUTO: 0.94 X10*3/UL (ref 0.1–1)
MONOCYTES NFR BLD AUTO: 12.2 %
NEUTROPHILS # BLD AUTO: 4.78 X10*3/UL (ref 1.2–7.7)
NEUTROPHILS NFR BLD AUTO: 61.9 %
NRBC BLD-RTO: 0.4 /100 WBCS (ref 0–0)
PHOSPHATE SERPL-MCNC: 2.7 MG/DL (ref 2.5–4.9)
PLATELET # BLD AUTO: 116 X10*3/UL (ref 150–450)
POTASSIUM SERPL-SCNC: 4.8 MMOL/L (ref 3.5–5.3)
PROT SERPL-MCNC: 7.2 G/DL (ref 6.4–8.2)
RBC # BLD AUTO: 2.7 X10*6/UL (ref 4.5–5.9)
SODIUM SERPL-SCNC: 136 MMOL/L (ref 136–145)
WBC # BLD AUTO: 7.7 X10*3/UL (ref 4.4–11.3)

## 2025-03-03 PROCEDURE — 83735 ASSAY OF MAGNESIUM: CPT

## 2025-03-03 PROCEDURE — 84100 ASSAY OF PHOSPHORUS: CPT

## 2025-03-03 PROCEDURE — 80053 COMPREHEN METABOLIC PANEL: CPT

## 2025-03-03 PROCEDURE — 82248 BILIRUBIN DIRECT: CPT

## 2025-03-03 PROCEDURE — 80197 ASSAY OF TACROLIMUS: CPT

## 2025-03-03 PROCEDURE — 82977 ASSAY OF GGT: CPT

## 2025-03-03 PROCEDURE — 85025 COMPLETE CBC W/AUTO DIFF WBC: CPT

## 2025-03-03 NOTE — TELEPHONE ENCOUNTER
03/03/25  spoke to pt., his surgeon appt is March 13 before endocrine.  Sent request to IR for Kidney Biopsy and sent message to Adrienne to r/s nutrition.

## 2025-03-04 ENCOUNTER — APPOINTMENT (OUTPATIENT)
Facility: HOSPITAL | Age: 55
End: 2025-03-04
Payer: MEDICAID

## 2025-03-04 ENCOUNTER — TELEPHONE (OUTPATIENT)
Facility: HOSPITAL | Age: 55
End: 2025-03-04
Payer: MEDICAID

## 2025-03-04 DIAGNOSIS — Z94.4 LIVER REPLACED BY TRANSPLANT (MULTI): ICD-10-CM

## 2025-03-04 LAB
GGT SERPL-CCNC: 118 U/L (ref 5–64)
TACROLIMUS BLD-MCNC: 6.9 NG/ML

## 2025-03-04 NOTE — TELEPHONE ENCOUNTER
03/04/25  IR scheduled biopsy for March 25th.    Nutrition is sched. For 03/11/25  Surg & Endo for 03/13/25 sent reminders

## 2025-03-06 ENCOUNTER — LAB (OUTPATIENT)
Dept: LAB | Facility: HOSPITAL | Age: 55
End: 2025-03-06
Payer: MEDICAID

## 2025-03-06 DIAGNOSIS — Z94.4 LIVER TRANSPLANT STATUS: Primary | ICD-10-CM

## 2025-03-06 DIAGNOSIS — Z94.0 KIDNEY TRANSPLANT STATUS: ICD-10-CM

## 2025-03-06 LAB
ALBUMIN SERPL BCP-MCNC: 3.8 G/DL (ref 3.4–5)
ALP SERPL-CCNC: 122 U/L (ref 33–120)
ALT SERPL W P-5'-P-CCNC: 14 U/L (ref 10–52)
ANION GAP SERPL CALC-SCNC: 15 MMOL/L (ref 10–20)
AST SERPL W P-5'-P-CCNC: 14 U/L (ref 9–39)
BASOPHILS # BLD MANUAL: 0.06 X10*3/UL (ref 0–0.1)
BASOPHILS NFR BLD MANUAL: 1 %
BILIRUB DIRECT SERPL-MCNC: 0.1 MG/DL (ref 0–0.3)
BILIRUB SERPL-MCNC: 0.5 MG/DL (ref 0–1.2)
BUN SERPL-MCNC: 27 MG/DL (ref 6–23)
CALCIUM SERPL-MCNC: 8.7 MG/DL (ref 8.6–10.3)
CHLORIDE SERPL-SCNC: 107 MMOL/L (ref 98–107)
CO2 SERPL-SCNC: 21 MMOL/L (ref 21–32)
CREAT SERPL-MCNC: 2.08 MG/DL (ref 0.5–1.3)
EGFRCR SERPLBLD CKD-EPI 2021: 37 ML/MIN/1.73M*2
EOSINOPHIL # BLD MANUAL: 0.06 X10*3/UL (ref 0–0.7)
EOSINOPHIL NFR BLD MANUAL: 1 %
ERYTHROCYTE [DISTWIDTH] IN BLOOD BY AUTOMATED COUNT: 20.7 % (ref 11.5–14.5)
GLUCOSE SERPL-MCNC: 122 MG/DL (ref 74–99)
HCT VFR BLD AUTO: 23.9 % (ref 41–52)
HGB BLD-MCNC: 7.8 G/DL (ref 13.5–17.5)
HYPOCHROMIA BLD QL SMEAR: NORMAL
IMM GRANULOCYTES # BLD AUTO: 0.33 X10*3/UL (ref 0–0.7)
IMM GRANULOCYTES NFR BLD AUTO: 5.3 % (ref 0–0.9)
INR PPP: 1.2 (ref 0.9–1.1)
LYMPHOCYTES # BLD MANUAL: 1.24 X10*3/UL (ref 1.2–4.8)
LYMPHOCYTES NFR BLD MANUAL: 20 %
MAGNESIUM SERPL-MCNC: 1.46 MG/DL (ref 1.6–2.4)
MCH RBC QN AUTO: 31.1 PG (ref 26–34)
MCHC RBC AUTO-ENTMCNC: 32.6 G/DL (ref 32–36)
MCV RBC AUTO: 95 FL (ref 80–100)
METAMYELOCYTES # BLD MANUAL: 0.25 X10*3/UL
METAMYELOCYTES NFR BLD MANUAL: 4 %
MONOCYTES # BLD MANUAL: 0.43 X10*3/UL (ref 0.1–1)
MONOCYTES NFR BLD MANUAL: 7 %
NEUTROPHILS # BLD MANUAL: 4.15 X10*3/UL (ref 1.2–7.7)
NEUTS BAND # BLD MANUAL: 0.06 X10*3/UL (ref 0–0.7)
NEUTS BAND NFR BLD MANUAL: 1 %
NEUTS SEG # BLD MANUAL: 4.09 X10*3/UL (ref 1.2–7)
NEUTS SEG NFR BLD MANUAL: 66 %
NRBC BLD-RTO: 0.3 /100 WBCS (ref 0–0)
PHOSPHATE SERPL-MCNC: 2.7 MG/DL (ref 2.5–4.9)
PLATELET # BLD AUTO: 137 X10*3/UL (ref 150–450)
POLYCHROMASIA BLD QL SMEAR: NORMAL
POTASSIUM SERPL-SCNC: 4.6 MMOL/L (ref 3.5–5.3)
PROT SERPL-MCNC: 6.5 G/DL (ref 6.4–8.2)
PROTHROMBIN TIME: 13.4 SECONDS (ref 9.8–12.4)
RBC # BLD AUTO: 2.51 X10*6/UL (ref 4.5–5.9)
RBC MORPH BLD: NORMAL
SODIUM SERPL-SCNC: 138 MMOL/L (ref 136–145)
TOTAL CELLS COUNTED BLD: 100
WBC # BLD AUTO: 6.2 X10*3/UL (ref 4.4–11.3)

## 2025-03-06 PROCEDURE — 85610 PROTHROMBIN TIME: CPT

## 2025-03-06 PROCEDURE — 85007 BL SMEAR W/DIFF WBC COUNT: CPT

## 2025-03-06 PROCEDURE — 84100 ASSAY OF PHOSPHORUS: CPT

## 2025-03-06 PROCEDURE — 80197 ASSAY OF TACROLIMUS: CPT

## 2025-03-06 PROCEDURE — 80053 COMPREHEN METABOLIC PANEL: CPT

## 2025-03-06 PROCEDURE — 82977 ASSAY OF GGT: CPT

## 2025-03-06 PROCEDURE — 83735 ASSAY OF MAGNESIUM: CPT

## 2025-03-06 PROCEDURE — 85027 COMPLETE CBC AUTOMATED: CPT

## 2025-03-06 PROCEDURE — 82248 BILIRUBIN DIRECT: CPT

## 2025-03-07 ENCOUNTER — APPOINTMENT (OUTPATIENT)
Facility: HOSPITAL | Age: 55
End: 2025-03-07
Payer: MEDICAID

## 2025-03-07 DIAGNOSIS — Z94.4 LIVER REPLACED BY TRANSPLANT (MULTI): ICD-10-CM

## 2025-03-07 DIAGNOSIS — Z94.0 KIDNEY REPLACED BY TRANSPLANT (HHS-HCC): ICD-10-CM

## 2025-03-07 LAB
GGT SERPL-CCNC: 90 U/L (ref 5–64)
TACROLIMUS BLD-MCNC: 5.7 NG/ML

## 2025-03-08 ENCOUNTER — DOCUMENTATION (OUTPATIENT)
Dept: TRANSPLANT | Facility: HOSPITAL | Age: 55
End: 2025-03-08
Payer: MEDICAID

## 2025-03-08 NOTE — PROGRESS NOTES
"    TRANSPLANT COORDINATOR REMEDIOS NOTE    Date: 3/8/2025  Time: 11:09 AM    Reason for call: This call was for a refill of BD Ultrafine diabetic pen needles 32 gauge 5/32\" needles, one box. Called to [facility] [address] [place] [address].    Is patient admitted to outside Facility or ER? If yes include contact number and name of provider: NO    Triage :   N/A     Plan  Medical management: N/A  Current Immunosuppression: Adjustment: NO  New prescription needed: YES      Follow-Up   To follow up in: N/A    Task sent to  to schedule a follow up : N/A      Notified physicians on call :  Surgeon: DR. CORTNEY GUEVARA  Nephrologist:   Primary coordinator: YES  *Note sent to post transplant coordinator team NO  "

## 2025-03-10 ENCOUNTER — LAB (OUTPATIENT)
Dept: LAB | Facility: HOSPITAL | Age: 55
End: 2025-03-10
Payer: MEDICAID

## 2025-03-10 ENCOUNTER — TELEPHONE (OUTPATIENT)
Facility: HOSPITAL | Age: 55
End: 2025-03-10
Payer: MEDICAID

## 2025-03-10 DIAGNOSIS — Z94.0 KIDNEY TRANSPLANT STATUS: ICD-10-CM

## 2025-03-10 DIAGNOSIS — Z94.4 LIVER TRANSPLANT STATUS: Primary | ICD-10-CM

## 2025-03-10 LAB
ALBUMIN SERPL BCP-MCNC: 4 G/DL (ref 3.4–5)
ALP SERPL-CCNC: 105 U/L (ref 33–120)
ALT SERPL W P-5'-P-CCNC: 12 U/L (ref 10–52)
ANION GAP SERPL CALC-SCNC: 14 MMOL/L (ref 10–20)
AST SERPL W P-5'-P-CCNC: 11 U/L (ref 9–39)
BASOPHILS # BLD AUTO: 0.07 X10*3/UL (ref 0–0.1)
BASOPHILS NFR BLD AUTO: 1.2 %
BILIRUB DIRECT SERPL-MCNC: 0.1 MG/DL (ref 0–0.3)
BILIRUB SERPL-MCNC: 0.5 MG/DL (ref 0–1.2)
BUN SERPL-MCNC: 33 MG/DL (ref 6–23)
CALCIUM SERPL-MCNC: 9.2 MG/DL (ref 8.6–10.3)
CHLORIDE SERPL-SCNC: 105 MMOL/L (ref 98–107)
CO2 SERPL-SCNC: 22 MMOL/L (ref 21–32)
CREAT SERPL-MCNC: 2.16 MG/DL (ref 0.5–1.3)
DACRYOCYTES BLD QL SMEAR: NORMAL
EGFRCR SERPLBLD CKD-EPI 2021: 35 ML/MIN/1.73M*2
EOSINOPHIL # BLD AUTO: 0.11 X10*3/UL (ref 0–0.7)
EOSINOPHIL NFR BLD AUTO: 1.9 %
ERYTHROCYTE [DISTWIDTH] IN BLOOD BY AUTOMATED COUNT: 22.7 % (ref 11.5–14.5)
GGT SERPL-CCNC: 72 U/L (ref 5–64)
GLUCOSE SERPL-MCNC: 125 MG/DL (ref 74–99)
HCT VFR BLD AUTO: 26.8 % (ref 41–52)
HGB BLD-MCNC: 8.5 G/DL (ref 13.5–17.5)
HYPOCHROMIA BLD QL SMEAR: NORMAL
IMM GRANULOCYTES # BLD AUTO: 0.06 X10*3/UL (ref 0–0.7)
IMM GRANULOCYTES NFR BLD AUTO: 1 % (ref 0–0.9)
INR PPP: 1.2 (ref 0.9–1.1)
LYMPHOCYTES # BLD AUTO: 1.02 X10*3/UL (ref 1.2–4.8)
LYMPHOCYTES NFR BLD AUTO: 17.3 %
MAGNESIUM SERPL-MCNC: 1.58 MG/DL (ref 1.6–2.4)
MCH RBC QN AUTO: 30.8 PG (ref 26–34)
MCHC RBC AUTO-ENTMCNC: 31.7 G/DL (ref 32–36)
MCV RBC AUTO: 97 FL (ref 80–100)
MONOCYTES # BLD AUTO: 0.49 X10*3/UL (ref 0.1–1)
MONOCYTES NFR BLD AUTO: 8.3 %
NEUTROPHILS # BLD AUTO: 4.16 X10*3/UL (ref 1.2–7.7)
NEUTROPHILS NFR BLD AUTO: 70.3 %
NRBC BLD-RTO: 0 /100 WBCS (ref 0–0)
OVALOCYTES BLD QL SMEAR: NORMAL
PHOSPHATE SERPL-MCNC: 3.1 MG/DL (ref 2.5–4.9)
PLATELET # BLD AUTO: 214 X10*3/UL (ref 150–450)
POLYCHROMASIA BLD QL SMEAR: NORMAL
POTASSIUM SERPL-SCNC: 4.6 MMOL/L (ref 3.5–5.3)
PROT SERPL-MCNC: 6.7 G/DL (ref 6.4–8.2)
PROTHROMBIN TIME: 13.2 SECONDS (ref 9.8–12.4)
RBC # BLD AUTO: 2.76 X10*6/UL (ref 4.5–5.9)
RBC MORPH BLD: NORMAL
SODIUM SERPL-SCNC: 136 MMOL/L (ref 136–145)
TACROLIMUS BLD-MCNC: 6.5 NG/ML
WBC # BLD AUTO: 5.9 X10*3/UL (ref 4.4–11.3)

## 2025-03-10 PROCEDURE — 84100 ASSAY OF PHOSPHORUS: CPT

## 2025-03-10 PROCEDURE — 83735 ASSAY OF MAGNESIUM: CPT

## 2025-03-10 PROCEDURE — 82248 BILIRUBIN DIRECT: CPT

## 2025-03-10 PROCEDURE — 86833 HLA CLASS II HIGH DEFIN QUAL: CPT

## 2025-03-10 PROCEDURE — 86832 HLA CLASS I HIGH DEFIN QUAL: CPT

## 2025-03-10 PROCEDURE — 82977 ASSAY OF GGT: CPT

## 2025-03-10 PROCEDURE — 80197 ASSAY OF TACROLIMUS: CPT

## 2025-03-10 PROCEDURE — 85610 PROTHROMBIN TIME: CPT

## 2025-03-10 PROCEDURE — 85025 COMPLETE CBC W/AUTO DIFF WBC: CPT

## 2025-03-10 PROCEDURE — 80053 COMPREHEN METABOLIC PANEL: CPT

## 2025-03-10 NOTE — TELEPHONE ENCOUNTER
VM from PT - he is completely out of ultra mini pen needles - I think Lois Jacome ordered these. He needs  RX for more.

## 2025-03-11 ENCOUNTER — TELEPHONE (OUTPATIENT)
Dept: TRANSPLANT | Facility: HOSPITAL | Age: 55
End: 2025-03-11
Payer: MEDICAID

## 2025-03-11 DIAGNOSIS — Z94.0 KIDNEY REPLACED BY TRANSPLANT (HHS-HCC): ICD-10-CM

## 2025-03-11 DIAGNOSIS — Z94.4 LIVER REPLACED BY TRANSPLANT (MULTI): ICD-10-CM

## 2025-03-11 DIAGNOSIS — E11.9 TYPE 2 DIABETES MELLITUS WITHOUT COMPLICATION, UNSPECIFIED WHETHER LONG TERM INSULIN USE (MULTI): ICD-10-CM

## 2025-03-11 DIAGNOSIS — Z94.4 LIVER TRANSPLANT RECIPIENT (MULTI): ICD-10-CM

## 2025-03-11 RX ORDER — TACROLIMUS 1 MG/1
3 CAPSULE ORAL 2 TIMES DAILY
Qty: 180 CAPSULE | Refills: 11 | Status: SHIPPED | OUTPATIENT
Start: 2025-03-11 | End: 2025-03-14 | Stop reason: SDUPTHER

## 2025-03-11 RX ORDER — PEN NEEDLE, DIABETIC 30 GX3/16"
1 NEEDLE, DISPOSABLE MISCELLANEOUS 4 TIMES DAILY
Qty: 400 EACH | Refills: 3 | Status: SHIPPED | OUTPATIENT
Start: 2025-03-11 | End: 2025-03-13 | Stop reason: SDUPTHER

## 2025-03-11 NOTE — PROGRESS NOTES
Pt needs more pen needles    Chelsea Hernandez PA-C   Endocrinology  Inpatient Diabetes Management Team

## 2025-03-11 NOTE — TELEPHONE ENCOUNTER
Labs reviewed by Dr. Wright, to increase tacrolimus to 3mg every 12 hours.  Spoke with Goran about dose increase.

## 2025-03-12 LAB
HLA RESULTS: NORMAL
HLA-A+B+C AB NFR SER: NORMAL %
HLA-DP+DQ+DR AB NFR SER: NORMAL %

## 2025-03-13 ENCOUNTER — APPOINTMENT (OUTPATIENT)
Facility: HOSPITAL | Age: 55
End: 2025-03-13
Payer: MEDICAID

## 2025-03-13 ENCOUNTER — OFFICE VISIT (OUTPATIENT)
Facility: HOSPITAL | Age: 55
End: 2025-03-13
Payer: MEDICAID

## 2025-03-13 VITALS
OXYGEN SATURATION: 96 % | SYSTOLIC BLOOD PRESSURE: 160 MMHG | HEART RATE: 78 BPM | DIASTOLIC BLOOD PRESSURE: 91 MMHG | WEIGHT: 233.2 LBS | BODY MASS INDEX: 33.46 KG/M2 | TEMPERATURE: 97.6 F

## 2025-03-13 VITALS
TEMPERATURE: 97.6 F | WEIGHT: 233.2 LBS | OXYGEN SATURATION: 96 % | HEART RATE: 78 BPM | BODY MASS INDEX: 33.46 KG/M2 | DIASTOLIC BLOOD PRESSURE: 91 MMHG | SYSTOLIC BLOOD PRESSURE: 160 MMHG

## 2025-03-13 DIAGNOSIS — Z01.818 ENCOUNTER FOR PRE-TRANSPLANT EVALUATION FOR LIVER TRANSPLANT: ICD-10-CM

## 2025-03-13 DIAGNOSIS — Z94.4 LIVER TRANSPLANT STATUS: ICD-10-CM

## 2025-03-13 DIAGNOSIS — Z94.0 KIDNEY REPLACED BY TRANSPLANT (HHS-HCC): ICD-10-CM

## 2025-03-13 DIAGNOSIS — Z79.899 IMMUNOSUPPRESSIVE MANAGEMENT ENCOUNTER FOLLOWING KIDNEY TRANSPLANT: Primary | ICD-10-CM

## 2025-03-13 DIAGNOSIS — R73.9 STEROID-INDUCED HYPERGLYCEMIA: ICD-10-CM

## 2025-03-13 DIAGNOSIS — E11.9 TYPE 2 DIABETES MELLITUS WITHOUT COMPLICATION, WITH LONG-TERM CURRENT USE OF INSULIN (MULTI): Primary | ICD-10-CM

## 2025-03-13 DIAGNOSIS — Z94.4 LIVER TRANSPLANT RECIPIENT (MULTI): ICD-10-CM

## 2025-03-13 DIAGNOSIS — T38.0X5A STEROID-INDUCED HYPERGLYCEMIA: ICD-10-CM

## 2025-03-13 DIAGNOSIS — Z94.0 IMMUNOSUPPRESSIVE MANAGEMENT ENCOUNTER FOLLOWING KIDNEY TRANSPLANT: Primary | ICD-10-CM

## 2025-03-13 DIAGNOSIS — Z79.4 TYPE 2 DIABETES MELLITUS WITHOUT COMPLICATION, WITH LONG-TERM CURRENT USE OF INSULIN (MULTI): Primary | ICD-10-CM

## 2025-03-13 DIAGNOSIS — Z94.4 LIVER REPLACED BY TRANSPLANT (MULTI): ICD-10-CM

## 2025-03-13 DIAGNOSIS — E11.9 TYPE 2 DIABETES MELLITUS WITHOUT COMPLICATION, UNSPECIFIED WHETHER LONG TERM INSULIN USE (MULTI): ICD-10-CM

## 2025-03-13 LAB
ALBUMIN SERPL BCP-MCNC: 4.3 G/DL (ref 3.4–5)
ALP SERPL-CCNC: 190 U/L (ref 33–120)
ALT SERPL W P-5'-P-CCNC: 38 U/L (ref 10–52)
ANION GAP SERPL CALC-SCNC: 13 MMOL/L (ref 10–20)
AST SERPL W P-5'-P-CCNC: 44 U/L (ref 9–39)
BASOPHILS # BLD AUTO: 0.13 X10*3/UL (ref 0–0.1)
BASOPHILS NFR BLD AUTO: 2.1 %
BILIRUB DIRECT SERPL-MCNC: 0.2 MG/DL (ref 0–0.3)
BILIRUB SERPL-MCNC: 0.6 MG/DL (ref 0–1.2)
BUN SERPL-MCNC: 42 MG/DL (ref 6–23)
CALCIUM SERPL-MCNC: 9.1 MG/DL (ref 8.6–10.6)
CHLORIDE SERPL-SCNC: 105 MMOL/L (ref 98–107)
CO2 SERPL-SCNC: 22 MMOL/L (ref 21–32)
CREAT SERPL-MCNC: 2.18 MG/DL (ref 0.5–1.3)
EGFRCR SERPLBLD CKD-EPI 2021: 35 ML/MIN/1.73M*2
EOSINOPHIL # BLD AUTO: 0.13 X10*3/UL (ref 0–0.7)
EOSINOPHIL NFR BLD AUTO: 2.1 %
ERYTHROCYTE [DISTWIDTH] IN BLOOD BY AUTOMATED COUNT: 22.4 % (ref 11.5–14.5)
GGT SERPL-CCNC: 189 U/L (ref 5–64)
GLUCOSE SERPL-MCNC: 130 MG/DL (ref 74–99)
HCT VFR BLD AUTO: 26.8 % (ref 41–52)
HGB BLD-MCNC: 8.2 G/DL (ref 13.5–17.5)
IMM GRANULOCYTES # BLD AUTO: 0.03 X10*3/UL (ref 0–0.7)
IMM GRANULOCYTES NFR BLD AUTO: 0.5 % (ref 0–0.9)
INR PPP: 1.2 (ref 0.9–1.1)
LYMPHOCYTES # BLD AUTO: 1 X10*3/UL (ref 1.2–4.8)
LYMPHOCYTES NFR BLD AUTO: 15.9 %
MAGNESIUM SERPL-MCNC: 1.66 MG/DL (ref 1.6–2.4)
MCH RBC QN AUTO: 31.1 PG (ref 26–34)
MCHC RBC AUTO-ENTMCNC: 30.6 G/DL (ref 32–36)
MCV RBC AUTO: 102 FL (ref 80–100)
MONOCYTES # BLD AUTO: 0.71 X10*3/UL (ref 0.1–1)
MONOCYTES NFR BLD AUTO: 11.3 %
NEUTROPHILS # BLD AUTO: 4.28 X10*3/UL (ref 1.2–7.7)
NEUTROPHILS NFR BLD AUTO: 68.1 %
NRBC BLD-RTO: 0 /100 WBCS (ref 0–0)
OVALOCYTES BLD QL SMEAR: NORMAL
PHOSPHATE SERPL-MCNC: 3.7 MG/DL (ref 2.5–4.9)
PLATELET # BLD AUTO: 246 X10*3/UL (ref 150–450)
POLYCHROMASIA BLD QL SMEAR: NORMAL
POTASSIUM SERPL-SCNC: 4.9 MMOL/L (ref 3.5–5.3)
PROT SERPL-MCNC: 7.1 G/DL (ref 6.4–8.2)
PROTHROMBIN TIME: 13.8 SECONDS (ref 9.8–12.4)
RBC # BLD AUTO: 2.64 X10*6/UL (ref 4.5–5.9)
RBC MORPH BLD: NORMAL
SODIUM SERPL-SCNC: 135 MMOL/L (ref 136–145)
TACROLIMUS BLD-MCNC: 5.3 NG/ML
WBC # BLD AUTO: 6.3 X10*3/UL (ref 4.4–11.3)

## 2025-03-13 PROCEDURE — 82977 ASSAY OF GGT: CPT | Performed by: STUDENT IN AN ORGANIZED HEALTH CARE EDUCATION/TRAINING PROGRAM

## 2025-03-13 PROCEDURE — 83735 ASSAY OF MAGNESIUM: CPT | Performed by: STUDENT IN AN ORGANIZED HEALTH CARE EDUCATION/TRAINING PROGRAM

## 2025-03-13 PROCEDURE — 80197 ASSAY OF TACROLIMUS: CPT | Performed by: STUDENT IN AN ORGANIZED HEALTH CARE EDUCATION/TRAINING PROGRAM

## 2025-03-13 PROCEDURE — 3077F SYST BP >= 140 MM HG: CPT | Performed by: TRANSPLANT SURGERY

## 2025-03-13 PROCEDURE — 84075 ASSAY ALKALINE PHOSPHATASE: CPT | Performed by: STUDENT IN AN ORGANIZED HEALTH CARE EDUCATION/TRAINING PROGRAM

## 2025-03-13 PROCEDURE — 99213 OFFICE O/P EST LOW 20 MIN: CPT

## 2025-03-13 PROCEDURE — 36415 COLL VENOUS BLD VENIPUNCTURE: CPT | Performed by: STUDENT IN AN ORGANIZED HEALTH CARE EDUCATION/TRAINING PROGRAM

## 2025-03-13 PROCEDURE — 3044F HG A1C LEVEL LT 7.0%: CPT | Performed by: PHYSICIAN ASSISTANT

## 2025-03-13 PROCEDURE — 82248 BILIRUBIN DIRECT: CPT | Performed by: STUDENT IN AN ORGANIZED HEALTH CARE EDUCATION/TRAINING PROGRAM

## 2025-03-13 PROCEDURE — 3080F DIAST BP >= 90 MM HG: CPT | Performed by: PHYSICIAN ASSISTANT

## 2025-03-13 PROCEDURE — 3077F SYST BP >= 140 MM HG: CPT | Performed by: PHYSICIAN ASSISTANT

## 2025-03-13 PROCEDURE — 85025 COMPLETE CBC W/AUTO DIFF WBC: CPT | Performed by: STUDENT IN AN ORGANIZED HEALTH CARE EDUCATION/TRAINING PROGRAM

## 2025-03-13 PROCEDURE — 99214 OFFICE O/P EST MOD 30 MIN: CPT | Performed by: PHYSICIAN ASSISTANT

## 2025-03-13 PROCEDURE — 3060F POS MICROALBUMINURIA REV: CPT | Performed by: PHYSICIAN ASSISTANT

## 2025-03-13 PROCEDURE — 3060F POS MICROALBUMINURIA REV: CPT | Performed by: TRANSPLANT SURGERY

## 2025-03-13 PROCEDURE — 3080F DIAST BP >= 90 MM HG: CPT | Performed by: TRANSPLANT SURGERY

## 2025-03-13 PROCEDURE — 3048F LDL-C <100 MG/DL: CPT | Performed by: TRANSPLANT SURGERY

## 2025-03-13 PROCEDURE — 3044F HG A1C LEVEL LT 7.0%: CPT | Performed by: TRANSPLANT SURGERY

## 2025-03-13 PROCEDURE — 80053 COMPREHEN METABOLIC PANEL: CPT | Performed by: STUDENT IN AN ORGANIZED HEALTH CARE EDUCATION/TRAINING PROGRAM

## 2025-03-13 PROCEDURE — 3048F LDL-C <100 MG/DL: CPT | Performed by: PHYSICIAN ASSISTANT

## 2025-03-13 PROCEDURE — 84100 ASSAY OF PHOSPHORUS: CPT | Performed by: STUDENT IN AN ORGANIZED HEALTH CARE EDUCATION/TRAINING PROGRAM

## 2025-03-13 PROCEDURE — 99213 OFFICE O/P EST LOW 20 MIN: CPT | Mod: 24

## 2025-03-13 PROCEDURE — 85610 PROTHROMBIN TIME: CPT | Performed by: STUDENT IN AN ORGANIZED HEALTH CARE EDUCATION/TRAINING PROGRAM

## 2025-03-13 RX ORDER — PEN NEEDLE, DIABETIC 30 GX3/16"
1 NEEDLE, DISPOSABLE MISCELLANEOUS 4 TIMES DAILY
Qty: 400 EACH | Refills: 3 | Status: SHIPPED | OUTPATIENT
Start: 2025-03-13 | End: 2025-06-11

## 2025-03-13 RX ORDER — ASPIRIN 81 MG/1
81 TABLET ORAL DAILY
COMMUNITY

## 2025-03-13 RX ORDER — PREDNISONE 5 MG/1
5 TABLET ORAL DAILY
Qty: 60 TABLET | Refills: 11 | Status: SHIPPED | OUTPATIENT
Start: 2025-03-13

## 2025-03-13 RX ORDER — INSULIN GLARGINE 100 [IU]/ML
10 INJECTION, SOLUTION SUBCUTANEOUS EVERY MORNING
Qty: 3 ML | Refills: 3 | Status: SHIPPED | OUTPATIENT
Start: 2025-03-13 | End: 2025-07-11

## 2025-03-13 RX ORDER — INSULIN LISPRO 100 [IU]/ML
0-10 INJECTION, SOLUTION INTRAVENOUS; SUBCUTANEOUS
Qty: 9 ML | Refills: 3 | Status: SHIPPED | OUTPATIENT
Start: 2025-03-13 | End: 2025-07-11

## 2025-03-13 ASSESSMENT — PAIN SCALES - GENERAL
PAINLEVEL_OUTOF10: 0-NO PAIN
PAINLEVEL_OUTOF10: 0-NO PAIN

## 2025-03-13 ASSESSMENT — ENCOUNTER SYMPTOMS: FATIGUE: 1

## 2025-03-13 NOTE — PROGRESS NOTES
Subjective   Goran Ibrahim is a 54 y.o. male who presents for follow-up of Type 2 diabetes mellitus. The initial diagnosis of diabetes was made in 2023 . The patient does not have a known family history of diabetes.    Known complications due to diabetes included chronic kidney disease and obesity    Cardiovascular risk factors include advanced age (older than 55 for men, 65 for women), diabetes mellitus, male gender, and obesity (BMI >= 30 kg/m2). The patient is not on an ACE inhibitor or angiotensin II receptor blocker.  The patient has not been previously hospitalized due to diabetic ketoacidosis.     Current symptoms/problems include hyperglycemia. His clinical course has been stable.     Current diabetes regimen is as follows:  Glargine 10u qAM, lispro 8-8-4u ACITD plus ssi 2u:50>150mg/dL and dexcom G7 CGM      The patient is currently checking the blood glucose 5+ times per day.    Patient is using: continuous glucose monitor -using dexcom with , so unable to download CGM report    Hypoglycemia frequency: one occurrence after taking ssi after dinner  Hypoglycemia awareness: Yes     Exercise: never - limited by recent liver and kidney transplant   Meal panning: He is using avoidance of concentrated sweets.    Review of Systems   Constitutional:  Positive for fatigue.   All other systems reviewed and are negative.      Objective   BP (!) 160/91   Pulse 78   Temp 36.4 °C (97.6 °F) (Temporal)   Wt 106 kg (233 lb 3.2 oz)   SpO2 96%   BMI 33.46 kg/m²   Physical Exam  Constitutional:       Appearance: Normal appearance. He is obese.   HENT:      Head: Normocephalic and atraumatic.      Nose: Nose normal.      Mouth/Throat:      Mouth: Mucous membranes are dry.      Pharynx: Oropharynx is clear.   Eyes:      Extraocular Movements: Extraocular movements intact.      Conjunctiva/sclera: Conjunctivae normal.   Cardiovascular:      Rate and Rhythm: Normal rate and regular rhythm.      Pulses: Normal pulses.       Heart sounds: Normal heart sounds.   Pulmonary:      Effort: Pulmonary effort is normal.      Breath sounds: Normal breath sounds.   Abdominal:      Palpations: Abdomen is soft.   Skin:     General: Skin is warm and dry.   Neurological:      General: No focal deficit present.      Mental Status: He is alert and oriented to person, place, and time. Mental status is at baseline.   Psychiatric:         Mood and Affect: Mood normal.         Behavior: Behavior normal.         Thought Content: Thought content normal.         Judgment: Judgment normal.       Lab Review  Glucose (mg/dL)   Date Value   03/13/2025 130 (H)   03/10/2025 125 (H)   03/06/2025 122 (H)     Hemoglobin A1C (%)   Date Value   01/21/2025 6.6 (H)   09/28/2023 5.3   06/28/2023 5.2   05/18/2023 6.8 (H)     Bicarbonate (mmol/L)   Date Value   03/13/2025 22   03/10/2025 22   03/06/2025 21     Urea Nitrogen (mg/dL)   Date Value   03/13/2025 42 (H)   03/10/2025 33 (H)   03/06/2025 27 (H)     Creatinine (mg/dL)   Date Value   03/13/2025 2.18 (H)   03/10/2025 2.16 (H)   03/06/2025 2.08 (H)       Health Maintenance:   Foot Exam: due for update  Eye Exam: due for update  Lipid Panel: up to date, though above goal of LDL <70 as of 1/13/25, ordered repeat lab for 6 months after txp  Urine Albumin: due for update at 6 months after txp     Assessment/Plan   Diagnoses and all orders for this visit:  Type 2 diabetes mellitus without complication, with long-term current use of insulin (Multi)  Steroid-induced hyperglycemia  -     insulin glargine (Basaglar KwikPen U-100 Insulin) 100 unit/mL (3 mL) pen; Inject 10 Units under the skin once daily in the morning. Take in AM with Prednisone  -     insulin lispro (HumaLOG KwikPen Insulin) 100 unit/mL pen; Inject 0-10 Units under the skin 3 times daily (morning, midday, late afternoon). Inject 5 units of Lispro subcutaneously two times a day before breakfast and lunch and 3 units with dinner- in addition to the following  "sliding scale: 0 unit(s) if Blood glucose is between  2 unit(s) if Blood glucose is between 151-200 4 unit(s) if Blood glucose is between 201-250 6 unit(s) if Blood glucose is between 251-300 8 unit(s) if Blood glucose is between 301-350 10 unit(s) if Blood glucose is between 351-400 If blood glucose is greater than 400 mg/dL, expect up to 50 units per day  Liver transplant status  Kidney replaced by transplant ([facility])  Type 2 diabetes mellitus without complication, unspecified whether long term insulin use (Multi)  -     insulin glargine (Basaglar KwikPen U-100 Insulin) 100 unit/mL (3 mL) pen; Inject 10 Units under the skin once daily in the morning. Take in AM with Prednisone  -     insulin lispro (HumaLOG KwikPen Insulin) 100 unit/mL pen; Inject 0-10 Units under the skin 3 times daily (morning, midday, late afternoon). Inject 5 units of Lispro subcutaneously two times a day before breakfast and lunch and 3 units with dinner- in addition to the following sliding scale: 0 unit(s) if Blood glucose is between  2 unit(s) if Blood glucose is between 151-200 4 unit(s) if Blood glucose is between 201-250 6 unit(s) if Blood glucose is between 251-300 8 unit(s) if Blood glucose is between 301-350 10 unit(s) if Blood glucose is between 351-400 If blood glucose is greater than 400 mg/dL, expect up to 50 units per day  -     pen needle, diabetic 32 gauge x 5/32\" needle; 1 each 4 times a day. Use to inject insulin 4 times daily  Liver replaced by transplant (Multi)  -     insulin glargine (Basaglar KwikPen U-100 Insulin) 100 unit/mL (3 mL) pen; Inject 10 Units under the skin once daily in the morning. Take in AM with Prednisone  -     insulin lispro (HumaLOG KwikPen Insulin) 100 unit/mL pen; Inject 0-10 Units under the skin 3 times daily (morning, midday, late afternoon). Inject 5 units of Lispro subcutaneously two times a day before breakfast and lunch and 3 units with dinner- in addition to the following " sliding scale: 0 unit(s) if Blood glucose is between  2 unit(s) if Blood glucose is between 151-200 4 unit(s) if Blood glucose is between 201-250 6 unit(s) if Blood glucose is between 251-300 8 unit(s) if Blood glucose is between 301-350 10 unit(s) if Blood glucose is between 351-400 If blood glucose is greater than 400 mg/dL, expect up to 50 units per day    Type 2 diabetes mellitus, is at goal. A1C due for update in 3 months    RX changes:  see insulin plan changes below    Education:  blood sugar goals, complications of diabetes mellitus, hypoglycemia prevention and treatment, and self-monitoring of blood glucose skills  Follow up: I recommend diabetes care be  on July 24th at 1:00p .    INSULIN INSTRUCTIONS     LANTUS = 10 units once every morning    HUMALOG       BREAKFAST = 5 units plus sliding scale (take 15 min before meal)       LUNCH = 5 units plus sliding scale (take 15 min before meal)       DINNER = 3 units plus sliding scale (take 15 min before meal)    SLIDING SCALE    = 0u  151-200 = 2u  201-250 = 4u  251-300 = 6u  301-350 = 8u  351-400 = 10u  Over 400 = repeat 10u every 4 hours

## 2025-03-13 NOTE — PATIENT INSTRUCTIONS
Type 2 diabetes mellitus without complication, with long-term current use of insulin (Multi)  Steroid-induced hyperglycemia  -     insulin glargine (Basaglar KwikPen U-100 Insulin) 100 unit/mL (3 mL) pen; Inject 10 Units under the skin once daily in the morning. Take in AM with Prednisone  -     insulin lispro (HumaLOG KwikPen Insulin) 100 unit/mL pen; Inject 0-10 Units under the skin 3 times daily (morning, midday, late afternoon). Inject 5 units of Lispro subcutaneously two times a day before breakfast and lunch and 3 units with dinner- in addition to the following sliding scale: 0 unit(s) if Blood glucose is between  2 unit(s) if Blood glucose is between 151-200 4 unit(s) if Blood glucose is between 201-250 6 unit(s) if Blood glucose is between 251-300 8 unit(s) if Blood glucose is between 301-350 10 unit(s) if Blood glucose is between 351-400 If blood glucose is greater than 400 mg/dL, expect up to 50 units per day  Liver transplant status  Kidney replaced by transplant ([facility])  Type 2 diabetes mellitus without complication, unspecified whether long term insulin use (Multi)  -     insulin glargine (Basaglar KwikPen U-100 Insulin) 100 unit/mL (3 mL) pen; Inject 10 Units under the skin once daily in the morning. Take in AM with Prednisone  -     insulin lispro (HumaLOG KwikPen Insulin) 100 unit/mL pen; Inject 0-10 Units under the skin 3 times daily (morning, midday, late afternoon). Inject 5 units of Lispro subcutaneously two times a day before breakfast and lunch and 3 units with dinner- in addition to the following sliding scale: 0 unit(s) if Blood glucose is between  2 unit(s) if Blood glucose is between 151-200 4 unit(s) if Blood glucose is between 201-250 6 unit(s) if Blood glucose is between 251-300 8 unit(s) if Blood glucose is between 301-350 10 unit(s) if Blood glucose is between 351-400 If blood glucose is greater than 400 mg/dL, expect up to 50 units per day  -     pen needle, diabetic 32  "gauge x 5/32\" needle; 1 each 4 times a day. Use to inject insulin 4 times daily  Liver replaced by transplant (Multi)  -     insulin glargine (Basaglar KwikPen U-100 Insulin) 100 unit/mL (3 mL) pen; Inject 10 Units under the skin once daily in the morning. Take in AM with Prednisone  -     insulin lispro (HumaLOG KwikPen Insulin) 100 unit/mL pen; Inject 0-10 Units under the skin 3 times daily (morning, midday, late afternoon). Inject 5 units of Lispro subcutaneously two times a day before breakfast and lunch and 3 units with dinner- in addition to the following sliding scale: 0 unit(s) if Blood glucose is between  2 unit(s) if Blood glucose is between 151-200 4 unit(s) if Blood glucose is between 201-250 6 unit(s) if Blood glucose is between 251-300 8 unit(s) if Blood glucose is between 301-350 10 unit(s) if Blood glucose is between 351-400 If blood glucose is greater than 400 mg/dL, expect up to 50 units per day    Type 2 diabetes mellitus, is at goal. A1C due for update in 3 months    RX changes: see insulin plan changes below   Education:  blood sugar goals, complications of diabetes mellitus, hypoglycemia prevention and treatment, and self-monitoring of blood glucose skills  Follow up: I recommend diabetes care be on July 24th at 1:00p.    INSULIN INSTRUCTIONS     LANTUS = 10 units once every morning    HUMALOG       BREAKFAST = 5 units plus sliding scale (take 15 min before meal)       LUNCH = 5 units plus sliding scale (take 15 min before meal)       DINNER = 3 units plus sliding scale (take 15 min before meal)    SLIDING SCALE    = 0u  151-200 = 2u  201-250 = 4u  251-300 = 6u  301-350 = 8u  351-400 = 10u  Over 400 = repeat 10u every 4 hours   "

## 2025-03-14 ENCOUNTER — TELEPHONE (OUTPATIENT)
Dept: TRANSPLANT | Facility: HOSPITAL | Age: 55
End: 2025-03-14
Payer: MEDICAID

## 2025-03-14 ENCOUNTER — TELEPHONE (OUTPATIENT)
Facility: HOSPITAL | Age: 55
End: 2025-03-14
Payer: MEDICAID

## 2025-03-14 DIAGNOSIS — Z94.0 KIDNEY REPLACED BY TRANSPLANT (HHS-HCC): ICD-10-CM

## 2025-03-14 DIAGNOSIS — Z94.4 LIVER REPLACED BY TRANSPLANT (MULTI): ICD-10-CM

## 2025-03-14 DIAGNOSIS — Z94.4 LIVER TRANSPLANT RECIPIENT (MULTI): ICD-10-CM

## 2025-03-14 RX ORDER — TACROLIMUS 1 MG/1
4 CAPSULE ORAL 2 TIMES DAILY
Qty: 240 CAPSULE | Refills: 11 | Status: ON HOLD | OUTPATIENT
Start: 2025-03-14 | End: 2025-03-23

## 2025-03-14 NOTE — TELEPHONE ENCOUNTER
Labs reviewed with Dr. Wright, increase tacrolimus to 4mg every 12 hours.  Spoke with patient, he is out of town, will change dose on Sunday as he only brought his pill box.

## 2025-03-14 NOTE — PROGRESS NOTES
TRANSPLANT SURGERY FOLLOW UP    Reason for visit:    Goran Ibrahim underwent transplant surgery,  1/20/2025 (Liver / Kidney), and returns to clinic for follow up evaluation focusing on their current immunosuppression. Specifically, Goran Ibrahim's regiment was evaluated to ensure adequate levels to prevent life threatening or organ function impairing rejection while not increasing risk of adverse effects including malignancy, infection, bone health, or accelerated cardiovascular disease.  I reviewed common side effects including tremor, hair loss, GI toxicity, and agitation.  The patient reported that they were experiencing: none.    The long-term management of maintenance immunosuppression in organ transplant recipients remains complex given differences in clinical characteristics, comorbid conditions, and prior rejection episodes.  These factors were evaluated at this visit and used in formulating this plan of care. Immunosuppression following the transplant is medically necessary to closely monitor and to reduce the risk of post-operative complications distinct from the post operative management of the transplant surgical procedure.     Interval history  Doing well    Patient Active Problem List   Diagnosis    Hypertension    Liver cirrhosis (Multi)    Esophageal varices in alcoholic cirrhosis (Multi)    Hepatic cirrhosis, unspecified hepatic cirrhosis type, unspecified whether ascites present (Multi)    ESRD (end stage renal disease) (Multi)    Steroid-induced hyperglycemia    Kidney replaced by transplant ([facility])    Liver transplant status    Type 2 diabetes mellitus without complication (Multi)    Hyperkalemia       Medications:    Current Outpatient Medications   Medication Instructions    acyclovir (ZOVIRAX) 400 mg, oral, 2 times daily    alcohol swabs pads, medicated 1 each, topical (top), 4 times daily, Use prior to checking glucose or injecting insulin    aspirin 81 mg, Daily    Basaglar KwikPen  "U-100 Insulin 10 Units, subcutaneous, Every morning, Take in AM with Prednisone    blood-glucose meter misc 1 each, miscellaneous, 4 times daily, Use to check glucose 4 times daily, before meals and at bedtime    blood-glucose meter,continuous (Dexcom G7 ) misc Use as instructed    blood-glucose sensor (Dexcom G7 Sensor) device 1 each, miscellaneous, 3 times daily before meals, Change sensor every 10 days    carvedilol (COREG) 6.25 mg, oral, 2 times daily, Hold for SBP <110 or HR <55    cholecalciferol (VITAMIN D-3) 50 mcg, oral, Daily    hydrALAZINE (APRESOLINE) 25 mg, oral, 2 times daily    insulin lispro (HUMALOG KWIKPEN INSULIN) 0-10 Units, subcutaneous, 3 times daily (morning, midday, late afternoon), Inject 5 units of Lispro subcutaneously two times a day before breakfast and lunch and 3 units with dinner- in addition to the following sliding scale: 0 unit(s) if Blood glucose is between  2 unit(s) if Blood glucose is between 151-200 4 unit(s) if Blood glucose is between 201-250 6 unit(s) if Blood glucose is between 251-300 8 unit(s) if Blood glucose is between 301-350 10 unit(s) if Blood glucose is between 351-400 If blood glucose is greater than 400 mg/dL, expect up to 50 units per day    lancets misc 1 each, miscellaneous, 4 times daily, Use to check glucose 4 times daily, before meals and at bedtime    magnesium oxide (MAG-OX) 800 mg, oral, 2 times daily    mycophenolate (CELLCEPT) 500 mg, oral, Every 12 hours scheduled (0630,1830)    pantoprazole (PROTONIX) 40 mg, oral, Daily before breakfast, Do not crush, chew, or split.    pen needle, diabetic 32 gauge x 5/32\" needle 1 each, miscellaneous, 4 times daily, Use to inject insulin 4 times daily    predniSONE (DELTASONE) 5 mg, oral, Daily    sulfamethoxazole-trimethoprim (Bactrim) 400-80 mg tablet 1 tablet, oral, Daily    tacrolimus (PROGRAF) 3 mg, oral, 2 times daily    tamsulosin (FLOMAX) 0.4 mg, oral, Daily       Physical Exam  Vitals:    " 03/13/25 1012   BP: (!) 160/91   Pulse: 78   Temp: 36.4 °C (97.6 °F)   SpO2: 96%     General: Alert and oriented to time, place and person. Not in distress  ENT: unremarkable  Cardiovascular: Regular rate, normotensive  Respiratory: no signs of labored breathing on room air  Abdomen/ GI: SLK transplant incision well healed  Extremity: BLE trace edema -  Skin: no rash    ALLERGY:  No Known Allergies    LABS:  No results found. However, due to the size of the patient record, not all encounters were searched. Please check Results Review for a complete set of results.   Lab Results   Component Value Date    ALT 38 03/13/2025    AST 44 (H) 03/13/2025     (H) 03/13/2025    ALKPHOS 190 (H) 03/13/2025    BILITOT 0.6 03/13/2025         ASSESSMENT AND PLAN:    Mr. Ibrahim is a 54 y.o. male who underwent  transplant surgery on 1/20/2025 (Liver / Kidney).    1. SLK 1/20/25  Excellent liver function    Mild AKUA - is hydrating well; will obtain DSA, CT to evaluate previous gilma-nephric fluid collections;   Cancel kidney biopsy - renal function stable/minimally improved, DSA negative.       Immunosuppression reviewed and adjusted       Tacrolimus: Yes - 3 mg bid goal 8-10, last level  5.3  Monitor. May need to increase his dose      Mycophenolate: Yes - 500 mg bid, tolerating well      Prednisone: Yes - 5 mg daily    2. Prophylaxis adherence protocol to prevent immunosuppression related infection      Acyclovir      Bactrim    3. Hypertension         Blood Pressures         3/13/2025  1012             BP: 160/91              Current antihypertensives:  increase Coreg to 12.5 BID.          4. Wound/KAITLYNN      Well healed    5. GI prophylaxis       On PPI     6.  Follow up:       Labs  every 2 weeks       RTC: 4 week(s)    I spent a total of 32 min providing care for this patient including record review, examination, face to face counseling, and documentation.     Goran Carrillo MD    Transplant Surgery Attending

## 2025-03-17 ENCOUNTER — TELEPHONE (OUTPATIENT)
Dept: TRANSPLANT | Facility: HOSPITAL | Age: 55
End: 2025-03-17
Payer: MEDICAID

## 2025-03-17 DIAGNOSIS — I10 HYPERTENSION, UNSPECIFIED TYPE: ICD-10-CM

## 2025-03-17 DIAGNOSIS — I85.10 ESOPHAGEAL VARICES IN ALCOHOLIC CIRRHOSIS (MULTI): ICD-10-CM

## 2025-03-17 DIAGNOSIS — K70.30 ESOPHAGEAL VARICES IN ALCOHOLIC CIRRHOSIS (MULTI): ICD-10-CM

## 2025-03-18 DIAGNOSIS — Z94.4 LIVER REPLACED BY TRANSPLANT (MULTI): ICD-10-CM

## 2025-03-19 ENCOUNTER — LAB (OUTPATIENT)
Dept: LAB | Facility: HOSPITAL | Age: 55
End: 2025-03-19
Payer: MEDICAID

## 2025-03-19 DIAGNOSIS — Z94.0 KIDNEY TRANSPLANT STATUS: Primary | ICD-10-CM

## 2025-03-19 DIAGNOSIS — Z94.4 LIVER REPLACED BY TRANSPLANT (MULTI): ICD-10-CM

## 2025-03-19 DIAGNOSIS — Z94.0 KIDNEY REPLACED BY TRANSPLANT (HHS-HCC): ICD-10-CM

## 2025-03-19 LAB
ALBUMIN SERPL BCP-MCNC: 4.2 G/DL (ref 3.4–5)
ALP SERPL-CCNC: 441 U/L (ref 33–120)
ALT SERPL W P-5'-P-CCNC: 270 U/L (ref 10–52)
ANION GAP SERPL CALC-SCNC: 18 MMOL/L (ref 10–20)
AST SERPL W P-5'-P-CCNC: 312 U/L (ref 9–39)
BASOPHILS # BLD AUTO: 0.04 X10*3/UL (ref 0–0.1)
BASOPHILS NFR BLD AUTO: 0.9 %
BILIRUB DIRECT SERPL-MCNC: 0.7 MG/DL (ref 0–0.3)
BILIRUB SERPL-MCNC: 1.3 MG/DL (ref 0–1.2)
BUN SERPL-MCNC: 39 MG/DL (ref 6–23)
CALCIUM SERPL-MCNC: 9.2 MG/DL (ref 8.6–10.3)
CHLORIDE SERPL-SCNC: 106 MMOL/L (ref 98–107)
CO2 SERPL-SCNC: 22 MMOL/L (ref 21–32)
CREAT SERPL-MCNC: 2.33 MG/DL (ref 0.5–1.3)
CREAT UR-MCNC: 101 MG/DL (ref 20–370)
DACRYOCYTES BLD QL SMEAR: NORMAL
EGFRCR SERPLBLD CKD-EPI 2021: 32 ML/MIN/1.73M*2
EOSINOPHIL # BLD AUTO: 0.1 X10*3/UL (ref 0–0.7)
EOSINOPHIL NFR BLD AUTO: 2.2 %
ERYTHROCYTE [DISTWIDTH] IN BLOOD BY AUTOMATED COUNT: 22.6 % (ref 11.5–14.5)
GGT SERPL-CCNC: 502 U/L (ref 5–64)
GLUCOSE SERPL-MCNC: 173 MG/DL (ref 74–99)
HCT VFR BLD AUTO: 29.1 % (ref 41–52)
HGB BLD-MCNC: 9.1 G/DL (ref 13.5–17.5)
IMM GRANULOCYTES # BLD AUTO: 0.02 X10*3/UL (ref 0–0.7)
IMM GRANULOCYTES NFR BLD AUTO: 0.4 % (ref 0–0.9)
INR PPP: 1.2 (ref 0.9–1.1)
LYMPHOCYTES # BLD AUTO: 0.82 X10*3/UL (ref 1.2–4.8)
LYMPHOCYTES NFR BLD AUTO: 18.4 %
MAGNESIUM SERPL-MCNC: 1.61 MG/DL (ref 1.6–2.4)
MCH RBC QN AUTO: 31.4 PG (ref 26–34)
MCHC RBC AUTO-ENTMCNC: 31.3 G/DL (ref 32–36)
MCV RBC AUTO: 100 FL (ref 80–100)
MONOCYTES # BLD AUTO: 0.39 X10*3/UL (ref 0.1–1)
MONOCYTES NFR BLD AUTO: 8.8 %
NEUTROPHILS # BLD AUTO: 3.08 X10*3/UL (ref 1.2–7.7)
NEUTROPHILS NFR BLD AUTO: 69.3 %
NRBC BLD-RTO: 0 /100 WBCS (ref 0–0)
OVALOCYTES BLD QL SMEAR: NORMAL
PHOSPHATE SERPL-MCNC: 3.2 MG/DL (ref 2.5–4.9)
PLATELET # BLD AUTO: 159 X10*3/UL (ref 150–450)
POLYCHROMASIA BLD QL SMEAR: NORMAL
POTASSIUM SERPL-SCNC: 4.5 MMOL/L (ref 3.5–5.3)
PROT SERPL-MCNC: 7 G/DL (ref 6.4–8.2)
PROT UR-ACNC: 70 MG/DL (ref 5–25)
PROT/CREAT UR: 0.69 MG/MG CREAT (ref 0–0.17)
PROTHROMBIN TIME: 13.7 SECONDS (ref 9.8–12.4)
RBC # BLD AUTO: 2.9 X10*6/UL (ref 4.5–5.9)
RBC MORPH BLD: NORMAL
SODIUM SERPL-SCNC: 141 MMOL/L (ref 136–145)
TACROLIMUS BLD-MCNC: 8.7 NG/ML
WBC # BLD AUTO: 4.5 X10*3/UL (ref 4.4–11.3)

## 2025-03-19 PROCEDURE — 84156 ASSAY OF PROTEIN URINE: CPT

## 2025-03-19 PROCEDURE — 82248 BILIRUBIN DIRECT: CPT

## 2025-03-19 PROCEDURE — 80197 ASSAY OF TACROLIMUS: CPT

## 2025-03-19 PROCEDURE — 83735 ASSAY OF MAGNESIUM: CPT

## 2025-03-19 PROCEDURE — 82570 ASSAY OF URINE CREATININE: CPT

## 2025-03-19 PROCEDURE — 87799 DETECT AGENT NOS DNA QUANT: CPT

## 2025-03-19 PROCEDURE — 80053 COMPREHEN METABOLIC PANEL: CPT

## 2025-03-19 PROCEDURE — 85025 COMPLETE CBC W/AUTO DIFF WBC: CPT

## 2025-03-19 PROCEDURE — 85610 PROTHROMBIN TIME: CPT

## 2025-03-19 PROCEDURE — 82977 ASSAY OF GGT: CPT

## 2025-03-19 PROCEDURE — 84100 ASSAY OF PHOSPHORUS: CPT

## 2025-03-19 PROCEDURE — 36415 COLL VENOUS BLD VENIPUNCTURE: CPT

## 2025-03-20 ENCOUNTER — APPOINTMENT (OUTPATIENT)
Dept: RADIOLOGY | Facility: HOSPITAL | Age: 55
End: 2025-03-20
Payer: MEDICAID

## 2025-03-20 ENCOUNTER — HOSPITAL ENCOUNTER (INPATIENT)
Facility: HOSPITAL | Age: 55
LOS: 3 days | Discharge: HOME | End: 2025-03-23
Attending: TRANSPLANT SURGERY | Admitting: TRANSPLANT SURGERY
Payer: MEDICAID

## 2025-03-20 DIAGNOSIS — R74.01 TRANSAMINITIS: Primary | ICD-10-CM

## 2025-03-20 DIAGNOSIS — Z94.4 LIVER TRANSPLANT RECIPIENT (MULTI): ICD-10-CM

## 2025-03-20 DIAGNOSIS — Z94.4 LIVER TRANSPLANT STATUS: ICD-10-CM

## 2025-03-20 DIAGNOSIS — I10 HYPERTENSION, UNSPECIFIED TYPE: ICD-10-CM

## 2025-03-20 DIAGNOSIS — Z94.0 KIDNEY REPLACED BY TRANSPLANT (HHS-HCC): ICD-10-CM

## 2025-03-20 DIAGNOSIS — I85.10 ESOPHAGEAL VARICES IN ALCOHOLIC CIRRHOSIS (MULTI): ICD-10-CM

## 2025-03-20 DIAGNOSIS — K70.30 ESOPHAGEAL VARICES IN ALCOHOLIC CIRRHOSIS (MULTI): ICD-10-CM

## 2025-03-20 LAB
ABO GROUP (TYPE) IN BLOOD: NORMAL
ALBUMIN SERPL BCP-MCNC: 3.7 G/DL (ref 3.4–5)
ALBUMIN SERPL BCP-MCNC: 4 G/DL (ref 3.4–5)
ALBUMIN SERPL BCP-MCNC: 4 G/DL (ref 3.4–5)
ALP SERPL-CCNC: 504 U/L (ref 33–120)
ALT SERPL W P-5'-P-CCNC: 289 U/L (ref 10–52)
ANION GAP SERPL CALC-SCNC: 13 MMOL/L (ref 10–20)
ANION GAP SERPL CALC-SCNC: 15 MMOL/L (ref 10–20)
ANTIBODY SCREEN: NORMAL
AST SERPL W P-5'-P-CCNC: 242 U/L (ref 9–39)
BASOPHILS # BLD MANUAL: 0 X10*3/UL (ref 0–0.1)
BASOPHILS NFR BLD MANUAL: 0 %
BILIRUB DIRECT SERPL-MCNC: 0.2 MG/DL (ref 0–0.3)
BILIRUB SERPL-MCNC: 1.3 MG/DL (ref 0–1.2)
BKV DNA SERPL NAA+PROBE-LOG#: NORMAL {LOG_COPIES}/ML
BLASTS # BLD MANUAL: 0 X10*3/UL
BLASTS NFR BLD MANUAL: 0 %
BUN SERPL-MCNC: 37 MG/DL (ref 6–23)
BUN SERPL-MCNC: 40 MG/DL (ref 6–23)
CALCIUM SERPL-MCNC: 8.2 MG/DL (ref 8.6–10.6)
CALCIUM SERPL-MCNC: 8.8 MG/DL (ref 8.6–10.6)
CHLORIDE SERPL-SCNC: 108 MMOL/L (ref 98–107)
CHLORIDE SERPL-SCNC: 112 MMOL/L (ref 98–107)
CO2 SERPL-SCNC: 15 MMOL/L (ref 21–32)
CO2 SERPL-SCNC: 17 MMOL/L (ref 21–32)
CREAT SERPL-MCNC: 2.28 MG/DL (ref 0.5–1.3)
CREAT SERPL-MCNC: 2.6 MG/DL (ref 0.5–1.3)
EGFRCR SERPLBLD CKD-EPI 2021: 28 ML/MIN/1.73M*2
EGFRCR SERPLBLD CKD-EPI 2021: 33 ML/MIN/1.73M*2
EOSINOPHIL # BLD MANUAL: 0.05 X10*3/UL (ref 0–0.7)
EOSINOPHIL NFR BLD MANUAL: 0.9 %
ERYTHROCYTE [DISTWIDTH] IN BLOOD BY AUTOMATED COUNT: 22 % (ref 11.5–14.5)
GLUCOSE BLD MANUAL STRIP-MCNC: 167 MG/DL (ref 74–99)
GLUCOSE BLD MANUAL STRIP-MCNC: 87 MG/DL (ref 74–99)
GLUCOSE BLD MANUAL STRIP-MCNC: 95 MG/DL (ref 74–99)
GLUCOSE SERPL-MCNC: 112 MG/DL (ref 74–99)
GLUCOSE SERPL-MCNC: 143 MG/DL (ref 74–99)
HCT VFR BLD AUTO: 25 % (ref 41–52)
HGB BLD-MCNC: 8.1 G/DL (ref 13.5–17.5)
IMM GRANULOCYTES # BLD AUTO: 0.04 X10*3/UL (ref 0–0.7)
IMM GRANULOCYTES NFR BLD AUTO: 0.7 % (ref 0–0.9)
LABORATORY COMMENT REPORT: NOT DETECTED
LYMPHOCYTES # BLD MANUAL: 0.76 X10*3/UL (ref 1.2–4.8)
LYMPHOCYTES NFR BLD MANUAL: 12.9 %
MAGNESIUM SERPL-MCNC: 1.86 MG/DL (ref 1.6–2.4)
MCH RBC QN AUTO: 32.9 PG (ref 26–34)
MCHC RBC AUTO-ENTMCNC: 32.4 G/DL (ref 32–36)
MCV RBC AUTO: 102 FL (ref 80–100)
METAMYELOCYTES # BLD MANUAL: 0 X10*3/UL
METAMYELOCYTES NFR BLD MANUAL: 0 %
MONOCYTES # BLD MANUAL: 0.1 X10*3/UL (ref 0.1–1)
MONOCYTES NFR BLD MANUAL: 1.7 %
MYELOCYTES # BLD MANUAL: 0 X10*3/UL
MYELOCYTES NFR BLD MANUAL: 0 %
NEUTROPHILS # BLD MANUAL: 4.99 X10*3/UL (ref 1.2–7.7)
NEUTS BAND # BLD MANUAL: 0 X10*3/UL (ref 0–0.7)
NEUTS BAND NFR BLD MANUAL: 0 %
NEUTS SEG # BLD MANUAL: 4.99 X10*3/UL (ref 1.2–7)
NEUTS SEG NFR BLD MANUAL: 84.5 %
NRBC BLD MANUAL-RTO: 0 % (ref 0–0)
NRBC BLD-RTO: 0 /100 WBCS (ref 0–0)
PHOSPHATE SERPL-MCNC: 2.3 MG/DL (ref 2.5–4.9)
PHOSPHATE SERPL-MCNC: 3.2 MG/DL (ref 2.5–4.9)
PLASMA CELLS # BLD MANUAL: 0 X10*3/UL
PLASMA CELLS NFR BLD MANUAL: 0 %
PLATELET # BLD AUTO: 154 X10*3/UL (ref 150–450)
POTASSIUM SERPL-SCNC: 4.8 MMOL/L (ref 3.5–5.3)
POTASSIUM SERPL-SCNC: 6.8 MMOL/L (ref 3.5–5.3)
PROMYELOCYTES # BLD MANUAL: 0 X10*3/UL
PROMYELOCYTES NFR BLD MANUAL: 0 %
PROT SERPL-MCNC: 6.9 G/DL (ref 6.4–8.2)
RBC # BLD AUTO: 2.46 X10*6/UL (ref 4.5–5.9)
RBC MORPH BLD: ABNORMAL
RH FACTOR (ANTIGEN D): NORMAL
SODIUM SERPL-SCNC: 133 MMOL/L (ref 136–145)
SODIUM SERPL-SCNC: 135 MMOL/L (ref 136–145)
TOTAL CELLS COUNTED BLD: 116
VARIANT LYMPHS # BLD MANUAL: 0 X10*3/UL (ref 0–0.5)
VARIANT LYMPHS NFR BLD: 0 %
WBC # BLD AUTO: 5.9 X10*3/UL (ref 4.4–11.3)

## 2025-03-20 PROCEDURE — 2500000001 HC RX 250 WO HCPCS SELF ADMINISTERED DRUGS (ALT 637 FOR MEDICARE OP): Mod: SE

## 2025-03-20 PROCEDURE — 85027 COMPLETE CBC AUTOMATED: CPT

## 2025-03-20 PROCEDURE — 84100 ASSAY OF PHOSPHORUS: CPT

## 2025-03-20 PROCEDURE — 86900 BLOOD TYPING SEROLOGIC ABO: CPT

## 2025-03-20 PROCEDURE — 36415 COLL VENOUS BLD VENIPUNCTURE: CPT

## 2025-03-20 PROCEDURE — 82248 BILIRUBIN DIRECT: CPT

## 2025-03-20 PROCEDURE — 85007 BL SMEAR W/DIFF WBC COUNT: CPT

## 2025-03-20 PROCEDURE — 74183 MRI ABD W/O CNTR FLWD CNTR: CPT

## 2025-03-20 PROCEDURE — 82040 ASSAY OF SERUM ALBUMIN: CPT

## 2025-03-20 PROCEDURE — 82947 ASSAY GLUCOSE BLOOD QUANT: CPT

## 2025-03-20 PROCEDURE — 99024 POSTOP FOLLOW-UP VISIT: CPT | Performed by: TRANSPLANT SURGERY

## 2025-03-20 PROCEDURE — A9575 INJ GADOTERATE MEGLUMI 0.1ML: HCPCS | Mod: SE | Performed by: TRANSPLANT SURGERY

## 2025-03-20 PROCEDURE — 83735 ASSAY OF MAGNESIUM: CPT

## 2025-03-20 PROCEDURE — 2500000004 HC RX 250 GENERAL PHARMACY W/ HCPCS (ALT 636 FOR OP/ED): Mod: SE

## 2025-03-20 PROCEDURE — 2500000002 HC RX 250 W HCPCS SELF ADMINISTERED DRUGS (ALT 637 FOR MEDICARE OP, ALT 636 FOR OP/ED): Mod: SE

## 2025-03-20 PROCEDURE — 2550000001 HC RX 255 CONTRASTS: Mod: SE | Performed by: TRANSPLANT SURGERY

## 2025-03-20 PROCEDURE — 1100000001 HC PRIVATE ROOM DAILY

## 2025-03-20 RX ORDER — SULFAMETHOXAZOLE AND TRIMETHOPRIM 400; 80 MG/1; MG/1
1 TABLET ORAL DAILY
Status: DISCONTINUED | OUTPATIENT
Start: 2025-03-20 | End: 2025-03-23 | Stop reason: HOSPADM

## 2025-03-20 RX ORDER — INSULIN LISPRO 100 [IU]/ML
0-5 INJECTION, SOLUTION INTRAVENOUS; SUBCUTANEOUS
Status: DISCONTINUED | OUTPATIENT
Start: 2025-03-20 | End: 2025-03-23 | Stop reason: HOSPADM

## 2025-03-20 RX ORDER — HYDRALAZINE HYDROCHLORIDE 25 MG/1
25 TABLET, FILM COATED ORAL 2 TIMES DAILY
Status: DISCONTINUED | OUTPATIENT
Start: 2025-03-20 | End: 2025-03-23 | Stop reason: HOSPADM

## 2025-03-20 RX ORDER — TACROLIMUS 1 MG/1
4 CAPSULE ORAL 2 TIMES DAILY
Status: DISCONTINUED | OUTPATIENT
Start: 2025-03-20 | End: 2025-03-21

## 2025-03-20 RX ORDER — GADOTERATE MEGLUMINE 376.9 MG/ML
20 INJECTION INTRAVENOUS
Status: COMPLETED | OUTPATIENT
Start: 2025-03-20 | End: 2025-03-20

## 2025-03-20 RX ORDER — CARVEDILOL 6.25 MG/1
6.25 TABLET ORAL 2 TIMES DAILY
Status: DISCONTINUED | OUTPATIENT
Start: 2025-03-20 | End: 2025-03-23 | Stop reason: HOSPADM

## 2025-03-20 RX ORDER — HEPARIN SODIUM 5000 [USP'U]/ML
5000 INJECTION, SOLUTION INTRAVENOUS; SUBCUTANEOUS EVERY 8 HOURS
Status: DISCONTINUED | OUTPATIENT
Start: 2025-03-20 | End: 2025-03-23 | Stop reason: HOSPADM

## 2025-03-20 RX ORDER — INSULIN GLARGINE 100 [IU]/ML
10 INJECTION, SOLUTION SUBCUTANEOUS DAILY
Status: DISCONTINUED | OUTPATIENT
Start: 2025-03-20 | End: 2025-03-21

## 2025-03-20 RX ORDER — PANTOPRAZOLE SODIUM 40 MG/1
40 TABLET, DELAYED RELEASE ORAL
Status: DISCONTINUED | OUTPATIENT
Start: 2025-03-21 | End: 2025-03-23 | Stop reason: HOSPADM

## 2025-03-20 RX ORDER — ASPIRIN 81 MG/1
81 TABLET ORAL DAILY
Status: DISCONTINUED | OUTPATIENT
Start: 2025-03-20 | End: 2025-03-23 | Stop reason: HOSPADM

## 2025-03-20 RX ORDER — MYCOPHENOLATE MOFETIL 250 MG/1
500 CAPSULE ORAL
Status: DISCONTINUED | OUTPATIENT
Start: 2025-03-20 | End: 2025-03-23 | Stop reason: HOSPADM

## 2025-03-20 RX ORDER — ACYCLOVIR 400 MG/1
400 TABLET ORAL 2 TIMES DAILY
Status: DISCONTINUED | OUTPATIENT
Start: 2025-03-20 | End: 2025-03-23 | Stop reason: HOSPADM

## 2025-03-20 RX ORDER — INSULIN LISPRO 100 [IU]/ML
5 INJECTION, SOLUTION INTRAVENOUS; SUBCUTANEOUS
Status: DISCONTINUED | OUTPATIENT
Start: 2025-03-21 | End: 2025-03-23 | Stop reason: HOSPADM

## 2025-03-20 RX ORDER — SODIUM CHLORIDE 9 MG/ML
100 INJECTION, SOLUTION INTRAVENOUS CONTINUOUS
Status: DISCONTINUED | OUTPATIENT
Start: 2025-03-20 | End: 2025-03-21

## 2025-03-20 RX ORDER — TAMSULOSIN HYDROCHLORIDE 0.4 MG/1
0.4 CAPSULE ORAL DAILY
Status: DISCONTINUED | OUTPATIENT
Start: 2025-03-20 | End: 2025-03-23 | Stop reason: HOSPADM

## 2025-03-20 RX ORDER — ACETAMINOPHEN 325 MG/1
650 TABLET ORAL EVERY 4 HOURS PRN
Status: DISCONTINUED | OUTPATIENT
Start: 2025-03-20 | End: 2025-03-23 | Stop reason: HOSPADM

## 2025-03-20 RX ORDER — INSULIN LISPRO 100 [IU]/ML
3 INJECTION, SOLUTION INTRAVENOUS; SUBCUTANEOUS
Status: DISCONTINUED | OUTPATIENT
Start: 2025-03-20 | End: 2025-03-23 | Stop reason: HOSPADM

## 2025-03-20 RX ORDER — CHOLECALCIFEROL (VITAMIN D3) 25 MCG
2000 TABLET ORAL DAILY
Status: DISCONTINUED | OUTPATIENT
Start: 2025-03-20 | End: 2025-03-23 | Stop reason: HOSPADM

## 2025-03-20 RX ORDER — DEXTROSE 50 % IN WATER (D50W) INTRAVENOUS SYRINGE
12.5
Status: DISCONTINUED | OUTPATIENT
Start: 2025-03-20 | End: 2025-03-23 | Stop reason: HOSPADM

## 2025-03-20 RX ORDER — POLYETHYLENE GLYCOL 3350 17 G/17G
17 POWDER, FOR SOLUTION ORAL DAILY
Status: DISCONTINUED | OUTPATIENT
Start: 2025-03-20 | End: 2025-03-23 | Stop reason: HOSPADM

## 2025-03-20 RX ORDER — LANOLIN ALCOHOL/MO/W.PET/CERES
800 CREAM (GRAM) TOPICAL 2 TIMES DAILY
Status: DISCONTINUED | OUTPATIENT
Start: 2025-03-20 | End: 2025-03-23 | Stop reason: HOSPADM

## 2025-03-20 RX ORDER — HYDROXYZINE HYDROCHLORIDE 10 MG/1
10 TABLET, FILM COATED ORAL ONCE
Status: COMPLETED | OUTPATIENT
Start: 2025-03-20 | End: 2025-03-21

## 2025-03-20 RX ORDER — PREDNISONE 10 MG/1
5 TABLET ORAL DAILY
Status: DISCONTINUED | OUTPATIENT
Start: 2025-03-20 | End: 2025-03-23 | Stop reason: HOSPADM

## 2025-03-20 RX ORDER — DEXTROSE 50 % IN WATER (D50W) INTRAVENOUS SYRINGE
25
Status: DISCONTINUED | OUTPATIENT
Start: 2025-03-20 | End: 2025-03-23 | Stop reason: HOSPADM

## 2025-03-20 RX ORDER — DIPHENHYDRAMINE HCL 25 MG
25 CAPSULE ORAL ONCE
Status: COMPLETED | OUTPATIENT
Start: 2025-03-20 | End: 2025-03-20

## 2025-03-20 RX ADMIN — HYDRALAZINE HYDROCHLORIDE 25 MG: 25 TABLET ORAL at 20:26

## 2025-03-20 RX ADMIN — CARVEDILOL 6.25 MG: 6.25 TABLET, FILM COATED ORAL at 20:26

## 2025-03-20 RX ADMIN — INSULIN LISPRO 1 UNITS: 100 INJECTION, SOLUTION INTRAVENOUS; SUBCUTANEOUS at 16:40

## 2025-03-20 RX ADMIN — GADOTERATE MEGLUMINE 20 ML: 376.9 INJECTION INTRAVENOUS at 19:18

## 2025-03-20 RX ADMIN — TACROLIMUS 4 MG: 1 CAPSULE ORAL at 17:54

## 2025-03-20 RX ADMIN — MAGNESIUM OXIDE TAB 400 MG (241.3 MG ELEMENTAL MG) 800 MG: 400 (241.3 MG) TAB at 20:26

## 2025-03-20 RX ADMIN — SODIUM CHLORIDE 100 ML/HR: 9 INJECTION, SOLUTION INTRAVENOUS at 20:18

## 2025-03-20 RX ADMIN — DIPHENHYDRAMINE HYDROCHLORIDE 25 MG: 25 CAPSULE ORAL at 21:15

## 2025-03-20 RX ADMIN — MYCOPHENOLATE MOFETIL 500 MG: 250 CAPSULE ORAL at 17:55

## 2025-03-20 RX ADMIN — ACYCLOVIR 400 MG: 400 TABLET ORAL at 20:26

## 2025-03-20 RX ADMIN — SODIUM CHLORIDE 100 ML/HR: 9 INJECTION, SOLUTION INTRAVENOUS at 18:01

## 2025-03-20 RX ADMIN — INSULIN LISPRO 3 UNITS: 100 INJECTION, SOLUTION INTRAVENOUS; SUBCUTANEOUS at 16:40

## 2025-03-20 SDOH — ECONOMIC STABILITY: HOUSING INSECURITY: IN THE LAST 12 MONTHS, WAS THERE A TIME WHEN YOU WERE NOT ABLE TO PAY THE MORTGAGE OR RENT ON TIME?: NO

## 2025-03-20 SDOH — HEALTH STABILITY: MENTAL HEALTH: HOW OFTEN DO YOU HAVE SIX OR MORE DRINKS ON ONE OCCASION?: NEVER

## 2025-03-20 SDOH — ECONOMIC STABILITY: FOOD INSECURITY: WITHIN THE PAST 12 MONTHS, YOU WORRIED THAT YOUR FOOD WOULD RUN OUT BEFORE YOU GOT THE MONEY TO BUY MORE.: NEVER TRUE

## 2025-03-20 SDOH — ECONOMIC STABILITY: HOUSING INSECURITY: AT ANY TIME IN THE PAST 12 MONTHS, WERE YOU HOMELESS OR LIVING IN A SHELTER (INCLUDING NOW)?: NO

## 2025-03-20 SDOH — SOCIAL STABILITY: SOCIAL INSECURITY: DOES ANYONE TRY TO KEEP YOU FROM HAVING/CONTACTING OTHER FRIENDS OR DOING THINGS OUTSIDE YOUR HOME?: NO

## 2025-03-20 SDOH — HEALTH STABILITY: MENTAL HEALTH: HOW OFTEN DO YOU HAVE A DRINK CONTAINING ALCOHOL?: NEVER

## 2025-03-20 SDOH — SOCIAL STABILITY: SOCIAL INSECURITY: WITHIN THE LAST YEAR, HAVE YOU BEEN AFRAID OF YOUR PARTNER OR EX-PARTNER?: NO

## 2025-03-20 SDOH — ECONOMIC STABILITY: INCOME INSECURITY: IN THE PAST 12 MONTHS HAS THE ELECTRIC, GAS, OIL, OR WATER COMPANY THREATENED TO SHUT OFF SERVICES IN YOUR HOME?: NO

## 2025-03-20 SDOH — SOCIAL STABILITY: SOCIAL INSECURITY: DO YOU FEEL ANYONE HAS EXPLOITED OR TAKEN ADVANTAGE OF YOU FINANCIALLY OR OF YOUR PERSONAL PROPERTY?: NO

## 2025-03-20 SDOH — HEALTH STABILITY: MENTAL HEALTH: HOW MANY DRINKS CONTAINING ALCOHOL DO YOU HAVE ON A TYPICAL DAY WHEN YOU ARE DRINKING?: PATIENT DOES NOT DRINK

## 2025-03-20 SDOH — SOCIAL STABILITY: SOCIAL INSECURITY: WERE YOU ABLE TO COMPLETE ALL THE BEHAVIORAL HEALTH SCREENINGS?: YES

## 2025-03-20 SDOH — SOCIAL STABILITY: SOCIAL INSECURITY: HAVE YOU HAD ANY THOUGHTS OF HARMING ANYONE ELSE?: NO

## 2025-03-20 SDOH — ECONOMIC STABILITY: HOUSING INSECURITY: IN THE PAST 12 MONTHS, HOW MANY TIMES HAVE YOU MOVED WHERE YOU WERE LIVING?: 1

## 2025-03-20 SDOH — SOCIAL STABILITY: SOCIAL INSECURITY: WITHIN THE LAST YEAR, HAVE YOU BEEN HUMILIATED OR EMOTIONALLY ABUSED IN OTHER WAYS BY YOUR PARTNER OR EX-PARTNER?: NO

## 2025-03-20 SDOH — SOCIAL STABILITY: SOCIAL INSECURITY: HAVE YOU HAD THOUGHTS OF HARMING ANYONE ELSE?: NO

## 2025-03-20 SDOH — SOCIAL STABILITY: SOCIAL INSECURITY: HAS ANYONE EVER THREATENED TO HURT YOUR FAMILY OR YOUR PETS?: NO

## 2025-03-20 SDOH — ECONOMIC STABILITY: FOOD INSECURITY: WITHIN THE PAST 12 MONTHS, THE FOOD YOU BOUGHT JUST DIDN'T LAST AND YOU DIDN'T HAVE MONEY TO GET MORE.: NEVER TRUE

## 2025-03-20 SDOH — SOCIAL STABILITY: SOCIAL INSECURITY: DO YOU FEEL UNSAFE GOING BACK TO THE PLACE WHERE YOU ARE LIVING?: NO

## 2025-03-20 SDOH — SOCIAL STABILITY: SOCIAL INSECURITY: ARE THERE ANY APPARENT SIGNS OF INJURIES/BEHAVIORS THAT COULD BE RELATED TO ABUSE/NEGLECT?: NO

## 2025-03-20 SDOH — SOCIAL STABILITY: SOCIAL INSECURITY: ARE YOU OR HAVE YOU BEEN THREATENED OR ABUSED PHYSICALLY, EMOTIONALLY, OR SEXUALLY BY ANYONE?: NO

## 2025-03-20 SDOH — SOCIAL STABILITY: SOCIAL INSECURITY: ABUSE: ADULT

## 2025-03-20 ASSESSMENT — COGNITIVE AND FUNCTIONAL STATUS - GENERAL
MOBILITY SCORE: 24
DAILY ACTIVITIY SCORE: 24
MOBILITY SCORE: 24
PATIENT BASELINE BEDBOUND: NO
DAILY ACTIVITIY SCORE: 24

## 2025-03-20 ASSESSMENT — ACTIVITIES OF DAILY LIVING (ADL)
HEARING - LEFT EAR: FUNCTIONAL
HEARING - LEFT EAR: FUNCTIONAL
LACK_OF_TRANSPORTATION: NO
HEARING - RIGHT EAR: FUNCTIONAL
BATHING: INDEPENDENT
JUDGMENT_ADEQUATE_SAFELY_COMPLETE_DAILY_ACTIVITIES: YES
BATHING: INDEPENDENT
PATIENT'S MEMORY ADEQUATE TO SAFELY COMPLETE DAILY ACTIVITIES?: YES
GROOMING: INDEPENDENT
TOILETING: INDEPENDENT
DRESSING YOURSELF: INDEPENDENT
WALKS IN HOME: INDEPENDENT
FEEDING YOURSELF: INDEPENDENT
DRESSING YOURSELF: INDEPENDENT
ASSISTIVE_DEVICE: WALKER
PATIENT'S MEMORY ADEQUATE TO SAFELY COMPLETE DAILY ACTIVITIES?: YES
GROOMING: INDEPENDENT
HEARING - RIGHT EAR: FUNCTIONAL
ADEQUATE_TO_COMPLETE_ADL: YES
TOILETING: INDEPENDENT
WALKS IN HOME: INDEPENDENT
FEEDING YOURSELF: INDEPENDENT

## 2025-03-20 ASSESSMENT — LIFESTYLE VARIABLES
HOW OFTEN DO YOU HAVE A DRINK CONTAINING ALCOHOL: NEVER
SKIP TO QUESTIONS 9-10: 1
AUDIT-C TOTAL SCORE: 0
HOW OFTEN DO YOU HAVE 6 OR MORE DRINKS ON ONE OCCASION: NEVER
HOW MANY STANDARD DRINKS CONTAINING ALCOHOL DO YOU HAVE ON A TYPICAL DAY: PATIENT DOES NOT DRINK
PRESCIPTION_ABUSE_PAST_12_MONTHS: NO
SKIP TO QUESTIONS 9-10: 1
AUDIT-C TOTAL SCORE: 0
AUDIT-C TOTAL SCORE: 0
SUBSTANCE_ABUSE_PAST_12_MONTHS: NO
SKIP TO QUESTIONS 9-10: 1
AUDIT-C TOTAL SCORE: 0

## 2025-03-20 ASSESSMENT — PATIENT HEALTH QUESTIONNAIRE - PHQ9
2. FEELING DOWN, DEPRESSED OR HOPELESS: NOT AT ALL
SUM OF ALL RESPONSES TO PHQ9 QUESTIONS 1 & 2: 0
1. LITTLE INTEREST OR PLEASURE IN DOING THINGS: NOT AT ALL

## 2025-03-20 ASSESSMENT — PAIN - FUNCTIONAL ASSESSMENT: PAIN_FUNCTIONAL_ASSESSMENT: 0-10

## 2025-03-20 ASSESSMENT — PAIN SCALES - GENERAL
PAINLEVEL_OUTOF10: 0 - NO PAIN
PAINLEVEL_OUTOF10: 0 - NO PAIN

## 2025-03-20 ASSESSMENT — PAIN SCALES - WONG BAKER: WONGBAKER_NUMERICALRESPONSE: NO HURT

## 2025-03-20 NOTE — H&P
Transplant Surgery History and Physical    Subjective   Chief Complaint/Reason for Admission: Significant increase in LFTs     HPI:  Goran Ibrahim is a 54 y.o. 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 Wright/Dr. Trey Kline, also has HTN and T2DM  who presents to [facility] for concerns of significant increase in his LFTs.    Past admissions:   1/20-1/28/25: a simultaneous liver and kidney transplan   2/10-2/14: admitted for hyperkalemia and underwent Uretal stent removal    Currently admitted for elevated LFTs seen on outpatient labs on 3/19/25. Patient denies fever, chills, nausea, vomiting and is having regular bowel movements. He drinks about 3x 20 oz's bottles of water everyday and voids about 5-6 times a day.   No recent sickness of travels.     Does endorse that he bruises easily and  has noticed that he has experienced itching starting recently for a couple of days.     A 12-point ROS was performed and was unremarkable except as above.    PMH:  Past Medical History:   Diagnosis Date    Cirrhosis (Multi)     CKD (chronic kidney disease)     Hypertension     Type 2 diabetes mellitus      PSH:  Past Surgical History:   Procedure Laterality Date    IR ANGIOGRAM CELIAC  5/19/2023    IR ANGIOGRAM CELIAC 5/19/2023    US GUIDED ABDOMINAL PARACENTESIS  6/22/2023    US GUIDED ABDOMINAL PARACENTESIS 6/22/2023    US GUIDED ABDOMINAL PARACENTESIS  5/19/2023    US GUIDED ABDOMINAL PARACENTESIS 5/19/2023     Soc Hx:  Social History     Socioeconomic History    Marital status: Single     Spouse name: Not on file    Number of children: Not on file    Years of education: Not on file    Highest education level: Not on file   Occupational History    Not on file   Tobacco Use    Smoking status: Former     Types: Cigarettes    Smokeless tobacco: Never   Vaping Use    Vaping status: Never Used   Substance and Sexual Activity    Alcohol use: Not Currently     Comment: 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    Drug use: Never    Sexual activity: Yes     Partners: Female   Other Topics Concern    Not on file   Social History Narrative    Not on file     Social Drivers of Health     Financial Resource Strain: Medium Risk (2/11/2025)    Overall Financial Resource Strain (CARDIA)     Difficulty of Paying Living Expenses: Somewhat hard   Food Insecurity: No Food Insecurity (2/10/2025)    Hunger Vital Sign     Worried About Running Out of Food in the Last Year: Never true     Ran Out of Food in the Last Year: Never true   Transportation Needs: No Transportation Needs (2/11/2025)    PRAPARE - Transportation     Lack of Transportation (Medical): No     Lack of Transportation (Non-Medical): No   Physical Activity: Not on file   Stress: No Stress Concern Present (2/21/2025)    [employer] - Occupational Stress Questionnaire     Feeling of Stress : Not at all   Social Connections: Not on file   Intimate Partner Violence: Not At Risk (2/10/2025)    Humiliation, Afraid, Rape, and Kick questionnaire     Fear of Current or Ex-Partner: No     Emotionally Abused: No     Physically Abused: No     Sexually Abused: No   Housing Stability: Low Risk  (2/11/2025)    Housing Stability Vital Sign     Unable to Pay for Housing in the Last Year: No     Number of Times Moved in the Last Year: 1     Homeless in the Last Year: No     Fam Hx:  No family history on file.   Allergies:  No Known Allergies  Current Medications:  No current facility-administered medications on file prior to encounter.     Current Outpatient Medications on File Prior to Encounter   Medication Sig Dispense Refill    acyclovir (Zovirax) 400 mg tablet Take 1 tablet (400 mg) by mouth 2 times a day. 60 tablet 1    alcohol swabs pads, medicated Apply 1 each topically 4 times a day. Use prior to checking glucose or injecting insulin 400 each 3    aspirin 81 mg EC tablet Take 1 tablet (81 mg) by mouth once daily.       blood-glucose meter misc 1 each 4 times a day. Use to check glucose 4 times daily, before meals and at bedtime 1 each 0    blood-glucose meter,continuous (Dexcom G7 ) misc Use as instructed 1 each 0    [] blood-glucose sensor (Dexcom G7 Sensor) device 1 each 3 times a day before meals. Change sensor every 10 days 3 each 11    carvedilol (Coreg) 6.25 mg tablet Take 1 tablet (6.25 mg) by mouth 2 times a day. Hold for SBP <110 or HR <55 180 tablet 3    cholecalciferol (Vitamin D-3) 50 MCG (2000 UT) tablet Take 1 tablet (50 mcg) by mouth once daily. 30 tablet 11    hydrALAZINE (Apresoline) 25 mg tablet Take 1 tablet (25 mg) by mouth 2 times a day. 180 tablet 3    insulin glargine (Basaglar KwikPen U-100 Insulin) 100 unit/mL (3 mL) pen Inject 10 Units under the skin once daily in the morning. Take in AM with Prednisone 3 mL 3    insulin lispro (HumaLOG KwikPen Insulin) 100 unit/mL pen Inject 0-10 Units under the skin 3 times daily (morning, midday, late afternoon). Inject 5 units of Lispro subcutaneously two times a day before breakfast and lunch and 3 units with dinner- in addition to the following sliding scale: 0 unit(s) if Blood glucose is between  2 unit(s) if Blood glucose is between 151-200 4 unit(s) if Blood glucose is between 201-250 6 unit(s) if Blood glucose is between 251-300 8 unit(s) if Blood glucose is between 301-350 10 unit(s) if Blood glucose is between 351-400 If blood glucose is greater than 400 mg/dL, expect up to 50 units per day 9 mL 3    lancets misc 1 each 4 times a day. Use to check glucose 4 times daily, before meals and at bedtime 400 each 3    magnesium oxide (Mag-Ox) 400 mg tablet Take 2 tablets (800 mg) by mouth 2 times a day. 240 tablet 11    mycophenolate (Cellcept) 250 mg capsule Take 2 capsules (500 mg) by mouth every 12 hours. 360 capsule 3    pantoprazole (ProtoNix) 40 mg EC tablet Take 1 tablet (40 mg) by mouth once daily in the morning. Take before meals. Do  "not crush, chew, or split. 90 tablet 3    pen needle, diabetic 32 gauge x 5/32\" needle 1 each 4 times a day. Use to inject insulin 4 times daily 400 each 3    predniSONE (Deltasone) 5 mg tablet Take 1 tablet (5 mg) by mouth once daily. 60 tablet 11    sulfamethoxazole-trimethoprim (Bactrim) 400-80 mg tablet Take 1 tablet by mouth once daily. 30 tablet 5    tacrolimus (Prograf) 1 mg capsule Take 4 capsules (4 mg) by mouth 2 times a day. 240 capsule 11    tamsulosin (Flomax) 0.4 mg 24 hr capsule Take 1 capsule (0.4 mg) by mouth once daily. 90 capsule 3    [DISCONTINUED] tacrolimus (Prograf) 1 mg capsule Take 3 capsules (3 mg) by mouth 2 times a day. 180 capsule 11         Objective   Vitals:  There were no vitals taken for this visit.    Physical Exam:  GEN: No acute distress. Alert, awake and conversant.  HEENT: Sclera anicteric. Moist mucous membranes.  RESP: Breathing non-labored, equal chest rise. On RA.  CV: Regular rate, normotensive  GI: Abdomen soft, nondistended, nontender. Prior surgical scars healing well.   : Voiding spontaneously.  MSK: No gross deformities. Moves all extremities spontaneously.  NEURO: Alert and oriented x3. No focal deficits.  PSYCH: Appropriate mood and affect.  SKIN: No rashes or lesions.    Labs within past 24h:  No results found. However, due to the size of the patient record, not all encounters were searched. Please check Results Review for a complete set of results.    Imaging within past 24h:  No results found.    No pertinent imaging to review.     ASSESSMENT  Goran Ibrahim is a 54 y.o. 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 Wright/Dr. Trey Kline, also has HTN and T2DM who presents to [facility] for concerns of significant increase in his LFTs. Patient is in stable condition.       PLAN:  - Admit to transplant  - NPO  - IVF  - Stat labs   - MRCP stat tonight   - GI consult for possible ERCP tomorrow   - IR consult for " liver biopsy   - consult endocrinology for Blood glucose management     Discussed with Dr. Hudson.    Maribel Rodriguez MD  PGY-1 General Surgery  Transplant Surgery [id number]

## 2025-03-21 ENCOUNTER — ANESTHESIA (OUTPATIENT)
Dept: GASTROENTEROLOGY | Facility: HOSPITAL | Age: 55
End: 2025-03-21
Payer: MEDICAID

## 2025-03-21 ENCOUNTER — APPOINTMENT (OUTPATIENT)
Dept: GASTROENTEROLOGY | Facility: HOSPITAL | Age: 55
End: 2025-03-21
Payer: MEDICAID

## 2025-03-21 ENCOUNTER — TELEPHONE (OUTPATIENT)
Dept: GASTROENTEROLOGY | Facility: HOSPITAL | Age: 55
End: 2025-03-21

## 2025-03-21 ENCOUNTER — ANESTHESIA EVENT (OUTPATIENT)
Dept: GASTROENTEROLOGY | Facility: HOSPITAL | Age: 55
End: 2025-03-21
Payer: MEDICAID

## 2025-03-21 DIAGNOSIS — Z94.0 KIDNEY REPLACED BY TRANSPLANT (HHS-HCC): ICD-10-CM

## 2025-03-21 PROBLEM — J81.1 PULMONARY EDEMA: Status: ACTIVE | Noted: 2025-03-21

## 2025-03-21 PROBLEM — E13.9 POST-TRANSPLANT DIABETES MELLITUS (MULTI): Chronic | Status: ACTIVE | Noted: 2025-03-21

## 2025-03-21 LAB
ALBUMIN SERPL BCP-MCNC: 3.7 G/DL (ref 3.4–5)
ALP SERPL-CCNC: 468 U/L (ref 33–120)
ALT SERPL W P-5'-P-CCNC: 275 U/L (ref 10–52)
ANION GAP SERPL CALC-SCNC: 15 MMOL/L (ref 10–20)
APTT PPP: 32 SECONDS (ref 26–36)
AST SERPL W P-5'-P-CCNC: 187 U/L (ref 9–39)
BASOPHILS # BLD AUTO: 0.04 X10*3/UL (ref 0–0.1)
BASOPHILS NFR BLD AUTO: 1 %
BILIRUB DIRECT SERPL-MCNC: 0.9 MG/DL (ref 0–0.3)
BILIRUB SERPL-MCNC: 1.4 MG/DL (ref 0–1.2)
BUN SERPL-MCNC: 41 MG/DL (ref 6–23)
BURR CELLS BLD QL SMEAR: NORMAL
CALCIUM SERPL-MCNC: 9 MG/DL (ref 8.6–10.6)
CHLORIDE SERPL-SCNC: 110 MMOL/L (ref 98–107)
CO2 SERPL-SCNC: 17 MMOL/L (ref 21–32)
CREAT SERPL-MCNC: 2.49 MG/DL (ref 0.5–1.3)
EGFRCR SERPLBLD CKD-EPI 2021: 30 ML/MIN/1.73M*2
EOSINOPHIL # BLD AUTO: 0.11 X10*3/UL (ref 0–0.7)
EOSINOPHIL NFR BLD AUTO: 2.9 %
ERYTHROCYTE [DISTWIDTH] IN BLOOD BY AUTOMATED COUNT: 21.6 % (ref 11.5–14.5)
GLUCOSE BLD MANUAL STRIP-MCNC: 117 MG/DL (ref 74–99)
GLUCOSE BLD MANUAL STRIP-MCNC: 319 MG/DL (ref 74–99)
GLUCOSE BLD MANUAL STRIP-MCNC: 323 MG/DL (ref 74–99)
GLUCOSE BLD MANUAL STRIP-MCNC: 83 MG/DL (ref 74–99)
GLUCOSE BLD MANUAL STRIP-MCNC: 93 MG/DL (ref 74–99)
GLUCOSE SERPL-MCNC: 80 MG/DL (ref 74–99)
HCT VFR BLD AUTO: 25 % (ref 41–52)
HGB BLD-MCNC: 7.9 G/DL (ref 13.5–17.5)
IMM GRANULOCYTES # BLD AUTO: 0.02 X10*3/UL (ref 0–0.7)
IMM GRANULOCYTES NFR BLD AUTO: 0.5 % (ref 0–0.9)
INR PPP: 1.2 (ref 0.9–1.1)
LYMPHOCYTES # BLD AUTO: 0.91 X10*3/UL (ref 1.2–4.8)
LYMPHOCYTES NFR BLD AUTO: 23.7 %
MAGNESIUM SERPL-MCNC: 1.73 MG/DL (ref 1.6–2.4)
MCH RBC QN AUTO: 32.5 PG (ref 26–34)
MCHC RBC AUTO-ENTMCNC: 31.6 G/DL (ref 32–36)
MCV RBC AUTO: 103 FL (ref 80–100)
MONOCYTES # BLD AUTO: 0.3 X10*3/UL (ref 0.1–1)
MONOCYTES NFR BLD AUTO: 7.8 %
NEUTROPHILS # BLD AUTO: 2.46 X10*3/UL (ref 1.2–7.7)
NEUTROPHILS NFR BLD AUTO: 64.1 %
NRBC BLD-RTO: 0 /100 WBCS (ref 0–0)
OVALOCYTES BLD QL SMEAR: NORMAL
PHOSPHATE SERPL-MCNC: 3.3 MG/DL (ref 2.5–4.9)
PLATELET # BLD AUTO: 101 X10*3/UL (ref 150–450)
POLYCHROMASIA BLD QL SMEAR: NORMAL
POTASSIUM SERPL-SCNC: 4.5 MMOL/L (ref 3.5–5.3)
PROT SERPL-MCNC: 6.2 G/DL (ref 6.4–8.2)
PROTHROMBIN TIME: 13.1 SECONDS (ref 9.8–12.4)
RBC # BLD AUTO: 2.43 X10*6/UL (ref 4.5–5.9)
RBC MORPH BLD: NORMAL
SODIUM SERPL-SCNC: 137 MMOL/L (ref 136–145)
TACROLIMUS BLD-MCNC: 12.9 NG/ML
WBC # BLD AUTO: 3.8 X10*3/UL (ref 4.4–11.3)

## 2025-03-21 PROCEDURE — 82947 ASSAY GLUCOSE BLOOD QUANT: CPT

## 2025-03-21 PROCEDURE — 7100000010 HC PHASE TWO TIME - EACH INCREMENTAL 1 MINUTE

## 2025-03-21 PROCEDURE — 83735 ASSAY OF MAGNESIUM: CPT

## 2025-03-21 PROCEDURE — BF101ZZ FLUOROSCOPY OF BILE DUCTS USING LOW OSMOLAR CONTRAST: ICD-10-PCS | Performed by: INTERNAL MEDICINE

## 2025-03-21 PROCEDURE — 85025 COMPLETE CBC W/AUTO DIFF WBC: CPT

## 2025-03-21 PROCEDURE — 80197 ASSAY OF TACROLIMUS: CPT

## 2025-03-21 PROCEDURE — 0F778DZ DILATION OF COMMON HEPATIC DUCT WITH INTRALUMINAL DEVICE, VIA NATURAL OR ARTIFICIAL OPENING ENDOSCOPIC: ICD-10-PCS | Performed by: INTERNAL MEDICINE

## 2025-03-21 PROCEDURE — 2500000002 HC RX 250 W HCPCS SELF ADMINISTERED DRUGS (ALT 637 FOR MEDICARE OP, ALT 636 FOR OP/ED): Mod: SE

## 2025-03-21 PROCEDURE — C1887 CATHETER, GUIDING: HCPCS

## 2025-03-21 PROCEDURE — 2500000004 HC RX 250 GENERAL PHARMACY W/ HCPCS (ALT 636 FOR OP/ED): Mod: SE

## 2025-03-21 PROCEDURE — 85730 THROMBOPLASTIN TIME PARTIAL: CPT

## 2025-03-21 PROCEDURE — 2500000001 HC RX 250 WO HCPCS SELF ADMINISTERED DRUGS (ALT 637 FOR MEDICARE OP): Mod: SE

## 2025-03-21 PROCEDURE — 2500000004 HC RX 250 GENERAL PHARMACY W/ HCPCS (ALT 636 FOR OP/ED): Mod: SE | Performed by: STUDENT IN AN ORGANIZED HEALTH CARE EDUCATION/TRAINING PROGRAM

## 2025-03-21 PROCEDURE — 99255 IP/OBS CONSLTJ NEW/EST HI 80: CPT | Performed by: PHYSICIAN ASSISTANT

## 2025-03-21 PROCEDURE — 3700000001 HC GENERAL ANESTHESIA TIME - INITIAL BASE CHARGE

## 2025-03-21 PROCEDURE — 80053 COMPREHEN METABOLIC PANEL: CPT

## 2025-03-21 PROCEDURE — 84100 ASSAY OF PHOSPHORUS: CPT

## 2025-03-21 PROCEDURE — 2720000007 HC OR 272 NO HCPCS

## 2025-03-21 PROCEDURE — 2500000005 HC RX 250 GENERAL PHARMACY W/O HCPCS: Mod: SE

## 2025-03-21 PROCEDURE — 3700000002 HC GENERAL ANESTHESIA TIME - EACH INCREMENTAL 1 MINUTE

## 2025-03-21 PROCEDURE — 43262 ENDO CHOLANGIOPANCREATOGRAPH: CPT | Performed by: INTERNAL MEDICINE

## 2025-03-21 PROCEDURE — 7100000009 HC PHASE TWO TIME - INITIAL BASE CHARGE

## 2025-03-21 PROCEDURE — 1100000001 HC PRIVATE ROOM DAILY

## 2025-03-21 PROCEDURE — 43274 ERCP DUCT STENT PLACEMENT: CPT | Performed by: INTERNAL MEDICINE

## 2025-03-21 PROCEDURE — C2617 STENT, NON-COR, TEM W/O DEL: HCPCS

## 2025-03-21 PROCEDURE — 99254 IP/OBS CNSLTJ NEW/EST MOD 60: CPT | Performed by: STUDENT IN AN ORGANIZED HEALTH CARE EDUCATION/TRAINING PROGRAM

## 2025-03-21 PROCEDURE — 99232 SBSQ HOSP IP/OBS MODERATE 35: CPT | Performed by: STUDENT IN AN ORGANIZED HEALTH CARE EDUCATION/TRAINING PROGRAM

## 2025-03-21 PROCEDURE — 2780000003 HC OR 278 NO HCPCS

## 2025-03-21 PROCEDURE — 36415 COLL VENOUS BLD VENIPUNCTURE: CPT

## 2025-03-21 PROCEDURE — 2500000004 HC RX 250 GENERAL PHARMACY W/ HCPCS (ALT 636 FOR OP/ED): Mod: SE | Performed by: PHYSICIAN ASSISTANT

## 2025-03-21 RX ORDER — INSULIN GLARGINE 100 [IU]/ML
7 INJECTION, SOLUTION SUBCUTANEOUS DAILY
Status: DISCONTINUED | OUTPATIENT
Start: 2025-03-22 | End: 2025-03-21

## 2025-03-21 RX ORDER — SODIUM BICARBONATE 650 MG/1
1300 TABLET ORAL EVERY 12 HOURS
Status: DISCONTINUED | OUTPATIENT
Start: 2025-03-21 | End: 2025-03-23 | Stop reason: HOSPADM

## 2025-03-21 RX ORDER — LABETALOL HYDROCHLORIDE 5 MG/ML
5 INJECTION, SOLUTION INTRAVENOUS ONCE AS NEEDED
OUTPATIENT
Start: 2025-03-21

## 2025-03-21 RX ORDER — SODIUM CHLORIDE, SODIUM LACTATE, POTASSIUM CHLORIDE, CALCIUM CHLORIDE 600; 310; 30; 20 MG/100ML; MG/100ML; MG/100ML; MG/100ML
100 INJECTION, SOLUTION INTRAVENOUS CONTINUOUS
Status: ACTIVE | OUTPATIENT
Start: 2025-03-21 | End: 2025-03-22

## 2025-03-21 RX ORDER — MAGNESIUM SULFATE HEPTAHYDRATE 40 MG/ML
2 INJECTION, SOLUTION INTRAVENOUS ONCE
Status: COMPLETED | OUTPATIENT
Start: 2025-03-21 | End: 2025-03-21

## 2025-03-21 RX ORDER — ROCURONIUM BROMIDE 10 MG/ML
INJECTION, SOLUTION INTRAVENOUS AS NEEDED
Status: DISCONTINUED | OUTPATIENT
Start: 2025-03-21 | End: 2025-03-21

## 2025-03-21 RX ORDER — FENTANYL CITRATE 50 UG/ML
INJECTION, SOLUTION INTRAMUSCULAR; INTRAVENOUS AS NEEDED
Status: DISCONTINUED | OUTPATIENT
Start: 2025-03-21 | End: 2025-03-21

## 2025-03-21 RX ORDER — ACETAMINOPHEN 325 MG/1
650 TABLET ORAL EVERY 4 HOURS PRN
OUTPATIENT
Start: 2025-03-21

## 2025-03-21 RX ORDER — HYDROMORPHONE HYDROCHLORIDE 0.2 MG/ML
0.2 INJECTION INTRAMUSCULAR; INTRAVENOUS; SUBCUTANEOUS EVERY 5 MIN PRN
OUTPATIENT
Start: 2025-03-21

## 2025-03-21 RX ORDER — TACROLIMUS 1 MG/1
3 CAPSULE ORAL 2 TIMES DAILY
Status: DISCONTINUED | OUTPATIENT
Start: 2025-03-22 | End: 2025-03-23 | Stop reason: HOSPADM

## 2025-03-21 RX ORDER — CARVEDILOL 6.25 MG/1
12.5 TABLET ORAL 2 TIMES DAILY
Qty: 360 TABLET | Refills: 3 | Status: SHIPPED | OUTPATIENT
Start: 2025-03-21 | End: 2025-03-23 | Stop reason: HOSPADM

## 2025-03-21 RX ORDER — LIDOCAINE HYDROCHLORIDE 10 MG/ML
0.1 INJECTION, SOLUTION INFILTRATION; PERINEURAL ONCE
OUTPATIENT
Start: 2025-03-21 | End: 2025-03-21

## 2025-03-21 RX ORDER — MIDAZOLAM HYDROCHLORIDE 1 MG/ML
1 INJECTION INTRAMUSCULAR; INTRAVENOUS ONCE AS NEEDED
OUTPATIENT
Start: 2025-03-21

## 2025-03-21 RX ORDER — SODIUM BICARBONATE 650 MG/1
1300 TABLET ORAL 3 TIMES DAILY
Status: DISCONTINUED | OUTPATIENT
Start: 2025-03-21 | End: 2025-03-21

## 2025-03-21 RX ORDER — CEFAZOLIN 1 G/1
INJECTION, POWDER, FOR SOLUTION INTRAVENOUS AS NEEDED
Status: DISCONTINUED | OUTPATIENT
Start: 2025-03-21 | End: 2025-03-21

## 2025-03-21 RX ORDER — OXYCODONE HYDROCHLORIDE 5 MG/1
10 TABLET ORAL EVERY 4 HOURS PRN
OUTPATIENT
Start: 2025-03-21

## 2025-03-21 RX ORDER — PROPOFOL 10 MG/ML
INJECTION, EMULSION INTRAVENOUS AS NEEDED
Status: DISCONTINUED | OUTPATIENT
Start: 2025-03-21 | End: 2025-03-21

## 2025-03-21 RX ORDER — ONDANSETRON HYDROCHLORIDE 2 MG/ML
INJECTION, SOLUTION INTRAVENOUS AS NEEDED
Status: DISCONTINUED | OUTPATIENT
Start: 2025-03-21 | End: 2025-03-21

## 2025-03-21 RX ORDER — MIDAZOLAM HYDROCHLORIDE 1 MG/ML
INJECTION INTRAMUSCULAR; INTRAVENOUS AS NEEDED
Status: DISCONTINUED | OUTPATIENT
Start: 2025-03-21 | End: 2025-03-21

## 2025-03-21 RX ORDER — LIDOCAINE HYDROCHLORIDE 40 MG/ML
SOLUTION TOPICAL AS NEEDED
Status: DISCONTINUED | OUTPATIENT
Start: 2025-03-21 | End: 2025-03-21

## 2025-03-21 RX ORDER — TACROLIMUS 1 MG/1
3 CAPSULE ORAL 2 TIMES DAILY
Status: DISCONTINUED | OUTPATIENT
Start: 2025-03-21 | End: 2025-03-21

## 2025-03-21 RX ORDER — LIDOCAINE HCL/PF 100 MG/5ML
SYRINGE (ML) INTRAVENOUS AS NEEDED
Status: DISCONTINUED | OUTPATIENT
Start: 2025-03-21 | End: 2025-03-21

## 2025-03-21 RX ORDER — NORETHINDRONE AND ETHINYL ESTRADIOL 0.5-0.035
KIT ORAL AS NEEDED
Status: DISCONTINUED | OUTPATIENT
Start: 2025-03-21 | End: 2025-03-21

## 2025-03-21 RX ORDER — METOCLOPRAMIDE HYDROCHLORIDE 5 MG/ML
10 INJECTION INTRAMUSCULAR; INTRAVENOUS ONCE AS NEEDED
OUTPATIENT
Start: 2025-03-21

## 2025-03-21 RX ORDER — SODIUM CHLORIDE, SODIUM LACTATE, POTASSIUM CHLORIDE, CALCIUM CHLORIDE 600; 310; 30; 20 MG/100ML; MG/100ML; MG/100ML; MG/100ML
100 INJECTION, SOLUTION INTRAVENOUS CONTINUOUS
OUTPATIENT
Start: 2025-03-21 | End: 2025-03-22

## 2025-03-21 RX ORDER — SODIUM CHLORIDE, SODIUM LACTATE, POTASSIUM CHLORIDE, CALCIUM CHLORIDE 600; 310; 30; 20 MG/100ML; MG/100ML; MG/100ML; MG/100ML
INJECTION, SOLUTION INTRAVENOUS CONTINUOUS PRN
Status: DISCONTINUED | OUTPATIENT
Start: 2025-03-21 | End: 2025-03-21

## 2025-03-21 RX ORDER — PHENYLEPHRINE HCL IN 0.9% NACL 0.4MG/10ML
SYRINGE (ML) INTRAVENOUS AS NEEDED
Status: DISCONTINUED | OUTPATIENT
Start: 2025-03-21 | End: 2025-03-21

## 2025-03-21 RX ORDER — INSULIN LISPRO 100 [IU]/ML
4 INJECTION, SOLUTION INTRAVENOUS; SUBCUTANEOUS ONCE
Status: COMPLETED | OUTPATIENT
Start: 2025-03-21 | End: 2025-03-21

## 2025-03-21 RX ORDER — DROPERIDOL 2.5 MG/ML
0.62 INJECTION, SOLUTION INTRAMUSCULAR; INTRAVENOUS ONCE AS NEEDED
OUTPATIENT
Start: 2025-03-21

## 2025-03-21 RX ORDER — INSULIN GLARGINE 100 [IU]/ML
10 INJECTION, SOLUTION SUBCUTANEOUS DAILY
Status: DISCONTINUED | OUTPATIENT
Start: 2025-03-22 | End: 2025-03-23 | Stop reason: HOSPADM

## 2025-03-21 RX ADMIN — CHOLECALCIFEROL TAB 25 MCG (1000 UNIT) 2000 UNITS: 25 TAB at 09:13

## 2025-03-21 RX ADMIN — FENTANYL CITRATE 75 MCG: 50 INJECTION, SOLUTION INTRAMUSCULAR; INTRAVENOUS at 10:38

## 2025-03-21 RX ADMIN — MYCOPHENOLATE MOFETIL 500 MG: 250 CAPSULE ORAL at 06:30

## 2025-03-21 RX ADMIN — INSULIN GLARGINE 10 UNITS: 100 INJECTION, SOLUTION SUBCUTANEOUS at 09:14

## 2025-03-21 RX ADMIN — MAGNESIUM OXIDE TAB 400 MG (241.3 MG ELEMENTAL MG) 800 MG: 400 (241.3 MG) TAB at 20:26

## 2025-03-21 RX ADMIN — SODIUM CHLORIDE, POTASSIUM CHLORIDE, SODIUM LACTATE AND CALCIUM CHLORIDE 50 ML/HR: 600; 310; 30; 20 INJECTION, SOLUTION INTRAVENOUS at 12:29

## 2025-03-21 RX ADMIN — TACROLIMUS 4 MG: 1 CAPSULE ORAL at 06:30

## 2025-03-21 RX ADMIN — HYDRALAZINE HYDROCHLORIDE 25 MG: 25 TABLET ORAL at 20:26

## 2025-03-21 RX ADMIN — EPHEDRINE SULFATE 5 MG: 50 INJECTION, SOLUTION INTRAVENOUS at 11:17

## 2025-03-21 RX ADMIN — EPHEDRINE SULFATE 5 MG: 50 INJECTION, SOLUTION INTRAVENOUS at 11:09

## 2025-03-21 RX ADMIN — INSULIN LISPRO 3 UNITS: 100 INJECTION, SOLUTION INTRAVENOUS; SUBCUTANEOUS at 17:18

## 2025-03-21 RX ADMIN — ROCURONIUM BROMIDE 50 MG: 10 INJECTION INTRAVENOUS at 10:39

## 2025-03-21 RX ADMIN — HYDRALAZINE HYDROCHLORIDE 25 MG: 25 TABLET ORAL at 09:14

## 2025-03-21 RX ADMIN — MYCOPHENOLATE MOFETIL 500 MG: 250 CAPSULE ORAL at 18:03

## 2025-03-21 RX ADMIN — Medication 120 MCG: at 11:05

## 2025-03-21 RX ADMIN — TAMSULOSIN HYDROCHLORIDE 0.4 MG: 0.4 CAPSULE ORAL at 09:14

## 2025-03-21 RX ADMIN — CARVEDILOL 6.25 MG: 6.25 TABLET, FILM COATED ORAL at 20:26

## 2025-03-21 RX ADMIN — Medication 80 MCG: at 10:57

## 2025-03-21 RX ADMIN — SODIUM BICARBONATE 50 ML/HR: 84 INJECTION, SOLUTION INTRAVENOUS at 12:26

## 2025-03-21 RX ADMIN — LIDOCAINE HYDROCHLORIDE 4 ML: 40 SOLUTION TOPICAL at 10:41

## 2025-03-21 RX ADMIN — CEFAZOLIN 2 G: 1 INJECTION, POWDER, FOR SOLUTION INTRAMUSCULAR; INTRAVENOUS at 10:49

## 2025-03-21 RX ADMIN — MAGNESIUM SULFATE HEPTAHYDRATE 2 G: 40 INJECTION, SOLUTION INTRAVENOUS at 12:25

## 2025-03-21 RX ADMIN — SODIUM BICARBONATE 1300 MG: 650 TABLET ORAL at 13:20

## 2025-03-21 RX ADMIN — PREDNISONE 5 MG: 10 TABLET ORAL at 09:14

## 2025-03-21 RX ADMIN — SODIUM BICARBONATE 1300 MG: 650 TABLET ORAL at 23:57

## 2025-03-21 RX ADMIN — ACYCLOVIR 400 MG: 400 TABLET ORAL at 20:26

## 2025-03-21 RX ADMIN — LIDOCAINE HYDROCHLORIDE 100 MG: 20 INJECTION INTRAVENOUS at 10:38

## 2025-03-21 RX ADMIN — SODIUM CHLORIDE 100 ML/HR: 9 INJECTION, SOLUTION INTRAVENOUS at 06:14

## 2025-03-21 RX ADMIN — PROPOFOL 60 MG: 10 INJECTION, EMULSION INTRAVENOUS at 10:38

## 2025-03-21 RX ADMIN — HYDROXYZINE HYDROCHLORIDE 10 MG: 10 TABLET ORAL at 00:32

## 2025-03-21 RX ADMIN — SULFAMETHOXAZOLE AND TRIMETHOPRIM 1 TABLET: 400; 80 TABLET ORAL at 09:13

## 2025-03-21 RX ADMIN — INSULIN LISPRO 4 UNITS: 100 INJECTION, SOLUTION INTRAVENOUS; SUBCUTANEOUS at 20:29

## 2025-03-21 RX ADMIN — PANTOPRAZOLE SODIUM 40 MG: 40 TABLET, DELAYED RELEASE ORAL at 06:30

## 2025-03-21 RX ADMIN — ONDANSETRON 4 MG: 2 INJECTION INTRAMUSCULAR; INTRAVENOUS at 10:59

## 2025-03-21 RX ADMIN — INSULIN LISPRO 4 UNITS: 100 INJECTION, SOLUTION INTRAVENOUS; SUBCUTANEOUS at 17:16

## 2025-03-21 RX ADMIN — SUGAMMADEX 400 MG: 100 INJECTION, SOLUTION INTRAVENOUS at 11:24

## 2025-03-21 RX ADMIN — ACYCLOVIR 400 MG: 400 TABLET ORAL at 09:14

## 2025-03-21 RX ADMIN — ASPIRIN 81 MG: 81 TABLET, COATED ORAL at 09:14

## 2025-03-21 RX ADMIN — MAGNESIUM OXIDE TAB 400 MG (241.3 MG ELEMENTAL MG) 800 MG: 400 (241.3 MG) TAB at 09:14

## 2025-03-21 RX ADMIN — MIDAZOLAM HYDROCHLORIDE 2 MG: 2 INJECTION, SOLUTION INTRAMUSCULAR; INTRAVENOUS at 10:34

## 2025-03-21 RX ADMIN — SODIUM CHLORIDE, POTASSIUM CHLORIDE, SODIUM LACTATE AND CALCIUM CHLORIDE: 600; 310; 30; 20 INJECTION, SOLUTION INTRAVENOUS at 10:34

## 2025-03-21 RX ADMIN — CARVEDILOL 6.25 MG: 6.25 TABLET, FILM COATED ORAL at 09:13

## 2025-03-21 RX ADMIN — DEXAMETHASONE SODIUM PHOSPHATE 4 MG: 4 INJECTION INTRA-ARTICULAR; INTRALESIONAL; INTRAMUSCULAR; INTRAVENOUS; SOFT TISSUE at 10:44

## 2025-03-21 ASSESSMENT — ENCOUNTER SYMPTOMS
FEVER: 0
VOMITING: 0
SHORTNESS OF BREATH: 0
NAUSEA: 0
APPETITE CHANGE: 0
DIARRHEA: 1

## 2025-03-21 ASSESSMENT — PAIN - FUNCTIONAL ASSESSMENT
PAIN_FUNCTIONAL_ASSESSMENT: 0-10
PAIN_FUNCTIONAL_ASSESSMENT: WONG-BAKER FACES

## 2025-03-21 ASSESSMENT — COGNITIVE AND FUNCTIONAL STATUS - GENERAL
WALKING IN HOSPITAL ROOM: A LITTLE
CLIMB 3 TO 5 STEPS WITH RAILING: A LITTLE
MOBILITY SCORE: 21
DAILY ACTIVITIY SCORE: 24
STANDING UP FROM CHAIR USING ARMS: A LITTLE

## 2025-03-21 ASSESSMENT — PAIN SCALES - GENERAL
PAINLEVEL_OUTOF10: 0 - NO PAIN
PAIN_LEVEL: 1
PAINLEVEL_OUTOF10: 0 - NO PAIN

## 2025-03-21 NOTE — ANESTHESIA PROCEDURE NOTES
Airway  Date/Time: 3/21/2025 10:41 AM  Urgency: elective    Airway not difficult    Staffing  Performed: CRNA   Authorized by: Sukhi Gomez MD    Performed by: ZION Marrero-CRNA, KAMARI  Patient location during procedure: OR    Indications and Patient Condition  Indications for airway management: anesthesia and airway protection  Spontaneous Ventilation: absent  Sedation level: deep  Preoxygenated: yes  Patient position: sniffing  Mask difficulty assessment: 2 - vent by mask + OA or adjuvant +/- NMBA  Planned trial extubation    Final Airway Details  Final airway type: endotracheal airway      Successful airway: ETT  Cuffed: yes   Successful intubation technique: video laryngoscopy  Facilitating devices/methods: intubating stylet  Endotracheal tube insertion site: oral  Blade: Michael  Blade size: #3  ETT size (mm): 7.0  Cormack-Lehane Classification: grade I - full view of glottis  Placement verified by: chest auscultation and capnometry   Cuff volume (mL): 6  Measured from: lips  ETT to lips (cm): 20  Number of attempts at approach: 1

## 2025-03-21 NOTE — CONSULTS
Transplant Nephrology Consult     Date of admission: 3/20/2025     Goran Ibrahim is a 54 y.o.  with PMH   Past Medical History:   Diagnosis Date    Cirrhosis (Multi)     CKD (chronic kidney disease)     Hypertension     Type 2 diabetes mellitus         History of present Illness:Goran Ibrahim is a 54 y.o. with a history of cirrhosis and advanced CKD status post simultaneous liver kidney transplant done on 1/20/2025 .  KDPI of the kidney is a 72%, PRA 0%, hepatitis C viral status D-/R-, CMV D-/R-, EBV D+/R+, hepatitis B viral status D-/R-, donor toxoplasma negative.  Patient was induced by Simulect and maintained on triple immunosuppression.  Currently admitted for elevated LFTs status post MRCP showing biliary stricture.  -Last hospital admission as of 2/14/2025: For hyperkalemia treated medically, Enterococcus bacteremia likely source abdominal-completed linezolid until 2/26/2025.      Past Medical History :  Past Medical History:   Diagnosis Date    Cirrhosis (Multi)     CKD (chronic kidney disease)     Hypertension     Type 2 diabetes mellitus         Surgical History:  Past Surgical History:   Procedure Laterality Date    IR ANGIOGRAM CELIAC  05/19/2023    IR ANGIOGRAM CELIAC 5/19/2023    LIVER TRANSPLANTATION      TRANSPLANTATION RENAL      US GUIDED ABDOMINAL PARACENTESIS  06/22/2023    US GUIDED ABDOMINAL PARACENTESIS 6/22/2023    US GUIDED ABDOMINAL PARACENTESIS  05/19/2023    US GUIDED ABDOMINAL PARACENTESIS 5/19/2023        Family HX:No family history on file.     Social Connections: Not on file            PROBLEM LIST:  Assessment & Plan  Transaminitis    Post-transplant diabetes mellitus (Multi)           ALLERGIES:  No Known Allergies         CURRENT MEDICATIONS:  Scheduled medications  acyclovir, 400 mg, oral, BID  aspirin, 81 mg, oral, Daily  carvedilol, 6.25 mg, oral, BID  cholecalciferol, 2,000 Units, oral, Daily  [Held by provider] heparin (porcine), 5,000 Units, subcutaneous, q8h  hydrALAZINE, 25  "mg, oral, BID  [START ON 3/22/2025] insulin glargine, 10 Units, subcutaneous, Daily  insulin lispro, 0-5 Units, subcutaneous, TID AC  insulin lispro, 3 Units, subcutaneous, Daily with evening meal  insulin lispro, 5 Units, subcutaneous, Daily with breakfast  insulin lispro, 5 Units, subcutaneous, Daily with lunch  magnesium oxide, 800 mg, oral, BID  mycophenolate, 500 mg, oral, q12h EDINSON  pantoprazole, 40 mg, oral, Daily before breakfast  polyethylene glycol, 17 g, oral, Daily  predniSONE, 5 mg, oral, Daily  sodium bicarbonate, 1,300 mg, oral, q12h  sulfamethoxazole-trimethoprim, 1 tablet, oral, Daily  tacrolimus, 3 mg, oral, BID  tamsulosin, 0.4 mg, oral, Daily      Continuous medications  lactated Ringer's, 75 mL/hr, Last Rate: 75 mL/hr (03/21/25 1504)      PRN medications  PRN medications: acetaminophen, dextrose, dextrose, glucagon, glucagon       OBJECTIVE:    VITALS: Visit Vitals  /87 (BP Location: Right arm, Patient Position: Sitting)   Pulse 64   Temp 36.5 °C (97.7 °F) (Temporal)   Resp 17   Ht 1.778 m (5' 10\")   Wt 104 kg (228 lb 9.9 oz)   SpO2 98%   BMI 32.80 kg/m²   Smoking Status Former   BSA 2.27 m²        General: No distress   Mucosa moist   AI, AC, AF     HEENT: PEERLA  CVS: S1 S2 no murmurs  RESP:  Lungs clear to auscultation   ABDO: Soft, non-tender ,mercedes and kidney fink incision non tender.  Neuro: A + O x 3  Skin: No rash   Extremities: No edema       LABS:  Results from last 72 hours   Lab Units 03/21/25  0611   WBC AUTO x10*3/uL 3.8*   HEMOGLOBIN g/dL 7.9*   MCV fL 103*   PLATELETS AUTO x10*3/uL 101*   BUN mg/dL 41*   CREATININE mg/dL 2.49*   CALCIUM mg/dL 9.0   TACROLIMUS ng/mL 12.9            Intake/Output Summary (Last 24 hours) at 3/21/2025 1650  Last data filed at 3/21/2025 1126  Gross per 24 hour   Intake 523.33 ml   Output 900 ml   Net -376.67 ml          ASSESSMENT AND PLAN:    Goran Ibrahim is a 54 y.o. with a history of cirrhosis and advanced CKD status post simultaneous " liver kidney transplant done on 1/20/2025 .  KDPI of the kidney is a 72%, PRA 0%, hepatitis C viral status D-/R-, CMV D-/R-, EBV D+/R+, hepatitis B viral status D-/R-, donor toxoplasma negative.  Patient was induced by Simulect and maintained on triple immunosuppression.  Currently admitted for elevated LFTs status post MRCP showing biliary stricture.  -Last hospital admission as of 2/14/2025: For hyperkalemia treated medically, Enterococcus bacteremia likely source abdominal-completed linezolid until 2/26/2025.    Allograft kidney function:  -Recent creatinine is in the range of 2.1-2.3.  Currently around 2.49, mild nongap acidosis.  -Please consider checking DSA  -Prior CAT scan as of 2/28/2025 showing decreasing perinephric fluid collections but may benefit from repeat ultrasound of the transplant kidney.  -Avoid nephrotoxins.    Immunosuppression: Currently on tacrolimus 3 mg twice daily, prednisone 5 and mycophenolate 500 twice daily    Liver allograft: Status post MRCP and stent with possible ERCP today.  Elevated liver enzymes      Hemodynamics: Blood pressures currently optimal continue with the carvedilol twice daily, hydralazine 25 twice daily    Infectious prophylaxis: Continue with acyclovir, Bactrim.    Anemia/leukopenia: Seems to have anemia due to iron deficiency, can consider IV iron.    Bone mineral disease: Calcium and phosphorus levels are optimal continue with the current management      Thank you for consulting :  Terri Strong MD    Notes created by Reed -Please excuse the Typos .

## 2025-03-21 NOTE — CONSULTS
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 His liver transplant was notable for duct-to-duct biliary anastomosis with V-plasty due to size discrepancy. He had an uncomplicated immediate post-transplant course and was discharged on 1/28/25.    Outpatient monitoring showed progressive increases in total and direct bilirubin, ALP, and transaminases, with a sharp rise on 3/19/25, prompting admission. Labs on 3/21/25 show worsening cholestasis (total bili 1.4, direct bili 0.9, , , ) with hypoalbuminemia (3.7) and low total protein (6.2). Given his V-plasty biliary reconstruction and cholestatic pattern, biliary stricture was the leading concern.  MRCP confirmed a 1.9 cm anastomotic stricture at the donor-recipient biliary anastomosis with abrupt cutoff of bile duct signal but no intrahepatic or extrahepatic biliary dilatation. The liver graft is normal in size (16.5 cm), and hepatic vasculature, including the portal vein, hepatic veins, and replaced right hepatic artery from the SMA, is patent.    12 point ROS otherwise negative, unless indicated above.     Past Medical History:    Past Medical History:   Diagnosis Date    Cirrhosis (Multi)     CKD (chronic kidney disease)     Hypertension     Type 2 diabetes mellitus        Home Medications  Medications Prior to Admission   Medication Sig Dispense Refill Last Dose/Taking    acyclovir (Zovirax) 400 mg tablet Take 1 tablet (400 mg) by mouth 2 times a day. 60 tablet 1     alcohol swabs pads, medicated Apply  1 each topically 4 times a day. Use prior to checking glucose or injecting insulin 400 each 3     aspirin 81 mg EC tablet Take 1 tablet (81 mg) by mouth once daily.       blood-glucose meter misc 1 each 4 times a day. Use to check glucose 4 times daily, before meals and at bedtime 1 each 0     blood-glucose meter,continuous (Dexcom G7 ) misc Use as instructed 1 each 0     [] blood-glucose sensor (Dexcom G7 Sensor) device 1 each 3 times a day before meals. Change sensor every 10 days 3 each 11     carvedilol (Coreg) 6.25 mg tablet Take 1 tablet (6.25 mg) by mouth 2 times a day. Hold for SBP <110 or HR <55 180 tablet 3     cholecalciferol (Vitamin D-3) 50 MCG (2000 UT) tablet Take 1 tablet (50 mcg) by mouth once daily. 30 tablet 11     hydrALAZINE (Apresoline) 25 mg tablet Take 1 tablet (25 mg) by mouth 2 times a day. 180 tablet 3     insulin glargine (Basaglar KwikPen U-100 Insulin) 100 unit/mL (3 mL) pen Inject 10 Units under the skin once daily in the morning. Take in AM with Prednisone 3 mL 3     insulin lispro (HumaLOG KwikPen Insulin) 100 unit/mL pen Inject 0-10 Units under the skin 3 times daily (morning, midday, late afternoon). Inject 5 units of Lispro subcutaneously two times a day before breakfast and lunch and 3 units with dinner- in addition to the following sliding scale: 0 unit(s) if Blood glucose is between  2 unit(s) if Blood glucose is between 151-200 4 unit(s) if Blood glucose is between 201-250 6 unit(s) if Blood glucose is between 251-300 8 unit(s) if Blood glucose is between 301-350 10 unit(s) if Blood glucose is between 351-400 If blood glucose is greater than 400 mg/dL, expect up to 50 units per day 9 mL 3     lancets misc 1 each 4 times a day. Use to check glucose 4 times daily, before meals and at bedtime 400 each 3     magnesium oxide (Mag-Ox) 400 mg tablet Take 2 tablets (800 mg) by mouth 2 times a day. 240 tablet 11     mycophenolate (Cellcept) 250 mg capsule Take 2  "capsules (500 mg) by mouth every 12 hours. 360 capsule 3     pantoprazole (ProtoNix) 40 mg EC tablet Take 1 tablet (40 mg) by mouth once daily in the morning. Take before meals. Do not crush, chew, or split. 90 tablet 3     pen needle, diabetic 32 gauge x 5/32\" needle 1 each 4 times a day. Use to inject insulin 4 times daily 400 each 3     predniSONE (Deltasone) 5 mg tablet Take 1 tablet (5 mg) by mouth once daily. 60 tablet 11     sulfamethoxazole-trimethoprim (Bactrim) 400-80 mg tablet Take 1 tablet by mouth once daily. 30 tablet 5     tacrolimus (Prograf) 1 mg capsule Take 4 capsules (4 mg) by mouth 2 times a day. 240 capsule 11     tamsulosin (Flomax) 0.4 mg 24 hr capsule Take 1 capsule (0.4 mg) by mouth once daily. 90 capsule 3        Surgical History:    Past Surgical History:   Procedure Laterality Date    IR ANGIOGRAM CELIAC  5/19/2023    IR ANGIOGRAM CELIAC 5/19/2023    US GUIDED ABDOMINAL PARACENTESIS  6/22/2023    US GUIDED ABDOMINAL PARACENTESIS 6/22/2023    US GUIDED ABDOMINAL PARACENTESIS  5/19/2023    US GUIDED ABDOMINAL PARACENTESIS 5/19/2023       Allergies:  No Known Allergies    Social History:    Social History     Socioeconomic History    Marital status: Single     Spouse name: Not on file    Number of children: Not on file    Years of education: Not on file    Highest education level: Not on file   Occupational History    Not on file   Tobacco Use    Smoking status: Former     Types: Cigarettes    Smokeless tobacco: Never   Vaping Use    Vaping status: Never Used   Substance and Sexual Activity    Alcohol use: Not Currently     Comment: 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    Drug use: Never    Sexual activity: Yes     Partners: Female   Other Topics Concern    Not on file   Social History Narrative    Not on file     Social Drivers of Health     Financial Resource Strain: Low Risk  (3/20/2025)    Overall Financial Resource Strain (CARDIA)     Difficulty of Paying Living Expenses: Not hard " at all   Recent Concern: Financial Resource Strain - Medium Risk (2/11/2025)    Overall Financial Resource Strain (CARDIA)     Difficulty of Paying Living Expenses: Somewhat hard   Food Insecurity: No Food Insecurity (3/20/2025)    Hunger Vital Sign     Worried About Running Out of Food in the Last Year: Never true     Ran Out of Food in the Last Year: Never true   Transportation Needs: No Transportation Needs (3/20/2025)    PRAPARE - Transportation     Lack of Transportation (Medical): No     Lack of Transportation (Non-Medical): No   Physical Activity: Not on file   Stress: No Stress Concern Present (2/21/2025)    [employer] [place] [employer] - Occupational Stress Questionnaire     Feeling of Stress : Not at all   Social Connections: Not on file   Intimate Partner Violence: Not At Risk (3/20/2025)    Humiliation, Afraid, Rape, and Kick questionnaire     Fear of Current or Ex-Partner: No     Emotionally Abused: No     Physically Abused: No     Sexually Abused: No   Housing Stability: Low Risk  (3/20/2025)    Housing Stability Vital Sign     Unable to Pay for Housing in the Last Year: No     Number of Times Moved in the Last Year: 1     Homeless in the Last Year: No       Family History:  No family history on file.  No family history of GI or liver disease.     Objective     Vitals:    Vitals:    03/20/25 2015 03/21/25 0053 03/21/25 0449 03/21/25 0823   BP:  136/83 142/86 131/81   BP Location:  Right arm Right arm Right arm   Patient Position:  Sitting Lying Lying   Pulse:  56 63 60   Resp: 18 18 18 18   Temp:  36.3 °C (97.3 °F) 36.3 °C (97.3 °F) 36.7 °C (98.1 °F)   TempSrc: Temporal Temporal Temporal Temporal   SpO2:  98% 97% 95%   Weight:   104 kg (228 lb 9.9 oz)    Height:         Failed to redirect to the Timeline version of the [employer] SmartLink.    Intake/Output Summary (Last 24 hours) at 3/21/2025 1003  Last data filed at 3/21/2025 0921  Gross per 24 hour   Intake 23.33 ml   Output 900 ml   Net  -876.67 ml       Physical Exam  GEN: No acute distress. Alert, awake and conversant.  HEENT: Sclera anicteric. Moist mucous membranes.  RESP: Breathing non-labored, equal chest rise. On RA.  CV: Regular rate, normotensive  GI: Abdomen soft, nondistended, nontender. Prior surgical scars healing well.   : Voiding spontaneously.  MSK: No gross deformities. Moves all extremities spontaneously.  NEURO: Alert and oriented x3. No focal deficits.  PSYCH: Appropriate mood and affect.  SKIN: No rashes or lesions.    Labs:   Lab Results   Component Value Date    WBC 3.8 (L) 03/21/2025    HGB 7.9 (L) 03/21/2025    HCT 25.0 (L) 03/21/2025     (H) 03/21/2025     (L) 03/21/2025     Lab Results   Component Value Date    GLUCOSE 80 03/21/2025    CALCIUM 9.0 03/21/2025     03/21/2025    K 4.5 03/21/2025    CO2 17 (L) 03/21/2025     (H) 03/21/2025    BUN 41 (H) 03/21/2025    CREATININE 2.49 (H) 03/21/2025     Lab Results   Component Value Date     (H) 03/21/2025     (H) 03/21/2025     (H) 03/19/2025    ALKPHOS 468 (H) 03/21/2025    BILITOT 1.4 (H) 03/21/2025     Lab Results   Component Value Date    INR 1.2 (H) 03/21/2025    INR 1.2 (H) 03/19/2025    INR 1.2 (H) 03/13/2025    PROTIME 13.1 (H) 03/21/2025    PROTIME 13.7 (H) 03/19/2025    PROTIME 13.8 (H) 03/13/2025       Imaging:   === 02/10/25 ===    US KIDNEY TRANSPLANT    - Impression -  1. Similar appearance of the right iliac fossa transplant kidney with  persistent perinephric collections as above.  2. Stable Doppler evaluation of the transplant kidney as described  above.  3. Additional chronic findings as above.    I personally reviewed the images/study and I agree with the resident  findings as stated by Arin Hernandez MD. This study was interpreted at  [facility], [place], [place].    MACRO:  None    Signed by: Marek Gee 2/13/2025 5:57 AM  Dictation workstation:   [id number]  === 02/28/25  ===    CT ABDOMEN PELVIS WO IV CONTRAST    - Impression -  1. Interval decrease in size of multiple perinephric collections,  when compared to CT from 02/20/2025.  2. Mild increase in amount of fluid and stranding within the right  lower quadrant abdominal wall, when compared to prior CT likely along  the incision site. Follow-up with the ultrasound is recommended  3. Left adrenal gland nodule, with density of 31 Hounsfield units is  indeterminate on this exam. Recommend follow-up with an dedicated  MR/CT of the adrenals.  4. Additional similar findings as above.    I personally reviewed the images/study and I agree with the findings  as stated. This study was interpreted at [place], [place].    Signed by: Joshua Anthony 2/28/2025 8:54 PM  Dictation workstation:   [id number]  === 03/20/25 ===    MR MRCP WITH PANCREAS WO AND W CONTRAST ([place])  This result has not been signed. Information might be incomplete.    - Impression -  1. Interval postsurgical change of liver transplant, normal in size  and unremarkable in appearance.  2. Abrupt cut off of normal signal intensity within the bile duct  lumen at the level of the donor-recipient biliary anastomosis with  stenosis measuring up to 1.9 cm in extent.  3. Persistent splenomegaly and abdominopelvic collateral vasculature,  likely representing a sequelae of longstanding portal hypertension.    I personally reviewed the images/study, and I agree with the findings  as stated above by resident physician Dr. Janis Najera MD. This  study was interpreted at [place], [place].    MACRO:  Janis Najera discussed the significance and urgency of this critical  finding via secure chat with Maribel Rodriguez MD on 3/20/2025 at  approximately 8:30 p.m.. (**-RCF-**) Findings:  See findings.      Dictation workstation:   [id number]    GI procedures:    ERCP 03/21/2025  Impression  An  "initial contrast injection was performed.  15 mm intrinsic and moderate stricture was visualized in the common hepatic duct at level of biliary anastomosis  Complete biliary sphincterotomy was performed.  A 10Fr x 9cm straight biliary stent was placed however the stent did not stay beyond the level of the stricture likely due to the length of the stricture an narrow donor duct, so it was removed.  One 8.5 Fr x 9 cm double flanged straight stent was placed in the common hepatic duct  Adequate bilious drainage was seen at the end of the procedure  The PD was not cannulated during the procedure   Findings  The  film showed clips in RUQ consistnet with previous liver transplant. The major papilla was native. The major papilla had a normal appearance. The common bile duct was deeply cannulated after 2 attempts using a traction sphincterotome with 450 cm x 0.035\" straight guidewire. Cannulation was not difficult and no bleeding was observed. An initial contrast injection was performed in the common bile duct. The injection extended throughout the biliary tree except the cystic duct. The study's image quality was acceptable. 15 mm intrinsic and moderate stricture was visualized in the common hepatic duct. Distal edge of stricture is just proximal to clips seen on fluoroscopy, consistent with anastomotic stricture. The proximal biliary tree is not dilated. Complete medium-sized biliary sphincterotomy was performed using a sphincterotome. No bleeding was noted at the procedure site. A 10Fr x 9cm straight biliary stent was placed however the stent did not stay beyond the level of the stricture so it was removed. One 8.5 Fr x 9 cm double flanged straight plastic stent was placed successfully in the common hepatic duct. Adequate bilious drainage was seen at the end of the procedure. The PD was not cannulated during the procedure.       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   Findings  Irregular Z-line 39 cm from the incisors  Matute's esophagus observed with 1 tongue and no associated lesion. The Z-line was 39 cm from the incisors. The most proximal island was 35.5 cm from the incisors. The total length of Matute's esophagus was 3.5 cm. Not biopsied, due to history of varices, portal HTN.  Multiple small varices in the lower third of the esophagus; no bleeding was observed. Eradicated varices from prior banding. No banding was performed at today's procedure.  Mild, diffuse and friable portal hypertensive gastropathy in the cardia, fundus of the stomach, body of the stomach and antrum  Abnormal mucosa with erosion in the 1st part of the duodenum  The 2nd part of the duodenum appeared normal.       EGD w Banding 06/07/2024  Impression  Irregular Z-line  Three medium grade II varices in the esophagus without stigmata of recent bleeding; placed 3 bands successfully, resulting in partial eradication.   Abnormal mucosa in the stomach, likely related to portal hypertensive gastropathy. Some oozing noted. Given history of H. Pylori, biopsies obtained to confirm eradication.  Mild duodenitis in the duodenal bulb.    Findings  Irregular Z-line 38 cm from the incisors  Three medium grade II varices (no red tiera sign) in the esophagus; no bleeding was identified; placed 3 bands successfully, resulting in partial eradication  Cobblestone, edematous, erythematous and friable mucosa in the body of the stomach and antrum; performed cold forceps biopsy to rule out H. pylori;  Patchy erythematous and friable mucosa in the duodenal bulb;  The duodenum appeared normal.    Assessment & Plan     This is a 54-year-old male with a history  of cryptogenic cirrhosis status post simultaneous orthotopic liver transplant (OLT) and  donor kidney transplant (DDKT) on 2025, now admitted for evaluation of progressively worsening liver function tests concerning for biliary obstruction. Outpatient monitoring revealed a progressive rise in total and direct bilirubin, ALP, and transaminases, with a sharp increase noted on 3/19/2025. On admission, labs from 3/21/2025 showed a cholestatic pattern of liver injury. Given his surgical history--including duct-to-duct biliary anastomosis with V-plasty--and current biochemical profile, an anastomotic biliary stricture was the leading concern. MRCP confirmed a 1.9 cm stricture at the donor-recipient biliary anastomosis with abrupt cutoff of bile duct signal.    He subsequently underwent ERCP, which revealed a 15 mm intrinsic, moderate stricture in the common hepatic duct at the level of the biliary anastomosis. An 8.5Fr x 9cm double flanged straight stent was successfully placed in the common hepatic duct. Adequate bilious drainage was observed at the end of the procedure. Findings are most consistent with early anastomotic biliary stricture in the setting of V-plasty reconstruction.    Plan:  - monitor for signs of post-procedure pancreatitis   - hold evening Tacrolimus dose   - consider nephrology consultation for ongoing renal dysfunction   - repeat ERCP in 8-12 weeks     Patient seen and discussed with attending, Dr. Lyndon Owen.     Nithin Griffith MD, PhD  Gastroenterology Fellow, PGY-6  [facility]   Division of Gastroenterology and Liver Disease      Thank you for the consultation. Gastroenterology will continue to the follow the patient.   Please do not hesitate to contact me on Haiku or page [phone] if there are any further questions between the weekday hours of 7 AM - 5 PM.   If there is an urgent concern during the weekend, after-hours, or holidays; then please  page the [place] GI fellow at [phone]. Thank you.    SIGNATURE: Nithin Griffith MD PATIENT NAME: Goran Ibrahim   DATE: March 21, 2025 MRN: [id number]

## 2025-03-21 NOTE — PROGRESS NOTES
03/21/25 1343   Discharge Planning   Living Arrangements Friends   Support Systems Friends/neighbors   Assistance Needed None   Type of Residence Private residence   Home or Post Acute Services None   Expected Discharge Disposition Home   Does the patient need discharge transport arranged? Yes   RoundTrip coordination needed? Yes   Has discharge transport been arranged? No   Financial Resource Strain   How hard is it for you to pay for the very basics like food, housing, medical care, and heating? Not very   Housing Stability   In the last 12 months, was there a time when you were not able to pay the mortgage or rent on time? N   Transportation Needs   In the past 12 months, has lack of transportation kept you from medical appointments or from getting medications? no       DC Planning:  Went in and met with the pt, confirmed demographics.     Transitional Care Coordination Progress Note:  Patient discussed during interdisciplinary rounds.     Plan per Medical/Surgical team:   Home. No home going needs anticipated for this pt at this time.        Discharge disposition: Home. No home going needs anticipated for this pt at this time.      Potential Barriers: None    ADOD:  3/21    This TCC will continue to follow for home going needs and safe DC plan.      Ivy Reeves. DEAN. TCC.

## 2025-03-21 NOTE — ANESTHESIA POSTPROCEDURE EVALUATION
Patient: Goran Ibrahim    Procedure Summary       Date: 03/21/25 Room / Location: [facility]    Anesthesia Start: 1034 Anesthesia Stop: 1132    Procedure: ERCP Diagnosis: Transaminitis    Scheduled Providers: Chester Whitley DO Responsible Provider: Sukhi Gomez MD    Anesthesia Type: general ASA Status: 3            Anesthesia Type: general    Vitals Value Taken Time   /86 03/21/25 1201   Temp 36.9 °C (98.4 °F) 03/21/25 1201   Pulse 62 03/21/25 1201   Resp 17 03/21/25 1146   SpO2 96 % 03/21/25 1201       Anesthesia Post Evaluation    Patient location during evaluation: PACU  Patient participation: complete - patient participated  Level of consciousness: awake and alert  Pain score: 1  Pain management: adequate  Airway patency: patent  Cardiovascular status: acceptable  Respiratory status: acceptable  Hydration status: acceptable  Postoperative Nausea and Vomiting: none        There were no known notable events for this encounter.

## 2025-03-21 NOTE — CARE PLAN
The patient's goals for the shift include  being able to eat.    The clinical goals for the shift include patient will remain HDS throughout this shift    Over the shift, the patient did  make progress towards his goals, remained stable throughout the shift and was advanced to a carb controlled diet.

## 2025-03-21 NOTE — SIGNIFICANT EVENT
03/21/25 1400   Patient Interaction   Organ Liver   Type of Interaction Phone conference   Interdisciplinary Rounds   Attendance Surgeon;Physician;CHERYL;Pharmacist   Topics Discussed Diet;Home care needs;Medications;Blood test results;Imaging/test results     Transplant Surgery Multidisciplinary Team Note    Goran Ibrahim is a 54 y.o. male  s/p  Kidney and Liver from a DCD donor on 1/20/2025. His post operative complications: None     24 Hour Events  1. Admitted with significant rise in LFTs     Last Recorded Vitals  Visit Vitals  /84 (BP Location: Right arm, Patient Position: Sitting)   Pulse 55   Temp 35.6 °C (96.1 °F) (Temporal)   Resp 16      Intake/Output last 3 Shifts:    Intake/Output Summary (Last 24 hours) at 3/21/2025 1402  Last data filed at 3/21/2025 1126  Gross per 24 hour   Intake 523.33 ml   Output 900 ml   Net -376.67 ml      Vitals:    03/21/25 0449   Weight: 104 kg (228 lb 9.9 oz)        Assessment/Plan     Liver alllograft function  -Elevated LFTs  -MRCP w/ stricture at  donor-recipient biliary anastomosis   -ERCP today     Kidney allograft function  -Worsening AKUA, neph consult   -Plan possible kidney biopsy Monday       Principal Problem:    Management after organ transplant  Active Problems:  Patient Active Problem List   Diagnosis    Hypertension    Liver cirrhosis (Multi)    Esophageal varices in alcoholic cirrhosis (Multi)    Hepatic cirrhosis, unspecified hepatic cirrhosis type, unspecified whether ascites present (Multi)    ESRD (end stage renal disease) (Multi)    Steroid-induced hyperglycemia    Kidney replaced by transplant (HHS-HCC)    Liver transplant status    Type 2 diabetes mellitus without complication (Multi)    Hyperkalemia    Transaminitis        Immunosuppression reviewed and adjusted       Induction: Simulect       Tacrolimus goal 8-10 ng/mL. Current dose 4 mg BID         MMF 1000 mg po BID       Solumedrol taper  DVT prophylaxis SCDS and subcutaneous heparin 5000  TID  PT/OT  Diet:  Clears for now, then advance this PM   Anticipated discharge next week      Lois Jacome PA-C

## 2025-03-21 NOTE — INTERVAL H&P NOTE
H&P reviewed. The patient was examined and there are no changes to the H&P.    He consents to the ERCP for biliary stricture and stent placement.  Chester Whitley, DO

## 2025-03-21 NOTE — PROGRESS NOTES
Pharmacy Medication History Review    Goran Ibrahim is a 54 y.o. male admitted for Transaminitis. Pharmacy reviewed the patient's [id number] medications and allergies for accuracy.    The list below reflects the updated PTA list.   Prior to Admission Medications   Prescriptions Last Dose Informant   acyclovir (Zovirax) 400 mg tablet  Brother   Sig: Take 1 tablet (400 mg) by mouth 2 times a day.   alcohol swabs pads, medicated  Brother   Sig: Apply 1 each topically 4 times a day. Use prior to checking glucose or injecting insulin   aspirin 81 mg EC tablet  Brother   Sig: Take 1 tablet (81 mg) by mouth once daily.   blood-glucose meter misc  Brother   Si each 4 times a day. Use to check glucose 4 times daily, before meals and at bedtime   blood-glucose meter,continuous (Dexcom G7 ) misc  Brother   Sig: Use as instructed   blood-glucose sensor (Dexcom G7 Sensor) device     Si each 3 times a day before meals. Change sensor every 10 days   carvedilol (Coreg) 6.25 mg tablet  Brother   Sig: Take 1 tablet (6.25 mg) by mouth 2 times a day. Hold for SBP <110 or HR <55   cholecalciferol (Vitamin D-3) 50 MCG (2000 UT) tablet  Brother   Sig: Take 1 tablet (50 mcg) by mouth once daily.   hydrALAZINE (Apresoline) 25 mg tablet  Brother   Sig: Take 1 tablet (25 mg) by mouth 2 times a day.   insulin glargine (Basaglar KwikPen U-100 Insulin) 100 unit/mL (3 mL) pen  Brother   Sig: Inject 10 Units under the skin once daily in the morning. Take in AM with Prednisone   insulin lispro (HumaLOG KwikPen Insulin) 100 unit/mL pen  Brother   Sig: Inject 0-10 Units under the skin 3 times daily (morning, midday, late afternoon). Inject 5 units of Lispro subcutaneously two times a day before breakfast and lunch and 3 units with dinner- in addition to the following sliding scale: 0 unit(s) if Blood glucose is between  2 unit(s) if Blood glucose is between 151-200 4 unit(s) if Blood glucose is between 201-250 6  "unit(s) if Blood glucose is between 251-300 8 unit(s) if Blood glucose is between 301-350 10 unit(s) if Blood glucose is between 351-400 If blood glucose is greater than 400 mg/dL, expect up to 50 units per day   lancets misc  Brother   Si each 4 times a day. Use to check glucose 4 times daily, before meals and at bedtime   magnesium oxide (Mag-Ox) 400 mg tablet  Brother   Sig: Take 2 tablets (800 mg) by mouth 2 times a day.   mycophenolate (Cellcept) 250 mg capsule  Brother   Sig: Take 2 capsules (500 mg) by mouth every 12 hours.   pantoprazole (ProtoNix) 40 mg EC tablet  Brother   Sig: Take 1 tablet (40 mg) by mouth once daily in the morning. Take before meals. Do not crush, chew, or split.   pen needle, diabetic 32 gauge x \" needle  Brother   Si each 4 times a day. Use to inject insulin 4 times daily   predniSONE (Deltasone) 5 mg tablet  Brother   Sig: Take 1 tablet (5 mg) by mouth once daily.   sulfamethoxazole-trimethoprim (Bactrim) 400-80 mg tablet  Brother   Sig: Take 1 tablet by mouth once daily.   tacrolimus (Prograf) 1 mg capsule  Brother   Sig: Take 4 capsules (4 mg) by mouth 2 times a day.   tamsulosin (Flomax) 0.4 mg 24 hr capsule  Brother   Sig: Take 1 capsule (0.4 mg) by mouth once daily.      Facility-Administered Medications: None        The list below reflects the updated allergy list. Please review each documented allergy for additional clarification and justification.  Allergies  Reviewed by Juan Esteban PharmD on 3/21/2025   No Known Allergies         Patient accepts M2B at discharge.     Sources used to complete the med history include:    [facility]  Pharmacy dispense history  Sibling Lenny Good historian  Chart Review  Care Everywhere     Below are additional concerns with the patient's PTA list.  I spoke with the patient's brother, Lenny, who is well-versed in the patient's medications. As a knowledgeable historian, he manages the patient's medications and is fully aware of all " dosages and directions.        Medications ADDED:  none  Medications CHANGED:  none  Medications REMOVED:   none    Juan Esteban PharmD  Transitions of Care Pharmacist  [facility]  Please reach out via Secure Chat for questions, or if no response call [email or web address] or vocera [facility]

## 2025-03-21 NOTE — ANESTHESIA PREPROCEDURE EVALUATION
Patient: Goran Ibrahim    Procedure Information       Date/Time: 03/21/25 1100    Scheduled providers: Chester Whitley DO    Procedure: ERCP    Location: [facility]            Relevant Problems   Cardiac   (+) Hypertension      GI   (+) Esophageal varices in alcoholic cirrhosis (Multi)      /Renal   (+) ESRD (end stage renal disease) (Multi)      Liver   (+) Hepatic cirrhosis, unspecified hepatic cirrhosis type, unspecified whether ascites present (Multi)   (+) Liver cirrhosis (Multi)      Endocrine   (+) Type 2 diabetes mellitus without complication (Multi)       Clinical information reviewed:   Tobacco  Allergies    Med Hx  Surg Hx   Fam Hx  Soc Hx        NPO Detail:  No data recorded     Physical Exam    Airway  Mallampati: II  TM distance: >3 FB  Neck ROM: full     Cardiovascular - normal exam     Dental   (+) upper dentures, lower dentures     Pulmonary - normal exam     Abdominal            Anesthesia Plan    History of general anesthesia?: yes  History of complications of general anesthesia?: no    ASA 3     general     intravenous induction   Anesthetic plan and risks discussed with patient.    Plan discussed with CAA and attending.

## 2025-03-21 NOTE — PROGRESS NOTES
"Goran Ibrahim is a 54 y.o. male now POD # 60 s/p SLK transplant.    Subjective   Feeling well       Objective   Physical Exam  Gen: A+OX3; NAD  Abd: S/NT/ND. Incision C/D/I.   Ext: no LE edema    Last Recorded Vitals  Blood pressure 147/87, pulse 64, temperature 36.5 °C (97.7 °F), temperature source Temporal, resp. rate 17, height 1.778 m (5' 10\"), weight 104 kg (228 lb 9.9 oz), SpO2 98%.  Intake/Output last 3 Shifts:  I/O last 3 completed shifts:  In: 23.3 (0.2 mL/kg) [I.V.:23.3 (0.2 mL/kg)]  Out: 650 (6.3 mL/kg) [Urine:650 (0.2 mL/kg/hr)]  Weight: 103.7 kg      Lab Results   Component Value Date    CREATININE 2.49 (H) 03/21/2025    K 4.5 03/21/2025    GLUCOSE 80 03/21/2025    HGB 7.9 (L) 03/21/2025    WBC 3.8 (L) 03/21/2025     (L) 03/21/2025    CALCIUM 9.0 03/21/2025         Current Facility-Administered Medications:     acetaminophen (Tylenol) tablet 650 mg, 650 mg, oral, q4h PRN, Maribel Rodriguez MD    acyclovir (Zovirax) tablet 400 mg, 400 mg, oral, BID, Maribel Rodriguez MD, 400 mg at 03/21/25 0914    aspirin EC tablet 81 mg, 81 mg, oral, Daily, Maribel Rodriguez MD, 81 mg at 03/21/25 0914    carvedilol (Coreg) tablet 6.25 mg, 6.25 mg, oral, BID, Maribel Rodriguez MD, 6.25 mg at 03/21/25 0913    cholecalciferol (Vitamin D-3) tablet 2,000 Units, 2,000 Units, oral, Daily, Maribel Rodriguez MD, 2,000 Units at 03/21/25 0913    dextrose 50 % injection 12.5 g, 12.5 g, intravenous, q15 min PRN, Maribel Rodriguez MD    dextrose 50 % injection 25 g, 25 g, intravenous, q15 min PRN, Maribel Rodriguez MD    glucagon (Glucagen) injection 1 mg, 1 mg, intramuscular, q15 min PRN, Maribel Rodriguez MD    glucagon (Glucagen) injection 1 mg, 1 mg, intramuscular, q15 min PRN, Maribel Rodriguez MD    [Held by provider] heparin (porcine) injection 5,000 Units, 5,000 Units, subcutaneous, q8h, Maribel Rodriguez MD    hydrALAZINE (Apresoline) tablet 25 mg, 25 mg, oral, BID, Maribel Rodriguez MD, 25 mg at 03/21/25 0914    [START ON 3/22/2025] " insulin glargine (Lantus) injection 10 Units, 10 Units, subcutaneous, Daily, Lois Jacome PA-C    insulin lispro injection 0-5 Units, 0-5 Units, subcutaneous, TID AC, Maribel Rodriguez MD, 1 Units at 03/20/25 1640    insulin lispro injection 3 Units, 3 Units, subcutaneous, Daily with evening meal, Maribel Rodriguez MD, 3 Units at 03/20/25 1640    insulin lispro injection 5 Units, 5 Units, subcutaneous, Daily with breakfast, Maribel Rodriguez MD    insulin lispro injection 5 Units, 5 Units, subcutaneous, Daily with lunch, Maribel Rodriguez MD    lactated Ringer's infusion, 75 mL/hr, intravenous, Continuous, Lois Jacome PA-C, Last Rate: 75 mL/hr at 03/21/25 1504, 75 mL/hr at 03/21/25 1504    magnesium oxide (Mag-Ox) tablet 800 mg, 800 mg, oral, BID, Maribel Rodriguez MD, 800 mg at 03/21/25 0914    mycophenolate (Cellcept) capsule 500 mg, 500 mg, oral, q12h EDINSON, Maribel Rodriguez MD, 500 mg at 03/21/25 0630    pantoprazole (ProtoNix) EC tablet 40 mg, 40 mg, oral, Daily before breakfast, Maribel Rodriguez MD, 40 mg at 03/21/25 0630    polyethylene glycol (Glycolax, Miralax) packet 17 g, 17 g, oral, Daily, Maribel Rodriguez MD    predniSONE (Deltasone) tablet 5 mg, 5 mg, oral, Daily, Maribel Rodriguez MD, 5 mg at 03/21/25 0914    sodium bicarbonate tablet 1,300 mg, 1,300 mg, oral, q12h, Kirt James MD, 1,300 mg at 03/21/25 1320    sulfamethoxazole-trimethoprim (Bactrim) 400-80 mg per tablet 1 tablet, 1 tablet, oral, Daily, Maribel Rodriguez MD, 1 tablet at 03/21/25 0913    tacrolimus (Prograf) capsule 3 mg, 3 mg, oral, BID, Ruth Javier, APRN-CNP    tamsulosin (Flomax) 24 hr capsule 0.4 mg, 0.4 mg, oral, Daily, Maribel Rodriguez MD, 0.4 mg at 03/21/25 0914     Assessment/Plan    Mr. Ibrahim is a 54 y.o. male who underwent  transplant surgery on 1/20/2025 (Liver / Kidney).  DCD, caval piggyback, rCHA to dSMA/celiac stack HA, duct to duct (small to large with v plasty)   Explant - no malignancy     Readmit for rising  LFT  MRCP with biliary stricture     S/p SLK  - MRCP with stent - appreciate GI and possible ERCP today  - Cont ursodiol  - Hydrate, follow Cr. Baseline 2  - Consider renal biopsy. Appreciate tx neph     2. Immunosuppression reviewed and adjusted       Tacrolimus: Yes - 1.5 mg po BID        Mycophenolate: Yes - 500 mg po BID   (infection)      Prednisone: Yes -  10 mg daily      Belatacept: N/A      I spent 35 minutes in the professional and overall care of this patient.      Priscila Wright MD

## 2025-03-21 NOTE — CONSULTS
Inpatient consult to Endocrinology  Consult performed by: Chelsea Hernandez PA-C  Consult ordered by: Liam Hudson MD  Reason for consult: DM2 with long term insulin with anticipated variable PO intake for LFT assessment  Assessment/Recommendations: Pt would benefit from continuation of his home insulin regimen.           Reason For Consult  Steroid induced hyperglycemia after liver transplant     History Of Present Illness  Goran Ibrahim is a 54 y.o. male presenting with past medical history of decompensated MetALD (ascites, variceal bleed, HE) c/b EV s/p banding in June 2024, advanced CKD (baseline creatinine around 2.5), long standing tobacco use, duodenal ulcer, H.pylori infection (negative biopsies in June 2024), and short segment Matute's esophagus (not biopsy confirmed due to the presence of varices) who was listed for SLK. He was subsequently brought in for DCD liver (caval piggyback, rCHA to dSMA/celiac stack HA, duct to duct [small to large with v plasty]) and right kidney transplant which he underwent on 1/20/2025 with Dr. Wright.     On 3/20/25 patient was admitted due to significant increase in his LFTs. Patient was seen at bedside s/p ERCP. He feels well and is ready to start eating solid foods. Since being on a liquid diet he has been having loose bowel movements.     Diabetes History  Type of diabetes: PTDM since January 2025  Seen by PCP or Endocrinology:  Endocrinology, Chelsea Hernandez PA-C last seen 3/13/25  Frequency of glucose checks: 5+ daily via dexcom CGM  Glucose review: TIR 81% for the last week per dexcom reciever  Frequency of Hypoglycemia:  none  Hypoglycemia unawareness: no  Severe hypoglycemia requiring assistance from others:  no    Past Medical History  He has a past medical history of Cirrhosis (Multi), CKD (chronic kidney disease), Hypertension, and Type 2 diabetes mellitus.    Surgical History  He has a past surgical history that includes US guided abdominal paracentesis  "(06/22/2023); IR angiogram celiac (05/19/2023); US guided abdominal paracentesis (05/19/2023); Kidney transplant; and Liver transplantation.     Social History  He reports that he has quit smoking. His smoking use included cigarettes. He has never used smokeless tobacco. He reports that he does not currently use alcohol. He reports that he does not use drugs.    Family History  No family history on file.     Allergies  Patient has no known allergies.    Review of Systems   Constitutional:  Negative for appetite change and fever.   Respiratory:  Negative for shortness of breath.    Cardiovascular:  Negative for chest pain.   Gastrointestinal:  Positive for diarrhea. Negative for nausea and vomiting.   All other systems reviewed and are negative.       Physical Exam  Constitutional:       Appearance: He is obese.   HENT:      Head: Normocephalic and atraumatic.      Nose: Nose normal.      Mouth/Throat:      Pharynx: Oropharynx is clear.   Eyes:      Extraocular Movements: Extraocular movements intact.      Conjunctiva/sclera: Conjunctivae normal.   Cardiovascular:      Rate and Rhythm: Normal rate and regular rhythm.      Pulses: Normal pulses.      Heart sounds: Normal heart sounds.   Pulmonary:      Effort: Pulmonary effort is normal.      Breath sounds: Normal breath sounds.   Skin:     General: Skin is warm and dry.   Neurological:      General: No focal deficit present.      Mental Status: He is alert and oriented to person, place, and time. Mental status is at baseline.   Psychiatric:         Mood and Affect: Mood normal.         Behavior: Behavior normal.         Thought Content: Thought content normal.         Judgment: Judgment normal.          ROS, PMH, FH/SH, surgical history and allergies have been reviewed.    Last Recorded Vitals  Blood pressure 145/84, pulse 55, temperature 35.6 °C (96.1 °F), temperature source Temporal, resp. rate 16, height 1.778 m (5' 10\"), weight 104 kg (228 lb 9.9 oz), SpO2 " 96%.    Relevant Results  Results from last 7 days   Lab Units 03/21/25  1224 03/21/25  0822 03/21/25  0611 03/21/25  0429 03/20/25  2233 03/20/25  2028 03/20/25  1803 03/20/25  1630 03/20/25  1557 03/19/25  0846   POCT GLUCOSE mg/dL 117* 83  --  93 95 87  --   --    < >  --    GLUCOSE mg/dL  --   --  80  --   --   --  112* 143*  --  173*    < > = values in this interval not displayed.     Scheduled medications  acyclovir, 400 mg, oral, BID  aspirin, 81 mg, oral, Daily  carvedilol, 6.25 mg, oral, BID  cholecalciferol, 2,000 Units, oral, Daily  [Held by provider] heparin (porcine), 5,000 Units, subcutaneous, q8h  hydrALAZINE, 25 mg, oral, BID  [START ON 3/22/2025] insulin glargine, 7 Units, subcutaneous, Daily  insulin lispro, 0-5 Units, subcutaneous, TID AC  insulin lispro, 3 Units, subcutaneous, Daily with evening meal  insulin lispro, 5 Units, subcutaneous, Daily with breakfast  insulin lispro, 5 Units, subcutaneous, Daily with lunch  magnesium oxide, 800 mg, oral, BID  magnesium sulfate, 2 g, intravenous, Once  mycophenolate, 500 mg, oral, q12h EDINSON  pantoprazole, 40 mg, oral, Daily before breakfast  polyethylene glycol, 17 g, oral, Daily  predniSONE, 5 mg, oral, Daily  sodium bicarbonate, 1,300 mg, oral, q12h  sulfamethoxazole-trimethoprim, 1 tablet, oral, Daily  tacrolimus, 4 mg, oral, BID  tamsulosin, 0.4 mg, oral, Daily      Continuous medications  lactated Ringer's, 50 mL/hr, Last Rate: 50 mL/hr (03/21/25 1229)  sodium bicarbonate 1 mEq/mL (8.4 %) 150 mEq in dextrose 5% 1,150 mL infusion, 50 mL/hr, Last Rate: 50 mL/hr (03/21/25 1226)      PRN medications  PRN medications: acetaminophen, dextrose, dextrose, glucagon, glucagon       Assessment/Plan   Assessment & Plan  Transaminitis    Post-transplant diabetes mellitus (Multi)       PLAN  Steroids: pred 5mg ongoing  Nutrition: PO  60g CHO/meal    - Continue glargine 10u        If NPO: reduce to 7u    - Continue lispro 5-5-3       If NPO: Hold    - start lispro  corrective scale #1 ACHS    = 0u  151-200 = 1u  201-250 = 2u  251-300 = 3u  301-350 = 4u  351-400 = 5u    -Accuchecks (not BMP) q4hr  - Goal -180  -Hypoglycemia protocol  -Will continue to follow and titrate insulin accordingly     Discharge planning:   [] patient may expect to discharge home on glargine and lispro with CGM, final doses TBD by titration  []will provide CGM sample prior to discharge - dexcom G 7  []will enroll pt in  pharmacy platinum plan program  [x]follow up with Chelsea Hernandez PA-C in PTDM clinic as scheduled in July, may request sooner if glucose control is compromised by tx course this admission      I spent 80 minutes in the professional and overall care of this patient.      YOUNG Jimenes-S2    Chelsea Hernandez PA-C   Endocrinology  Inpatient Diabetes Management Team

## 2025-03-22 ENCOUNTER — APPOINTMENT (OUTPATIENT)
Dept: RADIOLOGY | Facility: HOSPITAL | Age: 55
End: 2025-03-22
Payer: MEDICAID

## 2025-03-22 LAB
ALBUMIN SERPL BCP-MCNC: 3.5 G/DL (ref 3.4–5)
ALP SERPL-CCNC: 464 U/L (ref 33–120)
ALT SERPL W P-5'-P-CCNC: 195 U/L (ref 10–52)
ANION GAP SERPL CALC-SCNC: 14 MMOL/L (ref 10–20)
APTT PPP: 31 SECONDS (ref 26–36)
AST SERPL W P-5'-P-CCNC: 101 U/L (ref 9–39)
BASOPHILS # BLD AUTO: 0.04 X10*3/UL (ref 0–0.1)
BASOPHILS NFR BLD AUTO: 0.7 %
BILIRUB DIRECT SERPL-MCNC: 0.3 MG/DL (ref 0–0.3)
BILIRUB SERPL-MCNC: 0.6 MG/DL (ref 0–1.2)
BUN SERPL-MCNC: 45 MG/DL (ref 6–23)
CALCIUM SERPL-MCNC: 8.8 MG/DL (ref 8.6–10.6)
CHLORIDE SERPL-SCNC: 108 MMOL/L (ref 98–107)
CO2 SERPL-SCNC: 18 MMOL/L (ref 21–32)
CREAT SERPL-MCNC: 2.75 MG/DL (ref 0.5–1.3)
EGFRCR SERPLBLD CKD-EPI 2021: 26 ML/MIN/1.73M*2
EOSINOPHIL # BLD AUTO: 0.03 X10*3/UL (ref 0–0.7)
EOSINOPHIL NFR BLD AUTO: 0.6 %
ERYTHROCYTE [DISTWIDTH] IN BLOOD BY AUTOMATED COUNT: 21.4 % (ref 11.5–14.5)
GLUCOSE BLD MANUAL STRIP-MCNC: 119 MG/DL (ref 74–99)
GLUCOSE BLD MANUAL STRIP-MCNC: 140 MG/DL (ref 74–99)
GLUCOSE BLD MANUAL STRIP-MCNC: 140 MG/DL (ref 74–99)
GLUCOSE BLD MANUAL STRIP-MCNC: 149 MG/DL (ref 74–99)
GLUCOSE BLD MANUAL STRIP-MCNC: 163 MG/DL (ref 74–99)
GLUCOSE BLD MANUAL STRIP-MCNC: 166 MG/DL (ref 74–99)
GLUCOSE SERPL-MCNC: 120 MG/DL (ref 74–99)
HCT VFR BLD AUTO: 25.7 % (ref 41–52)
HGB BLD-MCNC: 7.9 G/DL (ref 13.5–17.5)
IMM GRANULOCYTES # BLD AUTO: 0.04 X10*3/UL (ref 0–0.7)
IMM GRANULOCYTES NFR BLD AUTO: 0.7 % (ref 0–0.9)
INR PPP: 1.2 (ref 0.9–1.1)
LYMPHOCYTES # BLD AUTO: 0.66 X10*3/UL (ref 1.2–4.8)
LYMPHOCYTES NFR BLD AUTO: 12.2 %
MAGNESIUM SERPL-MCNC: 1.97 MG/DL (ref 1.6–2.4)
MCH RBC QN AUTO: 31 PG (ref 26–34)
MCHC RBC AUTO-ENTMCNC: 30.7 G/DL (ref 32–36)
MCV RBC AUTO: 101 FL (ref 80–100)
MONOCYTES # BLD AUTO: 0.34 X10*3/UL (ref 0.1–1)
MONOCYTES NFR BLD AUTO: 6.3 %
NEUTROPHILS # BLD AUTO: 4.29 X10*3/UL (ref 1.2–7.7)
NEUTROPHILS NFR BLD AUTO: 79.5 %
NRBC BLD-RTO: 0 /100 WBCS (ref 0–0)
PHOSPHATE SERPL-MCNC: 3 MG/DL (ref 2.5–4.9)
PLATELET # BLD AUTO: 107 X10*3/UL (ref 150–450)
POTASSIUM SERPL-SCNC: 5.3 MMOL/L (ref 3.5–5.3)
PROT SERPL-MCNC: 6.2 G/DL (ref 6.4–8.2)
PROTHROMBIN TIME: 13.1 SECONDS (ref 9.8–12.4)
RBC # BLD AUTO: 2.55 X10*6/UL (ref 4.5–5.9)
RBC MORPH BLD: NORMAL
SODIUM SERPL-SCNC: 135 MMOL/L (ref 136–145)
TACROLIMUS BLD-MCNC: 9.6 NG/ML
WBC # BLD AUTO: 5.4 X10*3/UL (ref 4.4–11.3)

## 2025-03-22 PROCEDURE — 76776 US EXAM K TRANSPL W/DOPPLER: CPT | Performed by: RADIOLOGY

## 2025-03-22 PROCEDURE — 2500000001 HC RX 250 WO HCPCS SELF ADMINISTERED DRUGS (ALT 637 FOR MEDICARE OP): Mod: SE

## 2025-03-22 PROCEDURE — 85025 COMPLETE CBC W/AUTO DIFF WBC: CPT

## 2025-03-22 PROCEDURE — 2500000002 HC RX 250 W HCPCS SELF ADMINISTERED DRUGS (ALT 637 FOR MEDICARE OP, ALT 636 FOR OP/ED): Mod: SE | Performed by: PHYSICIAN ASSISTANT

## 2025-03-22 PROCEDURE — 2500000002 HC RX 250 W HCPCS SELF ADMINISTERED DRUGS (ALT 637 FOR MEDICARE OP, ALT 636 FOR OP/ED): Mod: SE

## 2025-03-22 PROCEDURE — 82947 ASSAY GLUCOSE BLOOD QUANT: CPT

## 2025-03-22 PROCEDURE — 99233 SBSQ HOSP IP/OBS HIGH 50: CPT | Performed by: HOSPITALIST

## 2025-03-22 PROCEDURE — 1100000001 HC PRIVATE ROOM DAILY

## 2025-03-22 PROCEDURE — 2500000004 HC RX 250 GENERAL PHARMACY W/ HCPCS (ALT 636 FOR OP/ED): Mod: SE | Performed by: STUDENT IN AN ORGANIZED HEALTH CARE EDUCATION/TRAINING PROGRAM

## 2025-03-22 PROCEDURE — 2500000004 HC RX 250 GENERAL PHARMACY W/ HCPCS (ALT 636 FOR OP/ED): Mod: SE

## 2025-03-22 PROCEDURE — 82248 BILIRUBIN DIRECT: CPT

## 2025-03-22 PROCEDURE — 80053 COMPREHEN METABOLIC PANEL: CPT

## 2025-03-22 PROCEDURE — 83735 ASSAY OF MAGNESIUM: CPT

## 2025-03-22 PROCEDURE — 85610 PROTHROMBIN TIME: CPT

## 2025-03-22 PROCEDURE — 36415 COLL VENOUS BLD VENIPUNCTURE: CPT

## 2025-03-22 PROCEDURE — 84100 ASSAY OF PHOSPHORUS: CPT

## 2025-03-22 PROCEDURE — 99232 SBSQ HOSP IP/OBS MODERATE 35: CPT | Performed by: STUDENT IN AN ORGANIZED HEALTH CARE EDUCATION/TRAINING PROGRAM

## 2025-03-22 PROCEDURE — 99233 SBSQ HOSP IP/OBS HIGH 50: CPT | Performed by: STUDENT IN AN ORGANIZED HEALTH CARE EDUCATION/TRAINING PROGRAM

## 2025-03-22 PROCEDURE — 80197 ASSAY OF TACROLIMUS: CPT

## 2025-03-22 PROCEDURE — 76776 US EXAM K TRANSPL W/DOPPLER: CPT

## 2025-03-22 RX ADMIN — INSULIN LISPRO 5 UNITS: 100 INJECTION, SOLUTION INTRAVENOUS; SUBCUTANEOUS at 09:19

## 2025-03-22 RX ADMIN — MAGNESIUM OXIDE TAB 400 MG (241.3 MG ELEMENTAL MG) 800 MG: 400 (241.3 MG) TAB at 21:05

## 2025-03-22 RX ADMIN — CARVEDILOL 6.25 MG: 6.25 TABLET, FILM COATED ORAL at 09:11

## 2025-03-22 RX ADMIN — HYDRALAZINE HYDROCHLORIDE 25 MG: 25 TABLET ORAL at 21:05

## 2025-03-22 RX ADMIN — TAMSULOSIN HYDROCHLORIDE 0.4 MG: 0.4 CAPSULE ORAL at 09:10

## 2025-03-22 RX ADMIN — INSULIN LISPRO 3 UNITS: 100 INJECTION, SOLUTION INTRAVENOUS; SUBCUTANEOUS at 18:12

## 2025-03-22 RX ADMIN — MYCOPHENOLATE MOFETIL 500 MG: 250 CAPSULE ORAL at 18:12

## 2025-03-22 RX ADMIN — ACYCLOVIR 400 MG: 400 TABLET ORAL at 21:05

## 2025-03-22 RX ADMIN — INSULIN LISPRO 5 UNITS: 100 INJECTION, SOLUTION INTRAVENOUS; SUBCUTANEOUS at 14:53

## 2025-03-22 RX ADMIN — CHOLECALCIFEROL TAB 25 MCG (1000 UNIT) 2000 UNITS: 25 TAB at 09:10

## 2025-03-22 RX ADMIN — HYDRALAZINE HYDROCHLORIDE 25 MG: 25 TABLET ORAL at 09:10

## 2025-03-22 RX ADMIN — TACROLIMUS 3 MG: 1 CAPSULE ORAL at 18:12

## 2025-03-22 RX ADMIN — ACYCLOVIR 400 MG: 400 TABLET ORAL at 09:11

## 2025-03-22 RX ADMIN — SULFAMETHOXAZOLE AND TRIMETHOPRIM 1 TABLET: 400; 80 TABLET ORAL at 09:11

## 2025-03-22 RX ADMIN — CARVEDILOL 6.25 MG: 6.25 TABLET, FILM COATED ORAL at 21:05

## 2025-03-22 RX ADMIN — MYCOPHENOLATE MOFETIL 500 MG: 250 CAPSULE ORAL at 06:19

## 2025-03-22 RX ADMIN — TACROLIMUS 3 MG: 1 CAPSULE ORAL at 06:19

## 2025-03-22 RX ADMIN — MAGNESIUM OXIDE TAB 400 MG (241.3 MG ELEMENTAL MG) 800 MG: 400 (241.3 MG) TAB at 09:11

## 2025-03-22 RX ADMIN — INSULIN LISPRO 1 UNITS: 100 INJECTION, SOLUTION INTRAVENOUS; SUBCUTANEOUS at 14:53

## 2025-03-22 RX ADMIN — PANTOPRAZOLE SODIUM 40 MG: 40 TABLET, DELAYED RELEASE ORAL at 06:19

## 2025-03-22 RX ADMIN — INSULIN GLARGINE 10 UNITS: 100 INJECTION, SOLUTION SUBCUTANEOUS at 09:19

## 2025-03-22 RX ADMIN — PREDNISONE 5 MG: 10 TABLET ORAL at 09:10

## 2025-03-22 RX ADMIN — SODIUM BICARBONATE 1300 MG: 650 TABLET ORAL at 14:53

## 2025-03-22 RX ADMIN — ASPIRIN 81 MG: 81 TABLET, COATED ORAL at 09:10

## 2025-03-22 RX ADMIN — SODIUM BICARBONATE 1300 MG: 650 TABLET ORAL at 23:49

## 2025-03-22 ASSESSMENT — COGNITIVE AND FUNCTIONAL STATUS - GENERAL
MOBILITY SCORE: 24
MOBILITY SCORE: 24
DAILY ACTIVITIY SCORE: 24
DAILY ACTIVITIY SCORE: 24

## 2025-03-22 ASSESSMENT — PAIN SCALES - GENERAL
PAINLEVEL_OUTOF10: 0 - NO PAIN

## 2025-03-22 NOTE — PROGRESS NOTES
"Transplant Nephrology progress note     Date of admission: 3/20/2025     Gorna Ibrahim is a 54 y.o.  with [facility]   Past Medical History:   Diagnosis Date    Cirrhosis (Multi)     CKD (chronic kidney disease)     Hypertension     Type 2 diabetes mellitus         SUBJECTIVE:    Denied any complaints today .      PROBLEM LIST:  Assessment & Plan  Transaminitis    Post-transplant diabetes mellitus (Multi)           ALLERGIES:  No Known Allergies         CURRENT MEDICATIONS:  Scheduled medications  acyclovir, 400 mg, oral, BID  aspirin, 81 mg, oral, Daily  carvedilol, 6.25 mg, oral, BID  cholecalciferol, 2,000 Units, oral, Daily  [Held by provider] heparin (porcine), 5,000 Units, subcutaneous, q8h  hydrALAZINE, 25 mg, oral, BID  insulin glargine, 10 Units, subcutaneous, Daily  insulin lispro, 0-5 Units, subcutaneous, TID AC  insulin lispro, 3 Units, subcutaneous, Daily with evening meal  insulin lispro, 5 Units, subcutaneous, Daily with breakfast  insulin lispro, 5 Units, subcutaneous, Daily with lunch  magnesium oxide, 800 mg, oral, BID  mycophenolate, 500 mg, oral, q12h EDINSON  pantoprazole, 40 mg, oral, Daily before breakfast  polyethylene glycol, 17 g, oral, Daily  predniSONE, 5 mg, oral, Daily  sodium bicarbonate, 1,300 mg, oral, q12h  sulfamethoxazole-trimethoprim, 1 tablet, oral, Daily  tacrolimus, 3 mg, oral, BID  tamsulosin, 0.4 mg, oral, Daily      Continuous medications     PRN medications  PRN medications: acetaminophen, dextrose, dextrose, glucagon, glucagon       OBJECTIVE:    VITALS: Visit Vitals  /75 (BP Location: Right arm, Patient Position: Sitting)   Pulse 65   Temp 36.8 °C (98.2 °F) (Temporal)   Resp 16   Ht 1.778 m (5' 10\")   Wt 106 kg (234 lb)   SpO2 98%   BMI 33.58 kg/m²   Smoking Status Former   BSA 2.29 m²        General: No distress   Mucosa moist   AI, AC, AF     HEENT: PEERLA  CVS: S1 S2 no murmurs  RESP:  Lungs clear to auscultation   ABDO: Soft, non-tender   Neuro: A + O x 3  Skin: No rash "   Extremities: No edema       LABS:  Results from last 72 hours   Lab Units 03/22/25  0550   WBC AUTO x10*3/uL 5.4   HEMOGLOBIN g/dL 7.9*   MCV fL 101*   PLATELETS AUTO x10*3/uL 107*   BUN mg/dL 45*   CREATININE mg/dL 2.75*   CALCIUM mg/dL 8.8   TACROLIMUS ng/mL 9.6            Intake/Output Summary (Last 24 hours) at 3/22/2025 1347  Last data filed at 3/22/2025 0912  Gross per 24 hour   Intake 1489 ml   Output 1200 ml   Net 289 ml          ASSESSMENT AND PLAN:    Goran Ibrahim is a 54 y.o.  with a history of cirrhosis and advanced CKD status post simultaneous liver kidney transplant done on 1/20/2025 .  KDPI of the kidney is a 72%, PRA 0%, hepatitis C viral status D-/R-, CMV D-/R-, EBV D+/R+, hepatitis B viral status D-/R-, donor toxoplasma negative.  Patient was induced by Simulect and maintained on triple immunosuppression.  Currently admitted for elevated LFTs status post MRCP showing biliary stricture .  -Last hospital admission as of 2/14/2025: For hyperkalemia treated medically, Enterococcus bacteremia likely source abdominal-completed linezolid until 2/26/2025.     Allograft kidney function:  -Recent creatinine is in the range of 2.1-2.3.  Currently around 2.75 likely due to high FK , mild nongap acidosis.  -Please consider checking DSA,if creatinine still up trend will plan biopsy on monday  -Prior CAT scan as of 2/28/2025 showing decreasing perinephric fluid collections but may benefit from repeat ultrasound of the transplant kidney.  -Avoid nephrotoxins.     Immunosuppression: Currently on tacrolimus 3 mg twice daily,level today is 9.6, prednisone 5 and mycophenolate 500 twice daily     Liver allograft: Status post ERCP and stent  ON 3/21 -MILD downtrend in the lft noticed.    Hemodynamics: Blood pressures currently optimal continue with the carvedilol twice daily, hydralazine 25 twice daily     Infectious prophylaxis: Continue with acyclovir, Bactrim.     Anemia/leukopenia: Seems to have anemia due to  iron deficiency, can consider IV iron.     Bone mineral disease: Calcium and phosphorus levels are optimal continue with the current management            Thank you for consulting .  Terri Gonzalez MD       Notes created by Reed -Please excuse the Typos .

## 2025-03-22 NOTE — CARE PLAN
The patient's goals for the shift include  Comfort    The clinical goals for the shift include Pt will be comfortable      Problem: Diabetes  Goal: Achieve decreasing blood glucose levels by end of shift  Outcome: Progressing  Goal: Increase stability of blood glucose readings by end of shift  Outcome: Progressing  Goal: Decrease in ketones present in urine by end of shift  Outcome: Progressing  Goal: Maintain electrolyte levels within acceptable range throughout shift  Outcome: Progressing  Goal: Maintain glucose levels >70mg/dl to <250mg/dl throughout shift  Outcome: Progressing  Goal: No changes in neurological exam by end of shift  Outcome: Progressing  Goal: Learn about and adhere to nutrition recommendations by end of shift  Outcome: Progressing  Goal: Vital signs within normal range for age by end of shift  Outcome: Progressing  Goal: Increase self care and/or family involovement by end of shift  Outcome: Progressing  Goal: Receive DSME education by end of shift  Outcome: Progressing     Problem: Pain - Adult  Goal: Verbalizes/displays adequate comfort level or baseline comfort level  Outcome: Progressing     Problem: Safety - Adult  Goal: Free from fall injury  Outcome: Progressing     Problem: Discharge Planning  Goal: Discharge to home or other facility with appropriate resources  Outcome: Progressing     Problem: Chronic Conditions and Co-morbidities  Goal: Patient's chronic conditions and co-morbidity symptoms are monitored and maintained or improved  Outcome: Progressing     Problem: Nutrition  Goal: Nutrient intake appropriate for maintaining nutritional needs  Outcome: Progressing     Problem: Fall/Injury  Goal: Not fall by end of shift  Outcome: Progressing  Goal: Be free from injury by end of the shift  Outcome: Progressing  Goal: Verbalize understanding of personal risk factors for fall in the hospital  Outcome: Progressing  Goal: Verbalize understanding of risk factor reduction measures to prevent  injury from fall in the home  Outcome: Progressing  Goal: Use assistive devices by end of the shift  Outcome: Progressing  Goal: Pace activities to prevent fatigue by end of the shift  Outcome: Progressing

## 2025-03-22 NOTE — CARE PLAN
The patient's goals for the shift include      The clinical goals for the shift include pt will remain comfortable throughout this shift      Problem: Diabetes  Goal: Achieve decreasing blood glucose levels by end of shift  Outcome: Progressing  Goal: Increase stability of blood glucose readings by end of shift  Outcome: Progressing  Goal: Decrease in ketones present in urine by end of shift  Outcome: Progressing  Goal: Maintain electrolyte levels within acceptable range throughout shift  Outcome: Progressing  Goal: Maintain glucose levels >70mg/dl to <250mg/dl throughout shift  Outcome: Progressing  Goal: No changes in neurological exam by end of shift  Outcome: Progressing  Goal: Learn about and adhere to nutrition recommendations by end of shift  Outcome: Progressing  Goal: Vital signs within normal range for age by end of shift  Outcome: Progressing  Goal: Increase self care and/or family involovement by end of shift  Outcome: Progressing  Goal: Receive DSME education by end of shift  Outcome: Progressing     Problem: Pain - Adult  Goal: Verbalizes/displays adequate comfort level or baseline comfort level  Outcome: Progressing     Problem: Safety - Adult  Goal: Free from fall injury  Outcome: Progressing     Problem: Discharge Planning  Goal: Discharge to home or other facility with appropriate resources  Outcome: Progressing     Problem: Chronic Conditions and Co-morbidities  Goal: Patient's chronic conditions and co-morbidity symptoms are monitored and maintained or improved  Outcome: Progressing     Problem: Nutrition  Goal: Nutrient intake appropriate for maintaining nutritional needs  Outcome: Progressing     Problem: Fall/Injury  Goal: Not fall by end of shift  Outcome: Progressing  Goal: Be free from injury by end of the shift  Outcome: Progressing  Goal: Verbalize understanding of personal risk factors for fall in the hospital  Outcome: Progressing  Goal: Verbalize understanding of risk factor reduction  measures to prevent injury from fall in the home  Outcome: Progressing  Goal: Use assistive devices by end of the shift  Outcome: Progressing  Goal: Pace activities to prevent fatigue by end of the shift  Outcome: Progressing

## 2025-03-22 NOTE — PROGRESS NOTES
Gastroenterology Consult Service Progress Note  Department of Gastroenterology & Hepatology  [facility]    [facility]  March 22, 2025   Patient: Goran Ibrahim    Medical Record: [id number]  Reason for Initial Consult: Rising LFTs due to anastomotic stricture     Interval History:   - no acute events overnight   - improved LFTs following ERCP with stent placement       Physical Exam:    Vitals:    03/21/25 2356 03/22/25 0438 03/22/25 0742 03/22/25 0813   BP: 120/68 156/81  149/75   BP Location: Right arm Right arm  Right arm   Patient Position: Lying Lying  Sitting   Pulse: 56 56  65   Resp: 18 18  16   Temp: 36.5 °C (97.7 °F) 36.9 °C (98.4 °F)  36.8 °C (98.2 °F)   TempSrc: Temporal Temporal  Temporal   SpO2: 92% 95%  98%   Weight:   106 kg (234 lb)    Height:             Intake/Output Summary (Last 24 hours) at 3/22/2025 1429  Last data filed at 3/22/2025 0912  Gross per 24 hour   Intake 1489 ml   Output 1200 ml   Net 289 ml       General: well-nourished, no acute distress  HEENT: PERRLA, EOM intact, no scleral icterus, moist MM  Respiratory: CTA bilaterally, normal work of breathing  Cardiovascular: RRR, no murmurs/rubs/gallops  Abdomen: Soft, nontender, nondistended, bowel sounds present. No masses palpated  Extremities: no edema, no asterixis  Neuro: alert and oriented, CNII-XII grossly intact, moves all 4 extremities with no focal deficits    Labs:  CBC  Results from last 7 days   Lab Units 03/22/25  0550 03/21/25  0611 03/20/25  1630 03/19/25  0846   HEMOGLOBIN g/dL 7.9* 7.9* 8.1* 9.1*   HEMATOCRIT % 25.7* 25.0* 25.0* 29.1*   WBC AUTO x10*3/uL 5.4 3.8* 5.9 4.5   PLATELETS AUTO x10*3/uL 107* 101* 154 159      BMP  Results from last 7 days   Lab Units 03/22/25  0550 03/21/25  0611 03/20/25  1803 03/20/25  1630   SODIUM mmol/L 135* 137 135* 133*   POTASSIUM mmol/L 5.3 4.5 4.8 6.8*   CHLORIDE mmol/L 108* 110* 112* 108*   CO2 mmol/L 18* 17* 15* 17*   BUN mg/dL 45* 41*  37* 40*   CREATININE mg/dL 2.75* 2.49* 2.28* 2.60*   MAGNESIUM mg/dL 1.97 1.73  --  1.86   PHOSPHORUS mg/dL 3.0 3.3 2.3* 3.2   ANION GAP mmol/L 14 15 13 15   GLUCOSE mg/dL 120* 80 112* 143*   CALCIUM mg/dL 8.8 9.0 8.2* 8.8     LFTS  Results from last 7 days   Lab Units 03/22/25  0550 03/21/25  0611 03/20/25  1630   ALT U/L 195* 275* 289*   AST U/L 101* 187* 242*   ALK PHOS U/L 464* 468* 504*   BILIRUBIN TOTAL mg/dL 0.6 1.4* 1.3*   BILIRUBIN DIRECT mg/dL 0.3 0.9* 0.2     COAGS  Results from last 7 days   Lab Units 03/22/25  0550 03/21/25  0611 03/19/25  0846   INR  1.2* 1.2* 1.2*        Imaging:  === 03/20/25 ===    US KIDNEY TRANSPLANT (Wet Read)  This result has not been signed. Information might be incomplete.    - Impression -  Elevated peak systolic velocities at the proximal main renal artery  and at the arterial anastomosis without significant gradient or  narrowing. Findings may relate to vessel tortuosity, however  attention on follow-up is recommended. Resistive indicesd are  otherwise within normal limits.    I personally reviewed the images/study and I agree with the findings  as stated by Sil Hall MD. This study was interpreted at  [facility], [place], [place].    MACRO:  None      Dictation workstation:   [id number]  === 02/28/25 ===    CT ABDOMEN PELVIS WO IV CONTRAST    - Impression -  1. Interval decrease in size of multiple perinephric collections,  when compared to CT from 02/20/2025.  2. Mild increase in amount of fluid and stranding within the right  lower quadrant abdominal wall, when compared to prior CT likely along  the incision site. Follow-up with the ultrasound is recommended  3. Left adrenal gland nodule, with density of 31 Hounsfield units is  indeterminate on this exam. Recommend follow-up with an dedicated  MR/CT of the adrenals.  4. Additional similar findings as above.    I personally reviewed the images/study and I agree with the  findings  as stated. This study was interpreted at [place], [place].    Signed by: Joshua Anthony 2/28/2025 8:54 PM  Dictation workstation:   [id number]  === 03/20/25 ===    MR MRCP WITH PANCREAS WO AND W CONTRAST    - Impression -  1. Interval postsurgical change of liver transplant, normal in size  and unremarkable in appearance.  2. Abrupt cutoff of normal signal intensity within the bile duct  lumen at the level of the donor-recipient biliary anastomosis with  stenosis measuring up to 1.9 cm in extent.  3. Persistent splenomegaly and abdominopelvic collateral vasculature,  likely representing a sequelae of longstanding portal hypertension.    I personally reviewed the images/study, and I agree with the findings  as stated above by resident physician Dr. Janis Najera MD. This  study was interpreted at [place], [place].    MACRO:  Janis Najera discussed the significance and urgency of this critical  finding via secure chat with Maribel Rodriguez MD on 3/20/2025 at  approximately 8:30 p.m.. (**-RCF-**) Findings:  See findings.    Signed by: Cristino Salas 3/21/2025 3:05 PM  Dictation workstation:   [id number]    GI procedures  ERCP 03/21/2025  Impression  An initial contrast injection was performed.  15 mm intrinsic and moderate stricture was visualized in the common hepatic duct at level of biliary anastomosis  Complete biliary sphincterotomy was performed.  A 10Fr x 9cm straight biliary stent was placed however the stent did not stay beyond the level of the stricture likely due to the length of the stricture an narrow donor duct, so it was removed.  One 8.5 Fr x 9 cm double flanged straight stent was placed in the common hepatic duct  Adequate bilious drainage was seen at the end of the procedure  The PD was not cannulated during the procedure   Findings  The  film showed clips in RUQ consistnet with previous liver  "transplant. The major papilla was native. The major papilla had a normal appearance. The common bile duct was deeply cannulated after 2 attempts using a traction sphincterotome with 450 cm x 0.035\" straight guidewire. Cannulation was not difficult and no bleeding was observed. An initial contrast injection was performed in the common bile duct. The injection extended throughout the biliary tree except the cystic duct. The study's image quality was acceptable. 15 mm intrinsic and moderate stricture was visualized in the common hepatic duct. Distal edge of stricture is just proximal to clips seen on fluoroscopy, consistent with anastomotic stricture. The proximal biliary tree is not dilated. Complete medium-sized biliary sphincterotomy was performed using a sphincterotome. No bleeding was noted at the procedure site. A 10Fr x 9cm straight biliary stent was placed however the stent did not stay beyond the level of the stricture so it was removed. One 8.5 Fr x 9 cm double flanged straight plastic stent was placed successfully in the common hepatic duct. Adequate bilious drainage was seen at the end of the procedure. The PD was not cannulated during the procedure.         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   Findings  Irregular Z-line 39 cm from the incisors  Matute's esophagus observed with 1 tongue and no associated lesion. The Z-line was 39 cm from the incisors. The most proximal island was 35.5 cm from the incisors. The total length of Matute's esophagus was 3.5 cm. Not biopsied, due to history of varices, portal HTN.  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 Eradicated varices from prior banding. No banding was performed at today's procedure.  Mild, diffuse and friable portal hypertensive gastropathy in the cardia, fundus of the stomach, body of the stomach and antrum  Abnormal mucosa with erosion in the 1st part of the duodenum  The 2nd part of the duodenum appeared normal.       EGD w Banding 2024  Impression  Irregular Z-line  Three medium grade II varices in the esophagus without stigmata of recent bleeding; placed 3 bands successfully, resulting in partial eradication.   Abnormal mucosa in the stomach, likely related to portal hypertensive gastropathy. Some oozing noted. Given history of H. Pylori, biopsies obtained to confirm eradication.  Mild duodenitis in the duodenal bulb.    Findings  Irregular Z-line 38 cm from the incisors  Three medium grade II varices (no red tiera sign) in the esophagus; no bleeding was identified; placed 3 bands successfully, resulting in partial eradication  Cobblestone, edematous, erythematous and friable mucosa in the body of the stomach and antrum; performed cold forceps biopsy to rule out H. pylori;  Patchy erythematous and friable mucosa in the duodenal bulb;  The duodenum appeared normal.    Assessment and Plan:        This is a 54-year-old male with a history of cryptogenic cirrhosis status post simultaneous orthotopic liver transplant (OLT) and  donor kidney transplant (DDKT) on 2025, now admitted for evaluation of progressively worsening liver function tests concerning for biliary obstruction. Outpatient monitoring revealed a progressive rise in total and direct bilirubin, ALP, and transaminases, with a sharp increase noted on 3/19/2025. On admission, labs from 3/21/2025 showed a cholestatic pattern of liver injury. Given his surgical history--including duct-to-duct biliary anastomosis with V-plasty--and current biochemical profile, an anastomotic biliary stricture was the leading concern. MRCP  confirmed a 1.9 cm stricture at the donor-recipient biliary anastomosis with abrupt cutoff of bile duct signal.    He subsequently underwent ERCP, which revealed a 15 mm intrinsic, moderate stricture in the common hepatic duct at the level of the biliary anastomosis. An 8.5Fr x 9cm double flanged straight stent was successfully placed in the common hepatic duct. Adequate bilious drainage was observed at the end of the procedure. Findings are most consistent with early anastomotic biliary stricture in the setting of V-plasty reconstruction.  He continues to have elevated creatinine for which nephrology has been consulted.       Plan:  - resume Tacrolimus at 3mg BID   - appreciate nephrology consultation for ongoing renal dysfunction   - repeat ERCP in 8-12 weeks     Patient seen and discussed with attending, Dr. Owen.       Nithin Griffith MD, PhD  Gastroenterology Fellow, PGY-6  [facility]   Division of Gastroenterology and Liver Disease       Thank you for the consultation. Gastroenterology will continue to the follow the patient.   Please do not hesitate to contact me on DocHalo or page [phone] if there are any further questions between the weekday hours of 7 AM - 5 PM.   If there is an urgent concern during the weekend, after-hours, or holidays; then please page the [place] GI [place] [phone]. Thank you.    SIGNATURE: Nithin Griffith MD PATIENT NAME: Goran Ibrahim   DATE: March 22, 2025 MRN: [id number]

## 2025-03-22 NOTE — PROGRESS NOTES
"Goran Ibrahim is a 54 y.o. male now POD # 61 s/p SLK transplant.    Subjective   Feeling well. Underwent ercp yesterday with stent placement       Objective   Physical Exam  Gen: A+OX3; NAD  Abd: S/NT/ND. Incision C/D/I.   Ext: no LE edema    Last Recorded Vitals  Blood pressure 139/90, pulse 64, temperature 36.7 °C (98.1 °F), temperature source Temporal, resp. rate 18, height 1.778 m (5' 10\"), weight 106 kg (234 lb), SpO2 96%.  Intake/Output last 3 Shifts:  I/O last 3 completed shifts:  In: 500 (4.8 mL/kg) [I.V.:500 (4.8 mL/kg)]  Out: 1500 (14.5 mL/kg) [Urine:1500 (0.4 mL/kg/hr)]  Weight: 103.7 kg      Lab Results   Component Value Date    CREATININE 2.75 (H) 03/22/2025    K 5.3 03/22/2025    GLUCOSE 120 (H) 03/22/2025    HGB 7.9 (L) 03/22/2025    WBC 5.4 03/22/2025     (L) 03/22/2025    CALCIUM 8.8 03/22/2025         Current Facility-Administered Medications:     acetaminophen (Tylenol) tablet 650 mg, 650 mg, oral, q4h PRN, Maribel Rodriguez MD    acyclovir (Zovirax) tablet 400 mg, 400 mg, oral, BID, Maribel Rodriguez MD, 400 mg at 03/22/25 0911    aspirin EC tablet 81 mg, 81 mg, oral, Daily, Maribel Rodriguez MD, 81 mg at 03/22/25 0910    carvedilol (Coreg) tablet 6.25 mg, 6.25 mg, oral, BID, Maribel Rodriguez MD, 6.25 mg at 03/22/25 0911    cholecalciferol (Vitamin D-3) tablet 2,000 Units, 2,000 Units, oral, Daily, Maribel Rodriguez MD, 2,000 Units at 03/22/25 0910    dextrose 50 % injection 12.5 g, 12.5 g, intravenous, q15 min PRN, Maribel Rodriguez MD    dextrose 50 % injection 25 g, 25 g, intravenous, q15 min PRN, Maribel Rodriguez MD    glucagon (Glucagen) injection 1 mg, 1 mg, intramuscular, q15 min PRN, Maribel Rodriguez MD    glucagon (Glucagen) injection 1 mg, 1 mg, intramuscular, q15 min PRN, Maribel Rodriguez MD    [Held by provider] heparin (porcine) injection 5,000 Units, 5,000 Units, subcutaneous, q8h, Maribel Rodriguez MD    hydrALAZINE (Apresoline) tablet 25 mg, 25 mg, oral, BID, Maribel Rodriguez MD, 25 mg at " 03/22/25 0910    insulin glargine (Lantus) injection 10 Units, 10 Units, subcutaneous, Daily, Lois Jacome PA-C, 10 Units at 03/22/25 0919    insulin lispro injection 0-5 Units, 0-5 Units, subcutaneous, TID AC, Maribel Rodriguez MD, 1 Units at 03/22/25 1453    insulin lispro injection 3 Units, 3 Units, subcutaneous, Daily with evening meal, Maribel Rodriguez MD, 3 Units at 03/21/25 1718    insulin lispro injection 5 Units, 5 Units, subcutaneous, Daily with breakfast, Maribel Rodriguez MD, 5 Units at 03/22/25 0919    insulin lispro injection 5 Units, 5 Units, subcutaneous, Daily with lunch, Maribel Rodriguez MD, 5 Units at 03/22/25 1453    magnesium oxide (Mag-Ox) tablet 800 mg, 800 mg, oral, BID, Maribel Rodriguez MD, 800 mg at 03/22/25 0911    mycophenolate (Cellcept) capsule 500 mg, 500 mg, oral, q12h EDINSON, Maribel Rdoriguez MD, 500 mg at 03/22/25 0619    pantoprazole (ProtoNix) EC tablet 40 mg, 40 mg, oral, Daily before breakfast, Maribel Rodriguez MD, 40 mg at 03/22/25 0619    polyethylene glycol (Glycolax, Miralax) packet 17 g, 17 g, oral, Daily, Maribel Rodriguez MD    predniSONE (Deltasone) tablet 5 mg, 5 mg, oral, Daily, Maribel Rodriguez MD, 5 mg at 03/22/25 0910    sodium bicarbonate tablet 1,300 mg, 1,300 mg, oral, q12h, Kirt James MD, 1,300 mg at 03/22/25 1453    sulfamethoxazole-trimethoprim (Bactrim) 400-80 mg per tablet 1 tablet, 1 tablet, oral, Daily, Maribel Rodriguez MD, 1 tablet at 03/22/25 0911    tacrolimus (Prograf) capsule 3 mg, 3 mg, oral, BID, Ana Luisa Berg MD, 3 mg at 03/22/25 0619    tamsulosin (Flomax) 24 hr capsule 0.4 mg, 0.4 mg, oral, Daily, Maribel Rodriguez MD, 0.4 mg at 03/22/25 0910     Assessment/Plan    Mr. Ibrahim is a 54 y.o. male who underwent  transplant surgery on 1/20/2025 (Liver / Kidney).  DCD, caval piggyback, rCHA to dSMA/celiac stack HA, duct to duct (small to large with v plasty)   Explant - no malignancy     Readmit for rising LFT  MRCP with biliary stricture     S/p SLK  -  MRCP with stent - s/p ERCP 3/21 - repeat 8-12 weeks  - Cont ursodiol  - Hydrate, follow Cr. Baseline 2. Up to 2.75. Possible 2/2 tac  - Repeat renal US today  - Consider renal biopsy. Appreciate tx neph     2. Immunosuppression reviewed and adjusted       Tacrolimus: Yes - 3 mg po BID        Mycophenolate: Yes - 500 mg po BID   (infection)      Prednisone: Yes -  5 mg daily      Belatacept: N/A      I spent 35 minutes in the professional and overall care of this patient.      Priscila Wright MD

## 2025-03-23 ENCOUNTER — PHARMACY VISIT (OUTPATIENT)
Dept: PHARMACY | Facility: CLINIC | Age: 55
End: 2025-03-23
Payer: MEDICAID

## 2025-03-23 VITALS
BODY MASS INDEX: 33.5 KG/M2 | WEIGHT: 234.02 LBS | HEIGHT: 70 IN | OXYGEN SATURATION: 96 % | RESPIRATION RATE: 16 BRPM | DIASTOLIC BLOOD PRESSURE: 78 MMHG | SYSTOLIC BLOOD PRESSURE: 114 MMHG | HEART RATE: 68 BPM | TEMPERATURE: 97.9 F

## 2025-03-23 LAB
ALBUMIN SERPL BCP-MCNC: 3.4 G/DL (ref 3.4–5)
ALP SERPL-CCNC: 408 U/L (ref 33–120)
ALT SERPL W P-5'-P-CCNC: 139 U/L (ref 10–52)
ANION GAP SERPL CALC-SCNC: 16 MMOL/L (ref 10–20)
APTT PPP: 29 SECONDS (ref 26–36)
AST SERPL W P-5'-P-CCNC: 73 U/L (ref 9–39)
BASOPHILS # BLD AUTO: 0.03 X10*3/UL (ref 0–0.1)
BASOPHILS NFR BLD AUTO: 0.8 %
BILIRUB DIRECT SERPL-MCNC: 0.2 MG/DL (ref 0–0.3)
BILIRUB SERPL-MCNC: 0.5 MG/DL (ref 0–1.2)
BUN SERPL-MCNC: 46 MG/DL (ref 6–23)
CALCIUM SERPL-MCNC: 8.8 MG/DL (ref 8.6–10.6)
CHLORIDE SERPL-SCNC: 107 MMOL/L (ref 98–107)
CO2 SERPL-SCNC: 20 MMOL/L (ref 21–32)
CREAT SERPL-MCNC: 2.33 MG/DL (ref 0.5–1.3)
EGFRCR SERPLBLD CKD-EPI 2021: 32 ML/MIN/1.73M*2
EOSINOPHIL # BLD AUTO: 0.05 X10*3/UL (ref 0–0.7)
EOSINOPHIL NFR BLD AUTO: 1.4 %
ERYTHROCYTE [DISTWIDTH] IN BLOOD BY AUTOMATED COUNT: 21.4 % (ref 11.5–14.5)
GLUCOSE BLD MANUAL STRIP-MCNC: 109 MG/DL (ref 74–99)
GLUCOSE BLD MANUAL STRIP-MCNC: 115 MG/DL (ref 74–99)
GLUCOSE BLD MANUAL STRIP-MCNC: 138 MG/DL (ref 74–99)
GLUCOSE SERPL-MCNC: 96 MG/DL (ref 74–99)
HCT VFR BLD AUTO: 24 % (ref 41–52)
HGB BLD-MCNC: 7.6 G/DL (ref 13.5–17.5)
IMM GRANULOCYTES # BLD AUTO: 0.02 X10*3/UL (ref 0–0.7)
IMM GRANULOCYTES NFR BLD AUTO: 0.6 % (ref 0–0.9)
INR PPP: 1.1 (ref 0.9–1.1)
LYMPHOCYTES # BLD AUTO: 0.73 X10*3/UL (ref 1.2–4.8)
LYMPHOCYTES NFR BLD AUTO: 20.4 %
MAGNESIUM SERPL-MCNC: 1.81 MG/DL (ref 1.6–2.4)
MCH RBC QN AUTO: 32.5 PG (ref 26–34)
MCHC RBC AUTO-ENTMCNC: 31.7 G/DL (ref 32–36)
MCV RBC AUTO: 103 FL (ref 80–100)
MONOCYTES # BLD AUTO: 0.25 X10*3/UL (ref 0.1–1)
MONOCYTES NFR BLD AUTO: 7 %
NEUTROPHILS # BLD AUTO: 2.5 X10*3/UL (ref 1.2–7.7)
NEUTROPHILS NFR BLD AUTO: 69.8 %
NRBC BLD-RTO: 0 /100 WBCS (ref 0–0)
OVALOCYTES BLD QL SMEAR: NORMAL
PHOSPHATE SERPL-MCNC: 2.8 MG/DL (ref 2.5–4.9)
PLATELET # BLD AUTO: 88 X10*3/UL (ref 150–450)
POTASSIUM SERPL-SCNC: 4.8 MMOL/L (ref 3.5–5.3)
PROT SERPL-MCNC: 6.1 G/DL (ref 6.4–8.2)
PROTHROMBIN TIME: 12.7 SECONDS (ref 9.8–12.4)
RBC # BLD AUTO: 2.34 X10*6/UL (ref 4.5–5.9)
RBC MORPH BLD: NORMAL
SODIUM SERPL-SCNC: 138 MMOL/L (ref 136–145)
TACROLIMUS BLD-MCNC: 7.7 NG/ML
WBC # BLD AUTO: 3.6 X10*3/UL (ref 4.4–11.3)

## 2025-03-23 PROCEDURE — RXMED WILLOW AMBULATORY MEDICATION CHARGE

## 2025-03-23 PROCEDURE — 84100 ASSAY OF PHOSPHORUS: CPT

## 2025-03-23 PROCEDURE — 85025 COMPLETE CBC W/AUTO DIFF WBC: CPT

## 2025-03-23 PROCEDURE — 36415 COLL VENOUS BLD VENIPUNCTURE: CPT

## 2025-03-23 PROCEDURE — 2500000004 HC RX 250 GENERAL PHARMACY W/ HCPCS (ALT 636 FOR OP/ED): Mod: SE

## 2025-03-23 PROCEDURE — 83735 ASSAY OF MAGNESIUM: CPT

## 2025-03-23 PROCEDURE — 82248 BILIRUBIN DIRECT: CPT

## 2025-03-23 PROCEDURE — 80197 ASSAY OF TACROLIMUS: CPT

## 2025-03-23 PROCEDURE — 86833 HLA CLASS II HIGH DEFIN QUAL: CPT | Performed by: PHYSICIAN ASSISTANT

## 2025-03-23 PROCEDURE — 99239 HOSP IP/OBS DSCHRG MGMT >30: CPT | Performed by: PHYSICIAN ASSISTANT

## 2025-03-23 PROCEDURE — 2500000002 HC RX 250 W HCPCS SELF ADMINISTERED DRUGS (ALT 637 FOR MEDICARE OP, ALT 636 FOR OP/ED): Mod: SE | Performed by: PHYSICIAN ASSISTANT

## 2025-03-23 PROCEDURE — 2500000004 HC RX 250 GENERAL PHARMACY W/ HCPCS (ALT 636 FOR OP/ED): Mod: SE | Performed by: STUDENT IN AN ORGANIZED HEALTH CARE EDUCATION/TRAINING PROGRAM

## 2025-03-23 PROCEDURE — 82947 ASSAY GLUCOSE BLOOD QUANT: CPT

## 2025-03-23 PROCEDURE — 85610 PROTHROMBIN TIME: CPT

## 2025-03-23 PROCEDURE — 2500000002 HC RX 250 W HCPCS SELF ADMINISTERED DRUGS (ALT 637 FOR MEDICARE OP, ALT 636 FOR OP/ED): Mod: SE

## 2025-03-23 PROCEDURE — 2500000004 HC RX 250 GENERAL PHARMACY W/ HCPCS (ALT 636 FOR OP/ED): Mod: JZ,SE | Performed by: PHYSICIAN ASSISTANT

## 2025-03-23 PROCEDURE — 2500000001 HC RX 250 WO HCPCS SELF ADMINISTERED DRUGS (ALT 637 FOR MEDICARE OP): Mod: SE

## 2025-03-23 RX ORDER — SODIUM BICARBONATE 650 MG/1
1300 TABLET ORAL EVERY 12 HOURS
Qty: 120 TABLET | Refills: 0 | Status: SHIPPED | OUTPATIENT
Start: 2025-03-23 | End: 2025-03-24 | Stop reason: SDUPTHER

## 2025-03-23 RX ORDER — TACROLIMUS 1 MG/1
3 CAPSULE ORAL 2 TIMES DAILY
Start: 2025-03-23 | End: 2025-03-24 | Stop reason: SDUPTHER

## 2025-03-23 RX ORDER — URSODIOL 300 MG/1
300 CAPSULE ORAL 3 TIMES DAILY
Qty: 90 CAPSULE | Refills: 0 | Status: SHIPPED | OUTPATIENT
Start: 2025-03-23 | End: 2025-03-24 | Stop reason: SDUPTHER

## 2025-03-23 RX ORDER — URSODIOL 300 MG/1
300 CAPSULE ORAL 3 TIMES DAILY
Status: DISCONTINUED | OUTPATIENT
Start: 2025-03-23 | End: 2025-03-23 | Stop reason: HOSPADM

## 2025-03-23 RX ORDER — CARVEDILOL 6.25 MG/1
6.25 TABLET ORAL 2 TIMES DAILY
Qty: 180 TABLET | Refills: 3 | Status: SHIPPED | OUTPATIENT
Start: 2025-03-23 | End: 2025-03-24 | Stop reason: SDUPTHER

## 2025-03-23 RX ADMIN — PREDNISONE 5 MG: 10 TABLET ORAL at 09:08

## 2025-03-23 RX ADMIN — MYCOPHENOLATE MOFETIL 500 MG: 250 CAPSULE ORAL at 06:15

## 2025-03-23 RX ADMIN — HYDRALAZINE HYDROCHLORIDE 25 MG: 25 TABLET ORAL at 09:08

## 2025-03-23 RX ADMIN — INSULIN GLARGINE 10 UNITS: 100 INJECTION, SOLUTION SUBCUTANEOUS at 09:10

## 2025-03-23 RX ADMIN — TACROLIMUS 3 MG: 1 CAPSULE ORAL at 06:15

## 2025-03-23 RX ADMIN — CHOLECALCIFEROL TAB 25 MCG (1000 UNIT) 2000 UNITS: 25 TAB at 09:08

## 2025-03-23 RX ADMIN — PANTOPRAZOLE SODIUM 40 MG: 40 TABLET, DELAYED RELEASE ORAL at 06:15

## 2025-03-23 RX ADMIN — MAGNESIUM OXIDE TAB 400 MG (241.3 MG ELEMENTAL MG) 800 MG: 400 (241.3 MG) TAB at 09:08

## 2025-03-23 RX ADMIN — CARVEDILOL 6.25 MG: 6.25 TABLET, FILM COATED ORAL at 09:08

## 2025-03-23 RX ADMIN — ASPIRIN 81 MG: 81 TABLET, COATED ORAL at 09:08

## 2025-03-23 RX ADMIN — URSODIOL 300 MG: 300 CAPSULE ORAL at 10:09

## 2025-03-23 RX ADMIN — INSULIN LISPRO 5 UNITS: 100 INJECTION, SOLUTION INTRAVENOUS; SUBCUTANEOUS at 09:08

## 2025-03-23 RX ADMIN — ACYCLOVIR 400 MG: 400 TABLET ORAL at 09:08

## 2025-03-23 RX ADMIN — SODIUM BICARBONATE 1300 MG: 650 TABLET ORAL at 11:46

## 2025-03-23 RX ADMIN — TAMSULOSIN HYDROCHLORIDE 0.4 MG: 0.4 CAPSULE ORAL at 09:08

## 2025-03-23 RX ADMIN — DARBEPOETIN ALFA 100 MCG: 100 INJECTION, SOLUTION INTRAVENOUS; SUBCUTANEOUS at 11:46

## 2025-03-23 RX ADMIN — SULFAMETHOXAZOLE AND TRIMETHOPRIM 1 TABLET: 400; 80 TABLET ORAL at 09:08

## 2025-03-23 ASSESSMENT — PAIN SCALES - GENERAL
PAINLEVEL_OUTOF10: 0 - NO PAIN

## 2025-03-23 NOTE — CARE PLAN
The patient's goals for the shift include      The clinical goals for the shift include pt will be free from falls and injury throughout this shift    Problem: Diabetes  Goal: Achieve decreasing blood glucose levels by end of shift  Outcome: Progressing  Goal: Increase stability of blood glucose readings by end of shift  Outcome: Progressing  Goal: Decrease in ketones present in urine by end of shift  Outcome: Progressing  Goal: Maintain electrolyte levels within acceptable range throughout shift  Outcome: Progressing  Goal: Maintain glucose levels >70mg/dl to <250mg/dl throughout shift  Outcome: Progressing  Goal: No changes in neurological exam by end of shift  Outcome: Progressing  Goal: Learn about and adhere to nutrition recommendations by end of shift  Outcome: Progressing  Goal: Vital signs within normal range for age by end of shift  Outcome: Progressing  Goal: Increase self care and/or family involovement by end of shift  Outcome: Progressing  Goal: Receive DSME education by end of shift  Outcome: Progressing     Problem: Pain - Adult  Goal: Verbalizes/displays adequate comfort level or baseline comfort level  Outcome: Progressing     Problem: Safety - Adult  Goal: Free from fall injury  Outcome: Progressing     Problem: Discharge Planning  Goal: Discharge to home or other facility with appropriate resources  Outcome: Progressing     Problem: Chronic Conditions and Co-morbidities  Goal: Patient's chronic conditions and co-morbidity symptoms are monitored and maintained or improved  Outcome: Progressing     Problem: Nutrition  Goal: Nutrient intake appropriate for maintaining nutritional needs  Outcome: Progressing     Problem: Fall/Injury  Goal: Not fall by end of shift  Outcome: Progressing  Goal: Be free from injury by end of the shift  Outcome: Progressing  Goal: Verbalize understanding of personal risk factors for fall in the hospital  Outcome: Progressing  Goal: Verbalize understanding of risk factor  reduction measures to prevent injury from fall in the home  Outcome: Progressing  Goal: Use assistive devices by end of the shift  Outcome: Progressing  Goal: Pace activities to prevent fatigue by end of the shift  Outcome: Progressing

## 2025-03-23 NOTE — CARE PLAN
The patient's goals for the shift include  PT will rest    The clinical goals for the shift include Pt will be free from falls      Problem: Diabetes  Goal: Achieve decreasing blood glucose levels by end of shift  3/23/2025 0130 by Yohan Arreaga RN  Outcome: Progressing  3/23/2025 0130 by Yohan Arreaga RN  Outcome: Progressing  Goal: Increase stability of blood glucose readings by end of shift  3/23/2025 0130 by Yohan Arreaga RN  Outcome: Progressing  3/23/2025 0130 by Yohan Arreaga RN  Outcome: Progressing  Goal: Decrease in ketones present in urine by end of shift  3/23/2025 0130 by Yohan Arreaga RN  Outcome: Progressing  3/23/2025 0130 by Yohan Arreaga RN  Outcome: Progressing  Goal: Maintain electrolyte levels within acceptable range throughout shift  3/23/2025 0130 by Yohan Arreaga RN  Outcome: Progressing  3/23/2025 0130 by Yohan Arreaga RN  Outcome: Progressing  Goal: Maintain glucose levels >70mg/dl to <250mg/dl throughout shift  3/23/2025 0130 by Yohan Arreaga RN  Outcome: Progressing  3/23/2025 0130 by Yohan Arreaga RN  Outcome: Progressing  Goal: No changes in neurological exam by end of shift  3/23/2025 0130 by Yohan Arreaga RN  Outcome: Progressing  3/23/2025 0130 by Yohan Arreaga RN  Outcome: Progressing  Goal: Learn about and adhere to nutrition recommendations by end of shift  3/23/2025 0130 by Yohan Arreaga RN  Outcome: Progressing  3/23/2025 0130 by Yohan Arreaga RN  Outcome: Progressing  Goal: Vital signs within normal range for age by end of shift  3/23/2025 0130 by Yohan Arreaga RN  Outcome: Progressing  3/23/2025 0130 by Yohan Arreaga RN  Outcome: Progressing  Goal: Increase self care and/or family involovement by end of shift  3/23/2025 0130 by Yohan Arreaga RN  Outcome: Progressing  3/23/2025 0130 by Yohan Gibson, RN  Outcome: Progressing  Goal: Receive DSME education by end of shift  3/23/2025 0130 by  Yohan Arreaga RN  Outcome: Progressing  3/23/2025 0130 by Yohan Arreaga RN  Outcome: Progressing     Problem: Pain - Adult  Goal: Verbalizes/displays adequate comfort level or baseline comfort level  3/23/2025 0130 by Yohan Arreaga RN  Outcome: Progressing  3/23/2025 0130 by Yohan Arreaga RN  Outcome: Progressing     Problem: Safety - Adult  Goal: Free from fall injury  3/23/2025 0130 by Yohan Arreaga RN  Outcome: Progressing  3/23/2025 0130 by Yohan Arreaga RN  Outcome: Progressing     Problem: Discharge Planning  Goal: Discharge to home or other facility with appropriate resources  3/23/2025 0130 by Yohan Arreaga RN  Outcome: Progressing  3/23/2025 0130 by Yohan Arreaga RN  Outcome: Progressing     Problem: Chronic Conditions and Co-morbidities  Goal: Patient's chronic conditions and co-morbidity symptoms are monitored and maintained or improved  3/23/2025 0130 by Yohan Arreaga RN  Outcome: Progressing  3/23/2025 0130 by Yohan Arreaga RN  Outcome: Progressing     Problem: Nutrition  Goal: Nutrient intake appropriate for maintaining nutritional needs  3/23/2025 0130 by Yohan Arreaga RN  Outcome: Progressing  3/23/2025 0130 by Yohan Arreaga RN  Outcome: Progressing     Problem: Fall/Injury  Goal: Not fall by end of shift  3/23/2025 0130 by Yohan Arreaga RN  Outcome: Progressing  3/23/2025 0130 by Yohan Arreaga RN  Outcome: Progressing  Goal: Be free from injury by end of the shift  3/23/2025 0130 by Yohan Arreaga RN  Outcome: Progressing  3/23/2025 0130 by Yohan Arreaga RN  Outcome: Progressing  Goal: Verbalize understanding of personal risk factors for fall in the hospital  3/23/2025 0130 by Yohan Arreaga RN  Outcome: Progressing  3/23/2025 0130 by Yohan Arreaga RN  Outcome: Progressing  Goal: Verbalize understanding of risk factor reduction measures to prevent injury from fall in the home  3/23/2025 0130 by Yohan Arreaga  RN  Outcome: Progressing  3/23/2025 0130 by Yohan Arreaga RN  Outcome: Progressing  Goal: Use assistive devices by end of the shift  3/23/2025 0130 by Yohan Arreaga RN  Outcome: Progressing  3/23/2025 0130 by Yohan Arreaga RN  Outcome: Progressing  Goal: Pace activities to prevent fatigue by end of the shift  3/23/2025 0130 by Yohan Arreaga RN  Outcome: Progressing  3/23/2025 0130 by Yohan Arreaga RN  Outcome: Progressing

## 2025-03-23 NOTE — ASSESSMENT & PLAN NOTE
Mr. Ibrahim is a 54 y.o. male who underwent  transplant surgery on 1/20/2025 (Liver / Kidney).  DCD, caval piggyback, rCHA to dSMA/celiac stack HA, duct to duct (small to large with v plasty)   Explant - no malignancy      Readmit for rising LFT  MRCP with biliary stricture

## 2025-03-23 NOTE — DISCHARGE SUMMARY
Discharge Diagnosis  Transaminitis    Issues Requiring Follow-Up  Home on FK 3/3,  mg BID, Pred 5 mg     Follow-up DSA    Plan for outpatient kidney biopsy     Test Results Pending At Discharge  Pending Labs       Order Current Status    HLA Transplant Antibody Panel In process            Hospital Course  Goran Ibrahim is a 54 y.o. 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 Wright/Dr. Trey Kline, also has HTN and T2DM  who presents to [facility] for concerns of significant increase in his LFTs.     During his hospital admission, he underwent a MRCP  MRCP confirmed a 1.9 cm stricture at the donor-recipient biliary anastomosis with abrupt cutoff of bile duct signal.  He subsequently underwent ERCP, which revealed a 15 mm intrinsic, moderate stricture in the common hepatic duct at the level of the biliary anastomosis. An 8.5Fr x 9cm double flanged straight stent was successfully placed in the common hepatic duct. Adequate bilious drainage was observed at the end of the procedure. Findings are most consistent with early anastomotic biliary stricture in the setting of V-plasty reconstruction.  He continued to have elevated creatinine for which nephrology was consulted, felt likely related to elevated FK level, his dose was adjusted. A DSA was also sent and in process at the time of discharge. He also underwent a transplant kidney ultrasound with possible vessel tortuosity, but otherwise unremarkable, His Scr was slowly down trending as were his LFTs. Plans were made for outpatient kidney biopsy. Endocrinology followed along too, his blood glucose remained in good control. A new Dexacom was placed prior to his discharge.     Pt is tolerating a low potassium, carb controlled diet, maintaining adequate hydration with oral intake, ambulating independently and having BMs. Immunosuppression was managed by the transplant team. Patient is in stable condition for  discharge home with renal telehealth today and homecare for drain. RX delivered to bedside prior to discharge. The patient will f/u in the transplant institute and have labs drawn in the walk-in clinic.      Pertinent Physical Exam At Time of Discharge  Physical Exam  Constitutional:       Appearance: Normal appearance.   HENT:      Head: Normocephalic.      Mouth/Throat:      Mouth: Mucous membranes are moist.   Cardiovascular:      Rate and Rhythm: Normal rate and regular rhythm.      Pulses: Normal pulses.      Heart sounds: Normal heart sounds.   Pulmonary:      Effort: Pulmonary effort is normal.      Breath sounds: Normal breath sounds.   Abdominal:      General: Bowel sounds are normal.      Palpations: Abdomen is soft.   Musculoskeletal:         General: Normal range of motion.      Cervical back: Normal range of motion.   Neurological:      General: No focal deficit present.      Mental Status: He is alert.   Psychiatric:         Mood and Affect: Mood normal.         Home Medications     Medication List      START taking these medications     sodium bicarbonate 650 mg tablet; Take 2 tablets (1,300 mg) by mouth   every 12 hours.   ursodiol 300 mg capsule; Commonly known as: Actigall; Take 1 capsule   (300 mg) by mouth 3 times a day.     CHANGE how you take these medications     tacrolimus 1 mg capsule; Commonly known as: Prograf; Take 3 capsules (3   mg) by mouth 2 times a day.; What changed: how much to take     CONTINUE taking these medications     acyclovir 400 mg tablet; Commonly known as: Zovirax; Take 1 tablet (400   mg) by mouth 2 times a day.   alcohol swabs pads, medicated; Apply 1 each topically 4 times a day. Use   prior to checking glucose or injecting insulin   aspirin 81 mg EC tablet   Basaglar KwikPen U-100 Insulin 100 unit/mL (3 mL) pen; Generic drug:   insulin glargine; Inject 10 Units under the skin once daily in the   morning. Take in AM with Prednisone   blood-glucose meter misc; 1 each 4  "times a day. Use to check glucose 4   times daily, before meals and at bedtime   carvedilol 6.25 mg tablet; Commonly known as: Coreg; Take 1 tablet (6.25   mg) by mouth 2 times a day. Hold for SBP <110 or HR <55   cholecalciferol 50 MCG (2000 UT) tablet; Commonly known as: Vitamin D-3;   Take 1 tablet (50 mcg) by mouth once daily.   Dexcom G7  misc; Generic drug: blood-glucose,,cont; Use   as instructed   hydrALAZINE 25 mg tablet; Commonly known as: Apresoline; Take 1 tablet   (25 mg) by mouth 2 times a day.   insulin lispro 100 unit/mL pen; Commonly known as: HumaLOG KwikPen   Insulin; Inject 0-10 Units under the skin 3 times daily (morning, midday,   late afternoon). Inject 5 units of Lispro subcutaneously two times a day   before breakfast and lunch and 3 units with dinner- in addition to the   following sliding scale: 0 unit(s) if Blood glucose is between  2   unit(s) if Blood glucose is between 151-200 4 unit(s) if Blood glucose is   between 201-250 6 unit(s) if Blood glucose is between 251-300 8 unit(s) if   Blood glucose is between 301-350 10 unit(s) if Blood glucose is between   351-400 If blood glucose is greater than 400 mg/dL, expect up to 50 units   per day   lancets misc; 1 each 4 times a day. Use to check glucose 4 times daily,   before meals and at bedtime   magnesium oxide 400 mg tablet; Commonly known as: Mag-Ox; Take 2 tablets   (800 mg) by mouth 2 times a day.   mycophenolate 250 mg capsule; Commonly known as: Cellcept; Take 2   capsules (500 mg) by mouth every 12 hours.   pantoprazole 40 mg EC tablet; Commonly known as: ProtoNix; Take 1 tablet   (40 mg) by mouth once daily in the morning. Take before meals. Do not   crush, chew, or split.   pen needle, diabetic 32 gauge x 5/32\" needle; 1 each 4 times a day. Use   to inject insulin 4 times daily   predniSONE 5 mg tablet; Commonly known as: Deltasone; Take 1 tablet (5   mg) by mouth once daily.   sulfamethoxazole-trimethoprim " 400-80 mg tablet; Commonly known as:   Bactrim; Take 1 tablet by mouth once daily.   tamsulosin 0.4 mg 24 hr capsule; Commonly known as: Flomax; Take 1   capsule (0.4 mg) by mouth once daily.     ASK your doctor about these medications     Dexcom G7 Sensor device; Generic drug: blood-glucose sensor; 1 each 3   times a day before meals. Change sensor every 10 days; Ask about: Should I   take this medication?       Outpatient Follow-Up  Future Appointments   Date Time Provider Department [place]   4/17/2025 10:30 AM TXP ABDOMINAL SURGEON [id number]   6/19/2025 10:20 AM Seun Rodrigues MD [id number]   7/24/2025  1:00 PM Chelsea Hernandez PA-C [id number]       Lois Jacome PA-C

## 2025-03-23 NOTE — PROGRESS NOTES
"Goran Ibrahim is a 54 y.o. male now POD#62 s/p SLK transplant, admitted with elevated LFTs    Subjective   Feeling well, anxious for discharge        Objective     Physical Exam  Constitutional:       Appearance: Normal appearance.   HENT:      Head: Normocephalic.   Cardiovascular:      Rate and Rhythm: Normal rate and regular rhythm.      Pulses: Normal pulses.      Heart sounds: Normal heart sounds.   Pulmonary:      Effort: Pulmonary effort is normal.      Breath sounds: Normal breath sounds.   Abdominal:      General: Bowel sounds are normal.      Palpations: Abdomen is soft.   Musculoskeletal:         General: No swelling. Normal range of motion.      Cervical back: Normal range of motion and neck supple.   Skin:     General: Skin is warm.   Neurological:      General: No focal deficit present.      Mental Status: He is alert.   Psychiatric:         Mood and Affect: Mood normal.         Last Recorded Vitals  Blood pressure 114/78, pulse 68, temperature 36.6 °C (97.9 °F), temperature source Temporal, resp. rate 16, height 1.778 m (5' 10\"), weight 106 kg (234 lb 0.3 oz), SpO2 96%.  Intake/Output last 3 Shifts:  I/O last 3 completed shifts:  In: 1489 (14 mL/kg) [I.V.:1489 (14 mL/kg)]  Out: 3180 (30 mL/kg) [Urine:3180 (0.8 mL/kg/hr)]  Weight: 106.1 kg     Relevant Results                              Assessment/Plan   Assessment & Plan  Transaminitis    Post-transplant diabetes mellitus (Multi)  Mr. Ibrahim is a 54 y.o. male who underwent  transplant surgery on 1/20/2025 (Liver / Kidney).  DCD, caval piggyback, rCHA to dSMA/celiac stack HA, duct to duct (small to large with v plasty)   Explant - no malignancy      Readmit for rising LFT  MRCP with biliary stricture     POD#62 s/p SLK, admit with rising LFT due to anastomotic stricture, now s/p ERCP  on 3/21 with stent placement, LFTs and Scr down trending slowly   Continue ursodiol   Plan outpatient kidney biopsy  DSA in process   Repeat ERCP in 8-12 weeks     2. " Immunosuppression reviewed and adjusted   Continue tacrolimus 3 mg PO BID  Continue reduced mycophenolate 500 mg BID due to recent infection (E. Faecium bacteremia)   Continue prednisone 5 mg daily     3. FENGI- taking good PO, mild NAGMA, Mg2+ persistently <2   Low k+, CC diet   Continue PO magnesium supplementation   Continue 1300 mg BID PO sodium bicarb     4. HEME- iron deficient/chronic disease anemia, H/H low but stable   Aranesp 100 mcg x 1 today, 3/23     5. ENDO- hx of type 2 diabetes mellitus, -166  Endocrinology following, continue current insulin regimen   New dexacom for discharge  Outpatient follow-up with Endocrinology         I spent 30 minutes in the professional and overall care of this patient.      Lois Jacome PA-C

## 2025-03-23 NOTE — HOSPITAL COURSE
Goran Ibrahim is a 54 y.o. 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 Wright/Dr. Trey Kline, also has HTN and T2DM  who presents to [facility] for concerns of significant increase in his LFTs.     During his hospital admission, he underwent a MRCP  MRCP confirmed a 1.9 cm stricture at the donor-recipient biliary anastomosis with abrupt cutoff of bile duct signal.  He subsequently underwent ERCP, which revealed a 15 mm intrinsic, moderate stricture in the common hepatic duct at the level of the biliary anastomosis. An 8.5Fr x 9cm double flanged straight stent was successfully placed in the common hepatic duct. Adequate bilious drainage was observed at the end of the procedure. Findings are most consistent with early anastomotic biliary stricture in the setting of V-plasty reconstruction.  He continued to have elevated creatinine for which nephrology was consulted, felt likely related to elevated FK level, his dose was adjusted. A DSA was also sent and in process at the time of discharge. He also underwent a transplant kidney ultrasound with possible vessel tortuosity, but otherwise unremarkable, His Scr was slowly down trending as were his LFTs. Plans were made for outpatient kidney biopsy. Endocrinology followed along too, his blood glucose remained in good control. A new Dexacom was placed prior to his discharge.     Pt is tolerating a low potassium, carb controlled diet, maintaining adequate hydration with oral intake, ambulating independently and having BMs. Immunosuppression was managed by the transplant team. Patient is in stable condition for discharge home with renal telehealth today and homecare for drain. RX delivered to bedside prior to discharge. The patient will f/u in the transplant institute and have labs drawn in the walk-in clinic.

## 2025-03-24 ENCOUNTER — TELEPHONE (OUTPATIENT)
Facility: HOSPITAL | Age: 55
End: 2025-03-24
Payer: MEDICAID

## 2025-03-24 DIAGNOSIS — Z94.4 LIVER TRANSPLANT RECIPIENT (MULTI): ICD-10-CM

## 2025-03-24 DIAGNOSIS — I85.10 ESOPHAGEAL VARICES IN ALCOHOLIC CIRRHOSIS (MULTI): ICD-10-CM

## 2025-03-24 DIAGNOSIS — Z94.0 KIDNEY REPLACED BY TRANSPLANT (HHS-HCC): ICD-10-CM

## 2025-03-24 DIAGNOSIS — I10 HYPERTENSION, UNSPECIFIED TYPE: ICD-10-CM

## 2025-03-24 DIAGNOSIS — K70.30 ESOPHAGEAL VARICES IN ALCOHOLIC CIRRHOSIS (MULTI): ICD-10-CM

## 2025-03-24 NOTE — TELEPHONE ENCOUNTER
Alexander at [facility] in [place] called - They said there was a cancel order on Carvedilol on 3/21, do we want them to fill his RX 12.4 mg BID or not? Also, RX for Tacro 4 MG BID - is that a new dose? [phone]

## 2025-03-25 ENCOUNTER — APPOINTMENT (OUTPATIENT)
Dept: RADIOLOGY | Facility: HOSPITAL | Age: 55
End: 2025-03-25
Payer: MEDICAID

## 2025-03-25 DIAGNOSIS — Z94.4 LIVER REPLACED BY TRANSPLANT (MULTI): ICD-10-CM

## 2025-03-25 LAB
HLA RESULTS: NORMAL
HLA-A+B+C AB NFR SER: NORMAL %
HLA-DP+DQ+DR AB NFR SER: NORMAL %

## 2025-03-25 RX ORDER — SODIUM BICARBONATE 650 MG/1
1300 TABLET ORAL EVERY 12 HOURS
Qty: 120 TABLET | Refills: 0 | Status: SHIPPED | OUTPATIENT
Start: 2025-03-25 | End: 2025-04-24

## 2025-03-25 RX ORDER — URSODIOL 300 MG/1
300 CAPSULE ORAL 3 TIMES DAILY
Qty: 90 CAPSULE | Refills: 11 | Status: SHIPPED | OUTPATIENT
Start: 2025-03-25 | End: 2026-03-25

## 2025-03-25 RX ORDER — CARVEDILOL 6.25 MG/1
6.25 TABLET ORAL 2 TIMES DAILY
Qty: 180 TABLET | Refills: 3 | Status: SHIPPED | OUTPATIENT
Start: 2025-03-25 | End: 2026-03-25

## 2025-03-25 RX ORDER — TACROLIMUS 1 MG/1
3 CAPSULE ORAL 2 TIMES DAILY
Qty: 540 CAPSULE | Refills: 3 | Status: SHIPPED | OUTPATIENT
Start: 2025-03-25 | End: 2026-03-25

## 2025-03-26 ENCOUNTER — LAB (OUTPATIENT)
Dept: LAB | Facility: HOSPITAL | Age: 55
End: 2025-03-26
Payer: MEDICAID

## 2025-03-26 DIAGNOSIS — Z94.4 LIVER TRANSPLANT STATUS: Primary | ICD-10-CM

## 2025-03-26 DIAGNOSIS — Z94.0 KIDNEY TRANSPLANT STATUS: ICD-10-CM

## 2025-03-26 LAB
ALBUMIN SERPL BCP-MCNC: 4.3 G/DL (ref 3.4–5)
ALP SERPL-CCNC: 443 U/L (ref 33–120)
ALT SERPL W P-5'-P-CCNC: 120 U/L (ref 10–52)
ANION GAP SERPL CALC-SCNC: 13 MMOL/L (ref 10–20)
AST SERPL W P-5'-P-CCNC: 53 U/L (ref 9–39)
BASOPHILS # BLD AUTO: 0.07 X10*3/UL (ref 0–0.1)
BASOPHILS NFR BLD AUTO: 1.1 %
BILIRUB DIRECT SERPL-MCNC: 0.3 MG/DL (ref 0–0.3)
BILIRUB SERPL-MCNC: 0.8 MG/DL (ref 0–1.2)
BUN SERPL-MCNC: 37 MG/DL (ref 6–23)
CALCIUM SERPL-MCNC: 9.1 MG/DL (ref 8.6–10.3)
CHLORIDE SERPL-SCNC: 110 MMOL/L (ref 98–107)
CO2 SERPL-SCNC: 20 MMOL/L (ref 21–32)
CREAT SERPL-MCNC: 1.71 MG/DL (ref 0.5–1.3)
CREAT UR-MCNC: 79.7 MG/DL (ref 20–370)
EGFRCR SERPLBLD CKD-EPI 2021: 46 ML/MIN/1.73M*2
EOSINOPHIL # BLD AUTO: 0.21 X10*3/UL (ref 0–0.7)
EOSINOPHIL NFR BLD AUTO: 3.3 %
ERYTHROCYTE [DISTWIDTH] IN BLOOD BY AUTOMATED COUNT: 21.7 % (ref 11.5–14.5)
FERRITIN SERPL-MCNC: 180 NG/ML (ref 20–300)
GGT SERPL-CCNC: 555 U/L (ref 5–64)
GLUCOSE SERPL-MCNC: 136 MG/DL (ref 74–99)
HCT VFR BLD AUTO: 33.9 % (ref 41–52)
HGB BLD-MCNC: 10.1 G/DL (ref 13.5–17.5)
IMM GRANULOCYTES # BLD AUTO: 0.06 X10*3/UL (ref 0–0.7)
IMM GRANULOCYTES NFR BLD AUTO: 1 % (ref 0–0.9)
INR PPP: 1.1 (ref 0.9–1.1)
IRON SATN MFR SERPL: 26 % (ref 25–45)
IRON SERPL-MCNC: 80 UG/DL (ref 35–150)
LYMPHOCYTES # BLD AUTO: 0.98 X10*3/UL (ref 1.2–4.8)
LYMPHOCYTES NFR BLD AUTO: 15.6 %
MAGNESIUM SERPL-MCNC: 1.66 MG/DL (ref 1.6–2.4)
MCH RBC QN AUTO: 32.1 PG (ref 26–34)
MCHC RBC AUTO-ENTMCNC: 29.8 G/DL (ref 32–36)
MCV RBC AUTO: 108 FL (ref 80–100)
MONOCYTES # BLD AUTO: 0.45 X10*3/UL (ref 0.1–1)
MONOCYTES NFR BLD AUTO: 7.1 %
NEUTROPHILS # BLD AUTO: 4.53 X10*3/UL (ref 1.2–7.7)
NEUTROPHILS NFR BLD AUTO: 71.9 %
NRBC BLD-RTO: 0 /100 WBCS (ref 0–0)
OVALOCYTES BLD QL SMEAR: NORMAL
PHOSPHATE SERPL-MCNC: 2.9 MG/DL (ref 2.5–4.9)
PLATELET # BLD AUTO: 153 X10*3/UL (ref 150–450)
POTASSIUM SERPL-SCNC: 5.5 MMOL/L (ref 3.5–5.3)
PROT SERPL-MCNC: 7 G/DL (ref 6.4–8.2)
PROT UR-ACNC: 55 MG/DL (ref 5–25)
PROT/CREAT UR: 0.69 MG/MG CREAT (ref 0–0.17)
PROTHROMBIN TIME: 12.5 SECONDS (ref 9.8–12.4)
RBC # BLD AUTO: 3.15 X10*6/UL (ref 4.5–5.9)
RBC MORPH BLD: NORMAL
SODIUM SERPL-SCNC: 137 MMOL/L (ref 136–145)
TACROLIMUS BLD-MCNC: 10.3 NG/ML
TIBC SERPL-MCNC: 310 UG/DL (ref 240–445)
UIBC SERPL-MCNC: 230 UG/DL (ref 110–370)
WBC # BLD AUTO: 6.3 X10*3/UL (ref 4.4–11.3)

## 2025-03-26 PROCEDURE — 84100 ASSAY OF PHOSPHORUS: CPT

## 2025-03-26 PROCEDURE — 82248 BILIRUBIN DIRECT: CPT

## 2025-03-26 PROCEDURE — 85025 COMPLETE CBC W/AUTO DIFF WBC: CPT

## 2025-03-26 PROCEDURE — 83540 ASSAY OF IRON: CPT

## 2025-03-26 PROCEDURE — 80197 ASSAY OF TACROLIMUS: CPT

## 2025-03-26 PROCEDURE — 82570 ASSAY OF URINE CREATININE: CPT

## 2025-03-26 PROCEDURE — 82728 ASSAY OF FERRITIN: CPT

## 2025-03-26 PROCEDURE — 80053 COMPREHEN METABOLIC PANEL: CPT

## 2025-03-26 PROCEDURE — 82977 ASSAY OF GGT: CPT

## 2025-03-26 PROCEDURE — 83550 IRON BINDING TEST: CPT

## 2025-03-26 PROCEDURE — 83735 ASSAY OF MAGNESIUM: CPT

## 2025-03-26 PROCEDURE — 84156 ASSAY OF PROTEIN URINE: CPT

## 2025-03-26 PROCEDURE — 87799 DETECT AGENT NOS DNA QUANT: CPT

## 2025-03-26 PROCEDURE — 85610 PROTHROMBIN TIME: CPT

## 2025-03-27 ENCOUNTER — DOCUMENTATION (OUTPATIENT)
Dept: TRANSPLANT | Facility: HOSPITAL | Age: 55
End: 2025-03-27
Payer: MEDICAID

## 2025-03-27 ENCOUNTER — TELEPHONE (OUTPATIENT)
Dept: TRANSPLANT | Facility: HOSPITAL | Age: 55
End: 2025-03-27
Payer: MEDICAID

## 2025-03-27 LAB
BKV DNA SERPL NAA+PROBE-LOG#: NORMAL {LOG_COPIES}/ML
LABORATORY COMMENT REPORT: NOT DETECTED

## 2025-03-27 NOTE — TELEPHONE ENCOUNTER
Discussed hydration with patient, he has been drinking Gatorade discussed that Gatorade has a lot of potassium to replace with water or non electrolyte drinks.

## 2025-03-28 DIAGNOSIS — Z94.4 LIVER REPLACED BY TRANSPLANT (MULTI): ICD-10-CM

## 2025-03-31 ENCOUNTER — TELEPHONE (OUTPATIENT)
Facility: HOSPITAL | Age: 55
End: 2025-03-31
Payer: MEDICAID

## 2025-03-31 NOTE — TELEPHONE ENCOUNTER
From PT, saying he got a letter that Medicare won't pay for admittance for his bile duct thing. [phone]

## 2025-04-01 DIAGNOSIS — Z94.4 LIVER REPLACED BY TRANSPLANT (MULTI): ICD-10-CM

## 2025-04-02 ENCOUNTER — LAB (OUTPATIENT)
Dept: LAB | Facility: HOSPITAL | Age: 55
End: 2025-04-02
Payer: MEDICAID

## 2025-04-02 LAB
ALBUMIN SERPL BCP-MCNC: 3.8 G/DL (ref 3.4–5)
ALP SERPL-CCNC: 401 U/L (ref 33–120)
ALT SERPL W P-5'-P-CCNC: 48 U/L (ref 10–52)
ANION GAP SERPL CALC-SCNC: 12 MMOL/L (ref 10–20)
AST SERPL W P-5'-P-CCNC: 26 U/L (ref 9–39)
BASOPHILS # BLD AUTO: 0.05 X10*3/UL (ref 0–0.1)
BASOPHILS NFR BLD AUTO: 1 %
BILIRUB DIRECT SERPL-MCNC: 0.3 MG/DL (ref 0–0.3)
BILIRUB SERPL-MCNC: 0.8 MG/DL (ref 0–1.2)
BUN SERPL-MCNC: 35 MG/DL (ref 6–23)
CALCIUM SERPL-MCNC: 8.6 MG/DL (ref 8.6–10.3)
CHLORIDE SERPL-SCNC: 109 MMOL/L (ref 98–107)
CO2 SERPL-SCNC: 21 MMOL/L (ref 21–32)
CREAT SERPL-MCNC: 1.7 MG/DL (ref 0.5–1.3)
EGFRCR SERPLBLD CKD-EPI 2021: 47 ML/MIN/1.73M*2
EOSINOPHIL # BLD AUTO: 0.15 X10*3/UL (ref 0–0.7)
EOSINOPHIL NFR BLD AUTO: 3 %
ERYTHROCYTE [DISTWIDTH] IN BLOOD BY AUTOMATED COUNT: 19.4 % (ref 11.5–14.5)
GGT SERPL-CCNC: 418 U/L (ref 5–64)
GLUCOSE SERPL-MCNC: 118 MG/DL (ref 74–99)
HCT VFR BLD AUTO: 30.6 % (ref 41–52)
HGB BLD-MCNC: 9.4 G/DL (ref 13.5–17.5)
IMM GRANULOCYTES # BLD AUTO: 0.02 X10*3/UL (ref 0–0.7)
IMM GRANULOCYTES NFR BLD AUTO: 0.4 % (ref 0–0.9)
INR PPP: 1.2 (ref 0.9–1.1)
LYMPHOCYTES # BLD AUTO: 0.87 X10*3/UL (ref 1.2–4.8)
LYMPHOCYTES NFR BLD AUTO: 17.3 %
MAGNESIUM SERPL-MCNC: 1.5 MG/DL (ref 1.6–2.4)
MCH RBC QN AUTO: 33.2 PG (ref 26–34)
MCHC RBC AUTO-ENTMCNC: 30.7 G/DL (ref 32–36)
MCV RBC AUTO: 108 FL (ref 80–100)
MONOCYTES # BLD AUTO: 0.39 X10*3/UL (ref 0.1–1)
MONOCYTES NFR BLD AUTO: 7.8 %
NEUTROPHILS # BLD AUTO: 3.55 X10*3/UL (ref 1.2–7.7)
NEUTROPHILS NFR BLD AUTO: 70.5 %
NRBC BLD-RTO: 0 /100 WBCS (ref 0–0)
PHOSPHATE SERPL-MCNC: 3 MG/DL (ref 2.5–4.9)
PLATELET # BLD AUTO: 143 X10*3/UL (ref 150–450)
POTASSIUM SERPL-SCNC: 5 MMOL/L (ref 3.5–5.3)
PROT SERPL-MCNC: 6.4 G/DL (ref 6.4–8.2)
PROTHROMBIN TIME: 12.8 SECONDS (ref 9.8–12.4)
RBC # BLD AUTO: 2.83 X10*6/UL (ref 4.5–5.9)
SODIUM SERPL-SCNC: 137 MMOL/L (ref 136–145)
TACROLIMUS BLD-MCNC: 10.6 NG/ML
WBC # BLD AUTO: 5 X10*3/UL (ref 4.4–11.3)

## 2025-04-02 PROCEDURE — 85610 PROTHROMBIN TIME: CPT

## 2025-04-02 PROCEDURE — 83735 ASSAY OF MAGNESIUM: CPT

## 2025-04-02 PROCEDURE — 80197 ASSAY OF TACROLIMUS: CPT

## 2025-04-02 PROCEDURE — 80053 COMPREHEN METABOLIC PANEL: CPT

## 2025-04-02 PROCEDURE — 82248 BILIRUBIN DIRECT: CPT

## 2025-04-02 PROCEDURE — 84100 ASSAY OF PHOSPHORUS: CPT

## 2025-04-02 PROCEDURE — 82977 ASSAY OF GGT: CPT

## 2025-04-02 PROCEDURE — 85025 COMPLETE CBC W/AUTO DIFF WBC: CPT

## 2025-04-04 ENCOUNTER — TELEPHONE (OUTPATIENT)
Facility: HOSPITAL | Age: 55
End: 2025-04-04
Payer: MEDICAID

## 2025-04-04 DIAGNOSIS — Z94.4 LIVER REPLACED BY TRANSPLANT (MULTI): ICD-10-CM

## 2025-04-04 DIAGNOSIS — Z94.0 KIDNEY REPLACED BY TRANSPLANT (HHS-HCC): ICD-10-CM

## 2025-04-08 ENCOUNTER — TELEPHONE (OUTPATIENT)
Dept: PRIMARY CARE | Facility: CLINIC | Age: 55
End: 2025-04-08
Payer: MEDICAID

## 2025-04-08 DIAGNOSIS — Z94.4 LIVER REPLACED BY TRANSPLANT (MULTI): ICD-10-CM

## 2025-04-08 NOTE — TELEPHONE ENCOUNTER
Monica called to let us know the pt was in the hospital recently.  He was at [facility] from 03/20/25 to 03/23/25 for elevated liver enzymes.  Pt is in need for liver transplant.

## 2025-04-09 ENCOUNTER — LAB (OUTPATIENT)
Dept: LAB | Facility: HOSPITAL | Age: 55
End: 2025-04-09
Payer: MEDICAID

## 2025-04-09 DIAGNOSIS — Z94.4 LIVER TRANSPLANT STATUS: Primary | ICD-10-CM

## 2025-04-09 LAB
ALBUMIN SERPL BCP-MCNC: 3.9 G/DL (ref 3.4–5)
ALP SERPL-CCNC: 344 U/L (ref 33–120)
ALT SERPL W P-5'-P-CCNC: 33 U/L (ref 10–52)
ANION GAP SERPL CALC-SCNC: 11 MMOL/L (ref 10–20)
AST SERPL W P-5'-P-CCNC: 24 U/L (ref 9–39)
BASOPHILS # BLD AUTO: 0.05 X10*3/UL (ref 0–0.1)
BASOPHILS NFR BLD AUTO: 0.8 %
BILIRUB DIRECT SERPL-MCNC: 0.3 MG/DL (ref 0–0.3)
BILIRUB SERPL-MCNC: 0.7 MG/DL (ref 0–1.2)
BUN SERPL-MCNC: 39 MG/DL (ref 6–23)
CALCIUM SERPL-MCNC: 8.6 MG/DL (ref 8.6–10.3)
CHLORIDE SERPL-SCNC: 111 MMOL/L (ref 98–107)
CO2 SERPL-SCNC: 19 MMOL/L (ref 21–32)
CREAT SERPL-MCNC: 1.77 MG/DL (ref 0.5–1.3)
EGFRCR SERPLBLD CKD-EPI 2021: 45 ML/MIN/1.73M*2
EOSINOPHIL # BLD AUTO: 0.15 X10*3/UL (ref 0–0.7)
EOSINOPHIL NFR BLD AUTO: 2.5 %
ERYTHROCYTE [DISTWIDTH] IN BLOOD BY AUTOMATED COUNT: 17.4 % (ref 11.5–14.5)
GGT SERPL-CCNC: 341 U/L (ref 5–64)
GLUCOSE SERPL-MCNC: 158 MG/DL (ref 74–99)
HCT VFR BLD AUTO: 33.2 % (ref 41–52)
HGB BLD-MCNC: 10.1 G/DL (ref 13.5–17.5)
IMM GRANULOCYTES # BLD AUTO: 0.03 X10*3/UL (ref 0–0.7)
IMM GRANULOCYTES NFR BLD AUTO: 0.5 % (ref 0–0.9)
INR PPP: 1.2 (ref 0.9–1.1)
LYMPHOCYTES # BLD AUTO: 0.93 X10*3/UL (ref 1.2–4.8)
LYMPHOCYTES NFR BLD AUTO: 15.8 %
MAGNESIUM SERPL-MCNC: 1.45 MG/DL (ref 1.6–2.4)
MCH RBC QN AUTO: 33 PG (ref 26–34)
MCHC RBC AUTO-ENTMCNC: 30.4 G/DL (ref 32–36)
MCV RBC AUTO: 109 FL (ref 80–100)
MONOCYTES # BLD AUTO: 0.45 X10*3/UL (ref 0.1–1)
MONOCYTES NFR BLD AUTO: 7.6 %
NEUTROPHILS # BLD AUTO: 4.29 X10*3/UL (ref 1.2–7.7)
NEUTROPHILS NFR BLD AUTO: 72.8 %
NRBC BLD-RTO: 0 /100 WBCS (ref 0–0)
PHOSPHATE SERPL-MCNC: 2.8 MG/DL (ref 2.5–4.9)
PLATELET # BLD AUTO: 152 X10*3/UL (ref 150–450)
POTASSIUM SERPL-SCNC: 4.7 MMOL/L (ref 3.5–5.3)
PROT SERPL-MCNC: 6.5 G/DL (ref 6.4–8.2)
PROTHROMBIN TIME: 13 SECONDS (ref 9.8–12.4)
RBC # BLD AUTO: 3.06 X10*6/UL (ref 4.5–5.9)
SODIUM SERPL-SCNC: 136 MMOL/L (ref 136–145)
TACROLIMUS BLD-MCNC: 9.9 NG/ML
WBC # BLD AUTO: 5.9 X10*3/UL (ref 4.4–11.3)

## 2025-04-09 PROCEDURE — 80197 ASSAY OF TACROLIMUS: CPT

## 2025-04-09 PROCEDURE — 84100 ASSAY OF PHOSPHORUS: CPT

## 2025-04-09 PROCEDURE — 85025 COMPLETE CBC W/AUTO DIFF WBC: CPT

## 2025-04-09 PROCEDURE — 85610 PROTHROMBIN TIME: CPT

## 2025-04-09 PROCEDURE — 83735 ASSAY OF MAGNESIUM: CPT

## 2025-04-09 PROCEDURE — 80053 COMPREHEN METABOLIC PANEL: CPT

## 2025-04-09 PROCEDURE — 82248 BILIRUBIN DIRECT: CPT

## 2025-04-09 PROCEDURE — 82977 ASSAY OF GGT: CPT

## 2025-04-11 ENCOUNTER — APPOINTMENT (OUTPATIENT)
Facility: HOSPITAL | Age: 55
End: 2025-04-11
Payer: MEDICAID

## 2025-04-11 ENCOUNTER — TELEPHONE (OUTPATIENT)
Facility: HOSPITAL | Age: 55
End: 2025-04-11

## 2025-04-11 VITALS
DIASTOLIC BLOOD PRESSURE: 81 MMHG | HEART RATE: 75 BPM | OXYGEN SATURATION: 98 % | SYSTOLIC BLOOD PRESSURE: 155 MMHG | TEMPERATURE: 97.1 F | BODY MASS INDEX: 33.16 KG/M2 | WEIGHT: 231.1 LBS

## 2025-04-11 DIAGNOSIS — Z94.4 LIVER TRANSPLANT RECIPIENT (MULTI): ICD-10-CM

## 2025-04-11 DIAGNOSIS — Z94.4 LIVER REPLACED BY TRANSPLANT (MULTI): ICD-10-CM

## 2025-04-11 DIAGNOSIS — Z94.0 KIDNEY REPLACED BY TRANSPLANT (HHS-HCC): ICD-10-CM

## 2025-04-11 PROCEDURE — 3044F HG A1C LEVEL LT 7.0%: CPT | Performed by: SURGERY

## 2025-04-11 PROCEDURE — 3048F LDL-C <100 MG/DL: CPT | Performed by: SURGERY

## 2025-04-11 PROCEDURE — 3077F SYST BP >= 140 MM HG: CPT | Performed by: SURGERY

## 2025-04-11 PROCEDURE — 3060F POS MICROALBUMINURIA REV: CPT | Performed by: SURGERY

## 2025-04-11 PROCEDURE — 99214 OFFICE O/P EST MOD 30 MIN: CPT | Performed by: SURGERY

## 2025-04-11 PROCEDURE — 99214 OFFICE O/P EST MOD 30 MIN: CPT | Mod: 24 | Performed by: SURGERY

## 2025-04-11 PROCEDURE — 3079F DIAST BP 80-89 MM HG: CPT | Performed by: SURGERY

## 2025-04-11 RX ORDER — TACROLIMUS 1 MG/1
4 CAPSULE ORAL 2 TIMES DAILY
Qty: 720 CAPSULE | Refills: 3 | Status: SHIPPED | OUTPATIENT
Start: 2025-04-11 | End: 2025-04-16 | Stop reason: SDUPTHER

## 2025-04-11 ASSESSMENT — PAIN SCALES - GENERAL: PAINLEVEL_OUTOF10: 0-NO PAIN

## 2025-04-11 NOTE — TELEPHONE ENCOUNTER
[facility] home care called requesting to speak with coordinator about orders that were faxed to Dr. Trey Kline office. Call was transferred to liver team.  [facility] call back number is [phone]

## 2025-04-11 NOTE — PATIENT INSTRUCTIONS
Stop Acyclovir on 4/20.    Bring pills with pill box and list next visit  Labs weekly  Return to clinic in 3 weeks

## 2025-04-11 NOTE — PROGRESS NOTES
Goran Ibrahim is a 54 y.o. male POD#81 from SLK from a DCD donor.    - no acute events    Objective   Gen: A+OX3; NAD  HEENT: PERRL, sclera anicteric, MMM  Cardiac: RRR  Chest: Normal inspiratory effort  Abdomen: S/NT/ND. Incision C/D/I.  Ext: No LE edema    Last Recorded Vitals  Visit Vitals  /81 (BP Location: Left arm, Patient Position: Sitting, BP Cuff Size: Adult)   Pulse 75   Temp 36.2 °C (97.1 °F) (Temporal)   Assessment/Plan   Principal Problem:    Liver/Kidney transplant recipient    Active Problems:  Patient Active Problem List   Diagnosis    Hypertension    Liver cirrhosis (Multi)    Esophageal varices in alcoholic cirrhosis (Multi)    Hepatic cirrhosis, unspecified hepatic cirrhosis type, unspecified whether ascites present (Multi)    ESRD (end stage renal disease) (Multi)    Steroid-induced hyperglycemia    Kidney replaced by transplant (HHS-HCC)    Liver transplant status    Type 2 diabetes mellitus without complication    Hyperkalemia    Transaminitis    Post-transplant diabetes mellitus (Multi)      - Biliary Stent (3/21): anastomotic stricture with 2 plastic stents; will plan repeat ERCP 6-8 weeks  - Tacrolimus continue 4 mg bid; never made change goal 8-10 ng/mL  - Acyclovir stops per protocol this week  - Labs weekly  - RTC 3 weeks    I spent 30 minutes in the professional and overall care of this patient, including immunosuppression management.    Lois Kline MD, PHD, MPH, FACS  Chief Transplant and Hepatobiliary Surgery

## 2025-04-15 ENCOUNTER — DOCUMENTATION (OUTPATIENT)
Dept: TRANSPLANT | Facility: HOSPITAL | Age: 55
End: 2025-04-15
Payer: MEDICAID

## 2025-04-15 ENCOUNTER — APPOINTMENT (OUTPATIENT)
Facility: HOSPITAL | Age: 55
End: 2025-04-15
Payer: MEDICAID

## 2025-04-15 DIAGNOSIS — Z94.4 LIVER REPLACED BY TRANSPLANT (MULTI): ICD-10-CM

## 2025-04-16 ENCOUNTER — TELEPHONE (OUTPATIENT)
Dept: TRANSPLANT | Facility: HOSPITAL | Age: 55
End: 2025-04-16

## 2025-04-16 ENCOUNTER — LAB (OUTPATIENT)
Dept: LAB | Facility: HOSPITAL | Age: 55
End: 2025-04-16
Payer: MEDICAID

## 2025-04-16 DIAGNOSIS — Z94.4 LIVER TRANSPLANT RECIPIENT (MULTI): ICD-10-CM

## 2025-04-16 DIAGNOSIS — Z94.4 LIVER TRANSPLANT STATUS: Primary | ICD-10-CM

## 2025-04-16 DIAGNOSIS — Z94.0 KIDNEY REPLACED BY TRANSPLANT (HHS-HCC): ICD-10-CM

## 2025-04-16 LAB
ALBUMIN SERPL BCP-MCNC: 3.8 G/DL (ref 3.4–5)
ALP SERPL-CCNC: 409 U/L (ref 33–120)
ALT SERPL W P-5'-P-CCNC: 40 U/L (ref 10–52)
ANION GAP SERPL CALC-SCNC: 13 MMOL/L (ref 10–20)
AST SERPL W P-5'-P-CCNC: 30 U/L (ref 9–39)
BASOPHILS # BLD AUTO: 0.06 X10*3/UL (ref 0–0.1)
BASOPHILS NFR BLD AUTO: 0.9 %
BILIRUB DIRECT SERPL-MCNC: 0.3 MG/DL (ref 0–0.3)
BILIRUB SERPL-MCNC: 0.8 MG/DL (ref 0–1.2)
BUN SERPL-MCNC: 37 MG/DL (ref 6–23)
CALCIUM SERPL-MCNC: 8.9 MG/DL (ref 8.6–10.3)
CHLORIDE SERPL-SCNC: 108 MMOL/L (ref 98–107)
CO2 SERPL-SCNC: 20 MMOL/L (ref 21–32)
CREAT SERPL-MCNC: 1.99 MG/DL (ref 0.5–1.3)
EGFRCR SERPLBLD CKD-EPI 2021: 39 ML/MIN/1.73M*2
EOSINOPHIL # BLD AUTO: 0.11 X10*3/UL (ref 0–0.7)
EOSINOPHIL NFR BLD AUTO: 1.6 %
ERYTHROCYTE [DISTWIDTH] IN BLOOD BY AUTOMATED COUNT: 16.2 % (ref 11.5–14.5)
GGT SERPL-CCNC: 367 U/L (ref 5–64)
GLUCOSE SERPL-MCNC: 135 MG/DL (ref 74–99)
HCT VFR BLD AUTO: 32.3 % (ref 41–52)
HGB BLD-MCNC: 10.1 G/DL (ref 13.5–17.5)
IMM GRANULOCYTES # BLD AUTO: 0.04 X10*3/UL (ref 0–0.7)
IMM GRANULOCYTES NFR BLD AUTO: 0.6 % (ref 0–0.9)
LYMPHOCYTES # BLD AUTO: 1.11 X10*3/UL (ref 1.2–4.8)
LYMPHOCYTES NFR BLD AUTO: 16 %
MAGNESIUM SERPL-MCNC: 1.59 MG/DL (ref 1.6–2.4)
MCH RBC QN AUTO: 33.2 PG (ref 26–34)
MCHC RBC AUTO-ENTMCNC: 31.3 G/DL (ref 32–36)
MCV RBC AUTO: 106 FL (ref 80–100)
MONOCYTES # BLD AUTO: 0.44 X10*3/UL (ref 0.1–1)
MONOCYTES NFR BLD AUTO: 6.3 %
NEUTROPHILS # BLD AUTO: 5.18 X10*3/UL (ref 1.2–7.7)
NEUTROPHILS NFR BLD AUTO: 74.6 %
NRBC BLD-RTO: 0 /100 WBCS (ref 0–0)
PHOSPHATE SERPL-MCNC: 3.1 MG/DL (ref 2.5–4.9)
PLATELET # BLD AUTO: 188 X10*3/UL (ref 150–450)
POTASSIUM SERPL-SCNC: 4.8 MMOL/L (ref 3.5–5.3)
PROT SERPL-MCNC: 6.8 G/DL (ref 6.4–8.2)
RBC # BLD AUTO: 3.04 X10*6/UL (ref 4.5–5.9)
SODIUM SERPL-SCNC: 136 MMOL/L (ref 136–145)
TACROLIMUS BLD-MCNC: 12.3 NG/ML
WBC # BLD AUTO: 6.9 X10*3/UL (ref 4.4–11.3)

## 2025-04-16 PROCEDURE — 82248 BILIRUBIN DIRECT: CPT

## 2025-04-16 PROCEDURE — 85025 COMPLETE CBC W/AUTO DIFF WBC: CPT

## 2025-04-16 PROCEDURE — 83735 ASSAY OF MAGNESIUM: CPT

## 2025-04-16 RX ORDER — TACROLIMUS 1 MG/1
3 CAPSULE ORAL 2 TIMES DAILY
Qty: 540 CAPSULE | Refills: 3 | Status: SHIPPED | OUTPATIENT
Start: 2025-04-16 | End: 2026-04-16

## 2025-04-16 RX ORDER — MYCOPHENOLATE MOFETIL 250 MG/1
750 CAPSULE ORAL
Qty: 540 CAPSULE | Refills: 3 | Status: SHIPPED | OUTPATIENT
Start: 2025-04-16 | End: 2026-04-16

## 2025-04-16 NOTE — RESULT ENCOUNTER NOTE
Labs reviewed in liver 365, increase Mycophenoalte to 750mg bid and decrease tacrolimus to 3mg bid.

## 2025-04-16 NOTE — TELEPHONE ENCOUNTER
Tried to call patient to change medications, patient did not pick phone, unable to leave message, voice inbox full.

## 2025-04-17 ENCOUNTER — APPOINTMENT (OUTPATIENT)
Facility: HOSPITAL | Age: 55
End: 2025-04-17
Payer: MEDICAID

## 2025-04-17 ENCOUNTER — TELEPHONE (OUTPATIENT)
Dept: TRANSPLANT | Facility: HOSPITAL | Age: 55
End: 2025-04-17

## 2025-04-17 NOTE — TELEPHONE ENCOUNTER
Labs reviewed in kidney 365, increase Mycophenolate to 750mg twice a day and decrease tacrolimus to 3mg twice a day.  Left voicemail, asked him to call back with confirmation that he has this message.

## 2025-04-18 DIAGNOSIS — Z94.4 LIVER REPLACED BY TRANSPLANT (MULTI): ICD-10-CM

## 2025-04-19 DIAGNOSIS — Z94.4 LIVER TRANSPLANT RECIPIENT (MULTI): ICD-10-CM

## 2025-04-19 DIAGNOSIS — Z94.0 KIDNEY REPLACED BY TRANSPLANT (HHS-HCC): ICD-10-CM

## 2025-04-20 RX ORDER — ACYCLOVIR 400 MG/1
400 TABLET ORAL 2 TIMES DAILY
Qty: 60 TABLET | Refills: 1 | Status: SHIPPED | OUTPATIENT
Start: 2025-04-20 | End: 2025-06-19

## 2025-04-22 DIAGNOSIS — Z94.4 LIVER REPLACED BY TRANSPLANT (MULTI): ICD-10-CM

## 2025-04-23 ENCOUNTER — LAB (OUTPATIENT)
Dept: LAB | Facility: HOSPITAL | Age: 55
End: 2025-04-23
Payer: MEDICAID

## 2025-04-23 DIAGNOSIS — Z94.4 LIVER TRANSPLANT STATUS: Primary | ICD-10-CM

## 2025-04-23 LAB
ALBUMIN SERPL BCP-MCNC: 3.9 G/DL (ref 3.4–5)
ALP SERPL-CCNC: 387 U/L (ref 33–120)
ALT SERPL W P-5'-P-CCNC: 32 U/L (ref 10–52)
ANION GAP SERPL CALC-SCNC: 14 MMOL/L (ref 10–20)
AST SERPL W P-5'-P-CCNC: 28 U/L (ref 9–39)
BASOPHILS # BLD AUTO: 0.07 X10*3/UL (ref 0–0.1)
BASOPHILS NFR BLD AUTO: 0.9 %
BILIRUB DIRECT SERPL-MCNC: 0.3 MG/DL (ref 0–0.3)
BILIRUB SERPL-MCNC: 0.8 MG/DL (ref 0–1.2)
BUN SERPL-MCNC: 40 MG/DL (ref 6–23)
CALCIUM SERPL-MCNC: 9.1 MG/DL (ref 8.6–10.3)
CHLORIDE SERPL-SCNC: 106 MMOL/L (ref 98–107)
CO2 SERPL-SCNC: 20 MMOL/L (ref 21–32)
CREAT SERPL-MCNC: 2.06 MG/DL (ref 0.5–1.3)
EGFRCR SERPLBLD CKD-EPI 2021: 37 ML/MIN/1.73M*2
EOSINOPHIL # BLD AUTO: 0.12 X10*3/UL (ref 0–0.7)
EOSINOPHIL NFR BLD AUTO: 1.5 %
ERYTHROCYTE [DISTWIDTH] IN BLOOD BY AUTOMATED COUNT: 15.4 % (ref 11.5–14.5)
GGT SERPL-CCNC: 313 U/L (ref 5–64)
GLUCOSE SERPL-MCNC: 136 MG/DL (ref 74–99)
HCT VFR BLD AUTO: 34.4 % (ref 41–52)
HGB BLD-MCNC: 10.8 G/DL (ref 13.5–17.5)
IMM GRANULOCYTES # BLD AUTO: 0.05 X10*3/UL (ref 0–0.7)
IMM GRANULOCYTES NFR BLD AUTO: 0.6 % (ref 0–0.9)
LYMPHOCYTES # BLD AUTO: 0.95 X10*3/UL (ref 1.2–4.8)
LYMPHOCYTES NFR BLD AUTO: 11.7 %
MAGNESIUM SERPL-MCNC: 1.68 MG/DL (ref 1.6–2.4)
MCH RBC QN AUTO: 32.7 PG (ref 26–34)
MCHC RBC AUTO-ENTMCNC: 31.4 G/DL (ref 32–36)
MCV RBC AUTO: 104 FL (ref 80–100)
MONOCYTES # BLD AUTO: 0.62 X10*3/UL (ref 0.1–1)
MONOCYTES NFR BLD AUTO: 7.6 %
NEUTROPHILS # BLD AUTO: 6.31 X10*3/UL (ref 1.2–7.7)
NEUTROPHILS NFR BLD AUTO: 77.7 %
NRBC BLD-RTO: 0 /100 WBCS (ref 0–0)
PHOSPHATE SERPL-MCNC: 4 MG/DL (ref 2.5–4.9)
PLATELET # BLD AUTO: 202 X10*3/UL (ref 150–450)
POTASSIUM SERPL-SCNC: 5 MMOL/L (ref 3.5–5.3)
PROT SERPL-MCNC: 6.9 G/DL (ref 6.4–8.2)
RBC # BLD AUTO: 3.3 X10*6/UL (ref 4.5–5.9)
SODIUM SERPL-SCNC: 135 MMOL/L (ref 136–145)
TACROLIMUS BLD-MCNC: 9.4 NG/ML
WBC # BLD AUTO: 8.1 X10*3/UL (ref 4.4–11.3)

## 2025-04-23 PROCEDURE — 84100 ASSAY OF PHOSPHORUS: CPT

## 2025-04-23 PROCEDURE — 80197 ASSAY OF TACROLIMUS: CPT

## 2025-04-23 PROCEDURE — 82248 BILIRUBIN DIRECT: CPT

## 2025-04-23 PROCEDURE — 80053 COMPREHEN METABOLIC PANEL: CPT

## 2025-04-23 PROCEDURE — 85025 COMPLETE CBC W/AUTO DIFF WBC: CPT

## 2025-04-23 PROCEDURE — 83735 ASSAY OF MAGNESIUM: CPT

## 2025-04-23 PROCEDURE — 82977 ASSAY OF GGT: CPT

## 2025-04-24 ENCOUNTER — TELEPHONE (OUTPATIENT)
Dept: TRANSPLANT | Facility: HOSPITAL | Age: 55
End: 2025-04-24
Payer: MEDICAID

## 2025-04-24 DIAGNOSIS — Z94.0 KIDNEY REPLACED BY TRANSPLANT (HHS-HCC): ICD-10-CM

## 2025-04-24 DIAGNOSIS — Z76.82 PRE-LIVER TRANSPLANT, LISTED: ICD-10-CM

## 2025-04-24 NOTE — TELEPHONE ENCOUNTER
Labs reviewed by Dr. Wright, to get kidney biopsy.  Spoke with Goran about needing a biopsy and to get PT/INR drawn before the biopsy.

## 2025-04-25 DIAGNOSIS — Z94.4 LIVER REPLACED BY TRANSPLANT (MULTI): ICD-10-CM

## 2025-04-25 DIAGNOSIS — Z94.0 KIDNEY REPLACED BY TRANSPLANT (HHS-HCC): ICD-10-CM

## 2025-04-29 DIAGNOSIS — Z94.4 LIVER REPLACED BY TRANSPLANT (MULTI): ICD-10-CM

## 2025-04-30 ENCOUNTER — LAB (OUTPATIENT)
Dept: LAB | Facility: HOSPITAL | Age: 55
End: 2025-04-30
Payer: MEDICAID

## 2025-04-30 DIAGNOSIS — Z94.0 KIDNEY TRANSPLANT STATUS: ICD-10-CM

## 2025-04-30 DIAGNOSIS — Z94.4 LIVER TRANSPLANT STATUS: Primary | ICD-10-CM

## 2025-04-30 LAB
ALBUMIN SERPL BCP-MCNC: 3.7 G/DL (ref 3.4–5)
ALP SERPL-CCNC: 423 U/L (ref 33–120)
ALT SERPL W P-5'-P-CCNC: 88 U/L (ref 10–52)
ANION GAP SERPL CALC-SCNC: 13 MMOL/L (ref 10–20)
AST SERPL W P-5'-P-CCNC: 109 U/L (ref 9–39)
BASOPHILS # BLD AUTO: 0.03 X10*3/UL (ref 0–0.1)
BASOPHILS NFR BLD AUTO: 0.5 %
BILIRUB DIRECT SERPL-MCNC: 0.8 MG/DL (ref 0–0.3)
BILIRUB SERPL-MCNC: 1.2 MG/DL (ref 0–1.2)
BUN SERPL-MCNC: 40 MG/DL (ref 6–23)
CALCIUM SERPL-MCNC: 8.6 MG/DL (ref 8.6–10.3)
CHLORIDE SERPL-SCNC: 107 MMOL/L (ref 98–107)
CO2 SERPL-SCNC: 20 MMOL/L (ref 21–32)
CREAT SERPL-MCNC: 1.86 MG/DL (ref 0.5–1.3)
EGFRCR SERPLBLD CKD-EPI 2021: 42 ML/MIN/1.73M*2
EOSINOPHIL # BLD AUTO: 0.08 X10*3/UL (ref 0–0.7)
EOSINOPHIL NFR BLD AUTO: 1.2 %
ERYTHROCYTE [DISTWIDTH] IN BLOOD BY AUTOMATED COUNT: 14.6 % (ref 11.5–14.5)
GGT SERPL-CCNC: 418 U/L (ref 5–64)
GLUCOSE SERPL-MCNC: 167 MG/DL (ref 74–99)
HCT VFR BLD AUTO: 30.6 % (ref 41–52)
HGB BLD-MCNC: 9.8 G/DL (ref 13.5–17.5)
IMM GRANULOCYTES # BLD AUTO: 0.11 X10*3/UL (ref 0–0.7)
IMM GRANULOCYTES NFR BLD AUTO: 1.7 % (ref 0–0.9)
INR PPP: 1.2 (ref 0.9–1.1)
LYMPHOCYTES # BLD AUTO: 0.39 X10*3/UL (ref 1.2–4.8)
LYMPHOCYTES NFR BLD AUTO: 5.9 %
MAGNESIUM SERPL-MCNC: 1.45 MG/DL (ref 1.6–2.4)
MCH RBC QN AUTO: 33.1 PG (ref 26–34)
MCHC RBC AUTO-ENTMCNC: 32 G/DL (ref 32–36)
MCV RBC AUTO: 103 FL (ref 80–100)
MONOCYTES # BLD AUTO: 0.49 X10*3/UL (ref 0.1–1)
MONOCYTES NFR BLD AUTO: 7.4 %
NEUTROPHILS # BLD AUTO: 5.48 X10*3/UL (ref 1.2–7.7)
NEUTROPHILS NFR BLD AUTO: 83.3 %
NRBC BLD-RTO: 0 /100 WBCS (ref 0–0)
PHOSPHATE SERPL-MCNC: 2.3 MG/DL (ref 2.5–4.9)
PLATELET # BLD AUTO: 158 X10*3/UL (ref 150–450)
POTASSIUM SERPL-SCNC: 4.9 MMOL/L (ref 3.5–5.3)
PROT SERPL-MCNC: 6.2 G/DL (ref 6.4–8.2)
PROTHROMBIN TIME: 13.3 SECONDS (ref 9.8–12.4)
RBC # BLD AUTO: 2.96 X10*6/UL (ref 4.5–5.9)
SODIUM SERPL-SCNC: 135 MMOL/L (ref 136–145)
TACROLIMUS BLD-MCNC: 5.8 NG/ML
WBC # BLD AUTO: 6.6 X10*3/UL (ref 4.4–11.3)

## 2025-04-30 PROCEDURE — 36415 COLL VENOUS BLD VENIPUNCTURE: CPT

## 2025-04-30 PROCEDURE — 85610 PROTHROMBIN TIME: CPT

## 2025-04-30 PROCEDURE — 85025 COMPLETE CBC W/AUTO DIFF WBC: CPT

## 2025-04-30 PROCEDURE — 80053 COMPREHEN METABOLIC PANEL: CPT

## 2025-04-30 PROCEDURE — 84100 ASSAY OF PHOSPHORUS: CPT

## 2025-04-30 PROCEDURE — 82977 ASSAY OF GGT: CPT

## 2025-04-30 PROCEDURE — 83735 ASSAY OF MAGNESIUM: CPT

## 2025-04-30 PROCEDURE — 80197 ASSAY OF TACROLIMUS: CPT

## 2025-04-30 PROCEDURE — 82248 BILIRUBIN DIRECT: CPT

## 2025-05-01 ENCOUNTER — TELEPHONE (OUTPATIENT)
Dept: TRANSPLANT | Facility: HOSPITAL | Age: 55
End: 2025-05-01
Payer: MEDICAID

## 2025-05-01 NOTE — TELEPHONE ENCOUNTER
Labs reviewed by Dr. Wright, stent may be getting clogged, to get labs early next week.  Spoke with Goran about getting more labs.

## 2025-05-02 ENCOUNTER — OFFICE VISIT (OUTPATIENT)
Facility: HOSPITAL | Age: 55
End: 2025-05-02
Payer: MEDICAID

## 2025-05-02 VITALS
BODY MASS INDEX: 31.81 KG/M2 | TEMPERATURE: 97.3 F | WEIGHT: 221.7 LBS | OXYGEN SATURATION: 97 % | DIASTOLIC BLOOD PRESSURE: 72 MMHG | SYSTOLIC BLOOD PRESSURE: 115 MMHG | HEART RATE: 76 BPM

## 2025-05-02 DIAGNOSIS — Z94.4 LIVER TRANSPLANT RECIPIENT (MULTI): ICD-10-CM

## 2025-05-02 DIAGNOSIS — Z94.4 LIVER REPLACED BY TRANSPLANT (MULTI): ICD-10-CM

## 2025-05-02 DIAGNOSIS — Z94.0 KIDNEY REPLACED BY TRANSPLANT (HHS-HCC): ICD-10-CM

## 2025-05-02 PROCEDURE — 3074F SYST BP LT 130 MM HG: CPT | Performed by: STUDENT IN AN ORGANIZED HEALTH CARE EDUCATION/TRAINING PROGRAM

## 2025-05-02 PROCEDURE — 99214 OFFICE O/P EST MOD 30 MIN: CPT | Mod: 24

## 2025-05-02 PROCEDURE — 99214 OFFICE O/P EST MOD 30 MIN: CPT

## 2025-05-02 PROCEDURE — 3048F LDL-C <100 MG/DL: CPT | Performed by: STUDENT IN AN ORGANIZED HEALTH CARE EDUCATION/TRAINING PROGRAM

## 2025-05-02 PROCEDURE — 3060F POS MICROALBUMINURIA REV: CPT | Performed by: STUDENT IN AN ORGANIZED HEALTH CARE EDUCATION/TRAINING PROGRAM

## 2025-05-02 PROCEDURE — 3044F HG A1C LEVEL LT 7.0%: CPT | Performed by: STUDENT IN AN ORGANIZED HEALTH CARE EDUCATION/TRAINING PROGRAM

## 2025-05-02 PROCEDURE — 3078F DIAST BP <80 MM HG: CPT | Performed by: STUDENT IN AN ORGANIZED HEALTH CARE EDUCATION/TRAINING PROGRAM

## 2025-05-02 RX ORDER — TACROLIMUS 1 MG/1
4 CAPSULE ORAL 2 TIMES DAILY
Qty: 720 CAPSULE | Refills: 3 | Status: SHIPPED | OUTPATIENT
Start: 2025-05-02 | End: 2026-05-02

## 2025-05-02 ASSESSMENT — PAIN SCALES - GENERAL: PAINLEVEL_OUTOF10: 0-NO PAIN

## 2025-05-02 NOTE — PROGRESS NOTES
Goran Ibrahim is a 54 y.o. male POD#81 from SLK from a DCD donor.    Biopsy pending Thursday  Follow lft - may need ercp earlier  - no acute events    Objective   Gen: A+OX3; NAD  HEENT: PERRL, sclera anicteric, MMM  Cardiac: RRR  Chest: Normal inspiratory effort  Abdomen: S/NT/ND. Incision C/D/I.  Ext: No LE edema    Last Recorded Vitals  Visit Vitals  /72   Pulse 76   Temp 36.3 °C (97.3 °F) (Temporal)   Assessment/Plan   Principal Problem:    Liver/Kidney transplant recipient    Active Problems:  Patient Active Problem List   Diagnosis    Hypertension    Liver cirrhosis (Multi)    Esophageal varices in alcoholic cirrhosis (Multi)    Hepatic cirrhosis, unspecified hepatic cirrhosis type, unspecified whether ascites present (Multi)    ESRD (end stage renal disease) (Multi)    Steroid-induced hyperglycemia    Kidney replaced by transplant (HHS-HCC)    Liver transplant status    Type 2 diabetes mellitus without complication    Hyperkalemia    Transaminitis    Post-transplant diabetes mellitus (Multi)      Mr. Ibrahim is a 54 y.o. male who underwent  transplant surgery on 1/20/2025 (Liver / Kidney).  DCD, caval piggyback, rCHA to dSMA/celiac stack HA, duct to duct (small to large with v plasty)   Explant - no malignancy      Readmit for rising LFT  MRCP with biliary stricture     S/p K  - MRCP with stent - s/p ERCP 3/21 - repeat 8-12 weeks  - Cont ursodiol  - Hydrate, follow Cr. Baseline 2. Up to 2.75. Possible 2/2 tac  - Repeat renal US today  - Consider renal biopsy. Appreciate tx neph     2. Immunosuppression reviewed and adjusted       Tacrolimus: Yes - 3 mg po BID        Mycophenolate: Yes - 500 mg po BID   (infection)      Prednisone: Yes -  5 mg daily      Belatacept: N/A

## 2025-05-02 NOTE — PATIENT INSTRUCTIONS
Increase tacrolimus to 4mg every 12 hours  Make sure you are taking Mycophenolate 750mg every 12 hours.  Stop Acyclovir  Return to clinic in 1 month  Labs weekly

## 2025-05-05 ENCOUNTER — LAB (OUTPATIENT)
Dept: LAB | Facility: HOSPITAL | Age: 55
End: 2025-05-05
Payer: MEDICAID

## 2025-05-05 DIAGNOSIS — Z94.4 LIVER TRANSPLANT STATUS: Primary | ICD-10-CM

## 2025-05-05 LAB
ALBUMIN SERPL BCP-MCNC: 3.8 G/DL (ref 3.4–5)
ALP SERPL-CCNC: 489 U/L (ref 33–120)
ALT SERPL W P-5'-P-CCNC: 64 U/L (ref 10–52)
ANION GAP SERPL CALC-SCNC: 16 MMOL/L (ref 10–20)
AST SERPL W P-5'-P-CCNC: 36 U/L (ref 9–39)
BASOPHILS # BLD AUTO: 0.06 X10*3/UL (ref 0–0.1)
BASOPHILS NFR BLD AUTO: 0.9 %
BILIRUB DIRECT SERPL-MCNC: 0.5 MG/DL (ref 0–0.3)
BILIRUB SERPL-MCNC: 1.1 MG/DL (ref 0–1.2)
BUN SERPL-MCNC: 47 MG/DL (ref 6–23)
CALCIUM SERPL-MCNC: 9.1 MG/DL (ref 8.6–10.3)
CHLORIDE SERPL-SCNC: 102 MMOL/L (ref 98–107)
CO2 SERPL-SCNC: 20 MMOL/L (ref 21–32)
CREAT SERPL-MCNC: 2.47 MG/DL (ref 0.5–1.3)
EGFRCR SERPLBLD CKD-EPI 2021: 30 ML/MIN/1.73M*2
EOSINOPHIL # BLD AUTO: 0.17 X10*3/UL (ref 0–0.7)
EOSINOPHIL NFR BLD AUTO: 2.7 %
ERYTHROCYTE [DISTWIDTH] IN BLOOD BY AUTOMATED COUNT: 14.5 % (ref 11.5–14.5)
GGT SERPL-CCNC: 433 U/L (ref 5–64)
GLUCOSE SERPL-MCNC: 126 MG/DL (ref 74–99)
HCT VFR BLD AUTO: 33.9 % (ref 41–52)
HGB BLD-MCNC: 10.8 G/DL (ref 13.5–17.5)
IMM GRANULOCYTES # BLD AUTO: 0.12 X10*3/UL (ref 0–0.7)
IMM GRANULOCYTES NFR BLD AUTO: 1.9 % (ref 0–0.9)
INR PPP: 1.2 (ref 0.9–1.1)
LYMPHOCYTES # BLD AUTO: 0.92 X10*3/UL (ref 1.2–4.8)
LYMPHOCYTES NFR BLD AUTO: 14.4 %
MAGNESIUM SERPL-MCNC: 1.85 MG/DL (ref 1.6–2.4)
MCH RBC QN AUTO: 32.4 PG (ref 26–34)
MCHC RBC AUTO-ENTMCNC: 31.9 G/DL (ref 32–36)
MCV RBC AUTO: 102 FL (ref 80–100)
MONOCYTES # BLD AUTO: 0.66 X10*3/UL (ref 0.1–1)
MONOCYTES NFR BLD AUTO: 10.3 %
NEUTROPHILS # BLD AUTO: 4.46 X10*3/UL (ref 1.2–7.7)
NEUTROPHILS NFR BLD AUTO: 69.8 %
NRBC BLD-RTO: 0 /100 WBCS (ref 0–0)
PHOSPHATE SERPL-MCNC: 4.4 MG/DL (ref 2.5–4.9)
PLATELET # BLD AUTO: 232 X10*3/UL (ref 150–450)
POTASSIUM SERPL-SCNC: 4.6 MMOL/L (ref 3.5–5.3)
PROT SERPL-MCNC: 7.1 G/DL (ref 6.4–8.2)
PROTHROMBIN TIME: 13.2 SECONDS (ref 9.8–12.4)
RBC # BLD AUTO: 3.33 X10*6/UL (ref 4.5–5.9)
SODIUM SERPL-SCNC: 133 MMOL/L (ref 136–145)
WBC # BLD AUTO: 6.4 X10*3/UL (ref 4.4–11.3)

## 2025-05-05 PROCEDURE — 82248 BILIRUBIN DIRECT: CPT

## 2025-05-05 PROCEDURE — 85025 COMPLETE CBC W/AUTO DIFF WBC: CPT

## 2025-05-05 PROCEDURE — 80053 COMPREHEN METABOLIC PANEL: CPT

## 2025-05-05 PROCEDURE — 80197 ASSAY OF TACROLIMUS: CPT

## 2025-05-05 PROCEDURE — 85610 PROTHROMBIN TIME: CPT

## 2025-05-05 PROCEDURE — 36415 COLL VENOUS BLD VENIPUNCTURE: CPT

## 2025-05-05 PROCEDURE — 83735 ASSAY OF MAGNESIUM: CPT

## 2025-05-05 PROCEDURE — 84100 ASSAY OF PHOSPHORUS: CPT

## 2025-05-05 PROCEDURE — 82977 ASSAY OF GGT: CPT

## 2025-05-06 ENCOUNTER — HOSPITAL ENCOUNTER (OUTPATIENT)
Dept: RADIOLOGY | Facility: HOSPITAL | Age: 55
Discharge: HOME | End: 2025-05-06
Payer: MEDICAID

## 2025-05-06 VITALS
SYSTOLIC BLOOD PRESSURE: 113 MMHG | HEART RATE: 71 BPM | RESPIRATION RATE: 24 BRPM | TEMPERATURE: 97.7 F | OXYGEN SATURATION: 97 % | DIASTOLIC BLOOD PRESSURE: 72 MMHG

## 2025-05-06 DIAGNOSIS — Z94.4 LIVER REPLACED BY TRANSPLANT (MULTI): ICD-10-CM

## 2025-05-06 DIAGNOSIS — Z94.0 KIDNEY REPLACED BY TRANSPLANT (HHS-HCC): ICD-10-CM

## 2025-05-06 LAB — TACROLIMUS BLD-MCNC: 11.8 NG/ML

## 2025-05-06 PROCEDURE — 7100000010 HC PHASE TWO TIME - EACH INCREMENTAL 1 MINUTE

## 2025-05-06 PROCEDURE — 2720000007 HC OR 272 NO HCPCS

## 2025-05-06 PROCEDURE — 50200 RENAL BIOPSY PERQ: CPT

## 2025-05-06 PROCEDURE — 2500000004 HC RX 250 GENERAL PHARMACY W/ HCPCS (ALT 636 FOR OP/ED): Mod: SE | Performed by: RADIOLOGY

## 2025-05-06 PROCEDURE — 7100000009 HC PHASE TWO TIME - INITIAL BASE CHARGE

## 2025-05-06 PROCEDURE — 99152 MOD SED SAME PHYS/QHP 5/>YRS: CPT | Performed by: RADIOLOGY

## 2025-05-06 PROCEDURE — 99152 MOD SED SAME PHYS/QHP 5/>YRS: CPT

## 2025-05-06 RX ORDER — FENTANYL CITRATE 50 UG/ML
INJECTION, SOLUTION INTRAMUSCULAR; INTRAVENOUS
Status: COMPLETED | OUTPATIENT
Start: 2025-05-06 | End: 2025-05-06

## 2025-05-06 RX ORDER — MIDAZOLAM HYDROCHLORIDE 1 MG/ML
INJECTION INTRAMUSCULAR; INTRAVENOUS
Status: COMPLETED | OUTPATIENT
Start: 2025-05-06 | End: 2025-05-06

## 2025-05-06 RX ADMIN — MIDAZOLAM HYDROCHLORIDE 1 MG: 2 INJECTION, SOLUTION INTRAMUSCULAR; INTRAVENOUS at 08:28

## 2025-05-06 RX ADMIN — FENTANYL CITRATE 50 MCG: 50 INJECTION, SOLUTION INTRAMUSCULAR; INTRAVENOUS at 08:28

## 2025-05-06 RX ADMIN — FENTANYL CITRATE 50 MCG: 50 INJECTION, SOLUTION INTRAMUSCULAR; INTRAVENOUS at 08:31

## 2025-05-06 RX ADMIN — MIDAZOLAM HYDROCHLORIDE 1 MG: 2 INJECTION, SOLUTION INTRAMUSCULAR; INTRAVENOUS at 08:30

## 2025-05-06 ASSESSMENT — PAIN SCALES - GENERAL
PAINLEVEL_OUTOF10: 0 - NO PAIN

## 2025-05-06 ASSESSMENT — PAIN - FUNCTIONAL ASSESSMENT
PAIN_FUNCTIONAL_ASSESSMENT: 0-10

## 2025-05-06 NOTE — Clinical Note
MD completed procedure pt tre well gauze with tegaderm DSG RLQ clean dry and intact no bleeding noted .

## 2025-05-06 NOTE — Clinical Note
Pt prepped and draped in sterile fashion ME began procedure with local anesthesia and US guidance pt tre well VSS no c/o

## 2025-05-06 NOTE — DISCHARGE INSTRUCTIONS
Follow instructions on Kidney Biopsy patient info sheet.    - Keep bandage clean and dry for 24 hrs, then may remove and shower. No soaking for 3 days until site is scabbed over. Monitor for signs of bleeding and infection. No heavy lifting more than 10 lbs for 10 days.    You received moderate sedation:  - Do not drive a car, or operate any machinery or power tools of any kind.  - Do not drink any alcoholic drinks.  - Do not take any over the counter medications that may cause drowsiness.  - Do not make any important decisions or sign any legal documents.  - You need to have a responsible adult accompany you home.  - You may resume your normal diet.  - We strongly suggest that a responsible adult be with you for the rest of the day and also during the night. This is for your protection and safety.     For questions related to your procedure:  Please call [phone] between the hours of 7:00am-5:00pm Monday through Friday.  Please call [phone] after 5:00pm and on weekends and holidays.     In the event of an emergency call [phone] or go to your nearest emergency room.

## 2025-05-06 NOTE — RESULT ENCOUNTER NOTE
Labs reviewed with Dr. Wright, renal biopsy pending, she will message GI to hopefully get ERCP sooner.  No other changes advised.

## 2025-05-06 NOTE — POST-PROCEDURE NOTE
Interventional Radiology Post-Procedure Note    Transplant Renal Biopsy    Procedure Details:  Technically successful and uncomplicated right iliac fossa transplant renal biopsy.  Please see PACS for full procedural details.    Patient Tolerance: good  Complications: None    Indication for procedure: The encounter diagnosis was Kidney replaced by transplant ([facility]).    Pre-Procedure Verification and Time Out:  · Procedure Location procedure area   · HUDDLE - Pre-procedure Verification completed   · TIME OUT - Final Verification completed immediately prior to procedure start   · DEBRIEF completed     General Information:  Date/Time of Procedure: 05/06/25 at 8:51 AM  Indication(s): c/f rejection  Findings: See PACS  Procedure performed by: Demar Padilla MD   Assistant(s): Dr. Thuy Santiago MD  Estimated Blood Loss (mL): minimal  Specimen: Yes, 2 core specimens  Informed Consent: written consent obtained    Prep:  Ultrasound Guided Insertion: Yes  Large Drape, Hand Hygiene, Surgical Cap, Surgical Mask, Sterile Gown, Sterile Gloves, Glasses, and Scrubs  Patient Position: Supine  Site Prep: chlorhexidine, draped, usual sterile procedure followed    Anesthesia/Medications:  Procedural Sedation:  Fentanyl: 100 mcg  Versed: 2 mg  1% Lidocaine: 8 mL    Demar Padilla MD, PGY-6  Interventional Radiology  IR pager: [phone]    NON-Urgent on call weekends and after hours weekdays (5pm - 5am) IR pager: [phone]  Urgent & emergent on call weekends and after hours weekdays (5pm-7am) IR pager: [phone]

## 2025-05-06 NOTE — PRE-PROCEDURE NOTE
INTERVENTIONAL RADIOLOGY PRE-PROCEDURE NOTE    Goran Ibrahim is a 54 y.o. male with PMHx of renal transplant w/ c/f rejection who presents to the interventional radiology department for renal transplant biopsy.    Procedure: renal transplant biopsy    Indication for procedure: The encounter diagnosis was Kidney replaced by transplant ([facility]).    Medical History[1]   Surgical History[2]    Relevant Labs:   Lab Results   Component Value Date    CREATININE 2.47 (H) 05/05/2025    EGFR 30 (L) 05/05/2025    INR 1.2 (H) 05/05/2025    PROTIME 13.2 (H) 05/05/2025       Planned Sedation/Anesthesia: Moderate    Directed physical examination:    General: Normal appearance, behavior, cognition and NAD  Heart: Heart regular rate and rhythm  Lungs: No increased work of breathing  Abdomen: soft and nontender  Psych: oriented to time, place and person    Current Medications[3]     Mallampati: II (hard and soft palate, upper portion of tonsils anduvula visible)    ASA Score: ASA 2 - Patient with mild systemic disease with no functional limitations    Benefits, risks and alternatives of procedure and planned sedation have been discussed with the patient and/or their representative. All questions answered and they agree to proceed.     Demar Padilla MD, PGY-6  Vascular & Interventional Radiology  IR pager: [phone]    NON-Urgent on call weekends and after hours weekdays (5pm - 5am) IR pager: [phone]  Urgent & emergent on call weekends and after hours weekdays (5pm-7am) IR pager: [phone]          [1]   Past Medical History:  Diagnosis Date    Cirrhosis (Multi)     CKD (chronic kidney disease)     Hypertension     Type 2 diabetes mellitus    [2]   Past Surgical History:  Procedure Laterality Date    IR ANGIOGRAM CELIAC  05/19/2023    IR ANGIOGRAM CELIAC 5/19/2023    LIVER TRANSPLANTATION      TRANSPLANTATION RENAL      US GUIDED ABDOMINAL PARACENTESIS  06/22/2023    US GUIDED ABDOMINAL PARACENTESIS 6/22/2023    US GUIDED ABDOMINAL  PARACENTESIS  05/19/2023    US GUIDED ABDOMINAL PARACENTESIS 5/19/2023   [3]   Current Outpatient Medications:     aspirin 81 mg EC tablet, Take 1 tablet (81 mg) by mouth once daily., Disp: , Rfl:     blood-glucose meter,continuous (Dexcom G7 ) misc, Use as instructed, Disp: 1 each, Rfl: 0    carvedilol (Coreg) 6.25 mg tablet, Take 1 tablet (6.25 mg) by mouth 2 times a day. Hold for SBP <110 or HR <55, Disp: 180 tablet, Rfl: 3    cholecalciferol (Vitamin D-3) 50 MCG (2000 UT) tablet, Take 1 tablet (50 mcg) by mouth once daily., Disp: 30 tablet, Rfl: 11    hydrALAZINE (Apresoline) 25 mg tablet, Take 1 tablet (25 mg) by mouth 2 times a day., Disp: 180 tablet, Rfl: 3    insulin glargine (Basaglar KwikPen U-100 Insulin) 100 unit/mL (3 mL) pen, Inject 10 Units under the skin once daily in the morning. Take in AM with Prednisone, Disp: 3 mL, Rfl: 3    insulin lispro (HumaLOG KwikPen Insulin) 100 unit/mL pen, Inject 0-10 Units under the skin 3 times daily (morning, midday, late afternoon). Inject 5 units of Lispro subcutaneously two times a day before breakfast and lunch and 3 units with dinner- in addition to the following sliding scale: 0 unit(s) if Blood glucose is between  2 unit(s) if Blood glucose is between 151-200 4 unit(s) if Blood glucose is between 201-250 6 unit(s) if Blood glucose is between 251-300 8 unit(s) if Blood glucose is between 301-350 10 unit(s) if Blood glucose is between 351-400 If blood glucose is greater than 400 mg/dL, expect up to 50 units per day, Disp: 9 mL, Rfl: 3    magnesium oxide (Mag-Ox) 400 mg tablet, Take 2 tablets (800 mg) by mouth 2 times a day., Disp: 240 tablet, Rfl: 11    mycophenolate (Cellcept) 250 mg capsule, Take 3 capsules (750 mg) by mouth every 12 hours., Disp: 540 capsule, Rfl: 3    pantoprazole (ProtoNix) 40 mg EC tablet, Take 1 tablet (40 mg) by mouth once daily in the morning. Take before meals. Do not crush, chew, or split., Disp: 90 tablet, Rfl: 3    pen  "needle, diabetic 32 gauge x 5/32\" needle, 1 each 4 times a day. Use to inject insulin 4 times daily, Disp: 400 each, Rfl: 3    predniSONE (Deltasone) 5 mg tablet, Take 1 tablet (5 mg) by mouth once daily., Disp: 60 tablet, Rfl: 11    sulfamethoxazole-trimethoprim (Bactrim) 400-80 mg tablet, Take 1 tablet by mouth once daily., Disp: 30 tablet, Rfl: 5    tacrolimus (Prograf) 1 mg capsule, Take 4 capsules (4 mg) by mouth 2 times a day., Disp: 720 capsule, Rfl: 3    tamsulosin (Flomax) 0.4 mg 24 hr capsule, Take 1 capsule (0.4 mg) by mouth once daily., Disp: 90 capsule, Rfl: 3    ursodiol (Actigall) 300 mg capsule, Take 1 capsule (300 mg) by mouth 3 times a day., Disp: 90 capsule, Rfl: 11  No current facility-administered medications for this encounter.    "

## 2025-05-07 ENCOUNTER — TELEPHONE (OUTPATIENT)
Dept: TRANSPLANT | Facility: HOSPITAL | Age: 55
End: 2025-05-07
Payer: MEDICAID

## 2025-05-07 DIAGNOSIS — Z94.0 KIDNEY REPLACED BY TRANSPLANT (HHS-HCC): ICD-10-CM

## 2025-05-07 DIAGNOSIS — Z94.4 LIVER TRANSPLANT STATUS: ICD-10-CM

## 2025-05-07 DIAGNOSIS — Z94.4 LIVER TRANSPLANT RECIPIENT (MULTI): ICD-10-CM

## 2025-05-07 NOTE — TELEPHONE ENCOUNTER
Per Dr. Wright, reduce tacrolimus to 3mg bid, get DSA.  Spoke with Goran regarding kidney biopsy results, reducing tacrolimus and ERCP.

## 2025-05-08 ENCOUNTER — APPOINTMENT (OUTPATIENT)
Dept: RADIOLOGY | Facility: HOSPITAL | Age: 55
End: 2025-05-08
Payer: MEDICAID

## 2025-05-08 RX ORDER — TACROLIMUS 1 MG/1
3 CAPSULE ORAL 2 TIMES DAILY
Qty: 540 CAPSULE | Refills: 3 | Status: SHIPPED | OUTPATIENT
Start: 2025-05-08 | End: 2025-05-14 | Stop reason: SDUPTHER

## 2025-05-09 DIAGNOSIS — Z94.4 LIVER REPLACED BY TRANSPLANT (MULTI): ICD-10-CM

## 2025-05-09 DIAGNOSIS — Z94.0 KIDNEY REPLACED BY TRANSPLANT (HHS-HCC): ICD-10-CM

## 2025-05-09 LAB
LAB AP ASR DISCLAIMER: NORMAL
LABORATORY COMMENT REPORT: NORMAL
PATH REPORT.COMMENTS IMP SPEC: NORMAL
PATH REPORT.FINAL DX SPEC: NORMAL
PATH REPORT.GROSS SPEC: NORMAL
PATH REPORT.MICROSCOPIC SPEC OTHER STN: NORMAL
PATH REPORT.RELEVANT HX SPEC: NORMAL
PATH REPORT.TOTAL CANCER: NORMAL

## 2025-05-13 DIAGNOSIS — Z94.4 LIVER REPLACED BY TRANSPLANT (MULTI): ICD-10-CM

## 2025-05-14 ENCOUNTER — NURSE ONLY (OUTPATIENT)
Facility: HOSPITAL | Age: 55
End: 2025-05-14
Payer: MEDICAID

## 2025-05-14 ENCOUNTER — ANESTHESIA EVENT (OUTPATIENT)
Dept: GASTROENTEROLOGY | Facility: HOSPITAL | Age: 55
End: 2025-05-14
Payer: MEDICAID

## 2025-05-14 ENCOUNTER — TELEPHONE (OUTPATIENT)
Facility: HOSPITAL | Age: 55
End: 2025-05-14

## 2025-05-14 ENCOUNTER — ANESTHESIA (OUTPATIENT)
Dept: GASTROENTEROLOGY | Facility: HOSPITAL | Age: 55
End: 2025-05-14
Payer: MEDICAID

## 2025-05-14 ENCOUNTER — HOSPITAL ENCOUNTER (OUTPATIENT)
Dept: GASTROENTEROLOGY | Facility: HOSPITAL | Age: 55
Discharge: HOME | End: 2025-05-14
Payer: MEDICAID

## 2025-05-14 VITALS
TEMPERATURE: 97.7 F | WEIGHT: 210 LBS | RESPIRATION RATE: 18 BRPM | HEIGHT: 70 IN | BODY MASS INDEX: 30.06 KG/M2 | DIASTOLIC BLOOD PRESSURE: 76 MMHG | OXYGEN SATURATION: 98 % | HEART RATE: 78 BPM | SYSTOLIC BLOOD PRESSURE: 111 MMHG

## 2025-05-14 DIAGNOSIS — Z94.4 LIVER TRANSPLANT RECIPIENT (MULTI): ICD-10-CM

## 2025-05-14 DIAGNOSIS — K83.1 BILE DUCT STRICTURE: Primary | ICD-10-CM

## 2025-05-14 DIAGNOSIS — Z94.0 KIDNEY REPLACED BY TRANSPLANT (HHS-HCC): ICD-10-CM

## 2025-05-14 DIAGNOSIS — R74.01 TRANSAMINITIS: ICD-10-CM

## 2025-05-14 LAB
ALBUMIN SERPL BCP-MCNC: 3.9 G/DL (ref 3.4–5)
ALP SERPL-CCNC: 421 U/L (ref 33–120)
ALT SERPL W P-5'-P-CCNC: 30 U/L (ref 10–52)
ANION GAP SERPL CALC-SCNC: 17 MMOL/L (ref 10–20)
AST SERPL W P-5'-P-CCNC: 26 U/L (ref 9–39)
BASOPHILS # BLD AUTO: 0.11 X10*3/UL (ref 0–0.1)
BASOPHILS NFR BLD AUTO: 1 %
BILIRUB DIRECT SERPL-MCNC: 0.4 MG/DL (ref 0–0.3)
BILIRUB SERPL-MCNC: 0.9 MG/DL (ref 0–1.2)
BUN SERPL-MCNC: 41 MG/DL (ref 6–23)
CALCIUM SERPL-MCNC: 9.2 MG/DL (ref 8.6–10.6)
CHLORIDE SERPL-SCNC: 107 MMOL/L (ref 98–107)
CO2 SERPL-SCNC: 17 MMOL/L (ref 21–32)
CREAT SERPL-MCNC: 1.93 MG/DL (ref 0.5–1.3)
CREAT UR-MCNC: 75.3 MG/DL (ref 20–370)
EGFRCR SERPLBLD CKD-EPI 2021: 40 ML/MIN/1.73M*2
EOSINOPHIL # BLD AUTO: 0.17 X10*3/UL (ref 0–0.7)
EOSINOPHIL NFR BLD AUTO: 1.5 %
ERYTHROCYTE [DISTWIDTH] IN BLOOD BY AUTOMATED COUNT: 13.9 % (ref 11.5–14.5)
GGT SERPL-CCNC: 299 U/L (ref 5–64)
GLUCOSE SERPL-MCNC: 151 MG/DL (ref 74–99)
HCT VFR BLD AUTO: 34.6 % (ref 41–52)
HGB BLD-MCNC: 10.9 G/DL (ref 13.5–17.5)
IMM GRANULOCYTES # BLD AUTO: 0.23 X10*3/UL (ref 0–0.7)
IMM GRANULOCYTES NFR BLD AUTO: 2 % (ref 0–0.9)
INR PPP: 1.2 (ref 0.9–1.1)
LYMPHOCYTES # BLD AUTO: 0.93 X10*3/UL (ref 1.2–4.8)
LYMPHOCYTES NFR BLD AUTO: 8.2 %
MAGNESIUM SERPL-MCNC: 1.65 MG/DL (ref 1.6–2.4)
MCH RBC QN AUTO: 31.2 PG (ref 26–34)
MCHC RBC AUTO-ENTMCNC: 31.5 G/DL (ref 32–36)
MCV RBC AUTO: 99 FL (ref 80–100)
MONOCYTES # BLD AUTO: 0.89 X10*3/UL (ref 0.1–1)
MONOCYTES NFR BLD AUTO: 7.8 %
NEUTROPHILS # BLD AUTO: 9.01 X10*3/UL (ref 1.2–7.7)
NEUTROPHILS NFR BLD AUTO: 79.5 %
NRBC BLD-RTO: 0 /100 WBCS (ref 0–0)
PHOSPHATE SERPL-MCNC: 3 MG/DL (ref 2.5–4.9)
PLATELET # BLD AUTO: 278 X10*3/UL (ref 150–450)
POTASSIUM SERPL-SCNC: 5 MMOL/L (ref 3.5–5.3)
PROT SERPL-MCNC: 7.1 G/DL (ref 6.4–8.2)
PROT UR-ACNC: 68 MG/DL (ref 5–25)
PROT/CREAT UR: 0.9 MG/MG CREAT (ref 0–0.17)
PROTHROMBIN TIME: 13.5 SECONDS (ref 9.8–12.4)
RBC # BLD AUTO: 3.49 X10*6/UL (ref 4.5–5.9)
SODIUM SERPL-SCNC: 136 MMOL/L (ref 136–145)
TACROLIMUS BLD-MCNC: 3.3 NG/ML
WBC # BLD AUTO: 11.3 X10*3/UL (ref 4.4–11.3)

## 2025-05-14 PROCEDURE — 87799 DETECT AGENT NOS DNA QUANT: CPT | Performed by: STUDENT IN AN ORGANIZED HEALTH CARE EDUCATION/TRAINING PROGRAM

## 2025-05-14 PROCEDURE — 7100000002 HC RECOVERY ROOM TIME - EACH INCREMENTAL 1 MINUTE

## 2025-05-14 PROCEDURE — A43277 PR ERCP BALLOON DILATE BILIARY/PANC DUCT/AMPULLA EA: Performed by: STUDENT IN AN ORGANIZED HEALTH CARE EDUCATION/TRAINING PROGRAM

## 2025-05-14 PROCEDURE — 7100000001 HC RECOVERY ROOM TIME - INITIAL BASE CHARGE

## 2025-05-14 PROCEDURE — 85610 PROTHROMBIN TIME: CPT

## 2025-05-14 PROCEDURE — 43277 ERCP EA DUCT/AMPULLA DILATE: CPT | Performed by: INTERNAL MEDICINE

## 2025-05-14 PROCEDURE — 83735 ASSAY OF MAGNESIUM: CPT | Performed by: STUDENT IN AN ORGANIZED HEALTH CARE EDUCATION/TRAINING PROGRAM

## 2025-05-14 PROCEDURE — 3700000001 HC GENERAL ANESTHESIA TIME - INITIAL BASE CHARGE

## 2025-05-14 PROCEDURE — 80048 BASIC METABOLIC PNL TOTAL CA: CPT | Performed by: STUDENT IN AN ORGANIZED HEALTH CARE EDUCATION/TRAINING PROGRAM

## 2025-05-14 PROCEDURE — 2500000004 HC RX 250 GENERAL PHARMACY W/ HCPCS (ALT 636 FOR OP/ED): Mod: SE

## 2025-05-14 PROCEDURE — C1726 CATH, BAL DIL, NON-VASCULAR: HCPCS

## 2025-05-14 PROCEDURE — 82570 ASSAY OF URINE CREATININE: CPT

## 2025-05-14 PROCEDURE — 7100000009 HC PHASE TWO TIME - INITIAL BASE CHARGE

## 2025-05-14 PROCEDURE — C2617 STENT, NON-COR, TEM W/O DEL: HCPCS

## 2025-05-14 PROCEDURE — C1887 CATHETER, GUIDING: HCPCS

## 2025-05-14 PROCEDURE — 7100000010 HC PHASE TWO TIME - EACH INCREMENTAL 1 MINUTE

## 2025-05-14 PROCEDURE — 80197 ASSAY OF TACROLIMUS: CPT | Performed by: STUDENT IN AN ORGANIZED HEALTH CARE EDUCATION/TRAINING PROGRAM

## 2025-05-14 PROCEDURE — 3700000002 HC GENERAL ANESTHESIA TIME - EACH INCREMENTAL 1 MINUTE

## 2025-05-14 PROCEDURE — 43274 ERCP DUCT STENT PLACEMENT: CPT | Performed by: INTERNAL MEDICINE

## 2025-05-14 PROCEDURE — 43276 ERCP STENT EXCHANGE W/DILATE: CPT | Performed by: INTERNAL MEDICINE

## 2025-05-14 PROCEDURE — 82977 ASSAY OF GGT: CPT | Performed by: STUDENT IN AN ORGANIZED HEALTH CARE EDUCATION/TRAINING PROGRAM

## 2025-05-14 PROCEDURE — 86832 HLA CLASS I HIGH DEFIN QUAL: CPT | Performed by: STUDENT IN AN ORGANIZED HEALTH CARE EDUCATION/TRAINING PROGRAM

## 2025-05-14 PROCEDURE — A43277 PR ERCP BALLOON DILATE BILIARY/PANC DUCT/AMPULLA EA

## 2025-05-14 PROCEDURE — 84100 ASSAY OF PHOSPHORUS: CPT | Performed by: STUDENT IN AN ORGANIZED HEALTH CARE EDUCATION/TRAINING PROGRAM

## 2025-05-14 PROCEDURE — 2780000003 HC OR 278 NO HCPCS

## 2025-05-14 PROCEDURE — 2720000007 HC OR 272 NO HCPCS

## 2025-05-14 PROCEDURE — 85025 COMPLETE CBC W/AUTO DIFF WBC: CPT | Performed by: STUDENT IN AN ORGANIZED HEALTH CARE EDUCATION/TRAINING PROGRAM

## 2025-05-14 PROCEDURE — 84075 ASSAY ALKALINE PHOSPHATASE: CPT | Performed by: STUDENT IN AN ORGANIZED HEALTH CARE EDUCATION/TRAINING PROGRAM

## 2025-05-14 RX ORDER — ONDANSETRON HYDROCHLORIDE 2 MG/ML
4 INJECTION, SOLUTION INTRAVENOUS ONCE AS NEEDED
OUTPATIENT
Start: 2025-05-14

## 2025-05-14 RX ORDER — MIDAZOLAM HYDROCHLORIDE 1 MG/ML
INJECTION INTRAMUSCULAR; INTRAVENOUS AS NEEDED
Status: DISCONTINUED | OUTPATIENT
Start: 2025-05-14 | End: 2025-05-14

## 2025-05-14 RX ORDER — PROPOFOL 10 MG/ML
INJECTION, EMULSION INTRAVENOUS AS NEEDED
Status: DISCONTINUED | OUTPATIENT
Start: 2025-05-14 | End: 2025-05-14

## 2025-05-14 RX ORDER — ONDANSETRON HYDROCHLORIDE 2 MG/ML
INJECTION, SOLUTION INTRAVENOUS AS NEEDED
Status: DISCONTINUED | OUTPATIENT
Start: 2025-05-14 | End: 2025-05-14

## 2025-05-14 RX ORDER — PHENYLEPHRINE HCL IN 0.9% NACL 1 MG/10 ML
SYRINGE (ML) INTRAVENOUS AS NEEDED
Status: DISCONTINUED | OUTPATIENT
Start: 2025-05-14 | End: 2025-05-14

## 2025-05-14 RX ORDER — LIDOCAINE HYDROCHLORIDE 20 MG/ML
INJECTION, SOLUTION INFILTRATION; PERINEURAL AS NEEDED
Status: DISCONTINUED | OUTPATIENT
Start: 2025-05-14 | End: 2025-05-14

## 2025-05-14 RX ORDER — TACROLIMUS 1 MG/1
4 CAPSULE ORAL 2 TIMES DAILY
Qty: 720 CAPSULE | Refills: 3 | Status: SHIPPED | OUTPATIENT
Start: 2025-05-14 | End: 2026-05-14

## 2025-05-14 RX ORDER — SUCCINYLCHOLINE CHLORIDE 20 MG/ML
INJECTION INTRAMUSCULAR; INTRAVENOUS AS NEEDED
Status: DISCONTINUED | OUTPATIENT
Start: 2025-05-14 | End: 2025-05-14

## 2025-05-14 RX ADMIN — ONDANSETRON 4 MG: 2 INJECTION, SOLUTION INTRAMUSCULAR; INTRAVENOUS at 10:30

## 2025-05-14 RX ADMIN — PROPOFOL 100 MG: 10 INJECTION, EMULSION INTRAVENOUS at 10:30

## 2025-05-14 RX ADMIN — LIDOCAINE HYDROCHLORIDE 100 MG: 20 INJECTION, SOLUTION INFILTRATION; PERINEURAL at 10:14

## 2025-05-14 RX ADMIN — SODIUM CHLORIDE, SODIUM LACTATE, POTASSIUM CHLORIDE, AND CALCIUM CHLORIDE: 600; 310; 30; 20 INJECTION, SOLUTION INTRAVENOUS at 10:07

## 2025-05-14 RX ADMIN — MIDAZOLAM HYDROCHLORIDE 2 MG: 2 INJECTION, SOLUTION INTRAMUSCULAR; INTRAVENOUS at 10:07

## 2025-05-14 RX ADMIN — PROPOFOL 100 MG: 10 INJECTION, EMULSION INTRAVENOUS at 10:14

## 2025-05-14 RX ADMIN — SUCCINYLCHOLINE CHLORIDE 200 MG: 20 INJECTION, SOLUTION INTRAMUSCULAR; INTRAVENOUS at 10:14

## 2025-05-14 RX ADMIN — Medication 120 MCG: at 10:14

## 2025-05-14 ASSESSMENT — PAIN - FUNCTIONAL ASSESSMENT
PAIN_FUNCTIONAL_ASSESSMENT: 0-10

## 2025-05-14 ASSESSMENT — PAIN SCALES - GENERAL
PAINLEVEL_OUTOF10: 0 - NO PAIN
PAIN_LEVEL: 0
PAINLEVEL_OUTOF10: 0 - NO PAIN

## 2025-05-14 NOTE — ANESTHESIA POSTPROCEDURE EVALUATION
Patient: Goran Ibrahim    Procedure Summary       Date: 05/14/25 Room / Location: [facility]    Anesthesia Start: 1007 Anesthesia Stop: 1106    Procedure: ERCP Diagnosis: Transaminitis    Scheduled Providers: Joanne Rodriguez MD; Sparkle Devries RN Responsible Provider: Aaron Mckay DO    Anesthesia Type: general ASA Status: 4            Anesthesia Type: general    Vitals Value Taken Time   BP 96/61 05/14/25 11:30   Temp  05/14/25 11:43   Pulse 74 05/14/25 11:30   Resp 18 05/14/25 11:30   SpO2 96 % 05/14/25 11:30       Anesthesia Post Evaluation    Patient location during evaluation: PACU  Patient participation: complete - patient participated  Level of consciousness: awake  Pain score: 0  Pain management: adequate  Multimodal analgesia pain management approach  Airway patency: patent  Two or more strategies used to mitigate risk of obstructive sleep apnea  Cardiovascular status: acceptable  Respiratory status: face mask  Hydration status: acceptable  Postoperative Nausea and Vomiting: none        There were no known notable events for this encounter.

## 2025-05-14 NOTE — SIGNIFICANT EVENT
AVS printed and given to patient, James ROSA at bedside to discuss results of procedure and recommended followup care

## 2025-05-14 NOTE — H&P
"History Of Present Illness  Goran Ibrahim is a 54 y.o. male presenting with PMH of liver transplant, who presents for ERCP for anastomotic stricture.     Past Medical History  Medical History[1]  Surgical History  Surgical History[2]  Social History  He reports that he has been smoking cigarettes. He has never used smokeless tobacco. He reports that he does not currently use alcohol. He reports that he does not use drugs.    Family History  Family History[3]     Allergies  Allergies[4]  Review of Systems     Physical Exam  Constitutional:       General: He is not in acute distress.     Appearance: Normal appearance.   HENT:      Head: Normocephalic and atraumatic.   Eyes:      Extraocular Movements: Extraocular movements intact.      Conjunctiva/sclera: Conjunctivae normal.   Pulmonary:      Effort: Pulmonary effort is normal. No respiratory distress.   Abdominal:      General: There is no distension.      Palpations: Abdomen is soft.   Neurological:      Mental Status: He is alert and oriented to person, place, and time.          Last Recorded Vitals  Blood pressure 134/88, pulse (!) 26, temperature 36.6 °C (97.9 °F), temperature source Temporal, resp. rate 20, height 1.778 m (5' 10\"), weight 95.3 kg (210 lb), SpO2 98%.    Assessment/Plan   Goran Ibrahim is a 54 y.o. male presenting with PMH of liver transplant, who presents for ERCP for anastomotic stricture.     Randall Ramirez MD       [1]   Past Medical History:  Diagnosis Date    Cirrhosis (Multi)     CKD (chronic kidney disease)     Hypertension     Type 2 diabetes mellitus    [2]   Past Surgical History:  Procedure Laterality Date    IR ANGIOGRAM CELIAC  05/19/2023    IR ANGIOGRAM CELIAC 5/19/2023    LIVER TRANSPLANTATION      TRANSPLANTATION RENAL      US GUIDED ABDOMINAL PARACENTESIS  06/22/2023    US GUIDED ABDOMINAL PARACENTESIS 6/22/2023    US GUIDED ABDOMINAL PARACENTESIS  05/19/2023    US GUIDED ABDOMINAL PARACENTESIS 5/19/2023   [3] No family history " on file.  [4] No Known Allergies

## 2025-05-14 NOTE — DISCHARGE INSTRUCTIONS
Post-procedure recommendations:  - Discharge the patient to home with family member/escort.  - The patient has a contact number available for  emergencies.  The signs and symptoms of potential delayed complications were discussed with the patient.  Return to normal activities tomorrow.  Written discharge instructions were provided to the patient.  - Resume previous diet.  - Continue present medications.  - Return to primary care physician.    The anesthetics, sedatives or narcotics which were given to you today will be acting in your body for the next 24 hours, so you might feel a little sleepy or groggy.  This feeling should slowly wear off. Carefully read and follow the instructions.     You received sedation today:  - Do not drive or operate any machinery or power tools of any kind.   - No alcoholic beverages today, not even beer or wine.  - Do not make any important decisions or sign any legal documents.  - No over the counter medications that contain alcohol or that may cause drowsiness.  - Do not make any important decisions or sign any legal documents.    While it is common to experience mild to moderate abdominal distention, gas, or belching after your procedure, if any of these symptoms occur following discharge from the GI Lab or within one week of having your procedure, call the [facility] to be advised whether a visit to your nearest Urgent Care or Emergency Department is indicated.  Take this paper with you if you go:  - If you develop an allergic reaction to the medications that were given during your procedure such as difficulty breathing, rash, hives, severe nausea, vomiting or lightheadedness.  - If you experience chest pain, shortness of breath, severe abdominal pain, fevers and chills.  - If you develop signs and symptoms of bleeding such as blood in your spit, if your stools turn black, tarry, or bloody.  - If you have not urinated within 8 hours following your procedure.  - If  your IV site becomes painful, red, inflamed, or looks infected.

## 2025-05-14 NOTE — Clinical Note
Refer to anesthesia flowsheet for vital signs, medication administration, and assessment intraprocedure.

## 2025-05-14 NOTE — TELEPHONE ENCOUNTER
Per Dr Wright change Tacrolimus to 4mg in the am and 4mg at bedtime    Name present on VM so medication change instructions left and requested call to office to verbalize understanding

## 2025-05-14 NOTE — ANESTHESIA PREPROCEDURE EVALUATION
Patient: Goran Ibrahim    Procedure Information       Date/Time: 05/14/25 1000    Scheduled providers: Joanne Rodriguez MD; Sparkle Devries RN    Procedure: ERCP    Location: [facility]            Relevant Problems   Anesthesia (within normal limits)      Cardiac   (+) Hypertension      GI   (+) Esophageal varices in alcoholic cirrhosis (Multi)      /Renal   (+) ESRD (end stage renal disease) (Multi)      Liver   (+) Hepatic cirrhosis, unspecified hepatic cirrhosis type, unspecified whether ascites present (Multi)   (+) Liver cirrhosis (Multi)      Endocrine   (+) Type 2 diabetes mellitus without complication       Clinical information reviewed:   Tobacco  Allergies  Meds   Med Hx  Surg Hx   Fam Hx  Soc Hx        NPO Detail:  NPO/Void Status  Date of Last Liquid: 05/14/25  Time of Last Liquid: 0100  Date of Last Solid: 05/13/25  Time of Last Solid: 2300  Last Intake Type: Light meal  Time of Last Void: 0919         Physical Exam    Airway  Mallampati: II  TM distance: >3 FB  Neck ROM: full  Mouth opening: 3 or more finger widths     Cardiovascular - normal exam   Dental - normal exam    (+) lower dentures, upper dentures     Pulmonary - normal examBreath sounds clear to auscultation     Abdominal - normal exam           Anesthesia Plan    History of general anesthesia?: yes  History of complications of general anesthesia?: no    ASA 4     general     intravenous induction   Postoperative pain plan includes opioids.  Trial extubation is planned.  Anesthetic plan and risks discussed with patient.    Plan discussed with CAA.

## 2025-05-14 NOTE — ANESTHESIA PROCEDURE NOTES
Airway  Date/Time: 5/14/2025 10:16 AM  Reason: elective    Airway not difficult    Staffing  Performed: KATT   Authorized by: Aaron Mckay DO    Performed by: KATT Otero  Patient location during procedure: OR    Patient Condition  Indications for airway management: anesthesia  Patient position: sniffing  Sedation level: deep     Final Airway Details   Preoxygenated: yes  Final airway type: endotracheal airway  Successful airway: ETT  Cuffed: yes   Successful intubation technique: direct laryngoscopy  Adjuncts used in placement: intubating stylet and cricoid pressure  Endotracheal tube insertion site: oral  Blade: Michael  Blade size: #4  ETT size (mm): 7.5  Cormack-Lehane Classification: grade I - full view of glottis  Placement verified by: capnometry and palpation of cuff   Measured from: lips  ETT to lips (cm): 22  Number of attempts at approach: 1

## 2025-05-15 ENCOUNTER — TELEPHONE (OUTPATIENT)
Dept: GASTROENTEROLOGY | Facility: CLINIC | Age: 55
End: 2025-05-15
Payer: MEDICAID

## 2025-05-15 LAB
BKV DNA SERPL NAA+PROBE-LOG#: NORMAL {LOG_COPIES}/ML
LABORATORY COMMENT REPORT: NOT DETECTED

## 2025-05-15 NOTE — TELEPHONE ENCOUNTER
RN called and spoke to pt  Reviewed change tac to 4mg in the morning and 4mg at bedtime  Pt received 24hr urine instructions and is picking up supplies    Pt verbalized understanding and repeated changes back to RN

## 2025-05-16 DIAGNOSIS — Z94.0 KIDNEY REPLACED BY TRANSPLANT (HHS-HCC): ICD-10-CM

## 2025-05-16 DIAGNOSIS — Z94.4 LIVER REPLACED BY TRANSPLANT (MULTI): ICD-10-CM

## 2025-05-20 DIAGNOSIS — Z94.4 LIVER REPLACED BY TRANSPLANT (MULTI): ICD-10-CM

## 2025-05-21 ENCOUNTER — LAB (OUTPATIENT)
Dept: LAB | Facility: HOSPITAL | Age: 55
End: 2025-05-21
Payer: MEDICAID

## 2025-05-21 DIAGNOSIS — Z94.0 KIDNEY REPLACED BY TRANSPLANT (HHS-HCC): ICD-10-CM

## 2025-05-21 DIAGNOSIS — Z94.4 LIVER TRANSPLANT RECIPIENT (MULTI): ICD-10-CM

## 2025-05-21 LAB
ALBUMIN SERPL BCP-MCNC: 3.9 G/DL (ref 3.4–5)
ALP SERPL-CCNC: 210 U/L (ref 33–120)
ALT SERPL W P-5'-P-CCNC: 12 U/L (ref 10–52)
ANION GAP SERPL CALC-SCNC: 19 MMOL/L (ref 10–20)
AST SERPL W P-5'-P-CCNC: 12 U/L (ref 9–39)
BASOPHILS # BLD AUTO: 0.1 X10*3/UL (ref 0–0.1)
BASOPHILS NFR BLD AUTO: 1.1 %
BILIRUB DIRECT SERPL-MCNC: 0.2 MG/DL (ref 0–0.3)
BILIRUB SERPL-MCNC: 0.6 MG/DL (ref 0–1.2)
BUN SERPL-MCNC: 55 MG/DL (ref 6–23)
CALCIUM SERPL-MCNC: 8.8 MG/DL (ref 8.6–10.3)
CHLORIDE SERPL-SCNC: 106 MMOL/L (ref 98–107)
CO2 SERPL-SCNC: 19 MMOL/L (ref 21–32)
CREAT SERPL-MCNC: 2.37 MG/DL (ref 0.5–1.3)
CREAT UR-MCNC: 72.7 MG/DL (ref 20–370)
EGFRCR SERPLBLD CKD-EPI 2021: 31 ML/MIN/1.73M*2
EOSINOPHIL # BLD AUTO: 0.23 X10*3/UL (ref 0–0.7)
EOSINOPHIL NFR BLD AUTO: 2.4 %
ERYTHROCYTE [DISTWIDTH] IN BLOOD BY AUTOMATED COUNT: 14.3 % (ref 11.5–14.5)
GGT SERPL-CCNC: 153 U/L (ref 5–64)
GLUCOSE SERPL-MCNC: 79 MG/DL (ref 74–99)
HCT VFR BLD AUTO: 33.6 % (ref 41–52)
HGB BLD-MCNC: 10.7 G/DL (ref 13.5–17.5)
HLA RESULTS: NORMAL
HLA-A+B+C AB NFR SER: NORMAL %
HLA-DP+DQ+DR AB NFR SER: NORMAL %
IMM GRANULOCYTES # BLD AUTO: 0.08 X10*3/UL (ref 0–0.7)
IMM GRANULOCYTES NFR BLD AUTO: 0.9 % (ref 0–0.9)
INR PPP: 1.2 (ref 0.9–1.1)
LYMPHOCYTES # BLD AUTO: 1.56 X10*3/UL (ref 1.2–4.8)
LYMPHOCYTES NFR BLD AUTO: 16.6 %
MAGNESIUM SERPL-MCNC: 1.59 MG/DL (ref 1.6–2.4)
MCH RBC QN AUTO: 31.1 PG (ref 26–34)
MCHC RBC AUTO-ENTMCNC: 31.8 G/DL (ref 32–36)
MCV RBC AUTO: 98 FL (ref 80–100)
MONOCYTES # BLD AUTO: 0.54 X10*3/UL (ref 0.1–1)
MONOCYTES NFR BLD AUTO: 5.7 %
NEUTROPHILS # BLD AUTO: 6.89 X10*3/UL (ref 1.2–7.7)
NEUTROPHILS NFR BLD AUTO: 73.3 %
NRBC BLD-RTO: 0 /100 WBCS (ref 0–0)
PHOSPHATE SERPL-MCNC: 2.3 MG/DL (ref 2.5–4.9)
PLATELET # BLD AUTO: 217 X10*3/UL (ref 150–450)
POTASSIUM SERPL-SCNC: 5.2 MMOL/L (ref 3.5–5.3)
PROT SERPL-MCNC: 6.6 G/DL (ref 6.4–8.2)
PROT UR-ACNC: 30 MG/DL (ref 5–25)
PROT/CREAT UR: 0.41 MG/MG CREAT (ref 0–0.17)
PROTHROMBIN TIME: 13.6 SECONDS (ref 9.8–12.4)
RBC # BLD AUTO: 3.44 X10*6/UL (ref 4.5–5.9)
SODIUM SERPL-SCNC: 139 MMOL/L (ref 136–145)
TACROLIMUS BLD-MCNC: 9.7 NG/ML
WBC # BLD AUTO: 9.4 X10*3/UL (ref 4.4–11.3)

## 2025-05-21 PROCEDURE — 80197 ASSAY OF TACROLIMUS: CPT

## 2025-05-21 PROCEDURE — 84156 ASSAY OF PROTEIN URINE: CPT

## 2025-05-21 PROCEDURE — 84100 ASSAY OF PHOSPHORUS: CPT

## 2025-05-21 PROCEDURE — 80053 COMPREHEN METABOLIC PANEL: CPT

## 2025-05-21 PROCEDURE — 82248 BILIRUBIN DIRECT: CPT

## 2025-05-21 PROCEDURE — 85025 COMPLETE CBC W/AUTO DIFF WBC: CPT

## 2025-05-21 PROCEDURE — 82570 ASSAY OF URINE CREATININE: CPT

## 2025-05-21 PROCEDURE — 83735 ASSAY OF MAGNESIUM: CPT

## 2025-05-21 PROCEDURE — 85610 PROTHROMBIN TIME: CPT

## 2025-05-21 PROCEDURE — 82977 ASSAY OF GGT: CPT

## 2025-05-21 RX ORDER — ACYCLOVIR 400 MG/1
400 TABLET ORAL 2 TIMES DAILY
Qty: 60 TABLET | Refills: 1 | Status: SHIPPED | OUTPATIENT
Start: 2025-05-21 | End: 2025-07-20

## 2025-05-23 ENCOUNTER — APPOINTMENT (OUTPATIENT)
Dept: GASTROENTEROLOGY | Facility: HOSPITAL | Age: 55
End: 2025-05-23
Payer: MEDICAID

## 2025-05-23 DIAGNOSIS — Z94.4 LIVER REPLACED BY TRANSPLANT (MULTI): ICD-10-CM

## 2025-05-27 ENCOUNTER — TELEPHONE (OUTPATIENT)
Facility: HOSPITAL | Age: 55
End: 2025-05-27
Payer: MEDICAID

## 2025-05-27 DIAGNOSIS — Z94.4 LIVER REPLACED BY TRANSPLANT (MULTI): ICD-10-CM

## 2025-05-28 ENCOUNTER — HOSPITAL ENCOUNTER (INPATIENT)
Facility: HOSPITAL | Age: 55
LOS: 4 days | Discharge: HOME | End: 2025-06-01
Attending: TRANSPLANT SURGERY | Admitting: TRANSPLANT SURGERY
Payer: MEDICAID

## 2025-05-28 ENCOUNTER — TELEPHONE (OUTPATIENT)
Facility: HOSPITAL | Age: 55
End: 2025-05-28

## 2025-05-28 ENCOUNTER — APPOINTMENT (OUTPATIENT)
Dept: RADIOLOGY | Facility: HOSPITAL | Age: 55
End: 2025-05-28
Payer: MEDICAID

## 2025-05-28 ENCOUNTER — LAB (OUTPATIENT)
Dept: LAB | Facility: HOSPITAL | Age: 55
End: 2025-05-28
Payer: MEDICAID

## 2025-05-28 DIAGNOSIS — Z94.4 LIVER REPLACED BY TRANSPLANT (MULTI): ICD-10-CM

## 2025-05-28 DIAGNOSIS — Z94.4 LIVER TRANSPLANT RECIPIENT (MULTI): ICD-10-CM

## 2025-05-28 DIAGNOSIS — Z94.4 LIVER TRANSPLANT STATUS: Primary | ICD-10-CM

## 2025-05-28 DIAGNOSIS — Z94.4 LIVER TRANSPLANT STATUS: ICD-10-CM

## 2025-05-28 DIAGNOSIS — N17.9 AKI (ACUTE KIDNEY INJURY): Primary | ICD-10-CM

## 2025-05-28 DIAGNOSIS — N17.9 AKI (ACUTE KIDNEY INJURY): ICD-10-CM

## 2025-05-28 DIAGNOSIS — Z94.0 KIDNEY REPLACED BY TRANSPLANT (HHS-HCC): ICD-10-CM

## 2025-05-28 DIAGNOSIS — Z94.0 KIDNEY REPLACED BY TRANSPLANT (HHS-HCC): Primary | ICD-10-CM

## 2025-05-28 LAB
ALBUMIN SERPL BCP-MCNC: 4 G/DL (ref 3.4–5)
ALP SERPL-CCNC: 147 U/L (ref 33–120)
ALT SERPL W P-5'-P-CCNC: 9 U/L (ref 10–52)
ANION GAP SERPL CALC-SCNC: 15 MMOL/L (ref 10–20)
AST SERPL W P-5'-P-CCNC: 10 U/L (ref 9–39)
BASOPHILS # BLD AUTO: 0.09 X10*3/UL (ref 0–0.1)
BASOPHILS NFR BLD AUTO: 1.3 %
BILIRUB DIRECT SERPL-MCNC: 0.1 MG/DL (ref 0–0.3)
BILIRUB SERPL-MCNC: 0.5 MG/DL (ref 0–1.2)
BUN SERPL-MCNC: 73 MG/DL (ref 6–23)
CALCIUM SERPL-MCNC: 8.8 MG/DL (ref 8.6–10.3)
CHLORIDE SERPL-SCNC: 110 MMOL/L (ref 98–107)
CO2 SERPL-SCNC: 15 MMOL/L (ref 21–32)
COLLECT DURATION TIME SPEC: 24 HRS
CREAT 24H UR-MCNC: 119.3 MG/DL (ref 20–370)
CREAT 24H UR-MRATE: 0.89 G/24 H (ref 0.87–2.41)
CREAT SERPL-MCNC: 3.29 MG/DL (ref 0.5–1.3)
CREAT UR-MCNC: 118.9 MG/DL (ref 20–370)
EGFRCR SERPLBLD CKD-EPI 2021: 21 ML/MIN/1.73M*2
EOSINOPHIL # BLD AUTO: 0.16 X10*3/UL (ref 0–0.7)
EOSINOPHIL NFR BLD AUTO: 2.3 %
ERYTHROCYTE [DISTWIDTH] IN BLOOD BY AUTOMATED COUNT: 14.6 % (ref 11.5–14.5)
GGT SERPL-CCNC: 94 U/L (ref 5–64)
GLUCOSE SERPL-MCNC: 112 MG/DL (ref 74–99)
HCT VFR BLD AUTO: 30.9 % (ref 41–52)
HGB BLD-MCNC: 10 G/DL (ref 13.5–17.5)
IMM GRANULOCYTES # BLD AUTO: 0.03 X10*3/UL (ref 0–0.7)
IMM GRANULOCYTES NFR BLD AUTO: 0.4 % (ref 0–0.9)
LYMPHOCYTES # BLD AUTO: 1.48 X10*3/UL (ref 1.2–4.8)
LYMPHOCYTES NFR BLD AUTO: 21 %
MAGNESIUM SERPL-MCNC: 1.77 MG/DL (ref 1.6–2.4)
MCH RBC QN AUTO: 31.1 PG (ref 26–34)
MCHC RBC AUTO-ENTMCNC: 32.4 G/DL (ref 32–36)
MCV RBC AUTO: 96 FL (ref 80–100)
MONOCYTES # BLD AUTO: 0.44 X10*3/UL (ref 0.1–1)
MONOCYTES NFR BLD AUTO: 6.3 %
NEUTROPHILS # BLD AUTO: 4.84 X10*3/UL (ref 1.2–7.7)
NEUTROPHILS NFR BLD AUTO: 68.7 %
NRBC BLD-RTO: 0 /100 WBCS (ref 0–0)
PHOSPHATE SERPL-MCNC: 3.6 MG/DL (ref 2.5–4.9)
PLATELET # BLD AUTO: 186 X10*3/UL (ref 150–450)
POTASSIUM SERPL-SCNC: 4.6 MMOL/L (ref 3.5–5.3)
PROT 24H UR-MCNC: 46 MG/DL (ref 5–25)
PROT 24H UR-MRATE: 345 MG/24H (ref 0–149)
PROT SERPL-MCNC: 6.4 G/DL (ref 6.4–8.2)
RBC # BLD AUTO: 3.22 X10*6/UL (ref 4.5–5.9)
SODIUM SERPL-SCNC: 135 MMOL/L (ref 136–145)
SPECIMEN VOL 24H UR: 750 ML
TACROLIMUS BLD-MCNC: 11.6 NG/ML
WBC # BLD AUTO: 7 X10*3/UL (ref 4.4–11.3)

## 2025-05-28 PROCEDURE — 36415 COLL VENOUS BLD VENIPUNCTURE: CPT

## 2025-05-28 PROCEDURE — 82977 ASSAY OF GGT: CPT

## 2025-05-28 PROCEDURE — 2500000002 HC RX 250 W HCPCS SELF ADMINISTERED DRUGS (ALT 637 FOR MEDICARE OP, ALT 636 FOR OP/ED): Mod: SE

## 2025-05-28 PROCEDURE — 76776 US EXAM K TRANSPL W/DOPPLER: CPT | Performed by: RADIOLOGY

## 2025-05-28 PROCEDURE — 76776 US EXAM K TRANSPL W/DOPPLER: CPT

## 2025-05-28 PROCEDURE — 82570 ASSAY OF URINE CREATININE: CPT

## 2025-05-28 PROCEDURE — 85025 COMPLETE CBC W/AUTO DIFF WBC: CPT

## 2025-05-28 PROCEDURE — 87799 DETECT AGENT NOS DNA QUANT: CPT

## 2025-05-28 PROCEDURE — 84100 ASSAY OF PHOSPHORUS: CPT

## 2025-05-28 PROCEDURE — 80053 COMPREHEN METABOLIC PANEL: CPT

## 2025-05-28 PROCEDURE — 83735 ASSAY OF MAGNESIUM: CPT

## 2025-05-28 PROCEDURE — 81050 URINALYSIS VOLUME MEASURE: CPT

## 2025-05-28 PROCEDURE — 86833 HLA CLASS II HIGH DEFIN QUAL: CPT

## 2025-05-28 PROCEDURE — 82248 BILIRUBIN DIRECT: CPT

## 2025-05-28 PROCEDURE — 1100000001 HC PRIVATE ROOM DAILY

## 2025-05-28 PROCEDURE — 80197 ASSAY OF TACROLIMUS: CPT

## 2025-05-28 PROCEDURE — 84156 ASSAY OF PROTEIN URINE: CPT

## 2025-05-28 PROCEDURE — 86832 HLA CLASS I HIGH DEFIN QUAL: CPT

## 2025-05-28 RX ORDER — TAMSULOSIN HYDROCHLORIDE 0.4 MG/1
0.4 CAPSULE ORAL DAILY
Status: DISCONTINUED | OUTPATIENT
Start: 2025-05-29 | End: 2025-06-01 | Stop reason: HOSPADM

## 2025-05-28 RX ORDER — HYDRALAZINE HYDROCHLORIDE 25 MG/1
25 TABLET, FILM COATED ORAL 2 TIMES DAILY
Status: DISCONTINUED | OUTPATIENT
Start: 2025-05-29 | End: 2025-05-30

## 2025-05-28 RX ORDER — CHOLECALCIFEROL (VITAMIN D3) 25 MCG
50 TABLET ORAL DAILY
Status: DISCONTINUED | OUTPATIENT
Start: 2025-05-29 | End: 2025-06-01 | Stop reason: HOSPADM

## 2025-05-28 RX ORDER — POLYETHYLENE GLYCOL 3350 17 G/17G
17 POWDER, FOR SOLUTION ORAL DAILY
Status: DISCONTINUED | OUTPATIENT
Start: 2025-05-29 | End: 2025-05-29

## 2025-05-28 RX ORDER — ACYCLOVIR 400 MG/1
400 TABLET ORAL 2 TIMES DAILY
Status: DISCONTINUED | OUTPATIENT
Start: 2025-05-29 | End: 2025-05-30

## 2025-05-28 RX ORDER — DEXTROSE 50 % IN WATER (D50W) INTRAVENOUS SYRINGE
12.5
Status: DISCONTINUED | OUTPATIENT
Start: 2025-05-28 | End: 2025-06-01 | Stop reason: HOSPADM

## 2025-05-28 RX ORDER — CARVEDILOL 6.25 MG/1
6.25 TABLET ORAL 2 TIMES DAILY
Status: DISCONTINUED | OUTPATIENT
Start: 2025-05-29 | End: 2025-06-01 | Stop reason: HOSPADM

## 2025-05-28 RX ORDER — DEXTROSE 50 % IN WATER (D50W) INTRAVENOUS SYRINGE
25
Status: DISCONTINUED | OUTPATIENT
Start: 2025-05-28 | End: 2025-06-01 | Stop reason: HOSPADM

## 2025-05-28 RX ORDER — MYCOPHENOLATE MOFETIL 250 MG/1
750 CAPSULE ORAL
Status: DISCONTINUED | OUTPATIENT
Start: 2025-05-29 | End: 2025-06-01 | Stop reason: HOSPADM

## 2025-05-28 RX ORDER — INSULIN LISPRO 100 [IU]/ML
0-10 INJECTION, SOLUTION INTRAVENOUS; SUBCUTANEOUS
Status: DISCONTINUED | OUTPATIENT
Start: 2025-05-29 | End: 2025-06-01 | Stop reason: HOSPADM

## 2025-05-28 RX ORDER — URSODIOL 300 MG/1
300 CAPSULE ORAL 3 TIMES DAILY
Status: DISCONTINUED | OUTPATIENT
Start: 2025-05-28 | End: 2025-06-01 | Stop reason: HOSPADM

## 2025-05-28 RX ORDER — TACROLIMUS 1 MG/1
4 CAPSULE ORAL
Status: DISCONTINUED | OUTPATIENT
Start: 2025-05-29 | End: 2025-05-29

## 2025-05-28 RX ORDER — HEPARIN SODIUM 5000 [USP'U]/ML
5000 INJECTION, SOLUTION INTRAVENOUS; SUBCUTANEOUS EVERY 8 HOURS
Status: DISCONTINUED | OUTPATIENT
Start: 2025-05-28 | End: 2025-06-01 | Stop reason: HOSPADM

## 2025-05-28 RX ORDER — SULFAMETHOXAZOLE AND TRIMETHOPRIM 400; 80 MG/1; MG/1
1 TABLET ORAL DAILY
Status: DISCONTINUED | OUTPATIENT
Start: 2025-05-29 | End: 2025-06-01 | Stop reason: HOSPADM

## 2025-05-28 RX ORDER — INSULIN GLARGINE 100 [IU]/ML
10 INJECTION, SOLUTION SUBCUTANEOUS EVERY MORNING
Status: DISCONTINUED | OUTPATIENT
Start: 2025-05-29 | End: 2025-06-01 | Stop reason: HOSPADM

## 2025-05-28 RX ORDER — PREDNISONE 5 MG/1
5 TABLET ORAL DAILY
Status: DISCONTINUED | OUTPATIENT
Start: 2025-05-29 | End: 2025-06-01 | Stop reason: HOSPADM

## 2025-05-28 RX ORDER — PANTOPRAZOLE SODIUM 40 MG/1
40 TABLET, DELAYED RELEASE ORAL
Status: DISCONTINUED | OUTPATIENT
Start: 2025-05-29 | End: 2025-06-01 | Stop reason: HOSPADM

## 2025-05-28 RX ORDER — ASPIRIN 81 MG/1
81 TABLET ORAL DAILY
Status: DISCONTINUED | OUTPATIENT
Start: 2025-05-29 | End: 2025-06-01 | Stop reason: HOSPADM

## 2025-05-28 SDOH — ECONOMIC STABILITY: HOUSING INSECURITY: IN THE LAST 12 MONTHS, WAS THERE A TIME WHEN YOU WERE NOT ABLE TO PAY THE MORTGAGE OR RENT ON TIME?: NO

## 2025-05-28 SDOH — SOCIAL STABILITY: SOCIAL INSECURITY: DO YOU FEEL UNSAFE GOING BACK TO THE PLACE WHERE YOU ARE LIVING?: NO

## 2025-05-28 SDOH — SOCIAL STABILITY: SOCIAL INSECURITY: HAVE YOU HAD ANY THOUGHTS OF HARMING ANYONE ELSE?: NO

## 2025-05-28 SDOH — ECONOMIC STABILITY: FOOD INSECURITY: HOW HARD IS IT FOR YOU TO PAY FOR THE VERY BASICS LIKE FOOD, HOUSING, MEDICAL CARE, AND HEATING?: NOT VERY HARD

## 2025-05-28 SDOH — SOCIAL STABILITY: SOCIAL INSECURITY: WERE YOU ABLE TO COMPLETE ALL THE BEHAVIORAL HEALTH SCREENINGS?: YES

## 2025-05-28 SDOH — SOCIAL STABILITY: SOCIAL INSECURITY: DO YOU FEEL ANYONE HAS EXPLOITED OR TAKEN ADVANTAGE OF YOU FINANCIALLY OR OF YOUR PERSONAL PROPERTY?: NO

## 2025-05-28 SDOH — SOCIAL STABILITY: SOCIAL INSECURITY: DOES ANYONE TRY TO KEEP YOU FROM HAVING/CONTACTING OTHER FRIENDS OR DOING THINGS OUTSIDE YOUR HOME?: NO

## 2025-05-28 SDOH — SOCIAL STABILITY: SOCIAL INSECURITY: ABUSE: ADULT

## 2025-05-28 SDOH — SOCIAL STABILITY: SOCIAL INSECURITY: ARE YOU OR HAVE YOU BEEN THREATENED OR ABUSED PHYSICALLY, EMOTIONALLY, OR SEXUALLY BY ANYONE?: NO

## 2025-05-28 SDOH — SOCIAL STABILITY: SOCIAL INSECURITY: HAVE YOU HAD THOUGHTS OF HARMING ANYONE ELSE?: NO

## 2025-05-28 SDOH — SOCIAL STABILITY: SOCIAL INSECURITY: HAS ANYONE EVER THREATENED TO HURT YOUR FAMILY OR YOUR PETS?: NO

## 2025-05-28 SDOH — SOCIAL STABILITY: SOCIAL INSECURITY: ARE THERE ANY APPARENT SIGNS OF INJURIES/BEHAVIORS THAT COULD BE RELATED TO ABUSE/NEGLECT?: NO

## 2025-05-28 SDOH — ECONOMIC STABILITY: HOUSING INSECURITY: DO YOU FEEL UNSAFE GOING BACK TO THE PLACE WHERE YOU LIVE?: NO

## 2025-05-28 ASSESSMENT — LIFESTYLE VARIABLES
AUDIT-C TOTAL SCORE: 0
HOW OFTEN DO YOU HAVE 6 OR MORE DRINKS ON ONE OCCASION: NEVER
HOW OFTEN DO YOU HAVE A DRINK CONTAINING ALCOHOL: NEVER
AUDIT-C TOTAL SCORE: 0
PRESCIPTION_ABUSE_PAST_12_MONTHS: NO
SKIP TO QUESTIONS 9-10: 1
HOW MANY STANDARD DRINKS CONTAINING ALCOHOL DO YOU HAVE ON A TYPICAL DAY: PATIENT DOES NOT DRINK
SUBSTANCE_ABUSE_PAST_12_MONTHS: NO

## 2025-05-28 ASSESSMENT — PAIN SCALES - GENERAL: PAINLEVEL_OUTOF10: 0 - NO PAIN

## 2025-05-28 ASSESSMENT — ACTIVITIES OF DAILY LIVING (ADL)
TOILETING: INDEPENDENT
JUDGMENT_ADEQUATE_SAFELY_COMPLETE_DAILY_ACTIVITIES: YES
DRESSING YOURSELF: INDEPENDENT
PATIENT'S MEMORY ADEQUATE TO SAFELY COMPLETE DAILY ACTIVITIES?: YES
LACK_OF_TRANSPORTATION: NO
GROOMING: INDEPENDENT
HEARING - LEFT EAR: FUNCTIONAL
ASSISTIVE_DEVICE: DENTURES UPPER;DENTURES LOWER;EYEGLASSES;CANE
LACK_OF_TRANSPORTATION: NO
FEEDING YOURSELF: INDEPENDENT
WALKS IN HOME: INDEPENDENT
ADEQUATE_TO_COMPLETE_ADL: YES
HEARING - RIGHT EAR: FUNCTIONAL
BATHING: INDEPENDENT

## 2025-05-28 ASSESSMENT — COGNITIVE AND FUNCTIONAL STATUS - GENERAL
MOBILITY SCORE: 24
PATIENT BASELINE BEDBOUND: NO
DAILY ACTIVITIY SCORE: 24

## 2025-05-28 ASSESSMENT — PAIN SCALES - WONG BAKER: WONGBAKER_NUMERICALRESPONSE: NO HURT

## 2025-05-28 NOTE — TELEPHONE ENCOUNTER
Labs reviewed in 365 with Lorena Jeffrey, Sen, and Nadia. Patient to be admitted for urgent workup of AKUA.   Primary coordinator made aware and will contact patient. Admission order placed, inpatient team & LT9 nurse managers notified.

## 2025-05-29 ENCOUNTER — APPOINTMENT (OUTPATIENT)
Dept: RADIOLOGY | Facility: HOSPITAL | Age: 55
End: 2025-05-29
Payer: MEDICAID

## 2025-05-29 LAB
ALBUMIN SERPL BCP-MCNC: 3.8 G/DL (ref 3.4–5)
ALBUMIN SERPL BCP-MCNC: 4 G/DL (ref 3.4–5)
ANION GAP SERPL CALC-SCNC: 14 MMOL/L (ref 10–20)
ANION GAP SERPL CALC-SCNC: 15 MMOL/L (ref 10–20)
BKV DNA SERPL NAA+PROBE-LOG#: NORMAL {LOG_COPIES}/ML
BUN SERPL-MCNC: 72 MG/DL (ref 6–23)
BUN SERPL-MCNC: 74 MG/DL (ref 6–23)
CALCIUM SERPL-MCNC: 8.3 MG/DL (ref 8.6–10.6)
CALCIUM SERPL-MCNC: 9.1 MG/DL (ref 8.6–10.6)
CHLORIDE SERPL-SCNC: 110 MMOL/L (ref 98–107)
CHLORIDE SERPL-SCNC: 111 MMOL/L (ref 98–107)
CO2 SERPL-SCNC: 16 MMOL/L (ref 21–32)
CO2 SERPL-SCNC: 16 MMOL/L (ref 21–32)
CREAT SERPL-MCNC: 2.89 MG/DL (ref 0.5–1.3)
CREAT SERPL-MCNC: 3.06 MG/DL (ref 0.5–1.3)
EBV DNA SPEC NAA+PROBE-LOG#: NORMAL {LOG_COPIES}/ML
EGFRCR SERPLBLD CKD-EPI 2021: 23 ML/MIN/1.73M*2
EGFRCR SERPLBLD CKD-EPI 2021: 25 ML/MIN/1.73M*2
ERYTHROCYTE [DISTWIDTH] IN BLOOD BY AUTOMATED COUNT: 14.2 % (ref 11.5–14.5)
GLUCOSE BLD MANUAL STRIP-MCNC: 149 MG/DL (ref 74–99)
GLUCOSE BLD MANUAL STRIP-MCNC: 273 MG/DL (ref 74–99)
GLUCOSE SERPL-MCNC: 119 MG/DL (ref 74–99)
GLUCOSE SERPL-MCNC: 138 MG/DL (ref 74–99)
HCT VFR BLD AUTO: 27.7 % (ref 41–52)
HGB BLD-MCNC: 9 G/DL (ref 13.5–17.5)
LABORATORY COMMENT REPORT: NOT DETECTED
LABORATORY COMMENT REPORT: NOT DETECTED
MAGNESIUM SERPL-MCNC: 2.07 MG/DL (ref 1.6–2.4)
MCH RBC QN AUTO: 31.5 PG (ref 26–34)
MCHC RBC AUTO-ENTMCNC: 32.5 G/DL (ref 32–36)
MCV RBC AUTO: 97 FL (ref 80–100)
NRBC BLD-RTO: 0 /100 WBCS (ref 0–0)
PHOSPHATE SERPL-MCNC: 3.4 MG/DL (ref 2.5–4.9)
PHOSPHATE SERPL-MCNC: 3.5 MG/DL (ref 2.5–4.9)
PLATELET # BLD AUTO: 135 X10*3/UL (ref 150–450)
POTASSIUM SERPL-SCNC: 4.9 MMOL/L (ref 3.5–5.3)
POTASSIUM SERPL-SCNC: 6 MMOL/L (ref 3.5–5.3)
RBC # BLD AUTO: 2.86 X10*6/UL (ref 4.5–5.9)
SODIUM SERPL-SCNC: 135 MMOL/L (ref 136–145)
SODIUM SERPL-SCNC: 136 MMOL/L (ref 136–145)
TACROLIMUS BLD-MCNC: 12.3 NG/ML
WBC # BLD AUTO: 5.9 X10*3/UL (ref 4.4–11.3)

## 2025-05-29 PROCEDURE — 50200 RENAL BIOPSY PERQ: CPT

## 2025-05-29 PROCEDURE — 2500000002 HC RX 250 W HCPCS SELF ADMINISTERED DRUGS (ALT 637 FOR MEDICARE OP, ALT 636 FOR OP/ED): Mod: SE

## 2025-05-29 PROCEDURE — 88350 IMFLUOR EA ADDL 1ANTB STN PX: CPT | Performed by: PATHOLOGY

## 2025-05-29 PROCEDURE — 2500000001 HC RX 250 WO HCPCS SELF ADMINISTERED DRUGS (ALT 637 FOR MEDICARE OP): Mod: SE

## 2025-05-29 PROCEDURE — 7100000010 HC PHASE TWO TIME - EACH INCREMENTAL 1 MINUTE

## 2025-05-29 PROCEDURE — 0TB03ZX EXCISION OF RIGHT KIDNEY, PERCUTANEOUS APPROACH, DIAGNOSTIC: ICD-10-PCS | Performed by: STUDENT IN AN ORGANIZED HEALTH CARE EDUCATION/TRAINING PROGRAM

## 2025-05-29 PROCEDURE — 85027 COMPLETE CBC AUTOMATED: CPT

## 2025-05-29 PROCEDURE — 99232 SBSQ HOSP IP/OBS MODERATE 35: CPT | Performed by: TRANSPLANT SURGERY

## 2025-05-29 PROCEDURE — 83735 ASSAY OF MAGNESIUM: CPT

## 2025-05-29 PROCEDURE — 2500000004 HC RX 250 GENERAL PHARMACY W/ HCPCS (ALT 636 FOR OP/ED): Mod: SE

## 2025-05-29 PROCEDURE — 88305 TISSUE EXAM BY PATHOLOGIST: CPT | Mod: TC,SUR | Performed by: STUDENT IN AN ORGANIZED HEALTH CARE EDUCATION/TRAINING PROGRAM

## 2025-05-29 PROCEDURE — 88348 ELECTRON MICROSCOPY DX: CPT | Performed by: PATHOLOGY

## 2025-05-29 PROCEDURE — 1100000001 HC PRIVATE ROOM DAILY

## 2025-05-29 PROCEDURE — 80069 RENAL FUNCTION PANEL: CPT

## 2025-05-29 PROCEDURE — 36415 COLL VENOUS BLD VENIPUNCTURE: CPT

## 2025-05-29 PROCEDURE — 88346 IMFLUOR 1ST 1ANTB STAIN PX: CPT | Performed by: PATHOLOGY

## 2025-05-29 PROCEDURE — 2500000004 HC RX 250 GENERAL PHARMACY W/ HCPCS (ALT 636 FOR OP/ED): Mod: JZ,SE | Performed by: RADIOLOGY

## 2025-05-29 PROCEDURE — 82947 ASSAY GLUCOSE BLOOD QUANT: CPT

## 2025-05-29 PROCEDURE — 99153 MOD SED SAME PHYS/QHP EA: CPT

## 2025-05-29 PROCEDURE — 88313 SPECIAL STAINS GROUP 2: CPT | Performed by: PATHOLOGY

## 2025-05-29 PROCEDURE — 88305 TISSUE EXAM BY PATHOLOGIST: CPT | Performed by: PATHOLOGY

## 2025-05-29 PROCEDURE — 2720000007 HC OR 272 NO HCPCS

## 2025-05-29 PROCEDURE — 2500000004 HC RX 250 GENERAL PHARMACY W/ HCPCS (ALT 636 FOR OP/ED): Mod: JZ,SE

## 2025-05-29 PROCEDURE — 7100000009 HC PHASE TWO TIME - INITIAL BASE CHARGE

## 2025-05-29 PROCEDURE — 99152 MOD SED SAME PHYS/QHP 5/>YRS: CPT

## 2025-05-29 PROCEDURE — 82435 ASSAY OF BLOOD CHLORIDE: CPT

## 2025-05-29 PROCEDURE — 80197 ASSAY OF TACROLIMUS: CPT

## 2025-05-29 PROCEDURE — 87799 DETECT AGENT NOS DNA QUANT: CPT

## 2025-05-29 RX ORDER — CALCIUM GLUCONATE 20 MG/ML
2 INJECTION, SOLUTION INTRAVENOUS ONCE
Status: COMPLETED | OUTPATIENT
Start: 2025-05-29 | End: 2025-05-29

## 2025-05-29 RX ORDER — FENTANYL CITRATE 50 UG/ML
INJECTION, SOLUTION INTRAMUSCULAR; INTRAVENOUS
Status: COMPLETED | OUTPATIENT
Start: 2025-05-29 | End: 2025-05-29

## 2025-05-29 RX ORDER — MIDAZOLAM HYDROCHLORIDE 1 MG/ML
INJECTION INTRAMUSCULAR; INTRAVENOUS
Status: COMPLETED | OUTPATIENT
Start: 2025-05-29 | End: 2025-05-29

## 2025-05-29 RX ORDER — ALBUTEROL SULFATE 0.83 MG/ML
SOLUTION RESPIRATORY (INHALATION)
Status: COMPLETED
Start: 2025-05-29 | End: 2025-05-29

## 2025-05-29 RX ORDER — TACROLIMUS 1 MG/1
3 CAPSULE ORAL
Status: DISCONTINUED | OUTPATIENT
Start: 2025-05-29 | End: 2025-06-01

## 2025-05-29 RX ORDER — POLYETHYLENE GLYCOL 3350 17 G/17G
17 POWDER, FOR SOLUTION ORAL DAILY PRN
Status: DISCONTINUED | OUTPATIENT
Start: 2025-05-29 | End: 2025-06-01 | Stop reason: HOSPADM

## 2025-05-29 RX ORDER — ALBUTEROL SULFATE 0.83 MG/ML
20 SOLUTION RESPIRATORY (INHALATION) ONCE
Status: COMPLETED | OUTPATIENT
Start: 2025-05-29 | End: 2025-05-29

## 2025-05-29 RX ADMIN — PREDNISONE 5 MG: 5 TABLET ORAL at 09:08

## 2025-05-29 RX ADMIN — MIDAZOLAM HYDROCHLORIDE 1 MG: 1 INJECTION, SOLUTION INTRAMUSCULAR; INTRAVENOUS at 12:51

## 2025-05-29 RX ADMIN — URSODIOL 300 MG: 300 CAPSULE ORAL at 20:17

## 2025-05-29 RX ADMIN — CALCIUM GLUCONATE 2 G: 20 INJECTION, SOLUTION INTRAVENOUS at 09:07

## 2025-05-29 RX ADMIN — CARVEDILOL 6.25 MG: 6.25 TABLET, FILM COATED ORAL at 09:08

## 2025-05-29 RX ADMIN — ACYCLOVIR 400 MG: 400 TABLET ORAL at 20:17

## 2025-05-29 RX ADMIN — URSODIOL 300 MG: 300 CAPSULE ORAL at 09:07

## 2025-05-29 RX ADMIN — ALBUTEROL SULFATE 20 MG: 2.5 SOLUTION RESPIRATORY (INHALATION) at 09:38

## 2025-05-29 RX ADMIN — SODIUM ZIRCONIUM CYCLOSILICATE 10 G: 10 POWDER, FOR SUSPENSION ORAL at 16:16

## 2025-05-29 RX ADMIN — SODIUM BICARBONATE 60 ML/HR: 84 INJECTION, SOLUTION INTRAVENOUS at 00:43

## 2025-05-29 RX ADMIN — SULFAMETHOXAZOLE AND TRIMETHOPRIM 1 TABLET: 400; 80 TABLET ORAL at 09:07

## 2025-05-29 RX ADMIN — SODIUM ZIRCONIUM CYCLOSILICATE 10 G: 10 POWDER, FOR SUSPENSION ORAL at 23:47

## 2025-05-29 RX ADMIN — PANTOPRAZOLE SODIUM 40 MG: 40 TABLET, DELAYED RELEASE ORAL at 06:40

## 2025-05-29 RX ADMIN — MYCOPHENOLATE MOFETIL 750 MG: 250 CAPSULE ORAL at 18:15

## 2025-05-29 RX ADMIN — INSULIN LISPRO 6 UNITS: 100 INJECTION, SOLUTION INTRAVENOUS; SUBCUTANEOUS at 18:49

## 2025-05-29 RX ADMIN — HYDRALAZINE HYDROCHLORIDE 25 MG: 25 TABLET ORAL at 20:16

## 2025-05-29 RX ADMIN — ASPIRIN 81 MG: 81 TABLET, COATED ORAL at 09:07

## 2025-05-29 RX ADMIN — TACROLIMUS 4 MG: 1 CAPSULE ORAL at 06:39

## 2025-05-29 RX ADMIN — CARVEDILOL 6.25 MG: 6.25 TABLET, FILM COATED ORAL at 20:16

## 2025-05-29 RX ADMIN — SODIUM ZIRCONIUM CYCLOSILICATE 10 G: 10 POWDER, FOR SUSPENSION ORAL at 09:07

## 2025-05-29 RX ADMIN — MIDAZOLAM HYDROCHLORIDE 1 MG: 1 INJECTION, SOLUTION INTRAMUSCULAR; INTRAVENOUS at 12:57

## 2025-05-29 RX ADMIN — TAMSULOSIN HYDROCHLORIDE 0.4 MG: 0.4 CAPSULE ORAL at 09:38

## 2025-05-29 RX ADMIN — FENTANYL CITRATE 50 MCG: 50 INJECTION, SOLUTION INTRAMUSCULAR; INTRAVENOUS at 12:56

## 2025-05-29 RX ADMIN — MYCOPHENOLATE MOFETIL 750 MG: 250 CAPSULE ORAL at 06:39

## 2025-05-29 RX ADMIN — URSODIOL 300 MG: 300 CAPSULE ORAL at 15:08

## 2025-05-29 RX ADMIN — TACROLIMUS 3 MG: 1 CAPSULE ORAL at 18:15

## 2025-05-29 RX ADMIN — Medication 50 MCG: at 09:07

## 2025-05-29 RX ADMIN — FENTANYL CITRATE 50 MCG: 50 INJECTION, SOLUTION INTRAMUSCULAR; INTRAVENOUS at 12:51

## 2025-05-29 RX ADMIN — HYDRALAZINE HYDROCHLORIDE 25 MG: 25 TABLET ORAL at 09:07

## 2025-05-29 RX ADMIN — ACYCLOVIR 400 MG: 400 TABLET ORAL at 09:07

## 2025-05-29 RX ADMIN — ALBUTEROL SULFATE 20 MG: 0.83 SOLUTION RESPIRATORY (INHALATION) at 09:38

## 2025-05-29 RX ADMIN — SODIUM BICARBONATE 60 ML/HR: 84 INJECTION, SOLUTION INTRAVENOUS at 09:38

## 2025-05-29 ASSESSMENT — PAIN SCALES - GENERAL
PAINLEVEL_OUTOF10: 0 - NO PAIN

## 2025-05-29 ASSESSMENT — COGNITIVE AND FUNCTIONAL STATUS - GENERAL
MOBILITY SCORE: 24
DAILY ACTIVITIY SCORE: 24

## 2025-05-29 ASSESSMENT — PAIN - FUNCTIONAL ASSESSMENT: PAIN_FUNCTIONAL_ASSESSMENT: 0-10

## 2025-05-29 ASSESSMENT — PAIN SCALES - WONG BAKER
WONGBAKER_NUMERICALRESPONSE: NO HURT
WONGBAKER_NUMERICALRESPONSE: NO HURT

## 2025-05-29 NOTE — H&P
Transplant Surgery History and Physical    Subjective   Chief Complaint/Reason for Admission: AKUA w/ low bicarb.     HPI:  Goran Ibrahim is a 54 y.o. 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 Wright/Dr. Trey Kline, also has HTN and T2DM who presents to [facility] for concerns of significant increase in Cr noted on outside labs. Patient states he is feeling well at this time. He has been taking his meds as instructed and has been trying to stay hydrated. HE has no complaints, no recent illnesses, and no sick contacts. Denies nausea, vomiting, shortness of breath, vision changes, headaches, chest pain, abdominal pain, dysuria, diarrhea, constipation, numbness and tingling in the extremities.       A 12-point ROS was performed and was unremarkable except as above.    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    Drug use: Never    Sexual activity: Yes     Partners: Female   Other Topics Concern    Not on file   Social History Narrative    Not on file     Social Drivers of Health     Financial Resource Strain: Low Risk  (5/28/2025)    Overall Financial Resource Strain (CARDIA)     Difficulty of Paying Living Expenses: Not very hard   Food Insecurity: No Food Insecurity (3/20/2025)    Hunger Vital Sign     Worried About Running Out of Food in the Last Year: Never true     Ran Out of Food in the Last Year: Never true   Transportation  Needs: No Transportation Needs (5/28/2025)    PRAPARE - Transportation     Lack of Transportation (Medical): No     Lack of Transportation (Non-Medical): No   Physical Activity: Not on file   Stress: No Stress Concern Present (2/21/2025)    [employer] [place] [employer] - Occupational Stress Questionnaire     Feeling of Stress : Not at all   Social Connections: Not on file   Intimate Partner Violence: Not At Risk (3/20/2025)    Humiliation, Afraid, Rape, and Kick questionnaire     Fear of Current or Ex-Partner: No     Emotionally Abused: No     Physically Abused: No     Sexually Abused: No   Housing Stability: Low Risk  (5/28/2025)    Housing Stability Vital Sign     Unable to Pay for Housing in the Last Year: No     Number of Times Moved in the Last Year: 1     Homeless in the Last Year: No     Fam Hx:  Family History[3]   Allergies:  RX Allergies[4]  Current Medications:  Medications Ordered Prior to Encounter[5]      Objective   Vitals:  Visit Vitals  /82 (BP Location: Left arm, Patient Position: Sitting)   Pulse 81   Temp 36.3 °C (97.3 °F) (Temporal)       Physical Exam:  GEN: No acute distress. Alert, awake and conversant.  HEENT: Sclera anicteric. Moist mucous membranes.  RESP: Breathing non-labored, equal chest rise. On RA.  CV: Regular rate, normotensive  GI: Abdomen soft, nondistended, nontender.   : Voiding spontaneously.  MSK: No gross deformities. Moves all extremities spontaneously.  NEURO: Alert and oriented x3. No focal deficits.  PSYCH: Appropriate mood and affect.  SKIN: No rashes or lesions.    Labs within past 24h:  Results for orders placed or performed in visit on 05/28/25 (from the past 24 hours)   Creatinine, Urine Random   Result Value Ref Range    Creatinine, Urine Random 118.9 20.0 - 370.0 mg/dL       Imaging within past 24h:  Imaging  No results found.    Cardiology, Vascular, and Other Imaging  No other imaging results found for the past 2 days      I have reviewed the  imaging above as it pertains to the patient's surgical concerns and agree with the radiologist's interpretation.     ASSESSMENT  Goran Ibrahim is a 54 y.o. 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 Wright/Dr. Trey Kline, also has HTN and T2DM who presents to [facility] for concerns of significant increase in Cr noted on outside labs. Patient is stable and appears well-hydrated at this time.     PLAN:  - Will administer sodium bicarb for low bicarb; 115meQ at 60mL/hr.   - Renal U/S  - EBV lab  - NPO at midnight for IR biopsy in the AM.  - Continue home immunosuppression  - Continue other home meds  - RNF      Discussed with Dr. Hudson.    Trey Vuong MD  PGY-1 General Surgery  Transplant Surgery [id number]         [1]   Past Medical History:  Diagnosis Date    Cirrhosis (Multi)     CKD (chronic kidney disease)     Hypertension     Type 2 diabetes mellitus    [2]   Past Surgical History:  Procedure Laterality Date    IR ANGIOGRAM CELIAC  05/19/2023    IR ANGIOGRAM CELIAC 5/19/2023    LIVER TRANSPLANTATION      TRANSPLANTATION RENAL      US GUIDED ABDOMINAL PARACENTESIS  06/22/2023    US GUIDED ABDOMINAL PARACENTESIS 6/22/2023    US GUIDED ABDOMINAL PARACENTESIS  05/19/2023    US GUIDED ABDOMINAL PARACENTESIS 5/19/2023   [3] No family history on file.  [4] No Known Allergies  [5]   No current facility-administered medications on file prior to encounter.     Current Outpatient Medications on File Prior to Encounter   Medication Sig Dispense Refill    acyclovir (Zovirax) 400 mg tablet Take 1 tablet (400 mg) by mouth 2 times a day. 60 tablet 1    aspirin 81 mg EC tablet Take 1 tablet (81 mg) by mouth once daily.      blood-glucose meter,continuous (Dexcom G7 ) misc Use as instructed 1 each 0    carvedilol (Coreg) 6.25 mg tablet Take 1 tablet (6.25 mg) by mouth 2 times a day. Hold for SBP <110 or HR <55 180 tablet 3    cholecalciferol (Vitamin D-3) 50 MCG  "(2000 UT) tablet Take 1 tablet (50 mcg) by mouth once daily. 30 tablet 11    hydrALAZINE (Apresoline) 25 mg tablet Take 1 tablet (25 mg) by mouth 2 times a day. 180 tablet 3    insulin glargine (Basaglar KwikPen U-100 Insulin) 100 unit/mL (3 mL) pen Inject 10 Units under the skin once daily in the morning. Take in AM with Prednisone 3 mL 3    insulin lispro (HumaLOG KwikPen Insulin) 100 unit/mL pen Inject 0-10 Units under the skin 3 times daily (morning, midday, late afternoon). Inject 5 units of Lispro subcutaneously two times a day before breakfast and lunch and 3 units with dinner- in addition to the following sliding scale: 0 unit(s) if Blood glucose is between  2 unit(s) if Blood glucose is between 151-200 4 unit(s) if Blood glucose is between 201-250 6 unit(s) if Blood glucose is between 251-300 8 unit(s) if Blood glucose is between 301-350 10 unit(s) if Blood glucose is between 351-400 If blood glucose is greater than 400 mg/dL, expect up to 50 units per day 9 mL 3    magnesium oxide (Mag-Ox) 400 mg tablet Take 2 tablets (800 mg) by mouth 2 times a day. 240 tablet 11    mycophenolate (Cellcept) 250 mg capsule Take 3 capsules (750 mg) by mouth every 12 hours. 540 capsule 3    pantoprazole (ProtoNix) 40 mg EC tablet Take 1 tablet (40 mg) by mouth once daily in the morning. Take before meals. Do not crush, chew, or split. 90 tablet 3    pen needle, diabetic 32 gauge x 5/32\" needle 1 each 4 times a day. Use to inject insulin 4 times daily 400 each 3    predniSONE (Deltasone) 5 mg tablet Take 1 tablet (5 mg) by mouth once daily. 60 tablet 11    sulfamethoxazole-trimethoprim (Bactrim) 400-80 mg tablet Take 1 tablet by mouth once daily. 30 tablet 5    tacrolimus (Prograf) 1 mg capsule Take 4 capsules (4 mg) by mouth 2 times a day. 720 capsule 3    tamsulosin (Flomax) 0.4 mg 24 hr capsule Take 1 capsule (0.4 mg) by mouth once daily. 90 capsule 3    ursodiol (Actigall) 300 mg capsule Take 1 capsule (300 mg) by " mouth 3 times a day. 90 capsule 11

## 2025-05-29 NOTE — CARE PLAN
The patient's goals for the shift include no    The clinical goals for the shift include pt will remain HDS throughout this shift    Problem: Pain - Adult  Goal: Verbalizes/displays adequate comfort level or baseline comfort level  Outcome: Progressing     Problem: Safety - Adult  Goal: Free from fall injury  Outcome: Progressing     Problem: Discharge Planning  Goal: Discharge to home or other facility with appropriate resources  Outcome: Progressing     Problem: Chronic Conditions and Co-morbidities  Goal: Patient's chronic conditions and co-morbidity symptoms are monitored and maintained or improved  Outcome: Progressing     Problem: Nutrition  Goal: Nutrient intake appropriate for maintaining nutritional needs  Outcome: Progressing

## 2025-05-29 NOTE — POST-PROCEDURE NOTE
Interventional Radiology Post-Procedure Note    Renal transplant biopsy    Procedure Details:  Technically successful and uncomplicated renal transplant biopsy.  Please see PACS for full procedural details.    Patient Tolerance: good  Complications: None    Indication for procedure: The encounter diagnosis was AKUA (acute kidney injury).    Pre-Procedure Verification and Time Out:  · Procedure Location procedure area   · HUDDLE - Pre-procedure Verification completed   · TIME OUT - Final Verification completed immediately prior to procedure start   · DEBRIEF completed     General Information:  Date/Time of Procedure: 05/29/25 at 1:22 PM  Indication(s): elevated Cr  Findings: See PACS  Procedure performed by: Demar Padilla MD   Assistant(s): Dr. Thuy Santiago MD  Estimated Blood Loss (mL): minimal  Specimen: Yes, 3 core specimens  Informed Consent: written consent obtained    Prep:  Ultrasound Guided Insertion: Yes  Large Drape, Hand Hygiene, Surgical Cap, Surgical Mask, Sterile Gown, Sterile Gloves, Glasses, and Scrubs  Patient Position: Supine  Site Prep: chlorhexidine, draped, usual sterile procedure followed    Anesthesia/Medications:  Procedural Sedation:  Fentanyl: 100 mcg  Versed: 2 mg  1% Lidocaine: 8 mL    Demar Padilla MD, PGY-6  Interventional Radiology  IR pager: [phone]    NON-Urgent on call weekends and after hours weekdays (5pm - 5am) IR pager: [phone]  Urgent & emergent on call weekends and after hours weekdays (5pm-7am) IR pager: [phone]

## 2025-05-29 NOTE — PROGRESS NOTES
05/29/25 1200   Discharge Planning   Living Arrangements Alone   Support Systems Family members   Assistance Needed None   Type of Residence Private residence   Home or Post Acute Services None   Expected Discharge Disposition Home   Does the patient need discharge transport arranged? Yes   RoundTrip coordination needed? Yes   Has discharge transport been arranged? No   Financial Resource Strain   How hard is it for you to pay for the very basics like food, housing, medical care, and heating? Not very   Housing Stability   In the last 12 months, was there a time when you were not able to pay the mortgage or rent on time? N   Transportation Needs   In the past 12 months, has lack of transportation kept you from medical appointments or from getting medications? no       DC Planning:  Went in and met with the pt, confirmed demographics.     Transitional Care Coordination Progress Note:  Patient discussed during interdisciplinary rounds.     Plan per Medical/Surgical team:    Home Care choice for home going needs, [place] [facility]  Pt Needs: TBD.       Discharge disposition: TBD    Potential Barriers: None    ADOD:  6/2    This TCC will continue to follow for home going needs and safe DC plan.      Ivy Reeves. DEAN. TCC.

## 2025-05-29 NOTE — PRE-PROCEDURE NOTE
INTERVENTIONAL RADIOLOGY PRE-PROCEDURE NOTE    Goran Ibrahim is a 54 y.o. male with PMHx of renal transplant who presents to the interventional radiology department for renal transplant biopsy.    Procedure: renal transplant biopsy    Indication for procedure: The encounter diagnosis was AKUA (acute kidney injury).    Medical History[1]   Surgical History[2]    Relevant Labs:   Lab Results   Component Value Date    CREATININE 3.06 (H) 05/29/2025    EGFR 23 (L) 05/29/2025    INR 1.2 (H) 05/21/2025    PROTIME 13.6 (H) 05/21/2025       Planned Sedation/Anesthesia: Moderate    Directed physical examination:    General: Normal appearance, behavior, cognition and NAD  Heart: Heart regular rate and rhythm  Lungs: No increased work of breathing  Abdomen: soft and nontender  Psych: oriented to time, place and person    Current Medications[3]     Mallampati: II (hard and soft palate, upper portion of tonsils anduvula visible)    ASA Score: ASA 2 - Patient with mild systemic disease with no functional limitations    Benefits, risks and alternatives of procedure and planned sedation have been discussed with the patient and/or their representative. All questions answered and they agree to proceed.     Demar Padilla MD, PGY-6  Vascular & Interventional Radiology  IR pager: [phone]    NON-Urgent on call weekends and after hours weekdays (5pm - 5am) IR pager: [phone]  Urgent & emergent on call weekends and after hours weekdays (5pm-7am) IR pager: [phone]          [1]   Past Medical History:  Diagnosis Date    Cirrhosis (Multi)     CKD (chronic kidney disease)     Hypertension     Type 2 diabetes mellitus    [2]   Past Surgical History:  Procedure Laterality Date    IR ANGIOGRAM CELIAC  05/19/2023    IR ANGIOGRAM CELIAC 5/19/2023    LIVER TRANSPLANTATION      TRANSPLANTATION RENAL      US GUIDED ABDOMINAL PARACENTESIS  06/22/2023    US GUIDED ABDOMINAL PARACENTESIS 6/22/2023    US GUIDED ABDOMINAL PARACENTESIS  05/19/2023    US GUIDED  ABDOMINAL PARACENTESIS 5/19/2023   [3]   Current Facility-Administered Medications:     acyclovir (Zovirax) tablet 400 mg, 400 mg, oral, BID, Trey Vuong MD, 400 mg at 05/29/25 0907    aspirin EC tablet 81 mg, 81 mg, oral, Daily, Trey Vuong MD, 81 mg at 05/29/25 0907    carvedilol (Coreg) tablet 6.25 mg, 6.25 mg, oral, BID, Trey Vuong MD, 6.25 mg at 05/29/25 0908    cholecalciferol (Vitamin D-3) tablet 50 mcg, 50 mcg, oral, Daily, Trey Vuong MD, 50 mcg at 05/29/25 0907    dextrose 50 % injection 12.5 g, 12.5 g, intravenous, q15 min PRN, Trey Vuong MD    dextrose 50 % injection 25 g, 25 g, intravenous, q15 min PRN, Trey Vuong MD    glucagon (Glucagen) injection 1 mg, 1 mg, intramuscular, q15 min PRN, Trey Vuong MD    glucagon (Glucagen) injection 1 mg, 1 mg, intramuscular, q15 min PRN, Trey Vuong MD    heparin (porcine) injection 5,000 Units, 5,000 Units, subcutaneous, q8h, Trey Vuong MD    hydrALAZINE (Apresoline) tablet 25 mg, 25 mg, oral, BID, Trey Vuong MD, 25 mg at 05/29/25 0907    [Held by provider] insulin glargine (Lantus) injection 10 Units, 10 Units, subcutaneous, q AM, Trey Vuong MD    insulin lispro injection 0-10 Units, 0-10 Units, subcutaneous, TID AC, Trey Vuong MD    mycophenolate (Cellcept) capsule 750 mg, 750 mg, oral, q12h EDINSON, Trey Vuong MD, 750 mg at 05/29/25 0639    pantoprazole (ProtoNix) EC tablet 40 mg, 40 mg, oral, Daily before breakfast, Trey Vuong MD, 40 mg at 05/29/25 0640    polyethylene glycol (Glycolax, Miralax) packet 17 g, 17 g, oral, Daily PRN, Chi Cyr MD    predniSONE (Deltasone) tablet 5 mg, 5 mg, oral, Daily, Trey JUAN Vuong MD, 5 mg at 05/29/25 0908    sodium zirconium cyclosilicate (Lokelma) packet 10 g, 10 g, oral, q8h, Chi Cyr MD, 10 g at 05/29/25 0907    [Held by provider] sulfamethoxazole-trimethoprim (Bactrim) 400-80 mg per tablet  1 tablet, 1 tablet, oral, Daily, Trey Vuong MD, 1 tablet at 05/29/25 0907    tacrolimus (Prograf) capsule 3 mg, 3 mg, oral, q12h [facility], Chi Cyr MD    tamsulosin (Flomax) 24 hr capsule 0.4 mg, 0.4 mg, oral, Daily, Trey Vuong MD, 0.4 mg at 05/29/25 0938    ursodiol (Actigall) capsule 300 mg, 300 mg, oral, TID, Trey Vuong MD, 300 mg at 05/29/25 0907

## 2025-05-29 NOTE — PROGRESS NOTES
Goran Ibrahim is a 54 y.o. male on day 1 of admission presenting with AKUA (acute kidney injury).    Subjective   Admitted, waiting for kidney biopsy       Objective   Vitals:    05/29/25 0756   BP: 118/69   Pulse: 70   Resp: 18   Temp: 36.2 °C (97.2 °F)   SpO2: 98%       Physical Exam  Constitutional:       Appearance: Normal appearance.   Eyes:      Pupils: Pupils are equal, round, and reactive to light.   Cardiovascular:      Rate and Rhythm: Normal rate.   Pulmonary:      Effort: Pulmonary effort is normal. No respiratory distress.   Abdominal:      General: There is no distension.      Palpations: Abdomen is soft.      Comments: Incision clean, dry, intact   Musculoskeletal:         General: Normal range of motion.      Right lower leg: No edema.      Left lower leg: No edema.   Skin:     General: Skin is warm and dry.   Neurological:      General: No focal deficit present.      Mental Status: He is alert and oriented to person, place, and time.   Psychiatric:         Mood and Affect: Mood normal.         Behavior: Behavior normal.         Last Recorded Vitals  Blood pressure 118/69, pulse 70, temperature 36.2 °C (97.2 °F), temperature source Temporal, resp. rate 18, weight 97.3 kg (214 lb 8.1 oz), SpO2 98%.  Intake/Output last 3 Shifts:  No intake/output data recorded.    Relevant Results  Lab Results   Component Value Date    WBC 5.9 05/29/2025    HGB 9.0 (L) 05/29/2025    HCT 27.7 (L) 05/29/2025    MCV 97 05/29/2025     (L) 05/29/2025     Lab Results   Component Value Date    GLUCOSE 119 (H) 05/29/2025    CALCIUM 8.3 (L) 05/29/2025     05/29/2025    K 6.0 (H) 05/29/2025    CO2 16 (L) 05/29/2025     (H) 05/29/2025    BUN 74 (H) 05/29/2025    CREATININE 3.06 (H) 05/29/2025     acyclovir, 400 mg, oral, BID  aspirin, 81 mg, oral, Daily  carvedilol, 6.25 mg, oral, BID  cholecalciferol, 50 mcg, oral, Daily  heparin (porcine), 5,000 Units, subcutaneous, q8h  hydrALAZINE, 25 mg, oral, BID  [Held by  provider] insulin glargine, 10 Units, subcutaneous, q AM  insulin lispro, 0-10 Units, subcutaneous, TID AC  mycophenolate, 750 mg, oral, q12h EDINSON  pantoprazole, 40 mg, oral, Daily before breakfast  predniSONE, 5 mg, oral, Daily  sodium zirconium cyclosilicate, 10 g, oral, q8h  sulfamethoxazole-trimethoprim, 1 tablet, oral, Daily  tacrolimus, 4 mg, oral, q12h EDINSON  tamsulosin, 0.4 mg, oral, Daily  ursodiol, 300 mg, oral, TID          Assessment & Plan  AKUA (acute kidney injury)    Liver allograft function   Stable    Kidney allograft function   Impaired,    Kidney biopsy planned for today     Immunosuppression   Tac 4/4, decrease to 3/3   /750   Pred 5    PPX   Hold bactrim, katerin, acyclovir    I spent 35 minutes in the professional and overall care of this patient.      Liam Hudson MD

## 2025-05-30 ENCOUNTER — APPOINTMENT (OUTPATIENT)
Facility: HOSPITAL | Age: 55
End: 2025-05-30
Payer: MEDICAID

## 2025-05-30 DIAGNOSIS — Z94.4 LIVER REPLACED BY TRANSPLANT (MULTI): ICD-10-CM

## 2025-05-30 LAB
ALBUMIN SERPL BCP-MCNC: 3.4 G/DL (ref 3.4–5)
ALBUMIN SERPL BCP-MCNC: 3.4 G/DL (ref 3.4–5)
ALBUMIN SERPL BCP-MCNC: 3.5 G/DL (ref 3.4–5)
ALP SERPL-CCNC: 124 U/L (ref 33–120)
ALP SERPL-CCNC: 126 U/L (ref 33–120)
ALT SERPL W P-5'-P-CCNC: 8 U/L (ref 10–52)
ALT SERPL W P-5'-P-CCNC: 9 U/L (ref 10–52)
ANION GAP SERPL CALC-SCNC: 14 MMOL/L (ref 10–20)
AST SERPL W P-5'-P-CCNC: 10 U/L (ref 9–39)
AST SERPL W P-5'-P-CCNC: 8 U/L (ref 9–39)
BILIRUB DIRECT SERPL-MCNC: 0.1 MG/DL (ref 0–0.3)
BILIRUB DIRECT SERPL-MCNC: 0.1 MG/DL (ref 0–0.3)
BILIRUB SERPL-MCNC: 0.3 MG/DL (ref 0–1.2)
BILIRUB SERPL-MCNC: 0.4 MG/DL (ref 0–1.2)
BUN SERPL-MCNC: 70 MG/DL (ref 6–23)
CALCIUM SERPL-MCNC: 8.4 MG/DL (ref 8.6–10.6)
CHLORIDE SERPL-SCNC: 112 MMOL/L (ref 98–107)
CO2 SERPL-SCNC: 16 MMOL/L (ref 21–32)
CREAT SERPL-MCNC: 3.06 MG/DL (ref 0.5–1.3)
EGFRCR SERPLBLD CKD-EPI 2021: 23 ML/MIN/1.73M*2
ERYTHROCYTE [DISTWIDTH] IN BLOOD BY AUTOMATED COUNT: 14.5 % (ref 11.5–14.5)
GLUCOSE BLD MANUAL STRIP-MCNC: 157 MG/DL (ref 74–99)
GLUCOSE BLD MANUAL STRIP-MCNC: 158 MG/DL (ref 74–99)
GLUCOSE BLD MANUAL STRIP-MCNC: 188 MG/DL (ref 74–99)
GLUCOSE BLD MANUAL STRIP-MCNC: 92 MG/DL (ref 74–99)
GLUCOSE SERPL-MCNC: 99 MG/DL (ref 74–99)
HCT VFR BLD AUTO: 25.6 % (ref 41–52)
HGB BLD-MCNC: 8.3 G/DL (ref 13.5–17.5)
MAGNESIUM SERPL-MCNC: 1.81 MG/DL (ref 1.6–2.4)
MCH RBC QN AUTO: 31.4 PG (ref 26–34)
MCHC RBC AUTO-ENTMCNC: 32.4 G/DL (ref 32–36)
MCV RBC AUTO: 97 FL (ref 80–100)
NRBC BLD-RTO: 0 /100 WBCS (ref 0–0)
PHOSPHATE SERPL-MCNC: 4.7 MG/DL (ref 2.5–4.9)
PLATELET # BLD AUTO: 115 X10*3/UL (ref 150–450)
POTASSIUM SERPL-SCNC: 4.3 MMOL/L (ref 3.5–5.3)
PROT SERPL-MCNC: 5.8 G/DL (ref 6.4–8.2)
PROT SERPL-MCNC: 6 G/DL (ref 6.4–8.2)
RBC # BLD AUTO: 2.64 X10*6/UL (ref 4.5–5.9)
SODIUM SERPL-SCNC: 138 MMOL/L (ref 136–145)
TACROLIMUS BLD-MCNC: 10.6 NG/ML
TACROLIMUS BLD-MCNC: 12.8 NG/ML
WBC # BLD AUTO: 5.3 X10*3/UL (ref 4.4–11.3)

## 2025-05-30 PROCEDURE — 80197 ASSAY OF TACROLIMUS: CPT

## 2025-05-30 PROCEDURE — 99232 SBSQ HOSP IP/OBS MODERATE 35: CPT | Performed by: TRANSPLANT SURGERY

## 2025-05-30 PROCEDURE — 83735 ASSAY OF MAGNESIUM: CPT

## 2025-05-30 PROCEDURE — 2500000001 HC RX 250 WO HCPCS SELF ADMINISTERED DRUGS (ALT 637 FOR MEDICARE OP): Mod: SE

## 2025-05-30 PROCEDURE — 2500000004 HC RX 250 GENERAL PHARMACY W/ HCPCS (ALT 636 FOR OP/ED): Mod: SE

## 2025-05-30 PROCEDURE — 2500000002 HC RX 250 W HCPCS SELF ADMINISTERED DRUGS (ALT 637 FOR MEDICARE OP, ALT 636 FOR OP/ED): Mod: SE

## 2025-05-30 PROCEDURE — 2500000005 HC RX 250 GENERAL PHARMACY W/O HCPCS: Mod: SE

## 2025-05-30 PROCEDURE — 85027 COMPLETE CBC AUTOMATED: CPT

## 2025-05-30 PROCEDURE — 1100000001 HC PRIVATE ROOM DAILY

## 2025-05-30 PROCEDURE — 84075 ASSAY ALKALINE PHOSPHATASE: CPT

## 2025-05-30 PROCEDURE — 82947 ASSAY GLUCOSE BLOOD QUANT: CPT

## 2025-05-30 PROCEDURE — 80069 RENAL FUNCTION PANEL: CPT

## 2025-05-30 PROCEDURE — 36415 COLL VENOUS BLD VENIPUNCTURE: CPT

## 2025-05-30 PROCEDURE — 82040 ASSAY OF SERUM ALBUMIN: CPT

## 2025-05-30 RX ORDER — SODIUM BICARBONATE 650 MG/1
1300 TABLET ORAL 2 TIMES DAILY
Status: DISCONTINUED | OUTPATIENT
Start: 2025-05-30 | End: 2025-06-01 | Stop reason: HOSPADM

## 2025-05-30 RX ORDER — SODIUM BICARBONATE 650 MG/1
1300 TABLET ORAL 2 TIMES DAILY
Qty: 4 TABLET | Refills: 29 | Status: SHIPPED | OUTPATIENT
Start: 2025-05-30 | End: 2025-06-01

## 2025-05-30 RX ORDER — MAGNESIUM SULFATE HEPTAHYDRATE 40 MG/ML
2 INJECTION, SOLUTION INTRAVENOUS ONCE
Status: COMPLETED | OUTPATIENT
Start: 2025-05-30 | End: 2025-05-30

## 2025-05-30 RX ORDER — SODIUM BICARBONATE 1 MEQ/ML
50 SYRINGE (ML) INTRAVENOUS ONCE
Status: COMPLETED | OUTPATIENT
Start: 2025-05-30 | End: 2025-05-30

## 2025-05-30 RX ADMIN — SODIUM BICARBONATE 1300 MG: 650 TABLET ORAL at 20:03

## 2025-05-30 RX ADMIN — URSODIOL 300 MG: 300 CAPSULE ORAL at 20:03

## 2025-05-30 RX ADMIN — URSODIOL 300 MG: 300 CAPSULE ORAL at 09:07

## 2025-05-30 RX ADMIN — SODIUM BICARBONATE 50 MEQ: 84 INJECTION INTRAVENOUS at 09:06

## 2025-05-30 RX ADMIN — ASPIRIN 81 MG: 81 TABLET, COATED ORAL at 09:07

## 2025-05-30 RX ADMIN — Medication 50 MCG: at 09:06

## 2025-05-30 RX ADMIN — INSULIN LISPRO 2 UNITS: 100 INJECTION, SOLUTION INTRAVENOUS; SUBCUTANEOUS at 15:21

## 2025-05-30 RX ADMIN — SODIUM ZIRCONIUM CYCLOSILICATE 10 G: 10 POWDER, FOR SUSPENSION ORAL at 09:06

## 2025-05-30 RX ADMIN — MYCOPHENOLATE MOFETIL 750 MG: 250 CAPSULE ORAL at 06:19

## 2025-05-30 RX ADMIN — MAGNESIUM SULFATE HEPTAHYDRATE 2 G: 40 INJECTION, SOLUTION INTRAVENOUS at 09:07

## 2025-05-30 RX ADMIN — TACROLIMUS 3 MG: 1 CAPSULE ORAL at 06:19

## 2025-05-30 RX ADMIN — INSULIN LISPRO 2 UNITS: 100 INJECTION, SOLUTION INTRAVENOUS; SUBCUTANEOUS at 20:03

## 2025-05-30 RX ADMIN — SULFAMETHOXAZOLE AND TRIMETHOPRIM 1 TABLET: 400; 80 TABLET ORAL at 13:20

## 2025-05-30 RX ADMIN — INSULIN GLARGINE 10 UNITS: 100 INJECTION, SOLUTION SUBCUTANEOUS at 09:07

## 2025-05-30 RX ADMIN — SODIUM BICARBONATE 1300 MG: 650 TABLET ORAL at 09:07

## 2025-05-30 RX ADMIN — PANTOPRAZOLE SODIUM 40 MG: 40 TABLET, DELAYED RELEASE ORAL at 06:20

## 2025-05-30 RX ADMIN — URSODIOL 300 MG: 300 CAPSULE ORAL at 15:22

## 2025-05-30 RX ADMIN — ACYCLOVIR 400 MG: 400 TABLET ORAL at 09:06

## 2025-05-30 RX ADMIN — MYCOPHENOLATE MOFETIL 750 MG: 250 CAPSULE ORAL at 18:21

## 2025-05-30 RX ADMIN — PREDNISONE 5 MG: 5 TABLET ORAL at 09:07

## 2025-05-30 RX ADMIN — HYDRALAZINE HYDROCHLORIDE 25 MG: 25 TABLET ORAL at 09:06

## 2025-05-30 RX ADMIN — CARVEDILOL 6.25 MG: 6.25 TABLET, FILM COATED ORAL at 20:03

## 2025-05-30 RX ADMIN — TAMSULOSIN HYDROCHLORIDE 0.4 MG: 0.4 CAPSULE ORAL at 09:06

## 2025-05-30 ASSESSMENT — COGNITIVE AND FUNCTIONAL STATUS - GENERAL
MOBILITY SCORE: 24
DAILY ACTIVITIY SCORE: 24

## 2025-05-30 ASSESSMENT — PAIN SCALES - GENERAL: PAINLEVEL_OUTOF10: 1

## 2025-05-30 ASSESSMENT — PAIN - FUNCTIONAL ASSESSMENT: PAIN_FUNCTIONAL_ASSESSMENT: 0-10

## 2025-05-30 NOTE — HOSPITAL COURSE
Goran Ibrahim is a 54 y.o. 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 Wright/Dr. Trey Kline, also has HTN and T2DM who presents to [facility] for concerns of significant increase in Cr noted on outside labs.     Pt went for kidney biopsy on 5/28/25 with results still pending. Patient with downtrending creatinine and BUN and was deemed stable for discharge home on 6/1/25 with close follow up and understanding for potential need to return to hospital for further inpatient treatment if biopsy results show signs of rejection or other concerns.     Patient was started on Lokelma during this admission and should continue to take upon discharge for high potassium levels. Prednisone dosing was adjusted to 20mg daily from 5 mg daily due to concern for rejection. Tacrolimus levels were adjusted down to 2mg twice daily due to elevated serum tacrolimus levels.

## 2025-05-30 NOTE — PROGRESS NOTES
Goran Ibrahim is a 54 y.o. male on day 2 of admission presenting with AKUA (acute kidney injury).    Subjective   AKUA, S/p kidney biopsy       Objective   Vitals:    05/30/25 0800   BP: 128/78   Pulse: 57   Resp: 16   Temp: 36.2 °C (97.2 °F)   SpO2: 98%       Physical Exam  Constitutional:       Appearance: Normal appearance.   Eyes:      Pupils: Pupils are equal, round, and reactive to light.   Cardiovascular:      Rate and Rhythm: Normal rate.   Pulmonary:      Effort: Pulmonary effort is normal. No respiratory distress.   Abdominal:      General: There is no distension.      Palpations: Abdomen is soft.      Comments: Incision clean, dry, intact   Musculoskeletal:         General: Normal range of motion.      Right lower leg: No edema.      Left lower leg: No edema.   Skin:     General: Skin is warm and dry.   Neurological:      General: No focal deficit present.      Mental Status: He is alert and oriented to person, place, and time.   Psychiatric:         Mood and Affect: Mood normal.         Behavior: Behavior normal.         Last Recorded Vitals  Blood pressure 128/78, pulse 57, temperature 36.2 °C (97.2 °F), temperature source Temporal, resp. rate 16, weight 97.8 kg (215 lb 9.8 oz), SpO2 98%.  Intake/Output last 3 Shifts:  I/O last 3 completed shifts:  In: 536 (5.5 mL/kg) [P.O.:450; I.V.:86 (0.9 mL/kg)]  Out: 500 (5.1 mL/kg) [Urine:500 (0.1 mL/kg/hr)]  Weight: 97.8 kg     Relevant Results  Lab Results   Component Value Date    WBC 5.3 05/30/2025    HGB 8.3 (L) 05/30/2025    HCT 25.6 (L) 05/30/2025    MCV 97 05/30/2025     (L) 05/30/2025     Lab Results   Component Value Date    GLUCOSE 99 05/30/2025    CALCIUM 8.4 (L) 05/30/2025     05/30/2025    K 4.3 05/30/2025    CO2 16 (L) 05/30/2025     (H) 05/30/2025    BUN 70 (H) 05/30/2025    CREATININE 3.06 (H) 05/30/2025     Lab Results   Component Value Date    ALT 9 (L) 05/28/2025    AST 10 05/28/2025    GGT 94 (H) 05/28/2025    ALKPHOS 147 (H)  05/28/2025    BILITOT 0.5 05/28/2025       acyclovir, 400 mg, oral, BID  aspirin, 81 mg, oral, Daily  carvedilol, 6.25 mg, oral, BID  cholecalciferol, 50 mcg, oral, Daily  heparin (porcine), 5,000 Units, subcutaneous, q8h  hydrALAZINE, 25 mg, oral, BID  insulin glargine, 10 Units, subcutaneous, q AM  insulin lispro, 0-10 Units, subcutaneous, TID AC  magnesium sulfate, 2 g, intravenous, Once  mycophenolate, 750 mg, oral, q12h EDINSON  pantoprazole, 40 mg, oral, Daily before breakfast  predniSONE, 5 mg, oral, Daily  sodium bicarbonate, 1,300 mg, oral, BID  sodium zirconium cyclosilicate, 10 g, oral, q8h  [Held by provider] sulfamethoxazole-trimethoprim, 1 tablet, oral, Daily  tacrolimus, 3 mg, oral, q12h EDINSON  tamsulosin, 0.4 mg, oral, Daily  ursodiol, 300 mg, oral, TID          Assessment & Plan  AKUA (acute kidney injury)    Liver allograft function   Stable, LFT pending    Kidney allograft function   Impaired, AKUA   Kidney biopsy completed, awaiting result    Has DSA class II ~2000     Immunosuppression   Tac 3/3   /750   Pred 5    PPX   bactrim, katerin, completed acyclovir    I spent 35 minutes in the professional and overall care of this patient.      Liam Hudson MD

## 2025-05-31 LAB
ALBUMIN SERPL BCP-MCNC: 3.5 G/DL (ref 3.4–5)
ALP SERPL-CCNC: 124 U/L (ref 33–120)
ALT SERPL W P-5'-P-CCNC: 8 U/L (ref 10–52)
ANION GAP SERPL CALC-SCNC: 12 MMOL/L (ref 10–20)
AST SERPL W P-5'-P-CCNC: 9 U/L (ref 9–39)
BILIRUB DIRECT SERPL-MCNC: 0.1 MG/DL (ref 0–0.3)
BILIRUB SERPL-MCNC: 0.3 MG/DL (ref 0–1.2)
BUN SERPL-MCNC: 63 MG/DL (ref 6–23)
CALCIUM SERPL-MCNC: 8.5 MG/DL (ref 8.6–10.6)
CHLORIDE SERPL-SCNC: 112 MMOL/L (ref 98–107)
CO2 SERPL-SCNC: 19 MMOL/L (ref 21–32)
CREAT SERPL-MCNC: 2.63 MG/DL (ref 0.5–1.3)
EGFRCR SERPLBLD CKD-EPI 2021: 28 ML/MIN/1.73M*2
ERYTHROCYTE [DISTWIDTH] IN BLOOD BY AUTOMATED COUNT: 14.5 % (ref 11.5–14.5)
GLUCOSE BLD MANUAL STRIP-MCNC: 158 MG/DL (ref 74–99)
GLUCOSE BLD MANUAL STRIP-MCNC: 159 MG/DL (ref 74–99)
GLUCOSE BLD MANUAL STRIP-MCNC: 98 MG/DL (ref 74–99)
GLUCOSE SERPL-MCNC: 90 MG/DL (ref 74–99)
HCT VFR BLD AUTO: 25.1 % (ref 41–52)
HGB BLD-MCNC: 8.3 G/DL (ref 13.5–17.5)
MAGNESIUM SERPL-MCNC: 1.99 MG/DL (ref 1.6–2.4)
MCH RBC QN AUTO: 32 PG (ref 26–34)
MCHC RBC AUTO-ENTMCNC: 33.1 G/DL (ref 32–36)
MCV RBC AUTO: 97 FL (ref 80–100)
NRBC BLD-RTO: 0 /100 WBCS (ref 0–0)
PHOSPHATE SERPL-MCNC: 3 MG/DL (ref 2.5–4.9)
PLATELET # BLD AUTO: 108 X10*3/UL (ref 150–450)
POTASSIUM SERPL-SCNC: 4 MMOL/L (ref 3.5–5.3)
PROT SERPL-MCNC: 5.9 G/DL (ref 6.4–8.2)
RBC # BLD AUTO: 2.59 X10*6/UL (ref 4.5–5.9)
SODIUM SERPL-SCNC: 139 MMOL/L (ref 136–145)
WBC # BLD AUTO: 4.7 X10*3/UL (ref 4.4–11.3)

## 2025-05-31 PROCEDURE — 83735 ASSAY OF MAGNESIUM: CPT

## 2025-05-31 PROCEDURE — 82947 ASSAY GLUCOSE BLOOD QUANT: CPT

## 2025-05-31 PROCEDURE — 2500000002 HC RX 250 W HCPCS SELF ADMINISTERED DRUGS (ALT 637 FOR MEDICARE OP, ALT 636 FOR OP/ED): Mod: SE

## 2025-05-31 PROCEDURE — 84075 ASSAY ALKALINE PHOSPHATASE: CPT

## 2025-05-31 PROCEDURE — 2500000001 HC RX 250 WO HCPCS SELF ADMINISTERED DRUGS (ALT 637 FOR MEDICARE OP): Mod: SE

## 2025-05-31 PROCEDURE — 85027 COMPLETE CBC AUTOMATED: CPT

## 2025-05-31 PROCEDURE — 2500000004 HC RX 250 GENERAL PHARMACY W/ HCPCS (ALT 636 FOR OP/ED): Mod: SE

## 2025-05-31 PROCEDURE — 36415 COLL VENOUS BLD VENIPUNCTURE: CPT

## 2025-05-31 PROCEDURE — 80048 BASIC METABOLIC PNL TOTAL CA: CPT

## 2025-05-31 PROCEDURE — 84100 ASSAY OF PHOSPHORUS: CPT

## 2025-05-31 PROCEDURE — 1100000001 HC PRIVATE ROOM DAILY

## 2025-05-31 RX ADMIN — INSULIN GLARGINE 10 UNITS: 100 INJECTION, SOLUTION SUBCUTANEOUS at 08:51

## 2025-05-31 RX ADMIN — PREDNISONE 5 MG: 5 TABLET ORAL at 08:51

## 2025-05-31 RX ADMIN — SULFAMETHOXAZOLE AND TRIMETHOPRIM 1 TABLET: 400; 80 TABLET ORAL at 08:51

## 2025-05-31 RX ADMIN — MYCOPHENOLATE MOFETIL 750 MG: 250 CAPSULE ORAL at 19:14

## 2025-05-31 RX ADMIN — TAMSULOSIN HYDROCHLORIDE 0.4 MG: 0.4 CAPSULE ORAL at 08:51

## 2025-05-31 RX ADMIN — SODIUM BICARBONATE 1300 MG: 650 TABLET ORAL at 08:51

## 2025-05-31 RX ADMIN — PANTOPRAZOLE SODIUM 40 MG: 40 TABLET, DELAYED RELEASE ORAL at 06:34

## 2025-05-31 RX ADMIN — MYCOPHENOLATE MOFETIL 750 MG: 250 CAPSULE ORAL at 06:33

## 2025-05-31 RX ADMIN — ASPIRIN 81 MG: 81 TABLET, COATED ORAL at 08:51

## 2025-05-31 RX ADMIN — URSODIOL 300 MG: 300 CAPSULE ORAL at 08:51

## 2025-05-31 RX ADMIN — URSODIOL 300 MG: 300 CAPSULE ORAL at 16:10

## 2025-05-31 RX ADMIN — CARVEDILOL 6.25 MG: 6.25 TABLET, FILM COATED ORAL at 08:51

## 2025-05-31 RX ADMIN — SODIUM ZIRCONIUM CYCLOSILICATE 5 G: 10 POWDER, FOR SUSPENSION ORAL at 11:52

## 2025-05-31 RX ADMIN — CARVEDILOL 6.25 MG: 6.25 TABLET, FILM COATED ORAL at 21:34

## 2025-05-31 RX ADMIN — URSODIOL 300 MG: 300 CAPSULE ORAL at 21:34

## 2025-05-31 RX ADMIN — SODIUM BICARBONATE 1300 MG: 650 TABLET ORAL at 21:34

## 2025-05-31 RX ADMIN — INSULIN LISPRO 2 UNITS: 100 INJECTION, SOLUTION INTRAVENOUS; SUBCUTANEOUS at 17:07

## 2025-05-31 RX ADMIN — Medication 50 MCG: at 08:51

## 2025-05-31 ASSESSMENT — COGNITIVE AND FUNCTIONAL STATUS - GENERAL
MOBILITY SCORE: 24
DAILY ACTIVITIY SCORE: 24

## 2025-05-31 ASSESSMENT — PAIN SCALES - GENERAL: PAINLEVEL_OUTOF10: 0 - NO PAIN

## 2025-06-01 VITALS
WEIGHT: 215.61 LBS | TEMPERATURE: 96.8 F | BODY MASS INDEX: 30.94 KG/M2 | DIASTOLIC BLOOD PRESSURE: 84 MMHG | SYSTOLIC BLOOD PRESSURE: 160 MMHG | OXYGEN SATURATION: 99 % | HEART RATE: 55 BPM | RESPIRATION RATE: 17 BRPM

## 2025-06-01 LAB
ALBUMIN SERPL BCP-MCNC: 3.5 G/DL (ref 3.4–5)
ALP SERPL-CCNC: 116 U/L (ref 33–120)
ALT SERPL W P-5'-P-CCNC: 7 U/L (ref 10–52)
ANION GAP SERPL CALC-SCNC: 13 MMOL/L (ref 10–20)
AST SERPL W P-5'-P-CCNC: 7 U/L (ref 9–39)
BILIRUB DIRECT SERPL-MCNC: 0.2 MG/DL (ref 0–0.3)
BILIRUB SERPL-MCNC: 0.3 MG/DL (ref 0–1.2)
BUN SERPL-MCNC: 53 MG/DL (ref 6–23)
CALCIUM SERPL-MCNC: 8.2 MG/DL (ref 8.6–10.6)
CHLORIDE SERPL-SCNC: 112 MMOL/L (ref 98–107)
CO2 SERPL-SCNC: 19 MMOL/L (ref 21–32)
CREAT SERPL-MCNC: 2.59 MG/DL (ref 0.5–1.3)
EGFRCR SERPLBLD CKD-EPI 2021: 28 ML/MIN/1.73M*2
ERYTHROCYTE [DISTWIDTH] IN BLOOD BY AUTOMATED COUNT: 14.6 % (ref 11.5–14.5)
GLUCOSE BLD MANUAL STRIP-MCNC: 107 MG/DL (ref 74–99)
GLUCOSE SERPL-MCNC: 112 MG/DL (ref 74–99)
HCT VFR BLD AUTO: 29 % (ref 41–52)
HGB BLD-MCNC: 9.1 G/DL (ref 13.5–17.5)
MAGNESIUM SERPL-MCNC: 1.79 MG/DL (ref 1.6–2.4)
MCH RBC QN AUTO: 31.4 PG (ref 26–34)
MCHC RBC AUTO-ENTMCNC: 31.4 G/DL (ref 32–36)
MCV RBC AUTO: 100 FL (ref 80–100)
NRBC BLD-RTO: 0 /100 WBCS (ref 0–0)
PHOSPHATE SERPL-MCNC: 3.2 MG/DL (ref 2.5–4.9)
PLATELET # BLD AUTO: 138 X10*3/UL (ref 150–450)
POTASSIUM SERPL-SCNC: 4.1 MMOL/L (ref 3.5–5.3)
PROT SERPL-MCNC: 6 G/DL (ref 6.4–8.2)
RBC # BLD AUTO: 2.9 X10*6/UL (ref 4.5–5.9)
SODIUM SERPL-SCNC: 140 MMOL/L (ref 136–145)
TACROLIMUS BLD-MCNC: 3.4 NG/ML
WBC # BLD AUTO: 5.6 X10*3/UL (ref 4.4–11.3)

## 2025-06-01 PROCEDURE — 99238 HOSP IP/OBS DSCHRG MGMT 30/<: CPT | Performed by: TRANSPLANT SURGERY

## 2025-06-01 PROCEDURE — 36415 COLL VENOUS BLD VENIPUNCTURE: CPT

## 2025-06-01 PROCEDURE — 2500000001 HC RX 250 WO HCPCS SELF ADMINISTERED DRUGS (ALT 637 FOR MEDICARE OP): Mod: SE

## 2025-06-01 PROCEDURE — 84075 ASSAY ALKALINE PHOSPHATASE: CPT

## 2025-06-01 PROCEDURE — 2500000002 HC RX 250 W HCPCS SELF ADMINISTERED DRUGS (ALT 637 FOR MEDICARE OP, ALT 636 FOR OP/ED): Mod: SE

## 2025-06-01 PROCEDURE — 85027 COMPLETE CBC AUTOMATED: CPT

## 2025-06-01 PROCEDURE — 82947 ASSAY GLUCOSE BLOOD QUANT: CPT

## 2025-06-01 PROCEDURE — 2500000004 HC RX 250 GENERAL PHARMACY W/ HCPCS (ALT 636 FOR OP/ED): Mod: SE

## 2025-06-01 PROCEDURE — 80197 ASSAY OF TACROLIMUS: CPT

## 2025-06-01 PROCEDURE — 83735 ASSAY OF MAGNESIUM: CPT

## 2025-06-01 PROCEDURE — 84100 ASSAY OF PHOSPHORUS: CPT

## 2025-06-01 RX ORDER — PREDNISONE 5 MG/1
20 TABLET ORAL DAILY
Qty: 120 TABLET | Refills: 0 | Status: SHIPPED | OUTPATIENT
Start: 2025-06-01 | End: 2025-06-12 | Stop reason: SDUPTHER

## 2025-06-01 RX ORDER — TACROLIMUS 1 MG/1
2 CAPSULE ORAL
Status: DISCONTINUED | OUTPATIENT
Start: 2025-06-01 | End: 2025-06-01 | Stop reason: HOSPADM

## 2025-06-01 RX ORDER — TACROLIMUS 1 MG/1
2 CAPSULE ORAL 2 TIMES DAILY
Qty: 360 CAPSULE | Refills: 3 | Status: SHIPPED | OUTPATIENT
Start: 2025-06-01 | End: 2025-06-06 | Stop reason: SDUPTHER

## 2025-06-01 RX ORDER — TACROLIMUS 1 MG/1
2 CAPSULE ORAL ONCE
Status: COMPLETED | OUTPATIENT
Start: 2025-06-01 | End: 2025-06-01

## 2025-06-01 RX ORDER — SODIUM BICARBONATE 650 MG/1
1300 TABLET ORAL 2 TIMES DAILY
Qty: 4 TABLET | Refills: 29 | Status: SHIPPED | OUTPATIENT
Start: 2025-06-01

## 2025-06-01 RX ADMIN — ASPIRIN 81 MG: 81 TABLET, COATED ORAL at 09:37

## 2025-06-01 RX ADMIN — URSODIOL 300 MG: 300 CAPSULE ORAL at 09:36

## 2025-06-01 RX ADMIN — SODIUM ZIRCONIUM CYCLOSILICATE 5 G: 10 POWDER, FOR SUSPENSION ORAL at 11:22

## 2025-06-01 RX ADMIN — TAMSULOSIN HYDROCHLORIDE 0.4 MG: 0.4 CAPSULE ORAL at 09:36

## 2025-06-01 RX ADMIN — INSULIN GLARGINE 10 UNITS: 100 INJECTION, SOLUTION SUBCUTANEOUS at 09:38

## 2025-06-01 RX ADMIN — MYCOPHENOLATE MOFETIL 750 MG: 250 CAPSULE ORAL at 06:38

## 2025-06-01 RX ADMIN — TACROLIMUS 2 MG: 1 CAPSULE ORAL at 09:36

## 2025-06-01 RX ADMIN — SULFAMETHOXAZOLE AND TRIMETHOPRIM 1 TABLET: 400; 80 TABLET ORAL at 09:36

## 2025-06-01 RX ADMIN — CARVEDILOL 6.25 MG: 6.25 TABLET, FILM COATED ORAL at 09:36

## 2025-06-01 RX ADMIN — PREDNISONE 5 MG: 5 TABLET ORAL at 09:36

## 2025-06-01 RX ADMIN — SODIUM BICARBONATE 1300 MG: 650 TABLET ORAL at 09:36

## 2025-06-01 RX ADMIN — PANTOPRAZOLE SODIUM 40 MG: 40 TABLET, DELAYED RELEASE ORAL at 06:38

## 2025-06-01 RX ADMIN — Medication 50 MCG: at 09:36

## 2025-06-01 ASSESSMENT — COGNITIVE AND FUNCTIONAL STATUS - GENERAL
MOBILITY SCORE: 24
DAILY ACTIVITIY SCORE: 24

## 2025-06-01 ASSESSMENT — PAIN SCALES - GENERAL: PAINLEVEL_OUTOF10: 0 - NO PAIN

## 2025-06-01 ASSESSMENT — PAIN - FUNCTIONAL ASSESSMENT: PAIN_FUNCTIONAL_ASSESSMENT: 0-10

## 2025-06-01 NOTE — DISCHARGE SUMMARY
Discharge Diagnosis  AKUA (acute kidney injury)    Issues Requiring Follow-Up  Biopsy results are still pending - depending on results which should be available on Monday 6/2, you may need to return to hospital for further treatment.    Continue regularly scheduled clinic and lab draw appointments    Changes made to prednisone and tacrolimus dosing.     Test Results Pending At Discharge  Pending Labs       Order Current Status    Surgical Pathology Exam In process    Tacrolimus In process            Hospital Course  Goran Ibrahim is a 54 y.o. 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 Wright/Dr. Trey Kline, also has HTN and T2DM who presents to [facility] for concerns of significant increase in Cr noted on outside labs.     Pt went for kidney biopsy on 5/28/25 with results still pending. Patient with downtrending creatinine and BUN and was deemed stable for discharge home on 6/1/25 with close follow up and understanding for potential need to return to hospital for further inpatient treatment if biopsy results show signs of rejection or other concerns.     Patient was started on Lokelma during this admission and should continue to take upon discharge for high potassium levels. Prednisone dosing was adjusted to 20mg daily from 5 mg daily due to concern for rejection. Tacrolimus levels were adjusted down to 2mg twice daily due to elevated serum tacrolimus levels.     Pertinent Physical Exam At Time of Discharge  Physical Exam  Constitutional:       General: He is not in acute distress.     Appearance: Normal appearance. He is not ill-appearing.   HENT:      Head: Normocephalic and atraumatic.      Mouth/Throat:      Mouth: Mucous membranes are moist.   Eyes:      Extraocular Movements: Extraocular movements intact.   Cardiovascular:      Rate and Rhythm: Normal rate.      Pulses: Normal pulses.   Pulmonary:      Effort: Pulmonary effort is normal. No respiratory  distress.   Abdominal:      General: There is no distension.      Palpations: Abdomen is soft.      Tenderness: There is no abdominal tenderness.      Comments: Well healed surgical incisions without erythema or drainage.    Musculoskeletal:         General: No swelling. Normal range of motion.      Cervical back: Normal range of motion.   Skin:     General: Skin is warm and dry.      Capillary Refill: Capillary refill takes 2 to 3 seconds.   Neurological:      General: No focal deficit present.      Mental Status: He is alert and oriented to person, place, and time.   Psychiatric:         Mood and Affect: Mood normal.         Behavior: Behavior normal.     Home Medications     Medication List      START taking these medications    • sodium bicarbonate 650 mg tablet; Take 2 tablets (1,300 mg) by mouth 2   times a day.  • sodium zirconium cyclosilicate 5 gram packet; Commonly known as:   Lokelma; Take 5 g by mouth once every 24 hours for 10 days. Do not fill   before May 31, 2025.     CHANGE how you take these medications    • predniSONE 5 mg tablet; Commonly known as: Deltasone; Take 4 tablets (20   mg) by mouth once daily.; What changed: how much to take  • tacrolimus 1 mg capsule; Commonly known as: Prograf; Take 2 capsules (2   mg) by mouth 2 times a day.; What changed: how much to take     CONTINUE taking these medications    • aspirin 81 mg EC tablet  • Basaglar KwikPen U-100 Insulin 100 unit/mL (3 mL) pen; Generic drug:   insulin glargine; Inject 10 Units under the skin once daily in the   morning. Take in AM with Prednisone  • carvedilol 6.25 mg tablet; Commonly known as: Coreg; Take 1 tablet (6.25   mg) by mouth 2 times a day. Hold for SBP <110 or HR <55  • cholecalciferol 50 mcg (2,000 units) tablet; Commonly known as: Vitamin   D-3; Take 1 tablet (50 mcg) by mouth once daily.  • Dexcom G7  misc; Generic drug: blood-glucose,,cont; Use   as instructed  • insulin lispro 100 unit/mL pen;  "Commonly known as: HumaLOG KwikPen   Insulin; Inject 0-10 Units under the skin 3 times daily (morning, midday,   late afternoon). Inject 5 units of Lispro subcutaneously two times a day   before breakfast and lunch and 3 units with dinner- in addition to the   following sliding scale: 0 unit(s) if Blood glucose is between  2   unit(s) if Blood glucose is between 151-200 4 unit(s) if Blood glucose is   between 201-250 6 unit(s) if Blood glucose is between 251-300 8 unit(s) if   Blood glucose is between 301-350 10 unit(s) if Blood glucose is between   351-400 If blood glucose is greater than 400 mg/dL, expect up to 50 units   per day  • magnesium oxide 400 mg tablet; Commonly known as: Mag-Ox; Take 2 tablets   (800 mg) by mouth 2 times a day.  • mycophenolate 250 mg capsule; Commonly known as: Cellcept; Take 3   capsules (750 mg) by mouth every 12 hours.  • pantoprazole 40 mg EC tablet; Commonly known as: ProtoNix; Take 1 tablet   (40 mg) by mouth once daily in the morning. Take before meals. Do not   crush, chew, or split.  • pen needle, diabetic 32 gauge x 5/32\" needle; 1 each 4 times a day. Use   to inject insulin 4 times daily  • sulfamethoxazole-trimethoprim 400-80 mg tablet; Commonly known as:   Bactrim; Take 1 tablet by mouth once daily.  • tamsulosin 0.4 mg 24 hr capsule; Commonly known as: Flomax; Take 1   capsule (0.4 mg) by mouth once daily.  • ursodiol 300 mg capsule; Commonly known as: Actigall; Take 1 capsule   (300 mg) by mouth 3 times a day.     STOP taking these medications    • acyclovir 400 mg tablet; Commonly known as: Zovirax  • hydrALAZINE 25 mg tablet; Commonly known as: Apresoline       Outpatient Follow-Up  Future Appointments   Date Time Provider Department [place]   6/19/2025 10:20 AM Seun Rodrigues MD [id number] [facility]   7/24/2025  1:00 PM Chelsea Hernandez PA-C [id number]   8/20/2025  9:30 AM Joanne Rodriguez MD [id number] [facility]       Chi Cyr MD  "

## 2025-06-01 NOTE — DISCHARGE INSTRUCTIONS
Change your prednisone dose to 20 mg daily (4 tabs of 5 mg)    Change your tacrolimus dosing to 2 mg daily (2 tabs of 1 mg)    Follow up on biopsy results - may need to come back to hospital for further treatment depending on final biopsy results.     Continue your weekly lab draws and clinic appointments as previously scheduled.

## 2025-06-01 NOTE — CARE PLAN
The patient's goals for the shift include no    The clinical goals for the shift include patient will remain HDS throughout this shift      Problem: Pain - Adult  Goal: Verbalizes/displays adequate comfort level or baseline comfort level  Outcome: Progressing     Problem: Safety - Adult  Goal: Free from fall injury  Outcome: Progressing     Problem: Discharge Planning  Goal: Discharge to home or other facility with appropriate resources  Outcome: Progressing     Problem: Chronic Conditions and Co-morbidities  Goal: Patient's chronic conditions and co-morbidity symptoms are monitored and maintained or improved  Outcome: Progressing     Problem: Nutrition  Goal: Nutrient intake appropriate for maintaining nutritional needs  Outcome: Progressing

## 2025-06-02 ENCOUNTER — PATIENT OUTREACH (OUTPATIENT)
Dept: PRIMARY CARE | Facility: CLINIC | Age: 55
End: 2025-06-02
Payer: MEDICAID

## 2025-06-02 LAB
HLA RESULTS: NORMAL
HLA-A+B+C AB NFR SER: NORMAL %
HLA-DP+DQ+DR AB NFR SER: NORMAL %

## 2025-06-02 NOTE — PROGRESS NOTES
Discharge Facility: [facility]   Discharge Diagnosis: AKUA (acute kidney injury)   Admission Date: 5/28/25  Discharge Date: 6/1/25    PCP Appointment Date: prefers to keep previously scheduled 6/19/25, outside of 14 day window  Specialist Appointment Date: needs transplant follow up  Hospital Encounter and Summary Linked: Yes  Admission (Discharged) with Goran Carrillo MD (05/28/2025)   See discharge assessment below for further details    Wrap Up  Wrap Up Additional Comments: Patient reports feeling well, states he did prior to hospitalization as well but had abnormal labs. He reports some soreness after biopsy but managing well, Patient is awaiting biopsy results. Encouraged follow up appointments and repeat labs, patient prefers to keep 6/19 previously scheduled PCP appt. Medication changes reviewed. Patient denies any further questions or concerns. Contact information provided. (6/2/2025  3:51 PM)    Medications  Medications reviewed with patient/caregiver?: Yes (6/2/2025  3:51 PM)  Is the patient having any side effects they believe may be caused by any medication additions or changes?: No (6/2/2025  3:51 PM)  Does the patient have all medications ordered at discharge?: No (6/2/2025  3:51 PM)  Prescription Comments: START taking:  sodium bicarbonate  sodium zirconium cyclosilicate (Lokelma)    CHANGE how you take:  predniSONE (Deltasone)   tacrolimus (Prograf)    STOP taking:  acyclovir 400 mg tablet (Zovirax)   hydrALAZINE 25 mg tablet (Apresoline) (6/2/2025  3:51 PM)  Medication Comments: awaiting lokelma from pharmacy today (6/2/2025  3:51 PM)    Appointments  Does the patient have a primary care provider?: Yes (6/2/2025  3:51 PM)  Care Management Interventions: Advised patient to make appointment (6/2/2025  3:51 PM)  Care Management Interventions: Advised patient to keep appointment; Advised to schedule with specialist (6/2/2025  3:51 PM)    Self Management  What is the home health  agency?: n/a, reports independent with no needs (6/2/2025  3:51 PM)  What Durable Medical Equipment (DME) was ordered?: n/a (6/2/2025  3:51 PM)    Patient Teaching  Does the patient have access to their discharge instructions?: Yes (6/2/2025  3:51 PM)  Care Management Interventions: Reviewed instructions with patient (6/2/2025  3:51 PM)  What is the patient's perception of their health status since discharge?: Improving (6/2/2025  3:51 PM)

## 2025-06-03 DIAGNOSIS — Z94.4 LIVER REPLACED BY TRANSPLANT (MULTI): ICD-10-CM

## 2025-06-04 ENCOUNTER — APPOINTMENT (OUTPATIENT)
Dept: LAB | Facility: HOSPITAL | Age: 55
End: 2025-06-04
Payer: MEDICAID

## 2025-06-04 LAB
ALBUMIN SERPL BCP-MCNC: 4.1 G/DL (ref 3.4–5)
ALP SERPL-CCNC: 131 U/L (ref 33–120)
ALT SERPL W P-5'-P-CCNC: 16 U/L (ref 10–52)
ANION GAP SERPL CALC-SCNC: 13 MMOL/L (ref 10–20)
AST SERPL W P-5'-P-CCNC: 13 U/L (ref 9–39)
BASOPHILS # BLD AUTO: 0.06 X10*3/UL (ref 0–0.1)
BASOPHILS NFR BLD AUTO: 0.8 %
BILIRUB DIRECT SERPL-MCNC: 0.1 MG/DL (ref 0–0.3)
BILIRUB SERPL-MCNC: 0.5 MG/DL (ref 0–1.2)
BUN SERPL-MCNC: 41 MG/DL (ref 6–23)
CALCIUM SERPL-MCNC: 8.8 MG/DL (ref 8.6–10.3)
CHLORIDE SERPL-SCNC: 108 MMOL/L (ref 98–107)
CO2 SERPL-SCNC: 23 MMOL/L (ref 21–32)
CREAT SERPL-MCNC: 2.07 MG/DL (ref 0.5–1.3)
EGFRCR SERPLBLD CKD-EPI 2021: 37 ML/MIN/1.73M*2
EOSINOPHIL # BLD AUTO: 0.19 X10*3/UL (ref 0–0.7)
EOSINOPHIL NFR BLD AUTO: 2.6 %
ERYTHROCYTE [DISTWIDTH] IN BLOOD BY AUTOMATED COUNT: 15.3 % (ref 11.5–14.5)
GGT SERPL-CCNC: 100 U/L (ref 5–64)
GLUCOSE SERPL-MCNC: 103 MG/DL (ref 74–99)
HCT VFR BLD AUTO: 30.8 % (ref 41–52)
HGB BLD-MCNC: 9.8 G/DL (ref 13.5–17.5)
IMM GRANULOCYTES # BLD AUTO: 0.04 X10*3/UL (ref 0–0.7)
IMM GRANULOCYTES NFR BLD AUTO: 0.6 % (ref 0–0.9)
INR PPP: 1.2 (ref 0.9–1.1)
LAB AP ASR DISCLAIMER: NORMAL
LABORATORY COMMENT REPORT: NORMAL
LYMPHOCYTES # BLD AUTO: 1.16 X10*3/UL (ref 1.2–4.8)
LYMPHOCYTES NFR BLD AUTO: 16.2 %
MAGNESIUM SERPL-MCNC: 1.22 MG/DL (ref 1.6–2.4)
MCH RBC QN AUTO: 31.4 PG (ref 26–34)
MCHC RBC AUTO-ENTMCNC: 31.8 G/DL (ref 32–36)
MCV RBC AUTO: 99 FL (ref 80–100)
MONOCYTES # BLD AUTO: 0.38 X10*3/UL (ref 0.1–1)
MONOCYTES NFR BLD AUTO: 5.3 %
NEUTROPHILS # BLD AUTO: 5.34 X10*3/UL (ref 1.2–7.7)
NEUTROPHILS NFR BLD AUTO: 74.5 %
NRBC BLD-RTO: 0 /100 WBCS (ref 0–0)
PATH REPORT.COMMENTS IMP SPEC: NORMAL
PATH REPORT.FINAL DX SPEC: NORMAL
PATH REPORT.GROSS SPEC: NORMAL
PATH REPORT.MICROSCOPIC SPEC OTHER STN: NORMAL
PATH REPORT.RELEVANT HX SPEC: NORMAL
PATH REPORT.TOTAL CANCER: NORMAL
PHOSPHATE SERPL-MCNC: 3.5 MG/DL (ref 2.5–4.9)
PLATELET # BLD AUTO: 168 X10*3/UL (ref 150–450)
POTASSIUM SERPL-SCNC: 4.3 MMOL/L (ref 3.5–5.3)
PROT SERPL-MCNC: 6.5 G/DL (ref 6.4–8.2)
PROTHROMBIN TIME: 13.1 SECONDS (ref 9.8–12.4)
RBC # BLD AUTO: 3.12 X10*6/UL (ref 4.5–5.9)
SODIUM SERPL-SCNC: 140 MMOL/L (ref 136–145)
TACROLIMUS BLD-MCNC: 3.5 NG/ML
WBC # BLD AUTO: 7.2 X10*3/UL (ref 4.4–11.3)

## 2025-06-04 PROCEDURE — 82977 ASSAY OF GGT: CPT

## 2025-06-04 PROCEDURE — 84100 ASSAY OF PHOSPHORUS: CPT

## 2025-06-04 PROCEDURE — 80197 ASSAY OF TACROLIMUS: CPT

## 2025-06-04 PROCEDURE — 85610 PROTHROMBIN TIME: CPT

## 2025-06-04 PROCEDURE — 85025 COMPLETE CBC W/AUTO DIFF WBC: CPT

## 2025-06-04 PROCEDURE — 82248 BILIRUBIN DIRECT: CPT

## 2025-06-04 PROCEDURE — 83735 ASSAY OF MAGNESIUM: CPT

## 2025-06-04 PROCEDURE — 80053 COMPREHEN METABOLIC PANEL: CPT

## 2025-06-05 DIAGNOSIS — Z94.4 LIVER REPLACED BY TRANSPLANT (MULTI): ICD-10-CM

## 2025-06-05 DIAGNOSIS — Z94.0 KIDNEY REPLACED BY TRANSPLANT (HHS-HCC): ICD-10-CM

## 2025-06-06 ENCOUNTER — TELEPHONE (OUTPATIENT)
Facility: HOSPITAL | Age: 55
End: 2025-06-06
Payer: MEDICAID

## 2025-06-06 DIAGNOSIS — Z94.0 KIDNEY REPLACED BY TRANSPLANT (HHS-HCC): ICD-10-CM

## 2025-06-06 DIAGNOSIS — Z94.4 LIVER REPLACED BY TRANSPLANT (MULTI): ICD-10-CM

## 2025-06-06 DIAGNOSIS — Z94.4 LIVER TRANSPLANT RECIPIENT (MULTI): ICD-10-CM

## 2025-06-06 RX ORDER — TACROLIMUS 1 MG/1
3 CAPSULE ORAL 2 TIMES DAILY
Qty: 540 CAPSULE | Refills: 3 | Status: SHIPPED | OUTPATIENT
Start: 2025-06-06 | End: 2025-06-12 | Stop reason: SDUPTHER

## 2025-06-06 NOTE — TELEPHONE ENCOUNTER
RN called to review Tac level  Confirmed pt was taking 2mg BID  Change to 3mg BID  Pt confirmed he received Lokelma and is taking medication

## 2025-06-10 DIAGNOSIS — Z94.4 LIVER REPLACED BY TRANSPLANT (MULTI): ICD-10-CM

## 2025-06-11 ENCOUNTER — LAB (OUTPATIENT)
Dept: LAB | Facility: HOSPITAL | Age: 55
End: 2025-06-11
Payer: MEDICAID

## 2025-06-11 DIAGNOSIS — Z94.0 KIDNEY TRANSPLANT STATUS: Primary | ICD-10-CM

## 2025-06-11 DIAGNOSIS — Z94.4 LIVER TRANSPLANT STATUS: ICD-10-CM

## 2025-06-11 LAB
ALBUMIN SERPL BCP-MCNC: 3.9 G/DL (ref 3.4–5)
ALP SERPL-CCNC: 143 U/L (ref 33–120)
ALT SERPL W P-5'-P-CCNC: 17 U/L (ref 10–52)
ANION GAP SERPL CALC-SCNC: 10 MMOL/L (ref 10–20)
AST SERPL W P-5'-P-CCNC: 12 U/L (ref 9–39)
BASOPHILS # BLD AUTO: 0.03 X10*3/UL (ref 0–0.1)
BASOPHILS NFR BLD AUTO: 0.5 %
BILIRUB DIRECT SERPL-MCNC: 0.1 MG/DL (ref 0–0.3)
BILIRUB SERPL-MCNC: 0.4 MG/DL (ref 0–1.2)
BUN SERPL-MCNC: 29 MG/DL (ref 6–23)
CALCIUM SERPL-MCNC: 8.4 MG/DL (ref 8.6–10.3)
CHLORIDE SERPL-SCNC: 110 MMOL/L (ref 98–107)
CO2 SERPL-SCNC: 26 MMOL/L (ref 21–32)
CREAT SERPL-MCNC: 1.72 MG/DL (ref 0.5–1.3)
EGFRCR SERPLBLD CKD-EPI 2021: 46 ML/MIN/1.73M*2
EOSINOPHIL # BLD AUTO: 0.13 X10*3/UL (ref 0–0.7)
EOSINOPHIL NFR BLD AUTO: 2.3 %
ERYTHROCYTE [DISTWIDTH] IN BLOOD BY AUTOMATED COUNT: 14.6 % (ref 11.5–14.5)
GGT SERPL-CCNC: 119 U/L (ref 5–64)
GLUCOSE SERPL-MCNC: 107 MG/DL (ref 74–99)
HCT VFR BLD AUTO: 30.4 % (ref 41–52)
HGB BLD-MCNC: 9.7 G/DL (ref 13.5–17.5)
IMM GRANULOCYTES # BLD AUTO: 0.03 X10*3/UL (ref 0–0.7)
IMM GRANULOCYTES NFR BLD AUTO: 0.5 % (ref 0–0.9)
LYMPHOCYTES # BLD AUTO: 0.93 X10*3/UL (ref 1.2–4.8)
LYMPHOCYTES NFR BLD AUTO: 16.5 %
MAGNESIUM SERPL-MCNC: 1.44 MG/DL (ref 1.6–2.4)
MCH RBC QN AUTO: 31.6 PG (ref 26–34)
MCHC RBC AUTO-ENTMCNC: 31.9 G/DL (ref 32–36)
MCV RBC AUTO: 99 FL (ref 80–100)
MONOCYTES # BLD AUTO: 0.37 X10*3/UL (ref 0.1–1)
MONOCYTES NFR BLD AUTO: 6.6 %
NEUTROPHILS # BLD AUTO: 4.14 X10*3/UL (ref 1.2–7.7)
NEUTROPHILS NFR BLD AUTO: 73.6 %
NRBC BLD-RTO: 0 /100 WBCS (ref 0–0)
PHOSPHATE SERPL-MCNC: 3.3 MG/DL (ref 2.5–4.9)
PLATELET # BLD AUTO: 120 X10*3/UL (ref 150–450)
POTASSIUM SERPL-SCNC: 3.7 MMOL/L (ref 3.5–5.3)
PROT SERPL-MCNC: 6.2 G/DL (ref 6.4–8.2)
RBC # BLD AUTO: 3.07 X10*6/UL (ref 4.5–5.9)
SODIUM SERPL-SCNC: 142 MMOL/L (ref 136–145)
TACROLIMUS BLD-MCNC: 6.7 NG/ML
WBC # BLD AUTO: 5.6 X10*3/UL (ref 4.4–11.3)

## 2025-06-11 PROCEDURE — 85025 COMPLETE CBC W/AUTO DIFF WBC: CPT

## 2025-06-11 PROCEDURE — 80197 ASSAY OF TACROLIMUS: CPT

## 2025-06-11 PROCEDURE — 82977 ASSAY OF GGT: CPT

## 2025-06-11 PROCEDURE — 87799 DETECT AGENT NOS DNA QUANT: CPT

## 2025-06-11 PROCEDURE — 36415 COLL VENOUS BLD VENIPUNCTURE: CPT

## 2025-06-11 PROCEDURE — 82248 BILIRUBIN DIRECT: CPT

## 2025-06-11 PROCEDURE — 83735 ASSAY OF MAGNESIUM: CPT

## 2025-06-11 PROCEDURE — 84100 ASSAY OF PHOSPHORUS: CPT

## 2025-06-11 PROCEDURE — 80053 COMPREHEN METABOLIC PANEL: CPT

## 2025-06-12 ENCOUNTER — TELEPHONE (OUTPATIENT)
Facility: HOSPITAL | Age: 55
End: 2025-06-12
Payer: MEDICAID

## 2025-06-12 DIAGNOSIS — Z94.4 LIVER TRANSPLANT RECIPIENT (MULTI): ICD-10-CM

## 2025-06-12 DIAGNOSIS — Z94.0 KIDNEY REPLACED BY TRANSPLANT (HHS-HCC): ICD-10-CM

## 2025-06-12 RX ORDER — TACROLIMUS 1 MG/1
4 CAPSULE ORAL 2 TIMES DAILY
Qty: 720 CAPSULE | Refills: 3 | Status: SHIPPED | OUTPATIENT
Start: 2025-06-12 | End: 2026-06-12

## 2025-06-12 RX ORDER — PREDNISONE 5 MG/1
15 TABLET ORAL DAILY
Qty: 90 TABLET | Refills: 0 | Status: SHIPPED | OUTPATIENT
Start: 2025-06-12

## 2025-06-12 NOTE — TELEPHONE ENCOUNTER
RN called and spoke to pt  Increase Tacrolimus to 4mg BID  Change prednisone to 15mg every morning     Pt verbalized understanding

## 2025-06-13 DIAGNOSIS — Z94.0 KIDNEY REPLACED BY TRANSPLANT (HHS-HCC): ICD-10-CM

## 2025-06-13 DIAGNOSIS — Z94.4 LIVER REPLACED BY TRANSPLANT (MULTI): ICD-10-CM

## 2025-06-13 LAB
BKV DNA SERPL NAA+PROBE-LOG#: NORMAL {LOG_COPIES}/ML
CMV DNA SERPL NAA+PROBE-LOG IU: NORMAL {LOG_IU}/ML
EBV DNA SPEC NAA+PROBE-LOG#: NORMAL {LOG_COPIES}/ML
LABORATORY COMMENT REPORT: NOT DETECTED

## 2025-06-17 DIAGNOSIS — Z94.4 LIVER REPLACED BY TRANSPLANT (MULTI): ICD-10-CM

## 2025-06-18 ENCOUNTER — LAB (OUTPATIENT)
Dept: LAB | Facility: HOSPITAL | Age: 55
End: 2025-06-18
Payer: MEDICAID

## 2025-06-18 DIAGNOSIS — Z94.4 LIVER TRANSPLANT STATUS: Primary | ICD-10-CM

## 2025-06-18 LAB
ALBUMIN SERPL BCP-MCNC: 4.1 G/DL (ref 3.4–5)
ALP SERPL-CCNC: 120 U/L (ref 33–120)
ALT SERPL W P-5'-P-CCNC: 12 U/L (ref 10–52)
ANION GAP SERPL CALC-SCNC: 13 MMOL/L (ref 10–20)
AST SERPL W P-5'-P-CCNC: 9 U/L (ref 9–39)
BASOPHILS # BLD AUTO: 0.09 X10*3/UL (ref 0–0.1)
BASOPHILS NFR BLD AUTO: 1.1 %
BILIRUB DIRECT SERPL-MCNC: 0.1 MG/DL (ref 0–0.3)
BILIRUB SERPL-MCNC: 0.4 MG/DL (ref 0–1.2)
BUN SERPL-MCNC: 36 MG/DL (ref 6–23)
CALCIUM SERPL-MCNC: 9.1 MG/DL (ref 8.6–10.3)
CHLORIDE SERPL-SCNC: 106 MMOL/L (ref 98–107)
CO2 SERPL-SCNC: 25 MMOL/L (ref 21–32)
CREAT SERPL-MCNC: 2.11 MG/DL (ref 0.5–1.3)
EGFRCR SERPLBLD CKD-EPI 2021: 36 ML/MIN/1.73M*2
EOSINOPHIL # BLD AUTO: 0.16 X10*3/UL (ref 0–0.7)
EOSINOPHIL NFR BLD AUTO: 1.9 %
ERYTHROCYTE [DISTWIDTH] IN BLOOD BY AUTOMATED COUNT: 14.6 % (ref 11.5–14.5)
GGT SERPL-CCNC: 119 U/L (ref 5–64)
GLUCOSE SERPL-MCNC: 95 MG/DL (ref 74–99)
HCT VFR BLD AUTO: 32.7 % (ref 41–52)
HGB BLD-MCNC: 10.3 G/DL (ref 13.5–17.5)
IMM GRANULOCYTES # BLD AUTO: 0.12 X10*3/UL (ref 0–0.7)
IMM GRANULOCYTES NFR BLD AUTO: 1.4 % (ref 0–0.9)
LYMPHOCYTES # BLD AUTO: 1.59 X10*3/UL (ref 1.2–4.8)
LYMPHOCYTES NFR BLD AUTO: 18.6 %
MAGNESIUM SERPL-MCNC: 1.63 MG/DL (ref 1.6–2.4)
MCH RBC QN AUTO: 31.4 PG (ref 26–34)
MCHC RBC AUTO-ENTMCNC: 31.5 G/DL (ref 32–36)
MCV RBC AUTO: 100 FL (ref 80–100)
MONOCYTES # BLD AUTO: 0.6 X10*3/UL (ref 0.1–1)
MONOCYTES NFR BLD AUTO: 7 %
NEUTROPHILS # BLD AUTO: 5.99 X10*3/UL (ref 1.2–7.7)
NEUTROPHILS NFR BLD AUTO: 70 %
NRBC BLD-RTO: 0 /100 WBCS (ref 0–0)
OVALOCYTES BLD QL SMEAR: NORMAL
PHOSPHATE SERPL-MCNC: 3.8 MG/DL (ref 2.5–4.9)
PLATELET # BLD AUTO: 202 X10*3/UL (ref 150–450)
POTASSIUM SERPL-SCNC: 5 MMOL/L (ref 3.5–5.3)
PROT SERPL-MCNC: 6.5 G/DL (ref 6.4–8.2)
RBC # BLD AUTO: 3.28 X10*6/UL (ref 4.5–5.9)
RBC MORPH BLD: NORMAL
SODIUM SERPL-SCNC: 139 MMOL/L (ref 136–145)
TACROLIMUS BLD-MCNC: 10 NG/ML
WBC # BLD AUTO: 8.6 X10*3/UL (ref 4.4–11.3)

## 2025-06-18 PROCEDURE — 83735 ASSAY OF MAGNESIUM: CPT

## 2025-06-18 PROCEDURE — 84100 ASSAY OF PHOSPHORUS: CPT

## 2025-06-18 PROCEDURE — 82248 BILIRUBIN DIRECT: CPT

## 2025-06-18 PROCEDURE — 86832 HLA CLASS I HIGH DEFIN QUAL: CPT

## 2025-06-18 PROCEDURE — 86833 HLA CLASS II HIGH DEFIN QUAL: CPT

## 2025-06-18 PROCEDURE — 85025 COMPLETE CBC W/AUTO DIFF WBC: CPT

## 2025-06-18 PROCEDURE — 36415 COLL VENOUS BLD VENIPUNCTURE: CPT

## 2025-06-18 PROCEDURE — 82977 ASSAY OF GGT: CPT

## 2025-06-18 PROCEDURE — 80053 COMPREHEN METABOLIC PANEL: CPT

## 2025-06-18 PROCEDURE — 80197 ASSAY OF TACROLIMUS: CPT

## 2025-06-19 ENCOUNTER — APPOINTMENT (OUTPATIENT)
Dept: PRIMARY CARE | Facility: CLINIC | Age: 55
End: 2025-06-19
Payer: MEDICAID

## 2025-06-19 VITALS
WEIGHT: 222 LBS | BODY MASS INDEX: 31.78 KG/M2 | HEIGHT: 70 IN | SYSTOLIC BLOOD PRESSURE: 167 MMHG | HEART RATE: 62 BPM | TEMPERATURE: 96.8 F | OXYGEN SATURATION: 98 % | DIASTOLIC BLOOD PRESSURE: 103 MMHG | RESPIRATION RATE: 16 BRPM

## 2025-06-19 DIAGNOSIS — E78.5 DYSLIPIDEMIA: ICD-10-CM

## 2025-06-19 DIAGNOSIS — Z00.00 ROUTINE GENERAL MEDICAL EXAMINATION AT A HEALTH CARE FACILITY: Primary | ICD-10-CM

## 2025-06-19 DIAGNOSIS — I10 PRIMARY HYPERTENSION: ICD-10-CM

## 2025-06-19 PROCEDURE — 3048F LDL-C <100 MG/DL: CPT | Performed by: STUDENT IN AN ORGANIZED HEALTH CARE EDUCATION/TRAINING PROGRAM

## 2025-06-19 PROCEDURE — 3077F SYST BP >= 140 MM HG: CPT | Performed by: STUDENT IN AN ORGANIZED HEALTH CARE EDUCATION/TRAINING PROGRAM

## 2025-06-19 PROCEDURE — 3008F BODY MASS INDEX DOCD: CPT | Performed by: STUDENT IN AN ORGANIZED HEALTH CARE EDUCATION/TRAINING PROGRAM

## 2025-06-19 PROCEDURE — 3061F NEG MICROALBUMINURIA REV: CPT | Performed by: STUDENT IN AN ORGANIZED HEALTH CARE EDUCATION/TRAINING PROGRAM

## 2025-06-19 PROCEDURE — 3044F HG A1C LEVEL LT 7.0%: CPT | Performed by: STUDENT IN AN ORGANIZED HEALTH CARE EDUCATION/TRAINING PROGRAM

## 2025-06-19 PROCEDURE — 99396 PREV VISIT EST AGE 40-64: CPT | Performed by: STUDENT IN AN ORGANIZED HEALTH CARE EDUCATION/TRAINING PROGRAM

## 2025-06-19 PROCEDURE — 3080F DIAST BP >= 90 MM HG: CPT | Performed by: STUDENT IN AN ORGANIZED HEALTH CARE EDUCATION/TRAINING PROGRAM

## 2025-06-19 RX ORDER — HYDRALAZINE HYDROCHLORIDE 25 MG/1
1 TABLET, FILM COATED ORAL
COMMUNITY
Start: 2025-06-05

## 2025-06-19 SDOH — ECONOMIC STABILITY: FOOD INSECURITY: WITHIN THE PAST 12 MONTHS, YOU WORRIED THAT YOUR FOOD WOULD RUN OUT BEFORE YOU GOT MONEY TO BUY MORE.: NEVER TRUE

## 2025-06-19 SDOH — ECONOMIC STABILITY: FOOD INSECURITY: WITHIN THE PAST 12 MONTHS, THE FOOD YOU BOUGHT JUST DIDN'T LAST AND YOU DIDN'T HAVE MONEY TO GET MORE.: NEVER TRUE

## 2025-06-19 ASSESSMENT — ENCOUNTER SYMPTOMS
DIARRHEA: 0
FATIGUE: 0
UNEXPECTED WEIGHT CHANGE: 0
CHILLS: 0
SHORTNESS OF BREATH: 0
WHEEZING: 0
MUSCULOSKELETAL NEGATIVE: 1
DIZZINESS: 0
COUGH: 0
FEVER: 0
HEADACHES: 0
PALPITATIONS: 0
NAUSEA: 0
COLOR CHANGE: 0
CONFUSION: 0
VOMITING: 0
CONSTIPATION: 0
ABDOMINAL PAIN: 0

## 2025-06-19 ASSESSMENT — PATIENT HEALTH QUESTIONNAIRE - PHQ9
SUM OF ALL RESPONSES TO PHQ9 QUESTIONS 1 & 2: 0
1. LITTLE INTEREST OR PLEASURE IN DOING THINGS: NOT AT ALL
2. FEELING DOWN, DEPRESSED OR HOPELESS: NOT AT ALL

## 2025-06-19 ASSESSMENT — LIFESTYLE VARIABLES
AUDIT-C TOTAL SCORE: 0
HOW OFTEN DO YOU HAVE SIX OR MORE DRINKS ON ONE OCCASION: NEVER
SKIP TO QUESTIONS 9-10: 1
HOW OFTEN DO YOU HAVE A DRINK CONTAINING ALCOHOL: NEVER
HOW MANY STANDARD DRINKS CONTAINING ALCOHOL DO YOU HAVE ON A TYPICAL DAY: PATIENT DOES NOT DRINK

## 2025-06-19 NOTE — LETTER
June 19, 2025     Patient: Goran Ibrahim   YOB: 1970   Date of Visit: 6/19/2025       To Whom It May Concern:    Goran Ibrahim was seen in my clinic on 6/19/2025 at 10:20 am.     If you have any questions or concerns, please don't hesitate to call.         Sincerely,         Seun Rodrigues MD        CC: No Recipients

## 2025-06-19 NOTE — Clinical Note
***HELP TEXT***  This letter is meant to display your personal SmartPhrase that you would like to send to patients.     1. Create a new SmartPhrase with the name MyPatientLetter.   2. Format the letter any way you want. Your organizational header will already be included so you can leave that out.  3. Save your SmartPhrase. If you get a warning about the name being used, ignore it.  4. Use the My Patient Letter template in the Communications section. This will send your letter!     64 y/o M w/ PMH HTN, BPH, T cell Lymphoma in remission since 11/2020, COPD, Stage 5 CKD (pending starting HD), Afib on Eliquis, severe MR, severe pulmonary HTN, CHF (EF was 40%, most recently 65-70%), L chest wall hematoma s/p evacuation (12/2020) and VATS (01/2021). Pt has c/o of SOB and progressive abdominal pain with onset three days prior. Pt has associated cough. Denies fever, leg swelling, vomiting, diarrhea. Passed flatus today. 64 y/o M w/ PMH HTN, BPH, T cell Lymphoma in remission since 11/2020, COPD, Stage 5 CKD (pending starting HD), Afib on Eliquis, severe MR, severe pulmonary HTN, CHF (EF was 40%, most recently 65-70%), L chest wall hematoma s/p evacuation (12/2020) and VATS (01/2021). Pt has c/o of SOB and progressive abdominal pain with onset three days prior. Pt has associated cough. Denies fever, leg swelling, vomiting, diarrhea. Passed flatus today, no other acute complaints.

## 2025-06-19 NOTE — PROGRESS NOTES
"Subjective   Patient ID: Goran Ibrahim is a 54 y.o. male who presents for Annual Exam (Pt has no concerns today).  He is here for annual and follow up. Off note: h/o ESLD s/p liver & kidney transplant on 01/20/25 at [facility]; reports he has been following with transplant regularly and doing well.  He came with elevated BP, 167/103 reports he has been taking all meds daily.  Reports he took stairs coming to office today as elevator was not working which worsened his pain elevating BP.  Reports he feels fine and does not want to recheck BP again.  States he will discuss BP meds with transplant/nephro team.     HPI   #HM stuffs  Screening tests:  - Colonoscopy (age 45-75): per pt has standing order from GI/surg; declined order from us.   - PSA (age 50 and older): declined test   - Lipid profile: declined test   - Low dose CT chest (age 50-80): declined test     Primary prevention:  - vaccines: declined all   - Statin (age 40-65 or high risk): declined     Counseling:   - ETOH (age>18): sober for 797 days   - Smoking: former smoker; unsure time; 8-9 mo ago     Review of Systems   Constitutional:  Negative for chills, fatigue, fever and unexpected weight change.   HENT: Negative.     Respiratory:  Negative for cough, shortness of breath and wheezing.    Cardiovascular:  Negative for chest pain, palpitations and leg swelling.   Gastrointestinal:  Negative for abdominal pain, constipation, diarrhea, nausea and vomiting.   Musculoskeletal: Negative.    Skin:  Negative for color change and rash.   Neurological:  Negative for dizziness and headaches.   Psychiatric/Behavioral:  Negative for behavioral problems and confusion.        Objective   BP (!) 167/103 (BP Location: Right arm, Patient Position: Sitting, BP Cuff Size: Adult)   Pulse 62   Temp 36 °C (96.8 °F) (Temporal)   Resp 16   Ht 1.778 m (5' 10\")   Wt 101 kg (222 lb)   SpO2 98%   BMI 31.85 kg/m²     Physical Exam  Vitals and nursing note reviewed. "   Constitutional:       Appearance: Normal appearance. He is obese.   HENT:      Right Ear: Tympanic membrane normal.      Left Ear: Tympanic membrane normal.   Eyes:      Extraocular Movements: Extraocular movements intact.      Pupils: Pupils are equal, round, and reactive to light.   Cardiovascular:      Rate and Rhythm: Normal rate and regular rhythm.      Pulses: Normal pulses.      Heart sounds: Normal heart sounds.   Pulmonary:      Effort: Pulmonary effort is normal.      Breath sounds: Normal breath sounds.   Abdominal:      General: Abdomen is flat. Bowel sounds are normal.      Palpations: Abdomen is soft.      Comments: Noted recent but healed surgical scar   Musculoskeletal:         General: Normal range of motion.   Neurological:      General: No focal deficit present.      Mental Status: He is alert. Mental status is at baseline.      Cranial Nerves: No cranial nerve deficit.      Sensory: No sensory deficit.      Motor: No weakness.   Psychiatric:         Mood and Affect: Mood normal.         Behavior: Behavior normal.       Assessment/Plan   He is here for annual visit and follow-up. Off note: h/o ESLD s/p liver & kidney transplant on 01/20/25 at [facility]; advised to continue follow-up with transplant nephro team regularly.  Appears he is doing okay, no acute illness today.  BP remains elevated likely from walking/taking stairs to office; recommend to keep BP logs and follow-up with nephrology for management as usual.  Declined BP recheck and addition of other meds.  Other plan as follows    # HM visit   - Immunization per emr: Declined all  - Age appropriate screenings as listed above summary: Declined all as stated in HPI  - refer summary above for detail  - He will continue to get blood work from transplant team; declined lipid order    Problem List Items Addressed This Visit           ICD-10-CM    Hypertension I10     Other Visit Diagnoses         Codes      Routine general medical examination at  a health care facility    -  Primary Z00.00      Dyslipidemia     E78.5          RTC 12 months for HM/follow-up    This note was partially generated using the Dragon Voice recognition system. There may be some incorrect wording, spelling and/or spelling errors or punctuation errors that were not corrected prior to committing the note to the medical record.      Seun Rodrigues MD    [facility]

## 2025-06-20 DIAGNOSIS — Z94.4 LIVER REPLACED BY TRANSPLANT (MULTI): ICD-10-CM

## 2025-06-20 LAB
HLA RESULTS: NORMAL
HLA-A+B+C AB NFR SER: NORMAL %
HLA-DP+DQ+DR AB NFR SER: NORMAL %

## 2025-06-23 ENCOUNTER — OFFICE VISIT (OUTPATIENT)
Facility: HOSPITAL | Age: 55
End: 2025-06-23
Payer: MEDICAID

## 2025-06-23 ENCOUNTER — TELEPHONE (OUTPATIENT)
Facility: HOSPITAL | Age: 55
End: 2025-06-23

## 2025-06-23 VITALS
TEMPERATURE: 97.9 F | WEIGHT: 222.7 LBS | SYSTOLIC BLOOD PRESSURE: 174 MMHG | BODY MASS INDEX: 31.95 KG/M2 | HEART RATE: 76 BPM | OXYGEN SATURATION: 97 % | DIASTOLIC BLOOD PRESSURE: 85 MMHG

## 2025-06-23 DIAGNOSIS — Z94.4 LIVER TRANSPLANT STATUS: ICD-10-CM

## 2025-06-23 DIAGNOSIS — Z94.4 LIVER REPLACED BY TRANSPLANT (MULTI): ICD-10-CM

## 2025-06-23 DIAGNOSIS — Z94.4 LIVER TRANSPLANT RECIPIENT (MULTI): ICD-10-CM

## 2025-06-23 DIAGNOSIS — Z94.0 KIDNEY REPLACED BY TRANSPLANT (HHS-HCC): ICD-10-CM

## 2025-06-23 PROCEDURE — 3079F DIAST BP 80-89 MM HG: CPT | Performed by: SURGERY

## 2025-06-23 PROCEDURE — 99214 OFFICE O/P EST MOD 30 MIN: CPT | Performed by: SURGERY

## 2025-06-23 PROCEDURE — 3044F HG A1C LEVEL LT 7.0%: CPT | Performed by: SURGERY

## 2025-06-23 PROCEDURE — 3077F SYST BP >= 140 MM HG: CPT | Performed by: SURGERY

## 2025-06-23 PROCEDURE — 99214 OFFICE O/P EST MOD 30 MIN: CPT | Mod: 24 | Performed by: SURGERY

## 2025-06-23 PROCEDURE — 3061F NEG MICROALBUMINURIA REV: CPT | Performed by: SURGERY

## 2025-06-23 PROCEDURE — 3048F LDL-C <100 MG/DL: CPT | Performed by: SURGERY

## 2025-06-23 PROCEDURE — RXMED WILLOW AMBULATORY MEDICATION CHARGE

## 2025-06-23 RX ORDER — PENTAMIDINE ISETHIONATE 300 MG/300MG
300 INHALANT RESPIRATORY (INHALATION)
Qty: 300 MG | Refills: 11 | Status: SHIPPED | OUTPATIENT
Start: 2025-06-23 | End: 2025-06-23 | Stop reason: SDUPTHER

## 2025-06-23 RX ORDER — TACROLIMUS 1 MG/1
CAPSULE ORAL
Qty: 630 CAPSULE | Refills: 3 | Status: SHIPPED | OUTPATIENT
Start: 2025-06-23 | End: 2025-06-26 | Stop reason: SDUPTHER

## 2025-06-23 RX ORDER — SODIUM BICARBONATE 650 MG/1
650 TABLET ORAL 2 TIMES DAILY
Qty: 2 TABLET | Refills: 29 | Status: SHIPPED | OUTPATIENT
Start: 2025-06-23

## 2025-06-23 RX ORDER — PENTAMIDINE ISETHIONATE 300 MG/300MG
300 INHALANT RESPIRATORY (INHALATION)
Qty: 300 MG | Refills: 11 | Status: SHIPPED | OUTPATIENT
Start: 2025-06-23 | End: 2026-06-23

## 2025-06-23 RX ORDER — PENTAMIDINE ISETHIONATE 300 MG/300MG
300 INHALANT RESPIRATORY (INHALATION) ONCE
OUTPATIENT
Start: 2025-06-23 | End: 2025-06-23

## 2025-06-23 RX ORDER — PREDNISONE 5 MG/1
10 TABLET ORAL DAILY
Qty: 60 TABLET | Refills: 3 | Status: SHIPPED | OUTPATIENT
Start: 2025-06-23

## 2025-06-23 ASSESSMENT — PAIN SCALES - GENERAL: PAINLEVEL_OUTOF10: 0-NO PAIN

## 2025-06-23 NOTE — TELEPHONE ENCOUNTER
Called [facility] pharmacy, spoke with pharmacist regarding pentamidine script, stated they cannot not order it in, would probably have to be through specialty pharmacy, advised coordinator to see if can route to [facility]

## 2025-06-23 NOTE — TELEPHONE ENCOUNTER
06/30/25  mileage letter sent to [employer] and email.  Pentamidine appt scheduled for Friday June 27 (per Ksenia)  needs an RX for meds

## 2025-06-23 NOTE — PROGRESS NOTES
Goran Ibrahim is a 54 y.o. male POD#154 from [facility] from a DCD donor.    - No acute complaints, feeling well    Objective   Gen: A+OX3; NAD  HEENT: PERRL, sclera anicteric, MMM  Cardiac: RRR  Chest: Normal inspiratory effort  Abdomen: S/NT/ND. Incision C/D/I.  Ext: No LE edema    Last Recorded Vitals  Visit Vitals  /85   Pulse 76   Temp 36.6 °C (97.9 °F) (Temporal)      Assessment/Plan   Principal Problem:    Liver/Kidney transplant recipient    Liver allograft function              Stable, LFT pending   Stop ursodiol     Kidney allograft function              Reduce sodium bicarbonate to 650 mg bid              Has had DSA class II ~2000 in past                Immunosuppression              Reduce tacro to 4 mg in AM and 3 mg in PM               mg bid              Prednisone 5 mg daily     PPX              Stop bactrim and do pentamidine x1 in next week    I spent 30 minutes in the professional and overall care of this patient, including immunosuppression management.    Lois Kline MD, PHD, MPH, FACS  Chief, Transplant and Hepatobiliary Surgery

## 2025-06-23 NOTE — TELEPHONE ENCOUNTER
Called patient, scheduled pentamidine treatment for Friday at 100pm. Patient states does not have pentamidine needs script called [place] [facility] [address], [phone].

## 2025-06-24 DIAGNOSIS — Z94.4 LIVER REPLACED BY TRANSPLANT (MULTI): ICD-10-CM

## 2025-06-25 ENCOUNTER — PATIENT OUTREACH (OUTPATIENT)
Dept: PRIMARY CARE | Facility: CLINIC | Age: 55
End: 2025-06-25

## 2025-06-25 ENCOUNTER — LAB (OUTPATIENT)
Dept: LAB | Facility: HOSPITAL | Age: 55
End: 2025-06-25
Payer: MEDICAID

## 2025-06-25 ENCOUNTER — TELEPHONE (OUTPATIENT)
Facility: HOSPITAL | Age: 55
End: 2025-06-25

## 2025-06-25 DIAGNOSIS — Z94.4 LIVER TRANSPLANT STATUS: Primary | ICD-10-CM

## 2025-06-25 LAB
ALBUMIN SERPL BCP-MCNC: 4.1 G/DL (ref 3.4–5)
ALP SERPL-CCNC: 85 U/L (ref 33–120)
ALT SERPL W P-5'-P-CCNC: 9 U/L (ref 10–52)
ANION GAP SERPL CALC-SCNC: 12 MMOL/L (ref 10–20)
AST SERPL W P-5'-P-CCNC: 8 U/L (ref 9–39)
BASOPHILS # BLD AUTO: 0.06 X10*3/UL (ref 0–0.1)
BASOPHILS NFR BLD AUTO: 0.7 %
BILIRUB DIRECT SERPL-MCNC: 0.1 MG/DL (ref 0–0.3)
BILIRUB SERPL-MCNC: 0.6 MG/DL (ref 0–1.2)
BUN SERPL-MCNC: 35 MG/DL (ref 6–23)
CALCIUM SERPL-MCNC: 9.3 MG/DL (ref 8.6–10.3)
CHLORIDE SERPL-SCNC: 107 MMOL/L (ref 98–107)
CO2 SERPL-SCNC: 24 MMOL/L (ref 21–32)
CREAT SERPL-MCNC: 2.15 MG/DL (ref 0.5–1.3)
EGFRCR SERPLBLD CKD-EPI 2021: 35 ML/MIN/1.73M*2
EOSINOPHIL # BLD AUTO: 0.13 X10*3/UL (ref 0–0.7)
EOSINOPHIL NFR BLD AUTO: 1.4 %
ERYTHROCYTE [DISTWIDTH] IN BLOOD BY AUTOMATED COUNT: 14.5 % (ref 11.5–14.5)
GGT SERPL-CCNC: 73 U/L (ref 5–64)
GLUCOSE SERPL-MCNC: 105 MG/DL (ref 74–99)
HCT VFR BLD AUTO: 34.6 % (ref 41–52)
HGB BLD-MCNC: 11.1 G/DL (ref 13.5–17.5)
IMM GRANULOCYTES # BLD AUTO: 0.07 X10*3/UL (ref 0–0.7)
IMM GRANULOCYTES NFR BLD AUTO: 0.8 % (ref 0–0.9)
LYMPHOCYTES # BLD AUTO: 1.24 X10*3/UL (ref 1.2–4.8)
LYMPHOCYTES NFR BLD AUTO: 13.6 %
MAGNESIUM SERPL-MCNC: 1.65 MG/DL (ref 1.6–2.4)
MCH RBC QN AUTO: 31.4 PG (ref 26–34)
MCHC RBC AUTO-ENTMCNC: 32.1 G/DL (ref 32–36)
MCV RBC AUTO: 98 FL (ref 80–100)
MONOCYTES # BLD AUTO: 0.54 X10*3/UL (ref 0.1–1)
MONOCYTES NFR BLD AUTO: 5.9 %
NEUTROPHILS # BLD AUTO: 7.1 X10*3/UL (ref 1.2–7.7)
NEUTROPHILS NFR BLD AUTO: 77.6 %
NRBC BLD-RTO: 0 /100 WBCS (ref 0–0)
PHOSPHATE SERPL-MCNC: 3.8 MG/DL (ref 2.5–4.9)
PLATELET # BLD AUTO: 183 X10*3/UL (ref 150–450)
POTASSIUM SERPL-SCNC: 5 MMOL/L (ref 3.5–5.3)
PROT SERPL-MCNC: 6.6 G/DL (ref 6.4–8.2)
RBC # BLD AUTO: 3.54 X10*6/UL (ref 4.5–5.9)
SODIUM SERPL-SCNC: 138 MMOL/L (ref 136–145)
TACROLIMUS BLD-MCNC: 11.5 NG/ML
WBC # BLD AUTO: 9.1 X10*3/UL (ref 4.4–11.3)

## 2025-06-25 PROCEDURE — 82248 BILIRUBIN DIRECT: CPT

## 2025-06-25 PROCEDURE — 36415 COLL VENOUS BLD VENIPUNCTURE: CPT

## 2025-06-25 PROCEDURE — 80197 ASSAY OF TACROLIMUS: CPT

## 2025-06-25 PROCEDURE — 83735 ASSAY OF MAGNESIUM: CPT

## 2025-06-25 PROCEDURE — 84100 ASSAY OF PHOSPHORUS: CPT

## 2025-06-25 PROCEDURE — 85025 COMPLETE CBC W/AUTO DIFF WBC: CPT

## 2025-06-25 PROCEDURE — 80053 COMPREHEN METABOLIC PANEL: CPT

## 2025-06-25 PROCEDURE — 87799 DETECT AGENT NOS DNA QUANT: CPT

## 2025-06-25 PROCEDURE — 82977 ASSAY OF GGT: CPT

## 2025-06-25 NOTE — PROGRESS NOTES
Completed telephonic follow-up with patient after recent visit with Dr. Rodrigues    Spoke to patient during outreach call.    Patient reports feeling: Improved    Patient has questions or concerns about medications: No    Have all prescribed medications been filled? Yes    Patient has necessary resources to manage their care? Yes    Patient has questions or concerns? Patient reports having some sinus congestion/drainage for which he would like recommendations from PCP for what is safe to take with his other medications. Message sent to PCP/Office.    Next care management follow-up approximately within one month.

## 2025-06-26 ENCOUNTER — APPOINTMENT (OUTPATIENT)
Dept: GASTROENTEROLOGY | Facility: HOSPITAL | Age: 55
End: 2025-06-26
Payer: MEDICAID

## 2025-06-26 DIAGNOSIS — Z94.4 LIVER TRANSPLANT RECIPIENT (MULTI): ICD-10-CM

## 2025-06-26 DIAGNOSIS — Z94.0 KIDNEY REPLACED BY TRANSPLANT (HHS-HCC): ICD-10-CM

## 2025-06-26 LAB
CMV DNA SERPL NAA+PROBE-LOG IU: NORMAL {LOG_IU}/ML
EBV DNA SPEC NAA+PROBE-LOG#: NORMAL {LOG_COPIES}/ML
LABORATORY COMMENT REPORT: NOT DETECTED
LABORATORY COMMENT REPORT: NOT DETECTED

## 2025-06-27 ENCOUNTER — PHARMACY VISIT (OUTPATIENT)
Dept: PHARMACY | Facility: CLINIC | Age: 55
End: 2025-06-27
Payer: MEDICAID

## 2025-06-27 ENCOUNTER — HOSPITAL ENCOUNTER (OUTPATIENT)
Dept: RESPIRATORY THERAPY | Facility: HOSPITAL | Age: 55
Discharge: HOME | End: 2025-06-27
Payer: MEDICAID

## 2025-06-27 DIAGNOSIS — Z94.4 LIVER REPLACED BY TRANSPLANT (MULTI): ICD-10-CM

## 2025-06-27 DIAGNOSIS — Z94.4 LIVER TRANSPLANT RECIPIENT (MULTI): ICD-10-CM

## 2025-06-27 DIAGNOSIS — Z94.0 KIDNEY REPLACED BY TRANSPLANT (HHS-HCC): ICD-10-CM

## 2025-06-27 PROCEDURE — 94642 AEROSOL INHALATION TREATMENT: CPT

## 2025-06-27 PROCEDURE — 94640 AIRWAY INHALATION TREATMENT: CPT

## 2025-06-27 RX ORDER — TACROLIMUS 1 MG/1
3 CAPSULE ORAL EVERY MORNING
Qty: 270 CAPSULE | Refills: 3 | Status: SHIPPED | OUTPATIENT
Start: 2025-06-27 | End: 2026-06-27

## 2025-06-27 NOTE — PROCEDURES
12:55 Patient arrived for Pentamidine Treatment.  Breath Sounds were clear throughout.  Heart Rate 64,  Respiratory Rate 14, SpO2 100%, Blood Pressure 160/87.  Albuterol MDI given via spacer.    1:08 300 mg Pentamidine (6 ml sterile water mixed with 300 mg Pentamidine) aerosol began.     1:11 HR 66, RR 14, SpO2 100%.  No adverse reactions noted at this time.    1:15 HR 65, RR 14, SpO2 100%.  BS clear throughout lung fields.      1:18 Treatment break (rinse and spit) HR 65, SpO2 100%,  BS clear.    1:20 Treatment resumed.  HR 64, RR 14, SpO2 100%.  BS clear.     1:25 HR 63, RR 14, SpO2 100%,  BS clear throughout.  No adverse reactions noted at this time.     1:28 Treatment completed.  /92, HR 64, SpO2 99%,  RR 14, BS clear throughout lung fields.  No signs of adverse reactions noted.  Patient released home.

## 2025-07-01 DIAGNOSIS — Z94.4 LIVER REPLACED BY TRANSPLANT (MULTI): ICD-10-CM

## 2025-07-02 ENCOUNTER — LAB (OUTPATIENT)
Dept: LAB | Facility: HOSPITAL | Age: 55
End: 2025-07-02
Payer: MEDICAID

## 2025-07-02 DIAGNOSIS — Z94.4 LIVER TRANSPLANT STATUS: Primary | ICD-10-CM

## 2025-07-02 LAB
ALBUMIN SERPL BCP-MCNC: 4.1 G/DL (ref 3.4–5)
ALP SERPL-CCNC: 93 U/L (ref 33–120)
ALT SERPL W P-5'-P-CCNC: 8 U/L (ref 10–52)
ANION GAP SERPL CALC-SCNC: 12 MMOL/L (ref 10–20)
AST SERPL W P-5'-P-CCNC: 9 U/L (ref 9–39)
BASOPHILS # BLD AUTO: 0.07 X10*3/UL (ref 0–0.1)
BASOPHILS NFR BLD AUTO: 1 %
BILIRUB DIRECT SERPL-MCNC: 0.1 MG/DL (ref 0–0.3)
BILIRUB SERPL-MCNC: 0.4 MG/DL (ref 0–1.2)
BUN SERPL-MCNC: 43 MG/DL (ref 6–23)
CALCIUM SERPL-MCNC: 8.9 MG/DL (ref 8.6–10.3)
CHLORIDE SERPL-SCNC: 110 MMOL/L (ref 98–107)
CO2 SERPL-SCNC: 22 MMOL/L (ref 21–32)
CREAT SERPL-MCNC: 1.8 MG/DL (ref 0.5–1.3)
EGFRCR SERPLBLD CKD-EPI 2021: 44 ML/MIN/1.73M*2
EOSINOPHIL # BLD AUTO: 0.16 X10*3/UL (ref 0–0.7)
EOSINOPHIL NFR BLD AUTO: 2.4 %
ERYTHROCYTE [DISTWIDTH] IN BLOOD BY AUTOMATED COUNT: 14.6 % (ref 11.5–14.5)
GLUCOSE SERPL-MCNC: 112 MG/DL (ref 74–99)
HCT VFR BLD AUTO: 34.2 % (ref 41–52)
HGB BLD-MCNC: 10.8 G/DL (ref 13.5–17.5)
IMM GRANULOCYTES # BLD AUTO: 0.09 X10*3/UL (ref 0–0.7)
IMM GRANULOCYTES NFR BLD AUTO: 1.3 % (ref 0–0.9)
LYMPHOCYTES # BLD AUTO: 0.97 X10*3/UL (ref 1.2–4.8)
LYMPHOCYTES NFR BLD AUTO: 14.5 %
MAGNESIUM SERPL-MCNC: 1.47 MG/DL (ref 1.6–2.4)
MCH RBC QN AUTO: 30.4 PG (ref 26–34)
MCHC RBC AUTO-ENTMCNC: 31.6 G/DL (ref 32–36)
MCV RBC AUTO: 96 FL (ref 80–100)
MONOCYTES # BLD AUTO: 0.5 X10*3/UL (ref 0.1–1)
MONOCYTES NFR BLD AUTO: 7.5 %
NEUTROPHILS # BLD AUTO: 4.88 X10*3/UL (ref 1.2–7.7)
NEUTROPHILS NFR BLD AUTO: 73.3 %
NRBC BLD-RTO: 0 /100 WBCS (ref 0–0)
PHOSPHATE SERPL-MCNC: 3.7 MG/DL (ref 2.5–4.9)
PLATELET # BLD AUTO: 166 X10*3/UL (ref 150–450)
POTASSIUM SERPL-SCNC: 4.9 MMOL/L (ref 3.5–5.3)
PROT SERPL-MCNC: 6.4 G/DL (ref 6.4–8.2)
RBC # BLD AUTO: 3.55 X10*6/UL (ref 4.5–5.9)
SODIUM SERPL-SCNC: 139 MMOL/L (ref 136–145)
WBC # BLD AUTO: 6.7 X10*3/UL (ref 4.4–11.3)

## 2025-07-02 PROCEDURE — 84100 ASSAY OF PHOSPHORUS: CPT

## 2025-07-02 PROCEDURE — 82977 ASSAY OF GGT: CPT

## 2025-07-02 PROCEDURE — 82248 BILIRUBIN DIRECT: CPT

## 2025-07-02 PROCEDURE — 80197 ASSAY OF TACROLIMUS: CPT

## 2025-07-02 PROCEDURE — 85025 COMPLETE CBC W/AUTO DIFF WBC: CPT

## 2025-07-02 PROCEDURE — 36415 COLL VENOUS BLD VENIPUNCTURE: CPT

## 2025-07-02 PROCEDURE — 80053 COMPREHEN METABOLIC PANEL: CPT

## 2025-07-02 PROCEDURE — 83735 ASSAY OF MAGNESIUM: CPT

## 2025-07-03 LAB
GGT SERPL-CCNC: 60 U/L (ref 5–64)
TACROLIMUS BLD-MCNC: 7.6 NG/ML

## 2025-07-04 DIAGNOSIS — Z94.4 LIVER REPLACED BY TRANSPLANT (MULTI): ICD-10-CM

## 2025-07-08 ENCOUNTER — TELEPHONE (OUTPATIENT)
Facility: HOSPITAL | Age: 55
End: 2025-07-08
Payer: MEDICAID

## 2025-07-08 DIAGNOSIS — Z94.4 LIVER TRANSPLANT RECIPIENT (MULTI): ICD-10-CM

## 2025-07-08 DIAGNOSIS — Z94.4 LIVER REPLACED BY TRANSPLANT (MULTI): ICD-10-CM

## 2025-07-08 DIAGNOSIS — Z94.0 KIDNEY REPLACED BY TRANSPLANT (HHS-HCC): ICD-10-CM

## 2025-07-08 RX ORDER — TACROLIMUS 1 MG/1
3 CAPSULE ORAL 2 TIMES DAILY
Qty: 540 CAPSULE | Refills: 3 | Status: SHIPPED | OUTPATIENT
Start: 2025-07-08 | End: 2026-07-08

## 2025-07-08 NOTE — TELEPHONE ENCOUNTER
Called and spoke with Phram.    Tac script was sent as 3 mg every morning.  Updated script for 3 mg BID to corraspond to 6/26 med update note.  Script sent to Dr. Carrillo for approval.

## 2025-07-09 ENCOUNTER — LAB (OUTPATIENT)
Dept: LAB | Facility: HOSPITAL | Age: 55
End: 2025-07-09
Payer: MEDICAID

## 2025-07-09 DIAGNOSIS — Z94.4 LIVER TRANSPLANT STATUS: Primary | ICD-10-CM

## 2025-07-09 LAB
ALBUMIN SERPL BCP-MCNC: 4.1 G/DL (ref 3.4–5)
ALP SERPL-CCNC: 72 U/L (ref 33–120)
ALT SERPL W P-5'-P-CCNC: 7 U/L (ref 10–52)
ANION GAP SERPL CALC-SCNC: 11 MMOL/L (ref 10–20)
AST SERPL W P-5'-P-CCNC: 8 U/L (ref 9–39)
BASOPHILS # BLD AUTO: 0.08 X10*3/UL (ref 0–0.1)
BASOPHILS NFR BLD AUTO: 1 %
BILIRUB DIRECT SERPL-MCNC: 0.1 MG/DL (ref 0–0.3)
BILIRUB SERPL-MCNC: 0.4 MG/DL (ref 0–1.2)
BUN SERPL-MCNC: 44 MG/DL (ref 6–23)
CALCIUM SERPL-MCNC: 8.9 MG/DL (ref 8.6–10.3)
CHLORIDE SERPL-SCNC: 110 MMOL/L (ref 98–107)
CO2 SERPL-SCNC: 23 MMOL/L (ref 21–32)
CREAT SERPL-MCNC: 2.06 MG/DL (ref 0.5–1.3)
EGFRCR SERPLBLD CKD-EPI 2021: 37 ML/MIN/1.73M*2
EOSINOPHIL # BLD AUTO: 0.14 X10*3/UL (ref 0–0.7)
EOSINOPHIL NFR BLD AUTO: 1.8 %
ERYTHROCYTE [DISTWIDTH] IN BLOOD BY AUTOMATED COUNT: 14.6 % (ref 11.5–14.5)
GLUCOSE SERPL-MCNC: 132 MG/DL (ref 74–99)
HCT VFR BLD AUTO: 33 % (ref 41–52)
HGB BLD-MCNC: 10.4 G/DL (ref 13.5–17.5)
IMM GRANULOCYTES # BLD AUTO: 0.1 X10*3/UL (ref 0–0.7)
IMM GRANULOCYTES NFR BLD AUTO: 1.3 % (ref 0–0.9)
LYMPHOCYTES # BLD AUTO: 1.22 X10*3/UL (ref 1.2–4.8)
LYMPHOCYTES NFR BLD AUTO: 15.4 %
MAGNESIUM SERPL-MCNC: 1.6 MG/DL (ref 1.6–2.4)
MCH RBC QN AUTO: 30.5 PG (ref 26–34)
MCHC RBC AUTO-ENTMCNC: 31.5 G/DL (ref 32–36)
MCV RBC AUTO: 97 FL (ref 80–100)
MONOCYTES # BLD AUTO: 0.53 X10*3/UL (ref 0.1–1)
MONOCYTES NFR BLD AUTO: 6.7 %
NEUTROPHILS # BLD AUTO: 5.84 X10*3/UL (ref 1.2–7.7)
NEUTROPHILS NFR BLD AUTO: 73.8 %
NRBC BLD-RTO: 0 /100 WBCS (ref 0–0)
PHOSPHATE SERPL-MCNC: 3.8 MG/DL (ref 2.5–4.9)
PLATELET # BLD AUTO: 162 X10*3/UL (ref 150–450)
POTASSIUM SERPL-SCNC: 4.7 MMOL/L (ref 3.5–5.3)
PROT SERPL-MCNC: 6.6 G/DL (ref 6.4–8.2)
RBC # BLD AUTO: 3.41 X10*6/UL (ref 4.5–5.9)
SODIUM SERPL-SCNC: 139 MMOL/L (ref 136–145)
WBC # BLD AUTO: 7.9 X10*3/UL (ref 4.4–11.3)

## 2025-07-09 PROCEDURE — 82248 BILIRUBIN DIRECT: CPT

## 2025-07-09 PROCEDURE — 82977 ASSAY OF GGT: CPT

## 2025-07-09 PROCEDURE — 80053 COMPREHEN METABOLIC PANEL: CPT

## 2025-07-09 PROCEDURE — 84100 ASSAY OF PHOSPHORUS: CPT

## 2025-07-09 PROCEDURE — 36415 COLL VENOUS BLD VENIPUNCTURE: CPT

## 2025-07-09 PROCEDURE — 87799 DETECT AGENT NOS DNA QUANT: CPT

## 2025-07-09 PROCEDURE — 80197 ASSAY OF TACROLIMUS: CPT

## 2025-07-09 PROCEDURE — 85025 COMPLETE CBC W/AUTO DIFF WBC: CPT

## 2025-07-09 PROCEDURE — 83735 ASSAY OF MAGNESIUM: CPT

## 2025-07-10 LAB
BKV DNA SERPL NAA+PROBE-LOG#: NORMAL {LOG_COPIES}/ML
GGT SERPL-CCNC: 39 U/L (ref 5–64)
LABORATORY COMMENT REPORT: NOT DETECTED
TACROLIMUS BLD-MCNC: 8.7 NG/ML

## 2025-07-11 DIAGNOSIS — Z94.0 KIDNEY REPLACED BY TRANSPLANT (HHS-HCC): ICD-10-CM

## 2025-07-11 DIAGNOSIS — Z94.4 LIVER REPLACED BY TRANSPLANT (MULTI): ICD-10-CM

## 2025-07-15 DIAGNOSIS — Z94.4 LIVER REPLACED BY TRANSPLANT (MULTI): ICD-10-CM

## 2025-07-16 ENCOUNTER — LAB (OUTPATIENT)
Dept: LAB | Facility: HOSPITAL | Age: 55
End: 2025-07-16
Payer: MEDICAID

## 2025-07-16 DIAGNOSIS — Z94.4 LIVER TRANSPLANT STATUS: Primary | ICD-10-CM

## 2025-07-16 LAB
ALBUMIN SERPL BCP-MCNC: 4.1 G/DL (ref 3.4–5)
ALP SERPL-CCNC: 72 U/L (ref 33–120)
ALT SERPL W P-5'-P-CCNC: 8 U/L (ref 10–52)
ANION GAP SERPL CALC-SCNC: 11 MMOL/L (ref 10–20)
AST SERPL W P-5'-P-CCNC: 9 U/L (ref 9–39)
BASOPHILS # BLD AUTO: 0.08 X10*3/UL (ref 0–0.1)
BASOPHILS NFR BLD AUTO: 1 %
BILIRUB DIRECT SERPL-MCNC: 0.1 MG/DL (ref 0–0.3)
BILIRUB SERPL-MCNC: 0.3 MG/DL (ref 0–1.2)
BUN SERPL-MCNC: 41 MG/DL (ref 6–23)
CALCIUM SERPL-MCNC: 8.7 MG/DL (ref 8.6–10.3)
CHLORIDE SERPL-SCNC: 110 MMOL/L (ref 98–107)
CO2 SERPL-SCNC: 22 MMOL/L (ref 21–32)
CREAT SERPL-MCNC: 2.09 MG/DL (ref 0.5–1.3)
EGFRCR SERPLBLD CKD-EPI 2021: 36 ML/MIN/1.73M*2
EOSINOPHIL # BLD AUTO: 0.16 X10*3/UL (ref 0–0.7)
EOSINOPHIL NFR BLD AUTO: 2.1 %
ERYTHROCYTE [DISTWIDTH] IN BLOOD BY AUTOMATED COUNT: 14.7 % (ref 11.5–14.5)
GLUCOSE SERPL-MCNC: 129 MG/DL (ref 74–99)
HCT VFR BLD AUTO: 32.9 % (ref 41–52)
HGB BLD-MCNC: 10.3 G/DL (ref 13.5–17.5)
IMM GRANULOCYTES # BLD AUTO: 0.16 X10*3/UL (ref 0–0.7)
IMM GRANULOCYTES NFR BLD AUTO: 2.1 % (ref 0–0.9)
LYMPHOCYTES # BLD AUTO: 0.99 X10*3/UL (ref 1.2–4.8)
LYMPHOCYTES NFR BLD AUTO: 12.9 %
MAGNESIUM SERPL-MCNC: 1.46 MG/DL (ref 1.6–2.4)
MCH RBC QN AUTO: 30.7 PG (ref 26–34)
MCHC RBC AUTO-ENTMCNC: 31.3 G/DL (ref 32–36)
MCV RBC AUTO: 98 FL (ref 80–100)
MONOCYTES # BLD AUTO: 0.53 X10*3/UL (ref 0.1–1)
MONOCYTES NFR BLD AUTO: 6.9 %
NEUTROPHILS # BLD AUTO: 5.78 X10*3/UL (ref 1.2–7.7)
NEUTROPHILS NFR BLD AUTO: 75 %
NRBC BLD-RTO: 0 /100 WBCS (ref 0–0)
PHOSPHATE SERPL-MCNC: 3.1 MG/DL (ref 2.5–4.9)
PLATELET # BLD AUTO: 173 X10*3/UL (ref 150–450)
POTASSIUM SERPL-SCNC: 4.4 MMOL/L (ref 3.5–5.3)
PROT SERPL-MCNC: 6.5 G/DL (ref 6.4–8.2)
RBC # BLD AUTO: 3.36 X10*6/UL (ref 4.5–5.9)
SODIUM SERPL-SCNC: 139 MMOL/L (ref 136–145)
WBC # BLD AUTO: 7.7 X10*3/UL (ref 4.4–11.3)

## 2025-07-16 PROCEDURE — 80197 ASSAY OF TACROLIMUS: CPT

## 2025-07-16 PROCEDURE — 82248 BILIRUBIN DIRECT: CPT

## 2025-07-16 PROCEDURE — 80053 COMPREHEN METABOLIC PANEL: CPT

## 2025-07-16 PROCEDURE — 82977 ASSAY OF GGT: CPT

## 2025-07-16 PROCEDURE — 85025 COMPLETE CBC W/AUTO DIFF WBC: CPT

## 2025-07-16 PROCEDURE — 83735 ASSAY OF MAGNESIUM: CPT

## 2025-07-16 PROCEDURE — 36415 COLL VENOUS BLD VENIPUNCTURE: CPT

## 2025-07-16 PROCEDURE — 84100 ASSAY OF PHOSPHORUS: CPT

## 2025-07-17 DIAGNOSIS — Z94.0 KIDNEY REPLACED BY TRANSPLANT (HHS-HCC): ICD-10-CM

## 2025-07-17 DIAGNOSIS — Z94.4 LIVER TRANSPLANT RECIPIENT (MULTI): ICD-10-CM

## 2025-07-17 LAB
GGT SERPL-CCNC: 31 U/L (ref 5–64)
TACROLIMUS BLD-MCNC: 2.6 NG/ML

## 2025-07-18 DIAGNOSIS — Z94.4 LIVER REPLACED BY TRANSPLANT (MULTI): ICD-10-CM

## 2025-07-18 RX ORDER — TACROLIMUS 1 MG/1
5 CAPSULE ORAL 2 TIMES DAILY
Qty: 900 CAPSULE | Refills: 3 | Status: SHIPPED | OUTPATIENT
Start: 2025-07-18 | End: 2025-07-23

## 2025-07-22 DIAGNOSIS — Z94.4 LIVER REPLACED BY TRANSPLANT (MULTI): ICD-10-CM

## 2025-07-23 ENCOUNTER — LAB (OUTPATIENT)
Dept: LAB | Facility: HOSPITAL | Age: 55
End: 2025-07-23
Payer: MEDICAID

## 2025-07-23 ENCOUNTER — TELEPHONE (OUTPATIENT)
Facility: HOSPITAL | Age: 55
End: 2025-07-23

## 2025-07-23 DIAGNOSIS — Z94.4 LIVER TRANSPLANT STATUS: Primary | ICD-10-CM

## 2025-07-23 DIAGNOSIS — Z94.0 KIDNEY REPLACED BY TRANSPLANT (HHS-HCC): ICD-10-CM

## 2025-07-23 DIAGNOSIS — Z94.4 LIVER TRANSPLANT RECIPIENT (MULTI): ICD-10-CM

## 2025-07-23 LAB
ALBUMIN SERPL BCP-MCNC: 4 G/DL (ref 3.4–5)
ALP SERPL-CCNC: 61 U/L (ref 33–120)
ALT SERPL W P-5'-P-CCNC: 9 U/L (ref 10–52)
ANION GAP SERPL CALC-SCNC: 12 MMOL/L (ref 10–20)
AST SERPL W P-5'-P-CCNC: 10 U/L (ref 9–39)
BASOPHILS # BLD AUTO: 0.06 X10*3/UL (ref 0–0.1)
BASOPHILS NFR BLD AUTO: 0.7 %
BILIRUB DIRECT SERPL-MCNC: 0.1 MG/DL (ref 0–0.3)
BILIRUB SERPL-MCNC: 0.4 MG/DL (ref 0–1.2)
BUN SERPL-MCNC: 42 MG/DL (ref 6–23)
CALCIUM SERPL-MCNC: 8.8 MG/DL (ref 8.6–10.3)
CHLORIDE SERPL-SCNC: 107 MMOL/L (ref 98–107)
CO2 SERPL-SCNC: 24 MMOL/L (ref 21–32)
CREAT SERPL-MCNC: 2.16 MG/DL (ref 0.5–1.3)
EGFRCR SERPLBLD CKD-EPI 2021: 35 ML/MIN/1.73M*2
EOSINOPHIL # BLD AUTO: 0.1 X10*3/UL (ref 0–0.7)
EOSINOPHIL NFR BLD AUTO: 1.2 %
ERYTHROCYTE [DISTWIDTH] IN BLOOD BY AUTOMATED COUNT: 14.3 % (ref 11.5–14.5)
GGT SERPL-CCNC: 28 U/L (ref 5–64)
GLUCOSE SERPL-MCNC: 84 MG/DL (ref 74–99)
HCT VFR BLD AUTO: 34.4 % (ref 41–52)
HGB BLD-MCNC: 10.7 G/DL (ref 13.5–17.5)
IMM GRANULOCYTES # BLD AUTO: 0.12 X10*3/UL (ref 0–0.7)
IMM GRANULOCYTES NFR BLD AUTO: 1.4 % (ref 0–0.9)
LYMPHOCYTES # BLD AUTO: 1.24 X10*3/UL (ref 1.2–4.8)
LYMPHOCYTES NFR BLD AUTO: 14.8 %
MAGNESIUM SERPL-MCNC: 1.52 MG/DL (ref 1.6–2.4)
MCH RBC QN AUTO: 29.9 PG (ref 26–34)
MCHC RBC AUTO-ENTMCNC: 31.1 G/DL (ref 32–36)
MCV RBC AUTO: 96 FL (ref 80–100)
MONOCYTES # BLD AUTO: 0.51 X10*3/UL (ref 0.1–1)
MONOCYTES NFR BLD AUTO: 6.1 %
NEUTROPHILS # BLD AUTO: 6.36 X10*3/UL (ref 1.2–7.7)
NEUTROPHILS NFR BLD AUTO: 75.8 %
NRBC BLD-RTO: 0 /100 WBCS (ref 0–0)
PHOSPHATE SERPL-MCNC: 3.8 MG/DL (ref 2.5–4.9)
PLATELET # BLD AUTO: 180 X10*3/UL (ref 150–450)
POTASSIUM SERPL-SCNC: 4.6 MMOL/L (ref 3.5–5.3)
PROT SERPL-MCNC: 6.3 G/DL (ref 6.4–8.2)
RBC # BLD AUTO: 3.58 X10*6/UL (ref 4.5–5.9)
SODIUM SERPL-SCNC: 138 MMOL/L (ref 136–145)
TACROLIMUS BLD-MCNC: 17.7 NG/ML
WBC # BLD AUTO: 8.4 X10*3/UL (ref 4.4–11.3)

## 2025-07-23 PROCEDURE — 82977 ASSAY OF GGT: CPT

## 2025-07-23 PROCEDURE — 36415 COLL VENOUS BLD VENIPUNCTURE: CPT

## 2025-07-23 PROCEDURE — 87799 DETECT AGENT NOS DNA QUANT: CPT

## 2025-07-23 PROCEDURE — 85025 COMPLETE CBC W/AUTO DIFF WBC: CPT

## 2025-07-23 PROCEDURE — 80053 COMPREHEN METABOLIC PANEL: CPT

## 2025-07-23 PROCEDURE — 83735 ASSAY OF MAGNESIUM: CPT

## 2025-07-23 PROCEDURE — 84100 ASSAY OF PHOSPHORUS: CPT

## 2025-07-23 PROCEDURE — 80197 ASSAY OF TACROLIMUS: CPT

## 2025-07-23 PROCEDURE — 82248 BILIRUBIN DIRECT: CPT

## 2025-07-23 NOTE — TELEPHONE ENCOUNTER
Spoke with JACOB Marie to decrease Pt Tacrolimus to 3mg and 3mg, have Pt skip tonights dose unless he already took it then have him skip tomorrow am.  Repeat labs on Monday.    Call out to Pt, unable to leave message at this time as his inbox is full, will attempt to call Pt back again before night dose of medication is due.

## 2025-07-24 ENCOUNTER — OFFICE VISIT (OUTPATIENT)
Facility: HOSPITAL | Age: 55
End: 2025-07-24
Payer: MEDICAID

## 2025-07-24 VITALS
TEMPERATURE: 97.6 F | BODY MASS INDEX: 33.7 KG/M2 | DIASTOLIC BLOOD PRESSURE: 88 MMHG | SYSTOLIC BLOOD PRESSURE: 180 MMHG | WEIGHT: 234.9 LBS | HEART RATE: 75 BPM | OXYGEN SATURATION: 96 %

## 2025-07-24 DIAGNOSIS — Z94.4 LIVER REPLACED BY TRANSPLANT (MULTI): ICD-10-CM

## 2025-07-24 DIAGNOSIS — Z94.0 KIDNEY REPLACED BY TRANSPLANT (HHS-HCC): ICD-10-CM

## 2025-07-24 DIAGNOSIS — T38.0X5A STEROID-INDUCED HYPERGLYCEMIA: ICD-10-CM

## 2025-07-24 DIAGNOSIS — Z94.4 LIVER TRANSPLANT STATUS: ICD-10-CM

## 2025-07-24 DIAGNOSIS — R73.9 STEROID-INDUCED HYPERGLYCEMIA: ICD-10-CM

## 2025-07-24 DIAGNOSIS — E11.9 TYPE 2 DIABETES MELLITUS WITHOUT COMPLICATION, WITH LONG-TERM CURRENT USE OF INSULIN: Primary | ICD-10-CM

## 2025-07-24 DIAGNOSIS — E11.9 TYPE 2 DIABETES MELLITUS WITHOUT COMPLICATION, UNSPECIFIED WHETHER LONG TERM INSULIN USE: ICD-10-CM

## 2025-07-24 DIAGNOSIS — E13.9 POST-TRANSPLANT DIABETES MELLITUS (MULTI): Chronic | ICD-10-CM

## 2025-07-24 DIAGNOSIS — Z79.4 TYPE 2 DIABETES MELLITUS WITHOUT COMPLICATION, WITH LONG-TERM CURRENT USE OF INSULIN: Primary | ICD-10-CM

## 2025-07-24 PROCEDURE — 3079F DIAST BP 80-89 MM HG: CPT | Performed by: PHYSICIAN ASSISTANT

## 2025-07-24 PROCEDURE — 99214 OFFICE O/P EST MOD 30 MIN: CPT | Performed by: PHYSICIAN ASSISTANT

## 2025-07-24 PROCEDURE — 3077F SYST BP >= 140 MM HG: CPT | Performed by: PHYSICIAN ASSISTANT

## 2025-07-24 RX ORDER — INSULIN LISPRO 100 [IU]/ML
0-10 INJECTION, SOLUTION INTRAVENOUS; SUBCUTANEOUS
Qty: 15 ML | Refills: 3 | Status: SHIPPED | OUTPATIENT
Start: 2025-07-24 | End: 2026-02-09

## 2025-07-24 RX ORDER — TACROLIMUS 1 MG/1
3 CAPSULE ORAL 2 TIMES DAILY
Qty: 540 CAPSULE | Refills: 3 | Status: SHIPPED | OUTPATIENT
Start: 2025-07-24 | End: 2026-07-24

## 2025-07-24 RX ORDER — PEN NEEDLE, DIABETIC 30 GX3/16"
1 NEEDLE, DISPOSABLE MISCELLANEOUS
Qty: 300 EACH | Refills: 3 | Status: SHIPPED | OUTPATIENT
Start: 2025-07-24 | End: 2026-07-24

## 2025-07-24 RX ORDER — BLOOD-GLUCOSE SENSOR
EACH MISCELLANEOUS
Qty: 3 EACH | Refills: 11 | Status: SHIPPED | OUTPATIENT
Start: 2025-07-24

## 2025-07-24 RX ORDER — ISOPROPYL ALCOHOL 70 ML/100ML
SWAB TOPICAL
Qty: 400 EACH | Refills: 3 | Status: SHIPPED | OUTPATIENT
Start: 2025-07-24

## 2025-07-24 ASSESSMENT — ENCOUNTER SYMPTOMS: FATIGUE: 1

## 2025-07-24 NOTE — PATIENT INSTRUCTIONS
"Type 2 diabetes mellitus without complication, with long-term current use of insulin  -     empagliflozin (Jardiance) 10 mg tablet; Take 1 tablet (10 mg) by mouth once daily.  -     pen needle, diabetic 32 gauge x 5/32\" needle; 1 each 3 times a day before meals.  -     alcohol swabs (Alcohol Pads); Use 4-8 per day to check blood glucose and for injectable medications  Post-transplant diabetes mellitus (Multi)  -     empagliflozin (Jardiance) 10 mg tablet; Take 1 tablet (10 mg) by mouth once daily.  -     pen needle, diabetic 32 gauge x 5/32\" needle; 1 each 3 times a day before meals.  -     alcohol swabs (Alcohol Pads); Use 4-8 per day to check blood glucose and for injectable medications  Kidney replaced by transplant ([facility])  -     empagliflozin (Jardiance) 10 mg tablet; Take 1 tablet (10 mg) by mouth once daily.  -     pen needle, diabetic 32 gauge x 5/32\" needle; 1 each 3 times a day before meals.  -     alcohol swabs (Alcohol Pads); Use 4-8 per day to check blood glucose and for injectable medications  Liver transplant status  -     empagliflozin (Jardiance) 10 mg tablet; Take 1 tablet (10 mg) by mouth once daily.  -     pen needle, diabetic 32 gauge x 5/32\" needle; 1 each 3 times a day before meals.  -     alcohol swabs (Alcohol Pads); Use 4-8 per day to check blood glucose and for injectable medications  Steroid-induced hyperglycemia  -     insulin lispro (HumaLOG KwikPen Insulin) 100 unit/mL pen; Inject 0-10 Units under the skin 3 times daily (morning, midday, late afternoon). Inject as per sliding scale before meals, expect up to 10 units/day  -     pen needle, diabetic 32 gauge x 5/32\" needle; 1 each 3 times a day before meals.  -     alcohol swabs (Alcohol Pads); Use 4-8 per day to check blood glucose and for injectable medications  Type 2 diabetes mellitus without complication, unspecified whether long term insulin use  -     insulin lispro (HumaLOG KwikPen Insulin) 100 unit/mL pen; Inject 0-10 Units " under the skin 3 times daily (morning, midday, late afternoon). Inject as per sliding scale before meals, expect up to 10 units/day  Liver replaced by transplant (Multi)  -     insulin lispro (HumaLOG KwikPen Insulin) 100 unit/mL pen; Inject 0-10 Units under the skin 3 times daily (morning, midday, late afternoon). Inject as per sliding scale before meals, expect up to 10 units/day      Type 2 diabetes mellitus, is at goal. A1C due for update in 3 months    RX changes: see insulin plan changes below, start jardiance 10mg (see safe use statement #3)   Education:  blood sugar goals, complications of diabetes mellitus, hypoglycemia prevention and treatment, and self-monitoring of blood glucose skills  Please start Jardiance 10mg once every morning. Please be sure to hydrate and maintain good personal hygiene. if you are feeling unwell or experiencing vomiting/diarrhea, please stop jardiance until your appetite returns to normal.  Follow up: I recommend diabetes care be return to clinic in 4-6 weeks.    INSULIN INSTRUCTIONS     HUMALOG    SLIDING SCALE    = 0u  151-200 = 2u  201-250 = 4u  251-300 = 6u  301-350 = 8u  351-400 = 10u  Over 400 = repeat 10u every 4 hours

## 2025-07-24 NOTE — PROGRESS NOTES
Subjective   Goran Ibrahim is a 55 y.o. male who presents for follow-up of Type 2 diabetes mellitus. The initial diagnosis of diabetes was made in 2023. The patient does not have a known family history of diabetes.    Known complications due to diabetes included chronic kidney disease and obesity    Cardiovascular risk factors include advanced age (older than 55 for men, 65 for women), diabetes mellitus, male gender, and obesity (BMI >= 30 kg/m2). The patient is not on an ACE inhibitor or angiotensin II receptor blocker.  The patient has not been previously hospitalized due to diabetic ketoacidosis.     Current symptoms/problems include hyperglycemia. His clinical course has been stable.     Current diabetes regimen is as follows: Lispro ssi 2u:50>150mg/dL and dexcom G7 CGM     The patient is currently checking the blood glucose 5+ times per day.    Risk/benefit of sglt2 tx discussed today, pt agree to starting jardiance 10mg today    Patient is using: continuous glucose monitor -using dexcom with , so unable to download CGM report    Hypoglycemia frequency: one occurrence after taking ssi after dinner  Hypoglycemia awareness: Yes     Exercise: never - limited by recent liver and kidney transplant   Meal panning: He is using avoidance of concentrated sweets.    Review of Systems   Constitutional:  Positive for fatigue.   All other systems reviewed and are negative.      Objective   /88 (BP Location: Right arm, Patient Position: Sitting, BP Cuff Size: Adult)   Pulse 75   Temp 36.4 °C (97.6 °F) (Temporal)   Wt 107 kg (234 lb 14.4 oz)   SpO2 96%   BMI 33.70 kg/m²   Physical Exam  Constitutional:       Appearance: Normal appearance. He is obese.   HENT:      Head: Normocephalic and atraumatic.      Nose: Nose normal.      Mouth/Throat:      Mouth: Mucous membranes are dry.      Pharynx: Oropharynx is clear.     Eyes:      Extraocular Movements: Extraocular movements intact.      Conjunctiva/sclera:  "Conjunctivae normal.       Cardiovascular:      Rate and Rhythm: Normal rate and regular rhythm.      Pulses: Normal pulses.      Heart sounds: Normal heart sounds.   Pulmonary:      Effort: Pulmonary effort is normal.      Breath sounds: Normal breath sounds.   Abdominal:      Palpations: Abdomen is soft.     Skin:     General: Skin is warm and dry.     Neurological:      General: No focal deficit present.      Mental Status: He is alert and oriented to person, place, and time. Mental status is at baseline.     Psychiatric:         Mood and Affect: Mood normal.         Behavior: Behavior normal.         Thought Content: Thought content normal.         Judgment: Judgment normal.       Lab Review  Glucose (mg/dL)   Date Value   07/23/2025 84   07/16/2025 129 (H)   07/09/2025 132 (H)     Hemoglobin A1C (%)   Date Value   01/21/2025 6.6 (H)   09/28/2023 5.3   06/28/2023 5.2   05/18/2023 6.8 (H)     Bicarbonate (mmol/L)   Date Value   07/23/2025 24   07/16/2025 22   07/09/2025 23     Urea Nitrogen (mg/dL)   Date Value   07/23/2025 42 (H)   07/16/2025 41 (H)   07/09/2025 44 (H)     Creatinine (mg/dL)   Date Value   07/23/2025 2.16 (H)   07/16/2025 2.09 (H)   07/09/2025 2.06 (H)       Health Maintenance:   Foot Exam: due for update  Eye Exam: due for update  Lipid Panel: up to date, though above goal of LDL <70 as of 1/13/25, ordered repeat lab for 6 months after txp  Urine Albumin: due for update at 6 months after txp     Assessment/Plan   Diagnoses and all orders for this visit:  Type 2 diabetes mellitus without complication, with long-term current use of insulin  -     empagliflozin (Jardiance) 10 mg tablet; Take 1 tablet (10 mg) by mouth once daily.  -     pen needle, diabetic 32 gauge x 5/32\" needle; 1 each 3 times a day before meals.  -     alcohol swabs (Alcohol Pads); Use 4-8 per day to check blood glucose and for injectable medications  Post-transplant diabetes mellitus (Multi)  -     empagliflozin (Jardiance) 10 " "mg tablet; Take 1 tablet (10 mg) by mouth once daily.  -     pen needle, diabetic 32 gauge x 5/32\" needle; 1 each 3 times a day before meals.  -     alcohol swabs (Alcohol Pads); Use 4-8 per day to check blood glucose and for injectable medications  Kidney replaced by transplant ([facility])  -     empagliflozin (Jardiance) 10 mg tablet; Take 1 tablet (10 mg) by mouth once daily.  -     pen needle, diabetic 32 gauge x 5/32\" needle; 1 each 3 times a day before meals.  -     alcohol swabs (Alcohol Pads); Use 4-8 per day to check blood glucose and for injectable medications  Liver transplant status  -     empagliflozin (Jardiance) 10 mg tablet; Take 1 tablet (10 mg) by mouth once daily.  -     pen needle, diabetic 32 gauge x 5/32\" needle; 1 each 3 times a day before meals.  -     alcohol swabs (Alcohol Pads); Use 4-8 per day to check blood glucose and for injectable medications  Steroid-induced hyperglycemia  -     insulin lispro (HumaLOG KwikPen Insulin) 100 unit/mL pen; Inject 0-10 Units under the skin 3 times daily (morning, midday, late afternoon). Inject as per sliding scale before meals, expect up to 10 units/day  -     pen needle, diabetic 32 gauge x 5/32\" needle; 1 each 3 times a day before meals.  -     alcohol swabs (Alcohol Pads); Use 4-8 per day to check blood glucose and for injectable medications  Type 2 diabetes mellitus without complication, unspecified whether long term insulin use  -     insulin lispro (HumaLOG KwikPen Insulin) 100 unit/mL pen; Inject 0-10 Units under the skin 3 times daily (morning, midday, late afternoon). Inject as per sliding scale before meals, expect up to 10 units/day  Liver replaced by transplant (Multi)  -     insulin lispro (HumaLOG KwikPen Insulin) 100 unit/mL pen; Inject 0-10 Units under the skin 3 times daily (morning, midday, late afternoon). Inject as per sliding scale before meals, expect up to 10 units/day      Type 2 diabetes mellitus, is at goal. A1C due for update in " 3 months    RX changes: see insulin plan changes below, start jardiance 10mg (see safe use statement #3)   Education:  blood sugar goals, complications of diabetes mellitus, hypoglycemia prevention and treatment, and self-monitoring of blood glucose skills  Please start Jardiance 10mg once every morning. Please be sure to hydrate and maintain good personal hygiene. if you are feeling unwell or experiencing vomiting/diarrhea, please stop jardiance until your appetite returns to normal.  Follow up: I recommend diabetes care be return to clinic in 4-6 weeks.    INSULIN INSTRUCTIONS     HUMALOG    SLIDING SCALE    = 0u  151-200 = 2u  201-250 = 4u  251-300 = 6u  301-350 = 8u  351-400 = 10u  Over 400 = repeat 10u every 4 hours

## 2025-07-24 NOTE — LETTER
To whom it may concern,       Goran Lowrying (: 1970) was seen by me today, 25, for care of his post-liver transplant diabetes.        Please feel free to contact me with any questions, concerns.    Office: [phone]   Fax: [phone]             Chelsea Hernandez PA-C   [address] [place] [id number]

## 2025-07-24 NOTE — TELEPHONE ENCOUNTER
Spoke with Pt, aware he is to skip tonights dose of Tacrolimus and is to start taking 3mg BID with labs on Monday. Pt verbally communicated understanding

## 2025-07-25 DIAGNOSIS — Z94.4 LIVER REPLACED BY TRANSPLANT (MULTI): ICD-10-CM

## 2025-07-25 DIAGNOSIS — Z94.0 KIDNEY REPLACED BY TRANSPLANT (HHS-HCC): ICD-10-CM

## 2025-07-28 ENCOUNTER — LAB (OUTPATIENT)
Dept: LAB | Facility: HOSPITAL | Age: 55
End: 2025-07-28
Payer: MEDICAID

## 2025-07-28 ENCOUNTER — TELEPHONE (OUTPATIENT)
Facility: HOSPITAL | Age: 55
End: 2025-07-28

## 2025-07-28 DIAGNOSIS — Z94.4 LIVER TRANSPLANT STATUS: Primary | ICD-10-CM

## 2025-07-29 DIAGNOSIS — Z94.4 LIVER REPLACED BY TRANSPLANT (MULTI): ICD-10-CM

## 2025-07-31 ENCOUNTER — OFFICE VISIT (OUTPATIENT)
Facility: HOSPITAL | Age: 55
End: 2025-07-31
Payer: MEDICAID

## 2025-07-31 ENCOUNTER — NURSE ONLY (OUTPATIENT)
Facility: HOSPITAL | Age: 55
End: 2025-07-31
Payer: MEDICAID

## 2025-07-31 VITALS
SYSTOLIC BLOOD PRESSURE: 169 MMHG | WEIGHT: 235.2 LBS | DIASTOLIC BLOOD PRESSURE: 101 MMHG | BODY MASS INDEX: 33.75 KG/M2 | TEMPERATURE: 97.2 F | OXYGEN SATURATION: 97 % | HEART RATE: 78 BPM

## 2025-07-31 VITALS
HEART RATE: 78 BPM | DIASTOLIC BLOOD PRESSURE: 102 MMHG | WEIGHT: 235.2 LBS | SYSTOLIC BLOOD PRESSURE: 175 MMHG | TEMPERATURE: 97.2 F | BODY MASS INDEX: 33.75 KG/M2 | OXYGEN SATURATION: 97 %

## 2025-07-31 DIAGNOSIS — I10 HYPERTENSION, UNSPECIFIED TYPE: ICD-10-CM

## 2025-07-31 DIAGNOSIS — Z94.0 KIDNEY REPLACED BY TRANSPLANT (HHS-HCC): ICD-10-CM

## 2025-07-31 DIAGNOSIS — Z94.4 LIVER TRANSPLANT RECIPIENT (MULTI): ICD-10-CM

## 2025-07-31 DIAGNOSIS — Z94.4 ENCOUNTER FOR IMMUNOSUPPRESSION MANAGEMENT AFTER LIVER TRANSPLANT: ICD-10-CM

## 2025-07-31 DIAGNOSIS — Z94.0 IMMUNOSUPPRESSIVE MANAGEMENT ENCOUNTER FOLLOWING KIDNEY TRANSPLANT: Primary | ICD-10-CM

## 2025-07-31 DIAGNOSIS — Z79.60 ENCOUNTER FOR IMMUNOSUPPRESSION MANAGEMENT AFTER LIVER TRANSPLANT: ICD-10-CM

## 2025-07-31 DIAGNOSIS — I85.10 ESOPHAGEAL VARICES IN ALCOHOLIC CIRRHOSIS (MULTI): ICD-10-CM

## 2025-07-31 DIAGNOSIS — Z79.899 IMMUNOSUPPRESSIVE MANAGEMENT ENCOUNTER FOLLOWING KIDNEY TRANSPLANT: Primary | ICD-10-CM

## 2025-07-31 DIAGNOSIS — K70.30 ESOPHAGEAL VARICES IN ALCOHOLIC CIRRHOSIS (MULTI): ICD-10-CM

## 2025-07-31 LAB
ALBUMIN SERPL BCP-MCNC: 4.2 G/DL (ref 3.4–5)
ALP SERPL-CCNC: 78 U/L (ref 33–120)
ALT SERPL W P-5'-P-CCNC: 9 U/L (ref 10–52)
ANION GAP SERPL CALC-SCNC: 16 MMOL/L (ref 10–20)
AST SERPL W P-5'-P-CCNC: 11 U/L (ref 9–39)
BASOPHILS # BLD AUTO: 0.07 X10*3/UL (ref 0–0.1)
BASOPHILS NFR BLD AUTO: 0.8 %
BILIRUB DIRECT SERPL-MCNC: 0.1 MG/DL (ref 0–0.3)
BILIRUB SERPL-MCNC: 0.3 MG/DL (ref 0–1.2)
BUN SERPL-MCNC: 49 MG/DL (ref 6–23)
CALCIUM SERPL-MCNC: 9.4 MG/DL (ref 8.6–10.6)
CHLORIDE SERPL-SCNC: 106 MMOL/L (ref 98–107)
CO2 SERPL-SCNC: 23 MMOL/L (ref 21–32)
CREAT SERPL-MCNC: 2.38 MG/DL (ref 0.5–1.3)
EGFRCR SERPLBLD CKD-EPI 2021: 31 ML/MIN/1.73M*2
EOSINOPHIL # BLD AUTO: 0.11 X10*3/UL (ref 0–0.7)
EOSINOPHIL NFR BLD AUTO: 1.2 %
ERYTHROCYTE [DISTWIDTH] IN BLOOD BY AUTOMATED COUNT: 13.9 % (ref 11.5–14.5)
GGT SERPL-CCNC: 30 U/L (ref 5–64)
GLUCOSE SERPL-MCNC: 148 MG/DL (ref 74–99)
HCT VFR BLD AUTO: 34.4 % (ref 41–52)
HGB BLD-MCNC: 10.4 G/DL (ref 13.5–17.5)
IMM GRANULOCYTES # BLD AUTO: 0.14 X10*3/UL (ref 0–0.7)
IMM GRANULOCYTES NFR BLD AUTO: 1.5 % (ref 0–0.9)
LYMPHOCYTES # BLD AUTO: 0.75 X10*3/UL (ref 1.2–4.8)
LYMPHOCYTES NFR BLD AUTO: 8 %
MAGNESIUM SERPL-MCNC: 1.98 MG/DL (ref 1.6–2.4)
MCH RBC QN AUTO: 29.5 PG (ref 26–34)
MCHC RBC AUTO-ENTMCNC: 30.2 G/DL (ref 32–36)
MCV RBC AUTO: 98 FL (ref 80–100)
MONOCYTES # BLD AUTO: 0.64 X10*3/UL (ref 0.1–1)
MONOCYTES NFR BLD AUTO: 6.9 %
NEUTROPHILS # BLD AUTO: 7.61 X10*3/UL (ref 1.2–7.7)
NEUTROPHILS NFR BLD AUTO: 81.6 %
NRBC BLD-RTO: 0 /100 WBCS (ref 0–0)
PHOSPHATE SERPL-MCNC: 4.3 MG/DL (ref 2.5–4.9)
PLATELET # BLD AUTO: 161 X10*3/UL (ref 150–450)
POTASSIUM SERPL-SCNC: 5.4 MMOL/L (ref 3.5–5.3)
PROT SERPL-MCNC: 6.8 G/DL (ref 6.4–8.2)
RBC # BLD AUTO: 3.52 X10*6/UL (ref 4.5–5.9)
SODIUM SERPL-SCNC: 140 MMOL/L (ref 136–145)
WBC # BLD AUTO: 9.3 X10*3/UL (ref 4.4–11.3)

## 2025-07-31 PROCEDURE — 85025 COMPLETE CBC W/AUTO DIFF WBC: CPT | Performed by: STUDENT IN AN ORGANIZED HEALTH CARE EDUCATION/TRAINING PROGRAM

## 2025-07-31 PROCEDURE — 84075 ASSAY ALKALINE PHOSPHATASE: CPT | Performed by: STUDENT IN AN ORGANIZED HEALTH CARE EDUCATION/TRAINING PROGRAM

## 2025-07-31 PROCEDURE — 80053 COMPREHEN METABOLIC PANEL: CPT | Performed by: STUDENT IN AN ORGANIZED HEALTH CARE EDUCATION/TRAINING PROGRAM

## 2025-07-31 PROCEDURE — 99213 OFFICE O/P EST LOW 20 MIN: CPT | Performed by: TRANSPLANT SURGERY

## 2025-07-31 PROCEDURE — 82977 ASSAY OF GGT: CPT | Performed by: STUDENT IN AN ORGANIZED HEALTH CARE EDUCATION/TRAINING PROGRAM

## 2025-07-31 PROCEDURE — 3077F SYST BP >= 140 MM HG: CPT | Performed by: TRANSPLANT SURGERY

## 2025-07-31 PROCEDURE — 3080F DIAST BP >= 90 MM HG: CPT | Performed by: TRANSPLANT SURGERY

## 2025-07-31 PROCEDURE — 84100 ASSAY OF PHOSPHORUS: CPT | Performed by: STUDENT IN AN ORGANIZED HEALTH CARE EDUCATION/TRAINING PROGRAM

## 2025-07-31 PROCEDURE — 99213 OFFICE O/P EST LOW 20 MIN: CPT | Mod: 24

## 2025-07-31 PROCEDURE — 83735 ASSAY OF MAGNESIUM: CPT | Performed by: STUDENT IN AN ORGANIZED HEALTH CARE EDUCATION/TRAINING PROGRAM

## 2025-07-31 PROCEDURE — 36415 COLL VENOUS BLD VENIPUNCTURE: CPT | Performed by: TRANSPLANT SURGERY

## 2025-07-31 RX ORDER — PREDNISONE 5 MG/1
5 TABLET ORAL DAILY
Qty: 90 TABLET | Refills: 3 | Status: SHIPPED | OUTPATIENT
Start: 2025-07-31

## 2025-07-31 NOTE — PROGRESS NOTES
Goran Ibrahim is a 55 y.o. male POD#192 from [facility] from a DCD donor.    - No acute complaints, feeling well  - Fell while wheeling his Brandan and scraped his arm  - Tac toxicity, decreased dose    Objective   Gen: A+OX3; NAD  HEENT: PERRL, sclera anicteric, MMM  Cardiac: RRR  Chest: Normal inspiratory effort  Abdomen: S/NT/ND. Incision C/D/I.  Ext: No LE edema    Last Recorded Vitals  Visit Vitals  BP (!) 169/101 (BP Location: Right arm, Patient Position: Sitting, BP Cuff Size: Adult)   Pulse 78   Temp 36.2 °C (97.2 °F) (Temporal)      Assessment/Plan   Principal Problem:    Liver/Kidney transplant recipient    Liver allograft function              Stable Lfts    Kidney allograft function              Cr increased. Likely tac. Repeating today              Has had DSA class II ~2000 in past                Immunosuppression medication reviewed and adjusted. Remains on triple therapy due to SLK.                 Reduce tacro to 3 BID. Repeat level pending               mg bid              Prednisone decreased to  5 mg daily     PPX             Off all prophylaxis    Hypertension   Repeat BP today.  If still high will increase to 12.5 mg BID    I spent 35 minutes in the professional and overall care of this patient, including immunosuppression management.    Goran Carrillo MD

## 2025-08-01 ENCOUNTER — LAB (OUTPATIENT)
Dept: LAB | Facility: HOSPITAL | Age: 55
End: 2025-08-01
Payer: MEDICAID

## 2025-08-01 ENCOUNTER — APPOINTMENT (OUTPATIENT)
Dept: LAB | Facility: HOSPITAL | Age: 55
End: 2025-08-01
Payer: MEDICAID

## 2025-08-01 DIAGNOSIS — Z94.4 LIVER REPLACED BY TRANSPLANT (MULTI): ICD-10-CM

## 2025-08-01 DIAGNOSIS — Z94.0 KIDNEY REPLACED BY TRANSPLANT (HHS-HCC): ICD-10-CM

## 2025-08-01 DIAGNOSIS — Z01.818 ENCOUNTER FOR PRE-TRANSPLANT EVALUATION FOR LIVER TRANSPLANT: ICD-10-CM

## 2025-08-01 DIAGNOSIS — Z01.818 ENCOUNTER FOR PRE-TRANSPLANT EVALUATION FOR KIDNEY TRANSPLANT: ICD-10-CM

## 2025-08-01 LAB
ALBUMIN SERPL BCP-MCNC: 4.2 G/DL (ref 3.4–5)
ALP SERPL-CCNC: 79 U/L (ref 33–120)
ALT SERPL W P-5'-P-CCNC: 8 U/L (ref 10–52)
ANION GAP SERPL CALC-SCNC: 14 MMOL/L (ref 10–20)
AST SERPL W P-5'-P-CCNC: 11 U/L (ref 9–39)
BASOPHILS # BLD AUTO: 0.09 X10*3/UL (ref 0–0.1)
BASOPHILS NFR BLD AUTO: 1.1 %
BILIRUB DIRECT SERPL-MCNC: 0.1 MG/DL (ref 0–0.3)
BILIRUB SERPL-MCNC: 0.3 MG/DL (ref 0–1.2)
BUN SERPL-MCNC: 49 MG/DL (ref 6–23)
CALCIUM SERPL-MCNC: 9.1 MG/DL (ref 8.6–10.3)
CHLORIDE SERPL-SCNC: 109 MMOL/L (ref 98–107)
CO2 SERPL-SCNC: 22 MMOL/L (ref 21–32)
CREAT SERPL-MCNC: 2.16 MG/DL (ref 0.5–1.3)
EGFRCR SERPLBLD CKD-EPI 2021: 35 ML/MIN/1.73M*2
EOSINOPHIL # BLD AUTO: 0.17 X10*3/UL (ref 0–0.7)
EOSINOPHIL NFR BLD AUTO: 2.1 %
ERYTHROCYTE [DISTWIDTH] IN BLOOD BY AUTOMATED COUNT: 14.2 % (ref 11.5–14.5)
GLUCOSE SERPL-MCNC: 128 MG/DL (ref 74–99)
HCT VFR BLD AUTO: 34.4 % (ref 41–52)
HGB BLD-MCNC: 10.6 G/DL (ref 13.5–17.5)
IMM GRANULOCYTES # BLD AUTO: 0.12 X10*3/UL (ref 0–0.7)
IMM GRANULOCYTES NFR BLD AUTO: 1.5 % (ref 0–0.9)
LYMPHOCYTES # BLD AUTO: 1.06 X10*3/UL (ref 1.2–4.8)
LYMPHOCYTES NFR BLD AUTO: 13 %
MAGNESIUM SERPL-MCNC: 1.71 MG/DL (ref 1.6–2.4)
MCH RBC QN AUTO: 29.6 PG (ref 26–34)
MCHC RBC AUTO-ENTMCNC: 30.8 G/DL (ref 32–36)
MCV RBC AUTO: 96 FL (ref 80–100)
MONOCYTES # BLD AUTO: 0.57 X10*3/UL (ref 0.1–1)
MONOCYTES NFR BLD AUTO: 7 %
NEUTROPHILS # BLD AUTO: 6.17 X10*3/UL (ref 1.2–7.7)
NEUTROPHILS NFR BLD AUTO: 75.3 %
NRBC BLD-RTO: 0 /100 WBCS (ref 0–0)
PLATELET # BLD AUTO: 169 X10*3/UL (ref 150–450)
POTASSIUM SERPL-SCNC: 5.3 MMOL/L (ref 3.5–5.3)
PROT SERPL-MCNC: 6.6 G/DL (ref 6.4–8.2)
RBC # BLD AUTO: 3.58 X10*6/UL (ref 4.5–5.9)
SODIUM SERPL-SCNC: 140 MMOL/L (ref 136–145)
WBC # BLD AUTO: 8.2 X10*3/UL (ref 4.4–11.3)

## 2025-08-01 PROCEDURE — 86832 HLA CLASS I HIGH DEFIN QUAL: CPT

## 2025-08-01 PROCEDURE — 85025 COMPLETE CBC W/AUTO DIFF WBC: CPT

## 2025-08-01 PROCEDURE — 80197 ASSAY OF TACROLIMUS: CPT

## 2025-08-01 PROCEDURE — 86833 HLA CLASS II HIGH DEFIN QUAL: CPT

## 2025-08-01 PROCEDURE — 83735 ASSAY OF MAGNESIUM: CPT

## 2025-08-01 PROCEDURE — 80053 COMPREHEN METABOLIC PANEL: CPT

## 2025-08-01 PROCEDURE — 82977 ASSAY OF GGT: CPT

## 2025-08-01 PROCEDURE — 82248 BILIRUBIN DIRECT: CPT

## 2025-08-02 LAB
GGT SERPL-CCNC: 30 U/L (ref 5–64)
TACROLIMUS BLD-MCNC: 7.9 NG/ML

## 2025-08-03 DIAGNOSIS — T38.0X5A STEROID-INDUCED HYPERGLYCEMIA: ICD-10-CM

## 2025-08-03 DIAGNOSIS — Z94.0 KIDNEY REPLACED BY TRANSPLANT (HHS-HCC): ICD-10-CM

## 2025-08-03 DIAGNOSIS — E11.9 TYPE 2 DIABETES MELLITUS WITHOUT COMPLICATION, UNSPECIFIED WHETHER LONG TERM INSULIN USE: ICD-10-CM

## 2025-08-03 DIAGNOSIS — Z94.4 LIVER TRANSPLANT STATUS: ICD-10-CM

## 2025-08-03 DIAGNOSIS — R73.9 STEROID-INDUCED HYPERGLYCEMIA: ICD-10-CM

## 2025-08-05 DIAGNOSIS — Z94.4 LIVER REPLACED BY TRANSPLANT (MULTI): ICD-10-CM

## 2025-08-05 LAB
HLA RESULTS: NORMAL
HLA-A+B+C AB NFR SER: NORMAL %
HLA-DP+DQ+DR AB NFR SER: NORMAL %

## 2025-08-06 ENCOUNTER — LAB (OUTPATIENT)
Dept: LAB | Facility: HOSPITAL | Age: 55
End: 2025-08-06
Payer: MEDICAID

## 2025-08-06 ENCOUNTER — APPOINTMENT (OUTPATIENT)
Dept: LAB | Facility: HOSPITAL | Age: 55
End: 2025-08-06
Payer: MEDICAID

## 2025-08-06 DIAGNOSIS — Z94.4 LIVER TRANSPLANT STATUS: Primary | ICD-10-CM

## 2025-08-06 LAB
ALBUMIN SERPL BCP-MCNC: 4.4 G/DL (ref 3.4–5)
ALP SERPL-CCNC: 70 U/L (ref 33–120)
ALT SERPL W P-5'-P-CCNC: 10 U/L (ref 10–52)
ANION GAP SERPL CALC-SCNC: 14 MMOL/L (ref 10–20)
AST SERPL W P-5'-P-CCNC: 11 U/L (ref 9–39)
BASOPHILS # BLD AUTO: 0.07 X10*3/UL (ref 0–0.1)
BASOPHILS NFR BLD AUTO: 0.8 %
BILIRUB DIRECT SERPL-MCNC: 0.1 MG/DL (ref 0–0.3)
BILIRUB SERPL-MCNC: 0.4 MG/DL (ref 0–1.2)
BUN SERPL-MCNC: 41 MG/DL (ref 6–23)
CALCIUM SERPL-MCNC: 9.3 MG/DL (ref 8.6–10.3)
CHLORIDE SERPL-SCNC: 109 MMOL/L (ref 98–107)
CO2 SERPL-SCNC: 19 MMOL/L (ref 21–32)
CREAT SERPL-MCNC: 2.28 MG/DL (ref 0.5–1.3)
CREAT UR-MCNC: 59.1 MG/DL (ref 20–370)
EGFRCR SERPLBLD CKD-EPI 2021: 33 ML/MIN/1.73M*2
EOSINOPHIL # BLD AUTO: 0.16 X10*3/UL (ref 0–0.7)
EOSINOPHIL NFR BLD AUTO: 1.7 %
ERYTHROCYTE [DISTWIDTH] IN BLOOD BY AUTOMATED COUNT: 14.1 % (ref 11.5–14.5)
GLUCOSE SERPL-MCNC: 106 MG/DL (ref 74–99)
HCT VFR BLD AUTO: 34.7 % (ref 41–52)
HGB BLD-MCNC: 11 G/DL (ref 13.5–17.5)
IMM GRANULOCYTES # BLD AUTO: 0.12 X10*3/UL (ref 0–0.7)
IMM GRANULOCYTES NFR BLD AUTO: 1.3 % (ref 0–0.9)
LYMPHOCYTES # BLD AUTO: 1.21 X10*3/UL (ref 1.2–4.8)
LYMPHOCYTES NFR BLD AUTO: 13 %
MAGNESIUM SERPL-MCNC: 1.78 MG/DL (ref 1.6–2.4)
MCH RBC QN AUTO: 29.6 PG (ref 26–34)
MCHC RBC AUTO-ENTMCNC: 31.7 G/DL (ref 32–36)
MCV RBC AUTO: 93 FL (ref 80–100)
MONOCYTES # BLD AUTO: 0.65 X10*3/UL (ref 0.1–1)
MONOCYTES NFR BLD AUTO: 7 %
NEUTROPHILS # BLD AUTO: 7.12 X10*3/UL (ref 1.2–7.7)
NEUTROPHILS NFR BLD AUTO: 76.2 %
NRBC BLD-RTO: 0 /100 WBCS (ref 0–0)
PHOSPHATE SERPL-MCNC: 4.4 MG/DL (ref 2.5–4.9)
PLATELET # BLD AUTO: 192 X10*3/UL (ref 150–450)
POTASSIUM SERPL-SCNC: 4.9 MMOL/L (ref 3.5–5.3)
PROT SERPL-MCNC: 7.1 G/DL (ref 6.4–8.2)
PROT UR-ACNC: 39 MG/DL (ref 5–25)
PROT/CREAT UR: 0.66 MG/MG CREAT (ref 0–0.17)
RBC # BLD AUTO: 3.72 X10*6/UL (ref 4.5–5.9)
SODIUM SERPL-SCNC: 137 MMOL/L (ref 136–145)
WBC # BLD AUTO: 9.3 X10*3/UL (ref 4.4–11.3)

## 2025-08-06 PROCEDURE — 87799 DETECT AGENT NOS DNA QUANT: CPT

## 2025-08-06 PROCEDURE — 36415 COLL VENOUS BLD VENIPUNCTURE: CPT

## 2025-08-06 PROCEDURE — 85025 COMPLETE CBC W/AUTO DIFF WBC: CPT

## 2025-08-06 PROCEDURE — 80197 ASSAY OF TACROLIMUS: CPT

## 2025-08-06 PROCEDURE — 82570 ASSAY OF URINE CREATININE: CPT

## 2025-08-06 PROCEDURE — 83735 ASSAY OF MAGNESIUM: CPT

## 2025-08-06 PROCEDURE — 80053 COMPREHEN METABOLIC PANEL: CPT

## 2025-08-06 PROCEDURE — 82977 ASSAY OF GGT: CPT

## 2025-08-06 PROCEDURE — 84156 ASSAY OF PROTEIN URINE: CPT

## 2025-08-06 PROCEDURE — 84100 ASSAY OF PHOSPHORUS: CPT

## 2025-08-06 PROCEDURE — 82248 BILIRUBIN DIRECT: CPT

## 2025-08-07 DIAGNOSIS — Z94.0 KIDNEY REPLACED BY TRANSPLANT (HHS-HCC): ICD-10-CM

## 2025-08-07 DIAGNOSIS — Z94.4 LIVER TRANSPLANT RECIPIENT (MULTI): ICD-10-CM

## 2025-08-07 LAB
BKV DNA SERPL NAA+PROBE-LOG#: NORMAL {LOG_COPIES}/ML
GGT SERPL-CCNC: 29 U/L (ref 5–64)
LABORATORY COMMENT REPORT: NOT DETECTED
TACROLIMUS BLD-MCNC: 5.6 NG/ML

## 2025-08-07 RX ORDER — PREDNISONE 5 MG/1
10 TABLET ORAL DAILY
Qty: 180 TABLET | Refills: 3 | Status: SHIPPED | OUTPATIENT
Start: 2025-08-07

## 2025-08-07 RX ORDER — TACROLIMUS 1 MG/1
4 CAPSULE ORAL 2 TIMES DAILY
Qty: 720 CAPSULE | Refills: 3 | Status: SHIPPED | OUTPATIENT
Start: 2025-08-07 | End: 2026-08-07

## 2025-08-08 ENCOUNTER — TELEPHONE (OUTPATIENT)
Facility: HOSPITAL | Age: 55
End: 2025-08-08
Payer: MEDICAID

## 2025-08-08 DIAGNOSIS — Z94.4 LIVER REPLACED BY TRANSPLANT (MULTI): ICD-10-CM

## 2025-08-08 DIAGNOSIS — Z94.0 KIDNEY REPLACED BY TRANSPLANT (HHS-HCC): ICD-10-CM

## 2025-08-11 ENCOUNTER — LAB (OUTPATIENT)
Dept: LAB | Facility: HOSPITAL | Age: 55
End: 2025-08-11
Payer: MEDICAID

## 2025-08-11 LAB
ALBUMIN SERPL BCP-MCNC: 4.1 G/DL (ref 3.4–5)
ALP SERPL-CCNC: 69 U/L (ref 33–120)
ALT SERPL W P-5'-P-CCNC: 7 U/L (ref 10–52)
ANION GAP SERPL CALC-SCNC: 13 MMOL/L (ref 10–20)
AST SERPL W P-5'-P-CCNC: 9 U/L (ref 9–39)
BASOPHILS # BLD AUTO: 0.06 X10*3/UL (ref 0–0.1)
BASOPHILS NFR BLD AUTO: 0.8 %
BILIRUB DIRECT SERPL-MCNC: 0 MG/DL (ref 0–0.3)
BILIRUB SERPL-MCNC: 0.3 MG/DL (ref 0–1.2)
BUN SERPL-MCNC: 45 MG/DL (ref 6–23)
CALCIUM SERPL-MCNC: 8.9 MG/DL (ref 8.6–10.3)
CHLORIDE SERPL-SCNC: 111 MMOL/L (ref 98–107)
CO2 SERPL-SCNC: 20 MMOL/L (ref 21–32)
CREAT SERPL-MCNC: 2.04 MG/DL (ref 0.5–1.3)
EGFRCR SERPLBLD CKD-EPI 2021: 37 ML/MIN/1.73M*2
EOSINOPHIL # BLD AUTO: 0.15 X10*3/UL (ref 0–0.7)
EOSINOPHIL NFR BLD AUTO: 2 %
ERYTHROCYTE [DISTWIDTH] IN BLOOD BY AUTOMATED COUNT: 14.1 % (ref 11.5–14.5)
GGT SERPL-CCNC: 23 U/L (ref 5–64)
GLUCOSE SERPL-MCNC: 109 MG/DL (ref 74–99)
HCT VFR BLD AUTO: 32.5 % (ref 41–52)
HGB BLD-MCNC: 10.3 G/DL (ref 13.5–17.5)
IMM GRANULOCYTES # BLD AUTO: 0.09 X10*3/UL (ref 0–0.7)
IMM GRANULOCYTES NFR BLD AUTO: 1.2 % (ref 0–0.9)
LYMPHOCYTES # BLD AUTO: 1.15 X10*3/UL (ref 1.2–4.8)
LYMPHOCYTES NFR BLD AUTO: 15.4 %
MAGNESIUM SERPL-MCNC: 1.61 MG/DL (ref 1.6–2.4)
MCH RBC QN AUTO: 29.9 PG (ref 26–34)
MCHC RBC AUTO-ENTMCNC: 31.7 G/DL (ref 32–36)
MCV RBC AUTO: 95 FL (ref 80–100)
MONOCYTES # BLD AUTO: 0.44 X10*3/UL (ref 0.1–1)
MONOCYTES NFR BLD AUTO: 5.9 %
NEUTROPHILS # BLD AUTO: 5.57 X10*3/UL (ref 1.2–7.7)
NEUTROPHILS NFR BLD AUTO: 74.7 %
NRBC BLD-RTO: 0 /100 WBCS (ref 0–0)
PHOSPHATE SERPL-MCNC: 3.9 MG/DL (ref 2.5–4.9)
PLATELET # BLD AUTO: 183 X10*3/UL (ref 150–450)
POTASSIUM SERPL-SCNC: 4.8 MMOL/L (ref 3.5–5.3)
PROT SERPL-MCNC: 6.4 G/DL (ref 6.4–8.2)
RBC # BLD AUTO: 3.44 X10*6/UL (ref 4.5–5.9)
SODIUM SERPL-SCNC: 139 MMOL/L (ref 136–145)
WBC # BLD AUTO: 7.5 X10*3/UL (ref 4.4–11.3)

## 2025-08-11 PROCEDURE — 80053 COMPREHEN METABOLIC PANEL: CPT

## 2025-08-11 PROCEDURE — 85025 COMPLETE CBC W/AUTO DIFF WBC: CPT

## 2025-08-11 PROCEDURE — 86833 HLA CLASS II HIGH DEFIN QUAL: CPT

## 2025-08-11 PROCEDURE — 84100 ASSAY OF PHOSPHORUS: CPT

## 2025-08-11 PROCEDURE — 86832 HLA CLASS I HIGH DEFIN QUAL: CPT

## 2025-08-11 PROCEDURE — 83735 ASSAY OF MAGNESIUM: CPT

## 2025-08-11 PROCEDURE — 82977 ASSAY OF GGT: CPT

## 2025-08-11 PROCEDURE — 80197 ASSAY OF TACROLIMUS: CPT

## 2025-08-12 DIAGNOSIS — Z94.4 LIVER REPLACED BY TRANSPLANT (MULTI): ICD-10-CM

## 2025-08-12 LAB — TACROLIMUS BLD-MCNC: 6.9 NG/ML

## 2025-08-13 LAB
HLA RESULTS: NORMAL
HLA-A+B+C AB NFR SER: NORMAL %
HLA-DP+DQ+DR AB NFR SER: NORMAL %

## 2025-08-15 DIAGNOSIS — Z94.4 LIVER REPLACED BY TRANSPLANT (MULTI): ICD-10-CM

## 2025-08-17 DIAGNOSIS — Z94.0 KIDNEY REPLACED BY TRANSPLANT (HHS-HCC): ICD-10-CM

## 2025-08-17 DIAGNOSIS — Z94.4 LIVER TRANSPLANT RECIPIENT (MULTI): ICD-10-CM

## 2025-08-17 RX ORDER — ACYCLOVIR 400 MG/1
400 TABLET ORAL 2 TIMES DAILY
Qty: 60 TABLET | Refills: 1 | Status: SHIPPED | OUTPATIENT
Start: 2025-08-17 | End: 2025-08-20 | Stop reason: ALTCHOICE

## 2025-08-19 ENCOUNTER — LAB (OUTPATIENT)
Dept: LAB | Facility: HOSPITAL | Age: 55
End: 2025-08-19
Payer: MEDICAID

## 2025-08-19 DIAGNOSIS — Z94.4 LIVER REPLACED BY TRANSPLANT (MULTI): ICD-10-CM

## 2025-08-19 LAB
ALBUMIN SERPL BCP-MCNC: 3.8 G/DL (ref 3.4–5)
ALP SERPL-CCNC: 68 U/L (ref 33–120)
ALT SERPL W P-5'-P-CCNC: 9 U/L (ref 10–52)
ANION GAP SERPL CALC-SCNC: 15 MMOL/L (ref 10–20)
AST SERPL W P-5'-P-CCNC: 11 U/L (ref 9–39)
BASOPHILS # BLD AUTO: 0.07 X10*3/UL (ref 0–0.1)
BASOPHILS NFR BLD AUTO: 0.9 %
BILIRUB DIRECT SERPL-MCNC: 0 MG/DL (ref 0–0.3)
BILIRUB SERPL-MCNC: 0.4 MG/DL (ref 0–1.2)
BUN SERPL-MCNC: 32 MG/DL (ref 6–23)
CALCIUM SERPL-MCNC: 8.4 MG/DL (ref 8.6–10.3)
CHLORIDE SERPL-SCNC: 107 MMOL/L (ref 98–107)
CO2 SERPL-SCNC: 23 MMOL/L (ref 21–32)
CREAT SERPL-MCNC: 2.19 MG/DL (ref 0.5–1.3)
EGFRCR SERPLBLD CKD-EPI 2021: 34 ML/MIN/1.73M*2
EOSINOPHIL # BLD AUTO: 0.11 X10*3/UL (ref 0–0.7)
EOSINOPHIL NFR BLD AUTO: 1.5 %
ERYTHROCYTE [DISTWIDTH] IN BLOOD BY AUTOMATED COUNT: 13.8 % (ref 11.5–14.5)
GLUCOSE SERPL-MCNC: 221 MG/DL (ref 74–99)
HCT VFR BLD AUTO: 33.7 % (ref 41–52)
HGB BLD-MCNC: 10.2 G/DL (ref 13.5–17.5)
IMM GRANULOCYTES # BLD AUTO: 0.08 X10*3/UL (ref 0–0.7)
IMM GRANULOCYTES NFR BLD AUTO: 1.1 % (ref 0–0.9)
LYMPHOCYTES # BLD AUTO: 1.05 X10*3/UL (ref 1.2–4.8)
LYMPHOCYTES NFR BLD AUTO: 13.9 %
MAGNESIUM SERPL-MCNC: 1.52 MG/DL (ref 1.6–2.4)
MCH RBC QN AUTO: 28.8 PG (ref 26–34)
MCHC RBC AUTO-ENTMCNC: 30.3 G/DL (ref 32–36)
MCV RBC AUTO: 95 FL (ref 80–100)
MONOCYTES # BLD AUTO: 0.48 X10*3/UL (ref 0.1–1)
MONOCYTES NFR BLD AUTO: 6.4 %
NEUTROPHILS # BLD AUTO: 5.74 X10*3/UL (ref 1.2–7.7)
NEUTROPHILS NFR BLD AUTO: 76.2 %
NRBC BLD-RTO: 0 /100 WBCS (ref 0–0)
PHOSPHATE SERPL-MCNC: 3.3 MG/DL (ref 2.5–4.9)
PLATELET # BLD AUTO: 170 X10*3/UL (ref 150–450)
POTASSIUM SERPL-SCNC: 4.8 MMOL/L (ref 3.5–5.3)
PROT SERPL-MCNC: 6.4 G/DL (ref 6.4–8.2)
RBC # BLD AUTO: 3.54 X10*6/UL (ref 4.5–5.9)
SODIUM SERPL-SCNC: 140 MMOL/L (ref 136–145)
WBC # BLD AUTO: 7.5 X10*3/UL (ref 4.4–11.3)

## 2025-08-19 PROCEDURE — 85025 COMPLETE CBC W/AUTO DIFF WBC: CPT

## 2025-08-19 PROCEDURE — 80053 COMPREHEN METABOLIC PANEL: CPT

## 2025-08-19 PROCEDURE — 87799 DETECT AGENT NOS DNA QUANT: CPT

## 2025-08-19 PROCEDURE — 80197 ASSAY OF TACROLIMUS: CPT

## 2025-08-19 PROCEDURE — 82977 ASSAY OF GGT: CPT

## 2025-08-19 PROCEDURE — 84100 ASSAY OF PHOSPHORUS: CPT

## 2025-08-19 PROCEDURE — 83735 ASSAY OF MAGNESIUM: CPT

## 2025-08-20 ENCOUNTER — ANESTHESIA EVENT (OUTPATIENT)
Dept: GASTROENTEROLOGY | Facility: HOSPITAL | Age: 55
End: 2025-08-20
Payer: MEDICAID

## 2025-08-20 ENCOUNTER — ANESTHESIA (OUTPATIENT)
Dept: GASTROENTEROLOGY | Facility: HOSPITAL | Age: 55
End: 2025-08-20
Payer: MEDICAID

## 2025-08-20 ENCOUNTER — HOSPITAL ENCOUNTER (OUTPATIENT)
Dept: GASTROENTEROLOGY | Facility: HOSPITAL | Age: 55
Discharge: HOME | End: 2025-08-20
Payer: MEDICAID

## 2025-08-20 VITALS
WEIGHT: 230 LBS | DIASTOLIC BLOOD PRESSURE: 89 MMHG | HEART RATE: 60 BPM | OXYGEN SATURATION: 98 % | HEIGHT: 70 IN | BODY MASS INDEX: 32.93 KG/M2 | TEMPERATURE: 97.3 F | RESPIRATION RATE: 18 BRPM | SYSTOLIC BLOOD PRESSURE: 179 MMHG

## 2025-08-20 DIAGNOSIS — K83.1 BILE DUCT STRICTURE: ICD-10-CM

## 2025-08-20 DIAGNOSIS — K83.1 BILE DUCT STRICTURE: Primary | ICD-10-CM

## 2025-08-20 LAB
CMV DNA SERPL NAA+PROBE-LOG IU: NORMAL {LOG_IU}/ML
EBV DNA SPEC NAA+PROBE-LOG#: NORMAL {LOG_COPIES}/ML
GGT SERPL-CCNC: 32 U/L (ref 5–64)
GLUCOSE BLD MANUAL STRIP-MCNC: 132 MG/DL (ref 74–99)
LABORATORY COMMENT REPORT: NOT DETECTED
LABORATORY COMMENT REPORT: NOT DETECTED
TACROLIMUS BLD-MCNC: 11.8 NG/ML

## 2025-08-20 PROCEDURE — 43276 ERCP STENT EXCHANGE W/DILATE: CPT | Performed by: INTERNAL MEDICINE

## 2025-08-20 PROCEDURE — A43276 PR ERCP BILIARY/PANC DUCT STENT EXCHANGE W/DIL AND WIRE: Performed by: NURSE ANESTHETIST, CERTIFIED REGISTERED

## 2025-08-20 PROCEDURE — 7100000002 HC RECOVERY ROOM TIME - EACH INCREMENTAL 1 MINUTE

## 2025-08-20 PROCEDURE — 2720000007 HC OR 272 NO HCPCS

## 2025-08-20 PROCEDURE — 2780000003 HC OR 278 NO HCPCS

## 2025-08-20 PROCEDURE — 7100000009 HC PHASE TWO TIME - INITIAL BASE CHARGE

## 2025-08-20 PROCEDURE — A43276 PR ERCP BILIARY/PANC DUCT STENT EXCHANGE W/DIL AND WIRE: Performed by: ANESTHESIOLOGY

## 2025-08-20 PROCEDURE — C1726 CATH, BAL DIL, NON-VASCULAR: HCPCS

## 2025-08-20 PROCEDURE — C1887 CATHETER, GUIDING: HCPCS

## 2025-08-20 PROCEDURE — 2500000004 HC RX 250 GENERAL PHARMACY W/ HCPCS (ALT 636 FOR OP/ED): Mod: SE | Performed by: NURSE ANESTHETIST, CERTIFIED REGISTERED

## 2025-08-20 PROCEDURE — 7100000010 HC PHASE TWO TIME - EACH INCREMENTAL 1 MINUTE

## 2025-08-20 PROCEDURE — 7100000001 HC RECOVERY ROOM TIME - INITIAL BASE CHARGE

## 2025-08-20 PROCEDURE — 3700000001 HC GENERAL ANESTHESIA TIME - INITIAL BASE CHARGE

## 2025-08-20 PROCEDURE — C2617 STENT, NON-COR, TEM W/O DEL: HCPCS

## 2025-08-20 PROCEDURE — 82947 ASSAY GLUCOSE BLOOD QUANT: CPT

## 2025-08-20 PROCEDURE — 3700000002 HC GENERAL ANESTHESIA TIME - EACH INCREMENTAL 1 MINUTE

## 2025-08-20 RX ORDER — MIDAZOLAM HYDROCHLORIDE 2 MG/2ML
INJECTION, SOLUTION INTRAMUSCULAR; INTRAVENOUS AS NEEDED
Status: DISCONTINUED | OUTPATIENT
Start: 2025-08-20 | End: 2025-08-20

## 2025-08-20 RX ORDER — GLYCOPYRROLATE 0.2 MG/ML
INJECTION INTRAMUSCULAR; INTRAVENOUS AS NEEDED
Status: DISCONTINUED | OUTPATIENT
Start: 2025-08-20 | End: 2025-08-20

## 2025-08-20 RX ORDER — ONDANSETRON HYDROCHLORIDE 2 MG/ML
INJECTION, SOLUTION INTRAVENOUS AS NEEDED
Status: DISCONTINUED | OUTPATIENT
Start: 2025-08-20 | End: 2025-08-20

## 2025-08-20 RX ORDER — ROCURONIUM BROMIDE 10 MG/ML
INJECTION, SOLUTION INTRAVENOUS AS NEEDED
Status: DISCONTINUED | OUTPATIENT
Start: 2025-08-20 | End: 2025-08-20

## 2025-08-20 RX ORDER — NEOSTIGMINE METHYLSULFATE 1 MG/ML
INJECTION INTRAVENOUS AS NEEDED
Status: DISCONTINUED | OUTPATIENT
Start: 2025-08-20 | End: 2025-08-20

## 2025-08-20 RX ORDER — FENTANYL CITRATE 50 UG/ML
INJECTION, SOLUTION INTRAMUSCULAR; INTRAVENOUS AS NEEDED
Status: DISCONTINUED | OUTPATIENT
Start: 2025-08-20 | End: 2025-08-20

## 2025-08-20 RX ORDER — LIDOCAINE HYDROCHLORIDE 20 MG/ML
INJECTION, SOLUTION INFILTRATION; PERINEURAL AS NEEDED
Status: DISCONTINUED | OUTPATIENT
Start: 2025-08-20 | End: 2025-08-20

## 2025-08-20 RX ORDER — PROPOFOL 10 MG/ML
INJECTION, EMULSION INTRAVENOUS AS NEEDED
Status: DISCONTINUED | OUTPATIENT
Start: 2025-08-20 | End: 2025-08-20

## 2025-08-20 RX ADMIN — PROPOFOL 30 MG: 10 INJECTION, EMULSION INTRAVENOUS at 09:16

## 2025-08-20 RX ADMIN — ROCURONIUM BROMIDE 50 MG: 10 INJECTION, SOLUTION INTRAVENOUS at 09:16

## 2025-08-20 RX ADMIN — LIDOCAINE HYDROCHLORIDE 50 MG: 20 INJECTION, SOLUTION INFILTRATION; PERINEURAL at 09:15

## 2025-08-20 RX ADMIN — PROPOFOL 20 MG: 10 INJECTION, EMULSION INTRAVENOUS at 09:17

## 2025-08-20 RX ADMIN — SODIUM CHLORIDE, SODIUM LACTATE, POTASSIUM CHLORIDE, AND CALCIUM CHLORIDE: 600; 310; 30; 20 INJECTION, SOLUTION INTRAVENOUS at 09:11

## 2025-08-20 RX ADMIN — FENTANYL CITRATE 100 MCG: 50 INJECTION, SOLUTION INTRAMUSCULAR; INTRAVENOUS at 09:15

## 2025-08-20 RX ADMIN — ONDANSETRON 4 MG: 2 INJECTION INTRAMUSCULAR; INTRAVENOUS at 09:56

## 2025-08-20 RX ADMIN — PROPOFOL 150 MG: 10 INJECTION, EMULSION INTRAVENOUS at 09:15

## 2025-08-20 RX ADMIN — MIDAZOLAM HYDROCHLORIDE 2 MG: 2 INJECTION, SOLUTION INTRAMUSCULAR; INTRAVENOUS at 09:09

## 2025-08-20 RX ADMIN — DEXAMETHASONE SODIUM PHOSPHATE 6 MG: 4 INJECTION INTRA-ARTICULAR; INTRALESIONAL; INTRAMUSCULAR; INTRAVENOUS; SOFT TISSUE at 09:30

## 2025-08-20 RX ADMIN — GLYCOPYRROLATE 0.8 MG: 0.2 INJECTION INTRAMUSCULAR; INTRAVENOUS at 09:57

## 2025-08-20 RX ADMIN — NEOSTIGMINE METHYLSULFATE 5 MG: 1 INJECTION INTRAVENOUS at 09:57

## 2025-08-20 ASSESSMENT — PAIN - FUNCTIONAL ASSESSMENT
PAIN_FUNCTIONAL_ASSESSMENT: 0-10

## 2025-08-20 ASSESSMENT — PAIN SCALES - GENERAL
PAINLEVEL_OUTOF10: 0 - NO PAIN

## 2025-08-21 DIAGNOSIS — Z94.4 LIVER REPLACED BY TRANSPLANT (MULTI): ICD-10-CM

## 2025-08-22 ENCOUNTER — TELEPHONE (OUTPATIENT)
Facility: HOSPITAL | Age: 55
End: 2025-08-22
Payer: MEDICAID

## 2025-08-22 DIAGNOSIS — Z94.4 LIVER REPLACED BY TRANSPLANT (MULTI): ICD-10-CM

## 2025-08-22 DIAGNOSIS — Z94.0 KIDNEY REPLACED BY TRANSPLANT (HHS-HCC): ICD-10-CM

## 2025-08-26 DIAGNOSIS — Z94.4 LIVER REPLACED BY TRANSPLANT (MULTI): ICD-10-CM

## 2025-08-27 ENCOUNTER — RESULTS FOLLOW-UP (OUTPATIENT)
Facility: HOSPITAL | Age: 55
End: 2025-08-27

## 2025-08-27 ENCOUNTER — LAB (OUTPATIENT)
Dept: LAB | Facility: HOSPITAL | Age: 55
End: 2025-08-27
Payer: MEDICAID

## 2025-08-27 DIAGNOSIS — Z94.4 LIVER TRANSPLANT STATUS: Primary | ICD-10-CM

## 2025-08-27 LAB
ALBUMIN SERPL BCP-MCNC: 4.1 G/DL (ref 3.4–5)
ALP SERPL-CCNC: 80 U/L (ref 33–120)
ALT SERPL W P-5'-P-CCNC: 8 U/L (ref 10–52)
ANION GAP SERPL CALC-SCNC: 12 MMOL/L (ref 10–20)
AST SERPL W P-5'-P-CCNC: 10 U/L (ref 9–39)
BASOPHILS # BLD AUTO: 0.1 X10*3/UL (ref 0–0.1)
BASOPHILS NFR BLD AUTO: 1 %
BILIRUB DIRECT SERPL-MCNC: 0.1 MG/DL (ref 0–0.3)
BILIRUB SERPL-MCNC: 0.3 MG/DL (ref 0–1.2)
BUN SERPL-MCNC: 41 MG/DL (ref 6–23)
CALCIUM SERPL-MCNC: 8.6 MG/DL (ref 8.6–10.3)
CHLORIDE SERPL-SCNC: 110 MMOL/L (ref 98–107)
CO2 SERPL-SCNC: 20 MMOL/L (ref 21–32)
CREAT SERPL-MCNC: 2.22 MG/DL (ref 0.5–1.3)
EGFRCR SERPLBLD CKD-EPI 2021: 34 ML/MIN/1.73M*2
EOSINOPHIL # BLD AUTO: 0.15 X10*3/UL (ref 0–0.7)
EOSINOPHIL NFR BLD AUTO: 1.5 %
ERYTHROCYTE [DISTWIDTH] IN BLOOD BY AUTOMATED COUNT: 13.9 % (ref 11.5–14.5)
GLUCOSE SERPL-MCNC: 110 MG/DL (ref 74–99)
HCT VFR BLD AUTO: 34.9 % (ref 41–52)
HGB BLD-MCNC: 11 G/DL (ref 13.5–17.5)
IMM GRANULOCYTES # BLD AUTO: 0.06 X10*3/UL (ref 0–0.7)
IMM GRANULOCYTES NFR BLD AUTO: 0.6 % (ref 0–0.9)
LYMPHOCYTES # BLD AUTO: 1.28 X10*3/UL (ref 1.2–4.8)
LYMPHOCYTES NFR BLD AUTO: 13 %
MAGNESIUM SERPL-MCNC: 1.74 MG/DL (ref 1.6–2.4)
MCH RBC QN AUTO: 28.9 PG (ref 26–34)
MCHC RBC AUTO-ENTMCNC: 31.5 G/DL (ref 32–36)
MCV RBC AUTO: 92 FL (ref 80–100)
MONOCYTES # BLD AUTO: 0.63 X10*3/UL (ref 0.1–1)
MONOCYTES NFR BLD AUTO: 6.4 %
NEUTROPHILS # BLD AUTO: 7.62 X10*3/UL (ref 1.2–7.7)
NEUTROPHILS NFR BLD AUTO: 77.5 %
NRBC BLD-RTO: 0 /100 WBCS (ref 0–0)
PHOSPHATE SERPL-MCNC: 3.6 MG/DL (ref 2.5–4.9)
PLATELET # BLD AUTO: 208 X10*3/UL (ref 150–450)
POTASSIUM SERPL-SCNC: 5.2 MMOL/L (ref 3.5–5.3)
PROT SERPL-MCNC: 6.5 G/DL (ref 6.4–8.2)
RBC # BLD AUTO: 3.81 X10*6/UL (ref 4.5–5.9)
SODIUM SERPL-SCNC: 137 MMOL/L (ref 136–145)
WBC # BLD AUTO: 9.8 X10*3/UL (ref 4.4–11.3)

## 2025-08-27 PROCEDURE — 36415 COLL VENOUS BLD VENIPUNCTURE: CPT

## 2025-08-28 LAB
GGT SERPL-CCNC: 42 U/L (ref 5–64)
TACROLIMUS BLD-MCNC: 9.7 NG/ML

## 2025-08-29 DIAGNOSIS — Z94.4 LIVER REPLACED BY TRANSPLANT (MULTI): ICD-10-CM

## 2025-09-03 ENCOUNTER — LAB (OUTPATIENT)
Dept: LAB | Facility: HOSPITAL | Age: 55
End: 2025-09-03
Payer: MEDICAID

## 2025-09-03 PROCEDURE — 82977 ASSAY OF GGT: CPT

## 2025-09-03 PROCEDURE — 87799 DETECT AGENT NOS DNA QUANT: CPT

## 2025-09-03 PROCEDURE — 80197 ASSAY OF TACROLIMUS: CPT

## 2025-09-03 PROCEDURE — 83735 ASSAY OF MAGNESIUM: CPT

## 2025-09-03 PROCEDURE — 84100 ASSAY OF PHOSPHORUS: CPT

## 2025-09-03 PROCEDURE — 80053 COMPREHEN METABOLIC PANEL: CPT

## 2025-09-03 PROCEDURE — 82570 ASSAY OF URINE CREATININE: CPT

## 2025-09-03 PROCEDURE — 84156 ASSAY OF PROTEIN URINE: CPT

## 2025-09-03 PROCEDURE — 85025 COMPLETE CBC W/AUTO DIFF WBC: CPT

## 2025-09-04 ENCOUNTER — APPOINTMENT (OUTPATIENT)
Facility: HOSPITAL | Age: 55
End: 2025-09-04
Payer: MEDICAID

## 2026-06-22 ENCOUNTER — APPOINTMENT (OUTPATIENT)
Dept: PRIMARY CARE | Facility: CLINIC | Age: 56
End: 2026-06-22
Payer: MEDICAID

## (undated) DEVICE — STRIP, SKIN CLOSURE, STERI STRIP, REINFORCED, 0.5 X 4 IN

## (undated) DEVICE — ENDO, GIA HANDLE, 12MM, SHORT

## (undated) DEVICE — Device

## (undated) DEVICE — COVER, TABLE, 44 X 75 IN, DISPOSABLE, LF, STERILE

## (undated) DEVICE — HOLSTER, ELECTROSURGERY ACCESSORY, STERILE

## (undated) DEVICE — REST, HEAD, BAGEL, 9 IN

## (undated) DEVICE — IRRIGATION SET, CYSTOSCOPY, F/CONSTANT/INTERMITTENT, 8 GTT/CC, 77 IN

## (undated) DEVICE — COVER, EQUIPMENT, SOLUTION, SLUSH, 112 X 168 CM, LF, STERILE

## (undated) DEVICE — DRAPE, INSTRUMENT, W/POUCH, STERI DRAPE, 7 X 11 IN, DISPOSABLE, STERILE

## (undated) DEVICE — DRAPE, FLUID WARMER

## (undated) DEVICE — TRAY, SUTURE REMOVAL

## (undated) DEVICE — SEALANT, HEMOSTATIC, FLOSEAL, 10 ML

## (undated) DEVICE — ADMINISTRATION SET, PRIMARY, 3 INJECTION SITES, CONTINU-FLO, INTERLINK, 10 GTT/CC, 110 IN, LUER LOCK, MALE

## (undated) DEVICE — CUP, SOLUTION

## (undated) DEVICE — SPONGE, LAP, XRAY DECT, 18IN X 18IN, W/LOOP, STERILE

## (undated) DEVICE — CATHETER, RED RUBBER 16FR

## (undated) DEVICE — CONTAINER, SPECIMEN, 120 ML, STERILE

## (undated) DEVICE — MANIFOLD, 4 PORT NEPTUNE STANDARD

## (undated) DEVICE — ENDO, GIA 60 MED/THCK ARTIC VASC RELOAD

## (undated) DEVICE — TRAY, SURESTEP, URINE METER, 14FR, SILICONE

## (undated) DEVICE — CLIP, SPRING, BULLDOG, FOGARTY, SOFT JAW, 6 MM, LF

## (undated) DEVICE — SHEET, SPLIT, CARDIOVASCULAR TIBURON

## (undated) DEVICE — COVER, CART, 45 X 27 X 48 IN, CLEAR

## (undated) DEVICE — DRAPE, INCISE, ANTIMICROBIAL, IOBAN 2, LARGE, 17 X 23 IN, DISPOSABLE, STERILE

## (undated) DEVICE — PAD, GROUNDING, ELECTROSURGICAL, DUAL

## (undated) DEVICE — DRAPE, ISOLATION, BAG, DRAW STRING CLOSURE, STERI DRAPE, LARGE, 20 X 20 IN, DISPOSABLE, STERILE

## (undated) DEVICE — SPONGE, HEMOSTATIC, CELLULOSE, SURGICEL, NU-KNIT, 6 X 9 IN

## (undated) DEVICE — PAD, GROUNDING, ELECTROSURGICAL, W/9 FT CABLE, POLYHESIVE II, ADULT, LF

## (undated) DEVICE — LIGASURE, MARYLAND JAW, OPEN DELIVERY

## (undated) DEVICE — GUIDEWIRE, .035 X 180 BENTSON STR

## (undated) DEVICE — TIP, SUCTION, YANKAUER, FLEXIBLE

## (undated) DEVICE — HANDPIECE, ABC, SINGLE FUNCTION, MALLEABLE, W/CORD, BEND-A-BEAM, 3 IN, LF

## (undated) DEVICE — DRAPE, MAGENTIC INSTRUMENT, 12X16

## (undated) DEVICE — BAG, DECANTER

## (undated) DEVICE — SPONGE, HEMOSTATIC, CELLULOSE, SURGICEL, 2 X 14 IN

## (undated) DEVICE — DRAPE, SHEET, LAPAROTOMY, W/ISO-BAC, W/ARMBOARD COVERS, 98 X 122 IN, DISPOSABLE, LF, STERILE

## (undated) DEVICE — DRAPE PACK, LIVERY I & II, CUSTOM, UHC

## (undated) DEVICE — TUBE, SALEM SUMP, 16 FR X 48IN, ENFIT

## (undated) DEVICE — TOWEL, OR, XRAY DETECT 5 PK, WHITE, 17X26, W/DMT TAG, ST